# Patient Record
Sex: FEMALE | Race: NATIVE HAWAIIAN OR OTHER PACIFIC ISLANDER | NOT HISPANIC OR LATINO
[De-identification: names, ages, dates, MRNs, and addresses within clinical notes are randomized per-mention and may not be internally consistent; named-entity substitution may affect disease eponyms.]

---

## 2017-04-19 ENCOUNTER — APPOINTMENT (OUTPATIENT)
Dept: HEART AND VASCULAR | Facility: CLINIC | Age: 67
End: 2017-04-19

## 2021-08-20 ENCOUNTER — LABORATORY RESULT (OUTPATIENT)
Age: 71
End: 2021-08-20

## 2021-08-20 ENCOUNTER — NON-APPOINTMENT (OUTPATIENT)
Age: 71
End: 2021-08-20

## 2021-08-20 ENCOUNTER — APPOINTMENT (OUTPATIENT)
Dept: INTERNAL MEDICINE | Facility: CLINIC | Age: 71
End: 2021-08-20
Payer: MEDICARE

## 2021-08-20 VITALS
OXYGEN SATURATION: 98 % | WEIGHT: 136 LBS | HEIGHT: 61 IN | DIASTOLIC BLOOD PRESSURE: 86 MMHG | SYSTOLIC BLOOD PRESSURE: 142 MMHG | RESPIRATION RATE: 16 BRPM | HEART RATE: 82 BPM | TEMPERATURE: 97.4 F | BODY MASS INDEX: 25.68 KG/M2

## 2021-08-20 DIAGNOSIS — R11.2 NAUSEA WITH VOMITING, UNSPECIFIED: ICD-10-CM

## 2021-08-20 PROCEDURE — G0439: CPT

## 2021-08-20 PROCEDURE — 36415 COLL VENOUS BLD VENIPUNCTURE: CPT

## 2021-08-20 PROCEDURE — 93000 ELECTROCARDIOGRAM COMPLETE: CPT | Mod: 59

## 2021-08-20 PROCEDURE — 99214 OFFICE O/P EST MOD 30 MIN: CPT | Mod: 25

## 2021-08-20 PROCEDURE — 99497 ADVNCD CARE PLAN 30 MIN: CPT

## 2021-08-20 PROCEDURE — G0444 DEPRESSION SCREEN ANNUAL: CPT | Mod: 59

## 2021-08-20 PROCEDURE — G0442 ANNUAL ALCOHOL SCREEN 15 MIN: CPT | Mod: 59

## 2021-08-22 PROBLEM — R11.2 NAUSEA WITH VOMITING: Status: ACTIVE | Noted: 2021-08-22

## 2021-08-22 LAB
25(OH)D3 SERPL-MCNC: 80.2 NG/ML
ALBUMIN SERPL ELPH-MCNC: 4.7 G/DL
ALP BLD-CCNC: 79 U/L
ALT SERPL-CCNC: 19 U/L
ANION GAP SERPL CALC-SCNC: 14 MMOL/L
APPEARANCE: ABNORMAL
AST SERPL-CCNC: 20 U/L
BASOPHILS # BLD AUTO: 0.02 K/UL
BASOPHILS NFR BLD AUTO: 0.4 %
BILIRUB SERPL-MCNC: 0.7 MG/DL
BILIRUBIN URINE: NEGATIVE
BLOOD URINE: NEGATIVE
BUN SERPL-MCNC: 15 MG/DL
CALCIUM SERPL-MCNC: 10.3 MG/DL
CHLORIDE SERPL-SCNC: 104 MMOL/L
CHOLEST SERPL-MCNC: 215 MG/DL
CO2 SERPL-SCNC: 24 MMOL/L
COLOR: YELLOW
CREAT SERPL-MCNC: 0.72 MG/DL
EOSINOPHIL # BLD AUTO: 0.11 K/UL
EOSINOPHIL NFR BLD AUTO: 2 %
ESTIMATED AVERAGE GLUCOSE: 94 MG/DL
GGT SERPL-CCNC: 13 U/L
GLUCOSE QUALITATIVE U: NEGATIVE
GLUCOSE SERPL-MCNC: 97 MG/DL
HBA1C MFR BLD HPLC: 4.9 %
HCT VFR BLD CALC: 47 %
HDLC SERPL-MCNC: 62 MG/DL
HGB BLD-MCNC: 15.3 G/DL
IMM GRANULOCYTES NFR BLD AUTO: 0.4 %
KETONES URINE: NEGATIVE
LDLC SERPL CALC-MCNC: 127 MG/DL
LEUKOCYTE ESTERASE URINE: NEGATIVE
LYMPHOCYTES # BLD AUTO: 1.98 K/UL
LYMPHOCYTES NFR BLD AUTO: 35.3 %
MAN DIFF?: NORMAL
MCHC RBC-ENTMCNC: 30.8 PG
MCHC RBC-ENTMCNC: 32.6 GM/DL
MCV RBC AUTO: 94.8 FL
MONOCYTES # BLD AUTO: 0.51 K/UL
MONOCYTES NFR BLD AUTO: 9.1 %
NEUTROPHILS # BLD AUTO: 2.97 K/UL
NEUTROPHILS NFR BLD AUTO: 52.8 %
NITRITE URINE: NEGATIVE
NONHDLC SERPL-MCNC: 153 MG/DL
PH URINE: 7
PLATELET # BLD AUTO: 218 K/UL
POTASSIUM SERPL-SCNC: 3.8 MMOL/L
PROT SERPL-MCNC: 6.7 G/DL
PROTEIN URINE: NEGATIVE
RBC # BLD: 4.96 M/UL
RBC # FLD: 13.5 %
SODIUM SERPL-SCNC: 142 MMOL/L
SPECIFIC GRAVITY URINE: 1.02
TRIGL SERPL-MCNC: 128 MG/DL
TSH SERPL-ACNC: 2.04 UIU/ML
UROBILINOGEN URINE: NORMAL
WBC # FLD AUTO: 5.61 K/UL

## 2021-08-22 NOTE — DATA REVIEWED
[FreeTextEntry1] : Cholesterol 215  vitamin D is 80.. EKG normal sinus rhythm no acute findings.  Questionable read of anteroseptal infarct age-indeterminate.  No history of MIs low risk patient.

## 2021-08-22 NOTE — HISTORY OF PRESENT ILLNESS
[FreeTextEntry1] : annual wellness, f/u, GHM, hx of lipids, gluc intol, osteoporosis, blood work [de-identified] : 69 yr old female comes for an annual wellness, general health maintenance of lipids, glucose intolerance, osteoporosis, routine blood work.\par \par Pt is fully vacinated against covid-19\par Is fasting for blood work\par Patient became lightheaded and nauseous with dry heaves prior to blood test.. Vital signs stable patient feels she was anxious and fasting from last night made her feel unwell.\par ROS as documented below.  Patient denies fever chills cough shortness of breath. Patient is now retired from work.\par States that she tries to exercise\par Voices no further complaints.

## 2021-08-22 NOTE — COUNSELING
[Fall prevention counseling provided] : Fall prevention counseling provided [Adequate lighting] : Adequate lighting [No throw rugs] : No throw rugs [Use proper foot wear] : Use proper foot wear [Use recommended devices] : Use recommended devices [Behavioral health counseling provided] : Behavioral health counseling provided [Sleep ___ hours/day] : Sleep [unfilled] hours/day [Engage in a relaxing activity] : Engage in a relaxing activity [Plan in advance] : Plan in advance [de-identified] : Medical Annual wellness visit completed:\par HRA completed and reviewed with patient\par Medical, family, surgical history reviewed with patient and updated\par List of current providers r/w patient and updated\par Vitals, BMI reviewed and discussed along with healthy BMI goals. Dietary counseling x 15 minutes provided\par Depression PHQ 9 completed and reviewed \par Annual safety assessment reviewed\par discussed advanced directives\par smoking cessation counseling provided\par Established routine screening and immunization schedules\par \par VACCINATION & OTHER TX RECOMMENDATIONS\par \par ASA preventative therapy\par Calcium/Vitamin D supplementation\par  \par Dietary counseling, nutrition referral\par risks vs. benefits d/w patient. routine vaccination and vaccination schedules and recommendation d/w patient\par \par Vaccines recommended: \par * pneumovax (once after 65) & prevnar\par * annual Influenza vaccine\par * Hep B vaccines\par * zostavax\par * Tdap\par \par Colorectal screening recommended; screening colonoscopy q10yr, flex sig q5yr, annual fecal occult testing\par BMD recommended biennially for osteoporosis screening\par Glaucoma screening recommended, annual optho evals\par Cardiovascular screening and blood tests recommended and discussed w/ patient, cholesterol screening and dietary counseling\par AAA recommended x 1\par \par Met with MOISÉS MAYO, who was willing to discuss advance care planning. \par Our advance care planning conversation included a discussion about:\par 1. The value and importance of advance care planning.\par 2. Experiences with loved ones who have been seriously ill or have .\par 3. Exploration of personal, cultural, or spiritual beliefs that might influence medical decisions.\par 4. Exploration of goals of care in the event of a sudden injury or illness.\par 5. Identification of a health care agent.\par 6. Review and update, or completion of, an advance directive.\par Start time: 10 End time: 1015\par \par diet and exercise weight loss.  Low-salt low-fat ADA diet/ htn- Discussed diabetes physiology\par - Discussed importance of monitoring blood glucose levels\par - Encouraged a low fat/low cholesterol diet\par - Discussed symptoms of hyperglycemia and hypoglycemia\par - Discussed ADA glucose goals\par - Discussed  HGB A1c and the effects of blood glucose on the level\par - Discussed Healthy eating, avoidance of concentrated sweets, and to include vegetables by at least 2 meals a day\par - Discussed regular exercise\par - Discussed importance of follow up physician visits Limit intake of Sodium (Salt) to less than 2 grams a day to prevent fluid retention-swelling or worsening of symptoms. The importance of keeping the blood pressure at or below 130/80 to prevent stroke, heart attacks, kidney failure, blindness, and loss of limbs was  low chol diet. Avoid fried foods, red meat, butter, eggs, hard cheeses. Use canola or olive oil preferred. ::  was established in which goals would be set, monitoring would be done, and problem solving would also be addressed. The patient would be assisted using behavior change techniques, such as self-help and counseling through behavioral modification: Problem solving using hypnosis and positive medical reinforcement to achieve agreed-upon goals.\par \par Symptomatic patients : Test for influenza, if positive, treat for influenza and do not continue below. \par 1. Fever plus cough or shortness of breath : Test for RVP and COVID-19.\par 2.Indirect, circumstantial or unclear exposure to COVID-19, or other concerning cases not meeting above criteria: Please call [facility] to discuss testing. \par +++ All above cases must be reported to the [facility] registry. +++\par \par Asymptomatic patients: \par 1. Known first-degree direct-contact exposure to positive COVID-19 patient but asymptomatic: No testing PLUS 14 day self-quarantine. Pt to call if symptoms develop. Report to [facility] Registry.\par 2. No known exposure and asymptomatic, referred from outside healthcare organization: Please call [facility] to discuss testing. \par 3.All other asymptomatic patients with no known exposures: no testing, no exceptions.\par \par \par  [None] : None

## 2021-08-22 NOTE — REASON FOR VISIT
[Annual Wellness Visit] : an annual wellness visit [FreeTextEntry1] : Annual wellness exam blood work

## 2021-08-22 NOTE — ASSESSMENT
[FreeTextEntry1] : Qatari-speaking female was not mild distress due to anxiety and nausea prior to blood test.  Patient felt better after blood tests were given and patient had fluids.  Patient denies fever chills cough chest pain or shortness of breath she is fully vaccinated.

## 2021-08-22 NOTE — HEALTH RISK ASSESSMENT
[Good] : ~his/her~  mood as  good [Never (0 pts)] : Never (0 points) [No] : In the past 12 months have you used drugs other than those required for medical reasons? No [No falls in past year] : Patient reported no falls in the past year [0] : 2) Feeling down, depressed, or hopeless: Not at all (0) [PHQ-2 Negative - No further assessment needed] : PHQ-2 Negative - No further assessment needed [I have developed a follow-up plan documented below in the note.] : I have developed a follow-up plan documented below in the note. [Patient reported colonoscopy was normal] : Patient reported colonoscopy was normal [None] : None [Patient reported mammogram was normal] : Patient reported mammogram was normal [Patient reported PAP Smear was normal] : Patient reported PAP Smear was normal [Patient reported bone density results were normal] : Patient reported bone density results were normal [Alone] : lives alone [Retired] : retired [College] : College [Single] : single [Feels Safe at Home] : Feels safe at home [Fully functional (bathing, dressing, toileting, transferring, walking, feeding)] : Fully functional (bathing, dressing, toileting, transferring, walking, feeding) [Fully functional (using the telephone, shopping, preparing meals, housekeeping, doing laundry, using] : Fully functional and needs no help or supervision to perform IADLs (using the telephone, shopping, preparing meals, housekeeping, doing laundry, using transportation, managing medications and managing finances) [With Patient/Caregiver] : , with patient/caregiver [Designated Healthcare Proxy] : Designated healthcare proxy [Name: ___] : Health Care Proxy's Name: [unfilled]  [Relationship: ___] : Relationship: [unfilled] [I will adhere to the patient's wishes.] : I will adhere to the patient's wishes. [Time Spent: ___ minutes] : Time Spent: [unfilled] minutes [Yes] : Yes [] : No [Monthly or less (1 pt)] : Monthly or less (1 point) [Audit-CScore] : 1 [de-identified] :  Diet [de-identified] : Walking [[id number]] : 0 [Change in mental status noted] : No change in mental status noted [Language] : denies difficulty with language [Behavior] : denies difficulty with behavior [Learning/Retaining New Information] : denies difficulty learning/retaining new information [Spatial Ability and Orientation] : denies difficulty with spatial ability and orientation [Sexually Active] : not sexually active [Reports changes in hearing] : Reports no changes in hearing [Reports changes in vision] : Reports no changes in vision [MammogramDate] : 2021 [BoneDensityDate] : 2021 [PapSmearDate] : 2021 [ColonoscopyDate] : 2021 [FreeTextEntry2] : Teacher [AdvancecareDate] : 08/21 [FreeTextEntry4] : Patient declines DNR

## 2021-08-25 ENCOUNTER — NON-APPOINTMENT (OUTPATIENT)
Age: 71
End: 2021-08-25

## 2021-09-20 ENCOUNTER — APPOINTMENT (OUTPATIENT)
Dept: INTERNAL MEDICINE | Facility: CLINIC | Age: 71
End: 2021-09-20
Payer: MEDICARE

## 2021-09-20 VITALS
SYSTOLIC BLOOD PRESSURE: 128 MMHG | HEART RATE: 78 BPM | TEMPERATURE: 97.5 F | RESPIRATION RATE: 16 BRPM | BODY MASS INDEX: 25.49 KG/M2 | HEIGHT: 61 IN | WEIGHT: 135 LBS | DIASTOLIC BLOOD PRESSURE: 64 MMHG | OXYGEN SATURATION: 98 %

## 2021-09-20 PROCEDURE — G0008: CPT

## 2021-09-20 PROCEDURE — 90662 IIV NO PRSV INCREASED AG IM: CPT

## 2021-09-22 ENCOUNTER — TRANSCRIPTION ENCOUNTER (OUTPATIENT)
Age: 71
End: 2021-09-22

## 2022-02-19 DIAGNOSIS — Z87.891 PERSONAL HISTORY OF NICOTINE DEPENDENCE: ICD-10-CM

## 2022-02-19 DIAGNOSIS — Z87.39 PERSONAL HISTORY OF OTHER DISEASES OF THE MUSCULOSKELETAL SYSTEM AND CONNECTIVE TISSUE: ICD-10-CM

## 2022-02-19 DIAGNOSIS — Z80.0 FAMILY HISTORY OF MALIGNANT NEOPLASM OF DIGESTIVE ORGANS: ICD-10-CM

## 2022-02-24 ENCOUNTER — NON-APPOINTMENT (OUTPATIENT)
Age: 72
End: 2022-02-24

## 2022-02-28 ENCOUNTER — APPOINTMENT (OUTPATIENT)
Dept: INTERNAL MEDICINE | Facility: CLINIC | Age: 72
End: 2022-02-28
Payer: MEDICARE

## 2022-02-28 VITALS
OXYGEN SATURATION: 97 % | HEART RATE: 80 BPM | HEIGHT: 61 IN | BODY MASS INDEX: 26.81 KG/M2 | RESPIRATION RATE: 17 BRPM | TEMPERATURE: 96.2 F | WEIGHT: 142 LBS | SYSTOLIC BLOOD PRESSURE: 116 MMHG | DIASTOLIC BLOOD PRESSURE: 78 MMHG

## 2022-02-28 DIAGNOSIS — M77.9 ENTHESOPATHY, UNSPECIFIED: ICD-10-CM

## 2022-02-28 DIAGNOSIS — G56.00 CARPAL TUNNEL SYNDROME, UNSPECIFIED UPPER LIMB: ICD-10-CM

## 2022-02-28 PROCEDURE — 99213 OFFICE O/P EST LOW 20 MIN: CPT

## 2022-03-01 NOTE — HEALTH RISK ASSESSMENT
[Good] : ~his/her~  mood as  good [Never] : Never [No] : No [No falls in past year] : Patient reported no falls in the past year [0] : 2) Feeling down, depressed, or hopeless: Not at all (0) [Change in mental status noted] : No change in mental status noted [Language] : denies difficulty with language [Behavior] : denies difficulty with behavior [Learning/Retaining New Information] : denies difficulty learning/retaining new information [Handling Complex Tasks] : denies difficulty handling complex tasks [Spatial Ability and Orientation] : denies difficulty with spatial ability and orientation [None] : None [Alone] : lives alone [College] : College [] :  [Fully functional (bathing, dressing, toileting, transferring, walking, feeding)] : Fully functional (bathing, dressing, toileting, transferring, walking, feeding) [Fully functional (using the telephone, shopping, preparing meals, housekeeping, doing laundry, using] : Fully functional and needs no help or supervision to perform IADLs (using the telephone, shopping, preparing meals, housekeeping, doing laundry, using transportation, managing medications and managing finances) [I will adhere to the patient's wishes.] : I will adhere to the patient's wishes. [FreeTextEntry4] : Patient was given the months and advance care booklet for personal review and then she will return to office for decision-making regarding advance care

## 2022-03-01 NOTE — HISTORY OF PRESENT ILLNESS
[FreeTextEntry1] : F/U,GHM Hx of hyperlipidemia,glucose intolerance and osteoporosis.Patient is here today because she has been having mild pain from her forearm down to her left hand (trembling of her left thumb more). CC: Tremors of left hand.  Pain is 1st 3 fingers with numbness.  Patient also has concerns about healthcare proxy and end-of-life decisions. [de-identified] : 71 year old female presents herself today for a F/U,GHM.\par Hx of hyperlipidemia,glucose intolerance and osteoporosis.\par \par Patient is here today because she has been having mild pain from her forearm down to her left hand palm (trembling of her left thumb more) for about two weeks already. \par She is also here to discuss about her health care proxy. \par She is fully vaccinated for covid-19. \par Otherwise patient is stable \par \par Voices no further complaints.\par ROS as documented below. \par Denies fever,cough,chills,body aches and SOB.

## 2022-03-12 ENCOUNTER — APPOINTMENT (OUTPATIENT)
Dept: INTERNAL MEDICINE | Facility: CLINIC | Age: 72
End: 2022-03-12
Payer: MEDICARE

## 2022-03-12 VITALS
TEMPERATURE: 99.5 F | BODY MASS INDEX: 26.81 KG/M2 | HEART RATE: 84 BPM | RESPIRATION RATE: 18 BRPM | DIASTOLIC BLOOD PRESSURE: 82 MMHG | WEIGHT: 142 LBS | OXYGEN SATURATION: 98 % | SYSTOLIC BLOOD PRESSURE: 116 MMHG | HEIGHT: 61 IN

## 2022-03-12 PROCEDURE — 99213 OFFICE O/P EST LOW 20 MIN: CPT

## 2022-03-12 NOTE — ASSESSMENT
[FreeTextEntry1] : Dog bite to forearm left questionable dermatitis  concurrent start Augmentin 875–125 for 7 days.  Start Medrol Dosepak as directed.  Close follow-up in 3 days with Dr. Meredith.  I advised patient to report to ER if has any worsening of rash swelling or discharging or fever.  Patient verbalized understanding.  Patient declined Tdap had it 2 years ago\par Follow-up 3 days

## 2022-03-12 NOTE — PHYSICAL EXAM
[Normal] : normal rate, regular rhythm, normal S1 and S2 and no murmur heard [de-identified] : Left forearm area of erythema 5 x 5 cm with blistering rash

## 2022-03-12 NOTE — HISTORY OF PRESENT ILLNESS
[de-identified] : Patient presents complaining of dog bite yesterday to left forearm.  Patient was bit by her dog who is up-to-date with immunizations.  Patient shortly after started experiencing significant swelling  states that she was not bleeding however itching and blistering of the skin.  Denies any fever discharge.  Last tetanus vaccine was few years ago [FreeTextEntry1] : Dog bite

## 2022-03-14 ENCOUNTER — APPOINTMENT (OUTPATIENT)
Dept: INTERNAL MEDICINE | Facility: CLINIC | Age: 72
End: 2022-03-14
Payer: MEDICARE

## 2022-03-14 VITALS
WEIGHT: 142 LBS | RESPIRATION RATE: 17 BRPM | HEIGHT: 61 IN | BODY MASS INDEX: 26.81 KG/M2 | SYSTOLIC BLOOD PRESSURE: 122 MMHG | DIASTOLIC BLOOD PRESSURE: 74 MMHG | OXYGEN SATURATION: 98 % | TEMPERATURE: 97.6 F | HEART RATE: 82 BPM

## 2022-03-14 DIAGNOSIS — S51.859A OPEN BITE OF UNSPECIFIED FOREARM, INITIAL ENCOUNTER: ICD-10-CM
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
## 2022-03-14 DIAGNOSIS — T14.8XXA OTHER INJURY OF UNSPECIFIED BODY REGION, INITIAL ENCOUNTER: ICD-10-CM

## 2022-03-14 PROCEDURE — 99213 OFFICE O/P EST LOW 20 MIN: CPT

## 2022-03-15 PROBLEM — S51.859A DOG BITE OF FOREARM: Status: ACTIVE | Noted: 2022-03-12

## 2022-03-15 PROBLEM — T14.8XXA SKIN ABRASION: Status: ACTIVE | Noted: 2022-03-15

## 2022-03-15 NOTE — PHYSICAL EXAM
[No Acute Distress] : no acute distress [Well Nourished] : well nourished [Well Developed] : well developed [Well-Appearing] : well-appearing [Normal Sclera/Conjunctiva] : normal sclera/conjunctiva [PERRL] : pupils equal round and reactive to light [EOMI] : extraocular movements intact [Normal Outer Ear/Nose] : the outer ears and nose were normal in appearance [Normal Oropharynx] : the oropharynx was normal [No JVD] : no jugular venous distention [No Lymphadenopathy] : no lymphadenopathy [Supple] : supple [Thyroid Normal, No Nodules] : the thyroid was normal and there were no nodules present [No Respiratory Distress] : no respiratory distress  [No Accessory Muscle Use] : no accessory muscle use [Clear to Auscultation] : lungs were clear to auscultation bilaterally [Normal Rate] : normal rate  [Regular Rhythm] : with a regular rhythm [Normal S1, S2] : normal S1 and S2 [No Murmur] : no murmur heard [No Carotid Bruits] : no carotid bruits [No Abdominal Bruit] : a ~M bruit was not heard ~T in the abdomen [No Varicosities] : no varicosities [Pedal Pulses Present] : the pedal pulses are present [No Edema] : there was no peripheral edema [No Palpable Aorta] : no palpable aorta [No Extremity Clubbing/Cyanosis] : no extremity clubbing/cyanosis [Soft] : abdomen soft [Non Tender] : non-tender [Non-distended] : non-distended [No Masses] : no abdominal mass palpated [No HSM] : no HSM [Normal Bowel Sounds] : normal bowel sounds [Normal Posterior Cervical Nodes] : no posterior cervical lymphadenopathy [Normal Anterior Cervical Nodes] : no anterior cervical lymphadenopathy [No CVA Tenderness] : no CVA  tenderness [No Spinal Tenderness] : no spinal tenderness [No Joint Swelling] : no joint swelling [Grossly Normal Strength/Tone] : grossly normal strength/tone [No Rash] : no rash [Coordination Grossly Intact] : coordination grossly intact [No Focal Deficits] : no focal deficits [Normal Gait] : normal gait [Deep Tendon Reflexes (DTR)] : deep tendon reflexes were 2+ and symmetric [Normal Affect] : the affect was normal [Normal Insight/Judgement] : insight and judgment were intact [de-identified] : Abrasion on her left forearm near her elbow and a hematoma between her thumb and index finger on her left hand.

## 2022-03-15 NOTE — HISTORY OF PRESENT ILLNESS
[FreeTextEntry1] : F/U,GHM Hx of hyperlipidemia,glucose intolerance and osteoporosis. Patient is here for a routine follow up on an abrasion that she has on her left forearm.  The lesion developed blisters, she wears an elbow brace perhaps had an allergic reaction to the brace.  Patient also was bitten on her hand by her dog has a black and blue serg [de-identified] : 71 year old female presents herself today for a F/U,GHM.\par Hx of hyperlipidemia,glucose intolerance and osteoporosis.\par \par Patient is here for a routine follow up because she has a itchy patch on her left forearm and has been taking oral steroids (Medrol dose padmini). Patient thought at first it was a dog bite but now she thinks it may be something else.  (Allergic reaction to elbow brace)\par She is fully vaccinated for covid-19. \par Otherwise patient is stable.\par \par Voices no further complaints.\par ROS as documented below. \par Denies fever,cough,chills,body aches and SOB.

## 2022-03-15 NOTE — HEALTH RISK ASSESSMENT
[Good] : ~his/her~  mood as  good [0] : 2) Feeling down, depressed, or hopeless: Not at all (0) [None] : None [Change in mental status noted] : No change in mental status noted [Language] : denies difficulty with language [Handling Complex Tasks] : denies difficulty handling complex tasks [Reasoning] : denies difficulty with reasoning [Spatial Ability and Orientation] : denies difficulty with spatial ability and orientation

## 2022-03-23 ENCOUNTER — APPOINTMENT (OUTPATIENT)
Dept: INTERNAL MEDICINE | Facility: CLINIC | Age: 72
End: 2022-03-23
Payer: MEDICARE

## 2022-03-23 VITALS
BODY MASS INDEX: 26.62 KG/M2 | OXYGEN SATURATION: 98 % | WEIGHT: 141 LBS | HEIGHT: 61 IN | DIASTOLIC BLOOD PRESSURE: 72 MMHG | SYSTOLIC BLOOD PRESSURE: 120 MMHG | RESPIRATION RATE: 17 BRPM | HEART RATE: 83 BPM | TEMPERATURE: 98.6 F

## 2022-03-23 PROCEDURE — 99213 OFFICE O/P EST LOW 20 MIN: CPT

## 2022-03-24 ENCOUNTER — NON-APPOINTMENT (OUTPATIENT)
Age: 72
End: 2022-03-24

## 2022-03-24 NOTE — HISTORY OF PRESENT ILLNESS
[FreeTextEntry1] : F/U,GHM Hx of hyperlipidemia,glucose intolerance and osteoporosis\par Acute complaint: s/p fall [de-identified] : 71 year old female presents herself today for a F/U,GHM.\par Hx of hyperlipidemia,glucose intolerance and osteoporosis.\par \par s/p fall, pt was pushing a daja with some boxes, the daja was stuck, pt hit her chest (sternal region) on the boxes, and fell onto her left side. She denies head injury, dizziness, nausea, vomiting, SOB and radiating pain. She denies any left side pain. \par Fall was mechanical.\par \par She is fully vaccinated for covid-19. \par Otherwise patient is stable.\par \par Voices no further complaints.\par ROS as documented below. \par Denies fever,cough,chills,body aches and SOB.

## 2022-03-24 NOTE — COUNSELING
[Fall prevention counseling provided] : Fall prevention counseling provided [Adequate lighting] : Adequate lighting [No throw rugs] : No throw rugs [Use proper foot wear] : Use proper foot wear [de-identified] : 1. Make an appointment with your doctor\par Begin your fall-prevention plan by making an appointment with your doctor. Be prepared to answer questions such as:\par \par What medications are you taking? Make a list of your prescription and over-the-counter medications and supplements, or bring them with you to the appointment. Your doctor can review your medications for side effects and interactions that may increase your risk of falling. To help with fall prevention, your doctor may consider weaning you off medications that make you tired or affect your thinking, such as sedatives and some types of antidepressants.\par Have you fallen before? Write down the details, including when, where and how you fell. Be prepared to discuss instances when you almost fell but were caught by someone or managed to grab hold of something just in time. Details such as these may help your doctor identify specific fall-prevention strategies.\par Could your health conditions cause a fall? Certain eye and ear disorders may increase your risk of falls. Be prepared to discuss your health conditions and how comfortable you are when you walk - for example, do you feel any dizziness, joint pain, shortness of breath, or numbness in your feet and legs when you walk? Your doctor may evaluate your muscle strength, balance and walking style (gait) as well.\par 2. Keep moving\par Physical activity can go a long way toward fall prevention. With your doctor's OK, consider activities such as walking, water workouts or arcadio chi - a gentle exercise that involves slow and graceful dance-like movements. Such activities reduce the risk of falls by improving strength, balance, coordination and flexibility.\par \par If you avoid physical activity because you're afraid it will make a fall more likely, tell your doctor. He or she may recommend carefully monitored exercise programs or refer you to a physical therapist. The physical therapist can create a custom exercise program aimed at improving your balance, flexibility, muscle strength and gait.\par \par 3. Wear sensible shoes\par Consider changing your footwear as part of your fall-prevention plan. High heels, floppy slippers and shoes with slick soles can make you slip, stumble and fall. So can walking in your stocking feet. Instead, wear properly fitting, sturdy shoes with nonskid soles. Sensible shoes may also reduce joint pain.\par \par 4. Remove home hazards\par Take a look around your home. Your living room, kitchen, bedroom, bathroom, hallways and stairways may be filled with hazards. To make your home safer:\par \par Remove boxes, newspapers, electrical cords and phone cords from walkways.\par Move coffee tables, magazine racks and plant stands from high-traffic areas.\par Secure loose rugs with double-faced tape, tacks or a slip-resistant backing - or remove loose rugs from your home.\par Repair loose, wooden floorboards and carpeting right away.\par Store clothing, dishes, food and other necessities within easy reach.\par Immediately clean spilled liquids, grease or food.\par Use nonslip mats in your bathtub or shower. Use a bath seat, which allows you to sit while showering.\par 5. Light up your living space\par Keep your home brightly lit to avoid tripping on objects that are hard to see. Also:\par \par Place night lights in your bedroom, bathroom and hallways.\par Place a lamp within reach of your bed for middle-of-the-night needs.\par Make clear paths to light switches that aren't near room entrances. Consider trading traditional switches for glow-in-the-dark or illuminated switches.\par Turn on the lights before going up or down stairs.\par Store flashlights in easy-to-find places in case of power outages.\par 6. Use assistive devices\par Your doctor might recommend using a cane or walker to keep you steady. Other assistive devices can help, too. For example:\par \par Hand rails for both sides of stairways\par Nonslip treads for bare-wood steps\par A raised toilet seat or one with armrests\par Grab bars for the shower or tub\par A sturdy plastic seat for the shower or tub - plus a hand-held shower nozzle for bathing while sitting down\par If necessary, ask your doctor for a referral to an occupational therapist. He or she can help you brainstorm other fall-prevention strategies. Some solutions are easily installed and relatively inexpensive. Others may require professional help or a larger investment. If you're concerned about the cost, remember that an investment in fall prevention is an investment in your independence.\par \par \par

## 2022-03-24 NOTE — PHYSICAL EXAM
[No Acute Distress] : no acute distress [Well Nourished] : well nourished [Well Developed] : well developed [Well-Appearing] : well-appearing [Normal Sclera/Conjunctiva] : normal sclera/conjunctiva [PERRL] : pupils equal round and reactive to light [EOMI] : extraocular movements intact [Normal Outer Ear/Nose] : the outer ears and nose were normal in appearance [Normal Oropharynx] : the oropharynx was normal [No JVD] : no jugular venous distention [No Lymphadenopathy] : no lymphadenopathy [Supple] : supple [Thyroid Normal, No Nodules] : the thyroid was normal and there were no nodules present [No Respiratory Distress] : no respiratory distress  [No Accessory Muscle Use] : no accessory muscle use [Clear to Auscultation] : lungs were clear to auscultation bilaterally [Normal Rate] : normal rate  [Regular Rhythm] : with a regular rhythm [Normal S1, S2] : normal S1 and S2 [No Murmur] : no murmur heard [No Carotid Bruits] : no carotid bruits [No Abdominal Bruit] : a ~M bruit was not heard ~T in the abdomen [No Varicosities] : no varicosities [Pedal Pulses Present] : the pedal pulses are present [No Edema] : there was no peripheral edema [No Palpable Aorta] : no palpable aorta [No Extremity Clubbing/Cyanosis] : no extremity clubbing/cyanosis [Soft] : abdomen soft [Non Tender] : non-tender [Non-distended] : non-distended [No Masses] : no abdominal mass palpated [No HSM] : no HSM [Normal Bowel Sounds] : normal bowel sounds [Normal Posterior Cervical Nodes] : no posterior cervical lymphadenopathy [Normal Anterior Cervical Nodes] : no anterior cervical lymphadenopathy [No CVA Tenderness] : no CVA  tenderness [No Spinal Tenderness] : no spinal tenderness [No Joint Swelling] : no joint swelling [Grossly Normal Strength/Tone] : grossly normal strength/tone [No Rash] : no rash [Coordination Grossly Intact] : coordination grossly intact [No Focal Deficits] : no focal deficits [Normal Gait] : normal gait [Deep Tendon Reflexes (DTR)] : deep tendon reflexes were 2+ and symmetric [Normal Affect] : the affect was normal [Normal Insight/Judgement] : insight and judgment were intact [de-identified] : mild tenderness to anterior chest wall to palpation, no crepitus, no ecchymosis

## 2022-04-04 ENCOUNTER — NON-APPOINTMENT (OUTPATIENT)
Age: 72
End: 2022-04-04

## 2022-04-04 ENCOUNTER — APPOINTMENT (OUTPATIENT)
Dept: INTERNAL MEDICINE | Facility: CLINIC | Age: 72
End: 2022-04-04
Payer: MEDICARE

## 2022-04-04 VITALS
TEMPERATURE: 97.8 F | BODY MASS INDEX: 26.62 KG/M2 | OXYGEN SATURATION: 98 % | SYSTOLIC BLOOD PRESSURE: 124 MMHG | HEART RATE: 82 BPM | DIASTOLIC BLOOD PRESSURE: 74 MMHG | RESPIRATION RATE: 17 BRPM | WEIGHT: 141 LBS | HEIGHT: 61 IN

## 2022-04-04 PROCEDURE — 99213 OFFICE O/P EST LOW 20 MIN: CPT | Mod: 25

## 2022-04-04 NOTE — DATA REVIEWED
[FreeTextEntry1] : Chest x-ray reveals no fractures of sternum or ribs does reveal mild COPD spirometry reveals FEV1/F VC of 76% predicted.  FEV1 2.3 and 2.2,

## 2022-04-04 NOTE — COUNSELING
[Adequate lighting] : Adequate lighting [No throw rugs] : No throw rugs [Use proper foot wear] : Use proper foot wear [Sleep ___ hours/day] : Sleep [unfilled] hours/day [Engage in a relaxing activity] : Engage in a relaxing activity [Plan in advance] : Plan in advance [None] : None [de-identified] : Urged to stop smoking offered tx options w. nictine gum , patches, pharmacotherapy, accupunture, hypnosis, and b-mod , dscd risks of smoking.\par \par \par Symptomatic patients : Test for influenza, if positive, treat for influenza and do not continue below. \par 1. Fever plus cough or shortness of breath : Test for RVP and COVID-19.\par 2.Indirect, circumstantial or unclear exposure to COVID-19, or other concerning cases not meeting above criteria: Please call [facility] to discuss testing. \par +++ All above cases must be reported to the [facility] registry. +++\par \par Asymptomatic patients: \par 1. Known first-degree direct-contact exposure to positive COVID-19 patient but asymptomatic: No testing PLUS 14 day self-quarantine. Pt to call if symptoms develop. Report to [facility] Registry.\par 2. No known exposure and asymptomatic, referred from outside healthcare organization: Please call [facility] to discuss testing. \par 3.All other asymptomatic patients with no known exposures: no testing, no exceptions.\par \par take medications as advised. Rest, ice/heat massage therapy/myofascial  release no heavy lift or twisting avoid prolonged positioning. We reviewed proper lifting mechanics continue with a home stretching program. Return to office in 2 weeks\par \par - Discussed diabetes physiology\par - Discussed importance of monitoring blood glucose levels\par - Encouraged a low fat/low cholesterol diet\par - Discussed symptoms of hyperglycemia and hypoglycemia\par - Discussed ADA glucose goals\par - Discussed  HGB A1c and the effects of blood glucose on the level\par - Discussed Healthy eating, avoidance of concentrated sweets, and to include vegetables by at least 2 meals a day\par - Discussed regular exercise\par - Discussed importance of follow up physician visits\par \par \par \par low chol diet. Avoid fried foods, red meat, butter, eggs, hard cheeses. Use canola or olive oil preferred.\par \par  - Encouraged a low fat/low cholesterol diet\par  - Discussed Healthy eating, avoidance of concentrated sweets, and to include vegetables by at least 3 meals a day\par  - encouraged low glycemic fruits, grains and vegetables and a diet high in plant protein\par  - Discussed regular exercise\par  - Discussed importance of follow up physician visits\par Face-to-face counseling 10 minutes\par

## 2022-04-04 NOTE — HEALTH RISK ASSESSMENT
[Good] : ~his/her~ current health as good [Former] : Former [Yes] : Yes [One fall no injury in past year] : Patient reported one fall in the past year without injury [0] : 2) Feeling down, depressed, or hopeless: Not at all (0) [None] : None [Alone] : lives alone [Retired] : retired [High School] : high school [] :  [Fully functional (bathing, dressing, toileting, transferring, walking, feeding)] : Fully functional (bathing, dressing, toileting, transferring, walking, feeding) [Fully functional (using the telephone, shopping, preparing meals, housekeeping, doing laundry, using] : Fully functional and needs no help or supervision to perform IADLs (using the telephone, shopping, preparing meals, housekeeping, doing laundry, using transportation, managing medications and managing finances) [Reviewed no changes] : Reviewed, no changes [Change in mental status noted] : No change in mental status noted [Behavior] : denies difficulty with behavior [Learning/Retaining New Information] : denies difficulty learning/retaining new information [Handling Complex Tasks] : denies difficulty handling complex tasks [Reasoning] : denies difficulty with reasoning [Spatial Ability and Orientation] : denies difficulty with spatial ability and orientation [Sexually Active] : not sexually active

## 2022-04-04 NOTE — HISTORY OF PRESENT ILLNESS
[FreeTextEntry1] : F/U,[facility] Hx of hyperlipidemia,glucose intolerance and osteoporosis.  Patient is status post trauma to chest wall has pain of sternum and left fifth rib.  X-rays ruled out fracture however they confirm that she has mild COPD.\par  [de-identified] : 71 year old female presents herself today for a F/U,[facility].\par Hx of hyperlipidemia,glucose intolerance and osteoporosis.\par \par s/p fall, pt was pushing a daja with some boxes, the daja was stuck, pt hit her chest (sternal region) on the boxes, and fell onto her left side.\par Pain on the bone is going away, but she still has some pain and goes under her armpit and then back. \par X-ray doesn’t show anything broken.\par on the xray showed pulmonary issues.  Mild COPD\par booster COVID shot begingi of may \par showed mild COPD and smoked 40 year ago. \par SOB after her fall.\par trauma to the chest wall.\par traumatized 5 rib goes to thoraic vertabeta 5 \par aleve or motrin\par \par \par \par \par . She denies head injury, dizziness, nausea, vomiting, SOB and radiating pain. She denies any left side pain. \par Fall was mechanical.\par \par She is fully vaccinated for covid-19. \par Otherwise patient is stable.\par \par Voices no further complaints.\par ROS as documented below. \par Denies fever,cough,chills,body aches and SOB.

## 2022-04-04 NOTE — REVIEW OF SYSTEMS
[Negative] : Heme/Lymph [Chest Pain] : chest pain [Shortness Of Breath] : shortness of breath [Discharge] : no discharge [FreeTextEntry5] : Musculoskeletal [FreeTextEntry6] : Shortness of breath since trauma to chest wall due to splinting [FreeTextEntry9] : Tenderness left lateral sternum left fifth rib

## 2022-04-04 NOTE — PHYSICAL EXAM
[No Acute Distress] : no acute distress [Well Nourished] : well nourished [Well Developed] : well developed [Well-Appearing] : well-appearing [Normal Sclera/Conjunctiva] : normal sclera/conjunctiva [PERRL] : pupils equal round and reactive to light [EOMI] : extraocular movements intact [Normal Outer Ear/Nose] : the outer ears and nose were normal in appearance [Normal Oropharynx] : the oropharynx was normal [No JVD] : no jugular venous distention [No Lymphadenopathy] : no lymphadenopathy [Supple] : supple [Thyroid Normal, No Nodules] : the thyroid was normal and there were no nodules present [No Respiratory Distress] : no respiratory distress  [No Accessory Muscle Use] : no accessory muscle use [Clear to Auscultation] : lungs were clear to auscultation bilaterally [Normal Rate] : normal rate  [Regular Rhythm] : with a regular rhythm [Normal S1, S2] : normal S1 and S2 [No Murmur] : no murmur heard [No Carotid Bruits] : no carotid bruits [No Abdominal Bruit] : a ~M bruit was not heard ~T in the abdomen [No Varicosities] : no varicosities [Pedal Pulses Present] : the pedal pulses are present [No Edema] : there was no peripheral edema [No Palpable Aorta] : no palpable aorta [No Extremity Clubbing/Cyanosis] : no extremity clubbing/cyanosis [Soft] : abdomen soft [Non Tender] : non-tender [Non-distended] : non-distended [No Masses] : no abdominal mass palpated [No HSM] : no HSM [Normal Bowel Sounds] : normal bowel sounds [Normal Posterior Cervical Nodes] : no posterior cervical lymphadenopathy [Normal Anterior Cervical Nodes] : no anterior cervical lymphadenopathy [No CVA Tenderness] : no CVA  tenderness [No Spinal Tenderness] : no spinal tenderness [No Joint Swelling] : no joint swelling [Grossly Normal Strength/Tone] : grossly normal strength/tone [No Rash] : no rash [Coordination Grossly Intact] : coordination grossly intact [No Focal Deficits] : no focal deficits [Normal Gait] : normal gait [Deep Tendon Reflexes (DTR)] : deep tendon reflexes were 2+ and symmetric [Normal Affect] : the affect was normal [Normal Insight/Judgement] : insight and judgment were intact [Normal] : soft, non-tender, non-distended, no masses palpated, no HSM and normal bowel sounds [de-identified] : Splinting noted with deep breaths [de-identified] : Tenderness left fifth rib left costochondral angle.

## 2022-04-07 ENCOUNTER — APPOINTMENT (OUTPATIENT)
Dept: INTERNAL MEDICINE | Facility: CLINIC | Age: 72
End: 2022-04-07

## 2022-04-11 ENCOUNTER — APPOINTMENT (OUTPATIENT)
Dept: PHYSICAL MEDICINE AND REHAB | Facility: CLINIC | Age: 72
End: 2022-04-11
Payer: MEDICARE

## 2022-04-11 VITALS
WEIGHT: 141 LBS | TEMPERATURE: 97.1 F | OXYGEN SATURATION: 98 % | DIASTOLIC BLOOD PRESSURE: 72 MMHG | RESPIRATION RATE: 18 BRPM | SYSTOLIC BLOOD PRESSURE: 124 MMHG | HEART RATE: 79 BPM | BODY MASS INDEX: 26.62 KG/M2 | HEIGHT: 61 IN

## 2022-04-11 DIAGNOSIS — S20.219A CONTUSION OF UNSPECIFIED FRONT WALL OF THORAX, INITIAL ENCOUNTER: ICD-10-CM

## 2022-04-11 DIAGNOSIS — M24.9 JOINT DERANGEMENT, UNSPECIFIED: ICD-10-CM

## 2022-04-11 DIAGNOSIS — G57.02 LESION OF SCIATIC NERVE, LEFT LOWER LIMB: ICD-10-CM

## 2022-04-11 PROCEDURE — 99204 OFFICE O/P NEW MOD 45 MIN: CPT

## 2022-04-11 PROCEDURE — 99214 OFFICE O/P EST MOD 30 MIN: CPT

## 2022-04-11 NOTE — HISTORY OF PRESENT ILLNESS
[FreeTextEntry1] : 71 year old female presents with low back  and left shoulder pain\par \par left shoulder pain\par The left shoulder pain began 3 weeks ago.  She fell over forward striking her chest against some boxes.\par Pain:  5/10 Worse: 10/10 Quality: sharp  Frequency: constant\par The pain is over the lateral chest wall anterior axillary line about T5.  The pain is a sharp stabbing sensation.  The pain is worse with cough.\par Often she cannot lay on the left shoulder due to pain.\par \par \par Left low back pain\par She has had pain in the left low back since Saturday.  She denies a fall.\par Pain:  8/10 Worse: 10/10 Quality: sharp  Frequency: constant\par The pain starts over the left lateral low back and the posterior hips.  The pain radiates through the buttock into the side of the leg into the knee.  The pain is worse with sitting.  The pain is not aggravated with walking.  She denies bowel bladder difficulties.\par \par She does not like to take pain medication.  When she has severe pain she takes Tylenol 3 25 mg 1 p.o. every 6 hours.  Pain\par

## 2022-04-11 NOTE — PHYSICAL EXAM
[FreeTextEntry1] : Pleasant, in no distress. Language: English was not female no apparent distress.\par HEENT: Head: no trauma. Eyes: no discharge. Ears: No discharge. Nose No discharge. Throat: clear\par Neck: FAROM. Negative Spurlings\par Heart: RR, +S1, S2\par Lungs: CTA\par Anterior chest wall tender over the fifth intercostal space over the anterior axillary line.  No rash\par Abdomen: soft, NT\par Lumbar spine: Full active range of motion, tenderness over the left SI joint and piriformis.  Nontender over the left lateral hip bursa.\par \par LUE: Shoulder:AROM, nontender to palpation MS 5/5\par Elbow: FAROM, MS 5/5 reflexes 2/4\par Wrist: FAROM, MS 5/5 reflexes 2/4\par Warm, nontender, pulse 2+\par \par RUE:Shoulder:FAROM, MS 5/5\par Elbow: FAROM, MS 5/5 reflexes 2/4\par Wrist: FAROM, MS 5/5 reflexes 2/4\par Warm, nontender, pulse 2+\par \par LLE: Hip: FAROM, MS 5/5\par Knee: FAROM, MS 5/5 reflexes 2/4 positive crepitus of the knee\par Ankle: FAROM, MS 5/5 reflexes 2/4\par Warm , nontender, pulse 2+ negative homans\par \par RLE: Hip: FAROM, MS 5/5\par Knee: FAROM, MS 5/5 reflexes 2/4.  Positive crepitus of the knee\par Ankle: FAROM, MS 5/5 reflexes 2/4\par Warm , nontender, pulse 2+ negative homans\par \par Gait: Spontaneous, reciprocal, safe without an assistive device\par \par Sensation\par RUE: sensation is intact to light touch, pinprick  and proprioception\par LUE: sensation is intact to light touch, pinprick  and proprioception\par RLE: sensation is intact to light touch, pinprick  and proprioception. Neg SLR. Neg OLAF, Neg FADIR\par LLE: sensation is intact to light touch, pinprick  and proprioception. Neg SLR. Neg OLAF, Neg FADIR\par \par

## 2022-04-11 NOTE — DATA REVIEWED
[FreeTextEntry1] : X-ray of the chest March 28, 2022 reveals mild obstructive pulmonary disease.  No fractures.

## 2022-04-11 NOTE — REVIEW OF SYSTEMS
[Joint Pain] : joint pain [Joint Stiffness] : joint stiffness [Muscle Pain] : muscle pain [Chills] : no chills [Red Eyes] : eyes not red [Hearing Loss] : no loss of hearing [Palpitations] : no palpitations [Wheezing] : no wheezing [Nausea] : no nausea [Incontinence] : no incontinence [Skin Wound] : no skin wound [Dizziness] : no dizziness [Insomnia] : no insomnia [Easy Bruising] : no tendency for easy bruising

## 2022-05-16 ENCOUNTER — APPOINTMENT (OUTPATIENT)
Dept: PHYSICAL MEDICINE AND REHAB | Facility: CLINIC | Age: 72
End: 2022-05-16

## 2022-08-05 ENCOUNTER — APPOINTMENT (OUTPATIENT)
Dept: INTERNAL MEDICINE | Facility: CLINIC | Age: 72
End: 2022-08-05

## 2022-08-22 ENCOUNTER — APPOINTMENT (OUTPATIENT)
Dept: INTERNAL MEDICINE | Facility: CLINIC | Age: 72
End: 2022-08-22

## 2022-08-22 ENCOUNTER — LABORATORY RESULT (OUTPATIENT)
Age: 72
End: 2022-08-22

## 2022-08-22 ENCOUNTER — NON-APPOINTMENT (OUTPATIENT)
Age: 72
End: 2022-08-22

## 2022-08-22 VITALS
OXYGEN SATURATION: 98 % | SYSTOLIC BLOOD PRESSURE: 112 MMHG | HEIGHT: 61 IN | DIASTOLIC BLOOD PRESSURE: 76 MMHG | WEIGHT: 140 LBS | HEART RATE: 74 BPM | TEMPERATURE: 98 F | BODY MASS INDEX: 26.43 KG/M2 | RESPIRATION RATE: 18 BRPM

## 2022-08-22 DIAGNOSIS — E73.9 LACTOSE INTOLERANCE, UNSPECIFIED: ICD-10-CM

## 2022-08-22 DIAGNOSIS — Z12.39 ENCOUNTER FOR OTHER SCREENING FOR MALIGNANT NEOPLASM OF BREAST: ICD-10-CM

## 2022-08-22 PROCEDURE — 99497 ADVNCD CARE PLAN 30 MIN: CPT

## 2022-08-22 PROCEDURE — 93000 ELECTROCARDIOGRAM COMPLETE: CPT | Mod: 59

## 2022-08-22 PROCEDURE — G0439: CPT

## 2022-08-22 PROCEDURE — G0444 DEPRESSION SCREEN ANNUAL: CPT | Mod: 59

## 2022-08-22 PROCEDURE — 99215 OFFICE O/P EST HI 40 MIN: CPT | Mod: 25

## 2022-08-23 ENCOUNTER — NON-APPOINTMENT (OUTPATIENT)
Age: 72
End: 2022-08-23

## 2022-08-23 PROBLEM — Z12.39 SCREENING FOR BREAST CANCER: Status: ACTIVE | Noted: 2022-08-23

## 2022-08-23 PROBLEM — E73.9 LACTOSE INTOLERANCE: Status: ACTIVE | Noted: 2022-08-23

## 2022-08-23 LAB
25(OH)D3 SERPL-MCNC: 68.1 NG/ML
ALBUMIN SERPL ELPH-MCNC: 4.6 G/DL
ALP BLD-CCNC: 82 U/L
ALT SERPL-CCNC: 25 U/L
ANION GAP SERPL CALC-SCNC: 9 MMOL/L
AST SERPL-CCNC: 26 U/L
BASOPHILS # BLD AUTO: 0.02 K/UL
BASOPHILS NFR BLD AUTO: 0.4 %
BILIRUB SERPL-MCNC: 0.4 MG/DL
BUN SERPL-MCNC: 16 MG/DL
CALCIUM SERPL-MCNC: 9.5 MG/DL
CHLORIDE SERPL-SCNC: 107 MMOL/L
CHOLEST SERPL-MCNC: 202 MG/DL
CO2 SERPL-SCNC: 27 MMOL/L
CREAT SERPL-MCNC: 0.62 MG/DL
EGFR: 95 ML/MIN/1.73M2
EOSINOPHIL # BLD AUTO: 0.08 K/UL
EOSINOPHIL NFR BLD AUTO: 1.7 %
ESTIMATED AVERAGE GLUCOSE: 100 MG/DL
GGT SERPL-CCNC: 14 U/L
GLUCOSE SERPL-MCNC: 83 MG/DL
HBA1C MFR BLD HPLC: 5.1 %
HCT VFR BLD CALC: 45.6 %
HDLC SERPL-MCNC: 53 MG/DL
HGB BLD-MCNC: 15.3 G/DL
IMM GRANULOCYTES NFR BLD AUTO: 0.2 %
LDLC SERPL CALC-MCNC: 127 MG/DL
LYMPHOCYTES # BLD AUTO: 1.39 K/UL
LYMPHOCYTES NFR BLD AUTO: 30 %
MAN DIFF?: NORMAL
MCHC RBC-ENTMCNC: 30.8 PG
MCHC RBC-ENTMCNC: 33.6 GM/DL
MCV RBC AUTO: 91.9 FL
MONOCYTES # BLD AUTO: 0.37 K/UL
MONOCYTES NFR BLD AUTO: 8 %
NEUTROPHILS # BLD AUTO: 2.76 K/UL
NEUTROPHILS NFR BLD AUTO: 59.7 %
NONHDLC SERPL-MCNC: 149 MG/DL
PLATELET # BLD AUTO: 216 K/UL
POTASSIUM SERPL-SCNC: 3.8 MMOL/L
PROT SERPL-MCNC: 6.4 G/DL
RBC # BLD: 4.96 M/UL
RBC # FLD: 13.7 %
SODIUM SERPL-SCNC: 144 MMOL/L
TRIGL SERPL-MCNC: 107 MG/DL
TSH SERPL-ACNC: 2.85 UIU/ML
WBC # FLD AUTO: 4.63 K/UL

## 2022-08-23 NOTE — DATA REVIEWED
[FreeTextEntry1] : Cholesterol 202 mg/dL, elevated\par  mg/dL, elevated\par Non  mg/dL, elevated\par diet, low fat, low carb, exercise

## 2022-08-23 NOTE — HEALTH RISK ASSESSMENT
[Good] : ~his/her~  mood as  good [Former] : Former [10-14] : 10-14 [Yes] : Yes [Monthly or less (1 pt)] : Monthly or less (1 point) [1 or 2 (0 pts)] : 1 or 2 (0 points) [Never (0 pts)] : Never (0 points) [No] : In the past 12 months have you used drugs other than those required for medical reasons? No [No falls in past year] : Patient reported no falls in the past year [0] : 2) Feeling down, depressed, or hopeless: Not at all (0) [PHQ-2 Negative - No further assessment needed] : PHQ-2 Negative - No further assessment needed [I have developed a follow-up plan documented below in the note.] : I have developed a follow-up plan documented below in the note. [Patient reported PAP Smear was normal] : Patient reported PAP Smear was normal [Patient reported colonoscopy was normal] : Patient reported colonoscopy was normal [None] : None [Alone] : lives alone [Retired] : retired [Single] : single [Feels Safe at Home] : Feels safe at home [Fully functional (bathing, dressing, toileting, transferring, walking, feeding)] : Fully functional (bathing, dressing, toileting, transferring, walking, feeding) [Fully functional (using the telephone, shopping, preparing meals, housekeeping, doing laundry, using] : Fully functional and needs no help or supervision to perform IADLs (using the telephone, shopping, preparing meals, housekeeping, doing laundry, using transportation, managing medications and managing finances) [With Patient/Caregiver] : , with patient/caregiver [Designated Healthcare Proxy] : Designated healthcare proxy [Name: ___] : Health Care Proxy's Name: [unfilled]  [Relationship: ___] : Relationship: [unfilled] [Aggressive treatment] : aggressive treatment [Last Verification Date: ___] : Last Verification Date: [unfilled] [I will adhere to the patient's wishes.] : I will adhere to the patient's wishes. [Time Spent: ___ minutes] : Time Spent: [unfilled] minutes [Reports normal functional visual acuity (ie: able to read med bottle)] : Reports normal functional visual acuity [Smoke Detector] : smoke detector [Carbon Monoxide Detector] : carbon monoxide detector [Safety elements used in home] : safety elements used in home [Seat Belt] :  uses seat belt [Sunscreen] : uses sunscreen [YearQuit] : ~84 [Audit-CScore] : 0 [de-identified] : walking [de-identified] : standard diet [[id number]] : 0 [Change in mental status noted] : No change in mental status noted [Language] : denies difficulty with language [Behavior] : denies difficulty with behavior [Learning/Retaining New Information] : denies difficulty learning/retaining new information [Handling Complex Tasks] : denies difficulty handling complex tasks [Reasoning] : denies difficulty with reasoning [Spatial Ability and Orientation] : denies difficulty with spatial ability and orientation [Sexually Active] : not sexually active [High Risk Behavior] : no high risk behavior [Reports changes in hearing] : Reports no changes in hearing [Reports changes in vision] : Reports no changes in vision [Reports changes in dental health] : Reports no changes in dental health [Guns at Home] : no guns at home [Travel to Developing Areas] : does not  travel to developing areas [TB Exposure] : is not being exposed to tuberculosis [Caregiver Concerns] : does not have caregiver concerns [MammogramComments] : Needs UTD screen [PapSmearDate] : 2021 [BoneDensityComments] : Needs UTD screen [ColonoscopyDate] : 2021 [AdvancecareDate] : 08/2022

## 2022-08-23 NOTE — PHYSICAL EXAM
[No Acute Distress] : no acute distress [Well Nourished] : well nourished [Well Developed] : well developed [Well-Appearing] : well-appearing [Normal Sclera/Conjunctiva] : normal sclera/conjunctiva [PERRL] : pupils equal round and reactive to light [EOMI] : extraocular movements intact [Normal Outer Ear/Nose] : the outer ears and nose were normal in appearance [Normal Oropharynx] : the oropharynx was normal [No JVD] : no jugular venous distention [No Lymphadenopathy] : no lymphadenopathy [Supple] : supple [Thyroid Normal, No Nodules] : the thyroid was normal and there were no nodules present [No Respiratory Distress] : no respiratory distress  [No Accessory Muscle Use] : no accessory muscle use [Clear to Auscultation] : lungs were clear to auscultation bilaterally [Normal Rate] : normal rate  [Regular Rhythm] : with a regular rhythm [Normal S1, S2] : normal S1 and S2 [No Murmur] : no murmur heard [No Carotid Bruits] : no carotid bruits [No Abdominal Bruit] : a ~M bruit was not heard ~T in the abdomen [No Varicosities] : no varicosities [Pedal Pulses Present] : the pedal pulses are present [No Edema] : there was no peripheral edema [No Palpable Aorta] : no palpable aorta [No Extremity Clubbing/Cyanosis] : no extremity clubbing/cyanosis [Soft] : abdomen soft [Non Tender] : non-tender [Non-distended] : non-distended [No HSM] : no HSM [Normal Bowel Sounds] : normal bowel sounds [Normal Posterior Cervical Nodes] : no posterior cervical lymphadenopathy [Normal Anterior Cervical Nodes] : no anterior cervical lymphadenopathy [No CVA Tenderness] : no CVA  tenderness [No Spinal Tenderness] : no spinal tenderness [No Joint Swelling] : no joint swelling [Grossly Normal Strength/Tone] : grossly normal strength/tone [No Rash] : no rash [Coordination Grossly Intact] : coordination grossly intact [No Focal Deficits] : no focal deficits [Normal Gait] : normal gait [Deep Tendon Reflexes (DTR)] : deep tendon reflexes were 2+ and symmetric [Normal Affect] : the affect was normal [Normal Insight/Judgement] : insight and judgment were intact [Normal Appearance] : normal in appearance [No Masses] : no palpable masses [No Nipple Discharge] : no nipple discharge [No Axillary Lymphadenopathy] : no axillary lymphadenopathy [Declined Rectal Exam] : declined rectal exam [Normal] : affect was normal and insight and judgment were intact

## 2022-08-23 NOTE — COUNSELING
[Fall prevention counseling provided] : Fall prevention counseling provided [Adequate lighting] : Adequate lighting [No throw rugs] : No throw rugs [Use proper foot wear] : Use proper foot wear [Use recommended devices] : Use recommended devices [Behavioral health counseling provided] : Behavioral health counseling provided [Sleep ___ hours/day] : Sleep [unfilled] hours/day [Engage in a relaxing activity] : Engage in a relaxing activity [Plan in advance] : Plan in advance [None] : None [Good understanding] : Patient has a good understanding of lifestyle changes and steps needed to achieve self management goal [de-identified] : Symptomatic patients : Test for influenza, if positive, treat for influenza and do not continue below. \par 1. Fever plus cough or shortness of breath : Test for RVP and COVID-19.\par 2.Indirect, circumstantial or unclear exposure to COVID-19, or other concerning cases not meeting above criteria: Please call [facility] to discuss testing. \par +++ All above cases must be reported to the [facility] registry. +++\par \par Asymptomatic patients: \par 1. Known first-degree direct-contact exposure to positive COVID-19 patient but asymptomatic: No testing PLUS 14 day self-quarantine. Pt to call if symptoms develop. Report to [facility] Registry.\par 2. No known exposure and asymptomatic, referred from outside healthcare organization: Please call [facility] to discuss testing. \par 3.All other asymptomatic patients with no known exposures: no testing, no exceptions.\par \par low chol diet. Avoid fried foods, red meat, butter, eggs, hard cheeses. Use canola or olive oil preferred.\par \par  - Encouraged a low fat/low cholesterol diet\par  - Discussed Healthy eating, avoidance of concentrated sweets, and to include vegetables by at least 3 meals a day\par  - encouraged low glycemic fruits, grains and vegetables and a diet high in plant protein\par  - Discussed regular exercise\par  - Discussed importance of follow up physician visits\par \par Advised to take 1500mg per day of elemental calcium and 1,000 iu of vitamin D3, as well as a minimum of 3 hours per week of weight baring exercise.Repeat Bone Density in 2 years, as protocol indicates:\par \par \par \par \par \par \par \par \par \par \par Medical Annual wellness visit completed:\par HRA completed and reviewed with patient\par Medical, family, surgical history reviewed with patient and updated\par List of current providers r/w patient and updated\par Vitals, BMI reviewed and discussed along with healthy BMI goals. Dietary counseling x 15 minutes provided\par Depression PHQ 9 completed and reviewed \par Annual safety assessment reviewed\par discussed advanced directives\par smoking cessation counseling provided\par Established routine screening and immunization schedules\par Medical Annual wellness visit completed:\par HRA completed and reviewed with patient\par Medical, family, surgical history reviewed with patient and updated\par List of current providers r/w patient and updated\par Vitals, BMI reviewed and discussed along with healthy BMI goals. Dietary counseling x 15 minutes provided\par Depression PHQ 9 completed and reviewed \par Annual safety assessment reviewed\par discussed advanced directives\par smoking cessation counseling provided\par Established routine screening and immunization schedules\par \par VACCINATION & OTHER TX RECOMMENDATIONS\par \par ASA preventative therapy\par Calcium/Vitamin D supplementation\par  \par Dietary counseling, nutrition referral\par risks vs. benefits d/w patient. routine vaccination and vaccination schedules and recommendation d/w patient\par \par Vaccines recommended: \par * pneumovax (once after 65) & prevnar\par * annual Influenza vaccine\par * Hep B vaccines\par * zostavax\par * Tdap\par \par Colorectal screening recommended; screening colonoscopy q10yr, flex sig q5yr, annual fecal occult testing\par BMD recommended biennially for osteoporosis screening\par Glaucoma screening recommended, annual optho evals\par Cardiovascular screening and blood tests recommended and discussed w/ patient, cholesterol screening and dietary counseling\par AAA recommended x 1\par \par

## 2022-08-23 NOTE — HISTORY OF PRESENT ILLNESS
[FreeTextEntry1] : F/U,[facility] Hx of hyperlipidemia,glucose intolerance and osteoporosis, lactose intolerance.  She wishes to discuss her abdominal sonogram and her GI work-up\par  [de-identified] : 71 year old female presents herself today for a F/U,[facility].\par Hx of hyperlipidemia,glucose intolerance and osteoporosis.\par \par covid vaccinated\par mild COPD and smoked 40 year ago. \par \par She is fully vaccinated for covid-19. \par Otherwise patient is stable.\par \par Voices no further complaints.\par ROS as documented below. \par Denies fever,cough,chills,body aches and SOB.

## 2022-08-24 ENCOUNTER — NON-APPOINTMENT (OUTPATIENT)
Age: 72
End: 2022-08-24

## 2022-08-24 LAB
APPEARANCE: ABNORMAL
BILIRUBIN URINE: NEGATIVE
BLOOD URINE: NEGATIVE
COLOR: NORMAL
GLUCOSE QUALITATIVE U: NEGATIVE
KETONES URINE: NEGATIVE
LEUKOCYTE ESTERASE URINE: NEGATIVE
NITRITE URINE: NEGATIVE
PH URINE: 6.5
PROTEIN URINE: NEGATIVE
SPECIFIC GRAVITY URINE: 1.02
UROBILINOGEN URINE: NORMAL

## 2022-10-06 ENCOUNTER — NON-APPOINTMENT (OUTPATIENT)
Age: 72
End: 2022-10-06

## 2022-10-06 ENCOUNTER — APPOINTMENT (OUTPATIENT)
Dept: INTERNAL MEDICINE | Facility: CLINIC | Age: 72
End: 2022-10-06

## 2022-10-06 VITALS
OXYGEN SATURATION: 97 % | BODY MASS INDEX: 25.86 KG/M2 | RESPIRATION RATE: 18 BRPM | DIASTOLIC BLOOD PRESSURE: 72 MMHG | HEIGHT: 61 IN | SYSTOLIC BLOOD PRESSURE: 114 MMHG | HEART RATE: 71 BPM | WEIGHT: 137 LBS | TEMPERATURE: 97.6 F

## 2022-10-06 DIAGNOSIS — R09.81 NASAL CONGESTION: ICD-10-CM

## 2022-10-06 PROCEDURE — 99213 OFFICE O/P EST LOW 20 MIN: CPT

## 2022-10-07 LAB
RAPID RVP RESULT: NOT DETECTED
SARS-COV-2 RNA PNL RESP NAA+PROBE: NOT DETECTED

## 2022-10-08 PROBLEM — R09.81 NASAL CONGESTION: Status: ACTIVE | Noted: 2022-10-06

## 2022-10-08 NOTE — PHYSICAL EXAM
[No Acute Distress] : no acute distress [Well Nourished] : well nourished [Well Developed] : well developed [Well-Appearing] : well-appearing [Normal Sclera/Conjunctiva] : normal sclera/conjunctiva [PERRL] : pupils equal round and reactive to light [EOMI] : extraocular movements intact [Normal Outer Ear/Nose] : the outer ears and nose were normal in appearance [Normal Oropharynx] : the oropharynx was normal [No JVD] : no jugular venous distention [No Lymphadenopathy] : no lymphadenopathy [Thyroid Normal, No Nodules] : the thyroid was normal and there were no nodules present [Supple] : supple [No Respiratory Distress] : no respiratory distress  [No Accessory Muscle Use] : no accessory muscle use [Clear to Auscultation] : lungs were clear to auscultation bilaterally [Normal Rate] : normal rate  [Regular Rhythm] : with a regular rhythm [Normal S1, S2] : normal S1 and S2 [No Murmur] : no murmur heard [No Carotid Bruits] : no carotid bruits [No Abdominal Bruit] : a ~M bruit was not heard ~T in the abdomen [No Varicosities] : no varicosities [Pedal Pulses Present] : the pedal pulses are present [No Edema] : there was no peripheral edema [No Palpable Aorta] : no palpable aorta [No Extremity Clubbing/Cyanosis] : no extremity clubbing/cyanosis [Soft] : abdomen soft [Non Tender] : non-tender [Non-distended] : non-distended [No Masses] : no abdominal mass palpated [No HSM] : no HSM [Normal Bowel Sounds] : normal bowel sounds [Normal Posterior Cervical Nodes] : no posterior cervical lymphadenopathy [Normal Anterior Cervical Nodes] : no anterior cervical lymphadenopathy [No CVA Tenderness] : no CVA  tenderness [No Spinal Tenderness] : no spinal tenderness [No Joint Swelling] : no joint swelling [Grossly Normal Strength/Tone] : grossly normal strength/tone [No Rash] : no rash [Coordination Grossly Intact] : coordination grossly intact [No Focal Deficits] : no focal deficits [Normal Gait] : normal gait [Normal Affect] : the affect was normal [Deep Tendon Reflexes (DTR)] : deep tendon reflexes were 2+ and symmetric [Normal Insight/Judgement] : insight and judgment were intact

## 2022-10-10 NOTE — REVIEW OF SYSTEMS
[Fever] : no fever [Chills] : no chills [Recent Change In Weight] : ~T no recent weight change [Vision Problems] : no vision problems [Earache] : no earache [Nasal Discharge] : no nasal discharge [Sore Throat] : no sore throat [Chest Pain] : no chest pain [Palpitations] : no palpitations [Shortness Of Breath] : no shortness of breath [Wheezing] : no wheezing [Abdominal Pain] : no abdominal pain [Nausea] : no nausea [Diarrhea] : diarrhea [Vomiting] : no vomiting [Dysuria] : no dysuria [Hematuria] : no hematuria [Frequency] : no frequency [Joint Pain] : no joint pain [Joint Swelling] : no joint swelling [Skin Rash] : no skin rash [Headache] : no headache [Dizziness] : no dizziness [Anxiety] : no anxiety [Depression] : no depression [Swollen Glands] : no swollen glands [FreeTextEntry4] : sinus congestion

## 2022-10-10 NOTE — HISTORY OF PRESENT ILLNESS
[FreeTextEntry1] : sinus congestion  [de-identified] : 73 y/o female presents with concerns for sinus congestion over the past few days. She has been using oxymetazoline nasal spray with mild relief of her symptoms. She feels otherwise well and offers no other acute complaints or concerns. ROS as documented below. \par

## 2022-10-13 ENCOUNTER — APPOINTMENT (OUTPATIENT)
Dept: PHYSICAL MEDICINE AND REHAB | Facility: CLINIC | Age: 72
End: 2022-10-13

## 2022-10-13 VITALS
HEART RATE: 75 BPM | WEIGHT: 137 LBS | TEMPERATURE: 97.5 F | HEIGHT: 61 IN | BODY MASS INDEX: 25.86 KG/M2 | SYSTOLIC BLOOD PRESSURE: 100 MMHG | RESPIRATION RATE: 17 BRPM | OXYGEN SATURATION: 97 % | DIASTOLIC BLOOD PRESSURE: 70 MMHG

## 2022-10-13 DIAGNOSIS — M25.532 PAIN IN LEFT WRIST: ICD-10-CM

## 2022-10-13 PROCEDURE — 99214 OFFICE O/P EST MOD 30 MIN: CPT

## 2022-10-17 PROBLEM — M25.532 WRIST PAIN, ACUTE, LEFT: Status: ACTIVE | Noted: 2022-10-17

## 2022-10-17 NOTE — HISTORY OF PRESENT ILLNESS
[FreeTextEntry1] : 72 year old female presents Presents with left knee pain and  left wrist pain\par She fell 1 month ago landing on her left wrist and knee.\par \par Left wrist pain\par Pain:  3/10 Worse: 10/10 Quality: mild ache  Frequency: constant\par Pain starts over the lateral wrist.  The pain is worse with leaning through the wrist.  The pain is improving.  She never had any ecchymosis.\par \par knee pain\par Pain:  8/10 Worse: 10/10 Quality: sharp  Frequency: constant\par The pain starts on the outside of her knee.  The pain is worse with performing yoga and planting and twisting over the knee.  She denies any pain with ambulation or use of stairs.\par \par Right great toe pain\par Pain:  8/10 Worse: 10/10 Quality: sharp  Frequency: constant\par She has a grinding sensation in the right great toe specially with heel off during gait cycle.  She previously had an x-ray which revealed arthritis in the first MTP\par \par She does not like to take pain medication.  When she has severe pain she takes Tylenol 3 25 mg 1 p.o. every 6 hours.  Pain\par \par \par

## 2022-10-17 NOTE — REVIEW OF SYSTEMS
[Joint Pain] : joint pain [Joint Stiffness] : joint stiffness [Muscle Pain] : muscle pain [Chills] : no chills [Red Eyes] : eyes not red [Hearing Loss] : no loss of hearing [Palpitations] : no palpitations [Wheezing] : no wheezing [Nausea] : no nausea [Incontinence] : no incontinence [Dizziness] : no dizziness [Skin Wound] : no skin wound [Insomnia] : no insomnia [Easy Bruising] : no tendency for easy bruising

## 2022-10-17 NOTE — PHYSICAL EXAM
[FreeTextEntry1] : Pleasant, in no distress. Language: English was not female no apparent distress.\par HEENT: Head: no trauma. Eyes: no discharge. Ears: No discharge. Nose No discharge. Throat: clear\par Neck: FAROM. Negative Spurlings\par Heart: RR, +S1, S2\par Lungs: CTA\par Anterior chest wall tender over the fifth intercostal space over the anterior axillary line.  No rash\par Abdomen: soft, NT\par Lumbar spine: Full active range of motion, tenderness over the left SI joint and piriformis.  Nontender over the left lateral hip bursa.\par \par LUE: Shoulder:AROM, nontender to palpation MS 5/5\par Elbow: FAROM, MS 5/5 reflexes 2/4\par Wrist: FAROM, MS 4+/5 reflexes 2/4\par Warm, nontender, pulseMild tender over the ulnar styloid.  No gross deformities. 2+\par \par RUE:Shoulder:FAROM, MS 5/5\par Elbow: FAROM, MS 5/5 reflexes 2/4\par Wrist: FAROM, MS 5/5 reflexes 2/4\par Warm, nontender, pulse 2+\par \par LLE: Hip: FAROM, MS 5/5\par Knee: AROM  degrees., MS 4/5 Positive crepitus.  No medial lateral Tc's.reflexes 2/4 \par Ankle: FAROM, MS 5/5 reflexes 2/4\par Warm , nontender, pulse 2+ negative homans\par \par RLE: Hip: FAROM, MS 5/5\par Knee: FAROM, MS 5/5 reflexes 2/4.  Positive crepitus of the knee\par Ankle: FAROM, MS 5/5 reflexes 2/4\par Warm , nontender, pulse 2+ negative homans\par \par Gait: Spontaneous, reciprocal, safe without an assistive device\par \par Sensation\par RUE: sensation is intact to light touch, pinprick  and proprioception\par LUE: sensation is intact to light touch, pinprick  and proprioception\par RLE: sensation is intact to light touch, pinprick  and proprioception. Neg SLR. Neg OLAF, Neg FADIR\par LLE: sensation is intact to light touch, pinprick  and proprioception. Neg SLR. Neg OLAF, Neg FADIR\par \par

## 2022-11-30 ENCOUNTER — NON-APPOINTMENT (OUTPATIENT)
Age: 72
End: 2022-11-30

## 2022-11-30 ENCOUNTER — APPOINTMENT (OUTPATIENT)
Dept: INTERNAL MEDICINE | Facility: CLINIC | Age: 72
End: 2022-11-30

## 2022-11-30 VITALS
TEMPERATURE: 97.1 F | SYSTOLIC BLOOD PRESSURE: 106 MMHG | OXYGEN SATURATION: 99 % | HEIGHT: 61 IN | RESPIRATION RATE: 17 BRPM | WEIGHT: 137 LBS | DIASTOLIC BLOOD PRESSURE: 80 MMHG | HEART RATE: 77 BPM | BODY MASS INDEX: 25.86 KG/M2

## 2022-11-30 PROCEDURE — 93000 ELECTROCARDIOGRAM COMPLETE: CPT

## 2022-11-30 PROCEDURE — 36415 COLL VENOUS BLD VENIPUNCTURE: CPT

## 2022-11-30 PROCEDURE — 99214 OFFICE O/P EST MOD 30 MIN: CPT | Mod: 25

## 2022-12-01 ENCOUNTER — NON-APPOINTMENT (OUTPATIENT)
Age: 72
End: 2022-12-01

## 2022-12-01 LAB
25(OH)D3 SERPL-MCNC: 76.8 NG/ML
ALBUMIN SERPL ELPH-MCNC: 5.1 G/DL
ALP BLD-CCNC: 83 U/L
ALT SERPL-CCNC: 22 U/L
ANION GAP SERPL CALC-SCNC: 13 MMOL/L
AST SERPL-CCNC: 21 U/L
BASOPHILS # BLD AUTO: 0.02 K/UL
BASOPHILS NFR BLD AUTO: 0.4 %
BILIRUB SERPL-MCNC: 0.6 MG/DL
BUN SERPL-MCNC: 18 MG/DL
CALCIUM SERPL-MCNC: 10.4 MG/DL
CHLORIDE SERPL-SCNC: 106 MMOL/L
CHOLEST SERPL-MCNC: 237 MG/DL
CO2 SERPL-SCNC: 22 MMOL/L
CREAT SERPL-MCNC: 0.72 MG/DL
EGFR: 89 ML/MIN/1.73M2
EOSINOPHIL # BLD AUTO: 0.04 K/UL
EOSINOPHIL NFR BLD AUTO: 0.8 %
GGT SERPL-CCNC: 15 U/L
GLUCOSE SERPL-MCNC: 93 MG/DL
HCT VFR BLD CALC: 44.5 %
HDLC SERPL-MCNC: 56 MG/DL
HGB BLD-MCNC: 15.8 G/DL
IMM GRANULOCYTES NFR BLD AUTO: 0.4 %
LDLC SERPL CALC-MCNC: 152 MG/DL
LYMPHOCYTES # BLD AUTO: 1.65 K/UL
LYMPHOCYTES NFR BLD AUTO: 31.9 %
MAN DIFF?: NORMAL
MCHC RBC-ENTMCNC: 31.3 PG
MCHC RBC-ENTMCNC: 35.5 GM/DL
MCV RBC AUTO: 88.1 FL
MONOCYTES # BLD AUTO: 0.42 K/UL
MONOCYTES NFR BLD AUTO: 8.1 %
NEUTROPHILS # BLD AUTO: 3.03 K/UL
NEUTROPHILS NFR BLD AUTO: 58.4 %
NONHDLC SERPL-MCNC: 181 MG/DL
PLATELET # BLD AUTO: 237 K/UL
POTASSIUM SERPL-SCNC: 4.8 MMOL/L
PROT SERPL-MCNC: 6.9 G/DL
RBC # BLD: 5.05 M/UL
RBC # FLD: 12.9 %
SODIUM SERPL-SCNC: 141 MMOL/L
TRIGL SERPL-MCNC: 144 MG/DL
TSH SERPL-ACNC: 3.13 UIU/ML
WBC # FLD AUTO: 5.18 K/UL

## 2022-12-01 NOTE — HISTORY OF PRESENT ILLNESS
[FreeTextEntry1] : F/U,[facility] Hx of hyperlipidemia,glucose intolerance and osteoporosis, lactose intolerance. weakness and fatigue.\par CC: Patient is here today complaining of having difficulty breathing. 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 \par She is fully covid/Flu vaccinated for this year.\par Has mild COPD and smoked 40 year ago. \par Otherwise patient is stable.\par \par Voices no further complaints.\par ROS as documented below. \par Denies fever,cough,chills,body aches and SOB. \par \par I Fely Daniels, am scribing for and in the presence of Dr. Meredith, the following sections of:  HISTORY OF PRESENT ILLNESS, PAST MEDICAL/FAMILY/SOCIAL HISTORY, ROS, VITALS, PE, DISPOSITION.

## 2022-12-01 NOTE — COUNSELING
[Fall prevention counseling provided] : Fall prevention counseling provided [Adequate lighting] : Adequate lighting [No throw rugs] : No throw rugs [Use proper foot wear] : Use proper foot wear [Use recommended devices] : Use recommended devices [de-identified] : Medical Annual wellness visit completed:\par HRA completed and reviewed with patient\par Medical, family, surgical history reviewed with patient and updated\par List of current providers r/w patient and updated\par Vitals, BMI reviewed and discussed along with healthy BMI goals. Dietary counseling x 15 minutes provided\par Depression PHQ 9 completed and reviewed \par Annual safety assessment reviewed\par discussed advanced directives\par smoking cessation counseling provided\par Established routine screening and immunization schedules\par Medical Annual wellness visit completed:\par HRA completed and reviewed with patient\par Medical, family, surgical history reviewed with patient and updated\par List of current providers r/w patient and updated\par Vitals, BMI reviewed and discussed along with healthy BMI goals. Dietary counseling x 15 minutes provided\par Depression PHQ 9 completed and reviewed \par Annual safety assessment reviewed\par discussed advanced directives\par smoking cessation counseling provided\par Established routine screening and immunization schedules\par \par VACCINATION & OTHER TX RECOMMENDATIONS\par \par ASA preventative therapy\par Calcium/Vitamin D supplementation\par  \par Dietary counseling, nutrition referral\par risks vs. benefits d/w patient. routine vaccination and vaccination schedules and recommendation d/w patient\par \par Vaccines recommended: \par * pneumovax (once after 65) & prevnar\par * annual Influenza vaccine\par * Hep B vaccines\par * zostavax\par * Tdap\par \par Colorectal screening recommended; screening colonoscopy q10yr, flex sig q5yr, annual fecal occult testing\par BMD recommended biennially for osteoporosis screening\par Glaucoma screening recommended, annual optho evals\par Cardiovascular screening and blood tests recommended and discussed w/ patient, cholesterol screening and dietary counseling\par AAA recommended x 1\par \par Symptomatic patients : Test for influenza, if positive, treat for influenza and do not continue below. \par 1. Fever plus cough or shortness of breath : Test for RVP and COVID-19.\par 2.Indirect, circumstantial or unclear exposure to COVID-19, or other concerning cases not meeting above criteria: Please call [facility] to discuss testing. \par +++ All above cases must be reported to the [facility] registry. +++\par \par Asymptomatic patients: \par 1. Known first-degree direct-contact exposure to positive COVID-19 patient but asymptomatic: No testing PLUS 14 day self-quarantine. Pt to call if symptoms develop. Report to [facility] Registry.\par 2. No known exposure and asymptomatic, referred from outside healthcare organization: Please call [facility] to discuss testing. \par 3.All other asymptomatic patients with no known exposures: no testing, no exceptions.\par \par

## 2022-12-05 ENCOUNTER — APPOINTMENT (OUTPATIENT)
Dept: PHYSICAL MEDICINE AND REHAB | Facility: CLINIC | Age: 72
End: 2022-12-05

## 2022-12-05 VITALS
WEIGHT: 137 LBS | BODY MASS INDEX: 25.86 KG/M2 | HEART RATE: 85 BPM | DIASTOLIC BLOOD PRESSURE: 80 MMHG | SYSTOLIC BLOOD PRESSURE: 120 MMHG | RESPIRATION RATE: 17 BRPM | HEIGHT: 61 IN | TEMPERATURE: 98.8 F | OXYGEN SATURATION: 98 %

## 2022-12-05 DIAGNOSIS — M17.0 BILATERAL PRIMARY OSTEOARTHRITIS OF KNEE: ICD-10-CM

## 2022-12-05 DIAGNOSIS — M79.674 PAIN IN RIGHT TOE(S): ICD-10-CM

## 2022-12-05 PROCEDURE — 99213 OFFICE O/P EST LOW 20 MIN: CPT

## 2022-12-06 PROBLEM — M17.0 PRIMARY OSTEOARTHRITIS OF BOTH KNEES: Status: ACTIVE | Noted: 2022-10-13

## 2022-12-06 PROBLEM — M79.674 PAIN OF RIGHT GREAT TOE: Status: ACTIVE | Noted: 2022-10-13

## 2022-12-06 NOTE — PHYSICAL EXAM
[FreeTextEntry1] : Pleasant, in no distress. Language: English was not female no apparent distress.\par HEENT: Head: no trauma. Eyes: no discharge. Ears: No discharge. Nose No discharge. Throat: clear\par Neck: FAROM. Negative Spurlings\par Heart: RR, +S1, S2\par Lungs: CTA\par Anterior chest wall tender over the fifth intercostal space over the anterior axillary line.  No rash\par Abdomen: soft, NT\par Lumbar spine: Full active range of motion, tenderness over the left SI joint and piriformis.  Nontender over the left lateral hip bursa.\par \par LUE: Shoulder:AROM, nontender to palpation MS 5/5\par Elbow: FAROM, MS 5/5 reflexes 2/4\par Wrist: FAROM, MS 4+/5 reflexes 2/4\par Warm, nontender, pulseMild tender over the ulnar styloid.  No gross deformities. 2+\par \par RUE:Shoulder:FAROM, MS 5/5\par Elbow: FAROM, MS 5/5 reflexes 2/4\par Wrist: FAROM, MS 5/5 reflexes 2/4\par Warm, nontender, pulse 2+\par \par LLE: Hip: FAROM, MS 5/5\par Knee: AROM  degrees., MS 4/5 Positive crepitus.  No medial lateral Tc's.reflexes 2/4 \par Ankle: FAROM, MS 5/5 reflexes 2/4\par Warm , nontender, pulse 2+ negative homans\par \par RLE: Hip: FAROM, MS 5/5\par Knee: FAROM, MS 5/5 reflexes 2/4.  Positive crepitus of the knee\par Ankle: FAROM, MS 5/5 reflexes 2/4.  Mild swelling no erythema of the right first MTP\par \par Warm , nontender, pulse 2+ negative homans.\par \par Gait: Spontaneous, reciprocal, safe without an assistive device\par \par Sensation\par RUE: sensation is intact to light touch, pinprick  and proprioception\par LUE: sensation is intact to light touch, pinprick  and proprioception\par RLE: sensation is intact to light touch, pinprick  and proprioception. Neg SLR. Neg OLAF, Neg FADIR\par LLE: sensation is intact to light touch, pinprick  and proprioception. Neg SLR. Neg OLAF, Neg FADIR\par \par

## 2022-12-06 NOTE — HISTORY OF PRESENT ILLNESS
[FreeTextEntry1] : 72 year old female presents Presents with left knee pain and  left wrist pain\par She fell 1 month ago landing on her left wrist and knee.\par \par She has been to therapy 2 x week for 7 sessions.  Her knee pain is much improved.  She continues to have significant right great toe pain. \par \par knee pain\par Pain:  1/10 Worse: 10/10 Quality: sharp  Frequency: constant\par She is able to ambulate with minimal amount of pain which resolves with rest.\par \par Right great toe pain\par Pain:  8/10 Worse: 10/10 Quality: sharp  Frequency: constant\par The pain is minimal at rest.  However with range of motion, therapy and prolonged ambulation the great toe begins to ache.  She has a grinding sensation in the right great toe specially with heel off during gait cycle.  She previously had an x-ray which revealed arthritis in the first MTP\par \par She does not like to take pain medication.  When she has severe pain she takes Tylenol 3 25 mg 1 p.o. every 6 hours.  \par \par

## 2022-12-21 ENCOUNTER — APPOINTMENT (OUTPATIENT)
Dept: ORTHOPEDIC SURGERY | Facility: CLINIC | Age: 72
End: 2022-12-21

## 2022-12-21 VITALS — WEIGHT: 136 LBS | BODY MASS INDEX: 25.68 KG/M2 | HEIGHT: 61 IN

## 2022-12-21 DIAGNOSIS — M20.21 HALLUX RIGIDUS, RIGHT FOOT: ICD-10-CM

## 2022-12-21 PROCEDURE — 99203 OFFICE O/P NEW LOW 30 MIN: CPT

## 2022-12-21 NOTE — ASSESSMENT
[FreeTextEntry1] : I had a lengthy discussion with the patient regarding my findings and treatment options. The patient has hallux rigidus. Given failure of conservative treatment and chronic pain surgical treatment is indicated. Even though a fusion would reliably relieve all of the patient’s pain, it would limit shoe wear and take away any motion at the joint. For this reason the patient would like to go ahead with a Cheilectomy and possible Tam phalangeal osteotomy of the 1st MTPJ. This will continue to allow motion. I emphasized to the patient that perhaps in the future they may still need the joint to be fused if they continue to experience pain and stiffness and activity limitations and the patient understands this.\par \par I explained to the patient the surgical procedure in detail. My explanation of the risks and benefits included but were not limited to infection, wound problems, nerve damage, nonunion, DVT, and/or further disability requiring braces and walking aids. I reviewed the post operative treatment plan. The patient understands this and is realistic about their expectations for surgery.\par .  She feels that she would not like to have surgery at this time.  She would like to continue with using a stiff soled shoe that I did recommend that limit the repetitive bending and to change and modify her poses during yoga classes she can continue with a local anti-inflammatory cream over the hallux MTP joint once to twice per day and she states that she feels that she would like to have the surgery in 3 months depending on her pain and discomfort.  She would like to follow-up with us in 2 to 3 months for repeat clinical and x-ray examination.  I discussed with the patient that I am leaving this group at the end of the month and I have given her 3 excellent recommendations of foot and ankle orthopedic surgeons in this group that she can call and schedule a follow-up appointment with.\par

## 2022-12-21 NOTE — HISTORY OF PRESENT ILLNESS
[3] : 3 [0] : 0 [Dull/Aching] : dull/aching [Occasional] : occasional [Rest] : rest [de-identified] : Ms. MAYO is a 72 year female who presents today for evaluation of their right foot pain and swelling and great toe stiffness.  She states that over the past few years she has been noticing bone spurs that have formed at the base of her great toe as well as limited motion and stiffness and pain at the base of the great toe.  She does not have pain throughout the entire day.  She does have pain with certain activities such as yoga and with certain poses during a yoga class.  She does use an anti-inflammatory cream intermittently.  She does not notice any redness or warmth. [FreeTextEntry5] : New patient is here for right foot. NKI. Big toe has been an issue for years, has been treated by another doctor who diagnosed her with bone spurs. Patient has been doing pt until 2 weeks ago [FreeTextEntry9] : Voltaren gel [de-identified] : putting pressure on toe

## 2022-12-21 NOTE — IMAGING
[de-identified] : General: Well-nourished, in no apparent distress\par Psych: Clear speech, pleasant mood and affect\par Neurological: Alert and oriented to person, place, and time\par Gait: Slight Antalgic\par Respiratory: Normal respiratory effort\par Skin: No rashes, no lesions, no open skin, no ecchymosis, no erythema\par \par LEFT/RIGHT Lower Extremity:\par \par Examination: There is mild swelling over the hallux MTP joint, without erythema or induration. No ecchymosis noted. Skin intact throughout.\par \par Alignment :Hindfoot slight valgus. Neutral midfoot and forefoot.\par \par Strength: 5/5 strength on resisted dorsiflexion, plantar flexion, inversion and eversion.\par \par Hindfoot: No pain over the ankle joint or syndesmosis. No pain over the proximal, midshaft or distal tibia or over the leg compartments. Calf is soft and nontender No tenderness over the calcaneus or over the insertion of the Achilles tendon. No pain or instability with passive inversion/eversion through the subtalar joint. No pain and over the course of Achilles, peroneal, or posterior tibial tendons. Plantar fascia non-tender. No pain over the sinus tarsi.\par Midfoot: No pain over the transverse tarsal joints or the Lisfranc joints on palpation or stress exam. No pain on supination abduction stress testing of the midfoot. No pain over the navicular, cuboid, cuneiforms, or metatarsal shafts. No gross instability noted.\par Forefoot: There are palpable osteophytes over the dorsal aspect of the hallux MTP joint. Limited range of motion of the hallux MTP joint with pain on dorsiflexion and plantar flexion. Positive grind test of the hallux MTP joint. Full ROM through the lesser MTP joints. Lesser toes are well aligned. No gross instability. No webspace tenderness.\par \par Neurologic Exam : Sensation is grossly intact bilateral upper and lower extremities to light touch throughout. 2+ patellar tendon and Achilles tendon reflexes are noted. There is a downgoing Babinski test. No clonus is noted bilaterally.\par \par Vascular: Arterial Pulses right and Left: posterior tibialis normal and dorsalis pedis normal. Right and Left: no edema, no varicosities and capillary refill test normal.\par \par Radiographs: X-rays done today in the office were 3 views of the foot weight-bearing with no limitations to today's studies and no comparative studies available for review which revealed no acute fractures or dislocations. Neutral hindfoot alignment. There are significant arthritic changes in the hallux MTP joint with joint space narrowing, subchondral sclerosis in addition to dorsal, medial and lateral osteophytes. No instability or plantar gapping of the first TMT joint is noted\par

## 2023-02-27 ENCOUNTER — APPOINTMENT (OUTPATIENT)
Dept: INTERNAL MEDICINE | Facility: CLINIC | Age: 73
End: 2023-02-27
Payer: MEDICARE

## 2023-02-27 ENCOUNTER — NON-APPOINTMENT (OUTPATIENT)
Age: 73
End: 2023-02-27

## 2023-02-27 VITALS
RESPIRATION RATE: 17 BRPM | BODY MASS INDEX: 25.68 KG/M2 | TEMPERATURE: 98.1 F | OXYGEN SATURATION: 98 % | HEIGHT: 61 IN | HEART RATE: 75 BPM | SYSTOLIC BLOOD PRESSURE: 118 MMHG | DIASTOLIC BLOOD PRESSURE: 80 MMHG | WEIGHT: 136 LBS

## 2023-02-27 DIAGNOSIS — L85.3 XEROSIS CUTIS: ICD-10-CM

## 2023-02-27 DIAGNOSIS — J06.9 ACUTE UPPER RESPIRATORY INFECTION, UNSPECIFIED: ICD-10-CM

## 2023-02-27 PROCEDURE — 99213 OFFICE O/P EST LOW 20 MIN: CPT

## 2023-02-27 RX ORDER — HYDROCORTISONE 1 %
12 CREAM (GRAM) TOPICAL
Qty: 1 | Refills: 3 | Status: ACTIVE | COMMUNITY
Start: 2023-02-27 | End: 1900-01-01

## 2023-02-28 NOTE — HISTORY OF PRESENT ILLNESS
[FreeTextEntry1] : F/U,[facility] Hx of hyperlipidemia,glucose intolerance and osteoporosis, lactose intolerance. weakness and fatigue.\par CC: Patient is here for medication refill  . anxiety.\par  [de-identified] : 72 year old female presents herself today for a F/U,[facility].\par Hx of hyperlipidemia,glucose intolerance and osteoporosis.\par \par cc/ medication refill. states her panic attacks have gotten better but she feels very stressed. \par She is fully covid/Flu vaccinated for this year.\par Has mild COPD and smoked 40 year ago. \par Otherwise patient is stable.\par \par Voices no further complaints.\par ROS as documented below. \par Denies fever,cough,chills,body aches and SOB. \par \par I Fely Daniels, am scribing for and in the presence of Dr. Meredith, the following sections of:  HISTORY OF PRESENT ILLNESS, PAST MEDICAL/FAMILY/SOCIAL HISTORY, ROS, VITALS, PE, DISPOSITION.

## 2023-03-02 ENCOUNTER — APPOINTMENT (OUTPATIENT)
Dept: PHYSICAL MEDICINE AND REHAB | Facility: CLINIC | Age: 73
End: 2023-03-02
Payer: MEDICARE

## 2023-03-02 VITALS
TEMPERATURE: 97.8 F | SYSTOLIC BLOOD PRESSURE: 110 MMHG | HEART RATE: 72 BPM | WEIGHT: 136 LBS | OXYGEN SATURATION: 98 % | HEIGHT: 61 IN | BODY MASS INDEX: 25.68 KG/M2 | RESPIRATION RATE: 17 BRPM | DIASTOLIC BLOOD PRESSURE: 80 MMHG

## 2023-03-02 PROCEDURE — 99214 OFFICE O/P EST MOD 30 MIN: CPT

## 2023-03-06 NOTE — PHYSICAL EXAM
[FreeTextEntry1] : Pleasant, in no distress. Language: English was not female no apparent distress.\par HEENT: Head: no trauma. Eyes: no discharge. Ears: No discharge. Nose No discharge. Throat: clear\par Neck: AROM.  To the right 0 to 60 degrees to the left 0 to 50 degrees.  Positive Spurling's to the right Spurlings\par Heart: RR, +S1, S2\par Lungs: CTA\par Abdomen: soft, NT\par Lumbar spine: Full active range of motion, tenderness over the left SI joint and piriformis.  Nontender over the left lateral hip bursa.\par \par LUE: Shoulder:AROM, nontender to palpation MS 5/5\par Elbow: FAROM, MS 5/5 reflexes 2/4\par Wrist: FAROM, MS 4+/5 reflexes 2/4\par Warm, nontender, pulseMild tender over the ulnar styloid.  No gross deformities. 2+\par \par RUE:Shoulder:FAROM, MS 5-/5 she has tenderness on palpation of the medial border of the scapula.  Negative lift test.  No rash.\par Elbow: FAROM, MS 5/5 reflexes 2/4\par Wrist: FAROM, MS 5/5 reflexes 2/4\par Warm, nontender, pulse 2+\par \par LLE: Hip: FAROM, MS 5/5\par Knee: AROM  degrees., MS 4/5 Positive crepitus.  No medial lateral Tc's.reflexes 2/4 \par Ankle: FAROM, MS 5/5 reflexes 2/4\par Warm , nontender, pulse 2+ negative homans\par \par RLE: Hip: FAROM, MS 5/5\par Knee: FAROM, MS 5/5 reflexes 2/4.  Positive crepitus of the knee\par Ankle: FAROM, MS 5/5 reflexes 2/4.  Mild swelling no erythema of the right first MTP\par \par Warm , nontender, pulse 2+ negative homans.\par \par Gait: Spontaneous, reciprocal, safe without an assistive device\par \par Sensation\par RUE: sensation is intact to light touch, pinprick  and proprioception\par LUE: sensation is intact to light touch, pinprick  and proprioception\par RLE: sensation is intact to light touch, pinprick  and proprioception. Neg SLR. Neg OLAF, Neg FADIR\par LLE: sensation is intact to light touch, pinprick  and proprioception. Neg SLR. Neg OLAF, Neg FADIR\par \par

## 2023-03-06 NOTE — HISTORY OF PRESENT ILLNESS
[FreeTextEntry1] : The patient was last seen in the office on October 13, 2022\par \par 72 year old female presents with pain in the neck and right shoulder pain.\par \par Neck pain\par Pain:  8/10 Worse: 10/10 Quality: sharp  Frequency: constant\par The pain starts  at  the base of the right neck into the shoulder blade\par Pain is worse with extension of her neck and rotation to the right.\par She denies weakness of her hands.\par \par Right shoulder pain\par Pain:  7/10 Worse: 10/10 Quality: Soreness Frequency: constant\par She has a soreness pain is on the right medial border scapula\par The pain is worse with use of her arm away from the body or pushing through the arm.\par She uses Salonpas at least once a day with some temporary relief of her pain\par

## 2023-03-06 NOTE — REVIEW OF SYSTEMS
[Joint Pain] : joint pain [Joint Stiffness] : joint stiffness [Muscle Pain] : muscle pain [Chills] : no chills [Red Eyes] : eyes not red [Hearing Loss] : no loss of hearing [Palpitations] : no palpitations [Wheezing] : no wheezing [Incontinence] : no incontinence [Nausea] : no nausea [Skin Wound] : no skin wound [Dizziness] : no dizziness [Insomnia] : no insomnia [Easy Bruising] : no tendency for easy bruising

## 2023-03-23 ENCOUNTER — APPOINTMENT (OUTPATIENT)
Dept: INTERNAL MEDICINE | Facility: CLINIC | Age: 73
End: 2023-03-23
Payer: MEDICARE

## 2023-03-23 VITALS
BODY MASS INDEX: 25.68 KG/M2 | DIASTOLIC BLOOD PRESSURE: 80 MMHG | HEIGHT: 61 IN | SYSTOLIC BLOOD PRESSURE: 112 MMHG | RESPIRATION RATE: 17 BRPM | HEART RATE: 84 BPM | OXYGEN SATURATION: 99 % | TEMPERATURE: 97.7 F | WEIGHT: 136 LBS

## 2023-03-23 DIAGNOSIS — N76.0 ACUTE VAGINITIS: ICD-10-CM

## 2023-03-23 LAB
BILIRUB UR QL STRIP: NEGATIVE
GLUCOSE UR-MCNC: NEGATIVE
HCG UR QL: 0.2 EU/DL
HGB UR QL STRIP.AUTO: NORMAL
KETONES UR-MCNC: NEGATIVE
LEUKOCYTE ESTERASE UR QL STRIP: NEGATIVE
NITRITE UR QL STRIP: NEGATIVE
PH UR STRIP: 6
PROT UR STRIP-MCNC: NEGATIVE
SP GR UR STRIP: 1.02

## 2023-03-23 PROCEDURE — 99213 OFFICE O/P EST LOW 20 MIN: CPT | Mod: 25

## 2023-03-23 PROCEDURE — 81003 URINALYSIS AUTO W/O SCOPE: CPT | Mod: QW

## 2023-03-23 NOTE — ASSESSMENT
[FreeTextEntry1] : Vaginitis Diflucan ketoconazole urine dipstick discussed with the patient unremarkable we will check UA urine culture antibiotic intervention pending results.  Increase hydration\par Follow-up as needed 1 week

## 2023-03-23 NOTE — HISTORY OF PRESENT ILLNESS
[FreeTextEntry1] : Pelvic burning [de-identified] : Patient presents complaining of burning sensation mild intermittent in pelvic/genital region.  Denies burning with urination denies frequency blood in the urine nausea vomiting flank pain.  Some discomfort in the genital/vaginal region

## 2023-04-10 ENCOUNTER — APPOINTMENT (OUTPATIENT)
Dept: PHYSICAL MEDICINE AND REHAB | Facility: CLINIC | Age: 73
End: 2023-04-10
Payer: MEDICARE

## 2023-04-10 VITALS
SYSTOLIC BLOOD PRESSURE: 110 MMHG | DIASTOLIC BLOOD PRESSURE: 76 MMHG | WEIGHT: 136 LBS | HEART RATE: 75 BPM | BODY MASS INDEX: 25.68 KG/M2 | RESPIRATION RATE: 17 BRPM | TEMPERATURE: 98.7 F | OXYGEN SATURATION: 98 % | HEIGHT: 61 IN

## 2023-04-10 DIAGNOSIS — S93.402D SPRAIN OF UNSPECIFIED LIGAMENT OF LEFT ANKLE, SUBSEQUENT ENCOUNTER: ICD-10-CM

## 2023-04-10 PROCEDURE — 99214 OFFICE O/P EST MOD 30 MIN: CPT

## 2023-04-11 ENCOUNTER — APPOINTMENT (OUTPATIENT)
Dept: PHYSICAL MEDICINE AND REHAB | Facility: CLINIC | Age: 73
End: 2023-04-11

## 2023-04-13 NOTE — PHYSICAL EXAM
[FreeTextEntry1] : Pleasant, in no distress. Language: English was not female no apparent distress.\par HEENT: Head: no trauma. Eyes: no discharge. Ears: No discharge. Nose No discharge. Throat: clear\par Neck: AROM.  To the right 0 to 60 degrees to the left 0 to 60 degrees.  Positive Spurling's to the right \par Heart: RR, +S1, S2\par Lungs: CTA\par Abdomen: soft, NT\par Lumbar spine: Full active range of motion, tenderness over the left SI joint and piriformis.  Nontender over the left lateral hip bursa.\par \par LUE: Shoulder:AROM, nontender to palpation MS 5/5\par Elbow: FAROM, MS 5/5 reflexes 2/4\par Wrist: FAROM, MS 4+/5 reflexes 2/4\par Warm, nontender, pulseMild tender over the ulnar styloid.  No gross deformities. 2+\par \par RUE:Shoulder:FAROM, MS 5-/5 she has less tenderness on palpation of the medial border of the scapula.  Negative lift test.  No rash.\par Elbow: FAROM, MS 5/5 reflexes 2/4\par Wrist: FAROM, MS 5/5 reflexes 2/4\par Warm, nontender, pulse 2+\par \par LLE: Hip: FAROM, MS 5/5\par Knee: AROM  degrees., MS 4/5 Positive crepitus.  No medial lateral Tc's.reflexes 2/4 \par Ankle: AROM, 0-20 MS 3/5 Positive swelling.  Resolving ecchymosis in the toes.reflexes 2/4\par Warm , nontender, pulse 2+ negative homans\par \par RLE: Hip: FAROM, MS 5/5\par Knee: FAROM, MS 5/5 reflexes 2/4.  Positive crepitus of the knee\par Ankle: FAROM, MS 5/5 reflexes 2/4.  Mild swelling no erythema of the right first MTP\par \par Warm , nontender, pulse 2+ negative homans.\par \par Gait: Spontaneous, reciprocal, safe without an assistive device\par \par Sensation\par RUE: sensation is intact to light touch, pinprick  and proprioception\par LUE: sensation is intact to light touch, pinprick  and proprioception\par RLE: sensation is intact to light touch, pinprick  and proprioception. Neg SLR. Neg OLAF, Neg FADIR\par LLE: sensation is intact to light touch, pinprick  and proprioception. Neg SLR. Neg OLAF, Neg FADIR\par \par

## 2023-04-13 NOTE — HISTORY OF PRESENT ILLNESS
[FreeTextEntry1] : \par \par 72 year old female presents Left ankle pain and follow-up for neck and right shoulder pain\par \par Left ankle pain.\par Pain:  8/10 Worse: 10/10 Quality: sharp  Frequency: constant\par He was recently on a trip in [place] when she twisted her ankle going down stairs at the hotel.  She had severe pain in the left ankle.  She had black and blue through the foot.  She was unable to step on the ankle.  She tried to treat with elevation ice and Tylenol.  She presents today for persistent severe left ankle pain.\par \par She is able to put weight through the left ankle.  The pain is a sharp sensation over the lateral ankle.  Worse with weight-bear through the foot and walking.\par \par Neck pain\par Pain:  2/10 Worse: 10/10 Quality: sharp  Frequency: constant\par The pain starts  at  the base of the right neck into the shoulder blade\par Pain is worse with extension of her neck and rotation to the right.\par She denies weakness of her hands.She denies radiation of pain into her left hand\par \par Right shoulder pain\par Pain:  1/10 Worse: 10/10 Quality: Soreness Frequency: constant\par She only has a mild ache on the inside part of her shoulder blade.  Salonpas is helpful in decreasing the pain\par \par

## 2023-04-13 NOTE — REVIEW OF SYSTEMS
[Joint Pain] : joint pain [Joint Stiffness] : joint stiffness [Muscle Pain] : muscle pain [Chills] : no chills [Red Eyes] : eyes not red [Hearing Loss] : no loss of hearing [Palpitations] : no palpitations [Nausea] : no nausea [Wheezing] : no wheezing [Incontinence] : no incontinence [Skin Wound] : no skin wound [Dizziness] : no dizziness [Insomnia] : no insomnia [Easy Bruising] : no tendency for easy bruising

## 2023-04-25 ENCOUNTER — APPOINTMENT (OUTPATIENT)
Dept: PHYSICAL MEDICINE AND REHAB | Facility: CLINIC | Age: 73
End: 2023-04-25
Payer: MEDICARE

## 2023-04-25 VITALS
WEIGHT: 139.44 LBS | HEART RATE: 78 BPM | OXYGEN SATURATION: 98 % | TEMPERATURE: 97.1 F | RESPIRATION RATE: 17 BRPM | SYSTOLIC BLOOD PRESSURE: 116 MMHG | HEIGHT: 61 IN | DIASTOLIC BLOOD PRESSURE: 76 MMHG | BODY MASS INDEX: 26.33 KG/M2

## 2023-04-25 DIAGNOSIS — M89.8X1 OTHER SPECIFIED DISORDERS OF BONE, SHOULDER: ICD-10-CM

## 2023-04-25 DIAGNOSIS — M25.572 PAIN IN LEFT ANKLE AND JOINTS OF LEFT FOOT: ICD-10-CM

## 2023-04-25 PROCEDURE — 99214 OFFICE O/P EST MOD 30 MIN: CPT

## 2023-04-26 DIAGNOSIS — M79.672 PAIN IN LEFT FOOT: ICD-10-CM

## 2023-04-27 PROBLEM — M25.572 LEFT ANKLE PAIN, UNSPECIFIED CHRONICITY: Status: ACTIVE | Noted: 2023-04-25

## 2023-04-27 PROBLEM — M89.8X1 PAIN OF RIGHT SCAPULA: Status: ACTIVE | Noted: 2023-03-02

## 2023-04-27 NOTE — PHYSICAL EXAM
[FreeTextEntry1] : Pleasant, in no distress. Language: English was not female no apparent distress.\par HEENT: Head: no trauma. Eyes: no discharge. Ears: No discharge. Nose No discharge. Throat: clear\par Neck: AROM.  To the right 0 to 60 degrees to the left 0 to 60 degrees.  Positive Spurling's to the right \par Heart: RR, +S1, S2\par Lungs: CTA\par Abdomen: soft, NT\par Lumbar spine: Full active range of motion, tenderness over the left SI joint and piriformis.  Nontender over the left lateral hip bursa.\par \par LUE: Shoulder:AROM, nontender to palpation MS 5/5\par Elbow: FAROM, MS 5/5 reflexes 2/4\par Wrist: FAROM, MS 4+/5 reflexes 2/4\par Warm, nontender, pulseMild tender over the ulnar styloid.  No gross deformities. 2+\par \par RUE:Shoulder:FAROM, MS 5-/5 she has less tenderness on palpation of the medial border of the scapula.  Negative lift test.  No rash.\par Elbow: FAROM, MS 5/5 reflexes 2/4\par Wrist: FAROM, MS 5/5 reflexes 2/4\par Warm, nontender, pulse 2+\par \par LLE: Hip: FAROM, MS 5/5\par Knee: AROM  degrees., MS 4/5 Positive crepitus.  No medial lateral Tc's.reflexes 2/4 \par Ankle: AROM, 0-20 MS 3/5 Positive swelling.  Very tender this over the lateral ankle anterior to the malleolus with mild swelling..reflexes 2/4\par Warm , nontender, pulse 2+ negative homans\par \par RLE: Hip: FAROM, MS 5/5\par Knee: FAROM, MS 5/5 reflexes 2/4.  Positive crepitus of the knee\par Ankle: FAROM, MS 5/5 reflexes 2/4.  Mild swelling no erythema of the right first MTP\par \par Warm , nontender, pulse 2+ negative homans.\par \par Gait: Spontaneous, reciprocal, safe without an assistive device.  She has decreased weightbearing over the left ankle\par \par Sensation\par RUE: sensation is intact to light touch, pinprick  and proprioception\par LUE: sensation is intact to light touch, pinprick  and proprioception\par RLE: sensation is intact to light touch, pinprick  and proprioception. Neg SLR. Neg OLAF, Neg FADIR\par LLE: sensation is intact to light touch, pinprick  and proprioception. Neg SLR. Neg OLAF, Neg FADIR\par \par

## 2023-04-27 NOTE — HISTORY OF PRESENT ILLNESS
[FreeTextEntry1] : \par 72 year old female presents Left ankle pain and follow-up for neck and right shoulder pain\par \par Left ankle pain.\par Pain:  9+/10 Worse: 10/10 Quality: sharp  Frequency: constant\par He was recently on a trip in [place] when she twisted her ankle going down stairs at the hotel.  She had severe pain in the left ankle.  She had black and blue through the foot.  She was unable to step on the ankle.  She tried to treat with elevation ice and Tylenol.  She presents today for persistent severe left ankle pain.\par \par She has been to restorative physical therapy 2 times a week for 6 sessions.  She continues to have exquisite pain over the lateral ankle especially to touch.  She is unable to tolerate pressure over the lateral ankle including using the Aircast.  She is able to put weight through the left ankle.  The pain is a sharp sensation over the lateral ankle.  Worse with weight-bear through the foot and walking. She has a sharp pain over the lateral ankle\par \par Neck pain\par Pain:  2/10 Worse: 10/10 Quality: sharp  Frequency: constant\par The pain starts  at  the base of the right neck into the shoulder blade\par Pain is worse with extension of her neck and rotation to the right.\par She denies weakness of her hands.She denies radiation of pain into her left hand\par \par Right shoulder pain\par Pain:  1/10 Worse: 10/10 Quality: Soreness Frequency: constant\par She only has a mild ache on the inside part of her shoulder blade.  Salonpas is helpful in decreasing the pain\par \par

## 2023-05-01 ENCOUNTER — APPOINTMENT (OUTPATIENT)
Dept: ORTHOPEDIC SURGERY | Facility: CLINIC | Age: 73
End: 2023-05-01
Payer: MEDICARE

## 2023-05-01 VITALS — BODY MASS INDEX: 26.24 KG/M2 | WEIGHT: 139 LBS | HEIGHT: 61 IN

## 2023-05-01 DIAGNOSIS — S92.025A NONDISPLACED FRACTURE OF ANTERIOR PROCESS OF LEFT CALCANEUS, INITIAL ENCOUNTER FOR CLOSED FRACTURE: ICD-10-CM

## 2023-05-01 PROCEDURE — L4361: CPT | Mod: KX,LT

## 2023-05-01 PROCEDURE — 99214 OFFICE O/P EST MOD 30 MIN: CPT | Mod: 57

## 2023-05-01 PROCEDURE — 28400 CLTX CALCANEAL FX W/O MNPJ: CPT

## 2023-05-01 NOTE — ASSESSMENT
[FreeTextEntry1] : Recommend weight bearing as tolerated in cam walker.  Ice to the affected area as needed.  Nsaids prn.\par Elevation encouraged.\par \par Patient was instructed that they can not operate an automatic vehicle while wearing a CAM boot or cast on the right lower extremity. If operating a vehicle that requires use of a clutch, patient may not drive while wearing a CAM boot or cast on the left lower extremity.\par \par Ankle ROM exercises daily are encouraged. \par \par Repeat x-ray will be performed at the next office visit (LT calcaneus).\par

## 2023-05-01 NOTE — HISTORY OF PRESENT ILLNESS
[4] : 4 [0] : 0 [de-identified] : Pt is a 72 year old F who presents today for evaluation of their left foot/ankle. Pt. reports twisting injury while going down hotel stairs in [place] on 3/30/23. She presents today with xray and mri studies. No numbness/tingling. Ice and elevation to affected area. Tylenol for pain. Doing PT. WB in sneakers and OTC brace. No previous injury/problem with left foot/ankle.  [FreeTextEntry1] : L ankle  [FreeTextEntry6] : stiffness

## 2023-05-01 NOTE — PHYSICAL EXAM
[Left] : left foot and ankle [NL (40)] : plantar flexion 40 degrees [5___] : eversion 5[unfilled]/5 [2+] : posterior tibialis pulse: 2+ [Normal] : saphenous nerve sensation normal [] : mildly antalgic [de-identified] : WB with OTC brace.  [de-identified] : inversion 20 degrees [de-identified] : eversion 10 degrees [TWNoteComboBox7] : dorsiflexion 15 degrees

## 2023-05-01 NOTE — DATA REVIEWED
[MRI] : MRI [Left] : left [Foot] : foot [Report was reviewed and noted in the chart] : The report was reviewed and noted in the chart [I independently reviewed and interpreted images and report] : I independently reviewed and interpreted images and report [FreeTextEntry1] : ZP 4/25/23: Nondisplaced mildly comminuted fracture involving the anterior process of the\par calcaneus with associated bone marrow edema. Suspicion for associated avascular\par necrosis.\par \par Moderate posterior subtalar joint effusion.\par \par Scarring and partial tearing of the anterior talofibular ligament. Scarring of\par the calcaneal fibular ligament and deep portion of the deltoid ligament.\par \par Soft tissue edema around the ankle.

## 2023-05-08 ENCOUNTER — NON-APPOINTMENT (OUTPATIENT)
Age: 73
End: 2023-05-08

## 2023-05-24 ENCOUNTER — APPOINTMENT (OUTPATIENT)
Dept: ORTHOPEDIC SURGERY | Facility: CLINIC | Age: 73
End: 2023-05-24
Payer: MEDICARE

## 2023-05-24 VITALS — WEIGHT: 139 LBS | BODY MASS INDEX: 26.24 KG/M2 | HEIGHT: 61 IN

## 2023-05-24 PROCEDURE — 99024 POSTOP FOLLOW-UP VISIT: CPT

## 2023-05-24 PROCEDURE — 73650 X-RAY EXAM OF HEEL: CPT | Mod: LT

## 2023-05-24 NOTE — ASSESSMENT
[FreeTextEntry1] : Patient is doing much better.\par She no longer needs the cam boot.\par She can be wbat in a supportive sneaker and should go to PT.\par Ice/nsaids prn.

## 2023-05-24 NOTE — HISTORY OF PRESENT ILLNESS
[4] : 4 [0] : 0 [de-identified] : Patient presents for follow up for  herl eft foot/ankle. Pt. reports twisting injury while going down hotel stairs in [place] on 3/30/23.  Prior MRI showed an anterior process calcaneus fracture.  She has bee WB in a cam boot.  She reports less pain at the fracture site, but she has pain with active ankle ROM. [] : no [FreeTextEntry1] : L ankle  [FreeTextEntry6] : stiffness

## 2023-05-24 NOTE — PHYSICAL EXAM
[NL (40)] : plantar flexion 40 degrees [5___] : dorsiflexion 5[unfilled]/5 [2+] : posterior tibialis pulse: 2+ [Normal] : saphenous nerve sensation normal [NL (20)] : eversion 20 degrees [4___] : eversion 4[unfilled]/5 [] : no pain when stressing lateral tarsal metatarsal joint [Left] : left calcaneus [There are no fractures, subluxations or dislocations. No significant abnormalities are seen] : There are no fractures, subluxations or dislocations. No significant abnormalities are seen [FreeTextEntry9] : Mild pain with passive hindfoot motion. [de-identified] : WB with OTC brace.  [de-identified] : inversion 20 degrees [de-identified] : eversion 15 degrees [TWNoteComboBox7] : dorsiflexion 15 degrees

## 2023-05-25 ENCOUNTER — NON-APPOINTMENT (OUTPATIENT)
Age: 73
End: 2023-05-25

## 2023-06-18 ENCOUNTER — FORM ENCOUNTER (OUTPATIENT)
Age: 73
End: 2023-06-18

## 2023-06-19 ENCOUNTER — APPOINTMENT (OUTPATIENT)
Dept: MRI IMAGING | Facility: CLINIC | Age: 73
End: 2023-06-19
Payer: MEDICARE

## 2023-06-19 ENCOUNTER — APPOINTMENT (OUTPATIENT)
Dept: ORTHOPEDIC SURGERY | Facility: CLINIC | Age: 73
End: 2023-06-19
Payer: MEDICARE

## 2023-06-19 DIAGNOSIS — M25.472 EFFUSION, LEFT ANKLE: ICD-10-CM

## 2023-06-19 PROCEDURE — 73721 MRI JNT OF LWR EXTRE W/O DYE: CPT | Mod: LT,MH

## 2023-06-19 PROCEDURE — 99213 OFFICE O/P EST LOW 20 MIN: CPT | Mod: 24

## 2023-06-19 NOTE — HISTORY OF PRESENT ILLNESS
[1] : 2 [8] : 8 [Lying in bed] : lying in bed [de-identified] : Patient presents for follow up for  her left foot/ankle. Pt. reports twisting injury while going down hotel stairs in [place] on 3/30/23.  Prior MRI showed an anterior process calcaneus fracture.  She has bee WB in sneaker and has been going to PT.  She still reports having pain.  She reports pain is with movement and it does not hurt to touch. [] : no [FreeTextEntry1] : L ankle  [FreeTextEntry6] : stiffness

## 2023-06-19 NOTE — PHYSICAL EXAM
[NL (40)] : plantar flexion 40 degrees [4___] : inversion 4[unfilled]/5 [2+] : posterior tibialis pulse: 2+ [Normal] : saphenous nerve sensation normal [Left] : left calcaneus [There are no fractures, subluxations or dislocations. No significant abnormalities are seen] : There are no fractures, subluxations or dislocations. No significant abnormalities are seen [5___] : eversion 5[unfilled]/5 [] : no pain when stressing lateral tarsal metatarsal joint [FreeTextEntry9] : Mild pain with passive hindfoot motion. [de-identified] : inversion 20 degrees [de-identified] : eversion 15 degrees [TWNoteComboBox7] : dorsiflexion 15 degrees

## 2023-06-19 NOTE — ASSESSMENT
[FreeTextEntry1] : Patient continues to have pain despite PT and home exercises.\par MRI left ankle has been ordered to evaluate for tendon injury.

## 2023-06-22 ENCOUNTER — NON-APPOINTMENT (OUTPATIENT)
Age: 73
End: 2023-06-22

## 2023-06-30 ENCOUNTER — APPOINTMENT (OUTPATIENT)
Dept: ORTHOPEDIC SURGERY | Facility: CLINIC | Age: 73
End: 2023-06-30
Payer: MEDICARE

## 2023-06-30 DIAGNOSIS — M25.672 STIFFNESS OF LEFT ANKLE, NOT ELSEWHERE CLASSIFIED: ICD-10-CM

## 2023-06-30 PROCEDURE — 99214 OFFICE O/P EST MOD 30 MIN: CPT | Mod: 24

## 2023-06-30 NOTE — PHYSICAL EXAM
[Left] : left foot and ankle [NL (40)] : plantar flexion 40 degrees [2+] : posterior tibialis pulse: 2+ [Normal] : saphenous nerve sensation normal [5___] : inversion 5[unfilled]/5 [FreeTextEntry3] : Minimal diffuse ankle swelling.\par  [] : patient ambulates without assistive device [de-identified] : inversion 20 degrees [de-identified] : eversion 15 degrees [TWNoteComboBox7] : dorsiflexion 15 degrees

## 2023-06-30 NOTE — HISTORY OF PRESENT ILLNESS
[Lying in bed] : lying in bed [5] : 5 [4] : 4 [de-identified] : Patient presents for follow up for  her left foot/ankle. Pt. reports twisting injury while going down hotel stairs in [place] on 3/30/23.  Prior MRI showed an anterior process calcaneus fracture. She went for updated MRI study.  She has bee WB in sneaker and has been going to PT. She has been able to walk two miles without issue.  [] : no [FreeTextEntry1] : L ankle  [FreeTextEntry6] : stiffness

## 2023-06-30 NOTE — PHYSICAL EXAM
[Left] : left foot and ankle [NL (40)] : plantar flexion 40 degrees [2+] : posterior tibialis pulse: 2+ [Normal] : saphenous nerve sensation normal [5___] : inversion 5[unfilled]/5 [FreeTextEntry3] : Minimal diffuse ankle swelling.\par  [] : patient ambulates without assistive device [de-identified] : inversion 20 degrees [de-identified] : eversion 15 degrees [TWNoteComboBox7] : dorsiflexion 15 degrees

## 2023-06-30 NOTE — ASSESSMENT
[FreeTextEntry1] : MRI report and films reviewed with patient.\par \par Pt. is doing much better and will continue with supportive footwear and physical therapy to improve her ankle motion.\par

## 2023-06-30 NOTE — HISTORY OF PRESENT ILLNESS
[Lying in bed] : lying in bed [5] : 5 [4] : 4 [de-identified] : Patient presents for follow up for  her left foot/ankle. Pt. reports twisting injury while going down hotel stairs in [place] on 3/30/23.  Prior MRI showed an anterior process calcaneus fracture. She went for updated MRI study.  She has bee WB in sneaker and has been going to PT. She has been able to walk two miles without issue.  [] : no [FreeTextEntry1] : L ankle  [FreeTextEntry6] : stiffness

## 2023-06-30 NOTE — DATA REVIEWED
[MRI] : MRI [Left] : left [Ankle] : ankle [Report was reviewed and noted in the chart] : The report was reviewed and noted in the chart [I independently reviewed and interpreted images and report] : I independently reviewed and interpreted images and report [FreeTextEntry1] : 1. Findings consistent with a large nondisplaced fracture of the calcaneus particularly involving the anterior \par process with diffuse surrounding soft tissue swelling. Clinical correlation regarding date of injury is \par recommended.\par 2. Possible contusion or mild degenerative subchondral edema in the dorsal proximal cuboid at the \par calcaneocuboid articulation.\par 3. Subchondral edema associated with arthrosis in the dorsal medial talonavicular joint.\par 4. Multifocal tendinopathy and tenosynovitis, moderate soft tissue swelling surrounding the ankle, mild chronic \par appearing sprain of the anterior talofibular ligament and mild chronic appearing sprains of the calcaneofibular \par and deltoid ligaments with moderate tibiotalar effusion and synovitis posteriorly.\par 5. Achilles tendinitis and plantar fascial thickening without tear.

## 2023-08-11 ENCOUNTER — APPOINTMENT (OUTPATIENT)
Dept: ORTHOPEDIC SURGERY | Facility: CLINIC | Age: 73
End: 2023-08-11
Payer: MEDICARE

## 2023-08-11 DIAGNOSIS — S92.025D NONDISPLACED FRACTURE OF ANTERIOR PROCESS OF LEFT CALCANEUS, SUBSEQUENT ENCOUNTER FOR FRACTURE WITH ROUTINE HEALING: ICD-10-CM

## 2023-08-11 PROCEDURE — 99213 OFFICE O/P EST LOW 20 MIN: CPT

## 2023-08-11 NOTE — ASSESSMENT
[FreeTextEntry1] : Patient has healed her fracture. and has recovered well. She will finish up her course of PT and then continue with home exercises. She will return if she has any problems.

## 2023-08-11 NOTE — PHYSICAL EXAM
[Left] : left foot and ankle [NL (40)] : plantar flexion 40 degrees [5___] : eversion 5[unfilled]/5 [2+] : posterior tibialis pulse: 2+ [Normal] : saphenous nerve sensation normal [NL 30)] : inversion 30 degrees [NL (20)] : eversion 20 degrees [] : non-antalgic

## 2023-08-11 NOTE — HISTORY OF PRESENT ILLNESS
[4] : 4 [1] : 2 [Lying in bed] : lying in bed [de-identified] : Patient presents for follow up for her left foot/ankle. Pt. reports twisting injury while going down hotel stairs in [place] on 3/30/23.  Prior MRI showed an anterior process calcaneus fracture.  She has been WB in sneaker and has been going to PT. She has been doing better.  Her pain is less. She still reports having some swelling, but this too is improving. [] : no [FreeTextEntry1] : L ankle  [FreeTextEntry6] : stiffness

## 2023-08-14 ENCOUNTER — NON-APPOINTMENT (OUTPATIENT)
Age: 73
End: 2023-08-14

## 2023-08-14 ENCOUNTER — APPOINTMENT (OUTPATIENT)
Dept: INTERNAL MEDICINE | Facility: CLINIC | Age: 73
End: 2023-08-14
Payer: MEDICARE

## 2023-08-14 VITALS
TEMPERATURE: 98.2 F | SYSTOLIC BLOOD PRESSURE: 118 MMHG | DIASTOLIC BLOOD PRESSURE: 78 MMHG | HEART RATE: 78 BPM | OXYGEN SATURATION: 98 % | HEIGHT: 61 IN | BODY MASS INDEX: 26.24 KG/M2 | RESPIRATION RATE: 17 BRPM | WEIGHT: 139 LBS

## 2023-08-14 PROCEDURE — G0439: CPT

## 2023-08-14 PROCEDURE — 93000 ELECTROCARDIOGRAM COMPLETE: CPT

## 2023-08-14 PROCEDURE — 99497 ADVNCD CARE PLAN 30 MIN: CPT

## 2023-08-14 PROCEDURE — 99214 OFFICE O/P EST MOD 30 MIN: CPT | Mod: 25

## 2023-08-14 NOTE — HEALTH RISK ASSESSMENT
[Good] : ~his/her~  mood as  good [Yes] : Yes [Monthly or less (1 pt)] : Monthly or less (1 point) [1 or 2 (0 pts)] : 1 or 2 (0 points) [Never (0 pts)] : Never (0 points) [No] : In the past 12 months have you used drugs other than those required for medical reasons? No [0] : 2) Feeling down, depressed, or hopeless: Not at all (0) [PHQ-2 Negative - No further assessment needed] : PHQ-2 Negative - No further assessment needed [I have developed a follow-up plan documented below in the note.] : I have developed a follow-up plan documented below in the note. [Patient reported mammogram was normal] : Patient reported mammogram was normal [Patient reported PAP Smear was normal] : Patient reported PAP Smear was normal [Patient reported bone density results were normal] : Patient reported bone density results were normal [None] : None [Alone] : lives alone [Retired] : retired [High School] : high school [] :  [Feels Safe at Home] : Feels safe at home [Fully functional (bathing, dressing, toileting, transferring, walking, feeding)] : Fully functional (bathing, dressing, toileting, transferring, walking, feeding) [Fully functional (using the telephone, shopping, preparing meals, housekeeping, doing laundry, using] : Fully functional and needs no help or supervision to perform IADLs (using the telephone, shopping, preparing meals, housekeeping, doing laundry, using transportation, managing medications and managing finances) [Reports changes in vision] : Reports changes in vision [Reports normal functional visual acuity (ie: able to read med bottle)] : Reports normal functional visual acuity [Smoke Detector] : smoke detector [Carbon Monoxide Detector] : carbon monoxide detector [With Patient/Caregiver] : , with patient/caregiver [Designated Healthcare Proxy] : Designated healthcare proxy [Name: ___] : Health Care Proxy's Name: [unfilled]  [Relationship: ___] : Relationship: [unfilled] [Aggressive treatment] : aggressive treatment [Last Verification Date: ___] : Last Verification Date: [unfilled] [I will adhere to the patient's wishes.] : I will adhere to the patient's wishes. [Time Spent: ___ minutes] : Time Spent: [unfilled] minutes [Former] : Former [No falls in past year] : Patient reported no falls in the past year [Audit-CScore] : 1 [de-identified] : Light [de-identified] : Standard [[id number]] : 0 [Change in mental status noted] : No change in mental status noted [Language] : denies difficulty with language [Sexually Active] : not sexually active [Reports changes in hearing] : Reports no changes in hearing [MammogramDate] : 01/2022 [PapSmearDate] : 01/2022 [BoneDensityDate] : 01/2022 [BoneDensityComments] : Osteopenia  [ColonoscopyDate] : 01/2021 [AdvancecareDate] : 08/23

## 2023-08-14 NOTE — HISTORY OF PRESENT ILLNESS
[FreeTextEntry1] : F/u and a GHM. Hx of hyperlipidemia,glucose intolerance and osteoporosis. CC: Patient is here today for an Annual Wellness and blood work. Patient also needs a refill on her Xanax for her occasional anxiety. States that she has allergies and takes Flonase and Zyrtec which helps her a lot.   [de-identified] : 72 year old female presents herself today for a F/U,GHM. Hx of hyperlipidemia,glucose intolerance and osteoporosis.  Patient is here today for an Annual Wellness and blood work.Patient also needs a refill on her Xanax for her occasional anxiety. States that she has allergies and takes Flonase and Zyrtec which helps her a lot.     She is fully covid/Flu vaccinated for this year. Has mild COPD and smoked 40 year ago.  Otherwise patient is stable.  Voices no further complaints. ROS as documented below.  Denies fever,cough,chills,body aches and SOB.   I Fely Daniels, am scribing for and in the presence of Dr. Meredith, the following sections of:  HISTORY OF PRESENT ILLNESS, PAST MEDICAL/FAMILY/SOCIAL HISTORY, ROS, VITALS, PE, DISPOSITION.

## 2023-08-14 NOTE — COUNSELING
[Fall prevention counseling provided] : Fall prevention counseling provided [Adequate lighting] : Adequate lighting [No throw rugs] : No throw rugs [Use proper foot wear] : Use proper foot wear [Use recommended devices] : Use recommended devices [Behavioral health counseling provided] : Behavioral health counseling provided [Sleep ___ hours/day] : Sleep [unfilled] hours/day [Engage in a relaxing activity] : Engage in a relaxing activity [Plan in advance] : Plan in advance [None] : None [Good understanding] : Patient has a good understanding of lifestyle changes and steps needed to achieve self management goal [de-identified] : Medical Annual wellness visit completed: HRA completed and reviewed with patient Medical, family, surgical history reviewed with patient and updated List of current providers r/w patient and updated Vitals, BMI reviewed and discussed along with healthy BMI goals. Dietary counseling x 15 minutes provided Depression PHQ 9 completed and reviewed  Annual safety assessment reviewed discussed advanced directives smoking cessation counseling provided Established routine screening and immunization schedules  Medical Annual wellness visit completed: HRA completed and reviewed with patient Medical, family, surgical history reviewed with patient and updated List of current providers r/w patient and updated Vitals, BMI reviewed and discussed along with healthy BMI goals. Dietary counseling x 15 minutes provided Depression PHQ 9 completed and reviewed  Annual safety assessment reviewed discussed advanced directives smoking cessation counseling provided Established routine screening and immunization schedules  VACCINATION & OTHER TX RECOMMENDATIONS  ASA preventative therapy Calcium/Vitamin D supplementation   Dietary counseling, nutrition referral risks vs. benefits d/w patient. routine vaccination and vaccination schedules and recommendation d/w patient  Vaccines recommended:  * pneumovax (once after 65) & prevnar * annual Influenza vaccine * Hep B vaccines * zostavax * Tdap  Colorectal screening recommended; screening colonoscopy q10yr, flex sig q5yr, annual fecal occult testing BMD recommended biennially for osteoporosis screening Glaucoma screening recommended, annual optho evals Cardiovascular screening and blood tests recommended and discussed w/ patient, cholesterol screening and dietary counseling AAA recommended x 1  DIABETIC recommendations: med nutrition counseling, nutrition referral  annual podiatry evaluations and follow up annual optho eval/retinal exams routine blood tets, including FBS, a1c% and cholesterol panels  Symptomatic patients : Test for influenza, if positive, treat for influenza and do not continue below.  1. Fever plus cough or shortness of breath : Test for RVP and COVID-19. 2.Indirect, circumstantial or unclear exposure to COVID-19, or other concerning cases not meeting above criteria: Please call [facility] to discuss testing.  +++ All above cases must be reported to the [facility] registry. +++  Asymptomatic patients:  1. Known first-degree direct-contact exposure to positive COVID-19 patient but asymptomatic: No testing PLUS 14 day self-quarantine. Pt to call if symptoms develop. Report to [facility] Registry. 2. No known exposure and asymptomatic, referred from outside healthcare organization: Please call [facility] to discuss testing.  3.All other asymptomatic patients with no known exposures: no testing, no exceptions.

## 2023-08-15 ENCOUNTER — APPOINTMENT (OUTPATIENT)
Dept: INTERNAL MEDICINE | Facility: CLINIC | Age: 73
End: 2023-08-15
Payer: MEDICARE

## 2023-08-15 ENCOUNTER — LABORATORY RESULT (OUTPATIENT)
Age: 73
End: 2023-08-15

## 2023-08-15 PROCEDURE — 36415 COLL VENOUS BLD VENIPUNCTURE: CPT

## 2023-08-16 LAB
25(OH)D3 SERPL-MCNC: 54.5 NG/ML
ALBUMIN SERPL ELPH-MCNC: 4.5 G/DL
ALP BLD-CCNC: 81 U/L
ALT SERPL-CCNC: 21 U/L
ANION GAP SERPL CALC-SCNC: 10 MMOL/L
APPEARANCE: CLEAR
AST SERPL-CCNC: 20 U/L
BILIRUB SERPL-MCNC: 0.5 MG/DL
BILIRUBIN URINE: NEGATIVE
BLOOD URINE: NEGATIVE
BUN SERPL-MCNC: 11 MG/DL
CALCIUM SERPL-MCNC: 9.6 MG/DL
CHLORIDE SERPL-SCNC: 106 MMOL/L
CHOLEST SERPL-MCNC: 218 MG/DL
CO2 SERPL-SCNC: 27 MMOL/L
COLOR: YELLOW
CREAT SERPL-MCNC: 0.61 MG/DL
EGFR: 95 ML/MIN/1.73M2
ESTIMATED AVERAGE GLUCOSE: 97 MG/DL
GGT SERPL-CCNC: 17 U/L
GLUCOSE QUALITATIVE U: NEGATIVE MG/DL
GLUCOSE SERPL-MCNC: 80 MG/DL
HBA1C MFR BLD HPLC: 5 %
HDLC SERPL-MCNC: 56 MG/DL
KETONES URINE: NEGATIVE MG/DL
LDLC SERPL CALC-MCNC: 132 MG/DL
LEUKOCYTE ESTERASE URINE: ABNORMAL
NITRITE URINE: POSITIVE
NONHDLC SERPL-MCNC: 162 MG/DL
PH URINE: 7
POTASSIUM SERPL-SCNC: 4.2 MMOL/L
PROT SERPL-MCNC: 6.3 G/DL
PROTEIN URINE: NEGATIVE MG/DL
SODIUM SERPL-SCNC: 143 MMOL/L
SPECIFIC GRAVITY URINE: 1.01
TRIGL SERPL-MCNC: 174 MG/DL
TSH SERPL-ACNC: 2.51 UIU/ML
UROBILINOGEN URINE: 0.2 MG/DL

## 2023-08-18 ENCOUNTER — LABORATORY RESULT (OUTPATIENT)
Age: 73
End: 2023-08-18

## 2023-08-18 ENCOUNTER — APPOINTMENT (OUTPATIENT)
Dept: INTERNAL MEDICINE | Facility: CLINIC | Age: 73
End: 2023-08-18
Payer: MEDICARE

## 2023-08-18 VITALS
RESPIRATION RATE: 18 BRPM | DIASTOLIC BLOOD PRESSURE: 72 MMHG | SYSTOLIC BLOOD PRESSURE: 116 MMHG | WEIGHT: 140 LBS | HEIGHT: 61 IN | HEART RATE: 78 BPM | OXYGEN SATURATION: 98 % | TEMPERATURE: 98.1 F | BODY MASS INDEX: 26.43 KG/M2

## 2023-08-18 DIAGNOSIS — S61.209A UNSPECIFIED OPEN WOUND OF UNSPECIFIED FINGER W/OUT DAMAGE TO NAIL, INITIAL ENCOUNTER: ICD-10-CM

## 2023-08-18 PROCEDURE — 99213 OFFICE O/P EST LOW 20 MIN: CPT

## 2023-08-18 NOTE — PHYSICAL EXAM
[No Acute Distress] : no acute distress [Well Nourished] : well nourished [Well Developed] : well developed [Well-Appearing] : well-appearing [Normal Sclera/Conjunctiva] : normal sclera/conjunctiva [PERRL] : pupils equal round and reactive to light [EOMI] : extraocular movements intact [Normal Outer Ear/Nose] : the outer ears and nose were normal in appearance [Normal Oropharynx] : the oropharynx was normal [No JVD] : no jugular venous distention [No Lymphadenopathy] : no lymphadenopathy [Supple] : supple [Thyroid Normal, No Nodules] : the thyroid was normal and there were no nodules present [No Respiratory Distress] : no respiratory distress  [No Accessory Muscle Use] : no accessory muscle use [Clear to Auscultation] : lungs were clear to auscultation bilaterally [Normal Rate] : normal rate  [Regular Rhythm] : with a regular rhythm [Normal S1, S2] : normal S1 and S2 [No Murmur] : no murmur heard [No Carotid Bruits] : no carotid bruits [No Abdominal Bruit] : a ~M bruit was not heard ~T in the abdomen [No Varicosities] : no varicosities [No Edema] : there was no peripheral edema [Pedal Pulses Present] : the pedal pulses are present [No Palpable Aorta] : no palpable aorta [No Extremity Clubbing/Cyanosis] : no extremity clubbing/cyanosis [Soft] : abdomen soft [Non Tender] : non-tender [Non-distended] : non-distended [No Masses] : no abdominal mass palpated [No HSM] : no HSM [Normal Bowel Sounds] : normal bowel sounds [Normal Posterior Cervical Nodes] : no posterior cervical lymphadenopathy [Normal Anterior Cervical Nodes] : no anterior cervical lymphadenopathy [No CVA Tenderness] : no CVA  tenderness [No Spinal Tenderness] : no spinal tenderness [No Joint Swelling] : no joint swelling [Grossly Normal Strength/Tone] : grossly normal strength/tone [No Rash] : no rash [Coordination Grossly Intact] : coordination grossly intact [No Focal Deficits] : no focal deficits [Normal Gait] : normal gait [Deep Tendon Reflexes (DTR)] : deep tendon reflexes were 2+ and symmetric [Normal Affect] : the affect was normal [Normal Insight/Judgement] : insight and judgment were intact No

## 2023-08-21 LAB
APPEARANCE: ABNORMAL
BILIRUBIN URINE: NEGATIVE
BLOOD URINE: NEGATIVE
COLOR: YELLOW
GLUCOSE QUALITATIVE U: NEGATIVE MG/DL
KETONES URINE: NEGATIVE MG/DL
LEUKOCYTE ESTERASE URINE: ABNORMAL
NITRITE URINE: POSITIVE
PH URINE: 7
PROTEIN URINE: NEGATIVE MG/DL
SPECIFIC GRAVITY URINE: 1.01
UROBILINOGEN URINE: 0.2 MG/DL

## 2023-08-21 NOTE — HISTORY OF PRESENT ILLNESS
[FreeTextEntry1] : Finger complaint [de-identified] : 72 year old female presents herself today for a F/U,GHM. L index finger healing- had skin avulsion with closure. Had positive UA recently but is currently asymptomatic.   Hx of hyperlipidemia,glucose intolerance and osteoporosis.  She is fully covid/Flu vaccinated for this year. Has mild COPD and smoked 40 year ago.  Voices no further complaints. ROS as documented below.  Denies fever,cough,chills,body aches and SOB.

## 2023-08-21 NOTE — COUNSELING
[Fall prevention counseling provided] : Fall prevention counseling provided [Adequate lighting] : Adequate lighting [No throw rugs] : No throw rugs [Use proper foot wear] : Use proper foot wear [Use recommended devices] : Use recommended devices [Behavioral health counseling provided] : Behavioral health counseling provided [Sleep ___ hours/day] : Sleep [unfilled] hours/day [Engage in a relaxing activity] : Engage in a relaxing activity [Plan in advance] : Plan in advance [None] : None [Good understanding] : Patient has a good understanding of lifestyle changes and steps needed to achieve self management goal [de-identified] : Total face-to-face time with patient - 20 minutes; >50% involved counselling, review of labs/tests, and/or coordination of medical care:

## 2023-08-21 NOTE — END OF VISIT
[FreeTextEntry4] : I Greg Shane, am scribing for and in the presence of Dr. Meredith, the following sections of:  HISTORY OF PRESENT ILLNESS, PAST MEDICAL/FAMILY/SOCIAL HISTORY, ROS, VITALS, PE, DISPOSITION

## 2023-08-25 ENCOUNTER — APPOINTMENT (OUTPATIENT)
Dept: INTERNAL MEDICINE | Facility: CLINIC | Age: 73
End: 2023-08-25
Payer: MEDICARE

## 2023-08-25 ENCOUNTER — LABORATORY RESULT (OUTPATIENT)
Age: 73
End: 2023-08-25

## 2023-08-25 DIAGNOSIS — N39.0 URINARY TRACT INFECTION, SITE NOT SPECIFIED: ICD-10-CM

## 2023-08-25 DIAGNOSIS — R30.0 DYSURIA: ICD-10-CM

## 2023-08-25 PROCEDURE — 99212 OFFICE O/P EST SF 10 MIN: CPT

## 2023-08-26 PROBLEM — N39.0 URINARY TRACT INFECTION: Status: ACTIVE | Noted: 2023-08-26 | Resolved: 2023-09-25

## 2023-08-26 LAB
APPEARANCE: CLEAR
BILIRUBIN URINE: NEGATIVE
BLOOD URINE: NEGATIVE
COLOR: YELLOW
GLUCOSE QUALITATIVE U: NEGATIVE MG/DL
KETONES URINE: NEGATIVE MG/DL
LEUKOCYTE ESTERASE URINE: ABNORMAL
NITRITE URINE: POSITIVE
PH URINE: 6.5
PROTEIN URINE: NEGATIVE MG/DL
SPECIFIC GRAVITY URINE: 1.02
UROBILINOGEN URINE: 0.2 MG/DL

## 2023-08-26 NOTE — COUNSELING
[Fall prevention counseling provided] : Fall prevention counseling provided [Adequate lighting] : Adequate lighting [No throw rugs] : No throw rugs [Use proper foot wear] : Use proper foot wear [Use recommended devices] : Use recommended devices [Behavioral health counseling provided] : Behavioral health counseling provided [Sleep ___ hours/day] : Sleep [unfilled] hours/day [Engage in a relaxing activity] : Engage in a relaxing activity [Plan in advance] : Plan in advance [Good understanding] : Patient has a good understanding of lifestyle changes and steps needed to achieve self management goal [de-identified] :  Told to increase fluids and avoid coffee alcohol and spicy foods   [None] : None

## 2023-08-26 NOTE — HISTORY OF PRESENT ILLNESS
[FreeTextEntry1] : Follow up, dysuria= patient's urine sample was discarded by lab because of insufficient quantity.  Patient continues to have burning when she urinates [de-identified] : 72 year old female presents herself today for a F/U,GHM. cc: persistent dysuria, has a weeks worth of levaquin has not taken thus far  Hx of hyperlipidemia,glucose intolerance and osteoporosis.  She is fully covid/Flu vaccinated for this year. Has mild COPD and smoked 40 year ago.  Voices no further complaints. ROS as documented below.  Denies fever,cough,chills,body aches and SOB.

## 2023-08-26 NOTE — PHYSICAL EXAM
[No Acute Distress] : no acute distress [Well Nourished] : well nourished [Well Developed] : well developed [Well-Appearing] : well-appearing [Normal Sclera/Conjunctiva] : normal sclera/conjunctiva [PERRL] : pupils equal round and reactive to light [EOMI] : extraocular movements intact [Normal Outer Ear/Nose] : the outer ears and nose were normal in appearance [Normal Oropharynx] : the oropharynx was normal [No JVD] : no jugular venous distention [No Lymphadenopathy] : no lymphadenopathy [Supple] : supple [Thyroid Normal, No Nodules] : the thyroid was normal and there were no nodules present [No Respiratory Distress] : no respiratory distress  [No Accessory Muscle Use] : no accessory muscle use [Clear to Auscultation] : lungs were clear to auscultation bilaterally [Normal Rate] : normal rate  [Regular Rhythm] : with a regular rhythm [Normal S1, S2] : normal S1 and S2 [No Murmur] : no murmur heard [No Carotid Bruits] : no carotid bruits [No Abdominal Bruit] : a ~M bruit was not heard ~T in the abdomen [No Varicosities] : no varicosities [Pedal Pulses Present] : the pedal pulses are present [No Edema] : there was no peripheral edema [No Palpable Aorta] : no palpable aorta [No Extremity Clubbing/Cyanosis] : no extremity clubbing/cyanosis [Soft] : abdomen soft [Non Tender] : non-tender [Non-distended] : non-distended [No Masses] : no abdominal mass palpated [No HSM] : no HSM [Normal Bowel Sounds] : normal bowel sounds [Normal Posterior Cervical Nodes] : no posterior cervical lymphadenopathy [Normal Anterior Cervical Nodes] : no anterior cervical lymphadenopathy [No CVA Tenderness] : no CVA  tenderness [No Spinal Tenderness] : no spinal tenderness [No Joint Swelling] : no joint swelling [Grossly Normal Strength/Tone] : grossly normal strength/tone [No Rash] : no rash [Coordination Grossly Intact] : coordination grossly intact [No Focal Deficits] : no focal deficits [Normal Gait] : normal gait [Deep Tendon Reflexes (DTR)] : deep tendon reflexes were 2+ and symmetric [Normal Affect] : the affect was normal [Normal Insight/Judgement] : insight and judgment were intact [Normal] : no acute distress, well nourished, well developed and well-appearing [Alert and Oriented x3] : oriented to person, place, and time [de-identified] : Anxious

## 2023-08-29 LAB — BACTERIA UR CULT: ABNORMAL

## 2023-08-30 DIAGNOSIS — M25.562 PAIN IN LEFT KNEE: ICD-10-CM

## 2023-09-08 ENCOUNTER — LABORATORY RESULT (OUTPATIENT)
Age: 73
End: 2023-09-08

## 2023-09-10 LAB
APPEARANCE: CLEAR
BILIRUBIN URINE: NEGATIVE
BLOOD URINE: NEGATIVE
COLOR: YELLOW
GLUCOSE QUALITATIVE U: NEGATIVE MG/DL
KETONES URINE: NEGATIVE MG/DL
LEUKOCYTE ESTERASE URINE: ABNORMAL
NITRITE URINE: NEGATIVE
PH URINE: 6
PROTEIN URINE: NEGATIVE MG/DL
SPECIFIC GRAVITY URINE: 1.02
UROBILINOGEN URINE: 0.2 MG/DL

## 2023-10-30 ENCOUNTER — TRANSCRIPTION ENCOUNTER (OUTPATIENT)
Age: 73
End: 2023-10-30

## 2023-12-14 ENCOUNTER — INPATIENT (INPATIENT)
Facility: HOSPITAL | Age: 73
LOS: 5 days | Discharge: NURSING, REHAB OR LONG-TERM CARE | DRG: 545 | End: 2023-12-20
Attending: PSYCHIATRY & NEUROLOGY | Admitting: PSYCHIATRY & NEUROLOGY
Payer: MEDICARE

## 2023-12-14 VITALS
OXYGEN SATURATION: 95 % | RESPIRATION RATE: 18 BRPM | SYSTOLIC BLOOD PRESSURE: 124 MMHG | HEART RATE: 80 BPM | DIASTOLIC BLOOD PRESSURE: 78 MMHG

## 2023-12-14 DIAGNOSIS — I61.9 NONTRAUMATIC INTRACEREBRAL HEMORRHAGE, UNSPECIFIED: ICD-10-CM

## 2023-12-14 LAB
ALBUMIN SERPL ELPH-MCNC: 4.3 G/DL — SIGNIFICANT CHANGE UP (ref 3.3–5)
ALBUMIN SERPL ELPH-MCNC: 4.3 G/DL — SIGNIFICANT CHANGE UP (ref 3.3–5)
ALP SERPL-CCNC: 70 U/L — SIGNIFICANT CHANGE UP (ref 40–120)
ALP SERPL-CCNC: 70 U/L — SIGNIFICANT CHANGE UP (ref 40–120)
ALT FLD-CCNC: 21 U/L — SIGNIFICANT CHANGE UP (ref 10–45)
ALT FLD-CCNC: 21 U/L — SIGNIFICANT CHANGE UP (ref 10–45)
ANION GAP SERPL CALC-SCNC: 13 MMOL/L — SIGNIFICANT CHANGE UP (ref 5–17)
ANION GAP SERPL CALC-SCNC: 13 MMOL/L — SIGNIFICANT CHANGE UP (ref 5–17)
ANION GAP SERPL CALC-SCNC: 14 MMOL/L — SIGNIFICANT CHANGE UP (ref 5–17)
ANION GAP SERPL CALC-SCNC: 14 MMOL/L — SIGNIFICANT CHANGE UP (ref 5–17)
APTT BLD: 29.3 SEC — SIGNIFICANT CHANGE UP (ref 24.5–35.6)
APTT BLD: 29.3 SEC — SIGNIFICANT CHANGE UP (ref 24.5–35.6)
AST SERPL-CCNC: 58 U/L — HIGH (ref 10–40)
AST SERPL-CCNC: 58 U/L — HIGH (ref 10–40)
BASOPHILS # BLD AUTO: 0.02 K/UL — SIGNIFICANT CHANGE UP (ref 0–0.2)
BASOPHILS # BLD AUTO: 0.02 K/UL — SIGNIFICANT CHANGE UP (ref 0–0.2)
BASOPHILS NFR BLD AUTO: 0.3 % — SIGNIFICANT CHANGE UP (ref 0–2)
BASOPHILS NFR BLD AUTO: 0.3 % — SIGNIFICANT CHANGE UP (ref 0–2)
BILIRUB SERPL-MCNC: 0.6 MG/DL — SIGNIFICANT CHANGE UP (ref 0.2–1.2)
BILIRUB SERPL-MCNC: 0.6 MG/DL — SIGNIFICANT CHANGE UP (ref 0.2–1.2)
BLD GP AB SCN SERPL QL: NEGATIVE — SIGNIFICANT CHANGE UP
BLD GP AB SCN SERPL QL: NEGATIVE — SIGNIFICANT CHANGE UP
BUN SERPL-MCNC: 13 MG/DL — SIGNIFICANT CHANGE UP (ref 7–23)
BUN SERPL-MCNC: 13 MG/DL — SIGNIFICANT CHANGE UP (ref 7–23)
BUN SERPL-MCNC: 15 MG/DL — SIGNIFICANT CHANGE UP (ref 7–23)
BUN SERPL-MCNC: 15 MG/DL — SIGNIFICANT CHANGE UP (ref 7–23)
CALCIUM SERPL-MCNC: 9.3 MG/DL — SIGNIFICANT CHANGE UP (ref 8.4–10.5)
CALCIUM SERPL-MCNC: 9.3 MG/DL — SIGNIFICANT CHANGE UP (ref 8.4–10.5)
CALCIUM SERPL-MCNC: 9.6 MG/DL — SIGNIFICANT CHANGE UP (ref 8.4–10.5)
CALCIUM SERPL-MCNC: 9.6 MG/DL — SIGNIFICANT CHANGE UP (ref 8.4–10.5)
CHLORIDE SERPL-SCNC: 104 MMOL/L — SIGNIFICANT CHANGE UP (ref 96–108)
CHLORIDE SERPL-SCNC: 104 MMOL/L — SIGNIFICANT CHANGE UP (ref 96–108)
CHLORIDE SERPL-SCNC: 105 MMOL/L — SIGNIFICANT CHANGE UP (ref 96–108)
CHLORIDE SERPL-SCNC: 105 MMOL/L — SIGNIFICANT CHANGE UP (ref 96–108)
CO2 SERPL-SCNC: 19 MMOL/L — LOW (ref 22–31)
CO2 SERPL-SCNC: 19 MMOL/L — LOW (ref 22–31)
CO2 SERPL-SCNC: 20 MMOL/L — LOW (ref 22–31)
CO2 SERPL-SCNC: 20 MMOL/L — LOW (ref 22–31)
CREAT SERPL-MCNC: 0.51 MG/DL — SIGNIFICANT CHANGE UP (ref 0.5–1.3)
CREAT SERPL-MCNC: 0.51 MG/DL — SIGNIFICANT CHANGE UP (ref 0.5–1.3)
CREAT SERPL-MCNC: 0.53 MG/DL — SIGNIFICANT CHANGE UP (ref 0.5–1.3)
CREAT SERPL-MCNC: 0.53 MG/DL — SIGNIFICANT CHANGE UP (ref 0.5–1.3)
EGFR: 98 ML/MIN/1.73M2 — SIGNIFICANT CHANGE UP
EOSINOPHIL # BLD AUTO: 0.01 K/UL — SIGNIFICANT CHANGE UP (ref 0–0.5)
EOSINOPHIL # BLD AUTO: 0.01 K/UL — SIGNIFICANT CHANGE UP (ref 0–0.5)
EOSINOPHIL NFR BLD AUTO: 0.1 % — SIGNIFICANT CHANGE UP (ref 0–6)
EOSINOPHIL NFR BLD AUTO: 0.1 % — SIGNIFICANT CHANGE UP (ref 0–6)
GLUCOSE SERPL-MCNC: 103 MG/DL — HIGH (ref 70–99)
GLUCOSE SERPL-MCNC: 103 MG/DL — HIGH (ref 70–99)
GLUCOSE SERPL-MCNC: 106 MG/DL — HIGH (ref 70–99)
GLUCOSE SERPL-MCNC: 106 MG/DL — HIGH (ref 70–99)
HCT VFR BLD CALC: 41.7 % — SIGNIFICANT CHANGE UP (ref 34.5–45)
HCT VFR BLD CALC: 41.7 % — SIGNIFICANT CHANGE UP (ref 34.5–45)
HCT VFR BLD CALC: 41.8 % — SIGNIFICANT CHANGE UP (ref 34.5–45)
HCT VFR BLD CALC: 41.8 % — SIGNIFICANT CHANGE UP (ref 34.5–45)
HGB BLD-MCNC: 15 G/DL — SIGNIFICANT CHANGE UP (ref 11.5–15.5)
HGB BLD-MCNC: 15 G/DL — SIGNIFICANT CHANGE UP (ref 11.5–15.5)
HGB BLD-MCNC: 15.3 G/DL — SIGNIFICANT CHANGE UP (ref 11.5–15.5)
HGB BLD-MCNC: 15.3 G/DL — SIGNIFICANT CHANGE UP (ref 11.5–15.5)
IMM GRANULOCYTES NFR BLD AUTO: 0.8 % — SIGNIFICANT CHANGE UP (ref 0–0.9)
IMM GRANULOCYTES NFR BLD AUTO: 0.8 % — SIGNIFICANT CHANGE UP (ref 0–0.9)
INR BLD: 1.07 RATIO — SIGNIFICANT CHANGE UP (ref 0.85–1.18)
INR BLD: 1.07 RATIO — SIGNIFICANT CHANGE UP (ref 0.85–1.18)
LYMPHOCYTES # BLD AUTO: 0.77 K/UL — LOW (ref 1–3.3)
LYMPHOCYTES # BLD AUTO: 0.77 K/UL — LOW (ref 1–3.3)
LYMPHOCYTES # BLD AUTO: 10 % — LOW (ref 13–44)
LYMPHOCYTES # BLD AUTO: 10 % — LOW (ref 13–44)
MAGNESIUM SERPL-MCNC: 1.9 MG/DL — SIGNIFICANT CHANGE UP (ref 1.6–2.6)
MAGNESIUM SERPL-MCNC: 1.9 MG/DL — SIGNIFICANT CHANGE UP (ref 1.6–2.6)
MCHC RBC-ENTMCNC: 30.7 PG — SIGNIFICANT CHANGE UP (ref 27–34)
MCHC RBC-ENTMCNC: 30.7 PG — SIGNIFICANT CHANGE UP (ref 27–34)
MCHC RBC-ENTMCNC: 31.5 PG — SIGNIFICANT CHANGE UP (ref 27–34)
MCHC RBC-ENTMCNC: 31.5 PG — SIGNIFICANT CHANGE UP (ref 27–34)
MCHC RBC-ENTMCNC: 35.9 GM/DL — SIGNIFICANT CHANGE UP (ref 32–36)
MCHC RBC-ENTMCNC: 35.9 GM/DL — SIGNIFICANT CHANGE UP (ref 32–36)
MCHC RBC-ENTMCNC: 36.7 GM/DL — HIGH (ref 32–36)
MCHC RBC-ENTMCNC: 36.7 GM/DL — HIGH (ref 32–36)
MCV RBC AUTO: 85.5 FL — SIGNIFICANT CHANGE UP (ref 80–100)
MCV RBC AUTO: 85.5 FL — SIGNIFICANT CHANGE UP (ref 80–100)
MCV RBC AUTO: 85.8 FL — SIGNIFICANT CHANGE UP (ref 80–100)
MCV RBC AUTO: 85.8 FL — SIGNIFICANT CHANGE UP (ref 80–100)
MONOCYTES # BLD AUTO: 0.32 K/UL — SIGNIFICANT CHANGE UP (ref 0–0.9)
MONOCYTES # BLD AUTO: 0.32 K/UL — SIGNIFICANT CHANGE UP (ref 0–0.9)
MONOCYTES NFR BLD AUTO: 4.1 % — SIGNIFICANT CHANGE UP (ref 2–14)
MONOCYTES NFR BLD AUTO: 4.1 % — SIGNIFICANT CHANGE UP (ref 2–14)
NEUTROPHILS # BLD AUTO: 6.55 K/UL — SIGNIFICANT CHANGE UP (ref 1.8–7.4)
NEUTROPHILS # BLD AUTO: 6.55 K/UL — SIGNIFICANT CHANGE UP (ref 1.8–7.4)
NEUTROPHILS NFR BLD AUTO: 84.7 % — HIGH (ref 43–77)
NEUTROPHILS NFR BLD AUTO: 84.7 % — HIGH (ref 43–77)
NRBC # BLD: 0 /100 WBCS — SIGNIFICANT CHANGE UP (ref 0–0)
PHOSPHATE SERPL-MCNC: 3 MG/DL — SIGNIFICANT CHANGE UP (ref 2.5–4.5)
PHOSPHATE SERPL-MCNC: 3 MG/DL — SIGNIFICANT CHANGE UP (ref 2.5–4.5)
PLATELET # BLD AUTO: 199 K/UL — SIGNIFICANT CHANGE UP (ref 150–400)
PLATELET # BLD AUTO: 199 K/UL — SIGNIFICANT CHANGE UP (ref 150–400)
PLATELET # BLD AUTO: 214 K/UL — SIGNIFICANT CHANGE UP (ref 150–400)
PLATELET # BLD AUTO: 214 K/UL — SIGNIFICANT CHANGE UP (ref 150–400)
PLATELET RESPONSE ASPIRIN RESULT: 468 ARU — SIGNIFICANT CHANGE UP
PLATELET RESPONSE ASPIRIN RESULT: 468 ARU — SIGNIFICANT CHANGE UP
POTASSIUM SERPL-MCNC: 3.7 MMOL/L — SIGNIFICANT CHANGE UP (ref 3.5–5.3)
POTASSIUM SERPL-MCNC: 3.7 MMOL/L — SIGNIFICANT CHANGE UP (ref 3.5–5.3)
POTASSIUM SERPL-MCNC: 5.5 MMOL/L — HIGH (ref 3.5–5.3)
POTASSIUM SERPL-MCNC: 5.5 MMOL/L — HIGH (ref 3.5–5.3)
POTASSIUM SERPL-SCNC: 3.7 MMOL/L — SIGNIFICANT CHANGE UP (ref 3.5–5.3)
POTASSIUM SERPL-SCNC: 3.7 MMOL/L — SIGNIFICANT CHANGE UP (ref 3.5–5.3)
POTASSIUM SERPL-SCNC: 5.5 MMOL/L — HIGH (ref 3.5–5.3)
POTASSIUM SERPL-SCNC: 5.5 MMOL/L — HIGH (ref 3.5–5.3)
PROT SERPL-MCNC: 7 G/DL — SIGNIFICANT CHANGE UP (ref 6–8.3)
PROT SERPL-MCNC: 7 G/DL — SIGNIFICANT CHANGE UP (ref 6–8.3)
PROTHROM AB SERPL-ACNC: 11.2 SEC — SIGNIFICANT CHANGE UP (ref 9.5–13)
PROTHROM AB SERPL-ACNC: 11.2 SEC — SIGNIFICANT CHANGE UP (ref 9.5–13)
RBC # BLD: 4.86 M/UL — SIGNIFICANT CHANGE UP (ref 3.8–5.2)
RBC # BLD: 4.86 M/UL — SIGNIFICANT CHANGE UP (ref 3.8–5.2)
RBC # BLD: 4.89 M/UL — SIGNIFICANT CHANGE UP (ref 3.8–5.2)
RBC # BLD: 4.89 M/UL — SIGNIFICANT CHANGE UP (ref 3.8–5.2)
RBC # FLD: 12.2 % — SIGNIFICANT CHANGE UP (ref 10.3–14.5)
RBC # FLD: 12.2 % — SIGNIFICANT CHANGE UP (ref 10.3–14.5)
RBC # FLD: 12.3 % — SIGNIFICANT CHANGE UP (ref 10.3–14.5)
RBC # FLD: 12.3 % — SIGNIFICANT CHANGE UP (ref 10.3–14.5)
RH IG SCN BLD-IMP: POSITIVE — SIGNIFICANT CHANGE UP
RH IG SCN BLD-IMP: POSITIVE — SIGNIFICANT CHANGE UP
SODIUM SERPL-SCNC: 136 MMOL/L — SIGNIFICANT CHANGE UP (ref 135–145)
SODIUM SERPL-SCNC: 136 MMOL/L — SIGNIFICANT CHANGE UP (ref 135–145)
SODIUM SERPL-SCNC: 139 MMOL/L — SIGNIFICANT CHANGE UP (ref 135–145)
SODIUM SERPL-SCNC: 139 MMOL/L — SIGNIFICANT CHANGE UP (ref 135–145)
TROPONIN T, HIGH SENSITIVITY RESULT: 7 NG/L — SIGNIFICANT CHANGE UP (ref 0–51)
TROPONIN T, HIGH SENSITIVITY RESULT: 7 NG/L — SIGNIFICANT CHANGE UP (ref 0–51)
WBC # BLD: 7.73 K/UL — SIGNIFICANT CHANGE UP (ref 3.8–10.5)
WBC # BLD: 7.73 K/UL — SIGNIFICANT CHANGE UP (ref 3.8–10.5)
WBC # BLD: 8.59 K/UL — SIGNIFICANT CHANGE UP (ref 3.8–10.5)
WBC # BLD: 8.59 K/UL — SIGNIFICANT CHANGE UP (ref 3.8–10.5)
WBC # FLD AUTO: 7.73 K/UL — SIGNIFICANT CHANGE UP (ref 3.8–10.5)
WBC # FLD AUTO: 7.73 K/UL — SIGNIFICANT CHANGE UP (ref 3.8–10.5)
WBC # FLD AUTO: 8.59 K/UL — SIGNIFICANT CHANGE UP (ref 3.8–10.5)
WBC # FLD AUTO: 8.59 K/UL — SIGNIFICANT CHANGE UP (ref 3.8–10.5)

## 2023-12-14 PROCEDURE — 70450 CT HEAD/BRAIN W/O DYE: CPT | Mod: 26,MA,XU

## 2023-12-14 PROCEDURE — 70498 CT ANGIOGRAPHY NECK: CPT | Mod: 26,MA

## 2023-12-14 PROCEDURE — 70496 CT ANGIOGRAPHY HEAD: CPT | Mod: 26,MA

## 2023-12-14 RX ORDER — DESMOPRESSIN ACETATE 0.1 MG/1
20 TABLET ORAL ONCE
Refills: 0 | Status: COMPLETED | OUTPATIENT
Start: 2023-12-14 | End: 2023-12-15

## 2023-12-14 RX ORDER — SODIUM CHLORIDE 5 G/100ML
1000 INJECTION, SOLUTION INTRAVENOUS
Refills: 0 | Status: DISCONTINUED | OUTPATIENT
Start: 2023-12-14 | End: 2023-12-15

## 2023-12-14 RX ORDER — ACETAMINOPHEN 500 MG
650 TABLET ORAL EVERY 6 HOURS
Refills: 0 | Status: DISCONTINUED | OUTPATIENT
Start: 2023-12-14 | End: 2023-12-20

## 2023-12-14 RX ORDER — POLYETHYLENE GLYCOL 3350 17 G/17G
17 POWDER, FOR SOLUTION ORAL
Refills: 0 | Status: DISCONTINUED | OUTPATIENT
Start: 2023-12-14 | End: 2023-12-20

## 2023-12-14 RX ORDER — CHLORHEXIDINE GLUCONATE 213 G/1000ML
1 SOLUTION TOPICAL DAILY
Refills: 0 | Status: DISCONTINUED | OUTPATIENT
Start: 2023-12-14 | End: 2023-12-17

## 2023-12-14 RX ORDER — ACETAMINOPHEN 500 MG
1000 TABLET ORAL ONCE
Refills: 0 | Status: COMPLETED | OUTPATIENT
Start: 2023-12-14 | End: 2023-12-14

## 2023-12-14 RX ORDER — PANTOPRAZOLE SODIUM 20 MG/1
40 TABLET, DELAYED RELEASE ORAL ONCE
Refills: 0 | Status: COMPLETED | OUTPATIENT
Start: 2023-12-14 | End: 2023-12-14

## 2023-12-14 RX ORDER — SENNA PLUS 8.6 MG/1
1 TABLET ORAL AT BEDTIME
Refills: 0 | Status: DISCONTINUED | OUTPATIENT
Start: 2023-12-14 | End: 2023-12-17

## 2023-12-14 RX ORDER — DESMOPRESSIN ACETATE 0.1 MG/1
20 TABLET ORAL ONCE
Refills: 0 | Status: DISCONTINUED | OUTPATIENT
Start: 2023-12-14 | End: 2023-12-14

## 2023-12-14 RX ORDER — SODIUM CHLORIDE 5 G/100ML
1000 INJECTION, SOLUTION INTRAVENOUS
Refills: 0 | Status: DISCONTINUED | OUTPATIENT
Start: 2023-12-14 | End: 2023-12-14

## 2023-12-14 RX ORDER — OXYCODONE HYDROCHLORIDE 5 MG/1
5 TABLET ORAL EVERY 4 HOURS
Refills: 0 | Status: DISCONTINUED | OUTPATIENT
Start: 2023-12-14 | End: 2023-12-15

## 2023-12-14 RX ORDER — OXYCODONE HYDROCHLORIDE 5 MG/1
10 TABLET ORAL EVERY 4 HOURS
Refills: 0 | Status: DISCONTINUED | OUTPATIENT
Start: 2023-12-14 | End: 2023-12-15

## 2023-12-14 RX ORDER — POTASSIUM CHLORIDE 20 MEQ
40 PACKET (EA) ORAL ONCE
Refills: 0 | Status: COMPLETED | OUTPATIENT
Start: 2023-12-14 | End: 2023-12-15

## 2023-12-14 RX ORDER — FAMOTIDINE 10 MG/ML
20 INJECTION INTRAVENOUS EVERY 12 HOURS
Refills: 0 | Status: DISCONTINUED | OUTPATIENT
Start: 2023-12-14 | End: 2023-12-19

## 2023-12-14 RX ORDER — MAGNESIUM SULFATE 500 MG/ML
1 VIAL (ML) INJECTION ONCE
Refills: 0 | Status: COMPLETED | OUTPATIENT
Start: 2023-12-14 | End: 2023-12-15

## 2023-12-14 RX ADMIN — SODIUM CHLORIDE 50 MILLILITER(S): 5 INJECTION, SOLUTION INTRAVENOUS at 22:12

## 2023-12-14 RX ADMIN — Medication 400 MILLIGRAM(S): at 21:40

## 2023-12-14 RX ADMIN — Medication 1000 MILLIGRAM(S): at 21:55

## 2023-12-14 RX ADMIN — PANTOPRAZOLE SODIUM 40 MILLIGRAM(S): 20 TABLET, DELAYED RELEASE ORAL at 21:47

## 2023-12-14 NOTE — ED ADULT NURSE NOTE - OBJECTIVE STATEMENT
74 y/o female presents to ED transferred from [facility] for AMS/headache, found to have R sided ICH. Pt states headache began around 6am this morning. Her friend went to visit her earlier and found her to be not acting like herself and c/o headache. Received 25mcg Fentanyl prior to transfer for 10/10 headache which improved to 6/10 pain. While en route to [facility], EMS reported pt began c/o of L sided weakness, L facial droop, loss of sensation to L face/arm/leg at 15:55. Called stroke called upon arrival. Pt arrived A&O x 3 though distractible. Breathing even and unlabored. Mild L facial droop noed. PERRL 3mmB. Speech is clear. +drift to L arm and leg though limbs do not hit bed. Passed dysphagia. NSR on CM. Safety and comfort provided. 72 y/o female presents to ED transferred from [facility] for AMS/headache, found to have R sided ICH. Pt states headache began around 6am this morning. Her friend went to visit her earlier and found her to be not acting like herself and c/o headache. Received 25mcg Fentanyl prior to transfer for 10/10 headache which improved to 6/10 pain. While en route to [facility], EMS reported pt began c/o of L sided weakness, L facial droop, loss of sensation to L face/arm/leg at 15:55. Called stroke called upon arrival. Pt arrived A&O x 3 though distractible. Breathing even and unlabored. Mild L facial droop noed. PERRL 3mmB. Speech is clear. +drift to L arm and leg though limbs do not hit bed. Passed dysphagia. NSR on CM. Safety and comfort provided. 74 y/o female presents to ED transferred from [facility] for AMS/headache, found to have R sided ICH. Pt states headache began around 6am this morning. Her friend went to visit her earlier and found her to be not acting like herself and c/o headache. Received 25mcg Fentanyl prior to transfer for 10/10 headache which improved to 6/10 pain. States pain radiates to R posterior neck. While en route to [facility], EMS reported pt began c/o of L sided weakness, L facial droop, loss of sensation to L face/arm/leg at 15:55. Called stroke called upon arrival. Pt arrived A&O x 3 though distractible. Breathing even and unlabored. Mild L facial droop noed. PERRL 3mmB. Speech is clear. +drift to L arm and leg though limbs do not hit bed. Passed dysphagia. NSR on CM. Safety and comfort provided. 74 y/o female presents to ED transferred from [facility] for AMS/headache, found to have R sided ICH. Pt states headache began around 6am this morning. Her friend went to visit her earlier and found her to be not acting like herself and c/o headache. Received 25mcg Fentanyl prior to transfer for 10/10 headache which improved to 6/10 pain. States pain radiates to R posterior neck. While en route to [facility], EMS reported pt began c/o of L sided weakness, L facial droop, loss of sensation to L face/arm/leg at 15:55. Called stroke called upon arrival. Pt arrived A&O x 3 though distractible. Breathing even and unlabored. Mild L facial droop noed. PERRL 3mmB. Speech is clear. +drift to L arm and leg though limbs do not hit bed. Passed dysphagia. NSR on CM. Safety and comfort provided. 72 y/o female presents to ED transferred from [facility] for AMS/headache, found to have R sided ICH. Pt states headache began around 6am this morning. Her friend went to visit her earlier and found her to be not acting like herself and c/o headache. Received 25mcg Fentanyl prior to transfer for 10/10 headache which improved to 6/10 pain. States pain radiates to R posterior neck. While en route to [facility], EMS reported pt began c/o of L sided weakness, L facial droop, loss of sensation to L face/arm/leg at 15:55. Called stroke called upon arrival. Pt arrived A&O x 3 though distractible. Breathing even and unlabored. Mild L facial droop noted. PERRL 3mmB. +R gaze preference. Speech is clear. +drift to L arm and leg though limbs do not hit bed. Passed dysphagia. NSR on CM. Safety and comfort provided. 74 y/o female presents to ED transferred from [facility] for AMS/headache, found to have R sided ICH. Pt states headache began around 6am this morning. Her friend went to visit her earlier and found her to be not acting like herself and c/o headache. Received 25mcg Fentanyl prior to transfer for 10/10 headache which improved to 6/10 pain. States pain radiates to R posterior neck. While en route to [facility], EMS reported pt began c/o of L sided weakness, L facial droop, loss of sensation to L face/arm/leg at 15:55. Called stroke called upon arrival. Pt arrived A&O x 3 though distractible. Breathing even and unlabored. Mild L facial droop noted. PERRL 3mmB. +R gaze preference. Speech is clear. +drift to L arm and leg though limbs do not hit bed. Passed dysphagia. NSR on CM. Safety and comfort provided.

## 2023-12-14 NOTE — CONSULT NOTE ADULT - ATTENDING COMMENTS
I reviewed available diagnostic studies, and reviewed images personally. I agree with resident's history, exam, orders placed, and plan of care. Thirty minutes of critical care time was spent in caring for this patient who has concern of sudden and clinically significant deterioration requiring a high level of physician preparedness, management of brain supporting interventions, and frequent physician assessments. Medical issues needing to be addressed include: Nontraumatic intracerebral hemorrhage w/ cerebral edema, brain compression, AMS, L hemiparesis and L neglect. no underlying vascular abn, on asa, s/p ddavp. SBP <140 goal. MRI wwo to assess underlying abn.  plt/coags wnl. Bp wnl, caa is a possibility here. Exam w/ L hemiparesis, neglect. appears to have possible L hemianopsia also.

## 2023-12-14 NOTE — H&P ADULT - NSHPPHYSICALEXAM_GEN_ALL_CORE
PE:  Neuro: Lethargic, EOV, PERRL, R gaze deviation can come to midline, Ox3, FC, R side 5/5. L side AG  CV: +s1s2, rrr, no peripheral edema or clubbing  Resp: CTA BL in all fields, no wheezes or crackles  Abd; soft ntnd x4  : no CVA tenderness  Integ: no rashes, lesions, or bruising

## 2023-12-14 NOTE — H&P ADULT - HISTORY OF PRESENT ILLNESS
patient is Patient MOISÉS MAYO is a 73y (1950) RIGHT handed woman who presented to [facility] ED on 12/14/2023, as a transfer from [facility], as a CODE STROKE, with c/o R IPH.  Denies PMH.  CTH and CTA repeated - large R IPH (temporal/parietal). Presented to [place] today with c/o HA and AMS. Friend accompanying patient said that when visiting patient today - patient was not acting like herself and c/o HA.    NIHSS 9    Patient MOISÉS MAYO is a 73y (1950) RIGHT handed woman who presented to [facility] ED on 12/14/2023, as a transfer from [facility], as a CODE STROKE, with c/o R IPH.  Denies PMH.  CTH and CTA repeated - large R IPH (temporal/parietal). Presented to [place] today with c/o HA and AMS. Friend accompanying patient said that when visiting patient today - patient was not acting like herself and c/o HA.    NIHSS 9

## 2023-12-14 NOTE — ED ADULT NURSE REASSESSMENT NOTE - NS ED NURSE REASSESS COMMENT FT1
Report received from break coverage MARLEN Rivas. Neuro team at bedside, pt placed on Primafit, NIHSS 9. Safety and comfort measures in place bed in lowest position, siderails upright and call bell within reach.

## 2023-12-14 NOTE — H&P ADULT - NSHPLABSRESULTS_GEN_ALL_CORE
15.0   8.59  )-----------( 199      ( 14 Dec 2023 22:14 )             41.8   12-14    139  |  105  |  13  ----------------------------<  106<H>  3.7   |  20<L>  |  0.51    Ca    9.3      14 Dec 2023 22:14  Phos  3.0     12-14  Mg     1.9     12-14    TPro  7.0  /  Alb  4.3  /  TBili  0.6  /  DBili  x   /  AST  58<H>  /  ALT  21  /  AlkPhos  70  12-14    PT/INR - ( 14 Dec 2023 16:33 )   PT: 11.2 sec;   INR: 1.07 ratio         PTT - ( 14 Dec 2023 16:33 )  PTT:29.3 sec    < from: CT Brain Stroke Protocol (12.14.23 @ 16:44) >    IMPRESSION: Large right temporal parietal parenchymal hemorrhage with   mass effect on the right lateral ventricle and midline shift to the left.   No definite abnormal vascularity or high-grade stenosis.    < end of copied text >

## 2023-12-14 NOTE — PATIENT PROFILE ADULT - FALL HARM RISK - HARM RISK INTERVENTIONS
Assistance OOB with selected safe patient handling equipment/Communicate Risk of Fall with Harm to all staff/Discuss with provider need for PT consult/Monitor gait and stability/Provide patient with walking aids - walker, cane, crutches/Reinforce activity limits and safety measures with patient and family/Tailored Fall Risk Interventions/Visual Cue: Yellow wristband and red socks/Bed in lowest position, wheels locked, appropriate side rails in place/Call bell, personal items and telephone in reach/Instruct patient to call for assistance before getting out of bed or chair/Non-slip footwear when patient is out of bed/[place] to call system/Physically safe environment - no spills, clutter or unnecessary equipment/Purposeful Proactive Rounding/Room/bathroom lighting operational, light cord in reach Assistance OOB with selected safe patient handling equipment/Communicate Risk of Fall with Harm to all staff/Discuss with provider need for PT consult/Monitor gait and stability/Provide patient with walking aids - walker, cane, crutches/Reinforce activity limits and safety measures with patient and family/Tailored Fall Risk Interventions/Visual Cue: Yellow wristband and red socks/Bed in lowest position, wheels locked, appropriate side rails in place/Call bell, personal items and telephone in reach/Instruct patient to call for assistance before getting out of bed or chair/Non-slip footwear when patient is out of bed/[place] to call system/Physically safe environment - no spills, clutter or unnecessary equipment/Purposeful Proactive Rounding/Room/bathroom lighting operational, light cord in reach

## 2023-12-14 NOTE — PATIENT PROFILE ADULT - CAREGIVER ADDRESS
[id number] [facility] [id number] [id number]

## 2023-12-14 NOTE — ED ADULT TRIAGE NOTE - NS ED NURSE AMBULANCES
[facility]

## 2023-12-14 NOTE — ED PROVIDER NOTE - OBJECTIVE STATEMENT
73-year-old female patient that is being transferred from [facility] ED for atraumatic intraparenchymal hemorrhage associated with left-sided weakness and loss of sensation.  Of note, patient was also AMS and lethargic.  Per EMS patient had headache and is currently status post fentanyl administration for her headache.  Code stroke was called on arrival. 73-year-old female patient that is being transferred from [facility] ED for atraumatic intraparenchymal hemorrhage associated with left-sided weakness and loss of sensation.  Of note, patient was also AMS and lethargic.  Per EMS patient had headache and is currently status post fentanyl administration for her headache.  Code stroke was called on arrival.

## 2023-12-14 NOTE — H&P ADULT - ASSESSMENT
Patient MOISÉS MAYO is a 73y (1950) RIGHT handed woman who presented to [facility] ED on 12/14/2023, as a transfer from [facility], as a CODE STROKE, with c/o R IPH.  Denies PMH.  CTH and CTA repeated - large R IPH (temporal/parietal). Presented to [employer] today with c/o HA and AMS. Friend accompanying patient said that when visiting patient today - patient was not acting like herself and c/o HA. Admited to NSCU for interval imaging and neuro checks.    Neuro  -CTH with large R IPH (temporal/parietal)  -Interval CT with possible worsening heme, f/u official read  -q1 hour neuro checks  -pain management with tylenol/oxy  -ARU/PRU both therapeutic possible 2/2 OTC supplements  -DDAVP 20mcg  -CTH AM  -f/u stroke core measures    Resp  -on RA  -encourage IS    CV  --140  -f/u TTE    GI  -NPO  -LBM PTA      -2% @ 50cc/hr Na goal 140-150  -BMP q6 hours for at least 24 hours  -I&O strict  -monitor Cr  -replete lytes PRN    ENDO  -fu a1c  -maintain eugylcemia 120-180    HEME  -monitor CBC  -f/u va dupplex BL LE  -defer chemical vte ppx at this time    ID  -afebrile    Patient to be maintained in ICU given risk for decompensation 2/2 acute IPH Patient MOISÉS MAYO is a 73y (1950) RIGHT handed woman who presented to [facility] ED on 12/14/2023, as a transfer from [facility], as a CODE STROKE, with c/o R IPH.  Denies PMH.  CTH and CTA repeated - large R IPH (temporal/parietal). Presented to [place] today with c/o HA and AMS. Friend accompanying patient said that when visiting patient today - patient was not acting like herself and c/o HA. Admited to NSCU for interval imaging and neuro checks.    Neuro  -CTH with large R IPH (temporal/parietal)  -Interval CT with possible worsening heme, f/u official read  -q1 hour neuro checks  -pain management with tylenol/oxy  -ARU/PRU both therapeutic possible 2/2 OTC supplements  -DDAVP 20mcg  -CTH AM  -f/u stroke core measures    Resp  -on RA  -encourage IS    CV  --140  -f/u TTE    GI  -NPO  -LBM PTA      -2% @ 50cc/hr Na goal 140-150  -BMP q6 hours for at least 24 hours  -I&O strict  -monitor Cr  -replete lytes PRN    ENDO  -fu a1c  -maintain eugylcemia 120-180    HEME  -monitor CBC  -f/u va dupplex BL LE  -defer chemical vte ppx at this time    ID  -afebrile    Patient to be maintained in ICU given risk for decompensation 2/2 acute IPH

## 2023-12-14 NOTE — ED ADULT NURSE NOTE - NSFALLRISKINTERV_ED_ALL_ED
Assistance OOB with selected safe patient handling equipment if applicable/Assistance with ambulation/Communicate fall risk and risk factors to all staff, patient, and family/Provide visual cue: yellow wristband, yellow gown, etc/Reinforce activity limits and safety measures with patient and family/Call bell, personal items and telephone in reach/Instruct patient to call for assistance before getting out of bed/chair/stretcher/Non-slip footwear applied when patient is off stretcher/[place] to call system/Physically safe environment - no spills, clutter or unnecessary equipment/Purposeful Proactive Rounding/Room/bathroom lighting operational, light cord in reach Assistance OOB with selected safe patient handling equipment if applicable/Assistance with ambulation/Communicate fall risk and risk factors to all staff, patient, and family/Provide visual cue: yellow wristband, yellow gown, etc/Reinforce activity limits and safety measures with patient and family/Call bell, personal items and telephone in reach/Instruct patient to call for assistance before getting out of bed/chair/stretcher/Non-slip footwear applied when patient is off stretcher/[place] to call system/Physically safe environment - no spills, clutter or unnecessary equipment/Purposeful Proactive Rounding/Room/bathroom lighting operational, light cord in reach

## 2023-12-14 NOTE — PATIENT PROFILE ADULT - FUNCTIONAL ASSESSMENT - BASIC MOBILITY 6.
2-calculated by average/Not able to assess (calculate score using [facility] averaging method)  2-calculated by average/Not able to assess (calculate score using [facility] averaging method)

## 2023-12-14 NOTE — PATIENT PROFILE ADULT - VISION (WITH CORRECTIVE LENSES IF THE PATIENT USUALLY WEARS THEM):
H/O of macular degeneration/Partially impaired: cannot see medication labels or newsprint, but can see obstacles in path, and the surrounding layout; can count fingers at arm's length

## 2023-12-14 NOTE — CONSULT NOTE ADULT - ASSESSMENT
73F no AC/AP hx macular degen, says she is typically hypotensive, takes only vitamins denied AP/AC. Presented to OSH w/ gradual onset HA since today AM, confusion. Tx s/p CTH w/ R temporo-parietal IPH w/ MLS to the left. CTA w/ no obvious vascular abnormality. Exam: AOx3, confused, left neglect, but on repeated interrogation strength is 5/5 in all extremities, SILT.  -adm NSCU under neurointensivist  -SBP <140  -no AC/AP  -ARU/PRU  -repeat CTH 4hr

## 2023-12-14 NOTE — ED ADULT NURSE REASSESSMENT NOTE - NS ED NURSE REASSESS COMMENT FT1
Report received from MARLEN Mix and MARLEN Thomas. PT is resting comfortably in bed, breathing unlabored on room air, and speaking in complete sentences. PT on primafit at this time. Neuro exam documented in code stroke flowsheet. Updated PT on plan of care. Awaiting NSCU bed. Safety and comfort maintained. Call bell within reach.

## 2023-12-14 NOTE — ED ADULT NURSE NOTE - NS ED NURSE TRANSPORT WITH
Packed on zoll with continuous cardiac monitoring and pulse oximetry transported with NSCU MD and EDT

## 2023-12-14 NOTE — CONSULT NOTE ADULT - ASSESSMENT
ED vitals notable for: ***. Labs notable for: ***. CT imaging disclosed: ***. Exam notable for ***.     NIHSS on admission: 9  ICH score: 1  LKN: Unknown  pre-MRS: 0 (no collateral history at this time)    Impression:    Recommendations INCOMPLETE:  [] Admit to NSCU  [] Telemetry monitoring  [] Dysphagia screen performed in ED  [] SBP goal 100-140 or per NSCU   [] Hold AP/AC  [] A1c, lipid panel  [] MRI brain w/o contrast  [] TTE with bubble study  [] Neurochecks, vitals q*h  [] Fall and aspiration precautions  [] PT/OT/SLP/CM  [] Diet: ***  [] DVT prophylaxis: enoxaparin 40mg q24h (unless medically contraindicated) AND SCDs  [] Stroke education provided  [] Smoking cessation education if patient is a current smoker  [] Please document MRS on discharge    NO tenecteplase d/t hemorrhage.  NO thrombectomy d/t no LVO.    Patient/plan d/w Stroke fellow, Dr. Hansen, under the supervision of attending vascular neurologist Dr. Genao. To be seen by attending in the AM with attestation to follow. Plan is NOT formalized until attending attestation is complete. 73-year-old RHW, unknown PMH, who presented to [facility] ED on 12/14/2023, as a transfer from [facility], as a CODE STROKE, with c/o R IPH. NIHSS 9 on Stroke Team assessment. CTH and CTA repeated - large R IPH (temporal/parietal). Presented to [place] today with c/o HA and AMS. NIHSS 0 at [place]. CTH performed at around 14:24 returned with large R IPH. Transferred to [facility] for further management in the NSCU. Did not take any AC/AP today (nor takes regularly).    ED vitals notable for: afebrile, HR 80s-90, SBP to 124, RR 18-20, SpO2 % on RA, NSR on telemetry. Labs notable for: MCHC 36.7, platelets 214, INR 1.07, bicarb 19, glucose 104. CT imaging disclosed: large R temporal-parietal IPH exerting mass effect on the R lateral ventricle, midline shift to the L; no evidence of underlying vascular malformation. Exam notable for L sided neglect, LHH, L NLF flattening, slight drift LUE/LLE, R gaze preference, significantly increased tone on the L side.     NIHSS on admission: 9  ICH score: 1  LKN: Unknown  pre-MRS: 0 (no collateral history at this time)    Impression: LHP (mild), left sided neglect, LHH w/R gaze preference d/t large R temporal-parietal IPH    Recommendations:  [] Admit to NSCU  [] Telemetry monitoring  [] Dysphagia screen performed in ED  [] SBP goal 100-140 or per NSCU   [] Hold AP/AC  [] MRI brain w/wo contrast  [] Neurochecks, vitals q1h  [] Fall and aspiration precautions  [] PT/OT/SLP/CM  [] Diet: NPO for now - target is DASH/TLC  [] DVT prophylaxis: SCDs, please document reason if chemical DVT prophylaxis is not started within 24 hours of admission  [] Stroke education provided  [] Smoking cessation education not needed - nonsmoker  [] Please document MRS on discharge    NO tenecteplase d/t hemorrhage.  NO thrombectomy d/t no LVO.    Patient/plan d/w Stroke fellow, Dr. Hansen, under the supervision of attending vascular neurologist Dr. Genao. To be seen by attending in the AM with attestation to follow. Plan is NOT formalized until attending attestation is complete. 73-year-old RHW, unknown PMH, who presented to [facility] ED on 12/14/2023, as a transfer from [facility], as a CODE STROKE, with c/o R IPH. NIHSS 9 on Stroke Team assessment. CTH and CTA repeated - large R IPH (temporal/parietal). Presented to [place] today with c/o HA and AMS. NIHSS 0 at [place]. CTH performed at around 14:24 returned with large R IPH. Transferred to [facility] for further management in the NSCU. Did not take any AC/AP today (nor takes regularly).    ED vitals notable for: afebrile, HR 80s-90, SBP to 124, RR 18-20, SpO2 % on RA, NSR on telemetry. Labs notable for: MCHC 36.7, platelets 214, INR 1.07, bicarb 19, glucose 104. CT imaging disclosed: large R temporal-parietal IPH exerting mass effect on the R lateral ventricle, midline shift to the L; no evidence of underlying vascular malformation. Exam notable for L sided neglect, LHH, L NLF flattening, slight drift LUE/LLE, R gaze preference, significantly increased tone on the L side.     NIHSS on admission: 9  ICH score: 1  LKN: Unknown  pre-MRS: 0 (no collateral history at this time)    Impression: LHP (mild), left sided neglect, LHH w/R gaze preference d/t large R temporal-parietal IPH    Recommendations:  [] Admit to NSCU  [] Telemetry monitoring  [] Dysphagia screen performed in ED  [] SBP goal 100-140 or per NSCU   [] Hold AP/AC  [] MRI brain w/wo contrast  [] Neurochecks, vitals q1h  [] Fall and aspiration precautions  [] PT/OT/SLP/CM  [] Diet: NPO for now - target is DASH/TLC  [] DVT prophylaxis: SCDs, please document reason if chemical DVT prophylaxis is not started within 24 hours of admission  [] Stroke education provided  [] Smoking cessation education not needed - nonsmoker  [] Please document MRS on discharge    NO tenecteplase d/t hemorrhage.  NO thrombectomy d/t no LVO.    Patient/plan d/w Stroke fellow, Dr. Hansen, under the supervision of attending vascular neurologist Dr. Genao. To be seen by attending in the AM with attestation to follow. Plan is NOT formalized until attending attestation is complete.

## 2023-12-14 NOTE — ED PROVIDER NOTE - PHYSICAL EXAMINATION
GENERAL: Awake, alert, NAD  HEENT: NC/AT, moist mucous membranes, left-sided neglect.  LUNGS: CTAB, no wheezes or crackles   CARDIAC: RRR, no m/r/g  ABDOMEN: Soft, non tender, non distended, no rebound, no guarding  BACK: No midline spinal tenderness, no CVA tenderness  EXT: No edema, no calf tenderness, no deformities.  NEURO: A&Ox3. Moving all extremities.  Left-sided numbness noted on exam.  SKIN: Warm and dry. No rash.  PSYCH: Normal affect.

## 2023-12-14 NOTE — ED ADULT NURSE NOTE - CHIEF COMPLAINT QUOTE
no
2018
tx for headbleed, en route by EMS at 1555 patient developed sudden left sided weakness
scheduled for excision pf benign subcutaneous lipoma of right lateral neck

## 2023-12-14 NOTE — ED PROVIDER NOTE - CLINICAL SUMMARY MEDICAL DECISION MAKING FREE TEXT BOX
73-year-old female patient who was transferred from outside hospital for atraumatic intraparenchymal bleed with shift.  Code stroke called on arrival.  Neurology evaluating patient bedside.  Will call neurosurgery for evaluation most likely admission.  Patient's current blood pressure 110s to 120s.  Will continue to monitor patient's vital signs.  Patient found with left-sided neglect and numbness.  Will draw labs and evaluate the CT.

## 2023-12-14 NOTE — ED PROVIDER NOTE - ATTENDING CONTRIBUTION TO CARE
code stroke called on arrival      attending Radha: 73yF transferred from OSH for intraparenchymal bleed. Pt presented to OSH with AMS and headache. Developed new L sided weakness en route prompting code stroke activation. Exam as above. Will review OSH records, obtain labs, ICH order set, CT scans, neuro and neurosurgery evaluation, admit

## 2023-12-15 LAB
A1C WITH ESTIMATED AVERAGE GLUCOSE RESULT: 5 % — SIGNIFICANT CHANGE UP (ref 4–5.6)
A1C WITH ESTIMATED AVERAGE GLUCOSE RESULT: 5 % — SIGNIFICANT CHANGE UP (ref 4–5.6)
ANION GAP SERPL CALC-SCNC: 10 MMOL/L — SIGNIFICANT CHANGE UP (ref 5–17)
ANION GAP SERPL CALC-SCNC: 10 MMOL/L — SIGNIFICANT CHANGE UP (ref 5–17)
ANION GAP SERPL CALC-SCNC: 11 MMOL/L — SIGNIFICANT CHANGE UP (ref 5–17)
ANION GAP SERPL CALC-SCNC: 11 MMOL/L — SIGNIFICANT CHANGE UP (ref 5–17)
ANION GAP SERPL CALC-SCNC: 9 MMOL/L — SIGNIFICANT CHANGE UP (ref 5–17)
ANION GAP SERPL CALC-SCNC: 9 MMOL/L — SIGNIFICANT CHANGE UP (ref 5–17)
BUN SERPL-MCNC: 14 MG/DL — SIGNIFICANT CHANGE UP (ref 7–23)
CALCIUM SERPL-MCNC: 8.7 MG/DL — SIGNIFICANT CHANGE UP (ref 8.4–10.5)
CALCIUM SERPL-MCNC: 8.7 MG/DL — SIGNIFICANT CHANGE UP (ref 8.4–10.5)
CALCIUM SERPL-MCNC: 8.8 MG/DL — SIGNIFICANT CHANGE UP (ref 8.4–10.5)
CALCIUM SERPL-MCNC: 8.8 MG/DL — SIGNIFICANT CHANGE UP (ref 8.4–10.5)
CALCIUM SERPL-MCNC: 9 MG/DL — SIGNIFICANT CHANGE UP (ref 8.4–10.5)
CALCIUM SERPL-MCNC: 9 MG/DL — SIGNIFICANT CHANGE UP (ref 8.4–10.5)
CHLORIDE SERPL-SCNC: 108 MMOL/L — SIGNIFICANT CHANGE UP (ref 96–108)
CHLORIDE SERPL-SCNC: 108 MMOL/L — SIGNIFICANT CHANGE UP (ref 96–108)
CHLORIDE SERPL-SCNC: 110 MMOL/L — HIGH (ref 96–108)
CHOLEST SERPL-MCNC: 197 MG/DL — SIGNIFICANT CHANGE UP
CHOLEST SERPL-MCNC: 197 MG/DL — SIGNIFICANT CHANGE UP
CO2 SERPL-SCNC: 22 MMOL/L — SIGNIFICANT CHANGE UP (ref 22–31)
CO2 SERPL-SCNC: 22 MMOL/L — SIGNIFICANT CHANGE UP (ref 22–31)
CO2 SERPL-SCNC: 23 MMOL/L — SIGNIFICANT CHANGE UP (ref 22–31)
CREAT SERPL-MCNC: 0.51 MG/DL — SIGNIFICANT CHANGE UP (ref 0.5–1.3)
CREAT SERPL-MCNC: 0.51 MG/DL — SIGNIFICANT CHANGE UP (ref 0.5–1.3)
CREAT SERPL-MCNC: 0.53 MG/DL — SIGNIFICANT CHANGE UP (ref 0.5–1.3)
CREAT SERPL-MCNC: 0.53 MG/DL — SIGNIFICANT CHANGE UP (ref 0.5–1.3)
CREAT SERPL-MCNC: 0.54 MG/DL — SIGNIFICANT CHANGE UP (ref 0.5–1.3)
CREAT SERPL-MCNC: 0.54 MG/DL — SIGNIFICANT CHANGE UP (ref 0.5–1.3)
EGFR: 97 ML/MIN/1.73M2 — SIGNIFICANT CHANGE UP
EGFR: 97 ML/MIN/1.73M2 — SIGNIFICANT CHANGE UP
EGFR: 98 ML/MIN/1.73M2 — SIGNIFICANT CHANGE UP
ESTIMATED AVERAGE GLUCOSE: 97 MG/DL — SIGNIFICANT CHANGE UP (ref 68–114)
ESTIMATED AVERAGE GLUCOSE: 97 MG/DL — SIGNIFICANT CHANGE UP (ref 68–114)
GLUCOSE SERPL-MCNC: 106 MG/DL — HIGH (ref 70–99)
GLUCOSE SERPL-MCNC: 106 MG/DL — HIGH (ref 70–99)
GLUCOSE SERPL-MCNC: 111 MG/DL — HIGH (ref 70–99)
GLUCOSE SERPL-MCNC: 111 MG/DL — HIGH (ref 70–99)
GLUCOSE SERPL-MCNC: 98 MG/DL — SIGNIFICANT CHANGE UP (ref 70–99)
GLUCOSE SERPL-MCNC: 98 MG/DL — SIGNIFICANT CHANGE UP (ref 70–99)
HDLC SERPL-MCNC: 58 MG/DL — SIGNIFICANT CHANGE UP
HDLC SERPL-MCNC: 58 MG/DL — SIGNIFICANT CHANGE UP
LIPID PNL WITH DIRECT LDL SERPL: 127 MG/DL — HIGH
LIPID PNL WITH DIRECT LDL SERPL: 127 MG/DL — HIGH
MAGNESIUM SERPL-MCNC: 2.2 MG/DL — SIGNIFICANT CHANGE UP (ref 1.6–2.6)
MAGNESIUM SERPL-MCNC: 2.2 MG/DL — SIGNIFICANT CHANGE UP (ref 1.6–2.6)
MAGNESIUM SERPL-MCNC: 2.3 MG/DL — SIGNIFICANT CHANGE UP (ref 1.6–2.6)
NON HDL CHOLESTEROL: 139 MG/DL — HIGH
NON HDL CHOLESTEROL: 139 MG/DL — HIGH
PA ADP PRP-ACNC: 177 PRU — LOW (ref 194–417)
PA ADP PRP-ACNC: 177 PRU — LOW (ref 194–417)
PA ADP PRP-ACNC: 191 PRU — LOW (ref 194–417)
PA ADP PRP-ACNC: 191 PRU — LOW (ref 194–417)
PHOSPHATE SERPL-MCNC: 2.6 MG/DL — SIGNIFICANT CHANGE UP (ref 2.5–4.5)
PHOSPHATE SERPL-MCNC: 2.6 MG/DL — SIGNIFICANT CHANGE UP (ref 2.5–4.5)
PHOSPHATE SERPL-MCNC: 2.7 MG/DL — SIGNIFICANT CHANGE UP (ref 2.5–4.5)
PHOSPHATE SERPL-MCNC: 2.7 MG/DL — SIGNIFICANT CHANGE UP (ref 2.5–4.5)
PHOSPHATE SERPL-MCNC: 3.1 MG/DL — SIGNIFICANT CHANGE UP (ref 2.5–4.5)
PHOSPHATE SERPL-MCNC: 3.1 MG/DL — SIGNIFICANT CHANGE UP (ref 2.5–4.5)
PLATELET RESPONSE ASPIRIN RESULT: 615 ARU — SIGNIFICANT CHANGE UP
PLATELET RESPONSE ASPIRIN RESULT: 615 ARU — SIGNIFICANT CHANGE UP
POTASSIUM SERPL-MCNC: 3.7 MMOL/L — SIGNIFICANT CHANGE UP (ref 3.5–5.3)
POTASSIUM SERPL-MCNC: 3.7 MMOL/L — SIGNIFICANT CHANGE UP (ref 3.5–5.3)
POTASSIUM SERPL-MCNC: 3.9 MMOL/L — SIGNIFICANT CHANGE UP (ref 3.5–5.3)
POTASSIUM SERPL-MCNC: 3.9 MMOL/L — SIGNIFICANT CHANGE UP (ref 3.5–5.3)
POTASSIUM SERPL-MCNC: 4.1 MMOL/L — SIGNIFICANT CHANGE UP (ref 3.5–5.3)
POTASSIUM SERPL-MCNC: 4.1 MMOL/L — SIGNIFICANT CHANGE UP (ref 3.5–5.3)
POTASSIUM SERPL-SCNC: 3.7 MMOL/L — SIGNIFICANT CHANGE UP (ref 3.5–5.3)
POTASSIUM SERPL-SCNC: 3.7 MMOL/L — SIGNIFICANT CHANGE UP (ref 3.5–5.3)
POTASSIUM SERPL-SCNC: 3.9 MMOL/L — SIGNIFICANT CHANGE UP (ref 3.5–5.3)
POTASSIUM SERPL-SCNC: 3.9 MMOL/L — SIGNIFICANT CHANGE UP (ref 3.5–5.3)
POTASSIUM SERPL-SCNC: 4.1 MMOL/L — SIGNIFICANT CHANGE UP (ref 3.5–5.3)
POTASSIUM SERPL-SCNC: 4.1 MMOL/L — SIGNIFICANT CHANGE UP (ref 3.5–5.3)
SODIUM SERPL-SCNC: 140 MMOL/L — SIGNIFICANT CHANGE UP (ref 135–145)
SODIUM SERPL-SCNC: 140 MMOL/L — SIGNIFICANT CHANGE UP (ref 135–145)
SODIUM SERPL-SCNC: 142 MMOL/L — SIGNIFICANT CHANGE UP (ref 135–145)
SODIUM SERPL-SCNC: 142 MMOL/L — SIGNIFICANT CHANGE UP (ref 135–145)
SODIUM SERPL-SCNC: 144 MMOL/L — SIGNIFICANT CHANGE UP (ref 135–145)
SODIUM SERPL-SCNC: 144 MMOL/L — SIGNIFICANT CHANGE UP (ref 135–145)
T3 SERPL-MCNC: 94 NG/DL — SIGNIFICANT CHANGE UP (ref 80–200)
T3 SERPL-MCNC: 94 NG/DL — SIGNIFICANT CHANGE UP (ref 80–200)
T4 AB SER-ACNC: 8 UG/DL — SIGNIFICANT CHANGE UP (ref 4.6–12)
T4 AB SER-ACNC: 8 UG/DL — SIGNIFICANT CHANGE UP (ref 4.6–12)
T4 FREE SERPL-MCNC: 1.3 NG/DL — SIGNIFICANT CHANGE UP (ref 0.9–1.8)
T4 FREE SERPL-MCNC: 1.3 NG/DL — SIGNIFICANT CHANGE UP (ref 0.9–1.8)
TRIGL SERPL-MCNC: 64 MG/DL — SIGNIFICANT CHANGE UP
TRIGL SERPL-MCNC: 64 MG/DL — SIGNIFICANT CHANGE UP
TSH SERPL-MCNC: 1.04 UIU/ML — SIGNIFICANT CHANGE UP (ref 0.27–4.2)
TSH SERPL-MCNC: 1.04 UIU/ML — SIGNIFICANT CHANGE UP (ref 0.27–4.2)

## 2023-12-15 PROCEDURE — 70450 CT HEAD/BRAIN W/O DYE: CPT | Mod: 26

## 2023-12-15 PROCEDURE — 99291 CRITICAL CARE FIRST HOUR: CPT

## 2023-12-15 PROCEDURE — 93970 EXTREMITY STUDY: CPT | Mod: 26

## 2023-12-15 PROCEDURE — 70553 MRI BRAIN STEM W/O & W/DYE: CPT | Mod: 26

## 2023-12-15 PROCEDURE — 70450 CT HEAD/BRAIN W/O DYE: CPT | Mod: 26,77

## 2023-12-15 PROCEDURE — 93306 TTE W/DOPPLER COMPLETE: CPT | Mod: 26

## 2023-12-15 PROCEDURE — 95720 EEG PHY/QHP EA INCR W/VEEG: CPT

## 2023-12-15 RX ORDER — METOCLOPRAMIDE HCL 10 MG
5 TABLET ORAL ONCE
Refills: 0 | Status: COMPLETED | OUTPATIENT
Start: 2023-12-15 | End: 2023-12-15

## 2023-12-15 RX ORDER — TRAMADOL HYDROCHLORIDE 50 MG/1
50 TABLET ORAL EVERY 4 HOURS
Refills: 0 | Status: DISCONTINUED | OUTPATIENT
Start: 2023-12-15 | End: 2023-12-19

## 2023-12-15 RX ORDER — POTASSIUM CHLORIDE 20 MEQ
20 PACKET (EA) ORAL
Refills: 0 | Status: COMPLETED | OUTPATIENT
Start: 2023-12-15 | End: 2023-12-15

## 2023-12-15 RX ORDER — ONDANSETRON 8 MG/1
4 TABLET, FILM COATED ORAL ONCE
Refills: 0 | Status: COMPLETED | OUTPATIENT
Start: 2023-12-15 | End: 2023-12-15

## 2023-12-15 RX ORDER — SODIUM CHLORIDE 5 G/100ML
1000 INJECTION, SOLUTION INTRAVENOUS
Refills: 0 | Status: DISCONTINUED | OUTPATIENT
Start: 2023-12-15 | End: 2023-12-17

## 2023-12-15 RX ORDER — ONDANSETRON 8 MG/1
4 TABLET, FILM COATED ORAL ONCE
Refills: 0 | Status: COMPLETED | OUTPATIENT
Start: 2023-12-15 | End: 2023-12-16

## 2023-12-15 RX ORDER — POTASSIUM PHOSPHATE, MONOBASIC POTASSIUM PHOSPHATE, DIBASIC 236; 224 MG/ML; MG/ML
15 INJECTION, SOLUTION INTRAVENOUS ONCE
Refills: 0 | Status: COMPLETED | OUTPATIENT
Start: 2023-12-15 | End: 2023-12-15

## 2023-12-15 RX ORDER — DICLOFENAC SODIUM 0.1 %
1 DROPS OPHTHALMIC (EYE)
Refills: 0 | Status: DISCONTINUED | OUTPATIENT
Start: 2023-12-15 | End: 2023-12-20

## 2023-12-15 RX ORDER — SODIUM CHLORIDE 5 G/100ML
1000 INJECTION, SOLUTION INTRAVENOUS
Refills: 0 | Status: DISCONTINUED | OUTPATIENT
Start: 2023-12-15 | End: 2023-12-15

## 2023-12-15 RX ORDER — ACETAMINOPHEN 500 MG
1000 TABLET ORAL ONCE
Refills: 0 | Status: COMPLETED | OUTPATIENT
Start: 2023-12-15 | End: 2023-12-15

## 2023-12-15 RX ORDER — TRAMADOL HYDROCHLORIDE 50 MG/1
25 TABLET ORAL EVERY 4 HOURS
Refills: 0 | Status: DISCONTINUED | OUTPATIENT
Start: 2023-12-15 | End: 2023-12-19

## 2023-12-15 RX ORDER — LEVETIRACETAM 250 MG/1
500 TABLET, FILM COATED ORAL
Refills: 0 | Status: DISCONTINUED | OUTPATIENT
Start: 2023-12-15 | End: 2023-12-16

## 2023-12-15 RX ADMIN — LEVETIRACETAM 500 MILLIGRAM(S): 250 TABLET, FILM COATED ORAL at 17:24

## 2023-12-15 RX ADMIN — LEVETIRACETAM 500 MILLIGRAM(S): 250 TABLET, FILM COATED ORAL at 12:10

## 2023-12-15 RX ADMIN — SODIUM CHLORIDE 50 MILLILITER(S): 5 INJECTION, SOLUTION INTRAVENOUS at 07:19

## 2023-12-15 RX ADMIN — Medication 1 DROP(S): at 17:34

## 2023-12-15 RX ADMIN — OXYCODONE HYDROCHLORIDE 5 MILLIGRAM(S): 5 TABLET ORAL at 00:30

## 2023-12-15 RX ADMIN — Medication 102 GRAM(S): at 02:08

## 2023-12-15 RX ADMIN — SODIUM CHLORIDE 50 MILLILITER(S): 5 INJECTION, SOLUTION INTRAVENOUS at 19:19

## 2023-12-15 RX ADMIN — Medication 5 MILLIGRAM(S): at 11:06

## 2023-12-15 RX ADMIN — CHLORHEXIDINE GLUCONATE 1 APPLICATION(S): 213 SOLUTION TOPICAL at 12:44

## 2023-12-15 RX ADMIN — Medication 20 MILLIEQUIVALENT(S): at 17:24

## 2023-12-15 RX ADMIN — SENNA PLUS 1 TABLET(S): 8.6 TABLET ORAL at 21:24

## 2023-12-15 RX ADMIN — POTASSIUM PHOSPHATE, MONOBASIC POTASSIUM PHOSPHATE, DIBASIC 62.5 MILLIMOLE(S): 236; 224 INJECTION, SOLUTION INTRAVENOUS at 17:14

## 2023-12-15 RX ADMIN — OXYCODONE HYDROCHLORIDE 5 MILLIGRAM(S): 5 TABLET ORAL at 01:00

## 2023-12-15 RX ADMIN — Medication 20 MILLIEQUIVALENT(S): at 21:23

## 2023-12-15 RX ADMIN — Medication 40 MILLIEQUIVALENT(S): at 00:36

## 2023-12-15 RX ADMIN — Medication 400 MILLIGRAM(S): at 12:09

## 2023-12-15 RX ADMIN — POLYETHYLENE GLYCOL 3350 17 GRAM(S): 17 POWDER, FOR SOLUTION ORAL at 05:11

## 2023-12-15 RX ADMIN — Medication 1000 MILLIGRAM(S): at 12:24

## 2023-12-15 RX ADMIN — ONDANSETRON 4 MILLIGRAM(S): 8 TABLET, FILM COATED ORAL at 08:54

## 2023-12-15 RX ADMIN — DESMOPRESSIN ACETATE 220 MICROGRAM(S): 0.1 TABLET ORAL at 00:33

## 2023-12-15 RX ADMIN — SODIUM CHLORIDE 50 MILLILITER(S): 5 INJECTION, SOLUTION INTRAVENOUS at 05:41

## 2023-12-15 NOTE — OCCUPATIONAL THERAPY INITIAL EVALUATION ADULT - DIAGNOSIS, OT EVAL
decreased independence with ADLs and functional transfers Pt p/w impaired balance, strength, endurance, AROM, coordination, motor control, vision, cognition impacting independence with ADLs and functional mobility.

## 2023-12-15 NOTE — PHYSICAL THERAPY INITIAL EVALUATION ADULT - PERTINENT HX OF CURRENT PROBLEM, REHAB EVAL
Patient MOISÉS MAYO is a 73y (1950) RIGHT handed woman who presented to [facility] ED on 12/14/2023, as a transfer from [facility], as a CODE STROKE, with c/o R IPH.  Denies PMH.  CTH and CTA repeated - large R IPH (temporal/parietal). Presented to [place] today with c/o HA and AMS. Friend accompanying patient said that when visiting patient today - patient was not acting like herself and c/o HA. 12/15 CT head: 1.Grossly stable intraparenchymal hemorrhage centered in the right parietal/temporal lobes. 2.Midline shift to the left of approximately 0.5 cm. Patient MOISÉS MAYO is a 73y (1950) RIGHT handed woman who presented to [facility] ED on 12/14/2023, as a transfer from [facility], as a CODE STROKE, with c/o R IPH.  Denies PMH.  CTH and CTA repeated - large R IPH (temporal/parietal). Presented to [place] today with c/o HA and AMS. Friend accompanying patient said that when visiting patient today - patient was not acting like herself and c/o HA. 12/15 CT head: 1.Grossly stable intraparenchymal hemorrhage centered in the right parietal/temporal lobes. 2.Midline shift to the left of approximately 0.5 cm.

## 2023-12-15 NOTE — PROGRESS NOTE ADULT - SUBJECTIVE AND OBJECTIVE BOX
HOSPITAL COURSE: Patient MOISÉS MAYO is a 73y (1950) RIGHT handed woman who presented to [facility] on 12/14/2023, as a transfer from [facility], as a CODE STROKE, with c/o R IPH.  Denies PMH.  CTH and CTA repeated - large R IPH (temporal/parietal). Presented to [place] today with c/o HA and AMS. Friend accompanying patient said that when visiting patient today - patient was not acting like herself and c/o HA.    NIHSS 9       Admission Scores  GCS:   HH:   MF:   NIHSS:   RASS:    CAM-ICU:   ICH:    24 hour Events:       12/15: rCTH: unchanged pending official read. No neurological exam as of now. Pending MR  12/15 O/N: Patient lethargic but opens eyes and follow commands. No events.     Allergies    Allergy Status Unknown    Intolerances    oxycodone (Nausea)      REVIEW OF SYSTEMS: [ ] Unable to Assess due to neurologic exam   [ ] All ROS addressed below are non-contributory, except:  Neuro: [ ] Headache [ ] Back pain [ ] Numbness [ ] Weakness [ ] Ataxia [ ] Dizziness [ ] Aphasia [ ] Dysarthria [ ] Visual disturbance  Resp: [ ] Shortness of breath/dyspnea, [ ] Orthopnea [ ] Cough  CV: [ ] Chest pain [ ] Palpitation [ ] Lightheadedness [ ] Syncope  Renal: [ ] Thirst [ ] Edema  GI: [ ] Nausea [ ] Emesis [ ] Abdominal pain [ ] Constipation [ ] Diarrhea  Hem: [ ] Hematemesis [ ] bright red blood per rectum  ID: [ ] Fever [ ] Chills [ ] Dysuria  ENT: [ ] Rhinorrhea      DEVICES:   [ ] Restraints [ ] ET tube [ ] central line [ ] arterial line [ ] kovacs [ ] NGT/OGT [ ] EVD [ ] LD [ ] TREVIN/HMV [ ] Trach [ ] PEG [ ] Chest Tube     VITALS:   Vital Signs Last 24 Hrs  T(C): 37.1 (15 Dec 2023 19:00), Max: 37.1 (15 Dec 2023 19:00)  T(F): 98.7 (15 Dec 2023 19:00), Max: 98.7 (15 Dec 2023 19:00)  HR: 79 (15 Dec 2023 19:00) (65 - 99)  BP: 109/63 (15 Dec 2023 19:00) (101/63 - 138/71)  BP(mean): 78 (15 Dec 2023 19:00) (74 - 99)  RR: 23 (15 Dec 2023 19:00) (15 - 23)  SpO2: 96% (15 Dec 2023 19:00) (96% - 100%)    Parameters below as of 15 Dec 2023 19:00  Patient On (Oxygen Delivery Method): room air      CAPILLARY BLOOD GLUCOSE        I&O's Summary    14 Dec 2023 07:01  -  15 Dec 2023 07:00  --------------------------------------------------------  IN: 680 mL / OUT: 500 mL / NET: 180 mL    15 Dec 2023 07:01  -  15 Dec 2023 20:06  --------------------------------------------------------  IN: 712.5 mL / OUT: 500 mL / NET: 212.5 mL        Respiratory:        LABS:                        15.0   8.59  )-----------( 199      ( 14 Dec 2023 22:14 )             41.8     12-15    144  |  110<H>  |  14  ----------------------------<  98  3.7   |  23  |  0.53             MEDICATION LEVELS:     IVF FLUIDS/MEDICATIONS:   MEDICATIONS  (STANDING):  chlorhexidine 4% Liquid 1 Application(s) Topical daily  diclofenac 0.1% Ophthalmic Solution 1 Drop(s) Left EYE <User Schedule>  levETIRAcetam 500 milliGRAM(s) Oral two times a day  ondansetron Injectable 4 milliGRAM(s) IV Push once  polyethylene glycol 3350 17 Gram(s) Oral two times a day  potassium chloride    Tablet ER 20 milliEquivalent(s) Oral every 2 hours  senna 1 Tablet(s) Oral at bedtime  sodium chloride 3% + sodium acetate 50:50 1000 milliLiter(s) (50 mL/Hr) IV Continuous <Continuous>    MEDICATIONS  (PRN):  acetaminophen     Tablet .. 650 milliGRAM(s) Oral every 6 hours PRN Temp greater or equal to 38C (100.4F), Mild Pain (1 - 3)  famotidine    Tablet 20 milliGRAM(s) Oral every 12 hours PRN dyspepsia  traMADol 25 milliGRAM(s) Oral every 4 hours PRN Moderate Pain (4 - 6)  traMADol 50 milliGRAM(s) Oral every 4 hours PRN Severe Pain (7 - 10)        IMAGING:      EXAMINATION:  PHYSICAL EXAM:    Constitutional: No Acute Distress     Neurological: Arousable, oriented x3, following commands after a lot of prompting, Pupils bilaterally reactive and equal. Right gaze preference. Right side 5/5. LUE antigravity but neglecting. LLE withdraws to pain and is antigravity, however neglecting.    Pulmonary: Clear to Auscultation, No rales, No rhonchi, No wheezes     Cardiovascular: S1, S2, Regular rate and rhythm     Gastrointestinal: Soft, Non-tender, Non-distended     Extremities: No calf tenderness     Incision:    HOSPITAL COURSE: Patient MOISÉS MAYO is a 73y (1950) RIGHT handed woman who presented to [facility] ED on 12/14/2023, as a transfer from [facility], as a CODE STROKE, with c/o R IPH.  Denies PMH.  CTH and CTA repeated - large R IPH (temporal/parietal). Presented to [place] today with c/o HA and AMS. Friend accompanying patient said that when visiting patient today - patient was not acting like herself and c/o HA.    NIHSS 9       Admission Scores  GCS:   HH:   MF:   NIHSS:   RASS:    CAM-ICU:   ICH:    24 hour Events:       12/15: rCTH: unchanged pending official read. No neurological exam as of now. Pending MR  12/15 O/N: Patient lethargic but opens eyes and follow commands. No events.     Allergies    Allergy Status Unknown    Intolerances    oxycodone (Nausea)      REVIEW OF SYSTEMS: [ ] Unable to Assess due to neurologic exam   [ ] All ROS addressed below are non-contributory, except:  Neuro: [ ] Headache [ ] Back pain [ ] Numbness [ ] Weakness [ ] Ataxia [ ] Dizziness [ ] Aphasia [ ] Dysarthria [ ] Visual disturbance  Resp: [ ] Shortness of breath/dyspnea, [ ] Orthopnea [ ] Cough  CV: [ ] Chest pain [ ] Palpitation [ ] Lightheadedness [ ] Syncope  Renal: [ ] Thirst [ ] Edema  GI: [ ] Nausea [ ] Emesis [ ] Abdominal pain [ ] Constipation [ ] Diarrhea  Hem: [ ] Hematemesis [ ] bright red blood per rectum  ID: [ ] Fever [ ] Chills [ ] Dysuria  ENT: [ ] Rhinorrhea      DEVICES:   [ ] Restraints [ ] ET tube [ ] central line [ ] arterial line [ ] kovacs [ ] NGT/OGT [ ] EVD [ ] LD [ ] TREVIN/HMV [ ] Trach [ ] PEG [ ] Chest Tube     VITALS:   Vital Signs Last 24 Hrs  T(C): 37.1 (15 Dec 2023 19:00), Max: 37.1 (15 Dec 2023 19:00)  T(F): 98.7 (15 Dec 2023 19:00), Max: 98.7 (15 Dec 2023 19:00)  HR: 79 (15 Dec 2023 19:00) (65 - 99)  BP: 109/63 (15 Dec 2023 19:00) (101/63 - 138/71)  BP(mean): 78 (15 Dec 2023 19:00) (74 - 99)  RR: 23 (15 Dec 2023 19:00) (15 - 23)  SpO2: 96% (15 Dec 2023 19:00) (96% - 100%)    Parameters below as of 15 Dec 2023 19:00  Patient On (Oxygen Delivery Method): room air      CAPILLARY BLOOD GLUCOSE        I&O's Summary    14 Dec 2023 07:01  -  15 Dec 2023 07:00  --------------------------------------------------------  IN: 680 mL / OUT: 500 mL / NET: 180 mL    15 Dec 2023 07:01  -  15 Dec 2023 20:06  --------------------------------------------------------  IN: 712.5 mL / OUT: 500 mL / NET: 212.5 mL        Respiratory:        LABS:                        15.0   8.59  )-----------( 199      ( 14 Dec 2023 22:14 )             41.8     12-15    144  |  110<H>  |  14  ----------------------------<  98  3.7   |  23  |  0.53             MEDICATION LEVELS:     IVF FLUIDS/MEDICATIONS:   MEDICATIONS  (STANDING):  chlorhexidine 4% Liquid 1 Application(s) Topical daily  diclofenac 0.1% Ophthalmic Solution 1 Drop(s) Left EYE <User Schedule>  levETIRAcetam 500 milliGRAM(s) Oral two times a day  ondansetron Injectable 4 milliGRAM(s) IV Push once  polyethylene glycol 3350 17 Gram(s) Oral two times a day  potassium chloride    Tablet ER 20 milliEquivalent(s) Oral every 2 hours  senna 1 Tablet(s) Oral at bedtime  sodium chloride 3% + sodium acetate 50:50 1000 milliLiter(s) (50 mL/Hr) IV Continuous <Continuous>    MEDICATIONS  (PRN):  acetaminophen     Tablet .. 650 milliGRAM(s) Oral every 6 hours PRN Temp greater or equal to 38C (100.4F), Mild Pain (1 - 3)  famotidine    Tablet 20 milliGRAM(s) Oral every 12 hours PRN dyspepsia  traMADol 25 milliGRAM(s) Oral every 4 hours PRN Moderate Pain (4 - 6)  traMADol 50 milliGRAM(s) Oral every 4 hours PRN Severe Pain (7 - 10)        IMAGING:      EXAMINATION:  PHYSICAL EXAM:    Constitutional: No Acute Distress     Neurological: Arousable, oriented x3, following commands after a lot of prompting, Pupils bilaterally reactive and equal. Right gaze preference. Right side 5/5. LUE antigravity but neglecting. LLE withdraws to pain and is antigravity, however neglecting.    Pulmonary: Clear to Auscultation, No rales, No rhonchi, No wheezes     Cardiovascular: S1, S2, Regular rate and rhythm     Gastrointestinal: Soft, Non-tender, Non-distended     Extremities: No calf tenderness     Incision:    HOSPITAL COURSE: Patient MOISÉS MAYO is a 73y (1950) RIGHT handed woman who presented to [facility] ED on 12/14/2023, as a transfer from [facility], as a CODE STROKE, with c/o R IPH.  Denies PMH.  CTH and CTA repeated - large R IPH (temporal/parietal). Presented to [place] today with c/o HA and AMS. Friend accompanying patient said that when visiting patient today - patient was not acting like herself and c/o HA.    NIHSS 9       24 hour Events:       12/15: rCTH: unchanged pending official read. No neurological exam as of now. Pending MR  12/15 O/N: Patient lethargic but opens eyes and follow commands. No events.     Allergies    Allergy Status Unknown    Intolerances    oxycodone (Nausea)      REVIEW OF SYSTEMS: [ ] Unable to Assess due to neurologic exam   [X] All ROS addressed below are non-contributory, except:  Neuro: [ ] Headache [ ] Back pain [ ] Numbness [ ] Weakness [ ] Ataxia [ ] Dizziness [ ] Aphasia [ ] Dysarthria [ ] Visual disturbance  Resp: [ ] Shortness of breath/dyspnea, [ ] Orthopnea [ ] Cough  CV: [ ] Chest pain [ ] Palpitation [ ] Lightheadedness [ ] Syncope  Renal: [ ] Thirst [ ] Edema  GI: [ ] Nausea [ ] Emesis [ ] Abdominal pain [ ] Constipation [ ] Diarrhea  Hem: [ ] Hematemesis [ ] bright red blood per rectum  ID: [ ] Fever [ ] Chills [ ] Dysuria  ENT: [ ] Rhinorrhea      DEVICES:   [ ] Restraints [ ] ET tube [ ] central line [ ] arterial line [ ] kovacs [ ] NGT/OGT [ ] EVD [ ] LD [ ] TREVIN/HMV [ ] Trach [ ] PEG [ ] Chest Tube     VITALS:   Vital Signs Last 24 Hrs  T(C): 37.1 (15 Dec 2023 19:00), Max: 37.1 (15 Dec 2023 19:00)  T(F): 98.7 (15 Dec 2023 19:00), Max: 98.7 (15 Dec 2023 19:00)  HR: 79 (15 Dec 2023 19:00) (65 - 99)  BP: 109/63 (15 Dec 2023 19:00) (101/63 - 138/71)  BP(mean): 78 (15 Dec 2023 19:00) (74 - 99)  RR: 23 (15 Dec 2023 19:00) (15 - 23)  SpO2: 96% (15 Dec 2023 19:00) (96% - 100%)    Parameters below as of 15 Dec 2023 19:00  Patient On (Oxygen Delivery Method): room air      CAPILLARY BLOOD GLUCOSE        I&O's Summary    14 Dec 2023 07:01  -  15 Dec 2023 07:00  --------------------------------------------------------  IN: 680 mL / OUT: 500 mL / NET: 180 mL    15 Dec 2023 07:01  -  15 Dec 2023 20:06  --------------------------------------------------------  IN: 712.5 mL / OUT: 500 mL / NET: 212.5 mL        Respiratory:        LABS:                        15.0   8.59  )-----------( 199      ( 14 Dec 2023 22:14 )             41.8     12-15    144  |  110<H>  |  14  ----------------------------<  98  3.7   |  23  |  0.53             MEDICATION LEVELS:     IVF FLUIDS/MEDICATIONS:   MEDICATIONS  (STANDING):  chlorhexidine 4% Liquid 1 Application(s) Topical daily  diclofenac 0.1% Ophthalmic Solution 1 Drop(s) Left EYE <User Schedule>  levETIRAcetam 500 milliGRAM(s) Oral two times a day  ondansetron Injectable 4 milliGRAM(s) IV Push once  polyethylene glycol 3350 17 Gram(s) Oral two times a day  potassium chloride    Tablet ER 20 milliEquivalent(s) Oral every 2 hours  senna 1 Tablet(s) Oral at bedtime  sodium chloride 3% + sodium acetate 50:50 1000 milliLiter(s) (50 mL/Hr) IV Continuous <Continuous>    MEDICATIONS  (PRN):  acetaminophen     Tablet .. 650 milliGRAM(s) Oral every 6 hours PRN Temp greater or equal to 38C (100.4F), Mild Pain (1 - 3)  famotidine    Tablet 20 milliGRAM(s) Oral every 12 hours PRN dyspepsia  traMADol 25 milliGRAM(s) Oral every 4 hours PRN Moderate Pain (4 - 6)  traMADol 50 milliGRAM(s) Oral every 4 hours PRN Severe Pain (7 - 10)        IMAGING:      EXAMINATION:  PHYSICAL EXAM:    Constitutional: No Acute Distress     Neurological: Arousable, oriented x3, following commands after a lot of prompting, Pupils bilaterally reactive and equal. Right gaze preference. Right side 5/5. LUE antigravity but neglecting. LLE withdraws to pain and is antigravity, however neglecting.    Pulmonary: Clear to Auscultation, No rales, No rhonchi, No wheezes     Cardiovascular: S1, S2, Regular rate and rhythm     Gastrointestinal: Soft, Non-tender, Non-distended     Extremities: No calf tenderness     Incision:    HOSPITAL COURSE: Patient MOISÉS MAYO is a 73y (1950) RIGHT handed woman who presented to [facility] ED on 12/14/2023, as a transfer from [facility], as a CODE STROKE, with c/o R IPH.  Denies PMH.  CTH and CTA repeated - large R IPH (temporal/parietal). Presented to [place] today with c/o HA and AMS. Friend accompanying patient said that when visiting patient today - patient was not acting like herself and c/o HA.    NIHSS 9       24 hour Events:       12/15: rCTH: unchanged pending official read. No neurological exam as of now. Pending MR  12/15 O/N: Patient lethargic but opens eyes and follow commands. No events.     Allergies    Allergy Status Unknown    Intolerances    oxycodone (Nausea)      REVIEW OF SYSTEMS: [ ] Unable to Assess due to neurologic exam   [X] All ROS addressed below are non-contributory, except:  Neuro: [ ] Headache [ ] Back pain [ ] Numbness [ ] Weakness [ ] Ataxia [ ] Dizziness [ ] Aphasia [ ] Dysarthria [ ] Visual disturbance  Resp: [ ] Shortness of breath/dyspnea, [ ] Orthopnea [ ] Cough  CV: [ ] Chest pain [ ] Palpitation [ ] Lightheadedness [ ] Syncope  Renal: [ ] Thirst [ ] Edema  GI: [ ] Nausea [ ] Emesis [ ] Abdominal pain [ ] Constipation [ ] Diarrhea  Hem: [ ] Hematemesis [ ] bright red blood per rectum  ID: [ ] Fever [ ] Chills [ ] Dysuria  ENT: [ ] Rhinorrhea      DEVICES:   [ ] Restraints [ ] ET tube [ ] central line [ ] arterial line [ ] kovacs [ ] NGT/OGT [ ] EVD [ ] LD [ ] TREVIN/HMV [ ] Trach [ ] PEG [ ] Chest Tube     VITALS:   Vital Signs Last 24 Hrs  T(C): 37.1 (15 Dec 2023 19:00), Max: 37.1 (15 Dec 2023 19:00)  T(F): 98.7 (15 Dec 2023 19:00), Max: 98.7 (15 Dec 2023 19:00)  HR: 79 (15 Dec 2023 19:00) (65 - 99)  BP: 109/63 (15 Dec 2023 19:00) (101/63 - 138/71)  BP(mean): 78 (15 Dec 2023 19:00) (74 - 99)  RR: 23 (15 Dec 2023 19:00) (15 - 23)  SpO2: 96% (15 Dec 2023 19:00) (96% - 100%)    Parameters below as of 15 Dec 2023 19:00  Patient On (Oxygen Delivery Method): room air      CAPILLARY BLOOD GLUCOSE        I&O's Summary    14 Dec 2023 07:01  -  15 Dec 2023 07:00  --------------------------------------------------------  IN: 680 mL / OUT: 500 mL / NET: 180 mL    15 Dec 2023 07:01  -  15 Dec 2023 20:06  --------------------------------------------------------  IN: 712.5 mL / OUT: 500 mL / NET: 212.5 mL        Respiratory:        LABS:                        15.0   8.59  )-----------( 199      ( 14 Dec 2023 22:14 )             41.8     12-15    144  |  110<H>  |  14  ----------------------------<  98  3.7   |  23  |  0.53             MEDICATION LEVELS:     IVF FLUIDS/MEDICATIONS:   MEDICATIONS  (STANDING):  chlorhexidine 4% Liquid 1 Application(s) Topical daily  diclofenac 0.1% Ophthalmic Solution 1 Drop(s) Left EYE <User Schedule>  levETIRAcetam 500 milliGRAM(s) Oral two times a day  ondansetron Injectable 4 milliGRAM(s) IV Push once  polyethylene glycol 3350 17 Gram(s) Oral two times a day  potassium chloride    Tablet ER 20 milliEquivalent(s) Oral every 2 hours  senna 1 Tablet(s) Oral at bedtime  sodium chloride 3% + sodium acetate 50:50 1000 milliLiter(s) (50 mL/Hr) IV Continuous <Continuous>    MEDICATIONS  (PRN):  acetaminophen     Tablet .. 650 milliGRAM(s) Oral every 6 hours PRN Temp greater or equal to 38C (100.4F), Mild Pain (1 - 3)  famotidine    Tablet 20 milliGRAM(s) Oral every 12 hours PRN dyspepsia  traMADol 25 milliGRAM(s) Oral every 4 hours PRN Moderate Pain (4 - 6)  traMADol 50 milliGRAM(s) Oral every 4 hours PRN Severe Pain (7 - 10)        IMAGING:      EXAMINATION:  PHYSICAL EXAM:    Constitutional: No Acute Distress     Neurological: Arousable, oriented x3, following commands after a lot of prompting, Pupils bilaterally reactive and equal. Right gaze preference. Right side 5/5. LUE antigravity but neglecting. LLE withdraws to pain and is antigravity, however neglecting.    Pulmonary: Clear to Auscultation, No rales, No rhonchi, No wheezes     Cardiovascular: S1, S2, Regular rate and rhythm     Gastrointestinal: Soft, Non-tender, Non-distended     Extremities: No calf tenderness     Incision:

## 2023-12-15 NOTE — SPEECH LANGUAGE PATHOLOGY EVALUATION - SLP DIAGNOSIS
Chart reviewed, attempted to see pt for speech language evaluation however pt off the floor at MRI. Service will f/u as appropriate. Chart reviewed, attempted to see pt for speech language evaluation x2 however pt lethargic and unable to achieve or maintain arousal. Service will f/u as appropriate.

## 2023-12-15 NOTE — PROGRESS NOTE ADULT - ASSESSMENT
ASSESSMENT/PLAN: Patient with right IPH ICH score 1.     Neuro  -CTH with large R IPH (temporal/parietal)  - rCTH as of 12/15: unchanged  - MRI done- no evidence of mass, stable right IPH   -q1 hour neuro checks  -pain management with tylenol/oxy  - EEG on - negative for seizures  - Patient started on Keppra for LLE shaking  - OOB as tolerated     Resp  -on RA  -encourage IS    CV  --150  -f/u TTE    GI  -NPO  -LBM PTA      -3% @ 50cc/hr Na goal 140-145 for 12/15  -BMP q6 hours for at least 24 hours  -I&O strict  -monitor Cr  -replete lytes PRN    ENDO  -fu a1c  -maintain eugylcemia 120-180    HEME  -monitor CBC  -f/u va dupplex BL LE  -defer chemical vte ppx at this time    ID  -afebrile         ASSESSMENT/PLAN: Patient with right IPH ICH score 1.     Neuro  - Neurochecks Q2 H  -CTH with large R IPH (temporal/parietal)  - rCTH as of 12/15: unchanged  - MRI done- no evidence of mass, stable right IPH   - pain management with tylenol/oxy  - EEG on - negative for seizures  - Patient started on Keppra for LLE shaking  - CTH tomorrow for stability- start DVT ppx if stable  - OOB as tolerated     Resp  -on RA  -encourage IS    CV  - -150  - TTE- EF 64%, no other otherwise     GI  - Passed dysphagia  - Will start diet from tomorrow   - LBM PTA      - 3% @ 50cc/hr Na goal 140-145 for 12/15  - BMP q8  - I&O strict  - monitor Cr  - Replete lytes PRN    ENDO  - 5.0 a1c  -maintain eugylcemia 120-180    HEME  -monitor CBC  - negative dupplex BL LE  - chemical vte ppx- restart after stability scan tomorrow    ID  - afebrile

## 2023-12-15 NOTE — OCCUPATIONAL THERAPY INITIAL EVALUATION ADULT - ADL RETRAINING, OT EVAL
pt will perform UB dressing with I in 4 weeks / pt will perform grooming task with I in 4 weeks Pt will complete grooming tasks with min A and AD as needed within 4 weeks.

## 2023-12-15 NOTE — OCCUPATIONAL THERAPY INITIAL EVALUATION ADULT - PERTINENT HX OF CURRENT PROBLEM, REHAB EVAL
Patient MOISÉS MAYO is a 73y (1950) RIGHT handed woman who presented to [facility] ED on 12/14/2023, as a transfer from [facility], as a CODE STROKE, with c/o R IPH.  Denies PMH.  CTH and CTA repeated - large R IPH (temporal/parietal). Presented to [place] today with c/o HA and AMS. Friend accompanying patient said that when visiting patient today - patient was not acting like herself and c/o HA.  12/15 CT head: 1.Grossly stable intraparenchymal hemorrhage centered in the right parietal/temporal lobes. 2.Midline shift to the left of approximately 0.5 cm. Patient MOISÉS MAYO is a 73y (1950) RIGHT handed woman who presented to [facility] ED on 12/14/2023, as a transfer from [facility], as a CODE STROKE, with c/o R IPH.  Denies PMH.  CTH and CTA repeated - large R IPH (temporal/parietal). Presented to [place] today with c/o HA and AMS. Friend accompanying patient said that when visiting patient today - patient was not acting like herself and c/o HA.  12/15 CT head: 1.Grossly stable intraparenchymal hemorrhage centered in the right parietal/temporal lobes. 2.Midline shift to the left of approximately 0.5 cm.

## 2023-12-15 NOTE — CHART NOTE - NSCHARTNOTEFT_GEN_A_CORE
CAPRINI SCORE [CLOT] Score on Admission for     AGE RELATED RISK FACTORS                                                       MOBILITY RELATED FACTORS  [ ] Age 41-60 years                                            (1 Point)                  [ ] Bed rest                                                        (1 Point)  [X ] Age: 61-74 years                                           (2 Points)                 [ ] Plaster cast                                                   (2 Points)  [ ] Age= 75 years                                              (3 Points)                 [ ] Bed bound for more than 72 hours                 (2 Points)    DISEASE RELATED RISK FACTORS                                               GENDER SPECIFIC FACTORS  [ ] Edema in the lower extremities                       (1 Point)                  [ ] Pregnancy                                                     (1 Point)  [ ] Varicose veins                                               (1 Point)                  [ ] Post-partum < 6 weeks                                   (1 Point)             [ ] BMI > 25 Kg/m2                                            (1 Point)                  [ ] Hormonal therapy  or oral contraception          (1 Point)                 [ ] Sepsis (in the previous month)                        (1 Point)                  [ ] History of pregnancy complications                 (1 point)  [ ] Pneumonia or serious lung disease                                               [ ] Unexplained or recurrent                     (1 Point)           (in the previous month)                               (1 Point)  [ ] Abnormal pulmonary function test                     (1 Point)                 SURGERY RELATED RISK FACTORS (include planned surgeries)  [ ] Acute myocardial infarction                              (1 Point)                 [ ]  Section                                             (1 Point)  [ ] Congestive heart failure (in the previous month)  (1 Point)         [ ] Minor surgery                                                  (1 Point)   [ ] Inflammatory bowel disease                             (1 Point)                 [ ] Arthroscopic surgery                                        (2 Points)  [ ] Central venous access                                      (2 Points)                [ ] General surgery lasting more than 45 minutes   (2 Points)       [ ] Stroke (in the previous month)                          (5 Points)               [ ] Elective arthroplasty                                         (5 Points)            [ ] current or past malignancy                              (2 Points)                                                                                                       HEMATOLOGY RELATED FACTORS                                                 TRAUMA RELATED RISK FACTORS  [ ] Prior episodes of VTE                                     (3 Points)                [ ] Fracture of the hip, pelvis, or leg                       (5 Points)  [ ] Positive family history for VTE                         (3 Points)                 [ ] Acute spinal cord injury (in the previous month)  (5 Points)  [ ] Prothrombin 47764 A                                     (3 Points)                 [ ] Paralysis  (less than 1 month)                             (5 Points)  [ ] Factor V Leiden                                             (3 Points)                  [ ] Multiple Trauma within 1 month                        (5 Points)  [ ] Lupus anticoagulants                                     (3 Points)                                                           [ ] Anticardiolipin antibodies                               (3 Points)                                                       [ ] High homocysteine in the blood                      (3 Points)                                             [ ] Other congenital or acquired thrombophilia      (3 Points)                                                [ ] Heparin induced thrombocytopenia                  (3 Points)                                          Total Score [   2   ]    Risk:  Very low 0   Low 1 to 2   Moderate 3 to 4   High =5       VTE Prophylasix Recommednations:  [X ] mechanical pneumatic compression devices                                      [ ] contraindicated: _____________________  [ ] chemo prophylasix                                                                                   [X ] contraindicated Right IPH  **** HIGH LIKELIHOOD DVT PRESENT ON ADMISSION  [ X] (please order LE dopplers within 24 hours of admission) CAPRINI SCORE [CLOT] Score on Admission for     AGE RELATED RISK FACTORS                                                       MOBILITY RELATED FACTORS  [ ] Age 41-60 years                                            (1 Point)                  [ ] Bed rest                                                        (1 Point)  [X ] Age: 61-74 years                                           (2 Points)                 [ ] Plaster cast                                                   (2 Points)  [ ] Age= 75 years                                              (3 Points)                 [ ] Bed bound for more than 72 hours                 (2 Points)    DISEASE RELATED RISK FACTORS                                               GENDER SPECIFIC FACTORS  [ ] Edema in the lower extremities                       (1 Point)                  [ ] Pregnancy                                                     (1 Point)  [ ] Varicose veins                                               (1 Point)                  [ ] Post-partum < 6 weeks                                   (1 Point)             [ ] BMI > 25 Kg/m2                                            (1 Point)                  [ ] Hormonal therapy  or oral contraception          (1 Point)                 [ ] Sepsis (in the previous month)                        (1 Point)                  [ ] History of pregnancy complications                 (1 point)  [ ] Pneumonia or serious lung disease                                               [ ] Unexplained or recurrent                     (1 Point)           (in the previous month)                               (1 Point)  [ ] Abnormal pulmonary function test                     (1 Point)                 SURGERY RELATED RISK FACTORS (include planned surgeries)  [ ] Acute myocardial infarction                              (1 Point)                 [ ]  Section                                             (1 Point)  [ ] Congestive heart failure (in the previous month)  (1 Point)         [ ] Minor surgery                                                  (1 Point)   [ ] Inflammatory bowel disease                             (1 Point)                 [ ] Arthroscopic surgery                                        (2 Points)  [ ] Central venous access                                      (2 Points)                [ ] General surgery lasting more than 45 minutes   (2 Points)       [ ] Stroke (in the previous month)                          (5 Points)               [ ] Elective arthroplasty                                         (5 Points)            [ ] current or past malignancy                              (2 Points)                                                                                                       HEMATOLOGY RELATED FACTORS                                                 TRAUMA RELATED RISK FACTORS  [ ] Prior episodes of VTE                                     (3 Points)                [ ] Fracture of the hip, pelvis, or leg                       (5 Points)  [ ] Positive family history for VTE                         (3 Points)                 [ ] Acute spinal cord injury (in the previous month)  (5 Points)  [ ] Prothrombin 72741 A                                     (3 Points)                 [ ] Paralysis  (less than 1 month)                             (5 Points)  [ ] Factor V Leiden                                             (3 Points)                  [ ] Multiple Trauma within 1 month                        (5 Points)  [ ] Lupus anticoagulants                                     (3 Points)                                                           [ ] Anticardiolipin antibodies                               (3 Points)                                                       [ ] High homocysteine in the blood                      (3 Points)                                             [ ] Other congenital or acquired thrombophilia      (3 Points)                                                [ ] Heparin induced thrombocytopenia                  (3 Points)                                          Total Score [   2   ]    Risk:  Very low 0   Low 1 to 2   Moderate 3 to 4   High =5       VTE Prophylasix Recommednations:  [X ] mechanical pneumatic compression devices                                      [ ] contraindicated: _____________________  [ ] chemo prophylasix                                                                                   [X ] contraindicated Right IPH  **** HIGH LIKELIHOOD DVT PRESENT ON ADMISSION  [ X] (please order LE dopplers within 24 hours of admission)

## 2023-12-15 NOTE — PROGRESS NOTE ADULT - SUBJECTIVE AND OBJECTIVE BOX
Patient seen and examined at bedside.    --Anticoagulation--    T(C): 36.5 (12-14-23 @ 23:00), Max: 37 (12-14-23 @ 16:45)  HR: 87 (12-15-23 @ 02:00) (68 - 92)  BP: 107/65 (12-15-23 @ 02:00) (107/65 - 138/71)  RR: 17 (12-15-23 @ 02:00) (15 - 21)  SpO2: 97% (12-15-23 @ 02:00) (95% - 100%)  Wt(kg): --    Exam: lethargic Ox3, PERRL, R gaze, L facial, Lt neglect, LUE drift, CHEN 5/

## 2023-12-15 NOTE — OCCUPATIONAL THERAPY INITIAL EVALUATION ADULT - PLANNED THERAPY INTERVENTIONS, OT EVAL
ADL retraining/balance training/cognitive, visual perceptual/transfer training ADL retraining/balance training/bed mobility training/cognitive, visual perceptual/motor coordination training/neuromuscular re-education/strengthening/transfer training

## 2023-12-15 NOTE — SPEECH LANGUAGE PATHOLOGY EVALUATION - SLP PERTINENT HISTORY OF CURRENT PROBLEM
Pt admitted to NSCU for R IPH and interval imaging and neuro checks. 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

## 2023-12-15 NOTE — PROGRESS NOTE ADULT - SUBJECTIVE AND OBJECTIVE BOX
HOSPITAL COURSE: Patient MOISÉS MAYO is a 73y (1950) RIGHT handed woman who presented to [facility] ED on 12/14/2023, as a transfer from [facility], as a CODE STROKE, with c/o R IPH.  Denies PMH.  CTH and CTA repeated - large R IPH (temporal/parietal). Presented to [place] today with c/o HA and AMS. Friend accompanying patient said that when visiting patient today - patient was not acting like herself and c/o HA.    NIHSS 9       Admission Scores  GCS:   HH:   MF:   NIHSS:   RASS:    CAM-ICU:   ICH:    24 hour Events:       Allergies    Allergy Status Unknown    Intolerances        REVIEW OF SYSTEMS: [ ] Unable to Assess due to neurologic exam   [ ] All ROS addressed below are non-contributory, except:  Neuro: [ ] Headache [ ] Back pain [ ] Numbness [ ] Weakness [ ] Ataxia [ ] Dizziness [ ] Aphasia [ ] Dysarthria [ ] Visual disturbance  Resp: [ ] Shortness of breath/dyspnea, [ ] Orthopnea [ ] Cough  CV: [ ] Chest pain [ ] Palpitation [ ] Lightheadedness [ ] Syncope  Renal: [ ] Thirst [ ] Edema  GI: [ ] Nausea [ ] Emesis [ ] Abdominal pain [ ] Constipation [ ] Diarrhea  Hem: [ ] Hematemesis [ ] bright red blood per rectum  ID: [ ] Fever [ ] Chills [ ] Dysuria  ENT: [ ] Rhinorrhea      DEVICES:   [ ] Restraints [ ] ET tube [ ] central line [ ] arterial line [ ] kovacs [ ] NGT/OGT [ ] EVD [ ] LD [ ] TREVIN/HMV [ ] Trach [ ] PEG [ ] Chest Tube     VITALS:   Vital Signs Last 24 Hrs  T(C): 36.7 (15 Dec 2023 03:00), Max: 37 (14 Dec 2023 16:45)  T(F): 98 (15 Dec 2023 03:00), Max: 98.6 (14 Dec 2023 16:45)  HR: 89 (15 Dec 2023 06:00) (68 - 99)  BP: 106/63 (15 Dec 2023 06:00) (101/63 - 138/71)  BP(mean): 77 (15 Dec 2023 06:00) (76 - 99)  RR: 18 (15 Dec 2023 06:00) (15 - 21)  SpO2: 99% (15 Dec 2023 06:00) (95% - 100%)    Parameters below as of 14 Dec 2023 21:00  Patient On (Oxygen Delivery Method): room air      CAPILLARY BLOOD GLUCOSE  104 (14 Dec 2023 17:16)      POCT Blood Glucose.: 104 mg/dL (14 Dec 2023 16:30)    I&O's Summary    14 Dec 2023 07:01  -  15 Dec 2023 06:23  --------------------------------------------------------  IN: 680 mL / OUT: 500 mL / NET: 180 mL        Respiratory:        LABS:                        15.0   8.59  )-----------( 199      ( 14 Dec 2023 22:14 )             41.8     12-15    140  |  108  |  14  ----------------------------<  111<H>  4.1   |  22  |  0.54             MEDICATION LEVELS:     IVF FLUIDS/MEDICATIONS:   MEDICATIONS  (STANDING):  chlorhexidine 4% Liquid 1 Application(s) Topical daily  polyethylene glycol 3350 17 Gram(s) Oral two times a day  senna 1 Tablet(s) Oral at bedtime  sodium chloride 3% + sodium acetate 50:50 1000 milliLiter(s) (50 mL/Hr) IV Continuous <Continuous>    MEDICATIONS  (PRN):  acetaminophen     Tablet .. 650 milliGRAM(s) Oral every 6 hours PRN Temp greater or equal to 38C (100.4F), Mild Pain (1 - 3)  famotidine    Tablet 20 milliGRAM(s) Oral every 12 hours PRN dyspepsia  traMADol 25 milliGRAM(s) Oral every 4 hours PRN Moderate Pain (4 - 6)  traMADol 50 milliGRAM(s) Oral every 4 hours PRN Severe Pain (7 - 10)        IMAGING:      EXAMINATION:  Neuro: Lethargic, EOV, PERRL, R gaze deviation can come to midline, Ox3, FC, R side 5/5. L side AG  CV: +s1s2, rrr, no peripheral edema or clubbing  Resp: CTA BL in all fields, no wheezes or crackles  Abd; soft ntnd x4  : no CVA tenderness  Integ: no rashes, lesions, or bruising   HOSPITAL COURSE: Patient MOISÉS MAYO is a 73y (1950) RIGHT handed woman who presented to [facility] ED on 12/14/2023, as a transfer from [facility], as a CODE STROKE, with c/o R IPH.  Denies PMH.  CTH and CTA repeated - large R IPH (temporal/parietal). Presented to [place] today with c/o HA and AMS. Friend accompanying patient said that when visiting patient today - patient was not acting like herself and c/o HA.    NIHSS 9       Admission Scores  GCS:   HH:   MF:   NIHSS:   RASS:    CAM-ICU:   ICH:    24 hour Events:       Allergies    Allergy Status Unknown    Intolerances        REVIEW OF SYSTEMS: [ ] Unable to Assess due to neurologic exam   [ ] All ROS addressed below are non-contributory, except:  Neuro: [ ] Headache [ ] Back pain [ ] Numbness [ ] Weakness [ ] Ataxia [ ] Dizziness [ ] Aphasia [ ] Dysarthria [ ] Visual disturbance  Resp: [ ] Shortness of breath/dyspnea, [ ] Orthopnea [ ] Cough  CV: [ ] Chest pain [ ] Palpitation [ ] Lightheadedness [ ] Syncope  Renal: [ ] Thirst [ ] Edema  GI: [ ] Nausea [ ] Emesis [ ] Abdominal pain [ ] Constipation [ ] Diarrhea  Hem: [ ] Hematemesis [ ] bright red blood per rectum  ID: [ ] Fever [ ] Chills [ ] Dysuria  ENT: [ ] Rhinorrhea      DEVICES:   [ ] Restraints [ ] ET tube [ ] central line [ ] arterial line [ ] kovacs [ ] NGT/OGT [ ] EVD [ ] LD [ ] TREVIN/HMV [ ] Trach [ ] PEG [ ] Chest Tube     VITALS:   Vital Signs Last 24 Hrs  T(C): 36.7 (15 Dec 2023 03:00), Max: 37 (14 Dec 2023 16:45)  T(F): 98 (15 Dec 2023 03:00), Max: 98.6 (14 Dec 2023 16:45)  HR: 89 (15 Dec 2023 06:00) (68 - 99)  BP: 106/63 (15 Dec 2023 06:00) (101/63 - 138/71)  BP(mean): 77 (15 Dec 2023 06:00) (76 - 99)  RR: 18 (15 Dec 2023 06:00) (15 - 21)  SpO2: 99% (15 Dec 2023 06:00) (95% - 100%)    Parameters below as of 14 Dec 2023 21:00  Patient On (Oxygen Delivery Method): room air      CAPILLARY BLOOD GLUCOSE  104 (14 Dec 2023 17:16)      POCT Blood Glucose.: 104 mg/dL (14 Dec 2023 16:30)    I&O's Summary    14 Dec 2023 07:01  -  15 Dec 2023 06:23  --------------------------------------------------------  IN: 680 mL / OUT: 500 mL / NET: 180 mL        Respiratory:        LABS:                        15.0   8.59  )-----------( 199      ( 14 Dec 2023 22:14 )             41.8     12-15    140  |  108  |  14  ----------------------------<  111<H>  4.1   |  22  |  0.54             MEDICATION LEVELS:     IVF FLUIDS/MEDICATIONS:   MEDICATIONS  (STANDING):  chlorhexidine 4% Liquid 1 Application(s) Topical daily  polyethylene glycol 3350 17 Gram(s) Oral two times a day  senna 1 Tablet(s) Oral at bedtime  sodium chloride 3% + sodium acetate 50:50 1000 milliLiter(s) (50 mL/Hr) IV Continuous <Continuous>    MEDICATIONS  (PRN):  acetaminophen     Tablet .. 650 milliGRAM(s) Oral every 6 hours PRN Temp greater or equal to 38C (100.4F), Mild Pain (1 - 3)  famotidine    Tablet 20 milliGRAM(s) Oral every 12 hours PRN dyspepsia  traMADol 25 milliGRAM(s) Oral every 4 hours PRN Moderate Pain (4 - 6)  traMADol 50 milliGRAM(s) Oral every 4 hours PRN Severe Pain (7 - 10)        IMAGING:      EXAMINATION:  Neuro: Lethargic, EOV, PERRL, R gaze deviation can come to midline, Ox3, FC, R side 5/5. L side AG  CV: +s1s2, rrr, no peripheral edema or clubbing  Resp: CTA BL in all fields, no wheezes or crackles  Abd; soft ntnd x4  : no CVA tenderness  Integ: no rashes, lesions, or bruising   HOSPITAL COURSE: Patient MOISÉS MAYO is a 73y (1950) RIGHT handed woman who presented to [facility] ED on 12/14/2023, as a transfer from [facility], as a CODE STROKE, with c/o R IPH.  Denies PMH.  CTH and CTA repeated - large R IPH (temporal/parietal). Presented to [place] today with c/o HA and AMS. Friend accompanying patient said that when visiting patient today - patient was not acting like herself and c/o HA.    NIHSS 9       Admission Scores  GCS:   HH:   MF:   NIHSS:   RASS:    CAM-ICU:   ICH:    24 hour Events:       12/15: rCTH: unchanged pending official read. No neurological exam as of now. Pending MRI    Allergies    Allergy Status Unknown    Intolerances        REVIEW OF SYSTEMS: [ ] Unable to Assess due to neurologic exam   [ ] All ROS addressed below are non-contributory, except:  Neuro: [ ] Headache [ ] Back pain [ ] Numbness [ ] Weakness [ ] Ataxia [ ] Dizziness [ ] Aphasia [ ] Dysarthria [ ] Visual disturbance  Resp: [ ] Shortness of breath/dyspnea, [ ] Orthopnea [ ] Cough  CV: [ ] Chest pain [ ] Palpitation [ ] Lightheadedness [ ] Syncope  Renal: [ ] Thirst [ ] Edema  GI: [ ] Nausea [ ] Emesis [ ] Abdominal pain [ ] Constipation [ ] Diarrhea  Hem: [ ] Hematemesis [ ] bright red blood per rectum  ID: [ ] Fever [ ] Chills [ ] Dysuria  ENT: [ ] Rhinorrhea      DEVICES:   [ ] Restraints [ ] ET tube [ ] central line [ ] arterial line [ ] kovacs [ ] NGT/OGT [ ] EVD [ ] LD [ ] TREVIN/HMV [ ] Trach [ ] PEG [ ] Chest Tube     VITALS:   Vital Signs Last 24 Hrs  T(C): 36.7 (15 Dec 2023 03:00), Max: 37 (14 Dec 2023 16:45)  T(F): 98 (15 Dec 2023 03:00), Max: 98.6 (14 Dec 2023 16:45)  HR: 89 (15 Dec 2023 06:00) (68 - 99)  BP: 106/63 (15 Dec 2023 06:00) (101/63 - 138/71)  BP(mean): 77 (15 Dec 2023 06:00) (76 - 99)  RR: 18 (15 Dec 2023 06:00) (15 - 21)  SpO2: 99% (15 Dec 2023 06:00) (95% - 100%)    Parameters below as of 14 Dec 2023 21:00  Patient On (Oxygen Delivery Method): room air      CAPILLARY BLOOD GLUCOSE  104 (14 Dec 2023 17:16)      POCT Blood Glucose.: 104 mg/dL (14 Dec 2023 16:30)    I&O's Summary    14 Dec 2023 07:01  -  15 Dec 2023 06:23  --------------------------------------------------------  IN: 680 mL / OUT: 500 mL / NET: 180 mL        Respiratory:        LABS:                        15.0   8.59  )-----------( 199      ( 14 Dec 2023 22:14 )             41.8     12-15    140  |  108  |  14  ----------------------------<  111<H>  4.1   |  22  |  0.54             MEDICATION LEVELS:     IVF FLUIDS/MEDICATIONS:   MEDICATIONS  (STANDING):  chlorhexidine 4% Liquid 1 Application(s) Topical daily  polyethylene glycol 3350 17 Gram(s) Oral two times a day  senna 1 Tablet(s) Oral at bedtime  sodium chloride 3% + sodium acetate 50:50 1000 milliLiter(s) (50 mL/Hr) IV Continuous <Continuous>    MEDICATIONS  (PRN):  acetaminophen     Tablet .. 650 milliGRAM(s) Oral every 6 hours PRN Temp greater or equal to 38C (100.4F), Mild Pain (1 - 3)  famotidine    Tablet 20 milliGRAM(s) Oral every 12 hours PRN dyspepsia  traMADol 25 milliGRAM(s) Oral every 4 hours PRN Moderate Pain (4 - 6)  traMADol 50 milliGRAM(s) Oral every 4 hours PRN Severe Pain (7 - 10)        IMAGING:      EXAMINATION:  Neuro: Lethargic, EOV, PERRL, R gaze deviation can come to midline, left side hemineglect, Ox3, FC, R side 5/5. L side AG  CV: +s1s2, rrr, no peripheral edema or clubbing  Resp: CTA BL in all fields, no wheezes or crackles  Abd; soft ntnd x4  : no CVA tenderness  Integ: no rashes, lesions, or bruising   HOSPITAL COURSE: Patient MOISÉS MAYO is a 73y (1950) RIGHT handed woman who presented to [facility] ED on 12/14/2023, as a transfer from [facility], as a CODE STROKE, with c/o R IPH.  Denies PMH.  CTH and CTA repeated - large R IPH (temporal/parietal). Presented to [place] today with c/o HA and AMS. Friend accompanying patient said that when visiting patient today - patient was not acting like herself and c/o HA.    NIHSS 9       Admission Scores  GCS:   HH:   MF:   NIHSS:   RASS:    CAM-ICU:   ICH:    24 hour Events:       12/15: rCTH: unchanged pending official read. No neurological exam as of now. Pending MRI    Allergies    Allergy Status Unknown    Intolerances        REVIEW OF SYSTEMS: [ ] Unable to Assess due to neurologic exam   [ ] All ROS addressed below are non-contributory, except:  Neuro: [ ] Headache [ ] Back pain [ ] Numbness [ ] Weakness [ ] Ataxia [ ] Dizziness [ ] Aphasia [ ] Dysarthria [ ] Visual disturbance  Resp: [ ] Shortness of breath/dyspnea, [ ] Orthopnea [ ] Cough  CV: [ ] Chest pain [ ] Palpitation [ ] Lightheadedness [ ] Syncope  Renal: [ ] Thirst [ ] Edema  GI: [ ] Nausea [ ] Emesis [ ] Abdominal pain [ ] Constipation [ ] Diarrhea  Hem: [ ] Hematemesis [ ] bright red blood per rectum  ID: [ ] Fever [ ] Chills [ ] Dysuria  ENT: [ ] Rhinorrhea      DEVICES:   [ ] Restraints [ ] ET tube [ ] central line [ ] arterial line [ ] kovacs [ ] NGT/OGT [ ] EVD [ ] LD [ ] TREVIN/HMV [ ] Trach [ ] PEG [ ] Chest Tube     VITALS:   Vital Signs Last 24 Hrs  T(C): 36.7 (15 Dec 2023 03:00), Max: 37 (14 Dec 2023 16:45)  T(F): 98 (15 Dec 2023 03:00), Max: 98.6 (14 Dec 2023 16:45)  HR: 89 (15 Dec 2023 06:00) (68 - 99)  BP: 106/63 (15 Dec 2023 06:00) (101/63 - 138/71)  BP(mean): 77 (15 Dec 2023 06:00) (76 - 99)  RR: 18 (15 Dec 2023 06:00) (15 - 21)  SpO2: 99% (15 Dec 2023 06:00) (95% - 100%)    Parameters below as of 14 Dec 2023 21:00  Patient On (Oxygen Delivery Method): room air      CAPILLARY BLOOD GLUCOSE  104 (14 Dec 2023 17:16)      POCT Blood Glucose.: 104 mg/dL (14 Dec 2023 16:30)    I&O's Summary    14 Dec 2023 07:01  -  15 Dec 2023 06:23  --------------------------------------------------------  IN: 680 mL / OUT: 500 mL / NET: 180 mL        Respiratory:        LABS:                        15.0   8.59  )-----------( 199      ( 14 Dec 2023 22:14 )             41.8     12-15    140  |  108  |  14  ----------------------------<  111<H>  4.1   |  22  |  0.54             MEDICATION LEVELS:     IVF FLUIDS/MEDICATIONS:   MEDICATIONS  (STANDING):  chlorhexidine 4% Liquid 1 Application(s) Topical daily  polyethylene glycol 3350 17 Gram(s) Oral two times a day  senna 1 Tablet(s) Oral at bedtime  sodium chloride 3% + sodium acetate 50:50 1000 milliLiter(s) (50 mL/Hr) IV Continuous <Continuous>    MEDICATIONS  (PRN):  acetaminophen     Tablet .. 650 milliGRAM(s) Oral every 6 hours PRN Temp greater or equal to 38C (100.4F), Mild Pain (1 - 3)  famotidine    Tablet 20 milliGRAM(s) Oral every 12 hours PRN dyspepsia  traMADol 25 milliGRAM(s) Oral every 4 hours PRN Moderate Pain (4 - 6)  traMADol 50 milliGRAM(s) Oral every 4 hours PRN Severe Pain (7 - 10)        IMAGING:      EXAMINATION:  Neuro: Lethargic, EOV, PERRL, R gaze deviation can come to midline, left side hemineglect, Ox3, FC, R side 5/5. L side AG  CV: +s1s2, rrr, no peripheral edema or clubbing  Resp: CTA BL in all fields, no wheezes or crackles  Abd; soft ntnd x4  : no CVA tenderness  Integ: no rashes, lesions, or bruising

## 2023-12-15 NOTE — SPEECH LANGUAGE PATHOLOGY EVALUATION - H & P REVIEW
Patient MOISÉS MAYO is a 73y (1950) RIGHT handed woman who presented to [facility] ED on 12/14/2023, as a transfer from [facility], as a CODE STROKE, with c/o R IPH.  Denies PMH.  CTH and CTA repeated - large R IPH (temporal/parietal). Presented to [place] today with c/o HA and AMS. Friend accompanying patient said that when visiting patient today - patient was not acting like herself and c/o HA. NIHSS 9/yes Patient MOISÉS MAYO is a 73y (1950) RIGHT handed woman who presented to [facility] ED on 12/14/2023, as a transfer from [facility], as a CODE STROKE, with c/o R IPH.  Denies PMH.  CTH and CTA repeated - large R IPH (temporal/parietal). Presented to [place] today with c/o HA and AMS. Friend accompanying patient said that when visiting patient today - patient was not acting like herself and c/o HA. NIHSS 9/yes

## 2023-12-15 NOTE — OCCUPATIONAL THERAPY INITIAL EVALUATION ADULT - BALANCE TRAINING, PT EVAL
pt will improve sitting/standing balance by 1/2 grade in 4 weeks Pt will improve static standing balance by 1/2 grade to improve functional performance with ADLs within 4 weeks.

## 2023-12-15 NOTE — PHYSICAL THERAPY INITIAL EVALUATION ADULT - ADDITIONAL COMMENTS
Pt lives in an elevator access apt, independent w/ all ADLs and functional mobility at baseline as per friend at bedside.

## 2023-12-15 NOTE — OCCUPATIONAL THERAPY INITIAL EVALUATION ADULT - GENERAL OBSERVATIONS, REHAB EVAL
pt received semisupine in bed+ICU monitoring, IV, EEG pt received semisupine in bed +ICU monitoring, IV. Pt received semisupine in bed +ICU monitoring, IV.

## 2023-12-15 NOTE — PROGRESS NOTE ADULT - ASSESSMENT
ASSESSMENT/PLAN:    Neuro  -CTH with large R IPH (temporal/parietal)  -Interval CT with possible worsening heme, f/u official read  -q1 hour neuro checks  -pain management with tylenol/oxy  -ARU/PRU both therapeutic possible 2/2 OTC supplements  -DDAVP 20mcg  -CTH AM  -f/u stroke core measures    Resp  -on RA  -encourage IS    CV  --140  -f/u TTE    GI  -NPO  -LBM PTA      -2% @ 50cc/hr Na goal 140-150  -BMP q6 hours for at least 24 hours  -I&O strict  -monitor Cr  -replete lytes PRN    ENDO  -fu a1c  -maintain eugylcemia 120-180    HEME  -monitor CBC  -f/u va dupplex BL LE  -defer chemical vte ppx at this time    ID  -afebrile    Patient to be maintained in ICU given risk for decompensation 2/2 acute IPH       ASSESSMENT/PLAN:    Neuro  -CTH with large R IPH (temporal/parietal)  -Interval CT with possible worsening heme  - rCTH as of 12/15: unchanged, pending official read  -q1 hour neuro checks  -pain management with tylenol/oxy  -f/u stroke core measures  - stroke team to be consulted    Resp  -on RA  -encourage IS    CV  --150  -f/u TTE    GI  -NPO  -LBM PTA      -3% @ 50cc/hr Na goal 140-145 for 12/15  -BMP q6 hours for at least 24 hours  -I&O strict  -monitor Cr  -replete lytes PRN    ENDO  -fu a1c  -maintain eugylcemia 120-180    HEME  -monitor CBC  -f/u va dupplex BL LE  -defer chemical vte ppx at this time    ID  -afebrile

## 2023-12-15 NOTE — OCCUPATIONAL THERAPY INITIAL EVALUATION ADULT - ADDITIONAL COMMENTS
as per family, pt lives alone in apt, elevator entrance. Prior to admission Ind with ADLs/ambulating, no AD/DME.

## 2023-12-15 NOTE — CHART NOTE - NSCHARTNOTEFT_GEN_A_CORE
EEG preliminary read (not final) on the initial recording hour(s) = x 2     No seizures recorded.  Right hemispheric focal slowing.  Moderate slowing noted, nonspecific.    Final report to follow tomorrow morning after completion of study.    [facility] EEG Reading Room Ph#: [phone]  Epilepsy Answering Service after 5PM and before 8:30AM: Ph#: [phone] EEG preliminary read (not final) on the initial recording hour(s) = x 2     No seizures recorded.  Right hemispheric focal slowing.  Moderate slowing noted, nonspecific.    Final report to follow tomorrow morning after completion of study.    [facility] EEG Reading Room Ph#: [phone]  Epilepsy Answering Service after 5PM and before 8:30AM: Ph#: [phone]

## 2023-12-15 NOTE — SPEECH LANGUAGE PATHOLOGY EVALUATION - COMMENTS
12/15: CTH: 1.  Grossly stable intraparenchymal hemorrhage centered in the right parietal/temporal lobes.  2.  Midline shift to the left of approximately 0.5 cm.    Pt unknown to this service 12/15: CTH: 1.  Grossly stable intraparenchymal hemorrhage centered in the right parietal/temporal lobes.  2.  Midline shift to the left of approximately 0.5 cm.  Passed dysphagia screen 12/14 @1645  Pt unknown to this service

## 2023-12-15 NOTE — CHART NOTE - NSCHARTNOTEFT_GEN_A_CORE
***Vascular Neurology Follow-up Chart Note***    Patient seen today 12/15/23. Please also refer to attending attestation on initial consult note dated 12/14/23    Exam grossly stable:  MS: A&A, oriented to self. Naming repetition and ability to follow commands intact. Patient not answering on DSS testing for neglect.  CN: PERRL, EOMI, reduced but present BTT in H  Motor: Left arm drift. Left leg and Right extremities fall after some time seemingly with sudden cessation of effort.  and Elbow flexion 5 on right, 4 on left.  Sensory: Patient not commenting on LT. Responds to noxious x 4 extremities  Coordination: Limited by cooperation and LUE weakness. No marked dysmetria in spontaneous movements.    Impression: Left Hemiparesis (mild), left sided neglect, Left homonomous hemianopsia with right gaze preference due to large Right temporal-parietal Intraparenchymal hemorrhage status post desmopressin     Plan:  [x] Vessel imaging  [x] TTE             [x] Smoking cessation education not needed - nonsmoker   [] Atorvastatin 80 [x] not on high intensity statin due to bleed  [] ANTITHROMBOTIC THERAPY: Holding in setting of bleed  [] MRI Head with and without contrast ordered  [] A1C, LDL - ordered, specimen in lab  [] Physical therapy/OT/Speech Language ordered  [] Telemetry monitoring  [] Dysphagia screen / S&S if failed  [] Diet: NPO for now - target is DASH/TLC  [] Fall and aspiration precautions  [] PT/OT/SLP/CM  [] SBP goal per NSCU   [] Neurochecks, per NSCU  [] DVT prophylaxis: SCDs, chemical dvt pxx per NSCU pending interval scans and clinical course     SBP goal: per nscu  Other:     CORE MEASURES:         Admission NIHSS: 9     ICH score: 1     [place] [address] Score:  N/A     Tenecteplase: [] YES [x] NO     Statin Therapy: [] YES [x] NO     Smoking [] YES [x] NO     Afib [] YES [x] NO     Stroke Education [] YES [] NO    Dysphagia screen : [] Passed [] Failed- S&S pending [x] Pending  (Ensure medications are ordered via appropriate route)    DVT ppx: Venodynes [] Heparin s.c [] LMWH [x] Not on chemical DVT ppx due to: Bleed

## 2023-12-15 NOTE — OCCUPATIONAL THERAPY INITIAL EVALUATION ADULT - TRANSFER TRAINING, PT EVAL
pt will perform functional transfers with CGA in 4 weeks using least restrictive assistive device Pt will complete functional bed-chair transfer with mod A and appropriate AD within 4 weeks.

## 2023-12-16 LAB
ANION GAP SERPL CALC-SCNC: 13 MMOL/L — SIGNIFICANT CHANGE UP (ref 5–17)
ANION GAP SERPL CALC-SCNC: 13 MMOL/L — SIGNIFICANT CHANGE UP (ref 5–17)
ANION GAP SERPL CALC-SCNC: 7 MMOL/L — SIGNIFICANT CHANGE UP (ref 5–17)
ANION GAP SERPL CALC-SCNC: 7 MMOL/L — SIGNIFICANT CHANGE UP (ref 5–17)
ANION GAP SERPL CALC-SCNC: 9 MMOL/L — SIGNIFICANT CHANGE UP (ref 5–17)
ANION GAP SERPL CALC-SCNC: 9 MMOL/L — SIGNIFICANT CHANGE UP (ref 5–17)
APTT BLD: 27.5 SEC — SIGNIFICANT CHANGE UP (ref 24.5–35.6)
APTT BLD: 27.5 SEC — SIGNIFICANT CHANGE UP (ref 24.5–35.6)
BASOPHILS # BLD AUTO: 0.01 K/UL — SIGNIFICANT CHANGE UP (ref 0–0.2)
BASOPHILS # BLD AUTO: 0.01 K/UL — SIGNIFICANT CHANGE UP (ref 0–0.2)
BASOPHILS NFR BLD AUTO: 0.1 % — SIGNIFICANT CHANGE UP (ref 0–2)
BASOPHILS NFR BLD AUTO: 0.1 % — SIGNIFICANT CHANGE UP (ref 0–2)
BUN SERPL-MCNC: 13 MG/DL — SIGNIFICANT CHANGE UP (ref 7–23)
BUN SERPL-MCNC: 14 MG/DL — SIGNIFICANT CHANGE UP (ref 7–23)
BUN SERPL-MCNC: 14 MG/DL — SIGNIFICANT CHANGE UP (ref 7–23)
CALCIUM SERPL-MCNC: 8.1 MG/DL — LOW (ref 8.4–10.5)
CALCIUM SERPL-MCNC: 8.1 MG/DL — LOW (ref 8.4–10.5)
CALCIUM SERPL-MCNC: 8.2 MG/DL — LOW (ref 8.4–10.5)
CALCIUM SERPL-MCNC: 8.2 MG/DL — LOW (ref 8.4–10.5)
CALCIUM SERPL-MCNC: 8.4 MG/DL — SIGNIFICANT CHANGE UP (ref 8.4–10.5)
CALCIUM SERPL-MCNC: 8.4 MG/DL — SIGNIFICANT CHANGE UP (ref 8.4–10.5)
CHLORIDE SERPL-SCNC: 112 MMOL/L — HIGH (ref 96–108)
CHLORIDE SERPL-SCNC: 112 MMOL/L — HIGH (ref 96–108)
CHLORIDE SERPL-SCNC: 113 MMOL/L — HIGH (ref 96–108)
CHLORIDE SERPL-SCNC: 113 MMOL/L — HIGH (ref 96–108)
CHLORIDE SERPL-SCNC: 114 MMOL/L — HIGH (ref 96–108)
CHLORIDE SERPL-SCNC: 114 MMOL/L — HIGH (ref 96–108)
CO2 SERPL-SCNC: 20 MMOL/L — LOW (ref 22–31)
CO2 SERPL-SCNC: 20 MMOL/L — LOW (ref 22–31)
CO2 SERPL-SCNC: 21 MMOL/L — LOW (ref 22–31)
CO2 SERPL-SCNC: 21 MMOL/L — LOW (ref 22–31)
CO2 SERPL-SCNC: 26 MMOL/L — SIGNIFICANT CHANGE UP (ref 22–31)
CO2 SERPL-SCNC: 26 MMOL/L — SIGNIFICANT CHANGE UP (ref 22–31)
CREAT SERPL-MCNC: 0.46 MG/DL — LOW (ref 0.5–1.3)
CREAT SERPL-MCNC: 0.46 MG/DL — LOW (ref 0.5–1.3)
CREAT SERPL-MCNC: 0.47 MG/DL — LOW (ref 0.5–1.3)
CREAT SERPL-MCNC: 0.47 MG/DL — LOW (ref 0.5–1.3)
CREAT SERPL-MCNC: 0.51 MG/DL — SIGNIFICANT CHANGE UP (ref 0.5–1.3)
CREAT SERPL-MCNC: 0.51 MG/DL — SIGNIFICANT CHANGE UP (ref 0.5–1.3)
EGFR: 100 ML/MIN/1.73M2 — SIGNIFICANT CHANGE UP
EGFR: 100 ML/MIN/1.73M2 — SIGNIFICANT CHANGE UP
EGFR: 101 ML/MIN/1.73M2 — SIGNIFICANT CHANGE UP
EGFR: 101 ML/MIN/1.73M2 — SIGNIFICANT CHANGE UP
EGFR: 98 ML/MIN/1.73M2 — SIGNIFICANT CHANGE UP
EGFR: 98 ML/MIN/1.73M2 — SIGNIFICANT CHANGE UP
EOSINOPHIL # BLD AUTO: 0.01 K/UL — SIGNIFICANT CHANGE UP (ref 0–0.5)
EOSINOPHIL # BLD AUTO: 0.01 K/UL — SIGNIFICANT CHANGE UP (ref 0–0.5)
EOSINOPHIL NFR BLD AUTO: 0.1 % — SIGNIFICANT CHANGE UP (ref 0–6)
EOSINOPHIL NFR BLD AUTO: 0.1 % — SIGNIFICANT CHANGE UP (ref 0–6)
GLUCOSE SERPL-MCNC: 121 MG/DL — HIGH (ref 70–99)
GLUCOSE SERPL-MCNC: 121 MG/DL — HIGH (ref 70–99)
GLUCOSE SERPL-MCNC: 188 MG/DL — HIGH (ref 70–99)
GLUCOSE SERPL-MCNC: 188 MG/DL — HIGH (ref 70–99)
GLUCOSE SERPL-MCNC: 99 MG/DL — SIGNIFICANT CHANGE UP (ref 70–99)
GLUCOSE SERPL-MCNC: 99 MG/DL — SIGNIFICANT CHANGE UP (ref 70–99)
HCT VFR BLD CALC: 35.4 % — SIGNIFICANT CHANGE UP (ref 34.5–45)
HCT VFR BLD CALC: 35.4 % — SIGNIFICANT CHANGE UP (ref 34.5–45)
HCT VFR BLD CALC: 37.2 % — SIGNIFICANT CHANGE UP (ref 34.5–45)
HCT VFR BLD CALC: 37.2 % — SIGNIFICANT CHANGE UP (ref 34.5–45)
HCV AB S/CO SERPL IA: 0.07 S/CO — SIGNIFICANT CHANGE UP (ref 0–0.99)
HCV AB S/CO SERPL IA: 0.07 S/CO — SIGNIFICANT CHANGE UP (ref 0–0.99)
HCV AB SERPL-IMP: SIGNIFICANT CHANGE UP
HCV AB SERPL-IMP: SIGNIFICANT CHANGE UP
HGB BLD-MCNC: 12.1 G/DL — SIGNIFICANT CHANGE UP (ref 11.5–15.5)
HGB BLD-MCNC: 12.1 G/DL — SIGNIFICANT CHANGE UP (ref 11.5–15.5)
HGB BLD-MCNC: 12.9 G/DL — SIGNIFICANT CHANGE UP (ref 11.5–15.5)
HGB BLD-MCNC: 12.9 G/DL — SIGNIFICANT CHANGE UP (ref 11.5–15.5)
IMM GRANULOCYTES NFR BLD AUTO: 0.9 % — SIGNIFICANT CHANGE UP (ref 0–0.9)
IMM GRANULOCYTES NFR BLD AUTO: 0.9 % — SIGNIFICANT CHANGE UP (ref 0–0.9)
INR BLD: 1.2 RATIO — HIGH (ref 0.85–1.18)
INR BLD: 1.2 RATIO — HIGH (ref 0.85–1.18)
LYMPHOCYTES # BLD AUTO: 0.9 K/UL — LOW (ref 1–3.3)
LYMPHOCYTES # BLD AUTO: 0.9 K/UL — LOW (ref 1–3.3)
LYMPHOCYTES # BLD AUTO: 11.6 % — LOW (ref 13–44)
LYMPHOCYTES # BLD AUTO: 11.6 % — LOW (ref 13–44)
MAGNESIUM SERPL-MCNC: 2.3 MG/DL — SIGNIFICANT CHANGE UP (ref 1.6–2.6)
MAGNESIUM SERPL-MCNC: 2.3 MG/DL — SIGNIFICANT CHANGE UP (ref 1.6–2.6)
MCHC RBC-ENTMCNC: 30.6 PG — SIGNIFICANT CHANGE UP (ref 27–34)
MCHC RBC-ENTMCNC: 30.6 PG — SIGNIFICANT CHANGE UP (ref 27–34)
MCHC RBC-ENTMCNC: 31.2 PG — SIGNIFICANT CHANGE UP (ref 27–34)
MCHC RBC-ENTMCNC: 31.2 PG — SIGNIFICANT CHANGE UP (ref 27–34)
MCHC RBC-ENTMCNC: 34.2 GM/DL — SIGNIFICANT CHANGE UP (ref 32–36)
MCHC RBC-ENTMCNC: 34.2 GM/DL — SIGNIFICANT CHANGE UP (ref 32–36)
MCHC RBC-ENTMCNC: 34.7 GM/DL — SIGNIFICANT CHANGE UP (ref 32–36)
MCHC RBC-ENTMCNC: 34.7 GM/DL — SIGNIFICANT CHANGE UP (ref 32–36)
MCV RBC AUTO: 89.6 FL — SIGNIFICANT CHANGE UP (ref 80–100)
MCV RBC AUTO: 89.6 FL — SIGNIFICANT CHANGE UP (ref 80–100)
MCV RBC AUTO: 90.1 FL — SIGNIFICANT CHANGE UP (ref 80–100)
MCV RBC AUTO: 90.1 FL — SIGNIFICANT CHANGE UP (ref 80–100)
MONOCYTES # BLD AUTO: 0.57 K/UL — SIGNIFICANT CHANGE UP (ref 0–0.9)
MONOCYTES # BLD AUTO: 0.57 K/UL — SIGNIFICANT CHANGE UP (ref 0–0.9)
MONOCYTES NFR BLD AUTO: 7.3 % — SIGNIFICANT CHANGE UP (ref 2–14)
MONOCYTES NFR BLD AUTO: 7.3 % — SIGNIFICANT CHANGE UP (ref 2–14)
NEUTROPHILS # BLD AUTO: 6.2 K/UL — SIGNIFICANT CHANGE UP (ref 1.8–7.4)
NEUTROPHILS # BLD AUTO: 6.2 K/UL — SIGNIFICANT CHANGE UP (ref 1.8–7.4)
NEUTROPHILS NFR BLD AUTO: 80 % — HIGH (ref 43–77)
NEUTROPHILS NFR BLD AUTO: 80 % — HIGH (ref 43–77)
NRBC # BLD: 0 /100 WBCS — SIGNIFICANT CHANGE UP (ref 0–0)
PA ADP PRP-ACNC: 219 PRU — SIGNIFICANT CHANGE UP (ref 194–417)
PA ADP PRP-ACNC: 219 PRU — SIGNIFICANT CHANGE UP (ref 194–417)
PA ADP PRP: 631 % — HIGH
PA ADP PRP: 631 % — HIGH
PHOSPHATE SERPL-MCNC: 2.2 MG/DL — LOW (ref 2.5–4.5)
PHOSPHATE SERPL-MCNC: 2.2 MG/DL — LOW (ref 2.5–4.5)
PLATELET # BLD AUTO: 142 K/UL — LOW (ref 150–400)
PLATELET # BLD AUTO: 142 K/UL — LOW (ref 150–400)
PLATELET # BLD AUTO: 176 K/UL — SIGNIFICANT CHANGE UP (ref 150–400)
PLATELET # BLD AUTO: 176 K/UL — SIGNIFICANT CHANGE UP (ref 150–400)
PLATELET RESPONSE ASPIRIN RESULT: 631 ARU — SIGNIFICANT CHANGE UP
PLATELET RESPONSE ASPIRIN RESULT: 631 ARU — SIGNIFICANT CHANGE UP
POTASSIUM SERPL-MCNC: 3.9 MMOL/L — SIGNIFICANT CHANGE UP (ref 3.5–5.3)
POTASSIUM SERPL-MCNC: 3.9 MMOL/L — SIGNIFICANT CHANGE UP (ref 3.5–5.3)
POTASSIUM SERPL-MCNC: 4 MMOL/L — SIGNIFICANT CHANGE UP (ref 3.5–5.3)
POTASSIUM SERPL-MCNC: 4 MMOL/L — SIGNIFICANT CHANGE UP (ref 3.5–5.3)
POTASSIUM SERPL-MCNC: 4.1 MMOL/L — SIGNIFICANT CHANGE UP (ref 3.5–5.3)
POTASSIUM SERPL-MCNC: 4.1 MMOL/L — SIGNIFICANT CHANGE UP (ref 3.5–5.3)
POTASSIUM SERPL-SCNC: 3.9 MMOL/L — SIGNIFICANT CHANGE UP (ref 3.5–5.3)
POTASSIUM SERPL-SCNC: 3.9 MMOL/L — SIGNIFICANT CHANGE UP (ref 3.5–5.3)
POTASSIUM SERPL-SCNC: 4 MMOL/L — SIGNIFICANT CHANGE UP (ref 3.5–5.3)
POTASSIUM SERPL-SCNC: 4 MMOL/L — SIGNIFICANT CHANGE UP (ref 3.5–5.3)
POTASSIUM SERPL-SCNC: 4.1 MMOL/L — SIGNIFICANT CHANGE UP (ref 3.5–5.3)
POTASSIUM SERPL-SCNC: 4.1 MMOL/L — SIGNIFICANT CHANGE UP (ref 3.5–5.3)
PROTHROM AB SERPL-ACNC: 12.5 SEC — SIGNIFICANT CHANGE UP (ref 9.5–13)
PROTHROM AB SERPL-ACNC: 12.5 SEC — SIGNIFICANT CHANGE UP (ref 9.5–13)
RBC # BLD: 3.95 M/UL — SIGNIFICANT CHANGE UP (ref 3.8–5.2)
RBC # BLD: 3.95 M/UL — SIGNIFICANT CHANGE UP (ref 3.8–5.2)
RBC # BLD: 4.13 M/UL — SIGNIFICANT CHANGE UP (ref 3.8–5.2)
RBC # BLD: 4.13 M/UL — SIGNIFICANT CHANGE UP (ref 3.8–5.2)
RBC # FLD: 12.6 % — SIGNIFICANT CHANGE UP (ref 10.3–14.5)
RBC # FLD: 12.6 % — SIGNIFICANT CHANGE UP (ref 10.3–14.5)
RBC # FLD: 12.8 % — SIGNIFICANT CHANGE UP (ref 10.3–14.5)
RBC # FLD: 12.8 % — SIGNIFICANT CHANGE UP (ref 10.3–14.5)
SODIUM SERPL-SCNC: 143 MMOL/L — SIGNIFICANT CHANGE UP (ref 135–145)
SODIUM SERPL-SCNC: 143 MMOL/L — SIGNIFICANT CHANGE UP (ref 135–145)
SODIUM SERPL-SCNC: 145 MMOL/L — SIGNIFICANT CHANGE UP (ref 135–145)
SODIUM SERPL-SCNC: 145 MMOL/L — SIGNIFICANT CHANGE UP (ref 135–145)
SODIUM SERPL-SCNC: 147 MMOL/L — HIGH (ref 135–145)
SODIUM SERPL-SCNC: 147 MMOL/L — HIGH (ref 135–145)
WBC # BLD: 7.16 K/UL — SIGNIFICANT CHANGE UP (ref 3.8–10.5)
WBC # BLD: 7.16 K/UL — SIGNIFICANT CHANGE UP (ref 3.8–10.5)
WBC # BLD: 7.76 K/UL — SIGNIFICANT CHANGE UP (ref 3.8–10.5)
WBC # BLD: 7.76 K/UL — SIGNIFICANT CHANGE UP (ref 3.8–10.5)
WBC # FLD AUTO: 7.16 K/UL — SIGNIFICANT CHANGE UP (ref 3.8–10.5)
WBC # FLD AUTO: 7.16 K/UL — SIGNIFICANT CHANGE UP (ref 3.8–10.5)
WBC # FLD AUTO: 7.76 K/UL — SIGNIFICANT CHANGE UP (ref 3.8–10.5)
WBC # FLD AUTO: 7.76 K/UL — SIGNIFICANT CHANGE UP (ref 3.8–10.5)

## 2023-12-16 PROCEDURE — 99233 SBSQ HOSP IP/OBS HIGH 50: CPT

## 2023-12-16 PROCEDURE — 70450 CT HEAD/BRAIN W/O DYE: CPT | Mod: 26,76

## 2023-12-16 RX ORDER — ONDANSETRON 8 MG/1
4 TABLET, FILM COATED ORAL ONCE
Refills: 0 | Status: COMPLETED | OUTPATIENT
Start: 2023-12-16 | End: 2023-12-16

## 2023-12-16 RX ORDER — SODIUM CHLORIDE 5 G/100ML
1000 INJECTION, SOLUTION INTRAVENOUS
Refills: 0 | Status: DISCONTINUED | OUTPATIENT
Start: 2023-12-16 | End: 2023-12-17

## 2023-12-16 RX ORDER — POTASSIUM PHOSPHATE, MONOBASIC POTASSIUM PHOSPHATE, DIBASIC 236; 224 MG/ML; MG/ML
15 INJECTION, SOLUTION INTRAVENOUS ONCE
Refills: 0 | Status: COMPLETED | OUTPATIENT
Start: 2023-12-16 | End: 2023-12-16

## 2023-12-16 RX ORDER — ACETAMINOPHEN 500 MG
1000 TABLET ORAL ONCE
Refills: 0 | Status: COMPLETED | OUTPATIENT
Start: 2023-12-16 | End: 2023-12-16

## 2023-12-16 RX ORDER — POTASSIUM CHLORIDE 20 MEQ
10 PACKET (EA) ORAL
Refills: 0 | Status: COMPLETED | OUTPATIENT
Start: 2023-12-16 | End: 2023-12-16

## 2023-12-16 RX ORDER — SODIUM CHLORIDE 9 MG/ML
3 INJECTION INTRAMUSCULAR; INTRAVENOUS; SUBCUTANEOUS EVERY 6 HOURS
Refills: 0 | Status: DISCONTINUED | OUTPATIENT
Start: 2023-12-16 | End: 2023-12-16

## 2023-12-16 RX ADMIN — SODIUM CHLORIDE 50 MILLILITER(S): 5 INJECTION, SOLUTION INTRAVENOUS at 19:21

## 2023-12-16 RX ADMIN — POLYETHYLENE GLYCOL 3350 17 GRAM(S): 17 POWDER, FOR SOLUTION ORAL at 05:03

## 2023-12-16 RX ADMIN — Medication 1000 MILLIGRAM(S): at 15:15

## 2023-12-16 RX ADMIN — SODIUM CHLORIDE 30 MILLILITER(S): 5 INJECTION, SOLUTION INTRAVENOUS at 19:21

## 2023-12-16 RX ADMIN — Medication 100 MILLIEQUIVALENT(S): at 03:18

## 2023-12-16 RX ADMIN — Medication 400 MILLIGRAM(S): at 15:03

## 2023-12-16 RX ADMIN — Medication 1000 MILLIGRAM(S): at 21:45

## 2023-12-16 RX ADMIN — ONDANSETRON 4 MILLIGRAM(S): 8 TABLET, FILM COATED ORAL at 12:32

## 2023-12-16 RX ADMIN — SODIUM CHLORIDE 30 MILLILITER(S): 5 INJECTION, SOLUTION INTRAVENOUS at 05:22

## 2023-12-16 RX ADMIN — LEVETIRACETAM 500 MILLIGRAM(S): 250 TABLET, FILM COATED ORAL at 05:04

## 2023-12-16 RX ADMIN — ONDANSETRON 4 MILLIGRAM(S): 8 TABLET, FILM COATED ORAL at 02:44

## 2023-12-16 RX ADMIN — SODIUM CHLORIDE 3 GRAM(S): 9 INJECTION INTRAMUSCULAR; INTRAVENOUS; SUBCUTANEOUS at 17:06

## 2023-12-16 RX ADMIN — POTASSIUM PHOSPHATE, MONOBASIC POTASSIUM PHOSPHATE, DIBASIC 62.5 MILLIMOLE(S): 236; 224 INJECTION, SOLUTION INTRAVENOUS at 05:03

## 2023-12-16 RX ADMIN — Medication 100 MILLIEQUIVALENT(S): at 02:06

## 2023-12-16 RX ADMIN — TRAMADOL HYDROCHLORIDE 25 MILLIGRAM(S): 50 TABLET ORAL at 02:32

## 2023-12-16 RX ADMIN — TRAMADOL HYDROCHLORIDE 25 MILLIGRAM(S): 50 TABLET ORAL at 03:32

## 2023-12-16 RX ADMIN — CHLORHEXIDINE GLUCONATE 1 APPLICATION(S): 213 SOLUTION TOPICAL at 21:15

## 2023-12-16 RX ADMIN — Medication 100 MILLIEQUIVALENT(S): at 04:22

## 2023-12-16 RX ADMIN — Medication 1 DROP(S): at 15:04

## 2023-12-16 RX ADMIN — POLYETHYLENE GLYCOL 3350 17 GRAM(S): 17 POWDER, FOR SOLUTION ORAL at 17:06

## 2023-12-16 RX ADMIN — Medication 400 MILLIGRAM(S): at 21:15

## 2023-12-16 NOTE — PROVIDER CONTACT NOTE (CHANGE IN STATUS NOTIFICATION) - RECOMMENDATIONS
August 12, 2020      Debbie Alvarez  [address] [place] [address] [address] [place] [address]        Dear ,    We are writing to inform you of your test results.    Your test results fall within the expected range. No concerns.    Resulted Orders   Glucose whole blood   Result Value Ref Range    Glucose Whole Blood 99 70 - 99 mg/dL   Lipid panel reflex to direct LDL Fasting   Result Value Ref Range    Cholesterol 172 <200 mg/dL    Triglycerides 75 <150 mg/dL    HDL Cholesterol 59 >49 mg/dL    LDL Cholesterol Calculated 98 <100 mg/dL      Comment:      Desirable:       <100 mg/dl    Non HDL Cholesterol 113 <130 mg/dL       If you have any questions or concerns, please call the clinic at the number listed above.       Sincerely,        Dr. Ruvalcaba                
MD Diamond Ridley and BLAKE Sood assessing pt at bedside. CT of the head to be ordered.

## 2023-12-16 NOTE — PROGRESS NOTE ADULT - ASSESSMENT
Patient MOISÉS MAYO is a 73y (1950) RIGHT handed woman, w/o PMH, who presented to [facility] ED on 12/14/2023, as a transfer from [facility], found to have large R IPH (temporal/parieta).    ICH score1  CTA - negative for definitive abnormal vasculature   NIHSS 9  (L-neglect, LHH, L NLF, LLUE/LLE drift, R gaze)  ED  RA.     large R IPH (temporal/parieta).    12/15: rCTH: unchanged   12/15 O/N: Patient lethargic but opens eyes and follow commands. No events.   12/16: rCTH showing interval mild increased IPH    # Large R IPH (temporal/parieta).    -ICU for IPH progression monitor,   - MRI done- no evidence of mass, not much microbleed on SWI, all the hypointensity dots have continuous trajectory, c/w vasculature   - pain management with tylenol/oxy for HA  - EEG on - pending read  - Patient started on Keppra for LLE shaking  - SBP goal 100-150  - a1c 5.0   - TTE- EF 64%, no other otherwise   - now on PO diet [] pending PT OT when IPPH is more stable.   - On 3% @ 50cc/hr Na goal 140-145 for 12/16    - [] will re-evaluate again for transfer care, follow exam for acute CTH need [] rCTH on 12/17 am      Colby Caldera MD PHD  Vascular neurology fellow PGY5 Patient MOISÉS MAYO is a 73y (1950) RIGHT handed woman, w/o PMH, who presented to [facility] ED on 12/14/2023, as a transfer from [facility], found to have large R IPH (temporal/parieta).    ICH score1  CTA - negative for definitive abnormal vasculature   NIHSS 9  (L-neglect, LHH, L NLF, LLUE/LLE drift, R gaze)  ED  RA.     large R IPH (temporal/parieta).    12/15: rCTH: unchanged   12/15 O/N: Patient lethargic but opens eyes and follow commands. No events.   12/16: rCTH showing interval mild increased IPH    # Large R IPH (temporal/parieta).    -ICU for IPH progression monitor,   - MRI done- no evidence of mass, not much microbleed on SWI, all the hypointensity dots have continuous trajectory, c/w vasculature   - pain management with tylenol/oxy for HA  - EEG on - pending read  - Patient started on Keppra for LLE shaking  - SBP goal 100-150  - a1c 5.0   - TTE- EF 64%, no other otherwise   - now on PO diet [] pending PT OT when IPPH is more stable.   - On 3% @ 50cc/hr Na goal 140-145 for 12/16    - [] will re-evaluate again for transfer care, follow exam for acute CTH need [] rCTH on 12/17 am      Colby Caldera MD PHD  Vascular neurology fellow PGY5

## 2023-12-16 NOTE — PROGRESS NOTE ADULT - SUBJECTIVE AND OBJECTIVE BOX
HOSPITAL COURSE: Patient MOISÉS MAYO is a 73y (1950) RIGHT handed woman who presented to [facility] ED on 12/14/2023, as a transfer from [facility], as a CODE STROKE, with c/o R IPH.  Denies PMH.  CTH and CTA repeated - large R IPH (temporal/parietal). Presented to [place] today with c/o HA and AMS. Friend accompanying patient said that when visiting patient today - patient was not acting like herself and c/o HA.    NIHSS 9       24 hour Events:       12/15: rCTH: unchanged pending official read. No neurological exam as of now. Pending MR  12/15 O/N: Patient lethargic but opens eyes and follow commands. No events.     Allergies    Allergy Status Unknown    Intolerances    oxycodone (Nausea)      REVIEW OF SYSTEMS: [ ] Unable to Assess due to neurologic exam   [X] All ROS addressed below are non-contributory, except:  Neuro: [ ] Headache [ ] Back pain [ ] Numbness [ ] Weakness [ ] Ataxia [ ] Dizziness [ ] Aphasia [ ] Dysarthria [ ] Visual disturbance  Resp: [ ] Shortness of breath/dyspnea, [ ] Orthopnea [ ] Cough  CV: [ ] Chest pain [ ] Palpitation [ ] Lightheadedness [ ] Syncope  Renal: [ ] Thirst [ ] Edema  GI: [ ] Nausea [ ] Emesis [ ] Abdominal pain [ ] Constipation [ ] Diarrhea  Hem: [ ] Hematemesis [ ] bright red blood per rectum  ID: [ ] Fever [ ] Chills [ ] Dysuria  ENT: [ ] Rhinorrhea      DEVICES:   [ ] Restraints [ ] ET tube [ ] central line [ ] arterial line [ ] kovacs [ ] NGT/OGT [ ] EVD [ ] LD [ ] TREVIN/HMV [ ] Trach [ ] PEG [ ] Chest Tube     VITALS:   Vital Signs Last 24 Hrs  T(C): 37.1 (15 Dec 2023 19:00), Max: 37.1 (15 Dec 2023 19:00)  T(F): 98.7 (15 Dec 2023 19:00), Max: 98.7 (15 Dec 2023 19:00)  HR: 79 (15 Dec 2023 19:00) (65 - 99)  BP: 109/63 (15 Dec 2023 19:00) (101/63 - 138/71)  BP(mean): 78 (15 Dec 2023 19:00) (74 - 99)  RR: 23 (15 Dec 2023 19:00) (15 - 23)  SpO2: 96% (15 Dec 2023 19:00) (96% - 100%)    Parameters below as of 15 Dec 2023 19:00  Patient On (Oxygen Delivery Method): room air      CAPILLARY BLOOD GLUCOSE        I&O's Summary    14 Dec 2023 07:01  -  15 Dec 2023 07:00  --------------------------------------------------------  IN: 680 mL / OUT: 500 mL / NET: 180 mL    15 Dec 2023 07:01  -  15 Dec 2023 20:06  --------------------------------------------------------  IN: 712.5 mL / OUT: 500 mL / NET: 212.5 mL        Respiratory:        LABS:                        15.0   8.59  )-----------( 199      ( 14 Dec 2023 22:14 )             41.8     12-15    144  |  110<H>  |  14  ----------------------------<  98  3.7   |  23  |  0.53             MEDICATION LEVELS:     IVF FLUIDS/MEDICATIONS:   MEDICATIONS  (STANDING):  chlorhexidine 4% Liquid 1 Application(s) Topical daily  diclofenac 0.1% Ophthalmic Solution 1 Drop(s) Left EYE <User Schedule>  levETIRAcetam 500 milliGRAM(s) Oral two times a day  ondansetron Injectable 4 milliGRAM(s) IV Push once  polyethylene glycol 3350 17 Gram(s) Oral two times a day  potassium chloride    Tablet ER 20 milliEquivalent(s) Oral every 2 hours  senna 1 Tablet(s) Oral at bedtime  sodium chloride 3% + sodium acetate 50:50 1000 milliLiter(s) (50 mL/Hr) IV Continuous <Continuous>    MEDICATIONS  (PRN):  acetaminophen     Tablet .. 650 milliGRAM(s) Oral every 6 hours PRN Temp greater or equal to 38C (100.4F), Mild Pain (1 - 3)  famotidine    Tablet 20 milliGRAM(s) Oral every 12 hours PRN dyspepsia  traMADol 25 milliGRAM(s) Oral every 4 hours PRN Moderate Pain (4 - 6)  traMADol 50 milliGRAM(s) Oral every 4 hours PRN Severe Pain (7 - 10)        IMAGING:      EXAMINATION:  PHYSICAL EXAM:    Constitutional: No Acute Distress     Neurological: Arousable, oriented x3, following commands after a lot of prompting, Pupils bilaterally reactive and equal. Right gaze preference. Right side 5/5. LUE antigravity but neglecting. LLE withdraws to pain and is antigravity, however neglecting.    Pulmonary: Clear to Auscultation, No rales, No rhonchi, No wheezes     Cardiovascular: S1, S2, Regular rate and rhythm     Gastrointestinal: Soft, Non-tender, Non-distended     Extremities: No calf tenderness     Incision:    HOSPITAL COURSE: Patient MOISÉS MAYO is a 73y (1950) RIGHT handed woman who presented to [facility] ED on 12/14/2023, as a transfer from [facility], as a CODE STROKE, with c/o R IPH.  Denies PMH.  CTH and CTA repeated - large R IPH (temporal/parietal). Presented to [place] today with c/o HA and AMS. Friend accompanying patient said that when visiting patient today - patient was not acting like herself and c/o HA.    NIHSS 9       24 hour Events:       12/15: rCTH: unchanged pending official read. No neurological exam as of now. Pending MR  12/15 O/N: Patient lethargic but opens eyes and follow commands. No events.     Allergies    Allergy Status Unknown    Intolerances    oxycodone (Nausea)      REVIEW OF SYSTEMS: [ ] Unable to Assess due to neurologic exam   [X] All ROS addressed below are non-contributory, except:  Neuro: [ ] Headache [ ] Back pain [ ] Numbness [ ] Weakness [ ] Ataxia [ ] Dizziness [ ] Aphasia [ ] Dysarthria [ ] Visual disturbance  Resp: [ ] Shortness of breath/dyspnea, [ ] Orthopnea [ ] Cough  CV: [ ] Chest pain [ ] Palpitation [ ] Lightheadedness [ ] Syncope  Renal: [ ] Thirst [ ] Edema  GI: [ ] Nausea [ ] Emesis [ ] Abdominal pain [ ] Constipation [ ] Diarrhea  Hem: [ ] Hematemesis [ ] bright red blood per rectum  ID: [ ] Fever [ ] Chills [ ] Dysuria  ENT: [ ] Rhinorrhea      DEVICES:   [ ] Restraints [ ] ET tube [ ] central line [ ] arterial line [ ] kovacs [ ] NGT/OGT [ ] EVD [ ] LD [ ] TREVIN/HMV [ ] Trach [ ] PEG [ ] Chest Tube     VITALS:   Vital Signs Last 24 Hrs  T(C): 37.1 (15 Dec 2023 19:00), Max: 37.1 (15 Dec 2023 19:00)  T(F): 98.7 (15 Dec 2023 19:00), Max: 98.7 (15 Dec 2023 19:00)  HR: 79 (15 Dec 2023 19:00) (65 - 99)  BP: 109/63 (15 Dec 2023 19:00) (101/63 - 138/71)  BP(mean): 78 (15 Dec 2023 19:00) (74 - 99)  RR: 23 (15 Dec 2023 19:00) (15 - 23)  SpO2: 96% (15 Dec 2023 19:00) (96% - 100%)    Parameters below as of 15 Dec 2023 19:00  Patient On (Oxygen Delivery Method): room air      CAPILLARY BLOOD GLUCOSE        I&O's Summary    14 Dec 2023 07:01  -  15 Dec 2023 07:00  --------------------------------------------------------  IN: 680 mL / OUT: 500 mL / NET: 180 mL    15 Dec 2023 07:01  -  15 Dec 2023 20:06  --------------------------------------------------------  IN: 712.5 mL / OUT: 500 mL / NET: 212.5 mL        Respiratory:        LABS:                        15.0   8.59  )-----------( 199      ( 14 Dec 2023 22:14 )             41.8     12-15    144  |  110<H>  |  14  ----------------------------<  98  3.7   |  23  |  0.53             MEDICATION LEVELS:     IVF FLUIDS/MEDICATIONS:   MEDICATIONS  (STANDING):  chlorhexidine 4% Liquid 1 Application(s) Topical daily  diclofenac 0.1% Ophthalmic Solution 1 Drop(s) Left EYE <User Schedule>  levETIRAcetam 500 milliGRAM(s) Oral two times a day  ondansetron Injectable 4 milliGRAM(s) IV Push once  polyethylene glycol 3350 17 Gram(s) Oral two times a day  potassium chloride    Tablet ER 20 milliEquivalent(s) Oral every 2 hours  senna 1 Tablet(s) Oral at bedtime  sodium chloride 3% + sodium acetate 50:50 1000 milliLiter(s) (50 mL/Hr) IV Continuous <Continuous>    MEDICATIONS  (PRN):  acetaminophen     Tablet .. 650 milliGRAM(s) Oral every 6 hours PRN Temp greater or equal to 38C (100.4F), Mild Pain (1 - 3)  famotidine    Tablet 20 milliGRAM(s) Oral every 12 hours PRN dyspepsia  traMADol 25 milliGRAM(s) Oral every 4 hours PRN Moderate Pain (4 - 6)  traMADol 50 milliGRAM(s) Oral every 4 hours PRN Severe Pain (7 - 10)        IMAGING:      EXAMINATION:  PHYSICAL EXAM:    Constitutional: No Acute Distress     Neurological: Arousable, oriented x3, following commands after a lot of prompting, Pupils bilaterally reactive and equal. Right gaze preference. Right side 5/5. LUE antigravity but neglecting. LLE withdraws to pain and is antigravity, however neglecting.    Pulmonary: Clear to Auscultation, No rales, No rhonchi, No wheezes     Cardiovascular: S1, S2, Regular rate and rhythm     Gastrointestinal: Soft, Non-tender, Non-distended     Extremities: No calf tenderness     Incision:    HOSPITAL COURSE: Patient MOISÉS MAYO is a 73y (1950) RIGHT handed woman who presented to [facility] ED on 12/14/2023, as a transfer from [facility], as a CODE STROKE, with c/o R IPH.  Denies PMH.  CTH and CTA repeated - large R IPH (temporal/parietal). Presented to [place] today with c/o HA and AMS. Friend accompanying patient said that when visiting patient today - patient was not acting like herself and c/o HA.    NIHSS 9       24 hour Events:       12/15: rCTH: unchanged pending official read. No neurological exam as of now. Pending MR  12/15 O/N: Patient lethargic but opens eyes and follow commands. No events.   12/16: Transfer to stroke unit     Allergies    Allergy Status Unknown    Intolerances    oxycodone (Nausea)      REVIEW OF SYSTEMS: [ ] Unable to Assess due to neurologic exam   [X] All ROS addressed below are non-contributory, except:  Neuro: [ ] Headache [ ] Back pain [ ] Numbness [ ] Weakness [ ] Ataxia [ ] Dizziness [ ] Aphasia [ ] Dysarthria [ ] Visual disturbance  Resp: [ ] Shortness of breath/dyspnea, [ ] Orthopnea [ ] Cough  CV: [ ] Chest pain [ ] Palpitation [ ] Lightheadedness [ ] Syncope  Renal: [ ] Thirst [ ] Edema  GI: [ ] Nausea [ ] Emesis [ ] Abdominal pain [ ] Constipation [ ] Diarrhea  Hem: [ ] Hematemesis [ ] bright red blood per rectum  ID: [ ] Fever [ ] Chills [ ] Dysuria  ENT: [ ] Rhinorrhea      DEVICES:   [ ] Restraints [ ] ET tube [ ] central line [ ] arterial line [ ] kovacs [ ] NGT/OGT [ ] EVD [ ] LD [ ] TREVIN/HMV [ ] Trach [ ] PEG [ ] Chest Tube     VITALS:   Vital Signs Last 24 Hrs  T(C): 37.1 (15 Dec 2023 19:00), Max: 37.1 (15 Dec 2023 19:00)  T(F): 98.7 (15 Dec 2023 19:00), Max: 98.7 (15 Dec 2023 19:00)  HR: 79 (15 Dec 2023 19:00) (65 - 99)  BP: 109/63 (15 Dec 2023 19:00) (101/63 - 138/71)  BP(mean): 78 (15 Dec 2023 19:00) (74 - 99)  RR: 23 (15 Dec 2023 19:00) (15 - 23)  SpO2: 96% (15 Dec 2023 19:00) (96% - 100%)    Parameters below as of 15 Dec 2023 19:00  Patient On (Oxygen Delivery Method): room air      CAPILLARY BLOOD GLUCOSE        I&O's Summary    14 Dec 2023 07:01  -  15 Dec 2023 07:00  --------------------------------------------------------  IN: 680 mL / OUT: 500 mL / NET: 180 mL    15 Dec 2023 07:01  -  15 Dec 2023 20:06  --------------------------------------------------------  IN: 712.5 mL / OUT: 500 mL / NET: 212.5 mL        Respiratory:        LABS:                        15.0   8.59  )-----------( 199      ( 14 Dec 2023 22:14 )             41.8     12-15    144  |  110<H>  |  14  ----------------------------<  98  3.7   |  23  |  0.53             MEDICATION LEVELS:     IVF FLUIDS/MEDICATIONS:   MEDICATIONS  (STANDING):  chlorhexidine 4% Liquid 1 Application(s) Topical daily  diclofenac 0.1% Ophthalmic Solution 1 Drop(s) Left EYE <User Schedule>  levETIRAcetam 500 milliGRAM(s) Oral two times a day  ondansetron Injectable 4 milliGRAM(s) IV Push once  polyethylene glycol 3350 17 Gram(s) Oral two times a day  potassium chloride    Tablet ER 20 milliEquivalent(s) Oral every 2 hours  senna 1 Tablet(s) Oral at bedtime  sodium chloride 3% + sodium acetate 50:50 1000 milliLiter(s) (50 mL/Hr) IV Continuous <Continuous>    MEDICATIONS  (PRN):  acetaminophen     Tablet .. 650 milliGRAM(s) Oral every 6 hours PRN Temp greater or equal to 38C (100.4F), Mild Pain (1 - 3)  famotidine    Tablet 20 milliGRAM(s) Oral every 12 hours PRN dyspepsia  traMADol 25 milliGRAM(s) Oral every 4 hours PRN Moderate Pain (4 - 6)  traMADol 50 milliGRAM(s) Oral every 4 hours PRN Severe Pain (7 - 10)        IMAGING:      EXAMINATION:  PHYSICAL EXAM:    Constitutional: No Acute Distress     Neurological: Arousable, oriented x3, following commands after a lot of prompting, Pupils bilaterally reactive and equal. Right gaze preference. Right side 5/5. LUE antigravity but neglecting. LLE withdraws to pain and is antigravity, however neglecting.    Pulmonary: Clear to Auscultation, No rales, No rhonchi, No wheezes     Cardiovascular: S1, S2, Regular rate and rhythm     Gastrointestinal: Soft, Non-tender, Non-distended     Extremities: No calf tenderness     Incision:    HOSPITAL COURSE: Patient MOISÉS MAYO is a 73y (1950) RIGHT handed woman who presented to [facility] ED on 12/14/2023, as a transfer from [facility], as a CODE STROKE, with c/o R IPH.  Denies PMH.  CTH and CTA repeated - large R IPH (temporal/parietal). Presented to [place] today with c/o HA and AMS. Friend accompanying patient said that when visiting patient today - patient was not acting like herself and c/o HA.    NIHSS 9       24 hour Events:       12/15: rCTH: unchanged pending official read. No neurological exam as of now. Pending MR  12/15 O/N: Patient lethargic but opens eyes and follow commands. No events.   12/16: Transfer to stroke unit     Allergies    Allergy Status Unknown    Intolerances    oxycodone (Nausea)      REVIEW OF SYSTEMS: [ ] Unable to Assess due to neurologic exam   [X] All ROS addressed below are non-contributory, except:  Neuro: [ ] Headache [ ] Back pain [ ] Numbness [ ] Weakness [ ] Ataxia [ ] Dizziness [ ] Aphasia [ ] Dysarthria [ ] Visual disturbance  Resp: [ ] Shortness of breath/dyspnea, [ ] Orthopnea [ ] Cough  CV: [ ] Chest pain [ ] Palpitation [ ] Lightheadedness [ ] Syncope  Renal: [ ] Thirst [ ] Edema  GI: [ ] Nausea [ ] Emesis [ ] Abdominal pain [ ] Constipation [ ] Diarrhea  Hem: [ ] Hematemesis [ ] bright red blood per rectum  ID: [ ] Fever [ ] Chills [ ] Dysuria  ENT: [ ] Rhinorrhea      DEVICES:   [ ] Restraints [ ] ET tube [ ] central line [ ] arterial line [ ] kovacs [ ] NGT/OGT [ ] EVD [ ] LD [ ] TREVIN/HMV [ ] Trach [ ] PEG [ ] Chest Tube     VITALS:   Vital Signs Last 24 Hrs  T(C): 37.1 (15 Dec 2023 19:00), Max: 37.1 (15 Dec 2023 19:00)  T(F): 98.7 (15 Dec 2023 19:00), Max: 98.7 (15 Dec 2023 19:00)  HR: 79 (15 Dec 2023 19:00) (65 - 99)  BP: 109/63 (15 Dec 2023 19:00) (101/63 - 138/71)  BP(mean): 78 (15 Dec 2023 19:00) (74 - 99)  RR: 23 (15 Dec 2023 19:00) (15 - 23)  SpO2: 96% (15 Dec 2023 19:00) (96% - 100%)    Parameters below as of 15 Dec 2023 19:00  Patient On (Oxygen Delivery Method): room air      CAPILLARY BLOOD GLUCOSE        I&O's Summary    14 Dec 2023 07:01  -  15 Dec 2023 07:00  --------------------------------------------------------  IN: 680 mL / OUT: 500 mL / NET: 180 mL    15 Dec 2023 07:01  -  15 Dec 2023 20:06  --------------------------------------------------------  IN: 712.5 mL / OUT: 500 mL / NET: 212.5 mL        Respiratory:        LABS:                        15.0   8.59  )-----------( 199      ( 14 Dec 2023 22:14 )             41.8     12-15    144  |  110<H>  |  14  ----------------------------<  98  3.7   |  23  |  0.53             MEDICATION LEVELS:     IVF FLUIDS/MEDICATIONS:   MEDICATIONS  (STANDING):  chlorhexidine 4% Liquid 1 Application(s) Topical daily  diclofenac 0.1% Ophthalmic Solution 1 Drop(s) Left EYE <User Schedule>  levETIRAcetam 500 milliGRAM(s) Oral two times a day  ondansetron Injectable 4 milliGRAM(s) IV Push once  polyethylene glycol 3350 17 Gram(s) Oral two times a day  potassium chloride    Tablet ER 20 milliEquivalent(s) Oral every 2 hours  senna 1 Tablet(s) Oral at bedtime  sodium chloride 3% + sodium acetate 50:50 1000 milliLiter(s) (50 mL/Hr) IV Continuous <Continuous>    MEDICATIONS  (PRN):  acetaminophen     Tablet .. 650 milliGRAM(s) Oral every 6 hours PRN Temp greater or equal to 38C (100.4F), Mild Pain (1 - 3)  famotidine    Tablet 20 milliGRAM(s) Oral every 12 hours PRN dyspepsia  traMADol 25 milliGRAM(s) Oral every 4 hours PRN Moderate Pain (4 - 6)  traMADol 50 milliGRAM(s) Oral every 4 hours PRN Severe Pain (7 - 10)        IMAGING:      EXAMINATION:  PHYSICAL EXAM:    Constitutional: No Acute Distress     Neurological: Arousable, oriented x3, following commands after a lot of prompting, Pupils bilaterally reactive and equal. Right gaze preference. Right side 5/5. LUE antigravity but neglecting. LLE withdraws to pain and is antigravity, however neglecting.    Pulmonary: Clear to Auscultation, No rales, No rhonchi, No wheezes     Cardiovascular: S1, S2, Regular rate and rhythm     Gastrointestinal: Soft, Non-tender, Non-distended     Extremities: No calf tenderness     Incision:

## 2023-12-16 NOTE — PROGRESS NOTE ADULT - SUBJECTIVE AND OBJECTIVE BOX
Subjective    Pt was in room sitting up conversational, upset about being at the hospital, complaining about HA and nausea.   REVIEW OF SYSTEMS:  All systems were reviewed except HPI     Med  MEDICATIONS  (STANDING):  chlorhexidine 4% Liquid 1 Application(s) Topical daily  diclofenac 0.1% Ophthalmic Solution 1 Drop(s) Left EYE <User Schedule>  levETIRAcetam 500 milliGRAM(s) Oral two times a day  polyethylene glycol 3350 17 Gram(s) Oral two times a day  senna 1 Tablet(s) Oral at bedtime  sodium chloride 3% + sodium acetate 50:50 1000 milliLiter(s) (50 mL/Hr) IV Continuous <Continuous>  sodium chloride 3% + sodium acetate 50:50 1000 milliLiter(s) (30 mL/Hr) IV Continuous <Continuous>    MEDICATIONS  (PRN):  acetaminophen     Tablet .. 650 milliGRAM(s) Oral every 6 hours PRN Temp greater or equal to 38C (100.4F), Mild Pain (1 - 3)  artificial  tears Solution 1 Drop(s) Both EYES every 4 hours PRN Dry Eyes  famotidine    Tablet 20 milliGRAM(s) Oral every 12 hours PRN dyspepsia  traMADol 25 milliGRAM(s) Oral every 4 hours PRN Moderate Pain (4 - 6)  traMADol 50 milliGRAM(s) Oral every 4 hours PRN Severe Pain (7 - 10)    Objective  ICU Vital Signs Last 24 Hrs  T(C): 36.8 (16 Dec 2023 11:00), Max: 37.1 (15 Dec 2023 19:00)  T(F): 98.2 (16 Dec 2023 11:00), Max: 98.7 (15 Dec 2023 19:00)  HR: 65 (16 Dec 2023 11:00) (55 - 81)  BP: 112/56 (16 Dec 2023 11:00) (101/45 - 138/61)  BP(mean): 73 (16 Dec 2023 11:00) (63 - 86)  RR: 22 (16 Dec 2023 11:00) (17 - 23)  SpO2: 98% (16 Dec 2023 11:00) (95% - 100%)    O2 Parameters below as of 16 Dec 2023 07:00  Patient On (Oxygen Delivery Method): room air    On exam,  -AOx4 conversational, moving all four with LUE2/5 LLE3/5 RUE 5/5 and RLE 5/5, reduced L-side sensory, mild left facial droop, R gaze preference, some L-neglect

## 2023-12-16 NOTE — EEG REPORT - NS EEG TEXT BOX
[facility]   COMPREHENSIVE EPILEPSY CENTER   REPORT OF CONTINUOUS VIDEO EEG     [facility]: [address] [facility] , [address], [place] [address], Ph#: [phone]  [facility]: [address], [place] [address], Ph#: [phone]  Office: [address] [place] [address], [facility] [address], [place] [address] Ph#: [phone]    Patient Name: MOISÉS MAYO  Age and : 73y (50)  MRN #: [id number]  Location: [place] [address]  Referring Physician: Ailyn Walden    Study Date: 12-15-23 - 23  Duration: 19 hr 4 min  _____________________________________________________________  STUDY INFORMATION    EEG Recording Technique:  The patient underwent continuous Video-EEG monitoring, using Telemetry System hardware on the XLTek Digital System. EEG and video data were stored on a computer hard drive with important events saved in digital archive files. The material was reviewed by a physician (electroencephalographer / epileptologist) on a daily basis. Saeed and seizure detection algorithms were utilized and reviewed. An EEG Technician attended to the patient, and was available throughout daytime work hours.  The epilepsy center neurologist was available in person or on call 24-hours per day.    EEG Placement and Labeling of Electrodes:  The EEG was performed utilizing 20 channel referential EEG connections (coronal over temporal over parasagittal montage) using all standard 10-20 electrode placements with EKG, with additional electrodes placed in the inferior temporal region using the modified 10-10 montage electrode placements for elective admissions, or if deemed necessary. Recording was at a sampling rate of 256 samples per second per channel. Time synchronized digital video recording was done simultaneously with EEG recording. A low light infrared camera was used for low light recording.     _____________________________________________________________  HISTORY    Patient is a 73y old  Female who presents with a chief complaint of R IPH (16 Dec 2023 12:58)      PERTINENT MEDICATION:  MEDICATIONS  (STANDING):  chlorhexidine 4% Liquid 1 Application(s) Topical daily  diclofenac 0.1% Ophthalmic Solution 1 Drop(s) Left EYE <User Schedule>  polyethylene glycol 3350 17 Gram(s) Oral two times a day  senna 1 Tablet(s) Oral at bedtime  sodium chloride 3% + sodium acetate 50:50 1000 milliLiter(s) (50 mL/Hr) IV Continuous <Continuous>  sodium chloride 3% + sodium acetate 50:50 1000 milliLiter(s) (30 mL/Hr) IV Continuous <Continuous>    _____________________________________________________________  INTERPRETATION    Findings: The background was continuous, spontaneously variable and reactive. During wakefulness, the posterior dominant rhythm consisted of well-modulated 9 Hz activity, with amplitude to 30 uV, that attenuated to eye opening.  Low amplitude frontal beta was noted in wakefulness.    Background Slowing:  -Intermittent diffuse theta and polymorphic delta slowing.    Focal Slowing:   -Continuous polymorphic delta and theta slowing in the right hemisphere  -LRDA, right frontocentral    Sleep Background:  Drowsiness was characterized by fragmentation, attenuation, and slowing of the background activity.      Other Non-Epileptiform Findings:  -Diffuse excess beta activity.    Interictal Epileptiform Activity:   -Rare sharp wave in the right frontocentral region, maximum F4/C4    Events:  Clinical events: None recorded.  Seizures: None recorded.    Artifacts:  Intermittent myogenic and movement artifacts were noted.    ECG:  The heart rate on single channel ECG was predominantly between 60-80 BPM.    _____________________________________________________________  EEG SUMMARY/CLASSIFICATION    Abnormal EEG in the awake, drowsy and asleep states.  -Rare sharp wave in the right frontocentral region, maximum F4/C4  -Continuous polymorphic delta and theta slowing in the right hemisphere  -LRDA, right frontocentral  -Intermittent diffuse theta and polymorphic delta slowing.  _____________________________________________________________  EEG IMPRESSION/CLINICAL CORRELATE    Abnormal EEG study.  1. Increased risk for focal seizures with onset in the right frontocentral region.  2. Structural abnormality in the right hemisphere.  3. Mild nonspecific diffuse or multifocal cerebral dysfunction.   4. No seizures were recorded.    Massimo Salinas MD  Neurology Attending Physician       [facility]   COMPREHENSIVE EPILEPSY CENTER   REPORT OF CONTINUOUS VIDEO EEG     [facility]: [address] [facility] , [address], [place] [address], Ph#: [phone]  [facility]: [address], [place] [address], Ph#: [phone]  Office: [address] [place] [address], [facility] [address], [place] [address] Ph#: [phone]    Patient Name: MOISÉS MAYO  Age and : 73y (50)  MRN #: [id number]  Location: [place] [address]  Referring Physician: Ailyn Walden    Study Date: 12-15-23 - 23  Duration: 19 hr 4 min  _____________________________________________________________  STUDY INFORMATION    EEG Recording Technique:  The patient underwent continuous Video-EEG monitoring, using Telemetry System hardware on the XLTek Digital System. EEG and video data were stored on a computer hard drive with important events saved in digital archive files. The material was reviewed by a physician (electroencephalographer / epileptologist) on a daily basis. Saeed and seizure detection algorithms were utilized and reviewed. An EEG Technician attended to the patient, and was available throughout daytime work hours.  The epilepsy center neurologist was available in person or on call 24-hours per day.    EEG Placement and Labeling of Electrodes:  The EEG was performed utilizing 20 channel referential EEG connections (coronal over temporal over parasagittal montage) using all standard 10-20 electrode placements with EKG, with additional electrodes placed in the inferior temporal region using the modified 10-10 montage electrode placements for elective admissions, or if deemed necessary. Recording was at a sampling rate of 256 samples per second per channel. Time synchronized digital video recording was done simultaneously with EEG recording. A low light infrared camera was used for low light recording.     _____________________________________________________________  HISTORY    Patient is a 73y old  Female who presents with a chief complaint of R IPH (16 Dec 2023 12:58)      PERTINENT MEDICATION:  MEDICATIONS  (STANDING):  chlorhexidine 4% Liquid 1 Application(s) Topical daily  diclofenac 0.1% Ophthalmic Solution 1 Drop(s) Left EYE <User Schedule>  polyethylene glycol 3350 17 Gram(s) Oral two times a day  senna 1 Tablet(s) Oral at bedtime  sodium chloride 3% + sodium acetate 50:50 1000 milliLiter(s) (50 mL/Hr) IV Continuous <Continuous>  sodium chloride 3% + sodium acetate 50:50 1000 milliLiter(s) (30 mL/Hr) IV Continuous <Continuous>    _____________________________________________________________  INTERPRETATION    Findings: The background was continuous, spontaneously variable and reactive. During wakefulness, the posterior dominant rhythm consisted of well-modulated 9 Hz activity, with amplitude to 30 uV, that attenuated to eye opening.  Low amplitude frontal beta was noted in wakefulness.    Background Slowing:  -Intermittent diffuse theta and polymorphic delta slowing.    Focal Slowing:   -Continuous polymorphic delta and theta slowing in the right hemisphere  -LRDA, right frontocentral    Sleep Background:  Drowsiness was characterized by fragmentation, attenuation, and slowing of the background activity.      Other Non-Epileptiform Findings:  -Diffuse excess beta activity.    Interictal Epileptiform Activity:   -Rare sharp wave in the right frontocentral region, maximum F4/C4    Events:  Clinical events: None recorded.  Seizures: None recorded.    Artifacts:  Intermittent myogenic and movement artifacts were noted.    ECG:  The heart rate on single channel ECG was predominantly between 60-80 BPM.    _____________________________________________________________  EEG SUMMARY/CLASSIFICATION    Abnormal EEG in the awake, drowsy and asleep states.  -Rare sharp wave in the right frontocentral region, maximum F4/C4  -Continuous polymorphic delta and theta slowing in the right hemisphere  -LRDA, right frontocentral  -Intermittent diffuse theta and polymorphic delta slowing.  _____________________________________________________________  EEG IMPRESSION/CLINICAL CORRELATE    Abnormal EEG study.  1. Increased risk for focal seizures with onset in the right frontocentral region.  2. Structural abnormality in the right hemisphere.  3. Mild nonspecific diffuse or multifocal cerebral dysfunction.   4. No seizures were recorded.    Massimo Salinas MD  Neurology Attending Physician       [facility]   COMPREHENSIVE EPILEPSY CENTER   REPORT OF CONTINUOUS VIDEO EEG     [facility]: [address] , [address], [place] [address], Ph#: [phone]  [facility]: [address], [place] [address], Ph#: [phone]  Office: [address] [place] [address], [facility] [address], [place] [address] Ph#: [phone]    Patient Name: MOISÉS MAYO  Age and : 73y (50)  MRN #: [id number]  Location: [place] [address]  Referring Physician: Ailyn Walden    Study Date: 12-15-23 - 23  Duration: 23 hr 49 min  _____________________________________________________________  STUDY INFORMATION    EEG Recording Technique:  The patient underwent continuous Video-EEG monitoring, using Telemetry System hardware on the XLTek Digital System. EEG and video data were stored on a computer hard drive with important events saved in digital archive files. The material was reviewed by a physician (electroencephalographer / epileptologist) on a daily basis. Saeed and seizure detection algorithms were utilized and reviewed. An EEG Technician attended to the patient, and was available throughout daytime work hours.  The epilepsy center neurologist was available in person or on call 24-hours per day.    EEG Placement and Labeling of Electrodes:  The EEG was performed utilizing 20 channel referential EEG connections (coronal over temporal over parasagittal montage) using all standard 10-20 electrode placements with EKG, with additional electrodes placed in the inferior temporal region using the modified 10-10 montage electrode placements for elective admissions, or if deemed necessary. Recording was at a sampling rate of 256 samples per second per channel. Time synchronized digital video recording was done simultaneously with EEG recording. A low light infrared camera was used for low light recording.     _____________________________________________________________  HISTORY    Patient is a 73y old  Female who presents with a chief complaint of R IPH (16 Dec 2023 12:58)      PERTINENT MEDICATION:  MEDICATIONS  (STANDING):  chlorhexidine 4% Liquid 1 Application(s) Topical daily  diclofenac 0.1% Ophthalmic Solution 1 Drop(s) Left EYE <User Schedule>  polyethylene glycol 3350 17 Gram(s) Oral two times a day  senna 1 Tablet(s) Oral at bedtime  sodium chloride 3% + sodium acetate 50:50 1000 milliLiter(s) (50 mL/Hr) IV Continuous <Continuous>  sodium chloride 3% + sodium acetate 50:50 1000 milliLiter(s) (30 mL/Hr) IV Continuous <Continuous>    _____________________________________________________________  INTERPRETATION    Findings: The background was continuous, spontaneously variable and reactive. During wakefulness, the posterior dominant rhythm consisted of well-modulated 9 Hz activity, with amplitude to 30 uV, that attenuated to eye opening.  Low amplitude frontal beta was noted in wakefulness.    Background Slowing:  -Intermittent diffuse theta and polymorphic delta slowing.    Focal Slowing:   -Continuous polymorphic delta and theta slowing in the right hemisphere  -LRDA, right frontocentral    Sleep Background:  Drowsiness was characterized by fragmentation, attenuation, and slowing of the background activity.      Other Non-Epileptiform Findings:  -Diffuse excess beta activity.    Interictal Epileptiform Activity:   -Rare sharp wave in the right frontocentral region, maximum F4/C4    Events:  Clinical events: None recorded.  Seizures: None recorded.    Artifacts:  Intermittent myogenic and movement artifacts were noted.    ECG:  The heart rate on single channel ECG was predominantly between 60-80 BPM.    _____________________________________________________________  EEG SUMMARY/CLASSIFICATION    Abnormal EEG in the awake, drowsy and asleep states.  -Rare sharp wave in the right frontocentral region, maximum F4/C4  -Continuous polymorphic delta and theta slowing in the right hemisphere  -LRDA, right frontocentral  -Intermittent diffuse theta and polymorphic delta slowing.  _____________________________________________________________  EEG IMPRESSION/CLINICAL CORRELATE    Abnormal EEG study.  1. Increased risk for focal seizures with onset in the right frontocentral region.  2. Structural abnormality in the right hemisphere.  3. Mild nonspecific diffuse or multifocal cerebral dysfunction.   4. No seizures were recorded.    Regan Mccarty MD  Epilepsy Fellow    Massimo Salinas MD  Neurology Attending Physician       [facility]   COMPREHENSIVE EPILEPSY CENTER   REPORT OF CONTINUOUS VIDEO EEG     [facility]: [address] [facility] , [address], [place] [address], Ph#: [phone]  [facility]: [address], [place] [address], Ph#: [phone]  Office: [address] [place] [address], [facility] [address], [place] [address] Ph#: [phone]    Patient Name: MOISÉS MAYO  Age and : 73y (50)  MRN #: [id number]  Location: [place] [address]  Referring Physician: Ailyn Walden    Study Date: 12-15-23 - 23  Duration: 23 hr 49 min  _____________________________________________________________  STUDY INFORMATION    EEG Recording Technique:  The patient underwent continuous Video-EEG monitoring, using Telemetry System hardware on the XLTek Digital System. EEG and video data were stored on a computer hard drive with important events saved in digital archive files. The material was reviewed by a physician (electroencephalographer / epileptologist) on a daily basis. Saeed and seizure detection algorithms were utilized and reviewed. An EEG Technician attended to the patient, and was available throughout daytime work hours.  The epilepsy center neurologist was available in person or on call 24-hours per day.    EEG Placement and Labeling of Electrodes:  The EEG was performed utilizing 20 channel referential EEG connections (coronal over temporal over parasagittal montage) using all standard 10-20 electrode placements with EKG, with additional electrodes placed in the inferior temporal region using the modified 10-10 montage electrode placements for elective admissions, or if deemed necessary. Recording was at a sampling rate of 256 samples per second per channel. Time synchronized digital video recording was done simultaneously with EEG recording. A low light infrared camera was used for low light recording.     _____________________________________________________________  HISTORY    Patient is a 73y old  Female who presents with a chief complaint of R IPH (16 Dec 2023 12:58)      PERTINENT MEDICATION:  MEDICATIONS  (STANDING):  chlorhexidine 4% Liquid 1 Application(s) Topical daily  diclofenac 0.1% Ophthalmic Solution 1 Drop(s) Left EYE <User Schedule>  polyethylene glycol 3350 17 Gram(s) Oral two times a day  senna 1 Tablet(s) Oral at bedtime  sodium chloride 3% + sodium acetate 50:50 1000 milliLiter(s) (50 mL/Hr) IV Continuous <Continuous>  sodium chloride 3% + sodium acetate 50:50 1000 milliLiter(s) (30 mL/Hr) IV Continuous <Continuous>    _____________________________________________________________  INTERPRETATION    Findings: The background was continuous, spontaneously variable and reactive. During wakefulness, the posterior dominant rhythm consisted of well-modulated 9 Hz activity, with amplitude to 30 uV, that attenuated to eye opening.  Low amplitude frontal beta was noted in wakefulness.    Background Slowing:  -Intermittent diffuse theta and polymorphic delta slowing.    Focal Slowing:   -Continuous polymorphic delta and theta slowing in the right hemisphere  -LRDA, right frontocentral    Sleep Background:  Drowsiness was characterized by fragmentation, attenuation, and slowing of the background activity.      Other Non-Epileptiform Findings:  -Diffuse excess beta activity.    Interictal Epileptiform Activity:   -Rare sharp wave in the right frontocentral region, maximum F4/C4    Events:  Clinical events: None recorded.  Seizures: None recorded.    Artifacts:  Intermittent myogenic and movement artifacts were noted.    ECG:  The heart rate on single channel ECG was predominantly between 60-80 BPM.    _____________________________________________________________  EEG SUMMARY/CLASSIFICATION    Abnormal EEG in the awake, drowsy and asleep states.  -Rare sharp wave in the right frontocentral region, maximum F4/C4  -Continuous polymorphic delta and theta slowing in the right hemisphere  -LRDA, right frontocentral  -Intermittent diffuse theta and polymorphic delta slowing.  _____________________________________________________________  EEG IMPRESSION/CLINICAL CORRELATE    Abnormal EEG study.  1. Increased risk for focal seizures with onset in the right frontocentral region.  2. Structural abnormality in the right hemisphere.  3. Mild nonspecific diffuse or multifocal cerebral dysfunction.   4. No seizures were recorded.    Regan Mccarty MD  Epilepsy Fellow    Massimo Salinas MD  Neurology Attending Physician       [facility]   COMPREHENSIVE EPILEPSY CENTER   REPORT OF CONTINUOUS VIDEO EEG     [facility]: [address] [facility] , [address], [place] [address], Ph#: [phone]  [facility]: [address], [place] [address], Ph#: [phone]  Office: [address] [place] [address], [facility] [address], [place] [address] Ph#: [phone]    Patient Name: MOISÉS MAYO  Age and : 73y (50)  MRN #: [id number]  Location: [place] [address]  Referring Physician: Ailyn Walden    Study Date: 12-15-23 - 23  Duration: 23 hr 49 min  _____________________________________________________________  STUDY INFORMATION    EEG Recording Technique:  The patient underwent continuous Video-EEG monitoring, using Telemetry System hardware on the XLTek Digital System. EEG and video data were stored on a computer hard drive with important events saved in digital archive files. The material was reviewed by a physician (electroencephalographer / epileptologist) on a daily basis. Saeed and seizure detection algorithms were utilized and reviewed. An EEG Technician attended to the patient, and was available throughout daytime work hours.  The epilepsy center neurologist was available in person or on call 24-hours per day.    EEG Placement and Labeling of Electrodes:  The EEG was performed utilizing 20 channel referential EEG connections (coronal over temporal over parasagittal montage) using all standard 10-20 electrode placements with EKG, with additional electrodes placed in the inferior temporal region using the modified 10-10 montage electrode placements for elective admissions, or if deemed necessary. Recording was at a sampling rate of 256 samples per second per channel. Time synchronized digital video recording was done simultaneously with EEG recording. A low light infrared camera was used for low light recording.     _____________________________________________________________  HISTORY    Patient is a 73y old  Female who presents with a chief complaint of R IPH (16 Dec 2023 12:58)      PERTINENT MEDICATION:  MEDICATIONS  (STANDING):  chlorhexidine 4% Liquid 1 Application(s) Topical daily  diclofenac 0.1% Ophthalmic Solution 1 Drop(s) Left EYE <User Schedule>  polyethylene glycol 3350 17 Gram(s) Oral two times a day  senna 1 Tablet(s) Oral at bedtime  sodium chloride 3% + sodium acetate 50:50 1000 milliLiter(s) (50 mL/Hr) IV Continuous <Continuous>  sodium chloride 3% + sodium acetate 50:50 1000 milliLiter(s) (30 mL/Hr) IV Continuous <Continuous>    _____________________________________________________________  INTERPRETATION    Findings: The background was continuous, spontaneously variable and reactive. During wakefulness, the posterior dominant rhythm consisted of well-modulated 9 Hz activity, with amplitude to 30 uV, that attenuated to eye opening.  Low amplitude frontal beta was noted in wakefulness.    Background Slowing:  -Intermittent diffuse theta and polymorphic delta slowing.    Focal Slowing:   -Continuous polymorphic delta and theta slowing in the right hemisphere  -LRDA, right frontocentral    Sleep Background:  Drowsiness was characterized by fragmentation, attenuation, and slowing of the background activity.      Other Non-Epileptiform Findings:  -Diffuse excess beta activity.    Interictal Epileptiform Activity:   -Rare sharp wave in the right frontocentral region, maximum F4/C4    Events:  Clinical events: None recorded.  Seizures: None recorded.    Artifacts:  Intermittent myogenic and movement artifacts were noted.    ECG:  The heart rate on single channel ECG was predominantly between 60-80 BPM.    _____________________________________________________________  EEG SUMMARY/CLASSIFICATION    Abnormal EEG in the awake, drowsy and asleep states.  -Rare sharp wave in the right frontocentral region, maximum F4/C4  -Continuous polymorphic delta and theta slowing in the right hemisphere  -LRDA, right frontocentral  -Intermittent diffuse theta and polymorphic delta slowing.  _____________________________________________________________  EEG IMPRESSION/CLINICAL CORRELATE    Abnormal EEG study.  1. Increased risk for focal seizures with onset in the right frontocentral region.  2. Structural abnormality in the right hemisphere.  3. Mild nonspecific diffuse or multifocal cerebral dysfunction.   4. No seizures were recorded.    Regan Mccarty MD  Epilepsy Fellow    Massimo Salinas MD  Neurology Attending Physician.       [facility]   COMPREHENSIVE EPILEPSY CENTER   REPORT OF CONTINUOUS VIDEO EEG     [facility]: [address] [facility] , [address], [place] [address], Ph#: [phone]  [facility]: [address], [place] [address], Ph#: [phone]  Office: [address] [place] [address], [facility] [address], [place] [address] Ph#: [phone]    Patient Name: MOISÉS MAYO  Age and : 73y (50)  MRN #: [id number]  Location: [place] [address]  Referring Physician: Ailyn Walden    Study Date: 12-15-23 - 23  Duration: 23 hr 49 min  _____________________________________________________________  STUDY INFORMATION    EEG Recording Technique:  The patient underwent continuous Video-EEG monitoring, using Telemetry System hardware on the XLTek Digital System. EEG and video data were stored on a computer hard drive with important events saved in digital archive files. The material was reviewed by a physician (electroencephalographer / epileptologist) on a daily basis. Saeed and seizure detection algorithms were utilized and reviewed. An EEG Technician attended to the patient, and was available throughout daytime work hours.  The epilepsy center neurologist was available in person or on call 24-hours per day.    EEG Placement and Labeling of Electrodes:  The EEG was performed utilizing 20 channel referential EEG connections (coronal over temporal over parasagittal montage) using all standard 10-20 electrode placements with EKG, with additional electrodes placed in the inferior temporal region using the modified 10-10 montage electrode placements for elective admissions, or if deemed necessary. Recording was at a sampling rate of 256 samples per second per channel. Time synchronized digital video recording was done simultaneously with EEG recording. A low light infrared camera was used for low light recording.     _____________________________________________________________  HISTORY    Patient is a 73y old  Female who presents with a chief complaint of R IPH (16 Dec 2023 12:58)      PERTINENT MEDICATION:  MEDICATIONS  (STANDING):  chlorhexidine 4% Liquid 1 Application(s) Topical daily  diclofenac 0.1% Ophthalmic Solution 1 Drop(s) Left EYE <User Schedule>  polyethylene glycol 3350 17 Gram(s) Oral two times a day  senna 1 Tablet(s) Oral at bedtime  sodium chloride 3% + sodium acetate 50:50 1000 milliLiter(s) (50 mL/Hr) IV Continuous <Continuous>  sodium chloride 3% + sodium acetate 50:50 1000 milliLiter(s) (30 mL/Hr) IV Continuous <Continuous>    _____________________________________________________________  INTERPRETATION    Findings: The background was continuous, spontaneously variable and reactive. During wakefulness, the posterior dominant rhythm consisted of well-modulated 9 Hz activity, with amplitude to 30 uV, that attenuated to eye opening.  Low amplitude frontal beta was noted in wakefulness.    Background Slowing:  -Intermittent diffuse theta and polymorphic delta slowing.    Focal Slowing:   -Continuous polymorphic delta and theta slowing in the right hemisphere  -LRDA, right frontocentral    Sleep Background:  Drowsiness was characterized by fragmentation, attenuation, and slowing of the background activity.      Other Non-Epileptiform Findings:  -Diffuse excess beta activity.    Interictal Epileptiform Activity:   -Rare sharp wave in the right frontocentral region, maximum F4/C4    Events:  Clinical events: None recorded.  Seizures: None recorded.    Artifacts:  Intermittent myogenic and movement artifacts were noted.    ECG:  The heart rate on single channel ECG was predominantly between 60-80 BPM.    _____________________________________________________________  EEG SUMMARY/CLASSIFICATION    Abnormal EEG in the awake, drowsy and asleep states.  -Rare sharp wave in the right frontocentral region, maximum F4/C4  -Continuous polymorphic delta and theta slowing in the right hemisphere  -LRDA, right frontocentral  -Intermittent diffuse theta and polymorphic delta slowing.  _____________________________________________________________  EEG IMPRESSION/CLINICAL CORRELATE    Abnormal EEG study.  1. Increased risk for focal seizures with onset in the right frontocentral region.  2. Structural abnormality in the right hemisphere.  3. Mild nonspecific diffuse or multifocal cerebral dysfunction.   4. No seizures were recorded.    Regan Mccarty MD  Epilepsy Fellow    Massimo Salinas MD  Neurology Attending Physician.

## 2023-12-16 NOTE — PROGRESS NOTE ADULT - ASSESSMENT
ASSESSMENT/PLAN: Patient with right IPH ICH score 1.     Neuro  - Neurochecks Q2 H  -CTH with large R IPH (temporal/parietal)  - r CTH: stablehematoma  - MRI done- no evidence of mass, stable right IPH   - pain management with tylenol/oxy  - EEG- Increase risk of seizures in the right frontocentral region, no seizures were recorded  - Patient started on Keppra for LLE shaking  - 12/16- CTH for stability- start DVT ppx if stable  - OOB as tolerated     Resp  -on RA  -encourage IS    CV  - -150  - TTE- EF 64%, no other otherwise     GI  - Passed dysphagia  - Will start diet from tomorrow   - LBM PTA      - 3% @ 80cc/hr Na goal 140-145 for 12/15  - BMP q8  - I&O strict  - monitor Cr  - Replete lytes PRN    ENDO  - 5.0 a1c  -maintain eugylcemia 120-180    HEME  -monitor CBC  - negative dupplex BL LE  - chemical vte ppx- restart after stability scan tomorrow    ID  - afebrile         ASSESSMENT/PLAN: Patient with right IPH ICH score 1.     Neuro  - Neurochecks Q2 H  -CTH with large R IPH (temporal/parietal)  - 12/16- r CTH: stable hematoma, will obtain another CTH in the AM  - MRI done- no evidence of mass, stable right IPH   - pain management with tylenol/oxy  - EEG- Increase risk of seizures in the right frontocentral region, no seizures were recorded- off now  - 12/16- CTH for stability- start DVT ppx if stable  - OOB as tolerated     Resp:  -on RA  -encourage IS    CV  - -150  - TTE- EF 64%, no other otherwise   - Bradycardic- will obtain EKG    GI  - On soft bite sized diet with calorie count   - LBM PTA  - Continue Pepcid   - Zofran for nausea   - Thrombocytopenia- unknown etiology could be consumptive continue to monitor     Renal   - 3% @ 80cc/hr Na goal 140-145 for 12/15 + salt tabs  - BMP q8  - I&O strict  - monitor Cr  - Replete lytes PRN    ENDO  - 5.0 a1c  - maintain eugylcemia 120-180    HEME  - monitor CBC  - negative dupplex BL LE  - chemical vte ppx- restart after stability scan tomorrow    ID  - afebrile  - Monitor fever curve    Disposition: Stroke unit after stability scan

## 2023-12-16 NOTE — PROVIDER CONTACT NOTE (CHANGE IN STATUS NOTIFICATION) - ASSESSMENT
right upper extremity displays drift and the left upper extremity displays no effort against gravity. the right lower extremity displays drift and the left lower extremity displays some effort against gravity. pupils are 3 round and brisk bilaterally. pt remains alert and oriented x4.

## 2023-12-16 NOTE — PROGRESS NOTE ADULT - NUTRITIONAL ASSESSMENT
Patient MOISÉS MAYO is a 73y (1950) RIGHT handed woman, w/o PMH, who presented to [facility] ED on 12/14/2023, as a transfer from [facility], found to have large R IPH (temporal/parieta).    ICH score1  CTA - negative for definitive abnormal vasculature   NIHSS 9  (L-neglect, LHH, L NLF, LLUE/LLE drift, R gaze)  ED  RA.     large R IPH (temporal/parieta).    12/15: rCTH: unchanged   12/15 O/N: Patient lethargic but opens eyes and follow commands. No events.   12/16: rCTH showing interval mild increased IPH    # Large R IPH (temporal/parieta).    -ICU for IPH progression monitor,   - MRI done- no evidence of mass, not much microbleed on SWI, all the hypointensity dots have continuous trajectory, c/w vasculature   - pain management with tylenol/oxy for HA  - EEG on - pending read  - Patient started on Keppra for LLE shaking  - SBP goal 100-150  - a1c 5.0   - TTE- EF 64%, no other otherwise   - now on PO diet [] pending PT OT when IPPH is more stable.   - On 3% @ 50cc/hr Na goal 140-145 for 12/16    - [] will re-evaluate again for transfer care, follow exam for acute CTH need [] rCTH on 12/17 am      Colby Caldera MD PHD  Vascular neurology fellow PGY5 Patient MOISÉS MAYO is a 73y (1950) RIGHT handed woman, w/o PMH, who presented to [facility] ED on 12/14/2023, as a transfer from [facility], found to have large R IPH (temporal/parieta).    ICH score1  CTA - negative for definitive abnormal vasculature   NIHSS 9  (L-neglect, LHH, L NLF, LLUE/LLE drift, R gaze)  ED  RA.     large R IPH (temporal/parieta).    12/15: rCTH: unchanged   12/15 O/N: Patient lethargic but opens eyes and follow commands. No events.   12/16: rCTH showing interval mild increased IPH    # Large R IPH (temporal/parieta).    -ICU for IPH progression monitor,   - MRI done- no evidence of mass, not much microbleed on SWI, all the hypointensity dots have continuous trajectory, c/w vasculature   - pain management with tylenol/oxy for HA  - EEG on - pending read  - Patient started on Keppra for LLE shaking  - SBP goal 100-150  - a1c 5.0   - TTE- EF 64%, no other otherwise   - now on PO diet [] pending PT OT when IPPH is more stable.   - On 3% @ 50cc/hr Na goal 140-145 for 12/16    - [] will re-evaluate again for transfer care, follow exam for acute CTH need [] rCTH on 12/17 am      Colby Caldera MD PHD  Vascular neurology fellow PGY5

## 2023-12-16 NOTE — PROGRESS NOTE ADULT - SUBJECTIVE AND OBJECTIVE BOX
Patient seen and examined at bedside.    --Anticoagulation--    T(C): 36.5 (12-14-23 @ 23:00), Max: 37 (12-14-23 @ 16:45)  HR: 87 (12-15-23 @ 02:00) (68 - 92)  BP: 107/65 (12-15-23 @ 02:00) (107/65 - 138/71)  RR: 17 (12-15-23 @ 02:00) (15 - 21)  SpO2: 97% (12-15-23 @ 02:00) (95% - 100%)  Wt(kg): --    Exam: AOx3, PERRL, R gaze, no facial, mild Lt neglect, no drift, CHEN 5/5

## 2023-12-16 NOTE — PROVIDER CONTACT NOTE (CHANGE IN STATUS NOTIFICATION) - DATE AND TIME:
How Severe Is Your Skin Lesion?: mild
Has Your Skin Lesion Been Treated?: not been treated
Is This A New Presentation, Or A Follow-Up?: Skin Lesion
16-Dec-2023 05:00
[see HPI] : see HPI
[Negative] : Gastrointestinal

## 2023-12-16 NOTE — PROGRESS NOTE ADULT - ASSESSMENT
ASSESSMENT/PLAN: Patient with right IPH ICH score 1.     Neuro  - Neurochecks Q2 H  -CTH with large R IPH (temporal/parietal)  - rCTH as of 12/15: unchanged  - MRI done- no evidence of mass, stable right IPH   - pain management with tylenol/oxy  - EEG on - negative for seizures  - Patient started on Keppra for LLE shaking  - CTH tomorrow for stability- start DVT ppx if stable  - OOB as tolerated     Resp  -on RA  -encourage IS    CV  - -150  - TTE- EF 64%, no other otherwise     GI  - Passed dysphagia  - Will start diet from tomorrow   - LBM PTA      - 3% @ 50cc/hr Na goal 140-145 for 12/15  - BMP q8  - I&O strict  - monitor Cr  - Replete lytes PRN    ENDO  - 5.0 a1c  -maintain eugylcemia 120-180    HEME  -monitor CBC  - negative dupplex BL LE  - chemical vte ppx- restart after stability scan tomorrow    ID  - afebrile         ASSESSMENT/PLAN: Patient with right IPH ICH score 1.     Neuro  - Neurochecks Q2 H  -CTH with large R IPH (temporal/parietal)  - r CTH: pending official read 12/16  - rCTH as of 12/15: unchanged  - MRI done- no evidence of mass, stable right IPH   - pain management with tylenol/oxy  - EEG on - pending read  - Patient started on Keppra for LLE shaking  - CTH tomorrow for stability- start DVT ppx if stable  - OOB as tolerated     Resp  -on RA  -encourage IS    CV  - -150  - TTE- EF 64%, no other otherwise     GI  - Passed dysphagia  - Will start diet from tomorrow   - LBM PTA      - 3% @ 50cc/hr Na goal 140-145 for 12/15  - BMP q8  - I&O strict  - monitor Cr  - Replete lytes PRN    ENDO  - 5.0 a1c  -maintain eugylcemia 120-180    HEME  -monitor CBC  - negative dupplex BL LE  - chemical vte ppx- restart after stability scan tomorrow    ID  - afebrile

## 2023-12-16 NOTE — PROGRESS NOTE ADULT - SUBJECTIVE AND OBJECTIVE BOX
HOSPITAL COURSE: Patient MOISÉS MAYO is a 73y (1950) RIGHT handed woman who presented to [facility] ED on 12/14/2023, as a transfer from [facility], as a CODE STROKE, with c/o R IPH.  Denies PMH.  CTH and CTA repeated - large R IPH (temporal/parietal). Presented to [place] today with c/o HA and AMS. Friend accompanying patient said that when visiting patient today - patient was not acting like herself and c/o HA.    NIHSS 9       24 hour Events:       12/15: rCTH: unchanged pending official read. No neurological exam as of now. Pending MR  12/15 O/N: Patient lethargic but opens eyes and follow commands. No events.   12/16: Transfer to stroke unit   12/16 o/n: Patient with increased in hematoma on the AM scan. No changes in exam. Pending another scan tomorrow AM.     Allergies    Allergy Status Unknown    Intolerances    oxycodone (Nausea)      REVIEW OF SYSTEMS: [ ] Unable to Assess due to neurologic exam   [ ] All ROS addressed below are non-contributory, except:  Neuro: [ ] Headache [ ] Back pain [ ] Numbness [ ] Weakness [ ] Ataxia [ ] Dizziness [ ] Aphasia [ ] Dysarthria [ ] Visual disturbance  Resp: [ ] Shortness of breath/dyspnea, [ ] Orthopnea [ ] Cough  CV: [ ] Chest pain [ ] Palpitation [ ] Lightheadedness [ ] Syncope  Renal: [ ] Thirst [ ] Edema  GI: [ ] Nausea [ ] Emesis [ ] Abdominal pain [ ] Constipation [ ] Diarrhea  Hem: [ ] Hematemesis [ ] bright red blood per rectum  ID: [ ] Fever [ ] Chills [ ] Dysuria  ENT: [ ] Rhinorrhea      DEVICES:   [ ] Restraints [ ] ET tube [ ] central line [ ] arterial line [ ] kovacs [ ] NGT/OGT [ ] EVD [ ] LD [ ] TREVIN/HMV [ ] Trach [ ] PEG [ ] Chest Tube     VITALS:   Vital Signs Last 24 Hrs  T(C): 36.9 (16 Dec 2023 15:00), Max: 37.1 (15 Dec 2023 19:00)  T(F): 98.5 (16 Dec 2023 15:00), Max: 98.7 (15 Dec 2023 19:00)  HR: 50 (16 Dec 2023 17:00) (50 - 79)  BP: 113/59 (16 Dec 2023 17:00) (101/45 - 122/60)  BP(mean): 76 (16 Dec 2023 17:00) (63 - 86)  RR: 19 (16 Dec 2023 17:00) (18 - 23)  SpO2: 97% (16 Dec 2023 17:00) (95% - 100%)    Parameters below as of 16 Dec 2023 07:00  Patient On (Oxygen Delivery Method): room air      CAPILLARY BLOOD GLUCOSE        I&O's Summary    15 Dec 2023 07:01  -  16 Dec 2023 07:00  --------------------------------------------------------  IN: 1952.5 mL / OUT: 1600 mL / NET: 352.5 mL    16 Dec 2023 07:01  -  16 Dec 2023 18:51  --------------------------------------------------------  IN: 980 mL / OUT: 650 mL / NET: 330 mL        Respiratory:        LABS:                        12.1   7.16  )-----------( 142      ( 16 Dec 2023 13:07 )             35.4     12-16    143  |  113<H>  |  14  ----------------------------<  188<H>  4.1   |  21<L>  |  0.46<L>             MEDICATION LEVELS:     IVF FLUIDS/MEDICATIONS:   MEDICATIONS  (STANDING):  chlorhexidine 4% Liquid 1 Application(s) Topical daily  diclofenac 0.1% Ophthalmic Solution 1 Drop(s) Left EYE <User Schedule>  polyethylene glycol 3350 17 Gram(s) Oral two times a day  senna 1 Tablet(s) Oral at bedtime  sodium chloride 3 Gram(s) Oral every 6 hours  sodium chloride 3% + sodium acetate 50:50 1000 milliLiter(s) (30 mL/Hr) IV Continuous <Continuous>  sodium chloride 3% + sodium acetate 50:50 1000 milliLiter(s) (50 mL/Hr) IV Continuous <Continuous>    MEDICATIONS  (PRN):  acetaminophen     Tablet .. 650 milliGRAM(s) Oral every 6 hours PRN Temp greater or equal to 38C (100.4F), Mild Pain (1 - 3)  artificial  tears Solution 1 Drop(s) Both EYES every 4 hours PRN Dry Eyes  famotidine    Tablet 20 milliGRAM(s) Oral every 12 hours PRN dyspepsia  traMADol 50 milliGRAM(s) Oral every 4 hours PRN Severe Pain (7 - 10)  traMADol 25 milliGRAM(s) Oral every 4 hours PRN Moderate Pain (4 - 6)        IMAGING:    XAMINATION:  PHYSICAL EXAM:    Constitutional: No Acute Distress     Neurological: Arousable, oriented x3, following commands after a lot of prompting, Pupils bilaterally reactive and equal. Right gaze preference. Right side 5/5. LUE antigravity but neglecting. LLE withdraws to pain and is antigravity, however neglecting.    Pulmonary: Clear to Auscultation, No rales, No rhonchi, No wheezes     Cardiovascular: S1, S2, Regular rate and rhythm     Gastrointestinal: Soft, Non-tender, Non-distended     Extremities: No calf tenderness     Incision:      HOSPITAL COURSE: Patient MOISÉS MAYO is a 73y (1950) RIGHT handed woman who presented to [facility] ED on 12/14/2023, as a transfer from [facility], as a CODE STROKE, with c/o R IPH.  Denies PMH.  CTH and CTA repeated - large R IPH (temporal/parietal). Presented to [place] today with c/o HA and AMS. Friend accompanying patient said that when visiting patient today - patient was not acting like herself and c/o HA.    NIHSS 9       24 hour Events:       12/15: rCTH: unchanged pending official read. No neurological exam as of now. Pending MR  12/15 O/N: Patient lethargic but opens eyes and follow commands. No events.   12/16: Transfer to stroke unit   12/16 o/n: Patient with increased in hematoma on the AM scan. No changes in exam. Pending another scan tomorrow AM.     Allergies    Allergy Status Unknown    Intolerances    oxycodone (Nausea)      REVIEW OF SYSTEMS: [ ] Unable to Assess due to neurologic exam   [ ] All ROS addressed below are non-contributory, except:  Neuro: [ ] Headache [ ] Back pain [ ] Numbness [ ] Weakness [ ] Ataxia [ ] Dizziness [ ] Aphasia [ ] Dysarthria [ ] Visual disturbance  Resp: [ ] Shortness of breath/dyspnea, [ ] Orthopnea [ ] Cough  CV: [ ] Chest pain [ ] Palpitation [ ] Lightheadedness [ ] Syncope  Renal: [ ] Thirst [ ] Edema  GI: [ ] Nausea [ ] Emesis [ ] Abdominal pain [ ] Constipation [ ] Diarrhea  Hem: [ ] Hematemesis [ ] bright red blood per rectum  ID: [ ] Fever [ ] Chills [ ] Dysuria  ENT: [ ] Rhinorrhea      DEVICES:   [ ] Restraints [ ] ET tube [ ] central line [ ] arterial line [ ] kovacs [ ] NGT/OGT [ ] EVD [ ] LD [ ] TREVIN/HMV [ ] Trach [ ] PEG [ ] Chest Tube     VITALS:   Vital Signs Last 24 Hrs  T(C): 36.9 (16 Dec 2023 15:00), Max: 37.1 (15 Dec 2023 19:00)  T(F): 98.5 (16 Dec 2023 15:00), Max: 98.7 (15 Dec 2023 19:00)  HR: 50 (16 Dec 2023 17:00) (50 - 79)  BP: 113/59 (16 Dec 2023 17:00) (101/45 - 122/60)  BP(mean): 76 (16 Dec 2023 17:00) (63 - 86)  RR: 19 (16 Dec 2023 17:00) (18 - 23)  SpO2: 97% (16 Dec 2023 17:00) (95% - 100%)    Parameters below as of 16 Dec 2023 07:00  Patient On (Oxygen Delivery Method): room air      CAPILLARY BLOOD GLUCOSE        I&O's Summary    15 Dec 2023 07:01  -  16 Dec 2023 07:00  --------------------------------------------------------  IN: 1952.5 mL / OUT: 1600 mL / NET: 352.5 mL    16 Dec 2023 07:01  -  16 Dec 2023 18:51  --------------------------------------------------------  IN: 980 mL / OUT: 650 mL / NET: 330 mL        Respiratory:        LABS:                        12.1   7.16  )-----------( 142      ( 16 Dec 2023 13:07 )             35.4     12-16    143  |  113<H>  |  14  ----------------------------<  188<H>  4.1   |  21<L>  |  0.46<L>             MEDICATION LEVELS:     IVF FLUIDS/MEDICATIONS:   MEDICATIONS  (STANDING):  chlorhexidine 4% Liquid 1 Application(s) Topical daily  diclofenac 0.1% Ophthalmic Solution 1 Drop(s) Left EYE <User Schedule>  polyethylene glycol 3350 17 Gram(s) Oral two times a day  senna 1 Tablet(s) Oral at bedtime  sodium chloride 3 Gram(s) Oral every 6 hours  sodium chloride 3% + sodium acetate 50:50 1000 milliLiter(s) (30 mL/Hr) IV Continuous <Continuous>  sodium chloride 3% + sodium acetate 50:50 1000 milliLiter(s) (50 mL/Hr) IV Continuous <Continuous>    MEDICATIONS  (PRN):  acetaminophen     Tablet .. 650 milliGRAM(s) Oral every 6 hours PRN Temp greater or equal to 38C (100.4F), Mild Pain (1 - 3)  artificial  tears Solution 1 Drop(s) Both EYES every 4 hours PRN Dry Eyes  famotidine    Tablet 20 milliGRAM(s) Oral every 12 hours PRN dyspepsia  traMADol 50 milliGRAM(s) Oral every 4 hours PRN Severe Pain (7 - 10)  traMADol 25 milliGRAM(s) Oral every 4 hours PRN Moderate Pain (4 - 6)        IMAGING:    XAMINATION:  PHYSICAL EXAM:    Constitutional: No Acute Distress     Neurological: Arousable, oriented x3, following commands after a lot of prompting, Pupils bilaterally reactive and equal. Right gaze preference. Right side 5/5. LUE antigravity but neglecting. LLE withdraws to pain and is antigravity, however neglecting.    Pulmonary: Clear to Auscultation, No rales, No rhonchi, No wheezes     Cardiovascular: S1, S2, Regular rate and rhythm     Gastrointestinal: Soft, Non-tender, Non-distended     Extremities: No calf tenderness     Incision:      HOSPITAL COURSE: Patient MOISÉS MAYO is a 73y (1950) RIGHT handed woman who presented to [facility] ED on 12/14/2023, as a transfer from [facility], as a CODE STROKE, with c/o R IPH.  Denies PMH.  CTH and CTA repeated - large R IPH (temporal/parietal). Presented to [place] today with c/o HA and AMS. Friend accompanying patient said that when visiting patient today - patient was not acting like herself and c/o HA.    NIHSS 9       24 hour Events:       12/15: rCTH: unchanged pending official read. No neurological exam as of now. Pending MR  12/15 O/N: Patient lethargic but opens eyes and follow commands. No events.   12/16: Transfer to stroke unit   12/16 o/n: No changes in exam. Pending another scan tomorrow AM.     Allergies    Allergy Status Unknown    Intolerances    oxycodone (Nausea)      REVIEW OF SYSTEMS: [ ] Unable to Assess due to neurologic exam   [ ] All ROS addressed below are non-contributory, except:  Neuro: [ ] Headache [ ] Back pain [ ] Numbness [ ] Weakness [ ] Ataxia [ ] Dizziness [ ] Aphasia [ ] Dysarthria [ ] Visual disturbance  Resp: [ ] Shortness of breath/dyspnea, [ ] Orthopnea [ ] Cough  CV: [ ] Chest pain [ ] Palpitation [ ] Lightheadedness [ ] Syncope  Renal: [ ] Thirst [ ] Edema  GI: [ ] Nausea [ ] Emesis [ ] Abdominal pain [ ] Constipation [ ] Diarrhea  Hem: [ ] Hematemesis [ ] bright red blood per rectum  ID: [ ] Fever [ ] Chills [ ] Dysuria  ENT: [ ] Rhinorrhea      DEVICES:   [ ] Restraints [ ] ET tube [ ] central line [ ] arterial line [ ] kovacs [ ] NGT/OGT [ ] EVD [ ] LD [ ] TREVIN/HMV [ ] Trach [ ] PEG [ ] Chest Tube     VITALS:   Vital Signs Last 24 Hrs  T(C): 36.9 (16 Dec 2023 15:00), Max: 37.1 (15 Dec 2023 19:00)  T(F): 98.5 (16 Dec 2023 15:00), Max: 98.7 (15 Dec 2023 19:00)  HR: 50 (16 Dec 2023 17:00) (50 - 79)  BP: 113/59 (16 Dec 2023 17:00) (101/45 - 122/60)  BP(mean): 76 (16 Dec 2023 17:00) (63 - 86)  RR: 19 (16 Dec 2023 17:00) (18 - 23)  SpO2: 97% (16 Dec 2023 17:00) (95% - 100%)    Parameters below as of 16 Dec 2023 07:00  Patient On (Oxygen Delivery Method): room air      CAPILLARY BLOOD GLUCOSE        I&O's Summary    15 Dec 2023 07:01  -  16 Dec 2023 07:00  --------------------------------------------------------  IN: 1952.5 mL / OUT: 1600 mL / NET: 352.5 mL    16 Dec 2023 07:01  -  16 Dec 2023 18:51  --------------------------------------------------------  IN: 980 mL / OUT: 650 mL / NET: 330 mL        Respiratory:        LABS:                        12.1   7.16  )-----------( 142      ( 16 Dec 2023 13:07 )             35.4     12-16    143  |  113<H>  |  14  ----------------------------<  188<H>  4.1   |  21<L>  |  0.46<L>             MEDICATION LEVELS:     IVF FLUIDS/MEDICATIONS:   MEDICATIONS  (STANDING):  chlorhexidine 4% Liquid 1 Application(s) Topical daily  diclofenac 0.1% Ophthalmic Solution 1 Drop(s) Left EYE <User Schedule>  polyethylene glycol 3350 17 Gram(s) Oral two times a day  senna 1 Tablet(s) Oral at bedtime  sodium chloride 3 Gram(s) Oral every 6 hours  sodium chloride 3% + sodium acetate 50:50 1000 milliLiter(s) (30 mL/Hr) IV Continuous <Continuous>  sodium chloride 3% + sodium acetate 50:50 1000 milliLiter(s) (50 mL/Hr) IV Continuous <Continuous>    MEDICATIONS  (PRN):  acetaminophen     Tablet .. 650 milliGRAM(s) Oral every 6 hours PRN Temp greater or equal to 38C (100.4F), Mild Pain (1 - 3)  artificial  tears Solution 1 Drop(s) Both EYES every 4 hours PRN Dry Eyes  famotidine    Tablet 20 milliGRAM(s) Oral every 12 hours PRN dyspepsia  traMADol 50 milliGRAM(s) Oral every 4 hours PRN Severe Pain (7 - 10)  traMADol 25 milliGRAM(s) Oral every 4 hours PRN Moderate Pain (4 - 6)        IMAGING:    XAMINATION:  PHYSICAL EXAM:    Constitutional: No Acute Distress     Neurological: Arousable, oriented x3, following commands after a lot of prompting, Pupils bilaterally reactive and equal. Right gaze preference. Right side 5/5. LUE withdrawing. LLE is antigravity, however neglecting the left side.    Pulmonary: Clear to Auscultation, No rales, No rhonchi, No wheezes     Cardiovascular: S1, S2, Regular rate and rhythm     Gastrointestinal: Soft, Non-tender, Non-distended     Extremities: No calf tenderness     Incision:      HOSPITAL COURSE: Patient MOISÉS MAYO is a 73y (1950) RIGHT handed woman who presented to [facility] ED on 12/14/2023, as a transfer from [facility], as a CODE STROKE, with c/o R IPH.  Denies PMH.  CTH and CTA repeated - large R IPH (temporal/parietal). Presented to [place] today with c/o HA and AMS. Friend accompanying patient said that when visiting patient today - patient was not acting like herself and c/o HA.    NIHSS 9       24 hour Events:       12/15: rCTH: unchanged pending official read. No neurological exam as of now. Pending MR  12/15 O/N: Patient lethargic but opens eyes and follow commands. No events.   12/16: Transfer to stroke unit   12/16 o/n: No changes in exam. Pending another scan tomorrow AM.     Allergies    Allergy Status Unknown    Intolerances    oxycodone (Nausea)      REVIEW OF SYSTEMS: [ ] Unable to Assess due to neurologic exam   [ ] All ROS addressed below are non-contributory, except:  Neuro: [ ] Headache [ ] Back pain [ ] Numbness [ ] Weakness [ ] Ataxia [ ] Dizziness [ ] Aphasia [ ] Dysarthria [ ] Visual disturbance  Resp: [ ] Shortness of breath/dyspnea, [ ] Orthopnea [ ] Cough  CV: [ ] Chest pain [ ] Palpitation [ ] Lightheadedness [ ] Syncope  Renal: [ ] Thirst [ ] Edema  GI: [ ] Nausea [ ] Emesis [ ] Abdominal pain [ ] Constipation [ ] Diarrhea  Hem: [ ] Hematemesis [ ] bright red blood per rectum  ID: [ ] Fever [ ] Chills [ ] Dysuria  ENT: [ ] Rhinorrhea      DEVICES:   [ ] Restraints [ ] ET tube [ ] central line [ ] arterial line [ ] kovacs [ ] NGT/OGT [ ] EVD [ ] LD [ ] TREVIN/HMV [ ] Trach [ ] PEG [ ] Chest Tube     VITALS:   Vital Signs Last 24 Hrs  T(C): 36.9 (16 Dec 2023 15:00), Max: 37.1 (15 Dec 2023 19:00)  T(F): 98.5 (16 Dec 2023 15:00), Max: 98.7 (15 Dec 2023 19:00)  HR: 50 (16 Dec 2023 17:00) (50 - 79)  BP: 113/59 (16 Dec 2023 17:00) (101/45 - 122/60)  BP(mean): 76 (16 Dec 2023 17:00) (63 - 86)  RR: 19 (16 Dec 2023 17:00) (18 - 23)  SpO2: 97% (16 Dec 2023 17:00) (95% - 100%)    Parameters below as of 16 Dec 2023 07:00  Patient On (Oxygen Delivery Method): room air      CAPILLARY BLOOD GLUCOSE        I&O's Summary    15 Dec 2023 07:01  -  16 Dec 2023 07:00  --------------------------------------------------------  IN: 1952.5 mL / OUT: 1600 mL / NET: 352.5 mL    16 Dec 2023 07:01  -  16 Dec 2023 18:51  --------------------------------------------------------  IN: 980 mL / OUT: 650 mL / NET: 330 mL        Respiratory:        LABS:                        12.1   7.16  )-----------( 142      ( 16 Dec 2023 13:07 )             35.4     12-16    143  |  113<H>  |  14  ----------------------------<  188<H>  4.1   |  21<L>  |  0.46<L>             MEDICATION LEVELS:     IVF FLUIDS/MEDICATIONS:   MEDICATIONS  (STANDING):  chlorhexidine 4% Liquid 1 Application(s) Topical daily  diclofenac 0.1% Ophthalmic Solution 1 Drop(s) Left EYE <User Schedule>  polyethylene glycol 3350 17 Gram(s) Oral two times a day  senna 1 Tablet(s) Oral at bedtime  sodium chloride 3 Gram(s) Oral every 6 hours  sodium chloride 3% + sodium acetate 50:50 1000 milliLiter(s) (30 mL/Hr) IV Continuous <Continuous>  sodium chloride 3% + sodium acetate 50:50 1000 milliLiter(s) (50 mL/Hr) IV Continuous <Continuous>    MEDICATIONS  (PRN):  acetaminophen     Tablet .. 650 milliGRAM(s) Oral every 6 hours PRN Temp greater or equal to 38C (100.4F), Mild Pain (1 - 3)  artificial  tears Solution 1 Drop(s) Both EYES every 4 hours PRN Dry Eyes  famotidine    Tablet 20 milliGRAM(s) Oral every 12 hours PRN dyspepsia  traMADol 50 milliGRAM(s) Oral every 4 hours PRN Severe Pain (7 - 10)  traMADol 25 milliGRAM(s) Oral every 4 hours PRN Moderate Pain (4 - 6)        IMAGING:    XAMINATION:  PHYSICAL EXAM:    Constitutional: No Acute Distress     Neurological: Arousable, oriented x3, following commands after a lot of prompting, Pupils bilaterally reactive and equal. Right gaze preference. Right side 5/5. LUE withdrawing. LLE is antigravity, however neglecting the left side.    Pulmonary: Clear to Auscultation, No rales, No rhonchi, No wheezes     Cardiovascular: S1, S2, Regular rate and rhythm     Gastrointestinal: Soft, Non-tender, Non-distended     Extremities: No calf tenderness     Incision:

## 2023-12-17 DIAGNOSIS — I61.9 NONTRAUMATIC INTRACEREBRAL HEMORRHAGE, UNSPECIFIED: ICD-10-CM

## 2023-12-17 DIAGNOSIS — R00.1 BRADYCARDIA, UNSPECIFIED: ICD-10-CM

## 2023-12-17 LAB
ANION GAP SERPL CALC-SCNC: 10 MMOL/L — SIGNIFICANT CHANGE UP (ref 5–17)
ANION GAP SERPL CALC-SCNC: 10 MMOL/L — SIGNIFICANT CHANGE UP (ref 5–17)
ANION GAP SERPL CALC-SCNC: 9 MMOL/L — SIGNIFICANT CHANGE UP (ref 5–17)
ANION GAP SERPL CALC-SCNC: 9 MMOL/L — SIGNIFICANT CHANGE UP (ref 5–17)
BUN SERPL-MCNC: 11 MG/DL — SIGNIFICANT CHANGE UP (ref 7–23)
BUN SERPL-MCNC: 11 MG/DL — SIGNIFICANT CHANGE UP (ref 7–23)
BUN SERPL-MCNC: 13 MG/DL — SIGNIFICANT CHANGE UP (ref 7–23)
BUN SERPL-MCNC: 13 MG/DL — SIGNIFICANT CHANGE UP (ref 7–23)
CALCIUM SERPL-MCNC: 8.4 MG/DL — SIGNIFICANT CHANGE UP (ref 8.4–10.5)
CALCIUM SERPL-MCNC: 8.4 MG/DL — SIGNIFICANT CHANGE UP (ref 8.4–10.5)
CALCIUM SERPL-MCNC: 8.7 MG/DL — SIGNIFICANT CHANGE UP (ref 8.4–10.5)
CALCIUM SERPL-MCNC: 8.7 MG/DL — SIGNIFICANT CHANGE UP (ref 8.4–10.5)
CHLORIDE SERPL-SCNC: 114 MMOL/L — HIGH (ref 96–108)
CHLORIDE SERPL-SCNC: 114 MMOL/L — HIGH (ref 96–108)
CHLORIDE SERPL-SCNC: 118 MMOL/L — HIGH (ref 96–108)
CHLORIDE SERPL-SCNC: 118 MMOL/L — HIGH (ref 96–108)
CO2 SERPL-SCNC: 25 MMOL/L — SIGNIFICANT CHANGE UP (ref 22–31)
CREAT SERPL-MCNC: 0.5 MG/DL — SIGNIFICANT CHANGE UP (ref 0.5–1.3)
CREAT SERPL-MCNC: 0.5 MG/DL — SIGNIFICANT CHANGE UP (ref 0.5–1.3)
CREAT SERPL-MCNC: 0.51 MG/DL — SIGNIFICANT CHANGE UP (ref 0.5–1.3)
CREAT SERPL-MCNC: 0.51 MG/DL — SIGNIFICANT CHANGE UP (ref 0.5–1.3)
EGFR: 98 ML/MIN/1.73M2 — SIGNIFICANT CHANGE UP
EGFR: 98 ML/MIN/1.73M2 — SIGNIFICANT CHANGE UP
EGFR: 99 ML/MIN/1.73M2 — SIGNIFICANT CHANGE UP
EGFR: 99 ML/MIN/1.73M2 — SIGNIFICANT CHANGE UP
GLUCOSE SERPL-MCNC: 118 MG/DL — HIGH (ref 70–99)
GLUCOSE SERPL-MCNC: 118 MG/DL — HIGH (ref 70–99)
GLUCOSE SERPL-MCNC: 125 MG/DL — HIGH (ref 70–99)
GLUCOSE SERPL-MCNC: 125 MG/DL — HIGH (ref 70–99)
POTASSIUM SERPL-MCNC: 3.3 MMOL/L — LOW (ref 3.5–5.3)
POTASSIUM SERPL-MCNC: 3.3 MMOL/L — LOW (ref 3.5–5.3)
POTASSIUM SERPL-MCNC: 3.9 MMOL/L — SIGNIFICANT CHANGE UP (ref 3.5–5.3)
POTASSIUM SERPL-MCNC: 3.9 MMOL/L — SIGNIFICANT CHANGE UP (ref 3.5–5.3)
POTASSIUM SERPL-SCNC: 3.3 MMOL/L — LOW (ref 3.5–5.3)
POTASSIUM SERPL-SCNC: 3.3 MMOL/L — LOW (ref 3.5–5.3)
POTASSIUM SERPL-SCNC: 3.9 MMOL/L — SIGNIFICANT CHANGE UP (ref 3.5–5.3)
POTASSIUM SERPL-SCNC: 3.9 MMOL/L — SIGNIFICANT CHANGE UP (ref 3.5–5.3)
SODIUM SERPL-SCNC: 148 MMOL/L — HIGH (ref 135–145)
SODIUM SERPL-SCNC: 148 MMOL/L — HIGH (ref 135–145)
SODIUM SERPL-SCNC: 153 MMOL/L — HIGH (ref 135–145)
SODIUM SERPL-SCNC: 153 MMOL/L — HIGH (ref 135–145)

## 2023-12-17 PROCEDURE — 70450 CT HEAD/BRAIN W/O DYE: CPT | Mod: 26

## 2023-12-17 PROCEDURE — 99233 SBSQ HOSP IP/OBS HIGH 50: CPT

## 2023-12-17 PROCEDURE — 93010 ELECTROCARDIOGRAM REPORT: CPT

## 2023-12-17 PROCEDURE — 74018 RADEX ABDOMEN 1 VIEW: CPT | Mod: 26

## 2023-12-17 RX ORDER — SODIUM CHLORIDE 5 G/100ML
1000 INJECTION, SOLUTION INTRAVENOUS
Refills: 0 | Status: DISCONTINUED | OUTPATIENT
Start: 2023-12-17 | End: 2023-12-18

## 2023-12-17 RX ORDER — BRIVARACETAM 25 MG/1
50 TABLET, FILM COATED ORAL
Refills: 0 | Status: DISCONTINUED | OUTPATIENT
Start: 2023-12-17 | End: 2023-12-19

## 2023-12-17 RX ORDER — SENNA PLUS 8.6 MG/1
2 TABLET ORAL AT BEDTIME
Refills: 0 | Status: DISCONTINUED | OUTPATIENT
Start: 2023-12-17 | End: 2023-12-20

## 2023-12-17 RX ORDER — LOSARTAN POTASSIUM 100 MG/1
25 TABLET, FILM COATED ORAL DAILY
Refills: 0 | Status: DISCONTINUED | OUTPATIENT
Start: 2023-12-17 | End: 2023-12-20

## 2023-12-17 RX ORDER — HYDRALAZINE HCL 50 MG
5 TABLET ORAL EVERY 6 HOURS
Refills: 0 | Status: DISCONTINUED | OUTPATIENT
Start: 2023-12-17 | End: 2023-12-19

## 2023-12-17 RX ORDER — ONDANSETRON 8 MG/1
4 TABLET, FILM COATED ORAL ONCE
Refills: 0 | Status: COMPLETED | OUTPATIENT
Start: 2023-12-17 | End: 2023-12-17

## 2023-12-17 RX ORDER — ENOXAPARIN SODIUM 100 MG/ML
40 INJECTION SUBCUTANEOUS
Refills: 0 | Status: DISCONTINUED | OUTPATIENT
Start: 2023-12-17 | End: 2023-12-20

## 2023-12-17 RX ORDER — LEVETIRACETAM 250 MG/1
500 TABLET, FILM COATED ORAL EVERY 12 HOURS
Refills: 0 | Status: DISCONTINUED | OUTPATIENT
Start: 2023-12-17 | End: 2023-12-17

## 2023-12-17 RX ORDER — METOCLOPRAMIDE HCL 10 MG
5 TABLET ORAL EVERY 6 HOURS
Refills: 0 | Status: DISCONTINUED | OUTPATIENT
Start: 2023-12-17 | End: 2023-12-19

## 2023-12-17 RX ORDER — SIMETHICONE 80 MG/1
80 TABLET, CHEWABLE ORAL ONCE
Refills: 0 | Status: COMPLETED | OUTPATIENT
Start: 2023-12-17 | End: 2023-12-17

## 2023-12-17 RX ORDER — METOCLOPRAMIDE HCL 10 MG
10 TABLET ORAL ONCE
Refills: 0 | Status: DISCONTINUED | OUTPATIENT
Start: 2023-12-17 | End: 2023-12-17

## 2023-12-17 RX ORDER — HYDRALAZINE HCL 50 MG
5 TABLET ORAL ONCE
Refills: 0 | Status: COMPLETED | OUTPATIENT
Start: 2023-12-17 | End: 2023-12-17

## 2023-12-17 RX ORDER — ACETAMINOPHEN 500 MG
1000 TABLET ORAL ONCE
Refills: 0 | Status: COMPLETED | OUTPATIENT
Start: 2023-12-17 | End: 2023-12-17

## 2023-12-17 RX ADMIN — SODIUM CHLORIDE 75 MILLILITER(S): 5 INJECTION, SOLUTION INTRAVENOUS at 11:09

## 2023-12-17 RX ADMIN — BRIVARACETAM 220 MILLIGRAM(S): 25 TABLET, FILM COATED ORAL at 18:30

## 2023-12-17 RX ADMIN — POLYETHYLENE GLYCOL 3350 17 GRAM(S): 17 POWDER, FOR SOLUTION ORAL at 05:14

## 2023-12-17 RX ADMIN — Medication 400 MILLIGRAM(S): at 18:54

## 2023-12-17 RX ADMIN — ENOXAPARIN SODIUM 40 MILLIGRAM(S): 100 INJECTION SUBCUTANEOUS at 18:30

## 2023-12-17 RX ADMIN — Medication 1000 MILLIGRAM(S): at 19:24

## 2023-12-17 RX ADMIN — Medication 1 DROP(S): at 16:42

## 2023-12-17 RX ADMIN — Medication 5 MILLIGRAM(S): at 14:40

## 2023-12-17 RX ADMIN — ONDANSETRON 4 MILLIGRAM(S): 8 TABLET, FILM COATED ORAL at 05:00

## 2023-12-17 RX ADMIN — Medication 5 MILLIGRAM(S): at 16:00

## 2023-12-17 RX ADMIN — SIMETHICONE 80 MILLIGRAM(S): 80 TABLET, CHEWABLE ORAL at 05:44

## 2023-12-17 RX ADMIN — Medication 10 MILLIGRAM(S): at 11:09

## 2023-12-17 NOTE — DIETITIAN INITIAL EVALUATION ADULT - ADD RECOMMEND
1. Continue PO diet with no therapeutic restrictions. Defer consistency to medical team, SLP.   2. Encourage and monitor PO intake. Encourage use of daily menus. Honor dietary preferences as expressed as able.  3. RD to add Mighty Shakes TID with meal trays, Greek yogurt with breakfast and lunch. Prune juice with breakfast, lunch and dinner.   4. Recommend multivitamin (if no medication contraindications) to aid in prevention of micronutrient deficiencies.   5. Monitor wt trends/labs/skin integrity/hydration/status/bowel regularity.   6. Calorie count in place - RD to analyze and interpret results upon completion of 3 days.

## 2023-12-17 NOTE — PROGRESS NOTE ADULT - THIS PATIENT HAS THE FOLLOWING CONDITION(S)/DIAGNOSES ON THIS ADMISSION:
Encephalopathy/Cerebral Edema/Brain Compression / Herniation
None
Encephalopathy/Cerebral Edema/Brain Compression / Herniation
IPH
None
None
IPH

## 2023-12-17 NOTE — PROGRESS NOTE ADULT - ASSESSMENT
Patient MOISÉS MAYO is a 73y (1950) RIGHT handed woman, w/o PMH, who presented to [facility] ED on 12/14/2023, as a transfer from [facility], found to have large R IPH (temporal/parieta).    ICH score1  CTA - negative for definitive abnormal vasculature   NIHSS 9  (L-neglect, LHH, L NLF, LLUE/LLE drift, R gaze)  ED  RA.     large R IPH (temporal/parieta).    12/15: rCTH: unchanged   12/15 O/N: Patient lethargic but opens eyes and follow commands. No events.   12/16: rCTH showing interval mild increased IPH  12/17 stable exam lalito to 30-40, EEG read rare sharp wave, Increased risk for focal seizures with onset in the right frontocentral region. EEG dc    # seizure tendency:  -h/o LLE shaking, on EEG for 19hr, showing rare sharp in R-FC, increase epileptic tendency  [] Please consider initiating AED and monitor clinically   #Constipation  - no BM since admission w/ abd pain  -[] Please increase bowel regimen to reduce Abd pain and nausea sxm    #Afib  #bradycardia to 30s  - is holding AC due to IPH..... [ ] please consult/ contact cardiology for a recommendation.     # Large R IPH (temporal/parieta).    -ICU for IPH progression monitor, Holding AC (for Afib on tele)  - MRI done- no evidence of mass, not much microbleed on SWI, all the hypointensity dots have continuous trajectory, c/w vasculature   - pain management with tylenol/oxy for HA  - Patient started on Keppra for LLE shaking, EEG on - negative and DC,   - SBP goal 100-150  - a1c 5.0   - TTE- EF 64%, no other otherwise   - now on PO diet [] pending PT OT when IPH is more stable.   - On 3% @ 50cc/hr Na goal 140-145 for 12/17  Now it is day 4   - [] 12/17 reviewed [facility], stable, will accept transfer care to stroke unit, stroke bed      Colby Caldera MD PHD  Vascular neurology fellow    Patient MOISÉS MAYO is a 73y (1950) RIGHT handed woman, w/o PMH, who presented to [facility] ED on 12/14/2023, as a transfer from [facility], found to have large R IPH (temporal/parieta).    ICH score1  CTA - negative for definitive abnormal vasculature   NIHSS 9  (L-neglect, LHH, L NLF, LLUE/LLE drift, R gaze)  ED  RA.     large R IPH (temporal/parieta).    12/15: rCTH: unchanged   12/15 O/N: Patient lethargic but opens eyes and follow commands. No events.   12/16: rCTH showing interval mild increased IPH  12/17 stable exam lalito to 30-40, EEG read rare sharp wave, Increased risk for focal seizures with onset in the right frontocentral region. EEG dc    # seizure tendency:  -h/o LLE shaking, on EEG for 19hr, showing rare sharp in R-FC, increase epileptic tendency  [] Please consider initiating AED and monitor clinically   #Constipation  - no BM since admission w/ abd pain  -[] Please increase bowel regimen to reduce Abd pain and nausea sxm    #Afib  #bradycardia to 30s  - is holding AC due to IPH..... [ ] please consult/ contact cardiology for a recommendation.     # Large R IPH (temporal/parieta).    -ICU for IPH progression monitor, Holding AC (for Afib on tele)  - MRI done- no evidence of mass, not much microbleed on SWI, all the hypointensity dots have continuous trajectory, c/w vasculature   - pain management with tylenol/oxy for HA  - Patient started on Keppra for LLE shaking, EEG on - negative and DC,   - SBP goal 100-150  - a1c 5.0   - TTE- EF 64%, no other otherwise   - now on PO diet [] pending PT OT when IPH is more stable.   - On 3% @ 50cc/hr Na goal 140-145 for 12/17  Now it is day 4   - [] 12/17 reviewed [facility], stable, will accept transfer care to stroke unit, stroke bed      Colby Caldera MD PHD  Vascular neurology fellow

## 2023-12-17 NOTE — CONSULT NOTE ADULT - SUBJECTIVE AND OBJECTIVE BOX
CHIEF COMPLAINT:    HISTORY OF PRESENT ILLNESS:   73y (1950) RIGHT handed woman who presented to [facility] ED on 12/14/2023, as a transfer from [facility], as a CODE STROKE, with c/o R IPH.  Denies PMH.  CTH and CTA repeated - large R IPH (temporal/parietal). Presented to [place] today with c/o HA and AMS. Friend accompanying patient said that when visiting patient today - patient was not acting like herself and c/o HA.    NIHSS 9       24 hour Events:       12/15: rCTH: unchanged pending official read. No neurological exam as of now. Pending MR  12/15 O/N: Patient lethargic but opens eyes and follow commands. No events.   12/16: Transfer to stroke unit   12/16 o/n: No changes in exam. Pending another scan tomorrow AM.   12/17- No events overnight.   Bradycardic to 30s. Stable BP   No chest pain or shortness of breath   No previous cardia issues as per patient      PAST MEDICAL & SURGICAL HISTORY:  No pertinent past medical history    MEDICATIONS:  enoxaparin Injectable 40 milliGRAM(s) SubCutaneous <User Schedule>        acetaminophen     Tablet .. 650 milliGRAM(s) Oral every 6 hours PRN  brivaracetam  IVPB 50 milliGRAM(s) IV Intermittent two times a day  traMADol 50 milliGRAM(s) Oral every 4 hours PRN  traMADol 25 milliGRAM(s) Oral every 4 hours PRN    bisacodyl 5 milliGRAM(s) Oral daily  bisacodyl Suppository 10 milliGRAM(s) Rectal daily PRN  famotidine    Tablet 20 milliGRAM(s) Oral every 12 hours PRN  polyethylene glycol 3350 17 Gram(s) Oral two times a day  senna 2 Tablet(s) Oral at bedtime      artificial  tears Solution 1 Drop(s) Both EYES every 4 hours PRN  diclofenac 0.1% Ophthalmic Solution 1 Drop(s) Left EYE <User Schedule>  sodium chloride 2% + sodium acetate 50:50 1000 milliLiter(s) IV Continuous <Continuous>      FAMILY HISTORY:      SOCIAL HISTORY:    [ ] Non-smoker  [ ] Smoker  [ ] Alcohol    Allergies    Allergy Status Unknown    Intolerances    oxycodone (Nausea)  	    REVIEW OF SYSTEMS:  CONSTITUTIONAL: No fever, weight loss, + fatigue  EYES: No eye pain, visual disturbances, or discharge  ENMT:  No difficulty hearing, tinnitus, vertigo; No sinus or throat pain  NECK: No pain or stiffness  RESPIRATORY: No cough, wheezing, chills or hemoptysis; No Shortness of Breath  CARDIOVASCULAR: No chest pain, palpitations, passing out, dizziness, or leg swelling  GASTROINTESTINAL: No abdominal or epigastric pain. No nausea, vomiting, or hematemesis; No diarrhea or constipation. No melena or hematochezia.  GENITOURINARY: No dysuria, frequency, hematuria, or incontinence  NEUROLOGICAL: No headaches, memory loss, loss of strength, numbness, or tremors  SKIN: No itching, burning, rashes, or lesions   LYMPH Nodes: No enlarged glands  ENDOCRINE: No heat or cold intolerance; No hair loss  MUSCULOSKELETAL: No joint pain or swelling; No muscle, back, or extremity pain  PSYCHIATRIC: No depression, anxiety, mood swings, or difficulty sleeping  HEME/LYMPH: No easy bruising, or bleeding gums  ALLERY AND IMMUNOLOGIC: No hives or eczema	    [ ] All others negative	  [ ] Unable to obtain    PHYSICAL EXAM:  T(C): 36.8 (12-17-23 @ 07:00), Max: 37.2 (12-16-23 @ 23:00)  HR: 44 (12-17-23 @ 09:00) (37 - 65)  BP: 135/61 (12-17-23 @ 09:00) (105/53 - 135/61)  RR: 23 (12-17-23 @ 09:00) (18 - 23)  SpO2: 96% (12-17-23 @ 09:00) (95% - 99%)  Wt(kg): --  I&O's Summary    16 Dec 2023 07:01  -  17 Dec 2023 07:00  --------------------------------------------------------  IN: 2120 mL / OUT: 850 mL / NET: 1270 mL    17 Dec 2023 07:01  -  17 Dec 2023 10:31  --------------------------------------------------------  IN: 160 mL / OUT: 0 mL / NET: 160 mL        Appearance: Normal	  HEENT:   Normal oral mucosa, PERRL, EOMI	  Lymphatic: No lymphadenopathy  Cardiovascular: Normal S1 S2, No JVD, No murmurs, No edema  Respiratory: Lungs clear to auscultation	  Psychiatry: A & O x 3, Mood & affect appropriate  Gastrointestinal:  Soft, Non-tender, + BS	  Skin: No rashes, No ecchymoses, No cyanosis	  Neurological: Arousable, oriented x3, following commands after a lot of prompting, Pupils bilaterally reactive and equal. Right gaze preference. Right side 5/5. LUE withdrawing. LLE is antigravity, however neglecting the left side.      Extremities: Normal range of motion, No clubbing, cyanosis or edema  Vascular: Peripheral pulses palpable 2+ bilaterally    TELEMETRY: Sinus bradycardia 37 PACs	    ECG:  	SR PACs non specific stt changes   RADIOLOGY:  < from: CT Head No Cont (12.16.23 @ 14:47) >  ACC: [id number] EXAM:  CT BRAIN   ORDERED BY: JEOVANY RUFF     PROCEDURE DATE:  12/16/2023          INTERPRETATION:  CLINICAL INDICATION: Follow-up right temporal parietal   intraparenchymal hematoma..    TECHNIQUE: CT of the head was performed without the administration of   intravenous contrast.    COMPARISON: Noncontrast CT head 12/16/2023 at 10:11 AM.    FINDINGS:  Similar-appearing large right parietal temporal intraparenchymal hematoma   measuring up to 6.3 x 3.0 x 4.0 cm with similar surrounding vasogenic   edema.. There is similar mass effect effacing the right lateral ventricle   and resulting in a right to left midline shift of 7 mm, unchanged from   comparison.    Similar small scattered areas of adjacent subarachnoid hemorrhage.   Similar small hemorrhage within the left occipital horn.    White matter hypoattenuating foci are noted, compatible with   age-indeterminate small vessel disease.    No hydrocephalus. No extra-axial fluid collections.    Similar mild mucosal thickening in the right sphenoid sinus. The other   visualized paranasal sinuses are clear. The mastoid air cells are clear.   The visualized soft tissues and osseous structures appear normal.    IMPRESSION:  Stable large right parietotemporal intraparenchymal hematoma with   intraventricular extension and surrounding small subarachnoid hemorrhage.   There are multiple blood fluid levels within the hematoma    --- End of Report ---        < end of copied text >  < from: CT Head No Cont (12.15.23 @ 08:22) >    ACC: [id number] EXAM:  CT BRAIN   ORDERED BY: JEOVANY RUFF     PROCEDURE DATE:  12/15/2023          INTERPRETATION:  Noncontrast CT of the brain.    CLINICAL INDICATION:  Follow-up right parietotemporal parenchymal   hemorrhage    TECHNIQUE : Axial CT scanning of the brain was obtained from the skull   base to the vertex without the administration of intravenous contrast.   Sagittal and coronal reformats were provided.    COMPARISON: CT brain 12/15/2023    FINDINGS:    Similar large right parietotemporal parenchymal hemorrhage.    Similar mass effect upon the right ventricular atrium.    Similar mild leftward midline shift. Basal cisterns are visualized.    Ventricles are similar in size, no large hydrocephalus.    IMPRESSION:    No significant interval change from CT brain 12/15/2023 obtained at 12:16   AM.    --- End of Report ---    < end of copied text >  < from: TTE Limited W or WO Ultrasound Enhancing Agent (12.15.23 @ 06:25) >    TRANSTHORACIC ECHOCARDIOGRAM REPORT  ________________________________________________________________________________                                      _______       Pt. Name:       MOISÉS MAYO Study Date:    12/15/2023  MRN:            [id number]    YOB: 1950  Accession #:    [id number]     Age:           73 years  Account#:       [id number]  Gender:        F  Heart Rate:     76 bpm        Height:        64.00 in (162.56 cm)  Rhythm:                       Weight:        150.00 lb (68.04 kg)  Blood Pressure: 106/63 mmHg   BSA/BMI:       1.73 m² / 25.75 kg/m²  ________________________________________________________________________________________  Referring Physician:    [id number] Ailyn Walden  Interpreting Physician: Haylee Suárez MD  Primary Sonographer:    Ria Wall [facility]  Fellow (Performing):    Holger Hancock MD    CPT:               ECHO TTE WO CON COMP W DOPP - 31751.m  Indication(s):     Chest pain, unspecified - R07.9  Procedure:         Transthoracic echocardiogram with 2-D, M-mode and complete                     spectral and color flow Doppler.  Ordering Location: [facility]  Admission Status:  Inpatient  Study Information: Image quality for this study is adequate.    _______________________________________________________________________________________     CONCLUSIONS:      1. Left ventricular cavity is small. Left ventricular wall thickness is normal. Left ventricular systolic function is normal with an ejection fraction of 64 % by Molina's method of disks. There are no regional wall motion abnormalities seen.   2. Normal left ventricular diastolic function, with normal filling pressure.   3. Normal right ventricular cavity size, wall thickness, and systolic function.   4. Normal atria.   5. No significant valvular disease.   6. No pericardial effusion seen.   7. No prior echocardiogram is available for comparison.    ________________________________________________________________________________________  FINDINGS:     Left Ventricle:  The left ventricular cavity is small. Left ventricular wall thickness is normal. Left ventricular systolic function is normal with a calculated ejection fraction of 64 % by the Molina's biplane method of disks. There are no regional wall motion abnormalities seen. There is normal left ventricular diastolic function, with normal filling pressure.     Right Ventricle:  The right ventricular cavity is normal in size, normal wall thickness and normal systolic function. Tricuspid annular plane systolic excursion (TAPSE) is 2.2 cm (normal >=1.7 cm).     Left Atrium:  The left atrium is normal with an indexed volume of 22.94 ml/m².     Right Atrium:  The right atrium is normal in size with an indexed volume of 17.91 ml/m².     Interatrial Septum:  The interatrial septum appears intact.     Aortic Valve:  The aortic valve is tricuspid with normal leaflet excursion. There is no aortic valve stenosis. There is no evidence of aortic regurgitation.     Mitral Valve:  Structurally normal mitral valve with normal leaflet excursion. There is no mitral valve stenosis. There is trace mitral regurgitation.     Tricuspid Valve:  Structurally normal tricuspid valve with normal leaflet excursion. There is trace tricuspid regurgitation.     Pulmonic Valve:  Structurally normal pulmonic valve with normal leaflet excursion. There is trace pulmonic regurgitation.     Aorta:  The aortic annulus and aortic root appear normal in size.     Pericardium:  No pericardial effusion seen.  ____________________________________________________________________    < end of copied text >    OTHER: 	  	  LABS:	 	    CARDIAC MARKERS:                                  12.1   7.16  )-----------( 142      ( 16 Dec 2023 13:07 )             35.4     12-17    153<H>  |  118<H>  |  13  ----------------------------<  125<H>  3.9   |  25  |  0.50    Ca    8.4      17 Dec 2023 05:46  Phos  2.2     12-16  Mg     2.3     12-16      proBNP:   Lipid Profile:   HgA1c:   TSH:            CHIEF COMPLAINT:    HISTORY OF PRESENT ILLNESS:   73y (1950) RIGHT handed woman who presented to [facility] ED on 12/14/2023, as a transfer from [facility], as a CODE STROKE, with c/o R IPH.  Denies PMH.  CTH and CTA repeated - large R IPH (temporal/parietal). Presented to [place] today with c/o HA and AMS. Friend accompanying patient said that when visiting patient today - patient was not acting like herself and c/o HA.    NIHSS 9       24 hour Events:       12/15: rCTH: unchanged pending official read. No neurological exam as of now. Pending MR  12/15 O/N: Patient lethargic but opens eyes and follow commands. No events.   12/16: Transfer to stroke unit   12/16 o/n: No changes in exam. Pending another scan tomorrow AM.   12/17- No events overnight.   Bradycardic to 30s. Stable BP   No chest pain or shortness of breath   No previous cardia issues as per patient      PAST MEDICAL & SURGICAL HISTORY:  No pertinent past medical history    MEDICATIONS:  enoxaparin Injectable 40 milliGRAM(s) SubCutaneous <User Schedule>        acetaminophen     Tablet .. 650 milliGRAM(s) Oral every 6 hours PRN  brivaracetam  IVPB 50 milliGRAM(s) IV Intermittent two times a day  traMADol 50 milliGRAM(s) Oral every 4 hours PRN  traMADol 25 milliGRAM(s) Oral every 4 hours PRN    bisacodyl 5 milliGRAM(s) Oral daily  bisacodyl Suppository 10 milliGRAM(s) Rectal daily PRN  famotidine    Tablet 20 milliGRAM(s) Oral every 12 hours PRN  polyethylene glycol 3350 17 Gram(s) Oral two times a day  senna 2 Tablet(s) Oral at bedtime      artificial  tears Solution 1 Drop(s) Both EYES every 4 hours PRN  diclofenac 0.1% Ophthalmic Solution 1 Drop(s) Left EYE <User Schedule>  sodium chloride 2% + sodium acetate 50:50 1000 milliLiter(s) IV Continuous <Continuous>      FAMILY HISTORY:      SOCIAL HISTORY:    [ ] Non-smoker  [ ] Smoker  [ ] Alcohol    Allergies    Allergy Status Unknown    Intolerances    oxycodone (Nausea)  	    REVIEW OF SYSTEMS:  CONSTITUTIONAL: No fever, weight loss, + fatigue  EYES: No eye pain, visual disturbances, or discharge  ENMT:  No difficulty hearing, tinnitus, vertigo; No sinus or throat pain  NECK: No pain or stiffness  RESPIRATORY: No cough, wheezing, chills or hemoptysis; No Shortness of Breath  CARDIOVASCULAR: No chest pain, palpitations, passing out, dizziness, or leg swelling  GASTROINTESTINAL: No abdominal or epigastric pain. No nausea, vomiting, or hematemesis; No diarrhea or constipation. No melena or hematochezia.  GENITOURINARY: No dysuria, frequency, hematuria, or incontinence  NEUROLOGICAL: No headaches, memory loss, loss of strength, numbness, or tremors  SKIN: No itching, burning, rashes, or lesions   LYMPH Nodes: No enlarged glands  ENDOCRINE: No heat or cold intolerance; No hair loss  MUSCULOSKELETAL: No joint pain or swelling; No muscle, back, or extremity pain  PSYCHIATRIC: No depression, anxiety, mood swings, or difficulty sleeping  HEME/LYMPH: No easy bruising, or bleeding gums  ALLERY AND IMMUNOLOGIC: No hives or eczema	    [ ] All others negative	  [ ] Unable to obtain    PHYSICAL EXAM:  T(C): 36.8 (12-17-23 @ 07:00), Max: 37.2 (12-16-23 @ 23:00)  HR: 44 (12-17-23 @ 09:00) (37 - 65)  BP: 135/61 (12-17-23 @ 09:00) (105/53 - 135/61)  RR: 23 (12-17-23 @ 09:00) (18 - 23)  SpO2: 96% (12-17-23 @ 09:00) (95% - 99%)  Wt(kg): --  I&O's Summary    16 Dec 2023 07:01  -  17 Dec 2023 07:00  --------------------------------------------------------  IN: 2120 mL / OUT: 850 mL / NET: 1270 mL    17 Dec 2023 07:01  -  17 Dec 2023 10:31  --------------------------------------------------------  IN: 160 mL / OUT: 0 mL / NET: 160 mL        Appearance: Normal	  HEENT:   Normal oral mucosa, PERRL, EOMI	  Lymphatic: No lymphadenopathy  Cardiovascular: Normal S1 S2, No JVD, No murmurs, No edema  Respiratory: Lungs clear to auscultation	  Psychiatry: A & O x 3, Mood & affect appropriate  Gastrointestinal:  Soft, Non-tender, + BS	  Skin: No rashes, No ecchymoses, No cyanosis	  Neurological: Arousable, oriented x3, following commands after a lot of prompting, Pupils bilaterally reactive and equal. Right gaze preference. Right side 5/5. LUE withdrawing. LLE is antigravity, however neglecting the left side.      Extremities: Normal range of motion, No clubbing, cyanosis or edema  Vascular: Peripheral pulses palpable 2+ bilaterally    TELEMETRY: Sinus bradycardia 37 PACs	    ECG:  	SR PACs non specific stt changes   RADIOLOGY:  < from: CT Head No Cont (12.16.23 @ 14:47) >  ACC: [id number] EXAM:  CT BRAIN   ORDERED BY: JEOVANY RUFF     PROCEDURE DATE:  12/16/2023          INTERPRETATION:  CLINICAL INDICATION: Follow-up right temporal parietal   intraparenchymal hematoma..    TECHNIQUE: CT of the head was performed without the administration of   intravenous contrast.    COMPARISON: Noncontrast CT head 12/16/2023 at 10:11 AM.    FINDINGS:  Similar-appearing large right parietal temporal intraparenchymal hematoma   measuring up to 6.3 x 3.0 x 4.0 cm with similar surrounding vasogenic   edema.. There is similar mass effect effacing the right lateral ventricle   and resulting in a right to left midline shift of 7 mm, unchanged from   comparison.    Similar small scattered areas of adjacent subarachnoid hemorrhage.   Similar small hemorrhage within the left occipital horn.    White matter hypoattenuating foci are noted, compatible with   age-indeterminate small vessel disease.    No hydrocephalus. No extra-axial fluid collections.    Similar mild mucosal thickening in the right sphenoid sinus. The other   visualized paranasal sinuses are clear. The mastoid air cells are clear.   The visualized soft tissues and osseous structures appear normal.    IMPRESSION:  Stable large right parietotemporal intraparenchymal hematoma with   intraventricular extension and surrounding small subarachnoid hemorrhage.   There are multiple blood fluid levels within the hematoma    --- End of Report ---        < end of copied text >  < from: CT Head No Cont (12.15.23 @ 08:22) >    ACC: [id number] EXAM:  CT BRAIN   ORDERED BY: JEOVANY RUFF     PROCEDURE DATE:  12/15/2023          INTERPRETATION:  Noncontrast CT of the brain.    CLINICAL INDICATION:  Follow-up right parietotemporal parenchymal   hemorrhage    TECHNIQUE : Axial CT scanning of the brain was obtained from the skull   base to the vertex without the administration of intravenous contrast.   Sagittal and coronal reformats were provided.    COMPARISON: CT brain 12/15/2023    FINDINGS:    Similar large right parietotemporal parenchymal hemorrhage.    Similar mass effect upon the right ventricular atrium.    Similar mild leftward midline shift. Basal cisterns are visualized.    Ventricles are similar in size, no large hydrocephalus.    IMPRESSION:    No significant interval change from CT brain 12/15/2023 obtained at 12:16   AM.    --- End of Report ---    < end of copied text >  < from: TTE Limited W or WO Ultrasound Enhancing Agent (12.15.23 @ 06:25) >    TRANSTHORACIC ECHOCARDIOGRAM REPORT  ________________________________________________________________________________                                      _______       Pt. Name:       MOISÉS MAYO Study Date:    12/15/2023  MRN:            [id number]    YOB: 1950  Accession #:    [id number]     Age:           73 years  Account#:       [id number]  Gender:        F  Heart Rate:     76 bpm        Height:        64.00 in (162.56 cm)  Rhythm:                       Weight:        150.00 lb (68.04 kg)  Blood Pressure: 106/63 mmHg   BSA/BMI:       1.73 m² / 25.75 kg/m²  ________________________________________________________________________________________  Referring Physician:    [id number] Ailyn Walden  Interpreting Physician: Haylee Suárez MD  Primary Sonographer:    Ria Wall [facility]  Fellow (Performing):    Holger Hancock MD    CPT:               ECHO TTE WO CON COMP W DOPP - 85097.m  Indication(s):     Chest pain, unspecified - R07.9  Procedure:         Transthoracic echocardiogram with 2-D, M-mode and complete                     spectral and color flow Doppler.  Ordering Location: [facility]  Admission Status:  Inpatient  Study Information: Image quality for this study is adequate.    _______________________________________________________________________________________     CONCLUSIONS:      1. Left ventricular cavity is small. Left ventricular wall thickness is normal. Left ventricular systolic function is normal with an ejection fraction of 64 % by Molina's method of disks. There are no regional wall motion abnormalities seen.   2. Normal left ventricular diastolic function, with normal filling pressure.   3. Normal right ventricular cavity size, wall thickness, and systolic function.   4. Normal atria.   5. No significant valvular disease.   6. No pericardial effusion seen.   7. No prior echocardiogram is available for comparison.    ________________________________________________________________________________________  FINDINGS:     Left Ventricle:  The left ventricular cavity is small. Left ventricular wall thickness is normal. Left ventricular systolic function is normal with a calculated ejection fraction of 64 % by the Molina's biplane method of disks. There are no regional wall motion abnormalities seen. There is normal left ventricular diastolic function, with normal filling pressure.     Right Ventricle:  The right ventricular cavity is normal in size, normal wall thickness and normal systolic function. Tricuspid annular plane systolic excursion (TAPSE) is 2.2 cm (normal >=1.7 cm).     Left Atrium:  The left atrium is normal with an indexed volume of 22.94 ml/m².     Right Atrium:  The right atrium is normal in size with an indexed volume of 17.91 ml/m².     Interatrial Septum:  The interatrial septum appears intact.     Aortic Valve:  The aortic valve is tricuspid with normal leaflet excursion. There is no aortic valve stenosis. There is no evidence of aortic regurgitation.     Mitral Valve:  Structurally normal mitral valve with normal leaflet excursion. There is no mitral valve stenosis. There is trace mitral regurgitation.     Tricuspid Valve:  Structurally normal tricuspid valve with normal leaflet excursion. There is trace tricuspid regurgitation.     Pulmonic Valve:  Structurally normal pulmonic valve with normal leaflet excursion. There is trace pulmonic regurgitation.     Aorta:  The aortic annulus and aortic root appear normal in size.     Pericardium:  No pericardial effusion seen.  ____________________________________________________________________    < end of copied text >    OTHER: 	  	  LABS:	 	    CARDIAC MARKERS:                                  12.1   7.16  )-----------( 142      ( 16 Dec 2023 13:07 )             35.4     12-17    153<H>  |  118<H>  |  13  ----------------------------<  125<H>  3.9   |  25  |  0.50    Ca    8.4      17 Dec 2023 05:46  Phos  2.2     12-16  Mg     2.3     12-16      proBNP:   Lipid Profile:   HgA1c:   TSH:

## 2023-12-17 NOTE — CONSULT NOTE ADULT - PROBLEM SELECTOR RECOMMENDATION 9
Likely neurogenic   Stable Blood pressure   Atropine at bedside for sustained HR < 35 and/or drop in BP with bradycardia

## 2023-12-17 NOTE — DIETITIAN INITIAL EVALUATION ADULT - OTHER INFO
Dosing wt (12/14): 149 lbs  Wt trend (per Nan BROWN): (12/14): 144 lbs; (5/24): 139 lbs; (3/23): 136 lbs; (12/21/22): 136 lbs  Wt GAIN trend noted; confirmed with pt that she has been experiencing gradual wt gain throughout the past year. States she has decreased her physical activity which she believes has contributed to gain. Will continue to monitor/trend.

## 2023-12-17 NOTE — DIETITIAN INITIAL EVALUATION ADULT - ENERGY INTAKE
Pt reports "I don't feel like eating today". Endorsed abdominal discomfort in setting of constipation. Will provide prune juice. Menu provided. Calorie count in place. RD to add Mighty Shakes TID with meals.

## 2023-12-17 NOTE — DIETITIAN INITIAL EVALUATION ADULT - REASON FOR ADMISSION
Pt is a 74 yo F presented [facility] a [facility] as a CODE STROKE with c/o R IPH. CTH and CTA repeated showed a large R IPH.

## 2023-12-17 NOTE — PROGRESS NOTE ADULT - ATTENDING COMMENTS
seen in ICU with stroke team and fellow   CTH stable   bradycardic   plan for stroke unit  cardio f/u  Soren Andrade MD  Vascular Neurology  Office: [phone] seen in ICU with stroke team and fellow   CTH stable   bradycardic   plan for stroke unit  cardio f/u  Soren Andrade MD  Vascular Neurology  Office: [phone]

## 2023-12-17 NOTE — PROGRESS NOTE ADULT - SUBJECTIVE AND OBJECTIVE BOX
HOSPITAL COURSE: Patient MOISÉS MAYO is a 73y (1950) RIGHT handed woman who presented to [facility] ED on 12/14/2023, as a transfer from [facility], as a CODE STROKE, with c/o R IPH.  Denies PMH.  CTH and CTA repeated - large R IPH (temporal/parietal). Presented to [place] today with c/o HA and AMS. Friend accompanying patient said that when visiting patient today - patient was not acting like herself and c/o HA.    NIHSS 9       24 hour Events:       12/15: rCTH: unchanged pending official read. No neurological exam as of now. Pending MR  12/15 O/N: Patient lethargic but opens eyes and follow commands. No events.   12/16: Transfer to stroke unit   12/16 o/n: No changes in exam. Pending another scan tomorrow AM.   12/17- No events overnight.     Allergies    Allergy Status Unknown    Intolerances    oxycodone (Nausea)      REVIEW OF SYSTEMS: [ ] Unable to Assess due to neurologic exam   [x ] All ROS addressed below are non-contributory, except:  Neuro: [ ] Headache [ ] Back pain [ ] Numbness [ ] Weakness [ ] Ataxia [ ] Dizziness [ ] Aphasia [ ] Dysarthria [ ] Visual disturbance  Resp: [ ] Shortness of breath/dyspnea, [ ] Orthopnea [ ] Cough  CV: [ ] Chest pain [ ] Palpitation [ ] Lightheadedness [ ] Syncope  Renal: [ ] Thirst [ ] Edema  GI: [ ] Nausea [ ] Emesis [ ] Abdominal pain [ ] Constipation [ ] Diarrhea  Hem: [ ] Hematemesis [ ] bright red blood per rectum  ID: [ ] Fever [ ] Chills [ ] Dysuria  ENT: [ ] Rhinorrhea      DEVICES:   [ ] Restraints [ ] ET tube [ ] central line [ ] arterial line [ ] kovacs [ ] NGT/OGT [ ] EVD [ ] LD [ ] TREVIN/HMV [ ] Trach [ ] PEG [ ] Chest Tube     VITALS:   Vital Signs Last 24 Hrs  T(C): 37.2 (16 Dec 2023 23:00), Max: 37.2 (16 Dec 2023 23:00)  T(F): 99 (16 Dec 2023 23:00), Max: 99 (16 Dec 2023 23:00)  HR: 46 (16 Dec 2023 23:00) (46 - 76)  BP: 120/76 (16 Dec 2023 23:00) (101/45 - 122/60)  BP(mean): 90 (16 Dec 2023 23:00) (63 - 90)  RR: 19 (16 Dec 2023 23:00) (19 - 22)  SpO2: 95% (16 Dec 2023 23:00) (95% - 100%)    Parameters below as of 16 Dec 2023 23:00  Patient On (Oxygen Delivery Method): room air      CAPILLARY BLOOD GLUCOSE        I&O's Summary    15 Dec 2023 07:01  -  16 Dec 2023 07:00  --------------------------------------------------------  IN: 1952.5 mL / OUT: 1600 mL / NET: 352.5 mL    16 Dec 2023 07:01  -  17 Dec 2023 00:05  --------------------------------------------------------  IN: 1480 mL / OUT: 650 mL / NET: 830 mL        Respiratory:        LABS:                        12.1   7.16  )-----------( 142      ( 16 Dec 2023 13:07 )             35.4     12-16    147<H>  |  114<H>  |  13  ----------------------------<  121<H>  4.0   |  26  |  0.51             MEDICATION LEVELS:     IVF FLUIDS/MEDICATIONS:   MEDICATIONS  (STANDING):  chlorhexidine 4% Liquid 1 Application(s) Topical daily  diclofenac 0.1% Ophthalmic Solution 1 Drop(s) Left EYE <User Schedule>  polyethylene glycol 3350 17 Gram(s) Oral two times a day  senna 1 Tablet(s) Oral at bedtime  sodium chloride 3% + sodium acetate 50:50 1000 milliLiter(s) (30 mL/Hr) IV Continuous <Continuous>  sodium chloride 3% + sodium acetate 50:50 1000 milliLiter(s) (50 mL/Hr) IV Continuous <Continuous>    MEDICATIONS  (PRN):  acetaminophen     Tablet .. 650 milliGRAM(s) Oral every 6 hours PRN Temp greater or equal to 38C (100.4F), Mild Pain (1 - 3)  artificial  tears Solution 1 Drop(s) Both EYES every 4 hours PRN Dry Eyes  famotidine    Tablet 20 milliGRAM(s) Oral every 12 hours PRN dyspepsia  traMADol 50 milliGRAM(s) Oral every 4 hours PRN Severe Pain (7 - 10)  traMADol 25 milliGRAM(s) Oral every 4 hours PRN Moderate Pain (4 - 6)        IMAGING:      PHYSICAL EXAM:    Constitutional: No Acute Distress     Neurological: Arousable, oriented x3, following commands after a lot of prompting, Pupils bilaterally reactive and equal. Right gaze preference. Right side 5/5. LUE withdrawing. LLE is antigravity, however neglecting the left side.    Pulmonary: Clear to Auscultation, No rales, No rhonchi, No wheezes     Cardiovascular: S1, S2, Regular rate and rhythm     Gastrointestinal: Soft, Non-tender, Non-distended     Extremities: No calf tenderness      HOSPITAL COURSE: Patient MOISÉS MAYO is a 73y (1950) RIGHT handed woman who presented to [facility] ED on 12/14/2023, as a transfer from [facility], as a CODE STROKE, with c/o R IPH.  Denies PMH.  CTH and CTA repeated - large R IPH (temporal/parietal). Presented to [place] today with c/o HA and AMS. Friend accompanying patient said that when visiting patient today - patient was not acting like herself and c/o HA.    NIHSS 9       24 hour Events:       12/15: rCTH: unchanged pending official read. No neurological exam as of now. Pending MR  12/15 O/N: Patient lethargic but opens eyes and follow commands. No events.   12/16: Transfer to stroke unit   12/16 o/n: No changes in exam. Pending another scan tomorrow AM.   12/17- No events overnight.     Allergies    Allergy Status Unknown    Intolerances    oxycodone (Nausea)      REVIEW OF SYSTEMS: [ ] Unable to Assess due to neurologic exam   [x ] All ROS addressed below are non-contributory, except:  Neuro: [ ] Headache [ ] Back pain [ ] Numbness [ ] Weakness [ ] Ataxia [ ] Dizziness [ ] Aphasia [ ] Dysarthria [ ] Visual disturbance  Resp: [ ] Shortness of breath/dyspnea, [ ] Orthopnea [ ] Cough  CV: [ ] Chest pain [ ] Palpitation [ ] Lightheadedness [ ] Syncope  Renal: [ ] Thirst [ ] Edema  GI: [ ] Nausea [ ] Emesis [ ] Abdominal pain [ ] Constipation [ ] Diarrhea  Hem: [ ] Hematemesis [ ] bright red blood per rectum  ID: [ ] Fever [ ] Chills [ ] Dysuria  ENT: [ ] Rhinorrhea      DEVICES:   [ ] Restraints [ ] ET tube [ ] central line [ ] arterial line [ ] kovacs [ ] NGT/OGT [ ] EVD [ ] LD [ ] TREVIN/HMV [ ] Trach [ ] PEG [ ] Chest Tube     VITALS:   Vital Signs Last 24 Hrs  T(C): 37.2 (16 Dec 2023 23:00), Max: 37.2 (16 Dec 2023 23:00)  T(F): 99 (16 Dec 2023 23:00), Max: 99 (16 Dec 2023 23:00)  HR: 46 (16 Dec 2023 23:00) (46 - 76)  BP: 120/76 (16 Dec 2023 23:00) (101/45 - 122/60)  BP(mean): 90 (16 Dec 2023 23:00) (63 - 90)  RR: 19 (16 Dec 2023 23:00) (19 - 22)  SpO2: 95% (16 Dec 2023 23:00) (95% - 100%)    Parameters below as of 16 Dec 2023 23:00  Patient On (Oxygen Delivery Method): room air      CAPILLARY BLOOD GLUCOSE        I&O's Summary    15 Dec 2023 07:01  -  16 Dec 2023 07:00  --------------------------------------------------------  IN: 1952.5 mL / OUT: 1600 mL / NET: 352.5 mL    16 Dec 2023 07:01  -  17 Dec 2023 00:05  --------------------------------------------------------  IN: 1480 mL / OUT: 650 mL / NET: 830 mL        Respiratory:        LABS:                        12.1   7.16  )-----------( 142      ( 16 Dec 2023 13:07 )             35.4     12-16    147<H>  |  114<H>  |  13  ----------------------------<  121<H>  4.0   |  26  |  0.51             MEDICATION LEVELS:     IVF FLUIDS/MEDICATIONS:   MEDICATIONS  (STANDING):  chlorhexidine 4% Liquid 1 Application(s) Topical daily  diclofenac 0.1% Ophthalmic Solution 1 Drop(s) Left EYE <User Schedule>  polyethylene glycol 3350 17 Gram(s) Oral two times a day  senna 1 Tablet(s) Oral at bedtime  sodium chloride 3% + sodium acetate 50:50 1000 milliLiter(s) (30 mL/Hr) IV Continuous <Continuous>  sodium chloride 3% + sodium acetate 50:50 1000 milliLiter(s) (50 mL/Hr) IV Continuous <Continuous>    MEDICATIONS  (PRN):  acetaminophen     Tablet .. 650 milliGRAM(s) Oral every 6 hours PRN Temp greater or equal to 38C (100.4F), Mild Pain (1 - 3)  artificial  tears Solution 1 Drop(s) Both EYES every 4 hours PRN Dry Eyes  famotidine    Tablet 20 milliGRAM(s) Oral every 12 hours PRN dyspepsia  traMADol 50 milliGRAM(s) Oral every 4 hours PRN Severe Pain (7 - 10)  traMADol 25 milliGRAM(s) Oral every 4 hours PRN Moderate Pain (4 - 6)        IMAGING:      PHYSICAL EXAM:    Constitutional: No Acute Distress     Neurological: Arousable, oriented x3, following commands after a lot of prompting, Pupils bilaterally reactive and equal. Right gaze preference. Right side 5/5. LUE withdrawing. LLE is antigravity, however neglecting the left side.    Pulmonary: Clear to Auscultation, No rales, No rhonchi, No wheezes     Cardiovascular: S1, S2, Regular rate and rhythm     Gastrointestinal: Soft, Non-tender, Non-distended     Extremities: No calf tenderness

## 2023-12-17 NOTE — DIETITIAN INITIAL EVALUATION ADULT - PERTINENT LABORATORY DATA
12-17    153<H>  |  118<H>  |  13  ----------------------------<  125<H>  3.9   |  25  |  0.50    Ca    8.4      17 Dec 2023 05:46  Phos  2.2     12-16  Mg     2.3     12-16    A1C with Estimated Average Glucose Result: 5.0 % (12-15-23 @ 04:03)

## 2023-12-17 NOTE — PROGRESS NOTE ADULT - ASSESSMENT
ASSESSMENT/PLAN: Patient with right IPH ICH score 1.     Neuro  - Neurochecks Q2 H  - CTH with large R IPH (temporal/parietal)  - 12/16- r CTH: stable hematoma, will obtain another CTH in the AM  - MRI done- no evidence of mass, stable right IPH   - pain management with tylenol/oxy  - EEG- Increase risk of seizures in the right frontocentral region, no seizures were recorded- off now  - 12/16- CTH for stability- start DVT ppx if stable  - OOB as tolerated     Resp:  -on RA  -encourage IS    CV  - -150  - TTE- EF 64%, no other otherwise   - Bradycardic- will obtain EKG    GI  - On soft bite sized diet with calorie count   - LBM PTA  - Continue Pepcid   - Zofran for nausea   - Thrombocytopenia- unknown etiology could be consumptive continue to monitor     Renal   - 3% @ 80cc/hr Na goal 140-145 for 12/15 + salt tabs  - BMP q8  - I&O strict  - monitor Cr  - Replete lytes PRN    ENDO  - 5.0 a1c  - maintain eugylcemia 120-180    HEME  - monitor CBC  - negative dupplex BL LE  - chemical vte ppx- restart after stability scan tomorrow    ID  - afebrile  - Monitor fever curve    Disposition: Stroke unit after stability scan          ASSESSMENT/PLAN: Patient with right IPH ICH score 1.     Neuro  - Neurochecks Q2 H  - CTH with large R IPH (temporal/parietal)  - 12/16- r CTH: stable hematoma, CTH today stable, will start DVT ppx  - MRI done- no evidence of mass, stable right IPH   - pain management with tylenol/oxy  - EEG- Increase risk of seizures in the right frontocentral region, no seizures were recorded- off now  - Briviact 50 mg BID for seizure ppx   - OOB as tolerated     Resp:  -on RA  -encourage IS    CV  - -150  - TTE- EF 64%, no other otherwise   - Cardiology- no afib, PVCs on EKG, continue to monitor    GI  - On soft bite sized diet with calorie count   - LBM - PTA  - Continue Pepcid   - Zofran for nausea   - Thrombocytopenia- unknown etiology could be consumptive continue to monitor - avoid medications that can make her thrombocytopenic- e.g. Keppra     Renal   - 2% at 75cc  - BMP q12  - Na goal 140-150  - I&O strict  - monitor Cr  - Replete lytes PRN    ENDO  - 5.0 a1c  - maintain eugylcemia 120-180    HEME  - monitor CBC  - negative dupplex BL LE  - chemical vte ppx- SQL    ID  - afebrile  - Monitor fever curve    Disposition: Stroke unit

## 2023-12-17 NOTE — PROGRESS NOTE ADULT - SUBJECTIVE AND OBJECTIVE BOX
Subjective    Pt was in room finishing [facility], conversational, upset about ABd pain, no BM when baseline daily, complaining about HA and nausea.   REVIEW OF SYSTEMS:  All systems were reviewed except HPI     MEDICATIONS  (STANDING):  bisacodyl 5 milliGRAM(s) Oral daily  chlorhexidine 4% Liquid 1 Application(s) Topical daily  diclofenac 0.1% Ophthalmic Solution 1 Drop(s) Left EYE <User Schedule>  polyethylene glycol 3350 17 Gram(s) Oral two times a day  senna 1 Tablet(s) Oral at bedtime  sodium chloride 3% + sodium acetate 50:50 1000 milliLiter(s) (30 mL/Hr) IV Continuous <Continuous>    MEDICATIONS  (PRN):  acetaminophen     Tablet .. 650 milliGRAM(s) Oral every 6 hours PRN Temp greater or equal to 38C (100.4F), Mild Pain (1 - 3)  artificial  tears Solution 1 Drop(s) Both EYES every 4 hours PRN Dry Eyes  famotidine    Tablet 20 milliGRAM(s) Oral every 12 hours PRN dyspepsia  traMADol 25 milliGRAM(s) Oral every 4 hours PRN Moderate Pain (4 - 6)  traMADol 50 milliGRAM(s) Oral every 4 hours PRN Severe Pain (7 - 10)    Objective  ICU Vital Signs Last 24 Hrs  T(C): 36.8 (17 Dec 2023 07:00), Max: 37.2 (16 Dec 2023 23:00)  T(F): 98.3 (17 Dec 2023 07:00), Max: 99 (16 Dec 2023 23:00)  HR: 44 (17 Dec 2023 09:00) (37 - 65)  BP: 135/61 (17 Dec 2023 09:00) (105/53 - 135/61)  BP(mean): 83 (17 Dec 2023 09:00) (63 - 90)  RR: 23 (17 Dec 2023 09:00) (18 - 23)  SpO2: 96% (17 Dec 2023 09:00) (95% - 99%)    O2 Parameters below as of 17 Dec 2023 07:00 Patient On (Oxygen Delivery Method): room air    On exam,  -AOx4 conversational, LUE0/5 LLE4/5 RUE 5/5 and RLE 5/5, reduced L-side sensory, mild left facial droop, R gaze preference, some L-neglect     Subjective    Pt was in room finishing [facility], conversational, upset about ABd pain, no BM when baseline daily, complaining about HA and nausea.   REVIEW OF SYSTEMS:  All systems were reviewed except HPI     MEDICATIONS  (STANDING):  bisacodyl 5 milliGRAM(s) Oral daily  chlorhexidine 4% Liquid 1 Application(s) Topical daily  diclofenac 0.1% Ophthalmic Solution 1 Drop(s) Left EYE <User Schedule>  polyethylene glycol 3350 17 Gram(s) Oral two times a day  senna 1 Tablet(s) Oral at bedtime  sodium chloride 3% + sodium acetate 50:50 1000 milliLiter(s) (30 mL/Hr) IV Continuous <Continuous>    MEDICATIONS  (PRN):  acetaminophen     Tablet .. 650 milliGRAM(s) Oral every 6 hours PRN Temp greater or equal to 38C (100.4F), Mild Pain (1 - 3)  artificial  tears Solution 1 Drop(s) Both EYES every 4 hours PRN Dry Eyes  famotidine    Tablet 20 milliGRAM(s) Oral every 12 hours PRN dyspepsia  traMADol 25 milliGRAM(s) Oral every 4 hours PRN Moderate Pain (4 - 6)  traMADol 50 milliGRAM(s) Oral every 4 hours PRN Severe Pain (7 - 10)    Objective  ICU Vital Signs Last 24 Hrs  T(C): 36.8 (17 Dec 2023 07:00), Max: 37.2 (16 Dec 2023 23:00)  T(F): 98.3 (17 Dec 2023 07:00), Max: 99 (16 Dec 2023 23:00)  HR: 44 (17 Dec 2023 09:00) (37 - 65)  BP: 135/61 (17 Dec 2023 09:00) (105/53 - 135/61)  BP(mean): 83 (17 Dec 2023 09:00) (63 - 90)  RR: 23 (17 Dec 2023 09:00) (18 - 23)  SpO2: 96% (17 Dec 2023 09:00) (95% - 99%)    O2 Parameters below as of 17 Dec 2023 07:00 Patient On (Oxygen Delivery Method): room air    On exam,  -AOx4 conversational, LUE0/5 LLE4/5 RUE 5/5 and RLE 5/5, reduced L-side sensory, mild left facial droop, R gaze preference, some L-neglect     Subjective    Pt was in room finishing [facility], conversational, upset about ABd pain, no BM when baseline daily, complaining about HA and nausea.   REVIEW OF SYSTEMS:  All systems were reviewed except HPI     MEDICATIONS  (STANDING):  bisacodyl 5 milliGRAM(s) Oral daily  chlorhexidine 4% Liquid 1 Application(s) Topical daily  diclofenac 0.1% Ophthalmic Solution 1 Drop(s) Left EYE <User Schedule>  polyethylene glycol 3350 17 Gram(s) Oral two times a day  senna 1 Tablet(s) Oral at bedtime  sodium chloride 3% + sodium acetate 50:50 1000 milliLiter(s) (30 mL/Hr) IV Continuous <Continuous>    MEDICATIONS  (PRN):  acetaminophen     Tablet .. 650 milliGRAM(s) Oral every 6 hours PRN Temp greater or equal to 38C (100.4F), Mild Pain (1 - 3)  artificial  tears Solution 1 Drop(s) Both EYES every 4 hours PRN Dry Eyes  famotidine    Tablet 20 milliGRAM(s) Oral every 12 hours PRN dyspepsia  traMADol 25 milliGRAM(s) Oral every 4 hours PRN Moderate Pain (4 - 6)  traMADol 50 milliGRAM(s) Oral every 4 hours PRN Severe Pain (7 - 10)    Objective  ICU Vital Signs Last 24 Hrs  T(C): 36.8 (17 Dec 2023 07:00), Max: 37.2 (16 Dec 2023 23:00)  T(F): 98.3 (17 Dec 2023 07:00), Max: 99 (16 Dec 2023 23:00)  HR: 44 (17 Dec 2023 09:00) (37 - 65)  BP: 135/61 (17 Dec 2023 09:00) (105/53 - 135/61)  BP(mean): 83 (17 Dec 2023 09:00) (63 - 90)  RR: 23 (17 Dec 2023 09:00) (18 - 23)  SpO2: 96% (17 Dec 2023 09:00) (95% - 99%)    O2 Parameters below as of 17 Dec 2023 07:00 Patient On (Oxygen Delivery Method): room air    On exam,  -AOx4 conversational, LUE0/5 LLE4/5 RUE 5/5 and RLE 5/5, reduced L-side sensory, mild left facial droop, R gaze preference, some L-neglect      LABS:                          12.1   7.16  )-----------( 142      ( 16 Dec 2023 13:07 )             35.4     12-17    153<H>  |  118<H>  |  13  ----------------------------<  125<H>  3.9   |  25  |  0.50    Ca    8.4      17 Dec 2023 05:46  Phos  2.2     12-16  Mg     2.3     12-16        PT/INR - ( 16 Dec 2023 13:07 )   PT: 12.5 sec;   INR: 1.20 ratio         PTT - ( 16 Dec 2023 13:07 )  PTT:27.5 sec  Urinalysis Basic - ( 17 Dec 2023 05:46 )    Color: x / Appearance: x / SG: x / pH: x  Gluc: 125 mg/dL / Ketone: x  / Bili: x / Urobili: x   Blood: x / Protein: x / Nitrite: x   Leuk Esterase: x / RBC: x / WBC x   Sq Epi: x / Non Sq Epi: x / Bacteria: x         Subjective    Pt was in room finishing [facility], conversational, upset about ABd pain, no BM when baseline daily, complaining about HA and nausea.   REVIEW OF SYSTEMS:  All systems were reviewed except HPI     MEDICATIONS  (STANDING):  bisacodyl 5 milliGRAM(s) Oral daily  chlorhexidine 4% Liquid 1 Application(s) Topical daily  diclofenac 0.1% Ophthalmic Solution 1 Drop(s) Left EYE <User Schedule>  polyethylene glycol 3350 17 Gram(s) Oral two times a day  senna 1 Tablet(s) Oral at bedtime  sodium chloride 3% + sodium acetate 50:50 1000 milliLiter(s) (30 mL/Hr) IV Continuous <Continuous>    MEDICATIONS  (PRN):  acetaminophen     Tablet .. 650 milliGRAM(s) Oral every 6 hours PRN Temp greater or equal to 38C (100.4F), Mild Pain (1 - 3)  artificial  tears Solution 1 Drop(s) Both EYES every 4 hours PRN Dry Eyes  famotidine    Tablet 20 milliGRAM(s) Oral every 12 hours PRN dyspepsia  traMADol 25 milliGRAM(s) Oral every 4 hours PRN Moderate Pain (4 - 6)  traMADol 50 milliGRAM(s) Oral every 4 hours PRN Severe Pain (7 - 10)    Objective  ICU Vital Signs Last 24 Hrs  T(C): 36.8 (17 Dec 2023 07:00), Max: 37.2 (16 Dec 2023 23:00)  T(F): 98.3 (17 Dec 2023 07:00), Max: 99 (16 Dec 2023 23:00)  HR: 44 (17 Dec 2023 09:00) (37 - 65)  BP: 135/61 (17 Dec 2023 09:00) (105/53 - 135/61)  BP(mean): 83 (17 Dec 2023 09:00) (63 - 90)  RR: 23 (17 Dec 2023 09:00) (18 - 23)  SpO2: 96% (17 Dec 2023 09:00) (95% - 99%)    O2 Parameters below as of 17 Dec 2023 07:00 Patient On (Oxygen Delivery Method): room air    On exam,  -AOx4 conversational, LUE0/5 LLE4/5 RUE 5/5 and RLE 5/5, reduced L-side sensory, mild left facial droop, R gaze preference, some L-neglect      LABS:                          12.1   7.16  )-----------( 142      ( 16 Dec 2023 13:07 )             35.4     12-17    153<H>  |  118<H>  |  13  ----------------------------<  125<H>  3.9   |  25  |  0.50    Ca    8.4      17 Dec 2023 05:46  Phos  2.2     12-16  Mg     2.3     12-16        PT/INR - ( 16 Dec 2023 13:07 )   PT: 12.5 sec;   INR: 1.20 ratio         PTT - ( 16 Dec 2023 13:07 )  PTT:27.5 sec  Urinalysis Basic - ( 17 Dec 2023 05:46 )    Color: x / Appearance: x / SG: x / pH: x  Gluc: 125 mg/dL / Ketone: x  / Bili: x / Urobili: x   Blood: x / Protein: x / Nitrite: x   Leuk Esterase: x / RBC: x / WBC x   Sq Epi: x / Non Sq Epi: x / Bacteria: x

## 2023-12-17 NOTE — DIETITIAN INITIAL EVALUATION ADULT - ORAL INTAKE PTA/DIET HISTORY
-Pt reports good appetite PTA. Typically consumes three meals/day and enjoys a variety of foods (including, but not limited to salads, eggs, Greek yogurt, fruit, sweet potatoes, artichokes, soup, occasionally salmon). Denies intolerance to chewing/swallowing and requesting regular consistency meals.  -No reported food allergies. Reports taking "vitamin for eyes" and calcium supplement.   -Typically denies N/V/C/diarrhea, however +constipation noted in-house with abdominal pain. Team aware. Requesting prunes/prune juice. Educated pt on importance of fiber consumption to promote bowel regularity. 
24

## 2023-12-17 NOTE — DIETITIAN INITIAL EVALUATION ADULT - NSFNSGIIOFT_GEN_A_CORE
-Continues on NaCl 2% with sodium acetate. Sodium goal: 140-150.  -No documented BM's recorded thus far. On bowel regimen (senna, Miralax, Dulcolax). Prune juice to be added.

## 2023-12-17 NOTE — DIETITIAN INITIAL EVALUATION ADULT - PERTINENT MEDS FT
MEDICATIONS  (STANDING):  bisacodyl 5 milliGRAM(s) Oral daily  brivaracetam  IVPB 50 milliGRAM(s) IV Intermittent two times a day  diclofenac 0.1% Ophthalmic Solution 1 Drop(s) Left EYE <User Schedule>  enoxaparin Injectable 40 milliGRAM(s) SubCutaneous <User Schedule>  polyethylene glycol 3350 17 Gram(s) Oral two times a day  senna 2 Tablet(s) Oral at bedtime  sodium chloride 2% + sodium acetate 50:50 1000 milliLiter(s) (75 mL/Hr) IV Continuous <Continuous>    MEDICATIONS  (PRN):  acetaminophen     Tablet .. 650 milliGRAM(s) Oral every 6 hours PRN Temp greater or equal to 38C (100.4F), Mild Pain (1 - 3)  artificial  tears Solution 1 Drop(s) Both EYES every 4 hours PRN Dry Eyes  bisacodyl Suppository 10 milliGRAM(s) Rectal daily PRN Constipation  famotidine    Tablet 20 milliGRAM(s) Oral every 12 hours PRN dyspepsia  traMADol 25 milliGRAM(s) Oral every 4 hours PRN Moderate Pain (4 - 6)  traMADol 50 milliGRAM(s) Oral every 4 hours PRN Severe Pain (7 - 10)

## 2023-12-18 LAB
ANION GAP SERPL CALC-SCNC: 10 MMOL/L — SIGNIFICANT CHANGE UP (ref 5–17)
ANION GAP SERPL CALC-SCNC: 8 MMOL/L — SIGNIFICANT CHANGE UP (ref 5–17)
ANION GAP SERPL CALC-SCNC: 8 MMOL/L — SIGNIFICANT CHANGE UP (ref 5–17)
BUN SERPL-MCNC: 15 MG/DL — SIGNIFICANT CHANGE UP (ref 7–23)
BUN SERPL-MCNC: 15 MG/DL — SIGNIFICANT CHANGE UP (ref 7–23)
BUN SERPL-MCNC: 17 MG/DL — SIGNIFICANT CHANGE UP (ref 7–23)
BUN SERPL-MCNC: 17 MG/DL — SIGNIFICANT CHANGE UP (ref 7–23)
BUN SERPL-MCNC: 18 MG/DL — SIGNIFICANT CHANGE UP (ref 7–23)
CALCIUM SERPL-MCNC: 8.4 MG/DL — SIGNIFICANT CHANGE UP (ref 8.4–10.5)
CALCIUM SERPL-MCNC: 8.4 MG/DL — SIGNIFICANT CHANGE UP (ref 8.4–10.5)
CALCIUM SERPL-MCNC: 8.7 MG/DL — SIGNIFICANT CHANGE UP (ref 8.4–10.5)
CALCIUM SERPL-MCNC: 8.8 MG/DL — SIGNIFICANT CHANGE UP (ref 8.4–10.5)
CALCIUM SERPL-MCNC: 8.8 MG/DL — SIGNIFICANT CHANGE UP (ref 8.4–10.5)
CHLORIDE SERPL-SCNC: 114 MMOL/L — HIGH (ref 96–108)
CHLORIDE SERPL-SCNC: 114 MMOL/L — HIGH (ref 96–108)
CHLORIDE SERPL-SCNC: 115 MMOL/L — HIGH (ref 96–108)
CHLORIDE SERPL-SCNC: 116 MMOL/L — HIGH (ref 96–108)
CHLORIDE SERPL-SCNC: 116 MMOL/L — HIGH (ref 96–108)
CO2 SERPL-SCNC: 25 MMOL/L — SIGNIFICANT CHANGE UP (ref 22–31)
CO2 SERPL-SCNC: 25 MMOL/L — SIGNIFICANT CHANGE UP (ref 22–31)
CO2 SERPL-SCNC: 26 MMOL/L — SIGNIFICANT CHANGE UP (ref 22–31)
CO2 SERPL-SCNC: 28 MMOL/L — SIGNIFICANT CHANGE UP (ref 22–31)
CO2 SERPL-SCNC: 28 MMOL/L — SIGNIFICANT CHANGE UP (ref 22–31)
CREAT SERPL-MCNC: 0.5 MG/DL — SIGNIFICANT CHANGE UP (ref 0.5–1.3)
CREAT SERPL-MCNC: 0.5 MG/DL — SIGNIFICANT CHANGE UP (ref 0.5–1.3)
CREAT SERPL-MCNC: 0.52 MG/DL — SIGNIFICANT CHANGE UP (ref 0.5–1.3)
CREAT SERPL-MCNC: 0.52 MG/DL — SIGNIFICANT CHANGE UP (ref 0.5–1.3)
CREAT SERPL-MCNC: 0.53 MG/DL — SIGNIFICANT CHANGE UP (ref 0.5–1.3)
EGFR: 98 ML/MIN/1.73M2 — SIGNIFICANT CHANGE UP
EGFR: 99 ML/MIN/1.73M2 — SIGNIFICANT CHANGE UP
EGFR: 99 ML/MIN/1.73M2 — SIGNIFICANT CHANGE UP
GLUCOSE SERPL-MCNC: 104 MG/DL — HIGH (ref 70–99)
GLUCOSE SERPL-MCNC: 104 MG/DL — HIGH (ref 70–99)
GLUCOSE SERPL-MCNC: 116 MG/DL — HIGH (ref 70–99)
GLUCOSE SERPL-MCNC: 116 MG/DL — HIGH (ref 70–99)
GLUCOSE SERPL-MCNC: 117 MG/DL — HIGH (ref 70–99)
GLUCOSE SERPL-MCNC: 117 MG/DL — HIGH (ref 70–99)
GLUCOSE SERPL-MCNC: 119 MG/DL — HIGH (ref 70–99)
GLUCOSE SERPL-MCNC: 119 MG/DL — HIGH (ref 70–99)
HCT VFR BLD CALC: 33.7 % — LOW (ref 34.5–45)
HCT VFR BLD CALC: 33.7 % — LOW (ref 34.5–45)
HGB BLD-MCNC: 11.4 G/DL — LOW (ref 11.5–15.5)
HGB BLD-MCNC: 11.4 G/DL — LOW (ref 11.5–15.5)
MAGNESIUM SERPL-MCNC: 2.1 MG/DL — SIGNIFICANT CHANGE UP (ref 1.6–2.6)
MAGNESIUM SERPL-MCNC: 2.1 MG/DL — SIGNIFICANT CHANGE UP (ref 1.6–2.6)
MCHC RBC-ENTMCNC: 31.1 PG — SIGNIFICANT CHANGE UP (ref 27–34)
MCHC RBC-ENTMCNC: 31.1 PG — SIGNIFICANT CHANGE UP (ref 27–34)
MCHC RBC-ENTMCNC: 33.8 GM/DL — SIGNIFICANT CHANGE UP (ref 32–36)
MCHC RBC-ENTMCNC: 33.8 GM/DL — SIGNIFICANT CHANGE UP (ref 32–36)
MCV RBC AUTO: 92.1 FL — SIGNIFICANT CHANGE UP (ref 80–100)
MCV RBC AUTO: 92.1 FL — SIGNIFICANT CHANGE UP (ref 80–100)
NRBC # BLD: 0 /100 WBCS — SIGNIFICANT CHANGE UP (ref 0–0)
NRBC # BLD: 0 /100 WBCS — SIGNIFICANT CHANGE UP (ref 0–0)
PHOSPHATE SERPL-MCNC: 2.3 MG/DL — LOW (ref 2.5–4.5)
PHOSPHATE SERPL-MCNC: 2.3 MG/DL — LOW (ref 2.5–4.5)
PLATELET # BLD AUTO: 134 K/UL — LOW (ref 150–400)
PLATELET # BLD AUTO: 134 K/UL — LOW (ref 150–400)
POTASSIUM SERPL-MCNC: 3.2 MMOL/L — LOW (ref 3.5–5.3)
POTASSIUM SERPL-MCNC: 3.2 MMOL/L — LOW (ref 3.5–5.3)
POTASSIUM SERPL-MCNC: 3.5 MMOL/L — SIGNIFICANT CHANGE UP (ref 3.5–5.3)
POTASSIUM SERPL-MCNC: 3.5 MMOL/L — SIGNIFICANT CHANGE UP (ref 3.5–5.3)
POTASSIUM SERPL-MCNC: 3.7 MMOL/L — SIGNIFICANT CHANGE UP (ref 3.5–5.3)
POTASSIUM SERPL-SCNC: 3.2 MMOL/L — LOW (ref 3.5–5.3)
POTASSIUM SERPL-SCNC: 3.2 MMOL/L — LOW (ref 3.5–5.3)
POTASSIUM SERPL-SCNC: 3.5 MMOL/L — SIGNIFICANT CHANGE UP (ref 3.5–5.3)
POTASSIUM SERPL-SCNC: 3.5 MMOL/L — SIGNIFICANT CHANGE UP (ref 3.5–5.3)
POTASSIUM SERPL-SCNC: 3.7 MMOL/L — SIGNIFICANT CHANGE UP (ref 3.5–5.3)
RBC # BLD: 3.66 M/UL — LOW (ref 3.8–5.2)
RBC # BLD: 3.66 M/UL — LOW (ref 3.8–5.2)
RBC # FLD: 12.7 % — SIGNIFICANT CHANGE UP (ref 10.3–14.5)
RBC # FLD: 12.7 % — SIGNIFICANT CHANGE UP (ref 10.3–14.5)
SODIUM SERPL-SCNC: 150 MMOL/L — HIGH (ref 135–145)
SODIUM SERPL-SCNC: 151 MMOL/L — HIGH (ref 135–145)
SODIUM SERPL-SCNC: 151 MMOL/L — HIGH (ref 135–145)
SODIUM SERPL-SCNC: 152 MMOL/L — HIGH (ref 135–145)
SODIUM SERPL-SCNC: 152 MMOL/L — HIGH (ref 135–145)
WBC # BLD: 6.11 K/UL — SIGNIFICANT CHANGE UP (ref 3.8–10.5)
WBC # BLD: 6.11 K/UL — SIGNIFICANT CHANGE UP (ref 3.8–10.5)
WBC # FLD AUTO: 6.11 K/UL — SIGNIFICANT CHANGE UP (ref 3.8–10.5)
WBC # FLD AUTO: 6.11 K/UL — SIGNIFICANT CHANGE UP (ref 3.8–10.5)

## 2023-12-18 PROCEDURE — 99233 SBSQ HOSP IP/OBS HIGH 50: CPT

## 2023-12-18 RX ORDER — POTASSIUM CHLORIDE 20 MEQ
10 PACKET (EA) ORAL
Refills: 0 | Status: COMPLETED | OUTPATIENT
Start: 2023-12-18 | End: 2023-12-18

## 2023-12-18 RX ORDER — ACETAMINOPHEN 500 MG
1000 TABLET ORAL ONCE
Refills: 0 | Status: COMPLETED | OUTPATIENT
Start: 2023-12-18 | End: 2023-12-18

## 2023-12-18 RX ORDER — SODIUM CHLORIDE 5 G/100ML
1000 INJECTION, SOLUTION INTRAVENOUS
Refills: 0 | Status: DISCONTINUED | OUTPATIENT
Start: 2023-12-18 | End: 2023-12-19

## 2023-12-18 RX ORDER — SODIUM CHLORIDE 5 G/100ML
1000 INJECTION, SOLUTION INTRAVENOUS
Refills: 0 | Status: DISCONTINUED | OUTPATIENT
Start: 2023-12-18 | End: 2023-12-18

## 2023-12-18 RX ORDER — POTASSIUM CHLORIDE 20 MEQ
40 PACKET (EA) ORAL ONCE
Refills: 0 | Status: DISCONTINUED | OUTPATIENT
Start: 2023-12-18 | End: 2023-12-18

## 2023-12-18 RX ADMIN — BRIVARACETAM 220 MILLIGRAM(S): 25 TABLET, FILM COATED ORAL at 05:19

## 2023-12-18 RX ADMIN — ENOXAPARIN SODIUM 40 MILLIGRAM(S): 100 INJECTION SUBCUTANEOUS at 18:27

## 2023-12-18 RX ADMIN — Medication 400 MILLIGRAM(S): at 08:00

## 2023-12-18 RX ADMIN — Medication 100 MILLIEQUIVALENT(S): at 13:00

## 2023-12-18 RX ADMIN — LOSARTAN POTASSIUM 25 MILLIGRAM(S): 100 TABLET, FILM COATED ORAL at 05:38

## 2023-12-18 RX ADMIN — Medication 100 MILLIEQUIVALENT(S): at 10:28

## 2023-12-18 RX ADMIN — Medication 5 MILLIGRAM(S): at 04:18

## 2023-12-18 RX ADMIN — Medication 100 MILLIEQUIVALENT(S): at 11:00

## 2023-12-18 RX ADMIN — SODIUM CHLORIDE 75 MILLILITER(S): 5 INJECTION, SOLUTION INTRAVENOUS at 00:49

## 2023-12-18 RX ADMIN — Medication 1 DROP(S): at 18:29

## 2023-12-18 RX ADMIN — Medication 100 MILLIEQUIVALENT(S): at 09:26

## 2023-12-18 RX ADMIN — Medication 1000 MILLIGRAM(S): at 08:30

## 2023-12-18 RX ADMIN — BRIVARACETAM 220 MILLIGRAM(S): 25 TABLET, FILM COATED ORAL at 18:27

## 2023-12-18 NOTE — PROVIDER CONTACT NOTE (OTHER) - ACTION/TREATMENT ORDERED:
MELIZA Will assess pt at bedside with RN. When pt awake HR sustaining 40-48. No new interventions ordered at this time. Pt maintained on tele, RN to notify provider if HR sustaining below 32 awake.

## 2023-12-18 NOTE — PROGRESS NOTE ADULT - ASSESSMENT
74 yo female admitted to the NSCU w/ a right IPH ICH score 1.  Found to be bradycardic  72 yo female admitted to the NSCU w/ a right IPH ICH score 1.  Found to be bradycardic

## 2023-12-18 NOTE — SPEECH LANGUAGE PATHOLOGY EVALUATION - H & P REVIEW
Patient MOISÉS MAYO is a 73y (1950) RIGHT handed woman who presented to [facility] ED on 12/14/2023, as a transfer from [facility], as a CODE STROKE, with c/o R IPH.  Denies PMH.  CTH and CTA repeated - large R IPH (temporal/parietal). Presented to [facility] today with c/o HA and AMS. Friend accompanying patient said that when visiting patient today - patient was not acting like herself and c/o HA. NIHSS 9/yes Patient MOISÉS MAYO is a 73y (1950) RIGHT handed woman who presented to [facility] ED on 12/14/2023, as a transfer from [facility], as a CODE STROKE, with c/o R IPH.  Denies PMH.  CTH and CTA repeated - large R IPH (temporal/parietal). Presented to [facility] today with c/o HA and AMS. Friend accompanying patient said that when visiting patient today - patient was not acting like herself and c/o HA. NIHSS 9/yes

## 2023-12-18 NOTE — SPEECH LANGUAGE PATHOLOGY EVALUATION - COMMENTS
12/15: CTH: 1.  Grossly stable intraparenchymal hemorrhage centered in the right parietal/temporal lobes.  2.  Midline shift to the left of approximately 0.5 cm.  Passed dysphagia screen 12/14 @1645  12/17/23 Pt with episodes of bradycardia.  Pt unknown to this service SLP d/w Dr. Canada and Nsg as Pt passed swallow screen and protocol is for Pt to receive puree diet; this service remains available if warranted.  Speech language therapy post d/c; SLP to f/u on inpt basis 1-2 times per week. n/a given Pt c/o headache and reduced sustained attention Pt required mod to max cues to arouse although sustained alertness was reduced. Pt c/o headache and nausea with decreased sustained levels of alertness/attention. Soft and bite sized tray at b/s; Pt passed dysphagia screen although mentation did not support PO intake at this time; SLP d/w Elisa and MD, re: dysphagia screen protocol. 12/15: CTH: 1.  Grossly stable intraparenchymal hemorrhage centered in the right parietal/temporal lobes.  2.  Midline shift to the left of approximately 0.5 cm.   Passed dysphagia screen 12/14/23 @1645  12/17/23 Pt with episodes of bradycardia. CT Head: IMPRESSION: Stable large right parietal temporal lobe hemorrhage with dissection of hemorrhage into the right lateral ventricle. Stable mass effect and midline shift.   Pt unknown to this service Pt verbalized simple thoughts and reported that she had a headache and nausea with PT and SLP at b/s. Pt answered simple self centered questions with encouragement.

## 2023-12-18 NOTE — PROGRESS NOTE ADULT - SUBJECTIVE AND OBJECTIVE BOX
THE PATIENT WAS SEEN AND EXAMINED BY ME WITH THE HOUSESTAFF AND STROKE TEAM DURING MORNING ROUNDS.     HPI:  Patient MOISÉS MAYO is a 73y (1950) RIGHT handed woman who presented to [facility] ED on 12/14/2023, as a transfer from [facility], as a CODE STROKE, with c/o R IPH.  Denies PMH.  CTH and CTA repeated - large R IPH (temporal/parietal). Presented to [place] today with c/o HA and AMS. Friend accompanying patient said that when visiting patient today - patient was not acting like herself and c/o HA    SUBJECTIVE: No events overnight.  No new neurologic complaints.      acetaminophen     Tablet .. 650 milliGRAM(s) Oral every 6 hours PRN  acetaminophen   IVPB .. 1000 milliGRAM(s) IV Intermittent once  artificial  tears Solution 1 Drop(s) Both EYES every 4 hours PRN  bisacodyl 5 milliGRAM(s) Oral daily  bisacodyl Suppository 10 milliGRAM(s) Rectal daily PRN  brivaracetam  IVPB 50 milliGRAM(s) IV Intermittent two times a day  diclofenac 0.1% Ophthalmic Solution 1 Drop(s) Left EYE <User Schedule>  enoxaparin Injectable 40 milliGRAM(s) SubCutaneous <User Schedule>  famotidine    Tablet 20 milliGRAM(s) Oral every 12 hours PRN  hydrALAZINE Injectable 5 milliGRAM(s) IV Push once  hydrALAZINE Injectable 5 milliGRAM(s) IV Push every 6 hours PRN  losartan 25 milliGRAM(s) Oral daily  metoclopramide Injectable 5 milliGRAM(s) IV Push every 6 hours PRN  polyethylene glycol 3350 17 Gram(s) Oral two times a day  senna 2 Tablet(s) Oral at bedtime  sodium chloride 2% + sodium acetate 50:50 1000 milliLiter(s) IV Continuous <Continuous>  traMADol 25 milliGRAM(s) Oral every 4 hours PRN  traMADol 50 milliGRAM(s) Oral every 4 hours PRN      PHYSICAL EXAM:   Vital Signs Last 24 Hrs  T(C): 37.3 (18 Dec 2023 04:05), Max: 37.3 (17 Dec 2023 20:00)  T(F): 99.2 (18 Dec 2023 04:05), Max: 99.2 (17 Dec 2023 20:00)  HR: 42 (18 Dec 2023 06:00) (38 - 69)  BP: 149/68 (18 Dec 2023 06:00) (114/61 - 170/71)  BP(mean): 98 (18 Dec 2023 06:00) (82 - 102)  RR: 21 (18 Dec 2023 06:00) (18 - 24)  SpO2: 94% (18 Dec 2023 06:00) (93% - 99%)    General: No acute distress  Abdomen: Soft, nontender, nondistended   Extremities: No edema    NEUROLOGICAL EXAM:  Mental status: Awake, alert, oriented x3, no aphasia, no neglect, normal memory   Cranial Nerves: Mild L facial droop, no nystagmus, no dysarthria,  tongue midline  Motor exam: Normal tone, no drift, 5/5 RUE, 5/5 RLE, 0/5 LUE, 4/5 LLE, normal fine finger movements.  Sensation: Reduced L sided sensory loss  Coordination/ Gait: No dysmetria, PARISH intact and symmetric bilaterally    LABS:                        11.4   6.11  )-----------( 134      ( 18 Dec 2023 06:34 )             33.7    12-18    152<H>  |  116<H>  |  18  ----------------------------<  116<H>  3.2<L>   |  26  |  0.53    Ca    8.8      18 Dec 2023 06:34  Phos  2.3     12-18  Mg     2.1     12-18    PT/INR - ( 16 Dec 2023 13:07 )   PT: 12.5 sec;   INR: 1.20 ratio      PTT - ( 16 Dec 2023 13:07 )  PTT:27.5 sec    IMAGING: Reviewed by me.     MRI Brain w/wo contrast 12/15/23:  Stable size of right parietotemporal intraparenchymal hemorrhage. Similar midline shift of 5 mm. Follow-up imaging is recommended to show no underlying lesion.    CT Head 12/17/23:  Stable large right parietal temporal lobe hemorrhage with dissection of hemorrhage into the right lateral ventricle. Stable mass effect and midline shift.    CT Head, CTA Head/Neck 12/14/23:  Large right temporal parietal parenchymal hemorrhage with mass effect on the right lateral ventricle and midline shift to the left. No definite abnormal vascularity or high-grade stenosis. THE PATIENT WAS SEEN AND EXAMINED BY ME WITH THE HOUSESTAFF AND STROKE TEAM DURING MORNING ROUNDS.     HPI:  Patient MOISÉS MAYO is a 73y (1950) RIGHT handed woman who presented to [facility] ED on 12/14/2023, as a transfer from [facility], as a CODE STROKE, with c/o R IPH.  Denies PMH.  CTH and CTA repeated - large R IPH (temporal/parietal). Presented to [place] today with c/o HA and AMS. Friend accompanying patient said that when visiting patient today - patient was not acting like herself and c/o HA    SUBJECTIVE: No events overnight.  No new neurologic complaints.      acetaminophen     Tablet .. 650 milliGRAM(s) Oral every 6 hours PRN  acetaminophen   IVPB .. 1000 milliGRAM(s) IV Intermittent once  artificial  tears Solution 1 Drop(s) Both EYES every 4 hours PRN  bisacodyl 5 milliGRAM(s) Oral daily  bisacodyl Suppository 10 milliGRAM(s) Rectal daily PRN  brivaracetam  IVPB 50 milliGRAM(s) IV Intermittent two times a day  diclofenac 0.1% Ophthalmic Solution 1 Drop(s) Left EYE <User Schedule>  enoxaparin Injectable 40 milliGRAM(s) SubCutaneous <User Schedule>  famotidine    Tablet 20 milliGRAM(s) Oral every 12 hours PRN  hydrALAZINE Injectable 5 milliGRAM(s) IV Push once  hydrALAZINE Injectable 5 milliGRAM(s) IV Push every 6 hours PRN  losartan 25 milliGRAM(s) Oral daily  metoclopramide Injectable 5 milliGRAM(s) IV Push every 6 hours PRN  polyethylene glycol 3350 17 Gram(s) Oral two times a day  senna 2 Tablet(s) Oral at bedtime  sodium chloride 2% + sodium acetate 50:50 1000 milliLiter(s) IV Continuous <Continuous>  traMADol 25 milliGRAM(s) Oral every 4 hours PRN  traMADol 50 milliGRAM(s) Oral every 4 hours PRN      PHYSICAL EXAM:   Vital Signs Last 24 Hrs  T(C): 37.3 (18 Dec 2023 04:05), Max: 37.3 (17 Dec 2023 20:00)  T(F): 99.2 (18 Dec 2023 04:05), Max: 99.2 (17 Dec 2023 20:00)  HR: 42 (18 Dec 2023 06:00) (38 - 69)  BP: 149/68 (18 Dec 2023 06:00) (114/61 - 170/71)  BP(mean): 98 (18 Dec 2023 06:00) (82 - 102)  RR: 21 (18 Dec 2023 06:00) (18 - 24)  SpO2: 94% (18 Dec 2023 06:00) (93% - 99%)    General: No acute distress  Abdomen: Soft, nontender, nondistended   Extremities: No edema    NEUROLOGICAL EXAM:  Mental status: Awake, alert, oriented x3, no aphasia, no neglect, normal memory   Cranial Nerves: Mild L facial droop, no nystagmus, no dysarthria,  tongue midline  Motor exam: Normal tone, no drift, 5/5 RUE, 5/5 RLE, 0/5 LUE, 4/5 LLE, normal fine finger movements.  Sensation: Reduced L sided sensory loss  Coordination/ Gait: No dysmetria, PARISH intact and symmetric bilaterally    LABS:                        11.4   6.11  )-----------( 134      ( 18 Dec 2023 06:34 )             33.7    12-18    152<H>  |  116<H>  |  18  ----------------------------<  116<H>  3.2<L>   |  26  |  0.53    Ca    8.8      18 Dec 2023 06:34  Phos  2.3     12-18  Mg     2.1     12-18    PT/INR - ( 16 Dec 2023 13:07 )   PT: 12.5 sec;   INR: 1.20 ratio      PTT - ( 16 Dec 2023 13:07 )  PTT:27.5 sec    IMAGING: Reviewed by me.     MRI Brain w/wo contrast 12/15/23:  Stable size of right parietotemporal intraparenchymal hemorrhage. Similar midline shift of 5 mm. Follow-up imaging is recommended to show no underlying lesion.    CT Head 12/17/23:  Stable large right parietal temporal lobe hemorrhage with dissection of hemorrhage into the right lateral ventricle. Stable mass effect and midline shift.    CT Head, CTA Head/Neck 12/14/23:  Large right temporal parietal parenchymal hemorrhage with mass effect on the right lateral ventricle and midline shift to the left. No definite abnormal vascularity or high-grade stenosis. THE PATIENT WAS SEEN AND EXAMINED BY ME WITH THE HOUSESTAFF AND STROKE TEAM DURING MORNING ROUNDS.     HPI:  72yo woman who presented to [facility] ED on 12/14/23, as a transfer from [facility], as a CODE STROKE, with c/o R IPH.  Denies PMH.  CTH and CTA repeated - large R IPH (temporal/parietal). Presented to [place] today with c/o HA and AMS. Friend accompanying patient said that when visiting patient today - patient was not acting like herself and c/o HA.    SUBJECTIVE: No events overnight. No new neurologic complaints.      acetaminophen     Tablet .. 650 milliGRAM(s) Oral every 6 hours PRN  acetaminophen   IVPB .. 1000 milliGRAM(s) IV Intermittent once  artificial  tears Solution 1 Drop(s) Both EYES every 4 hours PRN  bisacodyl 5 milliGRAM(s) Oral daily  bisacodyl Suppository 10 milliGRAM(s) Rectal daily PRN  brivaracetam  IVPB 50 milliGRAM(s) IV Intermittent two times a day  diclofenac 0.1% Ophthalmic Solution 1 Drop(s) Left EYE <User Schedule>  enoxaparin Injectable 40 milliGRAM(s) SubCutaneous <User Schedule>  famotidine    Tablet 20 milliGRAM(s) Oral every 12 hours PRN  hydrALAZINE Injectable 5 milliGRAM(s) IV Push once  hydrALAZINE Injectable 5 milliGRAM(s) IV Push every 6 hours PRN  losartan 25 milliGRAM(s) Oral daily  metoclopramide Injectable 5 milliGRAM(s) IV Push every 6 hours PRN  polyethylene glycol 3350 17 Gram(s) Oral two times a day  senna 2 Tablet(s) Oral at bedtime  sodium chloride 2% + sodium acetate 50:50 1000 milliLiter(s) IV Continuous <Continuous>  traMADol 25 milliGRAM(s) Oral every 4 hours PRN  traMADol 50 milliGRAM(s) Oral every 4 hours PRN    PHYSICAL EXAM:   Vital Signs Last 24 Hrs  T(C): 37.3 (18 Dec 2023 04:05), Max: 37.3 (17 Dec 2023 20:00)  T(F): 99.2 (18 Dec 2023 04:05), Max: 99.2 (17 Dec 2023 20:00)  HR: 42 (18 Dec 2023 06:00) (38 - 69)  BP: 149/68 (18 Dec 2023 06:00) (114/61 - 170/71)  BP(mean): 98 (18 Dec 2023 06:00) (82 - 102)  RR: 21 (18 Dec 2023 06:00) (18 - 24)  SpO2: 94% (18 Dec 2023 06:00) (93% - 99%)    General: No acute distress  Abdomen: Soft, nontender, nondistended   Extremities: No edema    NEUROLOGICAL EXAM:  Mental status: Awake, alert, oriented x3, no aphasia, no neglect, normal memory   Cranial Nerves: Mild L facial droop, no nystagmus, no dysarthria,  tongue midline  Motor exam: Normal tone, no drift, 5/5 RUE, 5/5 RLE, 0/5 LUE, 4/5 LLE, normal fine finger movements.  Sensation: Reduced L sided sensory loss  Coordination/ Gait: No dysmetria, PARISH intact and symmetric bilaterally    LABS:                        11.4   6.11  )-----------( 134      ( 18 Dec 2023 06:34 )             33.7    12-18    152<H>  |  116<H>  |  18  ----------------------------<  116<H>  3.2<L>   |  26  |  0.53    Ca    8.8      18 Dec 2023 06:34  Phos  2.3     12-18  Mg     2.1     12-18    PT/INR - ( 16 Dec 2023 13:07 )   PT: 12.5 sec;   INR: 1.20 ratio      PTT - ( 16 Dec 2023 13:07 )  PTT:27.5 sec    IMAGING: Reviewed by me.     MRI Brain w/wo contrast 12/15/23:  Stable size of right parietotemporal intraparenchymal hemorrhage. Similar midline shift of 5 mm. Follow-up imaging is recommended to show no underlying lesion.    CT Head 12/17/23:  Stable large right parietal temporal lobe hemorrhage with dissection of hemorrhage into the right lateral ventricle. Stable mass effect and midline shift.    CT Head, CTA Head/Neck 12/14/23:  Large right temporal parietal parenchymal hemorrhage with mass effect on the right lateral ventricle and midline shift to the left. No definite abnormal vascularity or high-grade stenosis. THE PATIENT WAS SEEN AND EXAMINED BY ME WITH THE HOUSESTAFF AND STROKE TEAM DURING MORNING ROUNDS.     HPI:  74yo woman who presented to [facility] ED on 12/14/23, as a transfer from [facility], as a CODE STROKE, with c/o R IPH.  Denies PMH.  CTH and CTA repeated - large R IPH (temporal/parietal). Presented to [place] today with c/o HA and AMS. Friend accompanying patient said that when visiting patient today - patient was not acting like herself and c/o HA.    SUBJECTIVE: No events overnight. No new neurologic complaints.      acetaminophen     Tablet .. 650 milliGRAM(s) Oral every 6 hours PRN  acetaminophen   IVPB .. 1000 milliGRAM(s) IV Intermittent once  artificial  tears Solution 1 Drop(s) Both EYES every 4 hours PRN  bisacodyl 5 milliGRAM(s) Oral daily  bisacodyl Suppository 10 milliGRAM(s) Rectal daily PRN  brivaracetam  IVPB 50 milliGRAM(s) IV Intermittent two times a day  diclofenac 0.1% Ophthalmic Solution 1 Drop(s) Left EYE <User Schedule>  enoxaparin Injectable 40 milliGRAM(s) SubCutaneous <User Schedule>  famotidine    Tablet 20 milliGRAM(s) Oral every 12 hours PRN  hydrALAZINE Injectable 5 milliGRAM(s) IV Push once  hydrALAZINE Injectable 5 milliGRAM(s) IV Push every 6 hours PRN  losartan 25 milliGRAM(s) Oral daily  metoclopramide Injectable 5 milliGRAM(s) IV Push every 6 hours PRN  polyethylene glycol 3350 17 Gram(s) Oral two times a day  senna 2 Tablet(s) Oral at bedtime  sodium chloride 2% + sodium acetate 50:50 1000 milliLiter(s) IV Continuous <Continuous>  traMADol 25 milliGRAM(s) Oral every 4 hours PRN  traMADol 50 milliGRAM(s) Oral every 4 hours PRN    PHYSICAL EXAM:   Vital Signs Last 24 Hrs  T(C): 37.3 (18 Dec 2023 04:05), Max: 37.3 (17 Dec 2023 20:00)  T(F): 99.2 (18 Dec 2023 04:05), Max: 99.2 (17 Dec 2023 20:00)  HR: 42 (18 Dec 2023 06:00) (38 - 69)  BP: 149/68 (18 Dec 2023 06:00) (114/61 - 170/71)  BP(mean): 98 (18 Dec 2023 06:00) (82 - 102)  RR: 21 (18 Dec 2023 06:00) (18 - 24)  SpO2: 94% (18 Dec 2023 06:00) (93% - 99%)    General: No acute distress  Abdomen: Soft, nontender, nondistended   Extremities: No edema    NEUROLOGICAL EXAM:  Mental status: Awake, alert, oriented x3, no aphasia, no neglect, normal memory   Cranial Nerves: Mild L facial droop, no nystagmus, no dysarthria,  tongue midline  Motor exam: Normal tone, no drift, 5/5 RUE, 5/5 RLE, 0/5 LUE, 4/5 LLE, normal fine finger movements.  Sensation: Reduced L sided sensory loss  Coordination/ Gait: No dysmetria, PARISH intact and symmetric bilaterally    LABS:                        11.4   6.11  )-----------( 134      ( 18 Dec 2023 06:34 )             33.7    12-18    152<H>  |  116<H>  |  18  ----------------------------<  116<H>  3.2<L>   |  26  |  0.53    Ca    8.8      18 Dec 2023 06:34  Phos  2.3     12-18  Mg     2.1     12-18    PT/INR - ( 16 Dec 2023 13:07 )   PT: 12.5 sec;   INR: 1.20 ratio      PTT - ( 16 Dec 2023 13:07 )  PTT:27.5 sec    IMAGING: Reviewed by me.     MRI Brain w/wo contrast 12/15/23:  Stable size of right parietotemporal intraparenchymal hemorrhage. Similar midline shift of 5 mm. Follow-up imaging is recommended to show no underlying lesion.    CT Head 12/17/23:  Stable large right parietal temporal lobe hemorrhage with dissection of hemorrhage into the right lateral ventricle. Stable mass effect and midline shift.    CT Head, CTA Head/Neck 12/14/23:  Large right temporal parietal parenchymal hemorrhage with mass effect on the right lateral ventricle and midline shift to the left. No definite abnormal vascularity or high-grade stenosis. THE PATIENT WAS SEEN AND EXAMINED BY ME WITH THE HOUSESTAFF AND STROKE TEAM DURING MORNING ROUNDS.     HPI:  74yo woman who presented to [facility] ED on 12/14/23, as a transfer from [facility], as a CODE STROKE, with c/o R IPH.  Denies PMH.  CTH and CTA repeated - large R IPH (temporal/parietal). Presented to [place] today with c/o HA and AMS. Friend accompanying patient said that when visiting patient today - patient was not acting like herself and c/o HA.    SUBJECTIVE: No events overnight. No new neurologic complaints.      acetaminophen     Tablet .. 650 milliGRAM(s) Oral every 6 hours PRN  acetaminophen   IVPB .. 1000 milliGRAM(s) IV Intermittent once  artificial  tears Solution 1 Drop(s) Both EYES every 4 hours PRN  bisacodyl 5 milliGRAM(s) Oral daily  bisacodyl Suppository 10 milliGRAM(s) Rectal daily PRN  brivaracetam  IVPB 50 milliGRAM(s) IV Intermittent two times a day  diclofenac 0.1% Ophthalmic Solution 1 Drop(s) Left EYE <User Schedule>  enoxaparin Injectable 40 milliGRAM(s) SubCutaneous <User Schedule>  famotidine    Tablet 20 milliGRAM(s) Oral every 12 hours PRN  hydrALAZINE Injectable 5 milliGRAM(s) IV Push once  hydrALAZINE Injectable 5 milliGRAM(s) IV Push every 6 hours PRN  losartan 25 milliGRAM(s) Oral daily  metoclopramide Injectable 5 milliGRAM(s) IV Push every 6 hours PRN  polyethylene glycol 3350 17 Gram(s) Oral two times a day  senna 2 Tablet(s) Oral at bedtime  sodium chloride 2% + sodium acetate 50:50 1000 milliLiter(s) IV Continuous <Continuous>  traMADol 25 milliGRAM(s) Oral every 4 hours PRN  traMADol 50 milliGRAM(s) Oral every 4 hours PRN    PHYSICAL EXAM:   Vital Signs Last 24 Hrs  T(C): 37.3 (18 Dec 2023 04:05), Max: 37.3 (17 Dec 2023 20:00)  T(F): 99.2 (18 Dec 2023 04:05), Max: 99.2 (17 Dec 2023 20:00)  HR: 42 (18 Dec 2023 06:00) (38 - 69)  BP: 149/68 (18 Dec 2023 06:00) (114/61 - 170/71)  BP(mean): 98 (18 Dec 2023 06:00) (82 - 102)  RR: 21 (18 Dec 2023 06:00) (18 - 24)  SpO2: 94% (18 Dec 2023 06:00) (93% - 99%)    General: No acute distress  Abdomen: Soft, nontender, nondistended   Extremities: No edema    NEUROLOGICAL EXAM:  Mental status: Awake, alert, oriented x3, no aphasia, no neglect.  Cranial Nerves: Mild L facial droop, no nystagmus, no dysarthria, tongue midline  Motor exam: Normal tone, no drift, 5/5 RUE, 5/5 RLE, 0/5 LUE, 3/5 LLE.  Sensation: L sided sensory loss  Coordination/ Gait: No dysmetria, PARISH intact and symmetric bilaterally    LABS:                        11.4   6.11  )-----------( 134      ( 18 Dec 2023 06:34 )             33.7    12-18    152<H>  |  116<H>  |  18  ----------------------------<  116<H>  3.2<L>   |  26  |  0.53    Ca    8.8      18 Dec 2023 06:34  Phos  2.3     12-18  Mg     2.1     12-18    PT/INR - ( 16 Dec 2023 13:07 )   PT: 12.5 sec;   INR: 1.20 ratio      PTT - ( 16 Dec 2023 13:07 )  PTT:27.5 sec    IMAGING: Reviewed by me.     MRI Brain w/wo contrast 12/15/23:  Stable size of right parietotemporal intraparenchymal hemorrhage. Similar midline shift of 5 mm. Follow-up imaging is recommended to show no underlying lesion.    CT Head 12/17/23:  Stable large right parietal temporal lobe hemorrhage with dissection of hemorrhage into the right lateral ventricle. Stable mass effect and midline shift.    CT Head, CTA Head/Neck 12/14/23:  Large right temporal parietal parenchymal hemorrhage with mass effect on the right lateral ventricle and midline shift to the left. No definite abnormal vascularity or high-grade stenosis. THE PATIENT WAS SEEN AND EXAMINED BY ME WITH THE HOUSESTAFF AND STROKE TEAM DURING MORNING ROUNDS.     HPI:  74yo woman who presented to [facility] ED on 12/14/23, as a transfer from [facility], as a CODE STROKE, with c/o R IPH.  Denies PMH.  CTH and CTA repeated - large R IPH (temporal/parietal). Presented to [place] today with c/o HA and AMS. Friend accompanying patient said that when visiting patient today - patient was not acting like herself and c/o HA.    SUBJECTIVE: No events overnight. No new neurologic complaints.      acetaminophen     Tablet .. 650 milliGRAM(s) Oral every 6 hours PRN  acetaminophen   IVPB .. 1000 milliGRAM(s) IV Intermittent once  artificial  tears Solution 1 Drop(s) Both EYES every 4 hours PRN  bisacodyl 5 milliGRAM(s) Oral daily  bisacodyl Suppository 10 milliGRAM(s) Rectal daily PRN  brivaracetam  IVPB 50 milliGRAM(s) IV Intermittent two times a day  diclofenac 0.1% Ophthalmic Solution 1 Drop(s) Left EYE <User Schedule>  enoxaparin Injectable 40 milliGRAM(s) SubCutaneous <User Schedule>  famotidine    Tablet 20 milliGRAM(s) Oral every 12 hours PRN  hydrALAZINE Injectable 5 milliGRAM(s) IV Push once  hydrALAZINE Injectable 5 milliGRAM(s) IV Push every 6 hours PRN  losartan 25 milliGRAM(s) Oral daily  metoclopramide Injectable 5 milliGRAM(s) IV Push every 6 hours PRN  polyethylene glycol 3350 17 Gram(s) Oral two times a day  senna 2 Tablet(s) Oral at bedtime  sodium chloride 2% + sodium acetate 50:50 1000 milliLiter(s) IV Continuous <Continuous>  traMADol 25 milliGRAM(s) Oral every 4 hours PRN  traMADol 50 milliGRAM(s) Oral every 4 hours PRN    PHYSICAL EXAM:   Vital Signs Last 24 Hrs  T(C): 37.3 (18 Dec 2023 04:05), Max: 37.3 (17 Dec 2023 20:00)  T(F): 99.2 (18 Dec 2023 04:05), Max: 99.2 (17 Dec 2023 20:00)  HR: 42 (18 Dec 2023 06:00) (38 - 69)  BP: 149/68 (18 Dec 2023 06:00) (114/61 - 170/71)  BP(mean): 98 (18 Dec 2023 06:00) (82 - 102)  RR: 21 (18 Dec 2023 06:00) (18 - 24)  SpO2: 94% (18 Dec 2023 06:00) (93% - 99%)    General: No acute distress  Abdomen: Soft, nontender, nondistended   Extremities: No edema    NEUROLOGICAL EXAM:  Mental status: Awake, alert, oriented x3, no aphasia, no neglect.  Cranial Nerves: Mild L facial droop, no nystagmus, no dysarthria, tongue midline  Motor exam: Normal tone, no drift, 5/5 RUE, 5/5 RLE, 0/5 LUE, 3/5 LLE.  Sensation: L sided sensory loss  Coordination/ Gait: No dysmetria, PARISH intact and symmetric bilaterally    LABS:                        11.4   6.11  )-----------( 134      ( 18 Dec 2023 06:34 )             33.7    12-18    152<H>  |  116<H>  |  18  ----------------------------<  116<H>  3.2<L>   |  26  |  0.53    Ca    8.8      18 Dec 2023 06:34  Phos  2.3     12-18  Mg     2.1     12-18    PT/INR - ( 16 Dec 2023 13:07 )   PT: 12.5 sec;   INR: 1.20 ratio      PTT - ( 16 Dec 2023 13:07 )  PTT:27.5 sec    IMAGING: Reviewed by me.     MRI Brain w/wo contrast 12/15/23:  Stable size of right parietotemporal intraparenchymal hemorrhage. Similar midline shift of 5 mm. Follow-up imaging is recommended to show no underlying lesion.    CT Head 12/17/23:  Stable large right parietal temporal lobe hemorrhage with dissection of hemorrhage into the right lateral ventricle. Stable mass effect and midline shift.    CT Head, CTA Head/Neck 12/14/23:  Large right temporal parietal parenchymal hemorrhage with mass effect on the right lateral ventricle and midline shift to the left. No definite abnormal vascularity or high-grade stenosis.

## 2023-12-18 NOTE — SPEECH LANGUAGE PATHOLOGY EVALUATION - SLP DIAGNOSIS
Pt is a 74 y/o female who presents with suspected cognitive linguistic deficits which appear to be superimposed upon by impaired mentation. Pt c/o headache and nausea at this time. Pt did not sustain awake/alert state following PT this am although Pt was able to follow some simple commands and verbalized basic self centered needs/thoughts. Pt is a 72 y/o female who presents with suspected cognitive linguistic deficits which appear to be superimposed upon by impaired mentation. Pt c/o headache and nausea at this time. Pt did not sustain awake/alert state following PT this am although Pt was able to follow some simple commands and verbalized basic self centered needs/thoughts. 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 Pt c/o headache and nausea at this time. Pt did not sustain awake/alert state following PT this am although Pt was able to follow some simple commands and verbalized basic self centered needs/thoughts. Staff continues to primarily anticipate needs and wants.  Speech intelligibility is reduced as decreased articulatory precision and volume noted.

## 2023-12-18 NOTE — PROGRESS NOTE ADULT - SUBJECTIVE AND OBJECTIVE BOX
Subjective: Patient seen and examined. No new events except as noted.   moved out of NSCU to Stroke unit   HR stable     REVIEW OF SYSTEMS:    CONSTITUTIONAL: +weakness, fevers or chills  EYES/ENT: No visual changes;  No vertigo or throat pain   NECK: No pain or stiffness  RESPIRATORY: No cough, wheezing, hemoptysis; No shortness of breath  CARDIOVASCULAR: No chest pain or palpitations  GASTROINTESTINAL: No abdominal or epigastric pain. No nausea, vomiting, or hematemesis; No diarrhea or constipation. No melena or hematochezia.  GENITOURINARY: No dysuria, frequency or hematuria  NEUROLOGICAL: No numbness or weakness  SKIN: No itching, burning, rashes, or lesions   All other review of systems is negative unless indicated above.    MEDICATIONS:  MEDICATIONS  (STANDING):  bisacodyl 5 milliGRAM(s) Oral daily  brivaracetam  IVPB 50 milliGRAM(s) IV Intermittent two times a day  diclofenac 0.1% Ophthalmic Solution 1 Drop(s) Left EYE <User Schedule>  enoxaparin Injectable 40 milliGRAM(s) SubCutaneous <User Schedule>  losartan 25 milliGRAM(s) Oral daily  polyethylene glycol 3350 17 Gram(s) Oral two times a day  senna 2 Tablet(s) Oral at bedtime  sodium chloride 2% + sodium acetate 50:50 1000 milliLiter(s) (40 mL/Hr) IV Continuous <Continuous>      PHYSICAL EXAM:  T(C): 37.1 (12-18-23 @ 18:00), Max: 37.3 (12-17-23 @ 20:00)  HR: 41 (12-18-23 @ 18:00) (37 - 55)  BP: 152/64 (12-18-23 @ 18:00) (114/61 - 158/65)  RR: 23 (12-18-23 @ 18:00) (18 - 25)  SpO2: 94% (12-18-23 @ 18:00) (92% - 98%)  Wt(kg): --  I&O's Summary    17 Dec 2023 07:01  -  18 Dec 2023 07:00  --------------------------------------------------------  IN: 235 mL / OUT: 300 mL / NET: -65 mL          Appearance: Normal	  HEENT:   Normal oral mucosa, PERRL, EOMI	  Lymphatic: No lymphadenopathy  Cardiovascular: Normal S1 S2, No JVD, No murmurs, No edema  Respiratory: Lungs clear to auscultation	  Psychiatry: A & O x 3, Mood & affect appropriate  Gastrointestinal:  Soft, Non-tender, + BS	  Skin: No rashes, No ecchymoses, No cyanosis	  Neurological: Arousable, oriented x3, following commands after a lot of prompting, Pupils bilaterally reactive and equal. Right gaze preference. Right side 5/5. LUE withdrawing. LLE is antigravity, however neglecting the left side.      Extremities: Normal range of motion, No clubbing, cyanosis or edema  Vascular: Peripheral pulses palpable 2+ bilaterally      LABS:    CARDIAC MARKERS:                                11.4   6.11  )-----------( 134      ( 18 Dec 2023 06:34 )             33.7     12-18    151<H>  |  115<H>  |  18  ----------------------------<  104<H>  3.7   |  28  |  0.52    Ca    8.4      18 Dec 2023 13:00  Phos  2.3     12-18  Mg     2.1     12-18      proBNP:   Lipid Profile:   HgA1c:   TSH:             TELEMETRY: 	SR     ECG:  	  RADIOLOGY:   DIAGNOSTIC TESTING:  [ ] Echocardiogram:  [ ]  Catheterization:  [ ] Stress Test:    OTHER:

## 2023-12-18 NOTE — PROVIDER CONTACT NOTE (OTHER) - ASSESSMENT
Pt lalito sustaining 32-38 sinus lalito, asymptomatic, /64. Known bradycardia, cardiology following. Pt remains neurologically unchanged from previous assessment. No c/o pain or discomfort at this time.

## 2023-12-18 NOTE — PROGRESS NOTE ADULT - ASSESSMENT
73y (1950) RIGHT handed woman, w/o PMH, who presented to [facility] ED on 12/14/2023, as a transfer from [facility], found to have large R IPH (temporal/parietal)    NEURO: Continue close monitoring for neurologic deterioration, permissive HTN, titrate statin to LDL goal less than 70, MRI Brain w/o, MRA Head w/o and Neck w/contrast. Physical therapy/OT/Speech eval/treatment.     ANTITHROMBOTIC THERAPY:     PULMONARY: CXR clear, protecting airway, saturating well     CARDIOVASCULAR: check TTE, cardiac monitoring                              SBP goal:     GASTROINTESTINAL:  dysphagia screen       Diet:     RENAL: BUN/Cr stable, good urine output      Na Goal: Greater than 135     Sweeney:    HEMATOLOGY: H/H stable, Platelets normal      DVT ppx: Heparin s.c [] LMWH []     ID: afebrile, no leukocytosis     OTHER:     DISPOSITION: Rehab or home depending on PT eval once stable and workup is complete      CORE MEASURES:        Admission NIHSS:      TPA: [] YES [] NO      LDL/HDL:     Depression Screen:      Statin Therapy:     Dysphagia Screen: [] PASS [] FAIL     Smoking [] YES [] NO      Afib [] YES [] NO     Stroke Education [] YES [] NO    Obtain screening lower extremity venous ultrasound in patients who meet 1 or more of the following criteria as patient is high risk for DVT/PE on admission:   [] History of DVT/PE  []Hypercoagulable states (Factor V Leiden, Cancer, OCP, etc. 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 Physical therapy/OT/Speech eval/treatment.     ANTITHROMBOTIC THERAPY:     PULMONARY: CXR clear, protecting airway, saturating well     CARDIOVASCULAR: check TTE, cardiac monitoring                              SBP goal:     GASTROINTESTINAL:  dysphagia screen       Diet:     RENAL: BUN/Cr stable, good urine output      Na Goal: Greater than 135     Sweeney:    HEMATOLOGY: H/H stable, Platelets normal      DVT ppx: Heparin s.c [] LMWH []     ID: afebrile, no leukocytosis     OTHER:     DISPOSITION: Rehab or home depending on PT eval once stable and workup is complete      CORE MEASURES:        Admission NIHSS:      TPA: [] YES [] NO      LDL/HDL:     Depression Screen:      Statin Therapy:     Dysphagia Screen: [] PASS [] FAIL     Smoking [] YES [] NO      Afib [] YES [] NO     Stroke Education [] YES [] NO    Obtain screening lower extremity venous ultrasound in patients who meet 1 or more of the following criteria as patient is high risk for DVT/PE on admission:   [] History of DVT/PE  []Hypercoagulable states (Factor V Leiden, Cancer, OCP, etc. 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     NEURO: Neurologically unchanged. Continue close monitoring for neurologic deterioration. -140. LDL - 127 - not on atorvastatin as mechanism nonarteriosclerotic, will follow up with PMD. A1c 5.0. MRI Brain w/wo contrast as above. Briviact 50mg BID for seizure prophylaxis, EEG with increased risk of seizures in the right frontocentral region. Physical therapy/OT/Speech eval - Acute.    ANTITHROMBOTIC THERAPY: None due to hemorrhage    PULMONARY: Protecting airway, saturating well     CARDIOVASCULAR: TTE EF 64%. Continue cardiac monitoring. Continue Losartan 25mg daily. Cardiology following for bradycardia,                SBP goal: 100-140    GASTROINTESTINAL: Dysphagia screen passed. Pt with vomiting on 12/17, abdominal x-ray with stool burden. Started on bowel regimen.      Diet: Soft and bite sized    RENAL: BUN/Cr stable, good urine output. 2% reduced to 40cc per hour, will continue to wean as tolerated.      Na Goal: Greater than 135     Sweeney: No    HEMATOLOGY: H/H stable, Platelets 134, will monitor.       DVT ppx: Lovenox    ID: Afebrile, no leukocytosis     OTHER: Plan discussed with patient at bedside    DISPOSITION: Rehab once stable and workup is complete    CORE MEASURES:        Admission NIHSS:      TPA: [] YES [] NO      LDL/HDL:     Depression Screen:      Statin Therapy:     Dysphagia Screen: [] PASS [] FAIL     Smoking [] YES [] NO      Afib [] YES [] NO     Stroke Education [] YES [] NO    Obtain screening lower extremity venous ultrasound in patients who meet 1 or more of the following criteria as patient is high risk for DVT/PE on admission:   [] History of DVT/PE  []Hypercoagulable states (Factor V Leiden, Cancer, OCP, etc. 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     NEURO: Neurologically unchanged. Continue close monitoring for neurologic deterioration. -140. LDL - 127 - not on atorvastatin as mechanism nonarteriosclerotic, will follow up with PMD. A1c 5.0. MRI Brain w/wo contrast as above. Briviact 50mg BID for seizure prophylaxis, EEG with increased risk of seizures in the right frontocentral region. Physical therapy/OT/Speech eval - Acute.    ANTITHROMBOTIC THERAPY: None due to hemorrhage    PULMONARY: Protecting airway, saturating well     CARDIOVASCULAR: TTE EF 64%. Continue cardiac monitoring. Continue Losartan 25mg daily. Cardiology following for bradycardia,                SBP goal: 100-140    GASTROINTESTINAL: Dysphagia screen passed. Pt with vomiting on 12/17, abdominal x-ray with stool burden. Started on bowel regimen.      Diet: Soft and bite sized    RENAL: BUN/Cr stable, good urine output. 2% reduced to 40cc per hour, will continue to wean as tolerated.      Na Goal: Greater than 135     Sweeney: No    HEMATOLOGY: H/H stable, Platelets 134, will monitor.       DVT ppx: Lovenox    ID: Afebrile, no leukocytosis     OTHER: Plan discussed with patient at bedside    DISPOSITION: Rehab once stable and workup is complete    CORE MEASURES:        Admission NIHSS:      TPA: [] YES [] NO      LDL/HDL:     Depression Screen:      Statin Therapy:     Dysphagia Screen: [] PASS [] FAIL     Smoking [] YES [] NO      Afib [] YES [] NO     Stroke Education [] YES [] NO    Obtain screening lower extremity venous ultrasound in patients who meet 1 or more of the following criteria as patient is high risk for DVT/PE on admission:   [] History of DVT/PE  []Hypercoagulable states (Factor V Leiden, Cancer, OCP, etc. 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     NEURO: Neurologically unchanged. Continue close monitoring for neurologic deterioration. -140. LDL - 127 - not on atorvastatin as mechanism nonarteriosclerotic, will follow up with PMD. A1c 5.0. MRI Brain w/wo contrast as above. Briviact 50mg BID for seizure prophylaxis, EEG with increased risk of seizures in the right frontocentral region. Physical therapy/OT/Speech eval - Acute.    ANTITHROMBOTIC THERAPY: None due to hemorrhage    PULMONARY: Protecting airway, saturating well     CARDIOVASCULAR: TTE EF 64%. Continue cardiac monitoring. Continue Losartan 25mg daily. Cardiology following for bradycardia, Atropine at bedside for sustained HR < 35 and/or drop in BP with bradycardia.              SBP goal: 100-140    GASTROINTESTINAL: Dysphagia screen passed. Pt with vomiting on 12/17, abdominal x-ray with stool burden. Started on bowel regimen.      Diet: Soft and bite sized    RENAL: BUN/Cr stable, good urine output. 2% reduced to 40cc per hour, will continue to wean as tolerated.      Na Goal: Greater than 135     Sweeney: No    HEMATOLOGY: H/H stable, Platelets 134, will monitor.       DVT ppx: Lovenox    ID: Afebrile, no leukocytosis     OTHER: Plan discussed with patient at bedside    DISPOSITION: Rehab once stable and workup is complete    CORE MEASURES:        Admission NIHSS: 9     TPA: NO      LDL/HDL: 127/58     Depression Screen: p     Statin Therapy: no, hemorrhage     Dysphagia Screen: PASS     Smoking NO      Afib NO     Stroke Education YES    Obtain screening lower extremity venous ultrasound in patients who meet 1 or more of the following criteria as patient is high risk for DVT/PE on admission:   [] History of DVT/PE  [] Hypercoagulable states (Factor V Leiden, Cancer, OCP, etc. 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     NEURO: Neurologically unchanged. Continue close monitoring for neurologic deterioration. -140. LDL - 127 - not on atorvastatin as mechanism nonarteriosclerotic, will follow up with PMD. A1c 5.0. MRI Brain w/wo contrast as above. Briviact 50mg BID for seizure prophylaxis, EEG with increased risk of seizures in the right frontocentral region. Physical therapy/OT/Speech eval - Acute.    ANTITHROMBOTIC THERAPY: None due to hemorrhage    PULMONARY: Protecting airway, saturating well     CARDIOVASCULAR: TTE EF 64%. Continue cardiac monitoring. Continue Losartan 25mg daily. Cardiology following for bradycardia, Atropine at bedside for sustained HR < 35 and/or drop in BP with bradycardia.              SBP goal: 100-140    GASTROINTESTINAL: Dysphagia screen passed. Pt with vomiting on 12/17, abdominal x-ray with stool burden. Started on bowel regimen.      Diet: Soft and bite sized    RENAL: BUN/Cr stable, good urine output. 2% reduced to 40cc per hour, will continue to wean as tolerated.      Na Goal: Greater than 135     Sweeney: No    HEMATOLOGY: H/H stable, Platelets 134, will monitor.       DVT ppx: Lovenox    ID: Afebrile, no leukocytosis     OTHER: Plan discussed with patient at bedside    DISPOSITION: Rehab once stable and workup is complete    CORE MEASURES:        Admission NIHSS: 9     TPA: NO      LDL/HDL: 127/58     Depression Screen: p     Statin Therapy: no, hemorrhage     Dysphagia Screen: PASS     Smoking NO      Afib NO     Stroke Education YES    Obtain screening lower extremity venous ultrasound in patients who meet 1 or more of the following criteria as patient is high risk for DVT/PE on admission:   [] History of DVT/PE  [] Hypercoagulable states (Factor V Leiden, Cancer, OCP, etc. )  [] Prolonged immobility (hemiplegia/hemiparesis/post operative or any other extended immobilization)  [] Transferred from outside facility (Rehab or Long term care)  [] Age </= to 50

## 2023-12-19 LAB
ANION GAP SERPL CALC-SCNC: 10 MMOL/L — SIGNIFICANT CHANGE UP (ref 5–17)
ANION GAP SERPL CALC-SCNC: 10 MMOL/L — SIGNIFICANT CHANGE UP (ref 5–17)
ANION GAP SERPL CALC-SCNC: 12 MMOL/L — SIGNIFICANT CHANGE UP (ref 5–17)
ANION GAP SERPL CALC-SCNC: 13 MMOL/L — SIGNIFICANT CHANGE UP (ref 5–17)
ANION GAP SERPL CALC-SCNC: 13 MMOL/L — SIGNIFICANT CHANGE UP (ref 5–17)
APPEARANCE UR: ABNORMAL
APPEARANCE UR: ABNORMAL
BACTERIA # UR AUTO: ABNORMAL /HPF
BACTERIA # UR AUTO: ABNORMAL /HPF
BILIRUB UR-MCNC: NEGATIVE — SIGNIFICANT CHANGE UP
BILIRUB UR-MCNC: NEGATIVE — SIGNIFICANT CHANGE UP
BUN SERPL-MCNC: 10 MG/DL — SIGNIFICANT CHANGE UP (ref 7–23)
BUN SERPL-MCNC: 10 MG/DL — SIGNIFICANT CHANGE UP (ref 7–23)
BUN SERPL-MCNC: 12 MG/DL — SIGNIFICANT CHANGE UP (ref 7–23)
BUN SERPL-MCNC: 12 MG/DL — SIGNIFICANT CHANGE UP (ref 7–23)
BUN SERPL-MCNC: 14 MG/DL — SIGNIFICANT CHANGE UP (ref 7–23)
BUN SERPL-MCNC: 14 MG/DL — SIGNIFICANT CHANGE UP (ref 7–23)
BUN SERPL-MCNC: 15 MG/DL — SIGNIFICANT CHANGE UP (ref 7–23)
BUN SERPL-MCNC: 15 MG/DL — SIGNIFICANT CHANGE UP (ref 7–23)
CALCIUM SERPL-MCNC: 8.4 MG/DL — SIGNIFICANT CHANGE UP (ref 8.4–10.5)
CALCIUM SERPL-MCNC: 8.4 MG/DL — SIGNIFICANT CHANGE UP (ref 8.4–10.5)
CALCIUM SERPL-MCNC: 8.7 MG/DL — SIGNIFICANT CHANGE UP (ref 8.4–10.5)
CALCIUM SERPL-MCNC: 8.8 MG/DL — SIGNIFICANT CHANGE UP (ref 8.4–10.5)
CALCIUM SERPL-MCNC: 8.8 MG/DL — SIGNIFICANT CHANGE UP (ref 8.4–10.5)
CAST: 1 /LPF — SIGNIFICANT CHANGE UP (ref 0–4)
CAST: 1 /LPF — SIGNIFICANT CHANGE UP (ref 0–4)
CHLORIDE SERPL-SCNC: 108 MMOL/L — SIGNIFICANT CHANGE UP (ref 96–108)
CHLORIDE SERPL-SCNC: 108 MMOL/L — SIGNIFICANT CHANGE UP (ref 96–108)
CHLORIDE SERPL-SCNC: 109 MMOL/L — HIGH (ref 96–108)
CHLORIDE SERPL-SCNC: 109 MMOL/L — HIGH (ref 96–108)
CHLORIDE SERPL-SCNC: 112 MMOL/L — HIGH (ref 96–108)
CHLORIDE SERPL-SCNC: 112 MMOL/L — HIGH (ref 96–108)
CHLORIDE SERPL-SCNC: 113 MMOL/L — HIGH (ref 96–108)
CHLORIDE SERPL-SCNC: 113 MMOL/L — HIGH (ref 96–108)
CO2 SERPL-SCNC: 22 MMOL/L — SIGNIFICANT CHANGE UP (ref 22–31)
CO2 SERPL-SCNC: 22 MMOL/L — SIGNIFICANT CHANGE UP (ref 22–31)
CO2 SERPL-SCNC: 24 MMOL/L — SIGNIFICANT CHANGE UP (ref 22–31)
CO2 SERPL-SCNC: 26 MMOL/L — SIGNIFICANT CHANGE UP (ref 22–31)
CO2 SERPL-SCNC: 26 MMOL/L — SIGNIFICANT CHANGE UP (ref 22–31)
COLOR SPEC: YELLOW — SIGNIFICANT CHANGE UP
COLOR SPEC: YELLOW — SIGNIFICANT CHANGE UP
CREAT SERPL-MCNC: 0.39 MG/DL — LOW (ref 0.5–1.3)
CREAT SERPL-MCNC: 0.39 MG/DL — LOW (ref 0.5–1.3)
CREAT SERPL-MCNC: 0.42 MG/DL — LOW (ref 0.5–1.3)
CREAT SERPL-MCNC: 0.44 MG/DL — LOW (ref 0.5–1.3)
CREAT SERPL-MCNC: 0.44 MG/DL — LOW (ref 0.5–1.3)
DIFF PNL FLD: ABNORMAL
DIFF PNL FLD: ABNORMAL
EGFR: 102 ML/MIN/1.73M2 — SIGNIFICANT CHANGE UP
EGFR: 102 ML/MIN/1.73M2 — SIGNIFICANT CHANGE UP
EGFR: 103 ML/MIN/1.73M2 — SIGNIFICANT CHANGE UP
EGFR: 105 ML/MIN/1.73M2 — SIGNIFICANT CHANGE UP
EGFR: 105 ML/MIN/1.73M2 — SIGNIFICANT CHANGE UP
GLUCOSE SERPL-MCNC: 102 MG/DL — HIGH (ref 70–99)
GLUCOSE SERPL-MCNC: 102 MG/DL — HIGH (ref 70–99)
GLUCOSE SERPL-MCNC: 104 MG/DL — HIGH (ref 70–99)
GLUCOSE SERPL-MCNC: 104 MG/DL — HIGH (ref 70–99)
GLUCOSE SERPL-MCNC: 95 MG/DL — SIGNIFICANT CHANGE UP (ref 70–99)
GLUCOSE SERPL-MCNC: 95 MG/DL — SIGNIFICANT CHANGE UP (ref 70–99)
GLUCOSE SERPL-MCNC: 98 MG/DL — SIGNIFICANT CHANGE UP (ref 70–99)
GLUCOSE SERPL-MCNC: 98 MG/DL — SIGNIFICANT CHANGE UP (ref 70–99)
GLUCOSE UR QL: NEGATIVE MG/DL — SIGNIFICANT CHANGE UP
GLUCOSE UR QL: NEGATIVE MG/DL — SIGNIFICANT CHANGE UP
HCT VFR BLD CALC: 39 % — SIGNIFICANT CHANGE UP (ref 34.5–45)
HCT VFR BLD CALC: 39 % — SIGNIFICANT CHANGE UP (ref 34.5–45)
HGB BLD-MCNC: 13.4 G/DL — SIGNIFICANT CHANGE UP (ref 11.5–15.5)
HGB BLD-MCNC: 13.4 G/DL — SIGNIFICANT CHANGE UP (ref 11.5–15.5)
KETONES UR-MCNC: NEGATIVE MG/DL — SIGNIFICANT CHANGE UP
KETONES UR-MCNC: NEGATIVE MG/DL — SIGNIFICANT CHANGE UP
LEUKOCYTE ESTERASE UR-ACNC: ABNORMAL
LEUKOCYTE ESTERASE UR-ACNC: ABNORMAL
MCHC RBC-ENTMCNC: 30.9 PG — SIGNIFICANT CHANGE UP (ref 27–34)
MCHC RBC-ENTMCNC: 30.9 PG — SIGNIFICANT CHANGE UP (ref 27–34)
MCHC RBC-ENTMCNC: 34.4 GM/DL — SIGNIFICANT CHANGE UP (ref 32–36)
MCHC RBC-ENTMCNC: 34.4 GM/DL — SIGNIFICANT CHANGE UP (ref 32–36)
MCV RBC AUTO: 90.1 FL — SIGNIFICANT CHANGE UP (ref 80–100)
MCV RBC AUTO: 90.1 FL — SIGNIFICANT CHANGE UP (ref 80–100)
NITRITE UR-MCNC: NEGATIVE — SIGNIFICANT CHANGE UP
NITRITE UR-MCNC: NEGATIVE — SIGNIFICANT CHANGE UP
NRBC # BLD: 0 /100 WBCS — SIGNIFICANT CHANGE UP (ref 0–0)
NRBC # BLD: 0 /100 WBCS — SIGNIFICANT CHANGE UP (ref 0–0)
PH UR: 8.5 (ref 5–8)
PH UR: 8.5 (ref 5–8)
PLATELET # BLD AUTO: 148 K/UL — LOW (ref 150–400)
PLATELET # BLD AUTO: 148 K/UL — LOW (ref 150–400)
POTASSIUM SERPL-MCNC: 3.1 MMOL/L — LOW (ref 3.5–5.3)
POTASSIUM SERPL-MCNC: 3.1 MMOL/L — LOW (ref 3.5–5.3)
POTASSIUM SERPL-MCNC: 3.4 MMOL/L — LOW (ref 3.5–5.3)
POTASSIUM SERPL-MCNC: 3.4 MMOL/L — LOW (ref 3.5–5.3)
POTASSIUM SERPL-MCNC: 3.6 MMOL/L — SIGNIFICANT CHANGE UP (ref 3.5–5.3)
POTASSIUM SERPL-MCNC: 3.6 MMOL/L — SIGNIFICANT CHANGE UP (ref 3.5–5.3)
POTASSIUM SERPL-MCNC: 4.4 MMOL/L — SIGNIFICANT CHANGE UP (ref 3.5–5.3)
POTASSIUM SERPL-MCNC: 4.4 MMOL/L — SIGNIFICANT CHANGE UP (ref 3.5–5.3)
POTASSIUM SERPL-SCNC: 3.1 MMOL/L — LOW (ref 3.5–5.3)
POTASSIUM SERPL-SCNC: 3.1 MMOL/L — LOW (ref 3.5–5.3)
POTASSIUM SERPL-SCNC: 3.4 MMOL/L — LOW (ref 3.5–5.3)
POTASSIUM SERPL-SCNC: 3.4 MMOL/L — LOW (ref 3.5–5.3)
POTASSIUM SERPL-SCNC: 3.6 MMOL/L — SIGNIFICANT CHANGE UP (ref 3.5–5.3)
POTASSIUM SERPL-SCNC: 3.6 MMOL/L — SIGNIFICANT CHANGE UP (ref 3.5–5.3)
POTASSIUM SERPL-SCNC: 4.4 MMOL/L — SIGNIFICANT CHANGE UP (ref 3.5–5.3)
POTASSIUM SERPL-SCNC: 4.4 MMOL/L — SIGNIFICANT CHANGE UP (ref 3.5–5.3)
PROT UR-MCNC: NEGATIVE MG/DL — SIGNIFICANT CHANGE UP
PROT UR-MCNC: NEGATIVE MG/DL — SIGNIFICANT CHANGE UP
RBC # BLD: 4.33 M/UL — SIGNIFICANT CHANGE UP (ref 3.8–5.2)
RBC # BLD: 4.33 M/UL — SIGNIFICANT CHANGE UP (ref 3.8–5.2)
RBC # FLD: 12.5 % — SIGNIFICANT CHANGE UP (ref 10.3–14.5)
RBC # FLD: 12.5 % — SIGNIFICANT CHANGE UP (ref 10.3–14.5)
RBC CASTS # UR COMP ASSIST: 12 /HPF — HIGH (ref 0–4)
RBC CASTS # UR COMP ASSIST: 12 /HPF — HIGH (ref 0–4)
SODIUM SERPL-SCNC: 142 MMOL/L — SIGNIFICANT CHANGE UP (ref 135–145)
SODIUM SERPL-SCNC: 142 MMOL/L — SIGNIFICANT CHANGE UP (ref 135–145)
SODIUM SERPL-SCNC: 147 MMOL/L — HIGH (ref 135–145)
SODIUM SERPL-SCNC: 147 MMOL/L — HIGH (ref 135–145)
SODIUM SERPL-SCNC: 148 MMOL/L — HIGH (ref 135–145)
SP GR SPEC: 1.02 — SIGNIFICANT CHANGE UP (ref 1–1.03)
SP GR SPEC: 1.02 — SIGNIFICANT CHANGE UP (ref 1–1.03)
SQUAMOUS # UR AUTO: 1 /HPF — SIGNIFICANT CHANGE UP (ref 0–5)
SQUAMOUS # UR AUTO: 1 /HPF — SIGNIFICANT CHANGE UP (ref 0–5)
UROBILINOGEN FLD QL: 1 MG/DL — SIGNIFICANT CHANGE UP (ref 0.2–1)
UROBILINOGEN FLD QL: 1 MG/DL — SIGNIFICANT CHANGE UP (ref 0.2–1)
WBC # BLD: 7 K/UL — SIGNIFICANT CHANGE UP (ref 3.8–10.5)
WBC # BLD: 7 K/UL — SIGNIFICANT CHANGE UP (ref 3.8–10.5)
WBC # FLD AUTO: 7 K/UL — SIGNIFICANT CHANGE UP (ref 3.8–10.5)
WBC # FLD AUTO: 7 K/UL — SIGNIFICANT CHANGE UP (ref 3.8–10.5)
WBC UR QL: 4 /HPF — SIGNIFICANT CHANGE UP (ref 0–5)
WBC UR QL: 4 /HPF — SIGNIFICANT CHANGE UP (ref 0–5)

## 2023-12-19 PROCEDURE — 99222 1ST HOSP IP/OBS MODERATE 55: CPT

## 2023-12-19 PROCEDURE — 70450 CT HEAD/BRAIN W/O DYE: CPT | Mod: 26

## 2023-12-19 PROCEDURE — 99233 SBSQ HOSP IP/OBS HIGH 50: CPT

## 2023-12-19 RX ORDER — POTASSIUM CHLORIDE 20 MEQ
10 PACKET (EA) ORAL
Refills: 0 | Status: COMPLETED | OUTPATIENT
Start: 2023-12-19 | End: 2023-12-20

## 2023-12-19 RX ORDER — METOCLOPRAMIDE HCL 10 MG
5 TABLET ORAL EVERY 6 HOURS
Refills: 0 | Status: DISCONTINUED | OUTPATIENT
Start: 2023-12-19 | End: 2023-12-20

## 2023-12-19 RX ORDER — METOCLOPRAMIDE HCL 10 MG
5 TABLET ORAL ONCE
Refills: 0 | Status: COMPLETED | OUTPATIENT
Start: 2023-12-19 | End: 2023-12-19

## 2023-12-19 RX ORDER — TRAMADOL HYDROCHLORIDE 50 MG/1
50 TABLET ORAL EVERY 4 HOURS
Refills: 0 | Status: DISCONTINUED | OUTPATIENT
Start: 2023-12-19 | End: 2023-12-20

## 2023-12-19 RX ORDER — ONDANSETRON 8 MG/1
4 TABLET, FILM COATED ORAL ONCE
Refills: 0 | Status: DISCONTINUED | OUTPATIENT
Start: 2023-12-19 | End: 2023-12-19

## 2023-12-19 RX ORDER — CEFTRIAXONE 500 MG/1
1000 INJECTION, POWDER, FOR SOLUTION INTRAMUSCULAR; INTRAVENOUS EVERY 24 HOURS
Refills: 0 | Status: DISCONTINUED | OUTPATIENT
Start: 2023-12-19 | End: 2023-12-20

## 2023-12-19 RX ORDER — ACETAMINOPHEN 500 MG
1000 TABLET ORAL ONCE
Refills: 0 | Status: COMPLETED | OUTPATIENT
Start: 2023-12-19 | End: 2023-12-19

## 2023-12-19 RX ORDER — KETOROLAC TROMETHAMINE 0.5 %
1 DROPS OPHTHALMIC (EYE) DAILY
Refills: 0 | Status: DISCONTINUED | OUTPATIENT
Start: 2023-12-19 | End: 2023-12-20

## 2023-12-19 RX ORDER — POTASSIUM CHLORIDE 20 MEQ
40 PACKET (EA) ORAL EVERY 4 HOURS
Refills: 0 | Status: DISCONTINUED | OUTPATIENT
Start: 2023-12-19 | End: 2023-12-19

## 2023-12-19 RX ORDER — BRIVARACETAM 25 MG/1
50 TABLET, FILM COATED ORAL
Refills: 0 | Status: DISCONTINUED | OUTPATIENT
Start: 2023-12-19 | End: 2023-12-20

## 2023-12-19 RX ORDER — ATROPINE SULFATE 0.1 MG/ML
1 SYRINGE (ML) INJECTION ONCE
Refills: 0 | Status: DISCONTINUED | OUTPATIENT
Start: 2023-12-19 | End: 2023-12-19

## 2023-12-19 RX ORDER — TRAMADOL HYDROCHLORIDE 50 MG/1
25 TABLET ORAL EVERY 4 HOURS
Refills: 0 | Status: DISCONTINUED | OUTPATIENT
Start: 2023-12-19 | End: 2023-12-20

## 2023-12-19 RX ORDER — METOCLOPRAMIDE HCL 10 MG
5 TABLET ORAL EVERY 6 HOURS
Refills: 0 | Status: DISCONTINUED | OUTPATIENT
Start: 2023-12-19 | End: 2023-12-19

## 2023-12-19 RX ORDER — SODIUM CHLORIDE 5 G/100ML
1000 INJECTION, SOLUTION INTRAVENOUS
Refills: 0 | Status: DISCONTINUED | OUTPATIENT
Start: 2023-12-19 | End: 2023-12-20

## 2023-12-19 RX ORDER — FAMOTIDINE 10 MG/ML
20 INJECTION INTRAVENOUS
Refills: 0 | Status: DISCONTINUED | OUTPATIENT
Start: 2023-12-19 | End: 2023-12-20

## 2023-12-19 RX ORDER — CALCIUM CARBONATE 500(1250)
1 TABLET ORAL THREE TIMES A DAY
Refills: 0 | Status: DISCONTINUED | OUTPATIENT
Start: 2023-12-19 | End: 2023-12-20

## 2023-12-19 RX ORDER — ONDANSETRON 8 MG/1
4 TABLET, FILM COATED ORAL ONCE
Refills: 0 | Status: COMPLETED | OUTPATIENT
Start: 2023-12-19 | End: 2023-12-19

## 2023-12-19 RX ADMIN — SODIUM CHLORIDE 20 MILLILITER(S): 5 INJECTION, SOLUTION INTRAVENOUS at 12:52

## 2023-12-19 RX ADMIN — Medication 1000 MILLIGRAM(S): at 00:59

## 2023-12-19 RX ADMIN — ONDANSETRON 4 MILLIGRAM(S): 8 TABLET, FILM COATED ORAL at 16:38

## 2023-12-19 RX ADMIN — Medication 650 MILLIGRAM(S): at 20:44

## 2023-12-19 RX ADMIN — CEFTRIAXONE 100 MILLIGRAM(S): 500 INJECTION, POWDER, FOR SOLUTION INTRAMUSCULAR; INTRAVENOUS at 18:21

## 2023-12-19 RX ADMIN — Medication 100 MILLIEQUIVALENT(S): at 17:16

## 2023-12-19 RX ADMIN — Medication 5 MILLIGRAM(S): at 00:29

## 2023-12-19 RX ADMIN — FAMOTIDINE 20 MILLIGRAM(S): 10 INJECTION INTRAVENOUS at 19:06

## 2023-12-19 RX ADMIN — Medication 1000 MILLIGRAM(S): at 15:12

## 2023-12-19 RX ADMIN — Medication 650 MILLIGRAM(S): at 06:41

## 2023-12-19 RX ADMIN — Medication 400 MILLIGRAM(S): at 14:42

## 2023-12-19 RX ADMIN — Medication 400 MILLIGRAM(S): at 00:29

## 2023-12-19 RX ADMIN — BRIVARACETAM 220 MILLIGRAM(S): 25 TABLET, FILM COATED ORAL at 05:27

## 2023-12-19 RX ADMIN — Medication 5 MILLIGRAM(S): at 23:44

## 2023-12-19 RX ADMIN — Medication 650 MILLIGRAM(S): at 20:14

## 2023-12-19 RX ADMIN — Medication 100 MILLIEQUIVALENT(S): at 19:28

## 2023-12-19 RX ADMIN — BRIVARACETAM 50 MILLIGRAM(S): 25 TABLET, FILM COATED ORAL at 19:06

## 2023-12-19 RX ADMIN — Medication 650 MILLIGRAM(S): at 07:11

## 2023-12-19 RX ADMIN — Medication 100 MILLIEQUIVALENT(S): at 21:53

## 2023-12-19 RX ADMIN — Medication 5 MILLIGRAM(S): at 14:29

## 2023-12-19 RX ADMIN — Medication 1 TABLET(S): at 19:27

## 2023-12-19 RX ADMIN — LOSARTAN POTASSIUM 25 MILLIGRAM(S): 100 TABLET, FILM COATED ORAL at 05:27

## 2023-12-19 RX ADMIN — Medication 1 DROP(S): at 21:52

## 2023-12-19 RX ADMIN — ENOXAPARIN SODIUM 40 MILLIGRAM(S): 100 INJECTION SUBCUTANEOUS at 19:06

## 2023-12-19 RX ADMIN — Medication 5 MILLIGRAM(S): at 06:41

## 2023-12-19 NOTE — CONSULT NOTE ADULT - SUBJECTIVE AND OBJECTIVE BOX
Patient is a 73y old  Female who presents with a chief complaint of R IPH (19 Dec 2023 07:54)    Admission HPI:  Patient MOISÉS MAYO is a 73y (1950) RIGHT handed woman who presented to [facility] ED on 12/14/2023, as a transfer from [facility], as a CODE STROKE, with c/o R IPH.  Denies PMH.  CTH and CTA repeated - large R IPH (temporal/parietal). Presented to [place] today with c/o HA and AMS. Friend accompanying patient said that when visiting patient today - patient was not acting like herself and c/o HA.  NIHSS 9    (14 Dec 2023 23:44)    Interval History:  Patient admitted for w/u and management.  Most recent imaging:  CT Head No Cont (12.17.23 @ 10:12) >  Stable large right parietal temporal lobe hemorrhage with   dissection of hemorrhage into the right lateral ventricle. Stable mass   effect and midline shift.     MR Head w/wo IV Cont (12.15.23 @ 10:49) >  Stable size of right parietotemporal intraparenchymal hemorrhage. Similar   midline shift of 5 mm. Follow-up imaging is recommended to show no   underlying lesion.    Patent w persistent functional deficits.    REVIEW OF SYSTEMS: + weakness,  + difficulty walking, + mild HANo chest pain, shortness of breath, nausea, vomiting or diarhea; other ROS neg     PAST MEDICAL & SURGICAL HISTORY  No pertinent past medical history    FUNCTIONAL HISTORY:   Lives alone in an apartment. PTA Independent    CURRENT FUNCTIONAL STATUS:  Max A transfers    FAMILY HISTORY   N/C    MEDICATIONS   acetaminophen     Tablet .. 650 milliGRAM(s) Oral every 6 hours PRN  artificial  tears Solution 1 Drop(s) Both EYES every 4 hours PRN  bisacodyl 5 milliGRAM(s) Oral daily  bisacodyl Suppository 10 milliGRAM(s) Rectal daily PRN  brivaracetam  IVPB 50 milliGRAM(s) IV Intermittent two times a day  diclofenac 0.1% Ophthalmic Solution 1 Drop(s) Left EYE <User Schedule>  enoxaparin Injectable 40 milliGRAM(s) SubCutaneous <User Schedule>  famotidine    Tablet 20 milliGRAM(s) Oral every 12 hours PRN  hydrALAZINE Injectable 5 milliGRAM(s) IV Push every 6 hours PRN  losartan 25 milliGRAM(s) Oral daily  metoclopramide Injectable 5 milliGRAM(s) IV Push every 6 hours PRN  polyethylene glycol 3350 17 Gram(s) Oral two times a day  senna 2 Tablet(s) Oral at bedtime  sodium chloride 2% + sodium acetate 50:50 1000 milliLiter(s) IV Continuous <Continuous>  traMADol 25 milliGRAM(s) Oral every 4 hours PRN  traMADol 50 milliGRAM(s) Oral every 4 hours PRN    ALLERGIES  Allergy Status Unknown  oxycodone (Nausea)    VITALS  T(C): 36.9 (12-19-23 @ 08:00), Max: 37.1 (12-18-23 @ 18:00)  HR: 45 (12-19-23 @ 10:00) (37 - 56)  BP: 144/75 (12-19-23 @ 10:00) (123/66 - 169/75)  RR: 22 (12-19-23 @ 10:00) (19 - 26)  SpO2: 98% (12-19-23 @ 10:00) (92% - 98%)  Wt(kg): --    PHYSICAL EXAM  Constitutional - NAD, Comfortable  HEENT - NCAT, EOMI  Neck - Supple  Chest - No distress, no use of accessory muscles for respiration  Cardiovascular - Butch. Well perfused  Abdomen - BS+, Soft, NTND  Extremities - No C/C/E, No calf tenderness   Neurologic Exam -                 Lethargic but arousable  Speech hypophonic  Some delayed processing  Motor LUE 4/5, LLE/RUE/RLE - 4+/5  No clonus   Psychiatric - Mood stable, Affect WNL    RECENT LABS/IMAGING  CBC Full  -  ( 19 Dec 2023 05:53 )  WBC Count : 7.00 K/uL  RBC Count : 4.33 M/uL  Hemoglobin : 13.4 g/dL  Hematocrit : 39.0 %  Platelet Count - Automated : 148 K/uL  Mean Cell Volume : 90.1 fl  Mean Cell Hemoglobin : 30.9 pg  Mean Cell Hemoglobin Concentration : 34.4 gm/dL  Auto Neutrophil # : x  Auto Lymphocyte # : x  Auto Monocyte # : x  Auto Eosinophil # : x  Auto Basophil # : x  Auto Neutrophil % : x  Auto Lymphocyte % : x  Auto Monocyte % : x  Auto Eosinophil % : x  Auto Basophil % : x    12-19    148<H>  |  112<H>  |  14  ----------------------------<  98  3.6   |  24  |  0.44<L>    Ca    8.8      19 Dec 2023 05:52  Phos  2.3     12-18  Mg     2.1     12-18      Urinalysis Basic - ( 19 Dec 2023 05:52 )    Color: x / Appearance: x / SG: x / pH: x  Gluc: 98 mg/dL / Ketone: x  / Bili: x / Urobili: x   Blood: x / Protein: x / Nitrite: x   Leuk Esterase: x / RBC: x / WBC x   Sq Epi: x / Non Sq Epi: x / Bacteria: x    Impression:  72 yo with functional deficits secondary to diagnosis of IPH    Plan:  PT- ROM, Bed Mob, Transfers, Amb w AD and bracing as needed  OT- ADLs, bracing  SLP- Dysphagia eval and treat  Prec- Falls, Cardiac  Monitor bradycardia  DVT Prophylaxis- Lovenox  Skin- Turn q2 h  Dispo- Acute Rehab- patient requires active and ongoing therapeutic interventions of multiple disciplines and can tolerate 3 hours of therapies x 4wks. Can actively participate and benefit from  an intensive rehabilitation program. Requires supervision by a rehabilitation physician and a coordinated interdisciplinary approach to providing rehabilitation.    Patient is a 73y old  Female who presents with a chief complaint of R IPH (19 Dec 2023 07:54)    Admission HPI:  Patient MOISÉS MAYO is a 73y (1950) RIGHT handed woman who presented to [facility] ED on 12/14/2023, as a transfer from [facility], as a CODE STROKE, with c/o R IPH.  Denies PMH.  CTH and CTA repeated - large R IPH (temporal/parietal). Presented to [place] today with c/o HA and AMS. Friend accompanying patient said that when visiting patient today - patient was not acting like herself and c/o HA.  NIHSS 9    (14 Dec 2023 23:44)    Interval History:  Patient admitted for w/u and management.  Most recent imaging:  CT Head No Cont (12.17.23 @ 10:12) >  Stable large right parietal temporal lobe hemorrhage with   dissection of hemorrhage into the right lateral ventricle. Stable mass   effect and midline shift.     MR Head w/wo IV Cont (12.15.23 @ 10:49) >  Stable size of right parietotemporal intraparenchymal hemorrhage. Similar   midline shift of 5 mm. Follow-up imaging is recommended to show no   underlying lesion.    Patent w persistent functional deficits.    REVIEW OF SYSTEMS: + weakness,  + difficulty walking, + mild HANo chest pain, shortness of breath, nausea, vomiting or diarhea; other ROS neg     PAST MEDICAL & SURGICAL HISTORY  No pertinent past medical history    FUNCTIONAL HISTORY:   Lives alone in an apartment. PTA Independent    CURRENT FUNCTIONAL STATUS:  Max A transfers    FAMILY HISTORY   N/C    MEDICATIONS   acetaminophen     Tablet .. 650 milliGRAM(s) Oral every 6 hours PRN  artificial  tears Solution 1 Drop(s) Both EYES every 4 hours PRN  bisacodyl 5 milliGRAM(s) Oral daily  bisacodyl Suppository 10 milliGRAM(s) Rectal daily PRN  brivaracetam  IVPB 50 milliGRAM(s) IV Intermittent two times a day  diclofenac 0.1% Ophthalmic Solution 1 Drop(s) Left EYE <User Schedule>  enoxaparin Injectable 40 milliGRAM(s) SubCutaneous <User Schedule>  famotidine    Tablet 20 milliGRAM(s) Oral every 12 hours PRN  hydrALAZINE Injectable 5 milliGRAM(s) IV Push every 6 hours PRN  losartan 25 milliGRAM(s) Oral daily  metoclopramide Injectable 5 milliGRAM(s) IV Push every 6 hours PRN  polyethylene glycol 3350 17 Gram(s) Oral two times a day  senna 2 Tablet(s) Oral at bedtime  sodium chloride 2% + sodium acetate 50:50 1000 milliLiter(s) IV Continuous <Continuous>  traMADol 25 milliGRAM(s) Oral every 4 hours PRN  traMADol 50 milliGRAM(s) Oral every 4 hours PRN    ALLERGIES  Allergy Status Unknown  oxycodone (Nausea)    VITALS  T(C): 36.9 (12-19-23 @ 08:00), Max: 37.1 (12-18-23 @ 18:00)  HR: 45 (12-19-23 @ 10:00) (37 - 56)  BP: 144/75 (12-19-23 @ 10:00) (123/66 - 169/75)  RR: 22 (12-19-23 @ 10:00) (19 - 26)  SpO2: 98% (12-19-23 @ 10:00) (92% - 98%)  Wt(kg): --    PHYSICAL EXAM  Constitutional - NAD, Comfortable  HEENT - NCAT, EOMI  Neck - Supple  Chest - No distress, no use of accessory muscles for respiration  Cardiovascular - Butch. Well perfused  Abdomen - BS+, Soft, NTND  Extremities - No C/C/E, No calf tenderness   Neurologic Exam -                 Lethargic but arousable  Speech hypophonic  Some delayed processing  Motor LUE 4/5, LLE/RUE/RLE - 4+/5  No clonus   Psychiatric - Mood stable, Affect WNL    RECENT LABS/IMAGING  CBC Full  -  ( 19 Dec 2023 05:53 )  WBC Count : 7.00 K/uL  RBC Count : 4.33 M/uL  Hemoglobin : 13.4 g/dL  Hematocrit : 39.0 %  Platelet Count - Automated : 148 K/uL  Mean Cell Volume : 90.1 fl  Mean Cell Hemoglobin : 30.9 pg  Mean Cell Hemoglobin Concentration : 34.4 gm/dL  Auto Neutrophil # : x  Auto Lymphocyte # : x  Auto Monocyte # : x  Auto Eosinophil # : x  Auto Basophil # : x  Auto Neutrophil % : x  Auto Lymphocyte % : x  Auto Monocyte % : x  Auto Eosinophil % : x  Auto Basophil % : x    12-19    148<H>  |  112<H>  |  14  ----------------------------<  98  3.6   |  24  |  0.44<L>    Ca    8.8      19 Dec 2023 05:52  Phos  2.3     12-18  Mg     2.1     12-18      Urinalysis Basic - ( 19 Dec 2023 05:52 )    Color: x / Appearance: x / SG: x / pH: x  Gluc: 98 mg/dL / Ketone: x  / Bili: x / Urobili: x   Blood: x / Protein: x / Nitrite: x   Leuk Esterase: x / RBC: x / WBC x   Sq Epi: x / Non Sq Epi: x / Bacteria: x    Impression:  74 yo with functional deficits secondary to diagnosis of IPH    Plan:  PT- ROM, Bed Mob, Transfers, Amb w AD and bracing as needed  OT- ADLs, bracing  SLP- Dysphagia eval and treat  Prec- Falls, Cardiac  Monitor bradycardia  DVT Prophylaxis- Lovenox  Skin- Turn q2 h  Dispo- Acute Rehab- patient requires active and ongoing therapeutic interventions of multiple disciplines and can tolerate 3 hours of therapies x 4wks. Can actively participate and benefit from  an intensive rehabilitation program. Requires supervision by a rehabilitation physician and a coordinated interdisciplinary approach to providing rehabilitation.

## 2023-12-19 NOTE — PROVIDER CONTACT NOTE (OTHER) - ASSESSMENT
pt is AOx4 with eyes closed, pt follow commands, pt complain of headache and nausea. b/p 140/756 hr 56, SpO2 98%room air, resp 21.

## 2023-12-19 NOTE — PROGRESS NOTE ADULT - SUBJECTIVE AND OBJECTIVE BOX
THE PATIENT WAS SEEN AND EXAMINED BY ME WITH THE HOUSESTAFF AND STROKE TEAM DURING MORNING ROUNDS.     HPI:  74yo woman who presented to [facility] ED on 12/14/23, as a transfer from [facility], as a CODE STROKE, with c/o R IPH.  Denies PMH.  CTH and CTA repeated - large R IPH (temporal/parietal). Presented to [place] today with c/o HA and AMS. Friend accompanying patient said that when visiting patient today - patient was not acting like herself and c/o HA.    SUBJECTIVE: Overnight pt required straight cath x 1, UA ordered. No new neurologic complaints.      acetaminophen     Tablet .. 650 milliGRAM(s) Oral every 6 hours PRN  artificial  tears Solution 1 Drop(s) Both EYES every 4 hours PRN  bisacodyl 5 milliGRAM(s) Oral daily  bisacodyl Suppository 10 milliGRAM(s) Rectal daily PRN  brivaracetam  IVPB 50 milliGRAM(s) IV Intermittent two times a day  diclofenac 0.1% Ophthalmic Solution 1 Drop(s) Left EYE <User Schedule>  enoxaparin Injectable 40 milliGRAM(s) SubCutaneous <User Schedule>  famotidine    Tablet 20 milliGRAM(s) Oral every 12 hours PRN  hydrALAZINE Injectable 5 milliGRAM(s) IV Push every 6 hours PRN  losartan 25 milliGRAM(s) Oral daily  metoclopramide Injectable 5 milliGRAM(s) IV Push every 6 hours PRN  polyethylene glycol 3350 17 Gram(s) Oral two times a day  senna 2 Tablet(s) Oral at bedtime  sodium chloride 2% + sodium acetate 50:50 1000 milliLiter(s) IV Continuous <Continuous>  traMADol 50 milliGRAM(s) Oral every 4 hours PRN  traMADol 25 milliGRAM(s) Oral every 4 hours PRN    PHYSICAL EXAM:   Vital Signs Last 24 Hrs  T(C): 36.8 (19 Dec 2023 02:00), Max: 37.1 (18 Dec 2023 18:00)  T(F): 98.3 (19 Dec 2023 02:00), Max: 98.8 (18 Dec 2023 18:00)  HR: 52 (19 Dec 2023 06:28) (37 - 56)  BP: 169/75 (19 Dec 2023 06:28) (115/58 - 169/75)  BP(mean): 108 (19 Dec 2023 06:28) (82 - 112)  RR: 24 (19 Dec 2023 06:28) (19 - 26)  SpO2: 93% (19 Dec 2023 06:28) (92% - 98%)    Parameters below as of 19 Dec 2023 06:00  Patient On (Oxygen Delivery Method): room air    General: No acute distress  Abdomen: Soft, nontender, nondistended   Extremities: No edema    NEUROLOGICAL EXAM:  Mental status: Awake, alert, oriented x3, no aphasia, no neglect.  Cranial Nerves: Mild L facial droop, no nystagmus, no dysarthria, tongue midline  Motor exam: Normal tone, no drift, 5/5 RUE, 5/5 RLE, 0/5 LUE, 3/5 LLE.  Sensation: L sided sensory loss  Coordination/ Gait: No dysmetria, PARISH intact and symmetric bilaterally    LABS:                        13.4   7.00  )-----------( 148      ( 19 Dec 2023 05:53 )             39.0    12-19    148<H>  |  112<H>  |  14  ----------------------------<  98  3.6   |  24  |  0.44<L>    Ca    8.8      19 Dec 2023 05:52  Phos  2.3     12-18  Mg     2.1     12-18    IMAGING: Reviewed by me.     MRI Brain w/wo contrast 12/15/23:  Stable size of right parietotemporal intraparenchymal hemorrhage. Similar midline shift of 5 mm. Follow-up imaging is recommended to show no underlying lesion.    CT Head 12/17/23:  Stable large right parietal temporal lobe hemorrhage with dissection of hemorrhage into the right lateral ventricle. Stable mass effect and midline shift.    CT Head, CTA Head/Neck 12/14/23:  Large right temporal parietal parenchymal hemorrhage with mass effect on the right lateral ventricle and midline shift to the left. No definite abnormal vascularity or high-grade stenosis. THE PATIENT WAS SEEN AND EXAMINED BY ME WITH THE HOUSESTAFF AND STROKE TEAM DURING MORNING ROUNDS.     HPI:  74yo woman who presented to [facility] ED on 12/14/23, as a transfer from [facility], as a CODE STROKE, with c/o R IPH.  Denies PMH.  CTH and CTA repeated - large R IPH (temporal/parietal). Presented to [place] today with c/o HA and AMS. Friend accompanying patient said that when visiting patient today - patient was not acting like herself and c/o HA.    SUBJECTIVE: Overnight pt required straight cath x 1, UA ordered. No new neurologic complaints.      acetaminophen     Tablet .. 650 milliGRAM(s) Oral every 6 hours PRN  artificial  tears Solution 1 Drop(s) Both EYES every 4 hours PRN  bisacodyl 5 milliGRAM(s) Oral daily  bisacodyl Suppository 10 milliGRAM(s) Rectal daily PRN  brivaracetam  IVPB 50 milliGRAM(s) IV Intermittent two times a day  diclofenac 0.1% Ophthalmic Solution 1 Drop(s) Left EYE <User Schedule>  enoxaparin Injectable 40 milliGRAM(s) SubCutaneous <User Schedule>  famotidine    Tablet 20 milliGRAM(s) Oral every 12 hours PRN  hydrALAZINE Injectable 5 milliGRAM(s) IV Push every 6 hours PRN  losartan 25 milliGRAM(s) Oral daily  metoclopramide Injectable 5 milliGRAM(s) IV Push every 6 hours PRN  polyethylene glycol 3350 17 Gram(s) Oral two times a day  senna 2 Tablet(s) Oral at bedtime  sodium chloride 2% + sodium acetate 50:50 1000 milliLiter(s) IV Continuous <Continuous>  traMADol 50 milliGRAM(s) Oral every 4 hours PRN  traMADol 25 milliGRAM(s) Oral every 4 hours PRN    PHYSICAL EXAM:   Vital Signs Last 24 Hrs  T(C): 36.8 (19 Dec 2023 02:00), Max: 37.1 (18 Dec 2023 18:00)  T(F): 98.3 (19 Dec 2023 02:00), Max: 98.8 (18 Dec 2023 18:00)  HR: 52 (19 Dec 2023 06:28) (37 - 56)  BP: 169/75 (19 Dec 2023 06:28) (115/58 - 169/75)  BP(mean): 108 (19 Dec 2023 06:28) (82 - 112)  RR: 24 (19 Dec 2023 06:28) (19 - 26)  SpO2: 93% (19 Dec 2023 06:28) (92% - 98%)    Parameters below as of 19 Dec 2023 06:00  Patient On (Oxygen Delivery Method): room air    General: No acute distress  Abdomen: Soft, nontender, nondistended   Extremities: No edema    NEUROLOGICAL EXAM:  Mental status: Awake, alert, oriented x3, no aphasia, no neglect.  Cranial Nerves: Mild L facial droop, no nystagmus, no dysarthria, tongue midline  Motor exam: Normal tone, no drift, 5/5 RUE, 5/5 RLE, 0/5 LUE, 3/5 LLE.  Sensation: L sided sensory loss  Coordination/ Gait: No dysmetria, PARISH intact and symmetric bilaterally    LABS:                        13.4   7.00  )-----------( 148      ( 19 Dec 2023 05:53 )             39.0    12-19    148<H>  |  112<H>  |  14  ----------------------------<  98  3.6   |  24  |  0.44<L>    Ca    8.8      19 Dec 2023 05:52  Phos  2.3     12-18  Mg     2.1     12-18    IMAGING: Reviewed by me.     MRI Brain w/wo contrast 12/15/23:  Stable size of right parietotemporal intraparenchymal hemorrhage. Similar midline shift of 5 mm. Follow-up imaging is recommended to show no underlying lesion.    CT Head 12/17/23:  Stable large right parietal temporal lobe hemorrhage with dissection of hemorrhage into the right lateral ventricle. Stable mass effect and midline shift.    CT Head, CTA Head/Neck 12/14/23:  Large right temporal parietal parenchymal hemorrhage with mass effect on the right lateral ventricle and midline shift to the left. No definite abnormal vascularity or high-grade stenosis. THE PATIENT WAS SEEN AND EXAMINED BY ME WITH THE HOUSESTAFF AND STROKE TEAM DURING MORNING ROUNDS.     HPI:  74yo woman who presented to [facility] ED on 12/14/23, as a transfer from [facility], as a CODE STROKE, with c/o R IPH.  Denies PMH.  CTH and CTA repeated - large R IPH (temporal/parietal). Presented to [place] today with c/o HA and AMS. Friend accompanying patient said that when visiting patient today - patient was not acting like herself and c/o HA.    SUBJECTIVE: Overnight pt required straight cath x 1, UA ordered. No new neurologic complaints.      acetaminophen     Tablet .. 650 milliGRAM(s) Oral every 6 hours PRN  artificial  tears Solution 1 Drop(s) Both EYES every 4 hours PRN  bisacodyl 5 milliGRAM(s) Oral daily  bisacodyl Suppository 10 milliGRAM(s) Rectal daily PRN  brivaracetam  IVPB 50 milliGRAM(s) IV Intermittent two times a day  diclofenac 0.1% Ophthalmic Solution 1 Drop(s) Left EYE <User Schedule>  enoxaparin Injectable 40 milliGRAM(s) SubCutaneous <User Schedule>  famotidine    Tablet 20 milliGRAM(s) Oral every 12 hours PRN  hydrALAZINE Injectable 5 milliGRAM(s) IV Push every 6 hours PRN  losartan 25 milliGRAM(s) Oral daily  metoclopramide Injectable 5 milliGRAM(s) IV Push every 6 hours PRN  polyethylene glycol 3350 17 Gram(s) Oral two times a day  senna 2 Tablet(s) Oral at bedtime  sodium chloride 2% + sodium acetate 50:50 1000 milliLiter(s) IV Continuous <Continuous>  traMADol 50 milliGRAM(s) Oral every 4 hours PRN  traMADol 25 milliGRAM(s) Oral every 4 hours PRN    PHYSICAL EXAM:   Vital Signs Last 24 Hrs  T(C): 36.8 (19 Dec 2023 02:00), Max: 37.1 (18 Dec 2023 18:00)  T(F): 98.3 (19 Dec 2023 02:00), Max: 98.8 (18 Dec 2023 18:00)  HR: 52 (19 Dec 2023 06:28) (37 - 56)  BP: 169/75 (19 Dec 2023 06:28) (115/58 - 169/75)  BP(mean): 108 (19 Dec 2023 06:28) (82 - 112)  RR: 24 (19 Dec 2023 06:28) (19 - 26)  SpO2: 93% (19 Dec 2023 06:28) (92% - 98%)    Parameters below as of 19 Dec 2023 06:00  Patient On (Oxygen Delivery Method): room air    General: No acute distress  Abdomen: Soft, nontender, nondistended   Extremities: No edema    NEUROLOGICAL EXAM:  Mental status: Sleeping but arousable to voice, oriented x 3, no aphasia, no neglect.  Cranial Nerves: Mild L facial droop, no nystagmus, no dysarthria, tongue midline  Motor exam: Normal tone, no drift, 5/5 RUE, 5/5 RLE, 0/5 LUE, 3/5 LLE.  Sensation: L sided sensory loss  Coordination/ Gait: No dysmetria, PARISH intact and symmetric bilaterally    LABS:                        13.4   7.00  )-----------( 148      ( 19 Dec 2023 05:53 )             39.0    12-19    148<H>  |  112<H>  |  14  ----------------------------<  98  3.6   |  24  |  0.44<L>    Ca    8.8      19 Dec 2023 05:52  Phos  2.3     12-18  Mg     2.1     12-18    IMAGING: Reviewed by me.     MRI Brain w/wo contrast 12/15/23:  Stable size of right parietotemporal intraparenchymal hemorrhage. Similar midline shift of 5 mm. Follow-up imaging is recommended to show no underlying lesion.    CT Head 12/17/23:  Stable large right parietal temporal lobe hemorrhage with dissection of hemorrhage into the right lateral ventricle. Stable mass effect and midline shift.    CT Head, CTA Head/Neck 12/14/23:  Large right temporal parietal parenchymal hemorrhage with mass effect on the right lateral ventricle and midline shift to the left. No definite abnormal vascularity or high-grade stenosis. THE PATIENT WAS SEEN AND EXAMINED BY ME WITH THE HOUSESTAFF AND STROKE TEAM DURING MORNING ROUNDS.     HPI:  72yo woman who presented to [facility] ED on 12/14/23, as a transfer from [facility], as a CODE STROKE, with c/o R IPH.  Denies PMH.  CTH and CTA repeated - large R IPH (temporal/parietal). Presented to [place] today with c/o HA and AMS. Friend accompanying patient said that when visiting patient today - patient was not acting like herself and c/o HA.    SUBJECTIVE: Overnight pt required straight cath x 1, UA ordered. No new neurologic complaints.      acetaminophen     Tablet .. 650 milliGRAM(s) Oral every 6 hours PRN  artificial  tears Solution 1 Drop(s) Both EYES every 4 hours PRN  bisacodyl 5 milliGRAM(s) Oral daily  bisacodyl Suppository 10 milliGRAM(s) Rectal daily PRN  brivaracetam  IVPB 50 milliGRAM(s) IV Intermittent two times a day  diclofenac 0.1% Ophthalmic Solution 1 Drop(s) Left EYE <User Schedule>  enoxaparin Injectable 40 milliGRAM(s) SubCutaneous <User Schedule>  famotidine    Tablet 20 milliGRAM(s) Oral every 12 hours PRN  hydrALAZINE Injectable 5 milliGRAM(s) IV Push every 6 hours PRN  losartan 25 milliGRAM(s) Oral daily  metoclopramide Injectable 5 milliGRAM(s) IV Push every 6 hours PRN  polyethylene glycol 3350 17 Gram(s) Oral two times a day  senna 2 Tablet(s) Oral at bedtime  sodium chloride 2% + sodium acetate 50:50 1000 milliLiter(s) IV Continuous <Continuous>  traMADol 50 milliGRAM(s) Oral every 4 hours PRN  traMADol 25 milliGRAM(s) Oral every 4 hours PRN    PHYSICAL EXAM:   Vital Signs Last 24 Hrs  T(C): 36.8 (19 Dec 2023 02:00), Max: 37.1 (18 Dec 2023 18:00)  T(F): 98.3 (19 Dec 2023 02:00), Max: 98.8 (18 Dec 2023 18:00)  HR: 52 (19 Dec 2023 06:28) (37 - 56)  BP: 169/75 (19 Dec 2023 06:28) (115/58 - 169/75)  BP(mean): 108 (19 Dec 2023 06:28) (82 - 112)  RR: 24 (19 Dec 2023 06:28) (19 - 26)  SpO2: 93% (19 Dec 2023 06:28) (92% - 98%)    Parameters below as of 19 Dec 2023 06:00  Patient On (Oxygen Delivery Method): room air    General: No acute distress  Abdomen: Soft, nontender, nondistended   Extremities: No edema    NEUROLOGICAL EXAM:  Mental status: Sleeping but arousable to voice, oriented x 3, no aphasia, no neglect.  Cranial Nerves: Mild L facial droop, no nystagmus, no dysarthria, tongue midline  Motor exam: Normal tone, no drift, 5/5 RUE, 5/5 RLE, 0/5 LUE, 3/5 LLE.  Sensation: L sided sensory loss  Coordination/ Gait: No dysmetria, PARISH intact and symmetric bilaterally    LABS:                        13.4   7.00  )-----------( 148      ( 19 Dec 2023 05:53 )             39.0    12-19    148<H>  |  112<H>  |  14  ----------------------------<  98  3.6   |  24  |  0.44<L>    Ca    8.8      19 Dec 2023 05:52  Phos  2.3     12-18  Mg     2.1     12-18    IMAGING: Reviewed by me.     MRI Brain w/wo contrast 12/15/23:  Stable size of right parietotemporal intraparenchymal hemorrhage. Similar midline shift of 5 mm. Follow-up imaging is recommended to show no underlying lesion.    CT Head 12/17/23:  Stable large right parietal temporal lobe hemorrhage with dissection of hemorrhage into the right lateral ventricle. Stable mass effect and midline shift.    CT Head, CTA Head/Neck 12/14/23:  Large right temporal parietal parenchymal hemorrhage with mass effect on the right lateral ventricle and midline shift to the left. No definite abnormal vascularity or high-grade stenosis.

## 2023-12-19 NOTE — PROGRESS NOTE ADULT - SUBJECTIVE AND OBJECTIVE BOX
Subjective: Patient seen and examined. No new events except as noted.   remains in Stroke unit   bradycardic 40s   bp stable       REVIEW OF SYSTEMS:    CONSTITUTIONAL: No weakness, fevers or chills  EYES/ENT: No visual changes;  No vertigo or throat pain   NECK: No pain or stiffness  RESPIRATORY: No cough, wheezing, hemoptysis; No shortness of breath  CARDIOVASCULAR: No chest pain or palpitations  GASTROINTESTINAL: No abdominal or epigastric pain. No nausea, vomiting, or hematemesis; No diarrhea or constipation. No melena or hematochezia.  GENITOURINARY: No dysuria, frequency or hematuria  NEUROLOGICAL: No numbness or weakness  SKIN: No itching, burning, rashes, or lesions   All other review of systems is negative unless indicated above.    MEDICATIONS:  MEDICATIONS  (STANDING):  bisacodyl 5 milliGRAM(s) Oral daily  brivaracetam 50 milliGRAM(s) Oral two times a day  cefTRIAXone   IVPB 1000 milliGRAM(s) IV Intermittent every 24 hours  diclofenac 0.1% Ophthalmic Solution 1 Drop(s) Left EYE <User Schedule>  enoxaparin Injectable 40 milliGRAM(s) SubCutaneous <User Schedule>  famotidine    Tablet 20 milliGRAM(s) Oral two times a day  ketorolac 0.5% Ophthalmic Solution 1 Drop(s) Left EYE daily  losartan 25 milliGRAM(s) Oral daily  polyethylene glycol 3350 17 Gram(s) Oral two times a day  potassium chloride  10 mEq/100 mL IVPB 10 milliEquivalent(s) IV Intermittent every 1 hour  senna 2 Tablet(s) Oral at bedtime  sodium chloride 2% + sodium acetate 50:50 1000 milliLiter(s) (20 mL/Hr) IV Continuous <Continuous>      PHYSICAL EXAM:  T(C): 36.9 (12-19-23 @ 08:00), Max: 37.1 (12-18-23 @ 18:00)  HR: 56 (12-19-23 @ 17:00) (38 - 56)  BP: 150/76 (12-19-23 @ 17:00) (123/66 - 169/75)  RR: 22 (12-19-23 @ 17:00) (17 - 26)  SpO2: 97% (12-19-23 @ 17:00) (93% - 98%)  Wt(kg): --  I&O's Summary    18 Dec 2023 07:01  -  19 Dec 2023 07:00  --------------------------------------------------------  IN: 480 mL / OUT: 1150 mL / NET: -670 mL    19 Dec 2023 07:01  -  19 Dec 2023 17:58  --------------------------------------------------------  IN: 520 mL / OUT: 400 mL / NET: 120 mL          Appearance: Normal	  HEENT:   Normal oral mucosa, PERRL, EOMI	  Lymphatic: No lymphadenopathy , no edema  Cardiovascular: Normal S1 S2, No JVD, No murmurs , Peripheral pulses palpable 2+ bilaterally  Respiratory: Lungs clear to auscultation, normal effort 	  Gastrointestinal:  Soft, Non-tender, + BS	  Skin: No rashes, No ecchymoses, No cyanosis, warm to touch  Musculoskeletal: Normal range of motion, normal strength  Psychiatry:  Mood & affect appropriate  Ext: No edema      LABS:    CARDIAC MARKERS:                                13.4   7.00  )-----------( 148      ( 19 Dec 2023 05:53 )             39.0     12-19    147<H>  |  109<H>  |  12  ----------------------------<  104<H>  3.1<L>   |  26  |  0.42<L>    Ca    8.7      19 Dec 2023 12:58  Phos  2.3     12-18  Mg     2.1     12-18      proBNP:   Lipid Profile:   HgA1c:   TSH:     Negative          TELEMETRY: 	    ECG:  	  RADIOLOGY:   DIAGNOSTIC TESTING:  [ ] Echocardiogram:  [ ]  Catheterization:  [ ] Stress Test:    OTHER:

## 2023-12-19 NOTE — PROVIDER CONTACT NOTE (OTHER) - ASSESSMENT
pt heart rate is outside ordered parameter , pt heart rate has been between 35-50's, MD change pt heart rate parameters pt heart rate is outside ordered parameter , pt heart rate has been between 35-50's, MD change pt heart rate parameters. pt is asymptomatic, pt denies chest pain or discomfort. No distress noted throughout shift.

## 2023-12-19 NOTE — PROVIDER CONTACT NOTE (OTHER) - ACTION/TREATMENT ORDERED:
As Per MD Newman, heart rate parameters will be changed. Cardiology MD Gloria is following the pt. Pacing Pads applied to pt chest as recommended by [facility] Director Lacie.

## 2023-12-19 NOTE — PROVIDER CONTACT NOTE (OTHER) - ACTION/TREATMENT ORDERED:
MD ordered potassium supplement, replan IV, eye drops MD ordered potassium supplement, Reglan IV push, eye drops

## 2023-12-19 NOTE — CHART NOTE - NSCHARTNOTEFT_GEN_A_CORE
Called [facility] at [phone] to update on patient's case and discharge planning. Went to voicemail twice. Left a message and call back number of 4 flores. Called [facility] at [phone] to update on patient's case and discharge planning. Went to voicemail twice. Left a message and call back number of 4 flores.

## 2023-12-19 NOTE — PROGRESS NOTE ADULT - ASSESSMENT
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     NEURO: Neurologically unchanged. Continue close monitoring for neurologic deterioration. -140. LDL - 127 - not on atorvastatin as mechanism nonarteriosclerotic, will follow up with PMD. A1c 5.0. MRI Brain w/wo contrast as above. Briviact 50mg BID for seizure prophylaxis, EEG with increased risk of seizures in the right frontocentral region. Physical therapy/OT/Speech eval - Acute.    ANTITHROMBOTIC THERAPY: None due to hemorrhage    PULMONARY: Protecting airway, saturating well     CARDIOVASCULAR: TTE EF 64%. Continue cardiac monitoring. Continue Losartan 25mg daily. Cardiology following for bradycardia, Atropine at bedside for sustained HR < 35 and/or drop in BP with bradycardia.              SBP goal: 100-140    GASTROINTESTINAL: Dysphagia screen passed. Pt with vomiting on 12/17, abdominal x-ray with stool burden. Started on bowel regimen.      Diet: Soft and bite sized    RENAL: BUN/Cr stable, good urine output. 2% reduced to 40cc per hour, will continue to wean as tolerated.      Na Goal: Greater than 135     Sweeney: No    HEMATOLOGY: H/H stable, Platelets 134, will monitor.       DVT ppx: Lovenox    ID: Afebrile, no leukocytosis     OTHER: Plan discussed with patient at bedside    DISPOSITION: Rehab once stable and workup is complete    CORE MEASURES:        Admission NIHSS: 9     TPA: NO      LDL/HDL: 127/58     Depression Screen: p     Statin Therapy: no, hemorrhage     Dysphagia Screen: PASS     Smoking NO      Afib NO     Stroke Education YES    Obtain screening lower extremity venous ultrasound in patients who meet 1 or more of the following criteria as patient is high risk for DVT/PE on admission:   [] History of DVT/PE  [] Hypercoagulable states (Factor V Leiden, Cancer, OCP, etc. 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     NEURO: Neurologically unchanged. Continue close monitoring for neurologic deterioration. -140. LDL - 127 - not on atorvastatin as mechanism nonarteriosclerotic, will follow up with PMD. A1c 5.0. MRI Brain w/wo contrast as above. Briviact 50mg BID for seizure prophylaxis, EEG with increased risk of seizures in the right frontocentral region. Physical therapy/OT/Speech eval - Acute.    ANTITHROMBOTIC THERAPY: None due to hemorrhage    PULMONARY: Protecting airway, saturating well     CARDIOVASCULAR: TTE EF 64%. Continue cardiac monitoring. Continue Losartan 25mg daily. Cardiology following for bradycardia, Atropine at bedside for sustained HR < 35 and/or drop in BP with bradycardia.              SBP goal: 100-140    GASTROINTESTINAL: Dysphagia screen passed. Pt with vomiting on 12/17, abdominal x-ray with stool burden. Started on bowel regimen.      Diet: Soft and bite sized    RENAL: BUN/Cr stable, good urine output. 2% reduced to 40cc per hour, will continue to wean as tolerated.      Na Goal: Greater than 135     Sweeney: No    HEMATOLOGY: H/H stable, Platelets 134, will monitor.       DVT ppx: Lovenox    ID: Afebrile, no leukocytosis     OTHER: Plan discussed with patient at bedside    DISPOSITION: Rehab once stable and workup is complete    CORE MEASURES:        Admission NIHSS: 9     TPA: NO      LDL/HDL: 127/58     Depression Screen: p     Statin Therapy: no, hemorrhage     Dysphagia Screen: PASS     Smoking NO      Afib NO     Stroke Education YES    Obtain screening lower extremity venous ultrasound in patients who meet 1 or more of the following criteria as patient is high risk for DVT/PE on admission:   [] History of DVT/PE  [] Hypercoagulable states (Factor V Leiden, Cancer, OCP, etc. )  [] Prolonged immobility (hemiplegia/hemiparesis/post operative or any other extended immobilization)  [] Transferred from outside facility (Rehab or Long term care)  [] Age </= to 50 73y (1950) RIGHT handed woman, w/o PMH, who presented to 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     NEURO: Neurologically unchanged. Continue close monitoring for neurologic deterioration. -140. LDL - 127 - not on atorvastatin as mechanism nonarteriosclerotic, will follow up with PMD. A1c 5.0. MRI Brain w/wo contrast as above. Briviact 50mg BID for seizure prophylaxis, EEG with increased risk of seizures in the right frontocentral region. Physical therapy/OT/Speech eval - Acute.    ANTITHROMBOTIC THERAPY: None due to hemorrhage    PULMONARY: Protecting airway, saturating well     CARDIOVASCULAR: TTE EF 64%. Continue cardiac monitoring. Continue Losartan 25mg daily. Cardiology following for bradycardia, Atropine at bedside for sustained HR < 35 and/or drop in BP with bradycardia.              SBP goal: 100-140    GASTROINTESTINAL: Dysphagia screen passed. Pt with vomiting on 12/17, abdominal x-ray with stool burden. Started on bowel regimen.      Diet: Soft and bite sized    RENAL: BUN/Cr stable, good urine output. 2% reduced to 20cc per hour, will plan to d/c tomorrow.      Na Goal: Greater than 135     Sweeney: No    HEMATOLOGY: H/H stable, Platelets 148, will monitor.       DVT ppx: Lovenox    ID: Afebrile, no leukocytosis     OTHER: Plan discussed with patient at bedside    DISPOSITION: Rehab once stable and workup is complete    CORE MEASURES:        Admission NIHSS: 9     TPA: NO      LDL/HDL: 127/58     Depression Screen: p     Statin Therapy: no, hemorrhage     Dysphagia Screen: PASS     Smoking NO      Afib NO     Stroke Education YES    Obtain screening lower extremity venous ultrasound in patients who meet 1 or more of the following criteria as patient is high risk for DVT/PE on admission:   [] History of DVT/PE  [] Hypercoagulable states (Factor V Leiden, Cancer, OCP, etc. 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     NEURO: Neurologically unchanged. Continue close monitoring for neurologic deterioration. -140. LDL - 127 - not on atorvastatin as mechanism nonarteriosclerotic, will follow up with PMD. A1c 5.0. MRI Brain w/wo contrast as above. Briviact 50mg BID for seizure prophylaxis, EEG with increased risk of seizures in the right frontocentral region. Physical therapy/OT/Speech eval - Acute.    ANTITHROMBOTIC THERAPY: None due to hemorrhage    PULMONARY: Protecting airway, saturating well     CARDIOVASCULAR: TTE EF 64%. Continue cardiac monitoring. Continue Losartan 25mg daily. Cardiology following for bradycardia, Atropine at bedside for sustained HR < 35 and/or drop in BP with bradycardia.              SBP goal: 100-140    GASTROINTESTINAL: Dysphagia screen passed. Pt with vomiting on 12/17, abdominal x-ray with stool burden. Started on bowel regimen.      Diet: Soft and bite sized    RENAL: BUN/Cr stable, good urine output. 2% reduced to 20cc per hour, will plan to d/c tomorrow.      Na Goal: Greater than 135     Sweeney: No    HEMATOLOGY: H/H stable, Platelets 148, will monitor.       DVT ppx: Lovenox    ID: Afebrile, no leukocytosis     OTHER: Plan discussed with patient at bedside    DISPOSITION: Rehab once stable and workup is complete    CORE MEASURES:        Admission NIHSS: 9     TPA: NO      LDL/HDL: 127/58     Depression Screen: p     Statin Therapy: no, hemorrhage     Dysphagia Screen: PASS     Smoking NO      Afib NO     Stroke Education YES    Obtain screening lower extremity venous ultrasound in patients who meet 1 or more of the following criteria as patient is high risk for DVT/PE on admission:   [] History of DVT/PE  [] Hypercoagulable states (Factor V Leiden, Cancer, OCP, etc. )  [] Prolonged immobility (hemiplegia/hemiparesis/post operative or any other extended immobilization)  [] Transferred from outside facility (Rehab or Long term care)  [] Age </= to 50

## 2023-12-19 NOTE — PROVIDER CONTACT NOTE (OTHER) - ACTION/TREATMENT ORDERED:
PA assess and evaluate pt at bedside, Zofran IV push, CT head stat ordered. pt attached to Defib with pacing pads applied, atropine at bedside on route to CT scan with PA, charge RN and assigned RN.

## 2023-12-19 NOTE — PROVIDER CONTACT NOTE (OTHER) - ASSESSMENT
pt had 1 emesis as per family after meal, pt complain of  nauseous, potassium 3.1, sodium 147, change eye drops to pt own meds at bedside pt had 1 emesis as per family after meal, pt complain of  nauseous, potassium 3.1, sodium 147, change eye drops to pt own meds at bedside. pt is neurologically stable.

## 2023-12-20 ENCOUNTER — INPATIENT (INPATIENT)
Facility: HOSPITAL | Age: 73
LOS: 21 days | Discharge: NURSING, REHAB OR LONG-TERM CARE | DRG: 57 | End: 2024-01-11
Attending: STUDENT IN AN ORGANIZED HEALTH CARE EDUCATION/TRAINING PROGRAM | Admitting: STUDENT IN AN ORGANIZED HEALTH CARE EDUCATION/TRAINING PROGRAM
Payer: MEDICARE

## 2023-12-20 ENCOUNTER — TRANSCRIPTION ENCOUNTER (OUTPATIENT)
Age: 73
End: 2023-12-20

## 2023-12-20 VITALS
RESPIRATION RATE: 18 BRPM | DIASTOLIC BLOOD PRESSURE: 74 MMHG | OXYGEN SATURATION: 96 % | SYSTOLIC BLOOD PRESSURE: 132 MMHG | TEMPERATURE: 98 F | HEART RATE: 54 BPM

## 2023-12-20 VITALS
RESPIRATION RATE: 18 BRPM | DIASTOLIC BLOOD PRESSURE: 71 MMHG | TEMPERATURE: 98 F | SYSTOLIC BLOOD PRESSURE: 113 MMHG | OXYGEN SATURATION: 97 % | HEART RATE: 57 BPM

## 2023-12-20 DIAGNOSIS — I61.8 OTHER NONTRAUMATIC INTRACEREBRAL HEMORRHAGE: ICD-10-CM

## 2023-12-20 LAB
ANION GAP SERPL CALC-SCNC: 10 MMOL/L — SIGNIFICANT CHANGE UP (ref 5–17)
ANION GAP SERPL CALC-SCNC: 10 MMOL/L — SIGNIFICANT CHANGE UP (ref 5–17)
ANION GAP SERPL CALC-SCNC: 12 MMOL/L — SIGNIFICANT CHANGE UP (ref 5–17)
ANION GAP SERPL CALC-SCNC: 12 MMOL/L — SIGNIFICANT CHANGE UP (ref 5–17)
ANION GAP SERPL CALC-SCNC: 13 MMOL/L — SIGNIFICANT CHANGE UP (ref 5–17)
ANION GAP SERPL CALC-SCNC: 13 MMOL/L — SIGNIFICANT CHANGE UP (ref 5–17)
BUN SERPL-MCNC: 10 MG/DL — SIGNIFICANT CHANGE UP (ref 7–23)
BUN SERPL-MCNC: 10 MG/DL — SIGNIFICANT CHANGE UP (ref 7–23)
BUN SERPL-MCNC: 11 MG/DL — SIGNIFICANT CHANGE UP (ref 7–23)
BUN SERPL-MCNC: 11 MG/DL — SIGNIFICANT CHANGE UP (ref 7–23)
BUN SERPL-MCNC: 9 MG/DL — SIGNIFICANT CHANGE UP (ref 7–23)
BUN SERPL-MCNC: 9 MG/DL — SIGNIFICANT CHANGE UP (ref 7–23)
CALCIUM SERPL-MCNC: 8.6 MG/DL — SIGNIFICANT CHANGE UP (ref 8.4–10.5)
CALCIUM SERPL-MCNC: 8.6 MG/DL — SIGNIFICANT CHANGE UP (ref 8.4–10.5)
CALCIUM SERPL-MCNC: 8.7 MG/DL — SIGNIFICANT CHANGE UP (ref 8.4–10.5)
CALCIUM SERPL-MCNC: 8.7 MG/DL — SIGNIFICANT CHANGE UP (ref 8.4–10.5)
CALCIUM SERPL-MCNC: 9.1 MG/DL — SIGNIFICANT CHANGE UP (ref 8.4–10.5)
CALCIUM SERPL-MCNC: 9.1 MG/DL — SIGNIFICANT CHANGE UP (ref 8.4–10.5)
CHLORIDE SERPL-SCNC: 104 MMOL/L — SIGNIFICANT CHANGE UP (ref 96–108)
CHLORIDE SERPL-SCNC: 104 MMOL/L — SIGNIFICANT CHANGE UP (ref 96–108)
CHLORIDE SERPL-SCNC: 105 MMOL/L — SIGNIFICANT CHANGE UP (ref 96–108)
CHLORIDE SERPL-SCNC: 105 MMOL/L — SIGNIFICANT CHANGE UP (ref 96–108)
CHLORIDE SERPL-SCNC: 107 MMOL/L — SIGNIFICANT CHANGE UP (ref 96–108)
CHLORIDE SERPL-SCNC: 107 MMOL/L — SIGNIFICANT CHANGE UP (ref 96–108)
CO2 SERPL-SCNC: 24 MMOL/L — SIGNIFICANT CHANGE UP (ref 22–31)
CO2 SERPL-SCNC: 24 MMOL/L — SIGNIFICANT CHANGE UP (ref 22–31)
CO2 SERPL-SCNC: 25 MMOL/L — SIGNIFICANT CHANGE UP (ref 22–31)
CO2 SERPL-SCNC: 25 MMOL/L — SIGNIFICANT CHANGE UP (ref 22–31)
CO2 SERPL-SCNC: 26 MMOL/L — SIGNIFICANT CHANGE UP (ref 22–31)
CO2 SERPL-SCNC: 26 MMOL/L — SIGNIFICANT CHANGE UP (ref 22–31)
CREAT SERPL-MCNC: 0.42 MG/DL — LOW (ref 0.5–1.3)
CREAT SERPL-MCNC: 0.42 MG/DL — LOW (ref 0.5–1.3)
CREAT SERPL-MCNC: 0.45 MG/DL — LOW (ref 0.5–1.3)
CREAT SERPL-MCNC: 0.45 MG/DL — LOW (ref 0.5–1.3)
CREAT SERPL-MCNC: 0.48 MG/DL — LOW (ref 0.5–1.3)
CREAT SERPL-MCNC: 0.48 MG/DL — LOW (ref 0.5–1.3)
EGFR: 100 ML/MIN/1.73M2 — SIGNIFICANT CHANGE UP
EGFR: 100 ML/MIN/1.73M2 — SIGNIFICANT CHANGE UP
EGFR: 102 ML/MIN/1.73M2 — SIGNIFICANT CHANGE UP
EGFR: 102 ML/MIN/1.73M2 — SIGNIFICANT CHANGE UP
EGFR: 103 ML/MIN/1.73M2 — SIGNIFICANT CHANGE UP
EGFR: 103 ML/MIN/1.73M2 — SIGNIFICANT CHANGE UP
GLUCOSE SERPL-MCNC: 122 MG/DL — HIGH (ref 70–99)
GLUCOSE SERPL-MCNC: 122 MG/DL — HIGH (ref 70–99)
GLUCOSE SERPL-MCNC: 87 MG/DL — SIGNIFICANT CHANGE UP (ref 70–99)
GLUCOSE SERPL-MCNC: 87 MG/DL — SIGNIFICANT CHANGE UP (ref 70–99)
GLUCOSE SERPL-MCNC: 92 MG/DL — SIGNIFICANT CHANGE UP (ref 70–99)
GLUCOSE SERPL-MCNC: 92 MG/DL — SIGNIFICANT CHANGE UP (ref 70–99)
HCT VFR BLD CALC: 38.7 % — SIGNIFICANT CHANGE UP (ref 34.5–45)
HCT VFR BLD CALC: 38.7 % — SIGNIFICANT CHANGE UP (ref 34.5–45)
HGB BLD-MCNC: 14 G/DL — SIGNIFICANT CHANGE UP (ref 11.5–15.5)
HGB BLD-MCNC: 14 G/DL — SIGNIFICANT CHANGE UP (ref 11.5–15.5)
MAGNESIUM SERPL-MCNC: 1.8 MG/DL — SIGNIFICANT CHANGE UP (ref 1.6–2.6)
MAGNESIUM SERPL-MCNC: 1.8 MG/DL — SIGNIFICANT CHANGE UP (ref 1.6–2.6)
MCHC RBC-ENTMCNC: 31 PG — SIGNIFICANT CHANGE UP (ref 27–34)
MCHC RBC-ENTMCNC: 31 PG — SIGNIFICANT CHANGE UP (ref 27–34)
MCHC RBC-ENTMCNC: 36.2 GM/DL — HIGH (ref 32–36)
MCHC RBC-ENTMCNC: 36.2 GM/DL — HIGH (ref 32–36)
MCV RBC AUTO: 85.8 FL — SIGNIFICANT CHANGE UP (ref 80–100)
MCV RBC AUTO: 85.8 FL — SIGNIFICANT CHANGE UP (ref 80–100)
NRBC # BLD: 0 /100 WBCS — SIGNIFICANT CHANGE UP (ref 0–0)
NRBC # BLD: 0 /100 WBCS — SIGNIFICANT CHANGE UP (ref 0–0)
PHOSPHATE SERPL-MCNC: 3.3 MG/DL — SIGNIFICANT CHANGE UP (ref 2.5–4.5)
PHOSPHATE SERPL-MCNC: 3.3 MG/DL — SIGNIFICANT CHANGE UP (ref 2.5–4.5)
PLATELET # BLD AUTO: 166 K/UL — SIGNIFICANT CHANGE UP (ref 150–400)
PLATELET # BLD AUTO: 166 K/UL — SIGNIFICANT CHANGE UP (ref 150–400)
POTASSIUM SERPL-MCNC: 3.1 MMOL/L — LOW (ref 3.5–5.3)
POTASSIUM SERPL-SCNC: 3.1 MMOL/L — LOW (ref 3.5–5.3)
RBC # BLD: 4.51 M/UL — SIGNIFICANT CHANGE UP (ref 3.8–5.2)
RBC # BLD: 4.51 M/UL — SIGNIFICANT CHANGE UP (ref 3.8–5.2)
RBC # FLD: 12.3 % — SIGNIFICANT CHANGE UP (ref 10.3–14.5)
RBC # FLD: 12.3 % — SIGNIFICANT CHANGE UP (ref 10.3–14.5)
SARS-COV-2 RNA SPEC QL NAA+PROBE: SIGNIFICANT CHANGE UP
SARS-COV-2 RNA SPEC QL NAA+PROBE: SIGNIFICANT CHANGE UP
SODIUM SERPL-SCNC: 140 MMOL/L — SIGNIFICANT CHANGE UP (ref 135–145)
SODIUM SERPL-SCNC: 140 MMOL/L — SIGNIFICANT CHANGE UP (ref 135–145)
SODIUM SERPL-SCNC: 142 MMOL/L — SIGNIFICANT CHANGE UP (ref 135–145)
SODIUM SERPL-SCNC: 142 MMOL/L — SIGNIFICANT CHANGE UP (ref 135–145)
SODIUM SERPL-SCNC: 144 MMOL/L — SIGNIFICANT CHANGE UP (ref 135–145)
SODIUM SERPL-SCNC: 144 MMOL/L — SIGNIFICANT CHANGE UP (ref 135–145)
WBC # BLD: 6.9 K/UL — SIGNIFICANT CHANGE UP (ref 3.8–10.5)
WBC # BLD: 6.9 K/UL — SIGNIFICANT CHANGE UP (ref 3.8–10.5)
WBC # FLD AUTO: 6.9 K/UL — SIGNIFICANT CHANGE UP (ref 3.8–10.5)
WBC # FLD AUTO: 6.9 K/UL — SIGNIFICANT CHANGE UP (ref 3.8–10.5)

## 2023-12-20 PROCEDURE — 83735 ASSAY OF MAGNESIUM: CPT

## 2023-12-20 PROCEDURE — 97535 SELF CARE MNGMENT TRAINING: CPT

## 2023-12-20 PROCEDURE — 97166 OT EVAL MOD COMPLEX 45 MIN: CPT

## 2023-12-20 PROCEDURE — 82962 GLUCOSE BLOOD TEST: CPT

## 2023-12-20 PROCEDURE — 84100 ASSAY OF PHOSPHORUS: CPT

## 2023-12-20 PROCEDURE — 70498 CT ANGIOGRAPHY NECK: CPT | Mod: MA

## 2023-12-20 PROCEDURE — 95700 EEG CONT REC W/VID EEG TECH: CPT

## 2023-12-20 PROCEDURE — 99239 HOSP IP/OBS DSCHRG MGMT >30: CPT

## 2023-12-20 PROCEDURE — 86850 RBC ANTIBODY SCREEN: CPT

## 2023-12-20 PROCEDURE — 84295 ASSAY OF SERUM SODIUM: CPT

## 2023-12-20 PROCEDURE — 84132 ASSAY OF SERUM POTASSIUM: CPT

## 2023-12-20 PROCEDURE — 84484 ASSAY OF TROPONIN QUANT: CPT

## 2023-12-20 PROCEDURE — 86900 BLOOD TYPING SEROLOGIC ABO: CPT

## 2023-12-20 PROCEDURE — 85610 PROTHROMBIN TIME: CPT

## 2023-12-20 PROCEDURE — 70553 MRI BRAIN STEM W/O & W/DYE: CPT

## 2023-12-20 PROCEDURE — 70496 CT ANGIOGRAPHY HEAD: CPT | Mod: MA

## 2023-12-20 PROCEDURE — 97161 PT EVAL LOW COMPLEX 20 MIN: CPT

## 2023-12-20 PROCEDURE — 80061 LIPID PANEL: CPT

## 2023-12-20 PROCEDURE — 85018 HEMOGLOBIN: CPT

## 2023-12-20 PROCEDURE — 99291 CRITICAL CARE FIRST HOUR: CPT | Mod: 25

## 2023-12-20 PROCEDURE — 85576 BLOOD PLATELET AGGREGATION: CPT

## 2023-12-20 PROCEDURE — A9585: CPT

## 2023-12-20 PROCEDURE — 85027 COMPLETE CBC AUTOMATED: CPT

## 2023-12-20 PROCEDURE — 86901 BLOOD TYPING SEROLOGIC RH(D): CPT

## 2023-12-20 PROCEDURE — 83036 HEMOGLOBIN GLYCOSYLATED A1C: CPT

## 2023-12-20 PROCEDURE — 95714 VEEG EA 12-26 HR UNMNTR: CPT

## 2023-12-20 PROCEDURE — 93306 TTE W/DOPPLER COMPLETE: CPT

## 2023-12-20 PROCEDURE — 80048 BASIC METABOLIC PNL TOTAL CA: CPT

## 2023-12-20 PROCEDURE — 84443 ASSAY THYROID STIM HORMONE: CPT

## 2023-12-20 PROCEDURE — 92523 SPEECH SOUND LANG COMPREHEN: CPT

## 2023-12-20 PROCEDURE — 93970 EXTREMITY STUDY: CPT | Mod: 26

## 2023-12-20 PROCEDURE — 93970 EXTREMITY STUDY: CPT

## 2023-12-20 PROCEDURE — 74018 RADEX ABDOMEN 1 VIEW: CPT

## 2023-12-20 PROCEDURE — 36415 COLL VENOUS BLD VENIPUNCTURE: CPT

## 2023-12-20 PROCEDURE — 84480 ASSAY TRIIODOTHYRONINE (T3): CPT

## 2023-12-20 PROCEDURE — 85014 HEMATOCRIT: CPT

## 2023-12-20 PROCEDURE — 85025 COMPLETE CBC W/AUTO DIFF WBC: CPT

## 2023-12-20 PROCEDURE — 87077 CULTURE AEROBIC IDENTIFY: CPT

## 2023-12-20 PROCEDURE — 93005 ELECTROCARDIOGRAM TRACING: CPT

## 2023-12-20 PROCEDURE — 82435 ASSAY OF BLOOD CHLORIDE: CPT

## 2023-12-20 PROCEDURE — 97116 GAIT TRAINING THERAPY: CPT

## 2023-12-20 PROCEDURE — 84436 ASSAY OF TOTAL THYROXINE: CPT

## 2023-12-20 PROCEDURE — 85730 THROMBOPLASTIN TIME PARTIAL: CPT

## 2023-12-20 PROCEDURE — 96374 THER/PROPH/DIAG INJ IV PUSH: CPT

## 2023-12-20 PROCEDURE — 80053 COMPREHEN METABOLIC PANEL: CPT

## 2023-12-20 PROCEDURE — 70450 CT HEAD/BRAIN W/O DYE: CPT | Mod: MA

## 2023-12-20 PROCEDURE — 82803 BLOOD GASES ANY COMBINATION: CPT

## 2023-12-20 PROCEDURE — 81001 URINALYSIS AUTO W/SCOPE: CPT

## 2023-12-20 PROCEDURE — 82947 ASSAY GLUCOSE BLOOD QUANT: CPT

## 2023-12-20 PROCEDURE — 83605 ASSAY OF LACTIC ACID: CPT

## 2023-12-20 PROCEDURE — 87086 URINE CULTURE/COLONY COUNT: CPT

## 2023-12-20 PROCEDURE — 97530 THERAPEUTIC ACTIVITIES: CPT

## 2023-12-20 PROCEDURE — 87186 SC STD MICRODIL/AGAR DIL: CPT

## 2023-12-20 PROCEDURE — 86803 HEPATITIS C AB TEST: CPT

## 2023-12-20 PROCEDURE — 84439 ASSAY OF FREE THYROXINE: CPT

## 2023-12-20 PROCEDURE — 82330 ASSAY OF CALCIUM: CPT

## 2023-12-20 PROCEDURE — 97110 THERAPEUTIC EXERCISES: CPT

## 2023-12-20 RX ORDER — BRIVARACETAM 25 MG/1
1 TABLET, FILM COATED ORAL
Qty: 0 | Refills: 0 | DISCHARGE
Start: 2023-12-20

## 2023-12-20 RX ORDER — POTASSIUM CHLORIDE 20 MEQ
40 PACKET (EA) ORAL ONCE
Refills: 0 | Status: COMPLETED | OUTPATIENT
Start: 2023-12-20 | End: 2023-12-20

## 2023-12-20 RX ORDER — METOCLOPRAMIDE HCL 10 MG
1 TABLET ORAL
Qty: 0 | Refills: 0 | DISCHARGE
Start: 2023-12-20

## 2023-12-20 RX ORDER — KETOROLAC TROMETHAMINE 0.5 %
1 DROPS OPHTHALMIC (EYE)
Qty: 0 | Refills: 0 | DISCHARGE
Start: 2023-12-20

## 2023-12-20 RX ORDER — ENOXAPARIN SODIUM 100 MG/ML
40 INJECTION SUBCUTANEOUS
Qty: 0 | Refills: 0 | DISCHARGE
Start: 2023-12-20

## 2023-12-20 RX ORDER — SODIUM CHLORIDE 0.65 %
1 AEROSOL, SPRAY (ML) NASAL
Refills: 0 | Status: DISCONTINUED | OUTPATIENT
Start: 2023-12-20 | End: 2023-12-25

## 2023-12-20 RX ORDER — SENNA PLUS 8.6 MG/1
2 TABLET ORAL
Qty: 0 | Refills: 0 | DISCHARGE
Start: 2023-12-20

## 2023-12-20 RX ORDER — BRIVARACETAM 25 MG/1
50 TABLET, FILM COATED ORAL
Refills: 0 | Status: DISCONTINUED | OUTPATIENT
Start: 2023-12-20 | End: 2023-12-26

## 2023-12-20 RX ORDER — CEFPODOXIME PROXETIL 100 MG
100 TABLET ORAL EVERY 12 HOURS
Refills: 0 | Status: COMPLETED | OUTPATIENT
Start: 2023-12-21 | End: 2023-12-23

## 2023-12-20 RX ORDER — TRAMADOL HYDROCHLORIDE 50 MG/1
25 TABLET ORAL EVERY 4 HOURS
Refills: 0 | Status: DISCONTINUED | OUTPATIENT
Start: 2023-12-20 | End: 2023-12-26

## 2023-12-20 RX ORDER — KETOROLAC TROMETHAMINE 0.5 %
1 DROPS OPHTHALMIC (EYE) DAILY
Refills: 0 | Status: DISCONTINUED | OUTPATIENT
Start: 2023-12-21 | End: 2024-01-11

## 2023-12-20 RX ORDER — POLYETHYLENE GLYCOL 3350 17 G/17G
17 POWDER, FOR SOLUTION ORAL
Qty: 0 | Refills: 0 | DISCHARGE
Start: 2023-12-20

## 2023-12-20 RX ORDER — CALCIUM CARBONATE 500(1250)
1 TABLET ORAL
Qty: 0 | Refills: 0 | DISCHARGE
Start: 2023-12-20

## 2023-12-20 RX ORDER — METOCLOPRAMIDE HCL 10 MG
5 TABLET ORAL EVERY 6 HOURS
Refills: 0 | Status: DISCONTINUED | OUTPATIENT
Start: 2023-12-20 | End: 2023-12-21

## 2023-12-20 RX ORDER — POTASSIUM CHLORIDE 20 MEQ
40 PACKET (EA) ORAL EVERY 4 HOURS
Refills: 0 | Status: COMPLETED | OUTPATIENT
Start: 2023-12-20 | End: 2023-12-20

## 2023-12-20 RX ORDER — ENOXAPARIN SODIUM 100 MG/ML
40 INJECTION SUBCUTANEOUS
Refills: 0 | Status: DISCONTINUED | OUTPATIENT
Start: 2023-12-21 | End: 2024-01-11

## 2023-12-20 RX ORDER — POLYETHYLENE GLYCOL 3350 17 G/17G
17 POWDER, FOR SOLUTION ORAL
Refills: 0 | Status: DISCONTINUED | OUTPATIENT
Start: 2023-12-21 | End: 2024-01-04

## 2023-12-20 RX ORDER — TRAMADOL HYDROCHLORIDE 50 MG/1
50 TABLET ORAL EVERY 4 HOURS
Refills: 0 | Status: DISCONTINUED | OUTPATIENT
Start: 2023-12-20 | End: 2023-12-26

## 2023-12-20 RX ORDER — DICLOFENAC SODIUM 0.1 %
1 DROPS OPHTHALMIC (EYE)
Qty: 0 | Refills: 0 | DISCHARGE
Start: 2023-12-20

## 2023-12-20 RX ORDER — POTASSIUM CHLORIDE 20 MEQ
20 PACKET (EA) ORAL
Refills: 0 | Status: COMPLETED | OUTPATIENT
Start: 2023-12-20 | End: 2023-12-20

## 2023-12-20 RX ORDER — FAMOTIDINE 10 MG/ML
20 INJECTION INTRAVENOUS
Refills: 0 | Status: DISCONTINUED | OUTPATIENT
Start: 2023-12-21 | End: 2024-01-11

## 2023-12-20 RX ORDER — SENNA PLUS 8.6 MG/1
2 TABLET ORAL AT BEDTIME
Refills: 0 | Status: DISCONTINUED | OUTPATIENT
Start: 2023-12-20 | End: 2024-01-11

## 2023-12-20 RX ORDER — LOSARTAN POTASSIUM 100 MG/1
25 TABLET, FILM COATED ORAL DAILY
Refills: 0 | Status: DISCONTINUED | OUTPATIENT
Start: 2023-12-20 | End: 2023-12-27

## 2023-12-20 RX ORDER — ACETAMINOPHEN 500 MG
650 TABLET ORAL EVERY 6 HOURS
Refills: 0 | Status: DISCONTINUED | OUTPATIENT
Start: 2023-12-20 | End: 2024-01-11

## 2023-12-20 RX ORDER — LOSARTAN POTASSIUM 100 MG/1
1 TABLET, FILM COATED ORAL
Qty: 0 | Refills: 0 | DISCHARGE
Start: 2023-12-20

## 2023-12-20 RX ORDER — CALCIUM CARBONATE 500(1250)
1 TABLET ORAL THREE TIMES A DAY
Refills: 0 | Status: DISCONTINUED | OUTPATIENT
Start: 2023-12-21 | End: 2024-01-11

## 2023-12-20 RX ORDER — FAMOTIDINE 10 MG/ML
1 INJECTION INTRAVENOUS
Qty: 0 | Refills: 0 | DISCHARGE
Start: 2023-12-20

## 2023-12-20 RX ADMIN — Medication 1 TABLET(S): at 12:02

## 2023-12-20 RX ADMIN — Medication 20 MILLIEQUIVALENT(S): at 18:00

## 2023-12-20 RX ADMIN — Medication 650 MILLIGRAM(S): at 22:34

## 2023-12-20 RX ADMIN — LOSARTAN POTASSIUM 25 MILLIGRAM(S): 100 TABLET, FILM COATED ORAL at 22:35

## 2023-12-20 RX ADMIN — Medication 650 MILLIGRAM(S): at 05:36

## 2023-12-20 RX ADMIN — ENOXAPARIN SODIUM 40 MILLIGRAM(S): 100 INJECTION SUBCUTANEOUS at 16:50

## 2023-12-20 RX ADMIN — Medication 1 DROP(S): at 12:38

## 2023-12-20 RX ADMIN — Medication 650 MILLIGRAM(S): at 06:00

## 2023-12-20 RX ADMIN — BRIVARACETAM 50 MILLIGRAM(S): 25 TABLET, FILM COATED ORAL at 05:36

## 2023-12-20 RX ADMIN — Medication 650 MILLIGRAM(S): at 23:34

## 2023-12-20 RX ADMIN — Medication 20 MILLIEQUIVALENT(S): at 16:37

## 2023-12-20 RX ADMIN — FAMOTIDINE 20 MILLIGRAM(S): 10 INJECTION INTRAVENOUS at 05:36

## 2023-12-20 RX ADMIN — Medication 40 MILLIEQUIVALENT(S): at 12:02

## 2023-12-20 RX ADMIN — CEFTRIAXONE 100 MILLIGRAM(S): 500 INJECTION, POWDER, FOR SOLUTION INTRAMUSCULAR; INTRAVENOUS at 16:49

## 2023-12-20 RX ADMIN — POLYETHYLENE GLYCOL 3350 17 GRAM(S): 17 POWDER, FOR SOLUTION ORAL at 05:36

## 2023-12-20 RX ADMIN — Medication 5 MILLIGRAM(S): at 16:50

## 2023-12-20 RX ADMIN — Medication 5 MILLIGRAM(S): at 12:02

## 2023-12-20 RX ADMIN — Medication 40 MILLIEQUIVALENT(S): at 08:36

## 2023-12-20 RX ADMIN — Medication 5 MILLIGRAM(S): at 05:35

## 2023-12-20 RX ADMIN — BRIVARACETAM 50 MILLIGRAM(S): 25 TABLET, FILM COATED ORAL at 16:50

## 2023-12-20 RX ADMIN — Medication 100 MILLIEQUIVALENT(S): at 00:48

## 2023-12-20 RX ADMIN — LOSARTAN POTASSIUM 25 MILLIGRAM(S): 100 TABLET, FILM COATED ORAL at 05:36

## 2023-12-20 RX ADMIN — Medication 30 MILLILITER(S): at 14:08

## 2023-12-20 RX ADMIN — FAMOTIDINE 20 MILLIGRAM(S): 10 INJECTION INTRAVENOUS at 16:50

## 2023-12-20 RX ADMIN — SENNA PLUS 2 TABLET(S): 8.6 TABLET ORAL at 22:34

## 2023-12-20 NOTE — PROGRESS NOTE ADULT - SUBJECTIVE AND OBJECTIVE BOX
Subjective: Patient seen and examined. No new events except as noted.     REVIEW OF SYSTEMS:    CONSTITUTIONAL: +weakness, fevers or chills  EYES/ENT: No visual changes;  No vertigo or throat pain   NECK: No pain or stiffness  RESPIRATORY: No cough, wheezing, hemoptysis; No shortness of breath  CARDIOVASCULAR: No chest pain or palpitations  GASTROINTESTINAL: No abdominal or epigastric pain. No nausea, vomiting, or hematemesis; No diarrhea or constipation. No melena or hematochezia.  GENITOURINARY: No dysuria, frequency or hematuria  NEUROLOGICAL: No numbness or weakness  SKIN: No itching, burning, rashes, or lesions   All other review of systems is negative unless indicated above.    MEDICATIONS:  MEDICATIONS  (STANDING):  bisacodyl 5 milliGRAM(s) Oral daily  brivaracetam 50 milliGRAM(s) Oral two times a day  cefTRIAXone   IVPB 1000 milliGRAM(s) IV Intermittent every 24 hours  enoxaparin Injectable 40 milliGRAM(s) SubCutaneous <User Schedule>  famotidine    Tablet 20 milliGRAM(s) Oral two times a day  ketorolac 0.5% Ophthalmic Solution 1 Drop(s) Left EYE daily  losartan 25 milliGRAM(s) Oral daily  metoclopramide 5 milliGRAM(s) Oral every 6 hours  polyethylene glycol 3350 17 Gram(s) Oral two times a day  potassium chloride    Tablet ER 20 milliEquivalent(s) Oral every 2 hours  senna 2 Tablet(s) Oral at bedtime      PHYSICAL EXAM:  T(C): 36.7 (12-20-23 @ 08:00), Max: 36.8 (12-20-23 @ 00:00)  HR: 50 (12-20-23 @ 14:00) (46 - 56)  BP: 151/67 (12-20-23 @ 14:00) (116/59 - 165/73)  RR: 21 (12-20-23 @ 14:00) (13 - 23)  SpO2: 96% (12-20-23 @ 14:00) (94% - 98%)  Wt(kg): --  I&O's Summary    19 Dec 2023 07:01  -  20 Dec 2023 07:00  --------------------------------------------------------  IN: 840 mL / OUT: 4050 mL / NET: -3210 mL    20 Dec 2023 07:01  -  20 Dec 2023 15:27  --------------------------------------------------------  IN: 240 mL / OUT: 400 mL / NET: -160 mL          Appearance: Normal	  HEENT:   Normal oral mucosa, PERRL, EOMI	  Lymphatic: No lymphadenopathy , no edema  Cardiovascular: Normal S1 S2, No JVD, No murmurs , Peripheral pulses palpable 2+ bilaterally  Respiratory: Lungs clear to auscultation, normal effort 	  Gastrointestinal:  Soft, Non-tender, + BS	  Skin: No rashes, No ecchymoses, No cyanosis, warm to touch  Musculoskeletal: Normal range of motion, normal strength  Psychiatry:  Mood & affect appropriate  Ext: No edema  NEUROLOGICAL EXAM:  Mental status: Sleeping but arousable to voice, oriented x 3, no aphasia, no neglect.  Cranial Nerves: Mild L facial droop, no nystagmus, no dysarthria, tongue midline  Motor exam: Normal tone, no drift, 5/5 RUE, 5/5 RLE, 0/5 LUE, 3/5 LLE.  Sensation: L sided sensory loss  Coordination/ Gait: No dysmetria, PARISH intact and symmetric bilaterally        LABS:    CARDIAC MARKERS:                                14.0   6.90  )-----------( 166      ( 20 Dec 2023 06:25 )             38.7     12-20    140  |  104  |  11  ----------------------------<  122<H>  3.1<L>   |  26  |  0.48<L>    Ca    9.1      20 Dec 2023 13:01  Phos  3.3     12-20  Mg     1.8     12-20          TELEMETRY: 	  SR SB   ECG:  	  RADIOLOGY:   DIAGNOSTIC TESTING:  [ ] Echocardiogram:  [ ]  Catheterization:  [ ] Stress Test:    OTHER:

## 2023-12-20 NOTE — DISCHARGE NOTE PROVIDER - HOSPITAL COURSE
Patient MOISÉS MAYO is a 73y (1950) RIGHT handed woman who presented to [facility] ED on 12/14/2023, as a transfer from [facility], as a CODE STROKE, with c/o R IPH.  Denies PMH.  CTH and CTA repeated - large R IPH (temporal/parietal). Presented to [place] today with c/o HA and AMS. Friend accompanying patient said that when visiting patient today - patient was not acting like herself and c/o HA. NIHSS 9.    Imaging:  MRI Brain w/wo contrast 12/15/23:  Stable size of right parietotemporal intraparenchymal hemorrhage. Similar midline shift of 5 mm. Follow-up imaging is recommended to show no underlying lesion.  CT Head 12/17/23:  Stable large right parietal temporal lobe hemorrhage with dissection of hemorrhage into the right lateral ventricle. Stable mass effect and midline shift.  CT Head, CTA Head/Neck 12/14/23:  Large right temporal parietal parenchymal hemorrhage with mass effect on the right lateral ventricle and midline shift to the left. No definite abnormal vascularity or high-grade stenosis.  CT Head No Cont (12.19.23   Acute right parietal hematoma with measurements given above   as seen on the prior. No significant interval change in mass effect on   the right lateral ventricle and midline shift to the left. 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   Briviact 50mg BID for seizure prophylaxis, EEG with increased risk of seizures in the right frontocentral region.  TTE- EF 64%. Continue cardiac monitoring. Continue Losartan 25mg daily. Cardiology following for bradycardia, cleared for discharge. No intervention at this time.   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   UA: positive for UTI, started on IV abx and then transitioned to oral, end date 12/22.    Evaluated by PT/OT and was recommended AR. Patient stable for discharge.    Patient MOISÉS MAYO is a 73y (1950) RIGHT handed woman who presented to [facility] ED on 12/14/2023, as a transfer from [facility], as a CODE STROKE, with c/o R IPH.  Denies PMH.  CTH and CTA repeated - large R IPH (temporal/parietal). Presented to [place] today with c/o HA and AMS. Friend accompanying patient said that when visiting patient today - patient was not acting like herself and c/o HA. NIHSS 9.    Imaging:  MRI Brain w/wo contrast 12/15/23:  Stable size of right parietotemporal intraparenchymal hemorrhage. Similar midline shift of 5 mm. Follow-up imaging is recommended to show no underlying lesion.  CT Head 12/17/23:  Stable large right parietal temporal lobe hemorrhage with dissection of hemorrhage into the right lateral ventricle. Stable mass effect and midline shift.  CT Head, CTA Head/Neck 12/14/23:  Large right temporal parietal parenchymal hemorrhage with mass effect on the right lateral ventricle and midline shift to the left. No definite abnormal vascularity or high-grade stenosis.  CT Head No Cont (12.19.23   Acute right parietal hematoma with measurements given above   as seen on the prior. No significant interval change in mass effect on   the right lateral ventricle and midline shift to the left. 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   Briviact 50mg BID for seizure prophylaxis, EEG with increased risk of seizures in the right frontocentral region.  TTE- EF 64%. Continue cardiac monitoring. Continue Losartan 25mg daily. Cardiology following for bradycardia, cleared for discharge. No intervention at this time.   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   UA: positive for UTI, started on IV abx and then transitioned to oral, end date 12/22.    Evaluated by PT/OT and was recommended AR. Patient stable for discharge.

## 2023-12-20 NOTE — PROGRESS NOTE ADULT - REASON FOR ADMISSION
MIGUEL CHARLESH

## 2023-12-20 NOTE — H&P ADULT - ASSESSMENT
ASSESSMENT/PLAN  This is a 72 YO RIGHT handed woman with no PMH who presented to [facility] ED on 12/14/2023, as a transfer from [facility], as a CODE STROKE where she was found to have R IPH. Hospital with repeat imaging showing stability, UTI s/p IV ABX,  new b/l DVTs, increased seizure risks.Patient now with gait Instability, ADL impairments and Functional impairments.    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  - Start Comprehensive Rehab Program: PT/OT/ST, 3hours daily and 5 days weekly  - PT: Focused on improving strength, endurance, coordination, balance, functional mobility, and transfers  - OT: Focused on improving strength, fine motor skills, coordination, posture and ADLs.    - ST: to diagnose and treat deficits in swallowing, cognition and communication.   -  Briviact 50mg BID for seizure prophylaxis.  - EEG with increased risk of seizures in the right frontocentral region  - TTE- EF 64%      #HTN  - Losartan 25mg daily  - seen by cardiology for bradycardia thought to be likely neurogenic  - monitor    #UTI  - Vantin 100mg BID   -  end date 12/22.      #Pain management  - Tylenol PRN, tramadol  PRN    #DVT ppx  - Lovenox, SCD, TEDs    #GI ppx  - Pepcid 20mg   - TUMS   - Maalox    #Bowel Regimen  - Senna, miralax PRN    #Bladder management  - BS on admission, and q 8 hours (SC if > 400)  - Monitor UO    #FEN   - Diet: Soft and Bite sized    #Skin:  - Skin on admission: ***  - Pressure injury/Skin: Turn Q2hrs while in bed, OOB to Chair, PT/OT     #Dysphagia    - SLP: evaluation and treatment    #Sleep:   - Maintain quiet hours and low stim environment.  - Melatonin PRN to maximize participation in therapy during the day.   - Monitor sleep logs    #Precaution  - Fall, Aspiration, cardiac, seizure    #GOC  CODE STATUS: FULL CODE    Outpatient Follow-up (Specialty/Name of physician):    Emy Prakash  NP in [address], [address]  [place] [address]  Phone: [phone]  Fax: [phone]  Follow Up Time: 2 weeks    Sergio Jurado  [address], [address]  [place] [address]  Phone: [phone]  Fax: [phone]  Follow Up Time: 1 month      MEDICAL PROGNOSIS: GOOD            REHAB POTENTIAL: GOOD             ESTIMATED DISPOSITION: HOME WITH HOME CARE            ELOS: 10-14 Days   EXPECTED THERAPY:     P.T. 1hr/day       O.T. 1hr/day      S.L.P. 1hr/day     P&O Unnecessary     EXP FREQUENCY: 5 days per 7 day period     PRESCREEN COMPARISON:   I have reviewed the prescreen information and I have found no relevant changes between the preadmission screening and my post admission evaluation     RATIONALE FOR INPATIENT ADMISSION - Patient demonstrates the following: (check all that apply)  [X] Medically appropriate for rehabilitation admission  [X] Has attainable rehab goals with an appropriate initial discharge plan  [X] Has rehabilitation potential (expected to make a significant improvement within a reasonable period of time)   [X] Requires close medical management by a rehab physician, rehab nursing care, Hospitalist and comprehensive interdisciplinary team (including PT, OT, & or SLP, Prosthetics and Orthotics)   ASSESSMENT/PLAN  This is a 72 YO RIGHT handed woman with no PMH who presented to [facility] ED on 12/14/2023, as a transfer from [facility], as a CODE STROKE where she was found to have R IPH. Hospital with repeat imaging showing stability, UTI s/p IV ABX,  new b/l DVTs, increased seizure risks.Patient now with gait Instability, ADL impairments and Functional impairments.    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  - Start Comprehensive Rehab Program: PT/OT/ST, 3hours daily and 5 days weekly  - PT: Focused on improving strength, endurance, coordination, balance, functional mobility, and transfers  - OT: Focused on improving strength, fine motor skills, coordination, posture and ADLs.    - ST: to diagnose and treat deficits in swallowing, cognition and communication.   -  Briviact 50mg BID for seizure prophylaxis.  - EEG with increased risk of seizures in the right frontocentral region  - TTE- EF 64%      #HTN  - Losartan 25mg daily  - seen by cardiology for bradycardia thought to be likely neurogenic  - monitor    #UTI  - Vantin 100mg BID   -  end date 12/22.      #Pain management  - Tylenol PRN, tramadol  PRN    #DVT ppx  - Lovenox, SCD, TEDs    #GI ppx  - Pepcid 20mg   - TUMS   - Maalox    #Bowel Regimen  - Senna, miralax PRN    #Bladder management  - BS on admission, and q 8 hours (SC if > 400)  - Monitor UO    #FEN   - Diet: Soft and Bite sized    #Skin:  - Skin on admission: ***  - Pressure injury/Skin: Turn Q2hrs while in bed, OOB to Chair, PT/OT     #Dysphagia    - SLP: evaluation and treatment    #Sleep:   - Maintain quiet hours and low stim environment.  - Melatonin PRN to maximize participation in therapy during the day.   - Monitor sleep logs    #Precaution  - Fall, Aspiration, cardiac, seizure    #GOC  CODE STATUS: FULL CODE    Outpatient Follow-up (Specialty/Name of physician):    Emy Prakash  NP in [address], [address]  [place] [address]  Phone: [phone]  Fax: [phone]  Follow Up Time: 2 weeks    Sergio Jurado  [address], [address]  [place] [address]  Phone: [phone]  Fax: [phone]  Follow Up Time: 1 month      MEDICAL PROGNOSIS: GOOD            REHAB POTENTIAL: GOOD             ESTIMATED DISPOSITION: HOME WITH HOME CARE            ELOS: 10-14 Days   EXPECTED THERAPY:     P.T. 1hr/day       O.T. 1hr/day      S.L.P. 1hr/day     P&O Unnecessary     EXP FREQUENCY: 5 days per 7 day period     PRESCREEN COMPARISON:   I have reviewed the prescreen information and I have found no relevant changes between the preadmission screening and my post admission evaluation     RATIONALE FOR INPATIENT ADMISSION - Patient demonstrates the following: (check all that apply)  [X] Medically appropriate for rehabilitation admission  [X] Has attainable rehab goals with an appropriate initial discharge plan  [X] Has rehabilitation potential (expected to make a significant improvement within a reasonable period of time)   [X] Requires close medical management by a rehab physician, rehab nursing care, Hospitalist and comprehensive interdisciplinary team (including PT, OT, & or SLP, Prosthetics and Orthotics)   ASSESSMENT/PLAN  This is a 74 YO RIGHT handed woman with no PMH who presented to [facility] ED on 12/14/2023, as a transfer from [facility], as a CODE STROKE where she was found to have R IPH. Hospital with repeat imaging showing stability, UTI s/p IV ABX,  new b/l DVTs, increased seizure risks.Patient now with gait Instability, ADL impairments and Functional impairments.    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  - Start Comprehensive Rehab Program: PT/OT/ST, 3hours daily and 5 days weekly  - PT: Focused on improving strength, endurance, coordination, balance, functional mobility, and transfers  - OT: Focused on improving strength, fine motor skills, coordination, posture and ADLs.    - ST: to diagnose and treat deficits in swallowing, cognition and communication.   -  Briviact 50mg BID for seizure prophylaxis.  - EEG with increased risk of seizures in the right frontocentral region  - TTE- EF 64%      #HTN  - Losartan 25mg daily  - seen by cardiology for bradycardia thought to be likely neurogenic  - monitor    #UTI  - Vantin 100mg BID   -  end date 12/22.      #Pain management  - Tylenol PRN, tramadol  PRN    #DVT   - b/l below the knee DVTs: right  soleal vein, left soleal, peroneal and gastrocnemius veins.  - Lovenox  -  monitor with weekly US for propagation    #GI ppx  - Pepcid 20mg   - TUMS   - Maalox    #Bowel Regimen  - Senna, miralax PRN    #Bladder management  - BS on admission, and q 8 hours (SC if > 400)  - Monitor UO    #FEN   - Diet: Soft and Bite sized    #Skin:  - Skin on admission: ***  - Pressure injury/Skin: Turn Q2hrs while in bed, OOB to Chair, PT/OT     #Dysphagia    - SLP: evaluation and treatment    #Sleep:   - Maintain quiet hours and low stim environment.  - Melatonin PRN to maximize participation in therapy during the day.   - Monitor sleep logs    #Precaution  - Fall, Aspiration, cardiac, seizure    #GOC  CODE STATUS: FULL CODE    Outpatient Follow-up (Specialty/Name of physician):    Emy Prakash  NP in [address], [address]  [place] [address]  Phone: [phone]  Fax: [phone]  Follow Up Time: 2 weeks    Sergio Jurado  [address] [place] [address], [address]  [place] [address]  Phone: [phone]  Fax: [phone]  Follow Up Time: 1 month      MEDICAL PROGNOSIS: GOOD            REHAB POTENTIAL: GOOD             ESTIMATED DISPOSITION: HOME WITH HOME CARE            ELOS: 10-14 Days   EXPECTED THERAPY:     P.T. 1hr/day       O.T. 1hr/day      S.L.P. 1hr/day     P&O Unnecessary     EXP FREQUENCY: 5 days per 7 day period     PRESCREEN COMPARISON:   I have reviewed the prescreen information and I have found no relevant changes between the preadmission screening and my post admission evaluation     RATIONALE FOR INPATIENT ADMISSION - Patient demonstrates the following: (check all that apply)  [X] Medically appropriate for rehabilitation admission  [X] Has attainable rehab goals with an appropriate initial discharge plan  [X] Has rehabilitation potential (expected to make a significant improvement within a reasonable period of time)   [X] Requires close medical management by a rehab physician, rehab nursing care, Hospitalist and comprehensive interdisciplinary team (including PT, OT, & or SLP, Prosthetics and Orthotics)   ASSESSMENT/PLAN  This is a 74 YO RIGHT handed woman with no PMH who presented to [facility] ED on 12/14/2023, as a transfer from [facility], as a CODE STROKE where she was found to have R IPH. Hospital with repeat imaging showing stability, UTI s/p IV ABX,  new b/l DVTs, increased seizure risks.Patient now with gait Instability, ADL impairments and Functional impairments.    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  - Start Comprehensive Rehab Program: PT/OT/ST, 3hours daily and 5 days weekly  - PT: Focused on improving strength, endurance, coordination, balance, functional mobility, and transfers  - OT: Focused on improving strength, fine motor skills, coordination, posture and ADLs.    - ST: to diagnose and treat deficits in swallowing, cognition and communication.   -  Briviact 50mg BID for seizure prophylaxis.  - EEG with increased risk of seizures in the right frontocentral region  - TTE- EF 64%      #HTN  - Losartan 25mg daily  - seen by cardiology for bradycardia thought to be likely neurogenic  - monitor    #UTI  - Vantin 100mg BID   -  end date 12/22.      #Pain management  - Tylenol PRN, tramadol  PRN    #DVT   - b/l below the knee DVTs: right  soleal vein, left soleal, peroneal and gastrocnemius veins.  - Lovenox  -  monitor with weekly US for propagation    #GI ppx  - Pepcid 20mg   - TUMS   - Maalox    #Bowel Regimen  - Senna, miralax PRN    #Bladder management  - BS on admission, and q 8 hours (SC if > 400)  - Monitor UO    #FEN   - Diet: Soft and Bite sized    #Skin:  - Skin on admission: ***  - Pressure injury/Skin: Turn Q2hrs while in bed, OOB to Chair, PT/OT     #Dysphagia    - SLP: evaluation and treatment    #Sleep:   - Maintain quiet hours and low stim environment.  - Melatonin PRN to maximize participation in therapy during the day.   - Monitor sleep logs    #Precaution  - Fall, Aspiration, cardiac, seizure    #GOC  CODE STATUS: FULL CODE    Outpatient Follow-up (Specialty/Name of physician):    Emy Prakash  NP in [address], [address]  [place] [address]  Phone: [phone]  Fax: [phone]  Follow Up Time: 2 weeks    Sergio Jurado  [address] [place] [address], [address]  [place] [address]  Phone: [phone]  Fax: [phone]  Follow Up Time: 1 month      MEDICAL PROGNOSIS: GOOD            REHAB POTENTIAL: GOOD             ESTIMATED DISPOSITION: HOME WITH HOME CARE            ELOS: 10-14 Days   EXPECTED THERAPY:     P.T. 1hr/day       O.T. 1hr/day      S.L.P. 1hr/day     P&O Unnecessary     EXP FREQUENCY: 5 days per 7 day period     PRESCREEN COMPARISON:   I have reviewed the prescreen information and I have found no relevant changes between the preadmission screening and my post admission evaluation     RATIONALE FOR INPATIENT ADMISSION - Patient demonstrates the following: (check all that apply)  [X] Medically appropriate for rehabilitation admission  [X] Has attainable rehab goals with an appropriate initial discharge plan  [X] Has rehabilitation potential (expected to make a significant improvement within a reasonable period of time)   [X] Requires close medical management by a rehab physician, rehab nursing care, Hospitalist and comprehensive interdisciplinary team (including PT, OT, & or SLP, Prosthetics and Orthotics)   ASSESSMENT/PLAN  This is a 74 YO RIGHT handed woman with no PMH who presented to [facility] ED on 12/14/2023, as a transfer from [facility], as a CODE STROKE where she was found to have R IPH. Hospital with repeat imaging showing stability, UTI s/p IV ABX,  new b/l DVTs, increased seizure risks.Patient now with gait Instability, ADL impairments and Functional impairments.    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  - Start Comprehensive Rehab Program: PT/OT/ST, 3hours daily and 5 days weekly  - PT: Focused on improving strength, endurance, coordination, balance, functional mobility, and transfers  - OT: Focused on improving strength, fine motor skills, coordination, posture and ADLs.    - ST: to diagnose and treat deficits in swallowing, cognition and communication.   -  Briviact 50mg BID for seizure prophylaxis.  - EEG with increased risk of seizures in the right frontocentral region  - TTE- EF 64%      #HTN  - Losartan 25mg daily  - seen by cardiology for bradycardia thought to be likely neurogenic  - monitor    #UTI  - Vantin 100mg BID   -  end date 12/22.      #Pain management  - Tylenol PRN    #DVT   - b/l below the knee DVTs: right  soleal vein, left soleal, peroneal and gastrocnemius veins.  - Lovenox  -  monitor with weekly US for propagation    #GI ppx  - Pepcid 20mg   - TUMS   - Maalox    #Bowel Regimen  - Senna, miralax PRN    #Bladder management  - BS on admission, and q 8 hours (SC if > 400)  - Monitor UO    #FEN   - Diet: Soft and Bite sized    #Skin:  - Skin on admission: intact  - Pressure injury/Skin: Turn Q2hrs while in bed, OOB to Chair, PT/OT     #Dysphagia    - SLP: evaluation and treatment    #Sleep:   - Maintain quiet hours and low stim environment.  - Melatonin PRN to maximize participation in therapy during the day.   - Monitor sleep logs    #Precaution  - Fall, Aspiration, cardiac, seizure    #GOC  CODE STATUS: FULL CODE    Outpatient Follow-up (Specialty/Name of physician):    Emy Prakash  NP in [address], [address]  [place] [address]  Phone: [phone]  Fax: [phone]  Follow Up Time: 2 weeks    Sergio Jurado  Cardiology  [address] [place] [address], [address]  [place] [address]  Phone: [phone]  Fax: [phone]  Follow Up Time: 1 month      MEDICAL PROGNOSIS: GOOD            REHAB POTENTIAL: GOOD             ESTIMATED DISPOSITION: HOME WITH HOME CARE            ELOS: 10-14 Days   EXPECTED THERAPY:     P.T. 1hr/day       O.T. 1hr/day      S.L.P. 1hr/day     P&O Unnecessary     EXP FREQUENCY: 5 days per 7 day period     PRESCREEN COMPARISON:   I have reviewed the prescreen information and I have found no relevant changes between the preadmission screening and my post admission evaluation     RATIONALE FOR INPATIENT ADMISSION - Patient demonstrates the following: (check all that apply)  [X] Medically appropriate for rehabilitation admission  [X] Has attainable rehab goals with an appropriate initial discharge plan  [X] Has rehabilitation potential (expected to make a significant improvement within a reasonable period of time)   [X] Requires close medical management by a rehab physician, rehab nursing care, Hospitalist and comprehensive interdisciplinary team (including PT, OT, & or SLP, Prosthetics and Orthotics)   ASSESSMENT/PLAN  This is a 74 YO RIGHT handed woman with no PMH who presented to [facility] ED on 12/14/2023, as a transfer from [facility], as a CODE STROKE where she was found to have R IPH. Hospital with repeat imaging showing stability, UTI s/p IV ABX,  new b/l DVTs, increased seizure risks.Patient now with gait Instability, ADL impairments and Functional impairments.    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  - Start Comprehensive Rehab Program: PT/OT/ST, 3hours daily and 5 days weekly  - PT: Focused on improving strength, endurance, coordination, balance, functional mobility, and transfers  - OT: Focused on improving strength, fine motor skills, coordination, posture and ADLs.    - ST: to diagnose and treat deficits in swallowing, cognition and communication.   -  Briviact 50mg BID for seizure prophylaxis.  - EEG with increased risk of seizures in the right frontocentral region  - TTE- EF 64%      #HTN  - Losartan 25mg daily  - seen by cardiology for bradycardia thought to be likely neurogenic  - monitor    #UTI  - Vantin 100mg BID   -  end date 12/22.      #Pain management  - Tylenol PRN    #DVT   - b/l below the knee DVTs: right  soleal vein, left soleal, peroneal and gastrocnemius veins.  - Lovenox  -  monitor with weekly US for propagation    #GI ppx  - Pepcid 20mg   - TUMS   - Maalox    #Bowel Regimen  - Senna, miralax PRN    #Bladder management  - BS on admission, and q 8 hours (SC if > 400)  - Monitor UO    #FEN   - Diet: Soft and Bite sized    #Skin:  - Skin on admission: intact  - Pressure injury/Skin: Turn Q2hrs while in bed, OOB to Chair, PT/OT     #Dysphagia    - SLP: evaluation and treatment    #Sleep:   - Maintain quiet hours and low stim environment.  - Melatonin PRN to maximize participation in therapy during the day.   - Monitor sleep logs    #Precaution  - Fall, Aspiration, cardiac, seizure    #GOC  CODE STATUS: FULL CODE    Outpatient Follow-up (Specialty/Name of physician):    Emy Prakash  NP in [address], [address]  [place] [address]  Phone: [phone]  Fax: [phone]  Follow Up Time: 2 weeks    Sergio Jurado  Cardiology  [address] [place] [address], [address]  [place] [address]  Phone: [phone]  Fax: [phone]  Follow Up Time: 1 month      MEDICAL PROGNOSIS: GOOD            REHAB POTENTIAL: GOOD             ESTIMATED DISPOSITION: HOME WITH HOME CARE            ELOS: 10-14 Days   EXPECTED THERAPY:     P.T. 1hr/day       O.T. 1hr/day      S.L.P. 1hr/day     P&O Unnecessary     EXP FREQUENCY: 5 days per 7 day period     PRESCREEN COMPARISON:   I have reviewed the prescreen information and I have found no relevant changes between the preadmission screening and my post admission evaluation     RATIONALE FOR INPATIENT ADMISSION - Patient demonstrates the following: (check all that apply)  [X] Medically appropriate for rehabilitation admission  [X] Has attainable rehab goals with an appropriate initial discharge plan  [X] Has rehabilitation potential (expected to make a significant improvement within a reasonable period of time)   [X] Requires close medical management by a rehab physician, rehab nursing care, Hospitalist and comprehensive interdisciplinary team (including PT, OT, & or SLP, Prosthetics and Orthotics)   ASSESSMENT/PLAN  This is a 72 YO RIGHT handed woman with no PMH who presented to [facility] ED on 12/14/2023, as a transfer from [facility], as a CODE STROKE where she was found to have R IPH. Hospital with repeat imaging showing stability, UTI s/p IV ABX,  new b/l DVTs, increased seizure risks.Patient now with gait Instability, ADL impairments and Functional impairments.    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  - Start Comprehensive Rehab Program: PT/OT/ST, 3hours daily and 5 days weekly  - PT: Focused on improving strength, endurance, coordination, balance, functional mobility, and transfers  - OT: Focused on improving strength, fine motor skills, coordination, posture and ADLs.    - ST: to diagnose and treat deficits in swallowing, cognition and communication.   -  Briviact 50mg BID for seizure prophylaxis.  - EEG with increased risk of seizures in the right frontocentral region  - TTE- EF 64%      #HTN  - Losartan 25mg daily  - seen by cardiology for bradycardia thought to be likely neurogenic  - monitor    #UTI  - Vantin 100mg BID   -  end date 12/22.      #Pain management  - Tylenol PRN  -tramadol PRN    #DVT   - b/l below the knee DVTs: right  soleal vein, left soleal, peroneal and gastrocnemius veins.  - Lovenox  -  monitor with weekly US for propagation    #GI ppx  - Pepcid 20mg   - TUMS   - Maalox    #Bowel Regimen  - Senna, miralax PRN    #Bladder management  - BS on admission, and q 8 hours (SC if > 400)  - Monitor UO    #FEN   - Diet: Soft and Bite sized    #Skin:  - Skin on admission: intact  - Pressure injury/Skin: Turn Q2hrs while in bed, OOB to Chair, PT/OT     #Dysphagia    - SLP: evaluation and treatment    #Sleep:   - Maintain quiet hours and low stim environment.  - Melatonin PRN to maximize participation in therapy during the day.   - Monitor sleep logs    #Precaution  - Fall, Aspiration, cardiac, seizure    #GOC  CODE STATUS: FULL CODE    Outpatient Follow-up (Specialty/Name of physician):    Emy Prakash  NP in [address], [address]  [place] [address]  Phone: [phone]  Fax: [phone]  Follow Up Time: 2 weeks    Sergio Jurado  [address], [address]  [place] [address]  Phone: [phone]  Fax: [phone]  Follow Up Time: 1 month      MEDICAL PROGNOSIS: GOOD            REHAB POTENTIAL: GOOD             ESTIMATED DISPOSITION: HOME WITH HOME CARE            ELOS: 10-14 Days   EXPECTED THERAPY:     P.T. 1hr/day       O.T. 1hr/day      S.L.P. 1hr/day     P&O Unnecessary     EXP FREQUENCY: 5 days per 7 day period     PRESCREEN COMPARISON:   I have reviewed the prescreen information and I have found no relevant changes between the preadmission screening and my post admission evaluation     RATIONALE FOR INPATIENT ADMISSION - Patient demonstrates the following: (check all that apply)  [X] Medically appropriate for rehabilitation admission  [X] Has attainable rehab goals with an appropriate initial discharge plan  [X] Has rehabilitation potential (expected to make a significant improvement within a reasonable period of time)   [X] Requires close medical management by a rehab physician, rehab nursing care, Hospitalist and comprehensive interdisciplinary team (including PT, OT, & or SLP, Prosthetics and Orthotics)   ASSESSMENT/PLAN  This is a 72 YO RIGHT handed woman with no PMH who presented to [facility] ED on 12/14/2023, as a transfer from [facility], as a CODE STROKE where she was found to have R IPH. Hospital with repeat imaging showing stability, UTI s/p IV ABX,  new b/l DVTs, increased seizure risks.Patient now with gait Instability, ADL impairments and Functional impairments.    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  - Start Comprehensive Rehab Program: PT/OT/ST, 3hours daily and 5 days weekly  - PT: Focused on improving strength, endurance, coordination, balance, functional mobility, and transfers  - OT: Focused on improving strength, fine motor skills, coordination, posture and ADLs.    - ST: to diagnose and treat deficits in swallowing, cognition and communication.   -  Briviact 50mg BID for seizure prophylaxis.  - EEG with increased risk of seizures in the right frontocentral region  - TTE- EF 64%      #HTN  - Losartan 25mg daily  - seen by cardiology for bradycardia thought to be likely neurogenic  - monitor    #UTI  - Vantin 100mg BID   -  end date 12/22.      #Pain management  - Tylenol PRN  -tramadol PRN    #DVT   - b/l below the knee DVTs: right  soleal vein, left soleal, peroneal and gastrocnemius veins.  - Lovenox  -  monitor with weekly US for propagation    #GI ppx  - Pepcid 20mg   - TUMS   - Maalox    #Bowel Regimen  - Senna, miralax PRN    #Bladder management  - BS on admission, and q 8 hours (SC if > 400)  - Monitor UO    #FEN   - Diet: Soft and Bite sized    #Skin:  - Skin on admission: intact  - Pressure injury/Skin: Turn Q2hrs while in bed, OOB to Chair, PT/OT     #Dysphagia    - SLP: evaluation and treatment    #Sleep:   - Maintain quiet hours and low stim environment.  - Melatonin PRN to maximize participation in therapy during the day.   - Monitor sleep logs    #Precaution  - Fall, Aspiration, cardiac, seizure    #GOC  CODE STATUS: FULL CODE    Outpatient Follow-up (Specialty/Name of physician):    Emy Prakash  NP in [address], [address]  [place] [address]  Phone: [phone]  Fax: [phone]  Follow Up Time: 2 weeks    Sergio Jurado  [address], [address]  [place] [address]  Phone: [phone]  Fax: [phone]  Follow Up Time: 1 month      MEDICAL PROGNOSIS: GOOD            REHAB POTENTIAL: GOOD             ESTIMATED DISPOSITION: HOME WITH HOME CARE            ELOS: 10-14 Days   EXPECTED THERAPY:     P.T. 1hr/day       O.T. 1hr/day      S.L.P. 1hr/day     P&O Unnecessary     EXP FREQUENCY: 5 days per 7 day period     PRESCREEN COMPARISON:   I have reviewed the prescreen information and I have found no relevant changes between the preadmission screening and my post admission evaluation     RATIONALE FOR INPATIENT ADMISSION - Patient demonstrates the following: (check all that apply)  [X] Medically appropriate for rehabilitation admission  [X] Has attainable rehab goals with an appropriate initial discharge plan  [X] Has rehabilitation potential (expected to make a significant improvement within a reasonable period of time)   [X] Requires close medical management by a rehab physician, rehab nursing care, Hospitalist and comprehensive interdisciplinary team (including PT, OT, & or SLP, Prosthetics and Orthotics)   ASSESSMENT/PLAN  This is a 72 YO RIGHT handed woman with no PMH who presented to [facility] ED on 12/14/2023, as a transfer from [facility], as a CODE STROKE where she was found to have R IPH. Hospital with repeat imaging showing stability, UTI s/p IV ABX,  new b/l DVTs, EEG with increased seizure risks. Patient now with Left sided weakness, Fazal-neglect, cognitive deficits, left Homonymous hemianopsia,  gait Instability, ADL impairments and Functional impairments.    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  - Start Comprehensive Rehab Program: PT/OT/ST, 3hours daily and 5 days weekly  - PT: Focused on improving strength, endurance, coordination, balance, functional mobility, and transfers  - OT: Focused on improving strength, fine motor skills, coordination, posture and ADLs.    - ST: to diagnose and treat deficits in swallowing, cognition and communication.   -  Briviact 50mg BID for seizure prophylaxis.  - EEG with increased risk of seizures in the right frontocentral region  - TTE- EF 64%      #HTN  - Losartan 25mg daily  - seen by cardiology for bradycardia thought to be likely neurogenic  - monitor    #UTI  - Vantin 100mg BID   -  end date 12/22.      #Pain management  - Tylenol PRN  -tramadol PRN    #DVT   - b/l below the knee DVTs: right  soleal vein, left soleal, peroneal and gastrocnemius veins.  - Lovenox 40 mg daily -- not cleared for full dose AC due to large ICH  -  monitor with weekly US for propagation  --f/u with Neurology toward the end of next week regarding timeline for starting full dose AC-- Consider f/u CT head next week.     #GI ppx  - Pepcid 20mg   - TUMS   - Maalox    #Bowel Regimen  - Senna, miralax PRN    #Bladder management  - Incontinent  --check PVRs, SC >400cc    #Dysphagia--   - Diet: Soft and Bite sized with thin liquids  - SLP: evaluation and treatment    #Skin:  - Skin on admission: intact  - Pressure injury/Skin:  OOB to Chair, PT/OT       #Sleep:   - Maintain quiet hours and low stim environment.  - Melatonin PRN to maximize participation in therapy during the day.   - Monitor sleep logs    #Precaution  - Fall, Aspiration, cardiac, seizure    #GOC  CODE STATUS: FULL CODE    Outpatient Follow-up (Specialty/Name of physician):    Emy Prakash  NP in [facility]  [address], [address]  [place] [address]  Phone: [phone]  Fax: [phone]  Follow Up Time: 2 weeks    Sergio Jurado  Cardiology  [address], [address]  [place] [address]  Phone: [phone]  Fax: [phone]  Follow Up Time: 1 month      MEDICAL PROGNOSIS: GOOD            REHAB POTENTIAL: GOOD             ESTIMATED DISPOSITION: HOME WITH HOME CARE            ELOS: 14 Days   EXPECTED THERAPY:     P.T. 1hr/day       O.T. 1hr/day      S.L.P. 1hr/day     P&O Unnecessary     EXP FREQUENCY: 5 days per 7 day period     PRESCREEN COMPARISON:   I have reviewed the prescreen information and I have found no relevant changes between the preadmission screening and my post admission evaluation     RATIONALE FOR INPATIENT ADMISSION - Patient demonstrates the following: (check all that apply)  [X] Medically appropriate for rehabilitation admission  [X] Has attainable rehab goals with an appropriate initial discharge plan  [X] Has rehabilitation potential (expected to make a significant improvement within a reasonable period of time)   [X] Requires close medical management by a rehab physician, rehab nursing care, Hospitalist and comprehensive interdisciplinary team (including PT, OT, & or SLP, Prosthetics and Orthotics)   ASSESSMENT/PLAN  This is a 74 YO RIGHT handed woman with no PMH who presented to [facility] ED on 12/14/2023, as a transfer from [facility], as a CODE STROKE where she was found to have R IPH. Hospital with repeat imaging showing stability, UTI s/p IV ABX,  new b/l DVTs, EEG with increased seizure risks. Patient now with Left sided weakness, Fazal-neglect, cognitive deficits, left Homonymous hemianopsia,  gait Instability, ADL impairments and Functional impairments.    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  - Start Comprehensive Rehab Program: PT/OT/ST, 3hours daily and 5 days weekly  - PT: Focused on improving strength, endurance, coordination, balance, functional mobility, and transfers  - OT: Focused on improving strength, fine motor skills, coordination, posture and ADLs.    - ST: to diagnose and treat deficits in swallowing, cognition and communication.   -  Briviact 50mg BID for seizure prophylaxis.  - EEG with increased risk of seizures in the right frontocentral region  - TTE- EF 64%      #HTN  - Losartan 25mg daily  - seen by cardiology for bradycardia thought to be likely neurogenic  - monitor    #UTI  - Vantin 100mg BID   -  end date 12/22.      #Pain management  - Tylenol PRN  -tramadol PRN    #DVT   - b/l below the knee DVTs: right  soleal vein, left soleal, peroneal and gastrocnemius veins.  - Lovenox 40 mg daily -- not cleared for full dose AC due to large ICH  -  monitor with weekly US for propagation  --f/u with Neurology toward the end of next week regarding timeline for starting full dose AC-- Consider f/u CT head next week.     #GI ppx  - Pepcid 20mg   - TUMS   - Maalox    #Bowel Regimen  - Senna, miralax PRN    #Bladder management  - Incontinent  --check PVRs, SC >400cc    #Dysphagia--   - Diet: Soft and Bite sized with thin liquids  - SLP: evaluation and treatment    #Skin:  - Skin on admission: intact  - Pressure injury/Skin:  OOB to Chair, PT/OT       #Sleep:   - Maintain quiet hours and low stim environment.  - Melatonin PRN to maximize participation in therapy during the day.   - Monitor sleep logs    #Precaution  - Fall, Aspiration, cardiac, seizure    #GOC  CODE STATUS: FULL CODE    Outpatient Follow-up (Specialty/Name of physician):    Emy Prakash  NP in [facility]  [address], [address]  [place] [address]  Phone: [phone]  Fax: [phone]  Follow Up Time: 2 weeks    Sergio Jurado  Cardiology  [address], [address]  [place] [address]  Phone: [phone]  Fax: [phone]  Follow Up Time: 1 month      MEDICAL PROGNOSIS: GOOD            REHAB POTENTIAL: GOOD             ESTIMATED DISPOSITION: HOME WITH HOME CARE            ELOS: 14 Days   EXPECTED THERAPY:     P.T. 1hr/day       O.T. 1hr/day      S.L.P. 1hr/day     P&O Unnecessary     EXP FREQUENCY: 5 days per 7 day period     PRESCREEN COMPARISON:   I have reviewed the prescreen information and I have found no relevant changes between the preadmission screening and my post admission evaluation     RATIONALE FOR INPATIENT ADMISSION - Patient demonstrates the following: (check all that apply)  [X] Medically appropriate for rehabilitation admission  [X] Has attainable rehab goals with an appropriate initial discharge plan  [X] Has rehabilitation potential (expected to make a significant improvement within a reasonable period of time)   [X] Requires close medical management by a rehab physician, rehab nursing care, Hospitalist and comprehensive interdisciplinary team (including PT, OT, & or SLP, Prosthetics and Orthotics)

## 2023-12-20 NOTE — DISCHARGE NOTE NURSING/CASE MANAGEMENT/SOCIAL WORK - NSDPFAC_GEN_ALL_CORE
[facility] Acute [facility] p:[phone] [facility] p:[phone]

## 2023-12-20 NOTE — DISCHARGE NOTE NURSING/CASE MANAGEMENT/SOCIAL WORK - NSDCPETBCESMAN_GEN_ALL_CORE
Pharmacokinetic Initial Assessment: IV Vancomycin    Assessment/Plan:    Patient transferred from outside facility on vancomycin 1000 mg IVPB every 24 hours.    Desired empiric serum trough concentration is 15 to 20 mcg/mL.  Draw vancomycin trough level prior to 3rd dose on 01/07/2020 at 0030.  Pharmacy will continue to follow and monitor vancomycin.      Please contact pharmacy at extension [phone] with any questions regarding this assessment.     Thank you for the consult,   Ginilizette LATONIA Neville       Patient brief summary:  Patito Hudson is a 65 y.o. female initiated on antimicrobial therapy with IV Vancomycin for treatment of suspected bone/joint infection.    Drug Allergies:   Review of patient's allergies indicates:   Allergen Reactions    Codeine Hives and Nausea Only    Keflex [cephalexin]     Linagliptin Swelling    Sulfa (sulfonamide antibiotics)     Neosporin [benzalkonium chloride] Rash       Actual Body Weight:   66.4 kg    Renal Function:   Estimated Creatinine Clearance: 52.5 mL/min (based on SCr of 1 mg/dL).     CBC (last 72 hours):  Recent Labs   Lab Result Units 01/03/20 0425 01/05/20  1014 01/05/20  1218   WBC K/uL 10.47 10.26 10.54   Hemoglobin g/dL 9.6* 9.3* 10.4*   Hematocrit % 31.2* 30.1* 34.4*   Platelets K/uL 291 266 303   Gran% %  --  85.0* 82.1*   Lymph% %  --  11.0* 13.0*   Mono% %  --  2.2* 3.5*   Eosinophil% %  --  0.6 0.4   Basophil% %  --  0.8 0.6   Differential Method   --  Automated Automated       Metabolic Panel (last 72 hours):  Recent Labs   Lab Result Units 01/03/20 0425 01/05/20  1014 01/05/20  1218 01/05/20  1236   Sodium mmol/L 134* 136 136  --    Potassium mmol/L 3.2* 3.7 4.2  --    Chloride mmol/L 88* 93* 95  --    CO2 mmol/L 35* 31* 27  --    Glucose mg/dL 172* 180* 119*  --    Glucose, UA   --   --   --  Negative   BUN, Bld mg/dL 14 20 21  --    Creatinine mg/dL 0.9 1.0 1.0  --    Albumin g/dL 2.4* 2.3* 2.5*  --    Total Bilirubin mg/dL 0.8 0.8 0.9  --     Alkaline Phosphatase U/L 108 103 110  --    AST U/L 16 12 15  --    ALT U/L <5* <5* <5*  --    Magnesium mg/dL 1.7  --   --   --    Phosphorus mg/dL 4.0  --   --   --        Drug levels (last 3 results):  Recent Labs   Lab Result Units 01/04/20  0100   Vancomycin-Trough ug/mL 11.1       Microbiologic Results:  Microbiology Results (last 7 days)     Procedure Component Value Units Date/Time    Blood culture (site 1) [[id number]]     Order Status:  No result Specimen:  Blood     Blood culture (site 2) [[id number]]     Order Status:  No result Specimen:  Blood     Urine culture [[id number]] Collected:  01/05/20 1236    Order Status:  No result Specimen:  Urine Updated:  01/05/20 1300         If you are a smoker, it is important for your health to stop smoking. Please be aware that second hand smoke is also harmful.

## 2023-12-20 NOTE — CHART NOTE - NSCHARTNOTEFT_GEN_A_CORE
Nutrition Follow Up Note  Patient seen for: calorie count follow-up. Chart reviewed, events noted.     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     Source: [x] Patient       [x] Medical Record        [x] RN        [] Family at bedside       [] Other:    -If unable to interview patient: [] Trach/Vent/BiPAP  [] Disoriented/confused/inappropriate to interview    Diet Order:   Diet, Soft and Bite Sized (12-15-23 @ 22:00) [Active]    - PO intake :   [] >75%  Adequate    [x] 50-75%  Fair       [] <50%  Poor    - Nutrition-related concerns:      - RN assisting pt with meal at time of visit. States pt with fair PO intake. Pt states she doesn't like sweets. Declines oral nutrition supplements. Endorses wanting lasagna.       - Pt on soft & bite sized diet. No swallowing evaluation by SLP noted per chart. Per RD initial note 12/17, pt denies intolerance to chewing/swallowing and was requesting regular consistency meals.      - Ordered for reglan.    GI: constipation noted per chart; vomiting noted 12/19 per chart; Per chart, noted with stool burden on abd xray. Last BM 12/19 per chart.  Ordered for prune juice/stewed prunes by previous RD. Bowel Regimen? [x] Yes (dulcolax suppository, dulcolax, senna, miralax)  [] No      Weights:   Dosing wt: 149.7 lbs/67.9 kg (12-14)  No updated weights noted per chart at this time. RD to continue to monitor weight trends as able/available.    MEDICATIONS  (STANDING):  bisacodyl 5 milliGRAM(s) Oral daily  brivaracetam 50 milliGRAM(s) Oral two times a day  cefTRIAXone   IVPB 1000 milliGRAM(s) IV Intermittent every 24 hours  diclofenac 0.1% Ophthalmic Solution 1 Drop(s) Left EYE <User Schedule>  enoxaparin Injectable 40 milliGRAM(s) SubCutaneous <User Schedule>  famotidine    Tablet 20 milliGRAM(s) Oral two times a day  ketorolac 0.5% Ophthalmic Solution 1 Drop(s) Left EYE daily  losartan 25 milliGRAM(s) Oral daily  metoclopramide 5 milliGRAM(s) Oral every 6 hours  polyethylene glycol 3350 17 Gram(s) Oral two times a day  senna 2 Tablet(s) Oral at bedtime    MEDICATIONS  (PRN):  acetaminophen     Tablet .. 650 milliGRAM(s) Oral every 6 hours PRN Temp greater or equal to 38C (100.4F), Mild Pain (1 - 3)  artificial  tears Solution 1 Drop(s) Both EYES every 4 hours PRN Dry Eyes  bisacodyl Suppository 10 milliGRAM(s) Rectal daily PRN Constipation  calcium carbonate    500 mG (Tums) Chewable 1 Tablet(s) Chew three times a day PRN Heartburn  traMADol 25 milliGRAM(s) Oral every 4 hours PRN Moderate Pain (4 - 6)  traMADol 50 milliGRAM(s) Oral every 4 hours PRN Severe Pain (7 - 10)      Pertinent Labs:  12-20 @ 06:27: Na 142, BUN 9, Cr 0.45<L>, BG 87, K+ 3.1<L>, Phos 3.3, Mg 1.8  12-20 @ 00:57: Na 144, BUN 10, Cr 0.42<L>, BG 92, K+ 3.1<L>  12-19 @ 19:33: Na 142, BUN 10, Cr 0.39<L>, BG 95, K+ 3.4<L>  12-19 @ 12:58: Na 147<H>, BUN 12, Cr 0.42<L>, <H>, K+ 3.1<L>    A1C with Estimated Average Glucose Result: 5.0 % (12-15-23 @ 04:03)      Skin per nursing documentation: No pressure injuries noted.  Edema per nursing documentation: +2 L arm, hand, & leg    Estimated Needs:   [x] no change since previous assessment  25-30 kcal/kg = 0669-8451 kcal/day  1.1-1.3 g pro/kg = 75-88 g pro/day  fluids per team  based on dosing wt 67.9 kg (12-14)    Previous Nutrition Diagnosis: Increased nutrient needs  Nutrition Diagnosis is: [x] ongoing  [] resolved [] not applicable   -Being addressed with PO diet, micronutrient supplementation, and food preferences.     Nutrition Care Plan:  [x] In Progress  [] Achieved  [] Not applicable    New Nutrition Diagnosis: [x] Not applicable    Nutrition Interventions:     Education Provided   [x] Yes:  Briefly encouraged intake of meals as tolerated. Pt occupied with RN at time of visit. Will reinforce upon f/u as feasible.    Recommendations:      1) Continue diet free of therapeutic restrictions; consider bedside swallow evaluation by SLP for texture recommendations.  2) Recommend a multivitamin, pending no medical contraindications, to aid in the prevention of micronutrient deficiencies.  3) Monitor PO intake, GI tolerance, skin integrity, labs, weight, and bowel movement regularity.   4) Honor food preferences as feasible. Assist with meals PRN and encourage PO intake.    Monitoring and Evaluation:   Continue to monitor nutritional intake, tolerance to diet prescription, weights, labs, skin integrity    RD remains available upon request and will follow up per protocol  Svetlana Neely RDN, CDN (Available via [place] TEAMS) Nutrition Follow Up Note  Patient seen for: calorie count follow-up. Chart reviewed, events noted.     Per chart, pt is a 73 year old woman who presented to 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     Source: [x] Patient       [x] Medical Record        [x] RN        [] Family at bedside       [] Other:    -If unable to interview patient: [] Trach/Vent/BiPAP  [] Disoriented/confused/inappropriate to interview    Diet Order:   Diet, Soft and Bite Sized (12-15-23 @ 22:00) [Active]    - PO intake :   [] >75%  Adequate    [x] 50-75%  Fair       [] <50%  Poor    - Nutrition-related concerns:      - RN assisting pt with meal at time of visit. States pt with fair PO intake. Pt states she doesn't like sweets. Declines oral nutrition supplements. Endorses wanting lasagna.       - Pt on soft & bite sized diet. No swallowing evaluation by SLP noted per chart. Per RD initial note 12/17, pt denies intolerance to chewing/swallowing and was requesting regular consistency meals.      - Ordered for reglan.    GI: constipation noted per chart; vomiting noted 12/19 per chart; Per chart, noted with stool burden on abd xray. Last BM 12/19 per chart.  Ordered for prune juice/stewed prunes by previous RD. Bowel Regimen? [x] Yes (dulcolax suppository, dulcolax, senna, miralax)  [] No      Weights:   Dosing wt: 149.7 lbs/67.9 kg (12-14)  No updated weights noted per chart at this time. RD to continue to monitor weight trends as able/available.    MEDICATIONS  (STANDING):  bisacodyl 5 milliGRAM(s) Oral daily  brivaracetam 50 milliGRAM(s) Oral two times a day  cefTRIAXone   IVPB 1000 milliGRAM(s) IV Intermittent every 24 hours  diclofenac 0.1% Ophthalmic Solution 1 Drop(s) Left EYE <User Schedule>  enoxaparin Injectable 40 milliGRAM(s) SubCutaneous <User Schedule>  famotidine    Tablet 20 milliGRAM(s) Oral two times a day  ketorolac 0.5% Ophthalmic Solution 1 Drop(s) Left EYE daily  losartan 25 milliGRAM(s) Oral daily  metoclopramide 5 milliGRAM(s) Oral every 6 hours  polyethylene glycol 3350 17 Gram(s) Oral two times a day  senna 2 Tablet(s) Oral at bedtime    MEDICATIONS  (PRN):  acetaminophen     Tablet .. 650 milliGRAM(s) Oral every 6 hours PRN Temp greater or equal to 38C (100.4F), Mild Pain (1 - 3)  artificial  tears Solution 1 Drop(s) Both EYES every 4 hours PRN Dry Eyes  bisacodyl Suppository 10 milliGRAM(s) Rectal daily PRN Constipation  calcium carbonate    500 mG (Tums) Chewable 1 Tablet(s) Chew three times a day PRN Heartburn  traMADol 25 milliGRAM(s) Oral every 4 hours PRN Moderate Pain (4 - 6)  traMADol 50 milliGRAM(s) Oral every 4 hours PRN Severe Pain (7 - 10)      Pertinent Labs:  12-20 @ 06:27: Na 142, BUN 9, Cr 0.45<L>, BG 87, K+ 3.1<L>, Phos 3.3, Mg 1.8  12-20 @ 00:57: Na 144, BUN 10, Cr 0.42<L>, BG 92, K+ 3.1<L>  12-19 @ 19:33: Na 142, BUN 10, Cr 0.39<L>, BG 95, K+ 3.4<L>  12-19 @ 12:58: Na 147<H>, BUN 12, Cr 0.42<L>, <H>, K+ 3.1<L>    A1C with Estimated Average Glucose Result: 5.0 % (12-15-23 @ 04:03)      Skin per nursing documentation: No pressure injuries noted.  Edema per nursing documentation: +2 L arm, hand, & leg    Estimated Needs:   [x] no change since previous assessment  25-30 kcal/kg = 5800-3990 kcal/day  1.1-1.3 g pro/kg = 75-88 g pro/day  fluids per team  based on dosing wt 67.9 kg (12-14)    Previous Nutrition Diagnosis: Increased nutrient needs  Nutrition Diagnosis is: [x] ongoing  [] resolved [] not applicable   -Being addressed with PO diet, micronutrient supplementation, and food preferences.     Nutrition Care Plan:  [x] In Progress  [] Achieved  [] Not applicable    New Nutrition Diagnosis: [x] Not applicable    Nutrition Interventions:     Education Provided   [x] Yes:  Briefly encouraged intake of meals as tolerated. Pt occupied with RN at time of visit. Will reinforce upon f/u as feasible.    Recommendations:      1) Continue diet free of therapeutic restrictions; consider bedside swallow evaluation by SLP for texture recommendations.  2) Recommend a multivitamin, pending no medical contraindications, to aid in the prevention of micronutrient deficiencies.  3) Monitor PO intake, GI tolerance, skin integrity, labs, weight, and bowel movement regularity.   4) Honor food preferences as feasible. Assist with meals PRN and encourage PO intake.    Monitoring and Evaluation:   Continue to monitor nutritional intake, tolerance to diet prescription, weights, labs, skin integrity    RD remains available upon request and will follow up per protocol  Svetlana Neely RDN, CDN (Available via MS TEAMS) Nutrition Follow Up Note  Patient seen for: calorie count follow-up. Chart reviewed, events noted.     Per chart, pt is a 73 year old woman who presented to [facility] ED on 12/14/23, as a transfer from 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     Source: [x] Patient       [x] Medical Record        [x] RN        [] Family at bedside       [] Other:    -If unable to interview patient: [] Trach/Vent/BiPAP  [] Disoriented/confused/inappropriate to interview    Diet Order:   Diet, Soft and Bite Sized (12-15-23 @ 22:00) [Active]    - PO intake :   [] >75%  Adequate    [x] 50-75%  Fair       [] <50%  Poor    - Nutrition-related concerns:      - RN assisting pt with meal at time of visit. States pt with fair PO intake. Pt states she doesn't like sweets. Declines oral nutrition supplements. Endorses wanting lasagna. Of note, calorie count initiated on 12/17. Unable to locate calorie count sheet in pt's room or pt's chart, and RN today was not aware of a calorie count.      - Pt on soft & bite sized diet. No swallowing evaluation by SLP noted per chart. Per RD initial note 12/17, pt denies intolerance to chewing/swallowing and was requesting regular consistency meals.      - Ordered for Reglan.    GI: constipation noted per chart; vomiting noted 12/19 per chart; Per chart, noted with stool burden on abd xray. Last BM 12/19 per chart.  Ordered for prune juice/stewed prunes by previous RD. Bowel Regimen? [x] Yes (dulcolax suppository, dulcolax, senna, miralax)  [] No      Weights:   Dosing wt: 149.7 lbs/67.9 kg (12-14)  No updated weights noted per chart at this time. RD to continue to monitor weight trends as able/available.    MEDICATIONS  (STANDING):  bisacodyl 5 milliGRAM(s) Oral daily  brivaracetam 50 milliGRAM(s) Oral two times a day  cefTRIAXone   IVPB 1000 milliGRAM(s) IV Intermittent every 24 hours  diclofenac 0.1% Ophthalmic Solution 1 Drop(s) Left EYE <User Schedule>  enoxaparin Injectable 40 milliGRAM(s) SubCutaneous <User Schedule>  famotidine    Tablet 20 milliGRAM(s) Oral two times a day  ketorolac 0.5% Ophthalmic Solution 1 Drop(s) Left EYE daily  losartan 25 milliGRAM(s) Oral daily  metoclopramide 5 milliGRAM(s) Oral every 6 hours  polyethylene glycol 3350 17 Gram(s) Oral two times a day  senna 2 Tablet(s) Oral at bedtime    MEDICATIONS  (PRN):  acetaminophen     Tablet .. 650 milliGRAM(s) Oral every 6 hours PRN Temp greater or equal to 38C (100.4F), Mild Pain (1 - 3)  artificial  tears Solution 1 Drop(s) Both EYES every 4 hours PRN Dry Eyes  bisacodyl Suppository 10 milliGRAM(s) Rectal daily PRN Constipation  calcium carbonate    500 mG (Tums) Chewable 1 Tablet(s) Chew three times a day PRN Heartburn  traMADol 25 milliGRAM(s) Oral every 4 hours PRN Moderate Pain (4 - 6)  traMADol 50 milliGRAM(s) Oral every 4 hours PRN Severe Pain (7 - 10)      Pertinent Labs:  12-20 @ 06:27: Na 142, BUN 9, Cr 0.45<L>, BG 87, K+ 3.1<L>, Phos 3.3, Mg 1.8  12-20 @ 00:57: Na 144, BUN 10, Cr 0.42<L>, BG 92, K+ 3.1<L>  12-19 @ 19:33: Na 142, BUN 10, Cr 0.39<L>, BG 95, K+ 3.4<L>  12-19 @ 12:58: Na 147<H>, BUN 12, Cr 0.42<L>, <H>, K+ 3.1<L>    A1C with Estimated Average Glucose Result: 5.0 % (12-15-23 @ 04:03)      Skin per nursing documentation: No pressure injuries noted.  Edema per nursing documentation: +2 L arm, hand, & leg    Estimated Needs:   [x] no change since previous assessment  25-30 kcal/kg = 9397-6574 kcal/day  1.1-1.3 g pro/kg = 75-88 g pro/day  fluids per team  based on dosing wt 67.9 kg (12-14)    Previous Nutrition Diagnosis: Increased nutrient needs  Nutrition Diagnosis is: [x] ongoing  [] resolved [] not applicable   -Being addressed with PO diet, micronutrient supplementation, and food preferences.     Nutrition Care Plan:  [x] In Progress  [] Achieved  [] Not applicable    New Nutrition Diagnosis: [x] Not applicable    Nutrition Interventions:     Education Provided   [x] Yes:  Briefly encouraged intake of meals as tolerated. Pt occupied with RN at time of visit. Will reinforce upon f/u as feasible.    Recommendations:      1) Continue diet free of therapeutic restrictions; consider bedside swallow evaluation by SLP for texture recommendations.  2) Recommend a multivitamin, pending no medical contraindications, to aid in the prevention of micronutrient deficiencies.  3) Monitor PO intake, GI tolerance, skin integrity, labs, weight, and bowel movement regularity.   4) Honor food preferences as feasible. Assist with meals PRN and encourage PO intake.    Monitoring and Evaluation:   Continue to monitor nutritional intake, tolerance to diet prescription, weights, labs, skin integrity    RD remains available upon request and will follow up per protocol  Svetlana Neely RDN, CDN (Available via MS TEAMS) Nutrition Follow Up Note  Patient seen for: calorie count follow-up. Chart reviewed, events noted.     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     Source: [x] Patient       [x] Medical Record        [x] RN        [] Family at bedside       [] Other:    -If unable to interview patient: [] Trach/Vent/BiPAP  [] Disoriented/confused/inappropriate to interview    Diet Order:   Diet, Soft and Bite Sized (12-15-23 @ 22:00) [Active]    - PO intake :   [] >75%  Adequate    [x] 50-75%  Fair       [] <50%  Poor    - Nutrition-related concerns:      - RN assisting pt with meal at time of visit. States pt with fair PO intake. Pt states she doesn't like sweets. Declines oral nutrition supplements. Endorses wanting lasagna. Of note, calorie count initiated on 12/17. Unable to locate calorie count sheet in pt's room or pt's chart, and RN today was not aware of a calorie count.      - Pt on soft & bite sized diet. No swallowing evaluation by SLP noted per chart. Per RD initial note 12/17, pt denies intolerance to chewing/swallowing and was requesting regular consistency meals.      - Ordered for Reglan.    GI: constipation noted per chart; vomiting noted 12/19 per chart; Per chart, noted with stool burden on abd xray. Last BM 12/19 per chart.  Ordered for prune juice/stewed prunes by previous RD. Bowel Regimen? [x] Yes (dulcolax suppository, dulcolax, senna, miralax)  [] No      Weights:   Dosing wt: 149.7 lbs/67.9 kg (12-14)  No updated weights noted per chart at this time. RD to continue to monitor weight trends as able/available.    MEDICATIONS  (STANDING):  bisacodyl 5 milliGRAM(s) Oral daily  brivaracetam 50 milliGRAM(s) Oral two times a day  cefTRIAXone   IVPB 1000 milliGRAM(s) IV Intermittent every 24 hours  diclofenac 0.1% Ophthalmic Solution 1 Drop(s) Left EYE <User Schedule>  enoxaparin Injectable 40 milliGRAM(s) SubCutaneous <User Schedule>  famotidine    Tablet 20 milliGRAM(s) Oral two times a day  ketorolac 0.5% Ophthalmic Solution 1 Drop(s) Left EYE daily  losartan 25 milliGRAM(s) Oral daily  metoclopramide 5 milliGRAM(s) Oral every 6 hours  polyethylene glycol 3350 17 Gram(s) Oral two times a day  senna 2 Tablet(s) Oral at bedtime    MEDICATIONS  (PRN):  acetaminophen     Tablet .. 650 milliGRAM(s) Oral every 6 hours PRN Temp greater or equal to 38C (100.4F), Mild Pain (1 - 3)  artificial  tears Solution 1 Drop(s) Both EYES every 4 hours PRN Dry Eyes  bisacodyl Suppository 10 milliGRAM(s) Rectal daily PRN Constipation  calcium carbonate    500 mG (Tums) Chewable 1 Tablet(s) Chew three times a day PRN Heartburn  traMADol 25 milliGRAM(s) Oral every 4 hours PRN Moderate Pain (4 - 6)  traMADol 50 milliGRAM(s) Oral every 4 hours PRN Severe Pain (7 - 10)      Pertinent Labs:  12-20 @ 06:27: Na 142, BUN 9, Cr 0.45<L>, BG 87, K+ 3.1<L>, Phos 3.3, Mg 1.8  12-20 @ 00:57: Na 144, BUN 10, Cr 0.42<L>, BG 92, K+ 3.1<L>  12-19 @ 19:33: Na 142, BUN 10, Cr 0.39<L>, BG 95, K+ 3.4<L>  12-19 @ 12:58: Na 147<H>, BUN 12, Cr 0.42<L>, <H>, K+ 3.1<L>    A1C with Estimated Average Glucose Result: 5.0 % (12-15-23 @ 04:03)      Skin per nursing documentation: No pressure injuries noted.  Edema per nursing documentation: +2 L arm, hand, & leg    Estimated Needs:   [x] no change since previous assessment  25-30 kcal/kg = 2496-3008 kcal/day  1.1-1.3 g pro/kg = 75-88 g pro/day  fluids per team  based on dosing wt 67.9 kg (12-14)    Previous Nutrition Diagnosis: Increased nutrient needs  Nutrition Diagnosis is: [x] ongoing  [] resolved [] not applicable   -Being addressed with PO diet, micronutrient supplementation, and food preferences.     Nutrition Care Plan:  [x] In Progress  [] Achieved  [] Not applicable    New Nutrition Diagnosis: [x] Not applicable    Nutrition Interventions:     Education Provided   [x] Yes:  Briefly encouraged intake of meals as tolerated. Pt occupied with RN at time of visit. Will reinforce upon f/u as feasible.    Recommendations:      1) Continue diet free of therapeutic restrictions; consider bedside swallow evaluation by SLP for texture recommendations.  2) Recommend a multivitamin, pending no medical contraindications, to aid in the prevention of micronutrient deficiencies.  3) Monitor PO intake, GI tolerance, skin integrity, labs, weight, and bowel movement regularity.   4) Honor food preferences as feasible. Assist with meals PRN and encourage PO intake.    Monitoring and Evaluation:   Continue to monitor nutritional intake, tolerance to diet prescription, weights, labs, skin integrity    RD remains available upon request and will follow up per protocol  Svetlana Neely RDN, CDN (Available via MS TEAMS)

## 2023-12-20 NOTE — PROGRESS NOTE ADULT - PROBLEM SELECTOR PLAN 1
Likely neurogenic   Stable Blood pressure   no cardiac objection to DC to rehab
Likely neurogenic   Stable Blood pressure   no cardiac objection to DC to rehab
Likely neurogenic   Stable Blood pressure   Atropine at bedside for sustained HR < 35 and/or drop in BP with bradycardia.

## 2023-12-20 NOTE — H&P ADULT - NSHPSOCIALHISTORY_GEN_ALL_CORE
Smoking -  EtOH -   Drugs -     Patient lives n an elevator access apt,  PTA: Independent in ADLs and ambulation     CURRENT FUNCTIONAL STATUS  Date: 12/20  Bed Mobility: Min a, 1 person  Transfers: Mod a, 1 person  Gait:  Max A, 1 person. Bed to chair Patient lives n an elevator access apt,  PTA: Independent in ADLs and ambulation     CURRENT FUNCTIONAL STATUS  Date: 12/20  Bed Mobility: Min a, 1 person  Transfers: Mod a, 1 person  Gait:  Max A, 1 person. Bed to chair Patient lives Alone in an apartment with elevator access, No TIM  PTA: Independent in ADLs and ambulation   Retired   Masters education in Art education  Pt. reports she was active and walked 2 miles a day-  Reports she has friends that live near her that she says can help-- reports her HCP is Isabella Medina    CURRENT FUNCTIONAL STATUS  Date: 12/20  Bed Mobility: Min a, 1 person  Transfers: Mod a, 1 person  Gait:  Max A, 1 person. Bed to chair Patient lives Alone in an apartment with elevator access, No TIM  PTA: Independent in ADLs and ambulation   Retired   Masters education in Art education  Pt. reports she was active and walked 2 miles a day-  Reports she has friends that live near her that she says can help-- reports her HCP is Isabella Medina    Smoking-- quit when she was in her 40s  ETOH-- wine with dinner  No drugs    CURRENT FUNCTIONAL STATUS  Date: 12/20  Bed Mobility: Min a, 1 person  Transfers: Mod a, 1 person  Gait:  Max A, 1 person. Bed to chair

## 2023-12-20 NOTE — PROGRESS NOTE ADULT - ASSESSMENT
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     NEURO: Neurologically unchanged. Continue close monitoring for neurologic deterioration. -140. LDL - 127 - not on atorvastatin as mechanism nonarteriosclerotic, will follow up with PMD. Hemoglobin A1C- 5.0. MRI Brain w/wo contrast as above. Briviact 50mg BID for seizure prophylaxis, EEG with increased risk of seizures in the right frontocentral region. Physical therapy/OT/Speech eval - Acute rehab.     ANTITHROMBOTIC THERAPY: None due to hemorrhage    PULMONARY: Protecting airway, saturating well on room air     CARDIOVASCULAR: TTE- EF 64%. Continue cardiac monitoring. Continue Losartan 25mg daily. Cardiology following for bradycardia, cleared for discharge. No intervention at this time.               SBP goal: 100- 140 mmHg     GASTROINTESTINAL: Dysphagia screen passed. Pt with vomiting on 12/17- 12/20, abdominal x-ray with stool burden. Started on bowel regimen. Will c/w with PRN reglan for nausea.      Diet: Soft and bite sized    RENAL: BUN/Cr stable, good urine output. 2% discontinued on 12/20.       Na Goal: Greater than 135     Sweeney: No    HEMATOLOGY: H/H stable, Platelets 166, will monitor.  LE duplex showing b/l below the knee DVTs.      DVT ppx: Lovenox subq     ID: Afebrile, no leukocytosis     OTHER: Plan discussed with patient at bedside    DISPOSITION: Rehab as workup is complete    CORE MEASURES:        Admission NIHSS: 9     TPA: NO      LDL/HDL: 127/58     Depression Screen: p     Statin Therapy: no, hemorrhage     Dysphagia Screen: PASS     Smoking NO      Afib NO     Stroke Education YES    Obtain screening lower extremity venous ultrasound in patients who meet 1 or more of the following criteria as patient is high risk for DVT/PE on admission:   [] History of DVT/PE  [] Hypercoagulable states (Factor V Leiden, Cancer, OCP, etc. 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     NEURO: Neurologically unchanged. Continue close monitoring for neurologic deterioration. -140. LDL - 127 - not on atorvastatin as mechanism nonarteriosclerotic, will follow up with PMD. Hemoglobin A1C- 5.0. MRI Brain w/wo contrast as above. Briviact 50mg BID for seizure prophylaxis, EEG with increased risk of seizures in the right frontocentral region. Physical therapy/OT/Speech eval - Acute rehab.     ANTITHROMBOTIC THERAPY: None due to hemorrhage    PULMONARY: Protecting airway, saturating well on room air     CARDIOVASCULAR: TTE- EF 64%. Continue cardiac monitoring. Continue Losartan 25mg daily. Cardiology following for bradycardia, cleared for discharge. No intervention at this time.               SBP goal: 100- 140 mmHg     GASTROINTESTINAL: Dysphagia screen passed. Pt with vomiting on 12/17- 12/20, abdominal x-ray with stool burden. Started on bowel regimen. Will c/w with PRN reglan for nausea.      Diet: Soft and bite sized    RENAL: BUN/Cr stable, good urine output. 2% discontinued on 12/20.       Na Goal: Greater than 135     Sweeney: No    HEMATOLOGY: H/H stable, Platelets 166, will monitor.  LE duplex showing b/l below the knee DVTs.      DVT ppx: Lovenox subq     ID: Afebrile, no leukocytosis     OTHER: Plan discussed with patient at bedside    DISPOSITION: Rehab as workup is complete    CORE MEASURES:        Admission NIHSS: 9     TPA: NO      LDL/HDL: 127/58     Depression Screen: p     Statin Therapy: no, hemorrhage     Dysphagia Screen: PASS     Smoking NO      Afib NO     Stroke Education YES    Obtain screening lower extremity venous ultrasound in patients who meet 1 or more of the following criteria as patient is high risk for DVT/PE on admission:   [] History of DVT/PE  [] Hypercoagulable states (Factor V Leiden, Cancer, OCP, etc. )  [] Prolonged immobility (hemiplegia/hemiparesis/post operative or any other extended immobilization)  [] Transferred from outside facility (Rehab or Long term care)  [] Age </= to 50

## 2023-12-20 NOTE — DISCHARGE NOTE NURSING/CASE MANAGEMENT/SOCIAL WORK - PATIENT PORTAL LINK FT
You can access the FollowMyHealth Patient Portal offered by [facility] by registering at the following website: [email or web address]. By joining [facility]’s FollowMyHealth portal, you will also be able to view your health information using other applications (apps) compatible with our system. You can access the FollowMyHealth Patient Portal offered by [facility] by registering at the following website: [email or web address]. By joining [facility]’s FollowMyHealth portal, you will also be able to view your health information using other applications (apps) compatible with our system.

## 2023-12-20 NOTE — DISCHARGE NOTE PROVIDER - PROVIDER TOKENS
PROVIDER:[TOKEN:[[id number]],FOLLOWUP:[2 weeks]],PROVIDER:[TOKEN:[[id number]],FOLLOWUP:[1 month]] PROVIDER:[TOKEN:[[id number]],FOLLOWUP:[2 weeks]],PROVIDER:[TOKEN:[[id number]],FOLLOWUP:[1 month]]

## 2023-12-20 NOTE — PATIENT PROFILE ADULT - FALL HARM RISK - HARM RISK INTERVENTIONS
Assistance OOB with selected safe patient handling equipment/Communicate Risk of Fall with Harm to all staff/Discuss with provider need for PT consult/Monitor gait and stability/Provide patient with walking aids - walker, cane, crutches/Reinforce activity limits and safety measures with patient and family/Tailored Fall Risk Interventions/Visual Cue: Yellow wristband and red socks/Bed in lowest position, wheels locked, appropriate side rails in place/Call bell, personal items and telephone in reach/Instruct patient to call for assistance before getting out of bed or chair/Non-slip footwear when patient is out of bed/[place] to call system/Physically safe environment - no spills, clutter or unnecessary equipment/Purposeful Proactive Rounding/Room/bathroom lighting operational, light cord in reach Assistance OOB with selected safe patient handling equipment/Communicate Risk of Fall with Harm to all staff/Discuss with provider need for PT consult/Monitor gait and stability/Provide patient with walking aids - walker, cane, crutches/Reinforce activity limits and safety measures with patient and family/Tailored Fall Risk Interventions/Visual Cue: Yellow wristband and red socks/Bed in lowest position, wheels locked, appropriate side rails in place/Call bell, personal items and telephone in reach/Instruct patient to call for assistance before getting out of bed or chair/Non-slip footwear when patient is out of bed/[place] to call system/Physically safe environment - no spills, clutter or unnecessary equipment/Purposeful Proactive Rounding/Room/bathroom lighting operational, light cord in reach

## 2023-12-20 NOTE — PROVIDER CONTACT NOTE (OTHER) - REASON
pt complaining of headache and nausea
Pt lalito sustaining 32-38 sinus lalito, asymptomatic
pt had 1 emesis as per family after meal, pt complain of  nauseous, potassium 3.1, sodium 147, change eye drops to pt own meds at bedside
potassium 3.1
pt heart rate is outside ordered parameter , pt heart rate has been between 35-50's, requesting MD to change pt heart rate parameters.

## 2023-12-20 NOTE — PROGRESS NOTE ADULT - PROBLEM SELECTOR PLAN 2
unknown etiology   ? angiopathy   Seizure ppx   aspiration precautions   Orders per NSCU.
unknown etiology   ? angiopathy   Seizure ppx   aspiration precautions   Orders per stroke unit
unknown etiology   ? angiopathy   Seizure ppx   aspiration precautions   Orders per stroke unit

## 2023-12-20 NOTE — H&P ADULT - NSHPREVIEWOFSYSTEMS_GEN_ALL_CORE
REVIEW OF SYSTEMS  limited to patient willingness to participate in exam/history  +Headache; left ankle pain, congestion REVIEW OF SYSTEMS  limited to patient willingness to participate in exam/history  +Headache; left ankle pain, congestion  cognitive deficits,   Left sided weakness

## 2023-12-20 NOTE — H&P ADULT - HISTORY OF PRESENT ILLNESS
This is a 74 YO RIGHT handed woman with no PMH who presented to [facility] ED on 12/14/2023, as a transfer from [facility], as a CODE STROKE, with c/o R IPH.   CTH and CTA repeated - large R IPH (temporal/parietal). Presented to [place] today with c/o HA and AMS. Friend accompanying patient said that when visiting patient today - patient was not acting like herself and c/o HA. NIHSS 9.    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 Similar midline shift of 5 mm.  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 Briviact 50mg BID for seizure prophylaxis, EEG with increased risk of seizures in the right frontocentral region.TTE- EF 64%. Continue Losartan 25mg daily. 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 UA: positive for UTI, started on IV abx and then transitioned to oral, end date 12/22.      Patient was evaluated by PM&R and therapy for functional deficits, gait/ADL impairments and acute rehabilitation was recommended. Patient was medically optimized for discharge to [facility] IRU on 12/20/23. This is a 74 YO RIGHT handed woman with no PMH who presented to [facility] ED on 12/14/2023, as a transfer from [facility], as a CODE STROKE, with c/o R IPH.   CTH and CTA repeated - large R IPH (temporal/parietal). Presented to [place] today with c/o HA and AMS. Friend accompanying patient said that when visiting patient today - patient was not acting like herself and c/o HA. NIHSS 9.    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 Similar midline shift of 5 mm.  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 Briviact 50mg BID for seizure prophylaxis, EEG with increased risk of seizures in the right frontocentral region.TTE- EF 64%. Continue Losartan 25mg daily. 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 UA: positive for UTI, started on IV abx and then transitioned to oral, end date 12/22.      Patient was evaluated by PM&R and therapy for functional deficits, gait/ADL impairments and acute rehabilitation was recommended. Patient was medically optimized for discharge to [facility] IRU on 12/20/23.

## 2023-12-20 NOTE — H&P ADULT - NS ATTEND AMEND GEN_ALL_CORE FT
Pt. seen 12/21 AM.  Agree with documentation above as per NP and resident on call with amendments made as appropriate. Patient medically stable. Appropriate for acute rehabilitation.      Pt. slept during the night as per pt. and nursing.  Reports intermittent right sided headache pain.  No nausea/vomiting.  Needs assistance to eat breakfast.  Distractable, tangential which limited exam.     Vital Signs Last 24 Hrs  T(C): 36.7 (21 Dec 2023 09:02), Max: 36.9 (20 Dec 2023 21:00)  T(F): 98 (21 Dec 2023 09:02), Max: 98.4 (20 Dec 2023 21:00)  HR: 57 (21 Dec 2023 09:02) (47 - 57)  BP: 126/76 (21 Dec 2023 09:02) (113/71 - 151/67)  BP(mean): 96 (20 Dec 2023 14:00) (96 - 110)  RR: 16 (21 Dec 2023 09:02) (16 - 21)  SpO2: 98% (21 Dec 2023 09:02) (96% - 98%)    Parameters below as of 21 Dec 2023 09:02  Patient On (Oxygen Delivery Method): room air    Physical Exam as amended above                          13.8   8.06  )-----------( 162      ( 21 Dec 2023 05:57 )             38.1   12-21    143  |  106  |  15  ----------------------------<  100<H>  3.2<L>   |  25  |  0.52    Ca    9.1      21 Dec 2023 05:57  Phos  3.3     12-20  Mg     1.8     12-20    TPro  5.9<L>  /  Alb  2.9<L>  /  TBili  0.8  /  DBili  x   /  AST  52<H>  /  ALT  55<H>  /  AlkPhos  62  12-21    72 YO RIGHT handed woman with no PMH who presented to [facility] ED on 12/14/2023, as a transfer from [facility], as a CODE STROKE where she was found to have R [facility]. Hospital with repeat imaging showing stability, UTI s/p IV ABX,  new b/l DVTs, EEG with increased seizure risks. Patient now with Left sided weakness, Fazal-neglect, cognitive deficits, left Homonymous hemianopsia,  gait Instability, ADL impairments and Functional impairments.    Bladder management  - Incontinent  --check PVRs, SC >400cc    - b/l below the knee DVTs: right  soleal vein, left soleal, peroneal and gastrocnemius veins.  - Lovenox 40 mg daily -- not cleared for full dose AC due to large ICH  -  monitor with weekly US for propagation  --f/u with Neurology toward the end of next week regarding timeline for starting full dose AC-- Consider f/u CT head next week.     Hypokalemia-- d/w hospitalist to replace K+,  f/u BMP tomorrow AM    Left ankle PRAFO in bed for positioning    Continue current medications-  Labs and VS reviewed  d/w hospitalist Pt. seen 12/21 AM.  Agree with documentation above as per NP and resident on call with amendments made as appropriate. Patient medically stable. Appropriate for acute rehabilitation.      Pt. slept during the night as per pt. and nursing.  Reports intermittent right sided headache pain.  No nausea/vomiting.  Needs assistance to eat breakfast.  Distractable, tangential which limited exam.     Vital Signs Last 24 Hrs  T(C): 36.7 (21 Dec 2023 09:02), Max: 36.9 (20 Dec 2023 21:00)  T(F): 98 (21 Dec 2023 09:02), Max: 98.4 (20 Dec 2023 21:00)  HR: 57 (21 Dec 2023 09:02) (47 - 57)  BP: 126/76 (21 Dec 2023 09:02) (113/71 - 151/67)  BP(mean): 96 (20 Dec 2023 14:00) (96 - 110)  RR: 16 (21 Dec 2023 09:02) (16 - 21)  SpO2: 98% (21 Dec 2023 09:02) (96% - 98%)    Parameters below as of 21 Dec 2023 09:02  Patient On (Oxygen Delivery Method): room air    Physical Exam as amended above                          13.8   8.06  )-----------( 162      ( 21 Dec 2023 05:57 )             38.1   12-21    143  |  106  |  15  ----------------------------<  100<H>  3.2<L>   |  25  |  0.52    Ca    9.1      21 Dec 2023 05:57  Phos  3.3     12-20  Mg     1.8     12-20    TPro  5.9<L>  /  Alb  2.9<L>  /  TBili  0.8  /  DBili  x   /  AST  52<H>  /  ALT  55<H>  /  AlkPhos  62  12-21    72 YO RIGHT handed woman with no PMH who presented to [facility] ED on 12/14/2023, as a transfer from [facility], as a CODE STROKE where she was found to have R [facility]. Hospital with repeat imaging showing stability, UTI s/p IV ABX,  new b/l DVTs, EEG with increased seizure risks. Patient now with Left sided weakness, Fazal-neglect, cognitive deficits, left Homonymous hemianopsia,  gait Instability, ADL impairments and Functional impairments.    Bladder management  - Incontinent  --check PVRs, SC >400cc    - b/l below the knee DVTs: right  soleal vein, left soleal, peroneal and gastrocnemius veins.  - Lovenox 40 mg daily -- not cleared for full dose AC due to large ICH  -  monitor with weekly US for propagation  --f/u with Neurology toward the end of next week regarding timeline for starting full dose AC-- Consider f/u CT head next week.     Hypokalemia-- d/w hospitalist to replace K+,  f/u BMP tomorrow AM    Left ankle PRAFO in bed for positioning    Continue current medications-  Labs and VS reviewed  d/w hospitalist Pt. seen 12/21 AM.  Agree with documentation above as per NP and resident on call with amendments made as appropriate. Patient medically stable. Appropriate for acute rehabilitation.      Pt. slept during the night as per pt. and nursing.  Reports intermittent right sided headache pain.  No nausea/vomiting.  Needs assistance to eat breakfast.  Distractable, tangential which limited exam.     Vital Signs Last 24 Hrs  T(C): 36.7 (21 Dec 2023 09:02), Max: 36.9 (20 Dec 2023 21:00)  T(F): 98 (21 Dec 2023 09:02), Max: 98.4 (20 Dec 2023 21:00)  HR: 57 (21 Dec 2023 09:02) (47 - 57)  BP: 126/76 (21 Dec 2023 09:02) (113/71 - 151/67)  BP(mean): 96 (20 Dec 2023 14:00) (96 - 110)  RR: 16 (21 Dec 2023 09:02) (16 - 21)  SpO2: 98% (21 Dec 2023 09:02) (96% - 98%)    Parameters below as of 21 Dec 2023 09:02  Patient On (Oxygen Delivery Method): room air    Physical Exam as amended above                          13.8   8.06  )-----------( 162      ( 21 Dec 2023 05:57 )             38.1   12-21    143  |  106  |  15  ----------------------------<  100<H>  3.2<L>   |  25  |  0.52    Ca    9.1      21 Dec 2023 05:57  Phos  3.3     12-20  Mg     1.8     12-20    TPro  5.9<L>  /  Alb  2.9<L>  /  TBili  0.8  /  DBili  x   /  AST  52<H>  /  ALT  55<H>  /  AlkPhos  62  12-21    72 YO RIGHT handed woman with no PMH who presented to [facility] ED on 12/14/2023, as a transfer from [facility], as a CODE STROKE where she was found to have R [facility]. Hospital with repeat imaging showing stability, UTI s/p IV ABX,  new b/l DVTs, EEG with increased seizure risks. Patient now with Left sided weakness, Fazal-neglect, cognitive deficits, left Homonymous hemianopsia,  gait Instability, ADL impairments and Functional impairments.    Bladder management  - Incontinent  --check PVRs, SC >400cc    - b/l below the knee DVTs: right  soleal vein, left soleal, peroneal and gastrocnemius veins.  - Lovenox 40 mg daily -- not cleared for full dose AC due to large ICH  -  monitor with weekly US for propagation  --f/u with Neurology toward the end of next week regarding timeline for starting full dose AC-- Consider f/u CT head next week.     --d/c Reglan for Nausea/vomiting due to cognitive and sedative effects of D2 blocker.   Switched to PRN zofran.  Pt's Qtc normal at 440.     Hypokalemia-- d/w hospitalist to replace K+,  f/u BMP tomorrow AM    Left ankle PRAFO in bed for positioning    Continue current medications-  Labs and VS reviewed  d/w hospitalist Pt. seen 12/21 AM.  Agree with documentation above as per NP and resident on call with amendments made as appropriate. Patient medically stable. Appropriate for acute rehabilitation.      Pt. slept during the night as per pt. and nursing.  Reports intermittent right sided headache pain.  No nausea/vomiting.  Needs assistance to eat breakfast.  Distractable, tangential which limited exam.     Vital Signs Last 24 Hrs  T(C): 36.7 (21 Dec 2023 09:02), Max: 36.9 (20 Dec 2023 21:00)  T(F): 98 (21 Dec 2023 09:02), Max: 98.4 (20 Dec 2023 21:00)  HR: 57 (21 Dec 2023 09:02) (47 - 57)  BP: 126/76 (21 Dec 2023 09:02) (113/71 - 151/67)  BP(mean): 96 (20 Dec 2023 14:00) (96 - 110)  RR: 16 (21 Dec 2023 09:02) (16 - 21)  SpO2: 98% (21 Dec 2023 09:02) (96% - 98%)    Parameters below as of 21 Dec 2023 09:02  Patient On (Oxygen Delivery Method): room air    Physical Exam as amended above                          13.8   8.06  )-----------( 162      ( 21 Dec 2023 05:57 )             38.1   12-21    143  |  106  |  15  ----------------------------<  100<H>  3.2<L>   |  25  |  0.52    Ca    9.1      21 Dec 2023 05:57  Phos  3.3     12-20  Mg     1.8     12-20    TPro  5.9<L>  /  Alb  2.9<L>  /  TBili  0.8  /  DBili  x   /  AST  52<H>  /  ALT  55<H>  /  AlkPhos  62  12-21    74 YO RIGHT handed woman with no PMH who presented to [facility] ED on 12/14/2023, as a transfer from [facility], as a CODE STROKE where she was found to have R [facility]. Hospital with repeat imaging showing stability, UTI s/p IV ABX,  new b/l DVTs, EEG with increased seizure risks. Patient now with Left sided weakness, Fazal-neglect, cognitive deficits, left Homonymous hemianopsia,  gait Instability, ADL impairments and Functional impairments.    Bladder management  - Incontinent  --check PVRs, SC >400cc    - b/l below the knee DVTs: right  soleal vein, left soleal, peroneal and gastrocnemius veins.  - Lovenox 40 mg daily -- not cleared for full dose AC due to large ICH  -  monitor with weekly US for propagation  --f/u with Neurology toward the end of next week regarding timeline for starting full dose AC-- Consider f/u CT head next week.     --d/c Reglan for Nausea/vomiting due to cognitive and sedative effects of D2 blocker.   Switched to PRN zofran.  Pt's Qtc normal at 440.     Hypokalemia-- d/w hospitalist to replace K+,  f/u BMP tomorrow AM    Left ankle PRAFO in bed for positioning    Continue current medications-  Labs and VS reviewed  d/w hospitalist

## 2023-12-20 NOTE — PROGRESS NOTE ADULT - NS ATTEND AMEND GEN_ALL_CORE FT
Thirty five minutes of discharge time was spent on patient exam and discussion including test results, pathophysiology, natural history, stroke risk factors, medication changes and secondary prophylaxis and importance of medication compliance. We also discussed follow up plan. This was discussed with patient/family, who expresses understanding.
I saw and examined the patient, reviewed diagnostic studies, and reviewed images personally. I agree with PA’s history, exam, orders placed, and plan of care. Medical issues needing to be addressed include:  Nontraumatic intracerebral hemorrhage w/ cerebral edema, brain compression, AMS, L hemiparesis and L neglect. no underlying vascular abn, on asa, s/p ddavp. SBP <140 goal. MRI wo notable underlying lesion. plt/coags wnl. Bp wnl. No notable microhemorrahges, but CAA is still possible. Exam w/ L hemiparesis, neglect, possible L hemianopsia. Wean HTS, 25cc/hr now. Na 148.
I saw and examined the patient, reviewed diagnostic studies, and reviewed images personally. I agree with PA’s history, exam, orders placed, and plan of care. Medical issues needing to be addressed include:  Nontraumatic intracerebral hemorrhage w/ cerebral edema, brain compression, AMS, L hemiparesis and L neglect. no underlying vascular abn, on asa, s/p ddavp. SBP <140 goal. MRI wo notable underlying lesion. plt/coags wnl. Bp wnl. No notable microhemorrahges, but CAA is still possible. Exam w/ L hemiparesis, neglect, possible L hemianopsia.

## 2023-12-20 NOTE — DISCHARGE NOTE NURSING/CASE MANAGEMENT/SOCIAL WORK - NSDCPEFALRISK_GEN_ALL_CORE
For information on Fall & Injury Prevention, visit: [email or web address] OR  [email or web address] OR  https://www.cdc.gov/steadi/patient.html For information on Fall & Injury Prevention, visit: [email or web address] OR  [email or web address] OR  https://www.cdc.gov/steadi/patient.html

## 2023-12-20 NOTE — PROVIDER CONTACT NOTE (OTHER) - RECOMMENDATIONS
MD to change pt heart rate parameters in orders
Potassium supplement
PA assess and evaluate pt at bedside, Zofran IV push, CT head stat
potassium supplement, Reglan IV push, eye drops

## 2023-12-20 NOTE — DISCHARGE NOTE PROVIDER - NSDCMRMEDTOKEN_GEN_ALL_CORE_FT
aluminum hydroxide-magnesium hydroxide 200 mg-200 mg/5 mL oral suspension: 30 milliliter(s) orally every 6 hours As needed Dyspepsia  brivaracetam 50 mg oral tablet: 1 tab(s) orally 2 times a day  calcium carbonate 500 mg (200 mg elemental calcium) oral tablet, chewable: 1 tab(s) orally 3 times a day As needed Heartburn  cefpodoxime 100 mg oral tablet: 1 tab(s) orally 2 times a day end date 12/22  diclofenac 0.1% ophthalmic solution: 1 drop(s) to each affected eye once a day left eye  enoxaparin: 40 milligram(s) subcutaneous once a day (at bedtime)  famotidine 20 mg oral tablet: 1 tab(s) orally 2 times a day  ketorolac 0.5% ophthalmic solution: 1 drop(s) to each affected eye once a day  losartan 25 mg oral tablet: 1 tab(s) orally once a day  metoclopramide 5 mg oral tablet: 1 tab(s) orally every 6 hours  ocular lubricant ophthalmic solution: 1 drop(s) to each affected eye every 4 hours As needed Dry Eyes  polyethylene glycol 3350 oral powder for reconstitution: 17 gram(s) orally 2 times a day  senna leaf extract oral tablet: 2 tab(s) orally once a day (at bedtime)   aluminum hydroxide-magnesium hydroxide 200 mg-200 mg/5 mL oral suspension: 30 milliliter(s) orally every 6 hours As needed Dyspepsia  brivaracetam 50 mg oral tablet: 1 tab(s) orally 2 times a day  calcium carbonate 500 mg (200 mg elemental calcium) oral tablet, chewable: 1 tab(s) orally 3 times a day As needed Heartburn  cefpodoxime 100 mg oral tablet: 1 tab(s) orally 2 times a day end date 12/22  enoxaparin: 40 milligram(s) subcutaneous once a day (at bedtime)  famotidine 20 mg oral tablet: 1 tab(s) orally 2 times a day  ketorolac 0.5% ophthalmic solution: 1 drop(s) to each affected eye once a day  losartan 25 mg oral tablet: 1 tab(s) orally once a day  metoclopramide 5 mg oral tablet: 1 tab(s) orally every 6 hours  ocular lubricant ophthalmic solution: 1 drop(s) to each affected eye every 4 hours As needed Dry Eyes  polyethylene glycol 3350 oral powder for reconstitution: 17 gram(s) orally 2 times a day  senna leaf extract oral tablet: 2 tab(s) orally once a day (at bedtime)

## 2023-12-20 NOTE — PROGRESS NOTE ADULT - SUBJECTIVE AND OBJECTIVE BOX
THE PATIENT WAS SEEN AND EXAMINED BY ME WITH THE HOUSESTAFF AND STROKE TEAM DURING MORNING ROUNDS.   HPI:  Patient MOISÉS MAYO is a 73y (1950) RIGHT handed woman who presented to [facility] ED on 12/14/2023, as a transfer from [facility], as a CODE STROKE, with c/o R IPH.  Denies PMH.  CTH and CTA repeated - large R IPH (temporal/parietal). Presented to [place] today with c/o HA and AMS. Friend accompanying patient said that when visiting patient today - patient was not acting like herself and c/o HA.    NIHSS 9     SUBJECTIVE: No events overnight.  Yesterday complaining of nausea/ vomiting, improved with reglan and Tums. Doing better today. Repeat CTH stable.  ROS reported negative unless otherwise noted.    acetaminophen     Tablet .. 650 milliGRAM(s) Oral every 6 hours PRN  artificial  tears Solution 1 Drop(s) Both EYES every 4 hours PRN  bisacodyl 5 milliGRAM(s) Oral daily  bisacodyl Suppository 10 milliGRAM(s) Rectal daily PRN  brivaracetam 50 milliGRAM(s) Oral two times a day  calcium carbonate    500 mG (Tums) Chewable 1 Tablet(s) Chew three times a day PRN  cefTRIAXone   IVPB 1000 milliGRAM(s) IV Intermittent every 24 hours  diclofenac 0.1% Ophthalmic Solution 1 Drop(s) Left EYE <User Schedule>  enoxaparin Injectable 40 milliGRAM(s) SubCutaneous <User Schedule>  famotidine    Tablet 20 milliGRAM(s) Oral two times a day  ketorolac 0.5% Ophthalmic Solution 1 Drop(s) Left EYE daily  losartan 25 milliGRAM(s) Oral daily  metoclopramide 5 milliGRAM(s) Oral every 6 hours  polyethylene glycol 3350 17 Gram(s) Oral two times a day  senna 2 Tablet(s) Oral at bedtime  traMADol 25 milliGRAM(s) Oral every 4 hours PRN  traMADol 50 milliGRAM(s) Oral every 4 hours PRN      PHYSICAL EXAM:   Vital Signs Last 24 Hrs  T(C): 36.7 (20 Dec 2023 08:00), Max: 36.8 (20 Dec 2023 00:00)  T(F): 98.1 (20 Dec 2023 08:00), Max: 98.3 (20 Dec 2023 00:00)  HR: 55 (20 Dec 2023 10:30) (46 - 56)  BP: 146/75 (20 Dec 2023 10:30) (116/59 - 165/73)  BP(mean): 104 (20 Dec 2023 10:30) (82 - 106)  RR: 13 (20 Dec 2023 10:30) (13 - 23)  SpO2: 95% (20 Dec 2023 10:30) (94% - 98%)    Parameters below as of 20 Dec 2023 08:00  Patient On (Oxygen Delivery Method): room air        General: No acute distress  Abdomen: Soft, nontender, nondistended   Extremities: No edema    NEUROLOGICAL EXAM:  Mental status: Sleeping but arousable to voice, oriented x 3, no aphasia, no neglect.  Cranial Nerves: Mild L facial droop, no nystagmus, no dysarthria, tongue midline  Motor exam: Normal tone, no drift, 5/5 RUE, 5/5 RLE, 0/5 LUE, 3/5 LLE.  Sensation: L sided sensory loss  Coordination/ Gait: No dysmetria, PARISH intact and symmetric bilaterally    LABS:                        14.0   6.90  )-----------( 166      ( 20 Dec 2023 06:25 )             38.7    12-20    142  |  105  |  9   ----------------------------<  87  3.1<L>   |  24  |  0.45<L>    Ca    8.7      20 Dec 2023 06:27  Phos  3.3     12-20  Mg     1.8     12-20          IMAGING: Reviewed by me.       MRI Brain w/wo contrast 12/15/23:  Stable size of right parietotemporal intraparenchymal hemorrhage. Similar midline shift of 5 mm. Follow-up imaging is recommended to show no underlying lesion.    CT Head 12/17/23:  Stable large right parietal temporal lobe hemorrhage with dissection of hemorrhage into the right lateral ventricle. Stable mass effect and midline shift.    CT Head, CTA Head/Neck 12/14/23:  Large right temporal parietal parenchymal hemorrhage with mass effect on the right lateral ventricle and midline shift to the left. No definite abnormal vascularity or high-grade stenosis.    CT Head No Cont (12.19.23   Acute right parietal hematoma with measurements given above   as seen on the prior. No significant interval change in mass effect on   the right lateral ventricle and midline shift to the left. No change in   the size of the hematoma     THE PATIENT WAS SEEN AND EXAMINED BY ME WITH THE HOUSESTAFF AND STROKE TEAM DURING MORNING ROUNDS.   HPI:  Patient MOISÉS MAYO is a 73y (1950) RIGHT handed woman who presented to [facility] ED on 12/14/2023, as a transfer from [facility], as a CODE STROKE, with c/o R IPH.  Denies PMH.  CTH and CTA repeated - large R IPH (temporal/parietal). Presented to [place] today with c/o HA and AMS. Friend accompanying patient said that when visiting patient today - patient was not acting like herself and c/o HA.    NIHSS 9     SUBJECTIVE: No events overnight.  Yesterday complaining of nausea/ vomiting, improved with reglan and Tums. Doing better today. Repeat CTH stable.  ROS reported negative unless otherwise noted.    acetaminophen     Tablet .. 650 milliGRAM(s) Oral every 6 hours PRN  artificial  tears Solution 1 Drop(s) Both EYES every 4 hours PRN  bisacodyl 5 milliGRAM(s) Oral daily  bisacodyl Suppository 10 milliGRAM(s) Rectal daily PRN  brivaracetam 50 milliGRAM(s) Oral two times a day  calcium carbonate    500 mG (Tums) Chewable 1 Tablet(s) Chew three times a day PRN  cefTRIAXone   IVPB 1000 milliGRAM(s) IV Intermittent every 24 hours  diclofenac 0.1% Ophthalmic Solution 1 Drop(s) Left EYE <User Schedule>  enoxaparin Injectable 40 milliGRAM(s) SubCutaneous <User Schedule>  famotidine    Tablet 20 milliGRAM(s) Oral two times a day  ketorolac 0.5% Ophthalmic Solution 1 Drop(s) Left EYE daily  losartan 25 milliGRAM(s) Oral daily  metoclopramide 5 milliGRAM(s) Oral every 6 hours  polyethylene glycol 3350 17 Gram(s) Oral two times a day  senna 2 Tablet(s) Oral at bedtime  traMADol 25 milliGRAM(s) Oral every 4 hours PRN  traMADol 50 milliGRAM(s) Oral every 4 hours PRN      PHYSICAL EXAM:   Vital Signs Last 24 Hrs  T(C): 36.7 (20 Dec 2023 08:00), Max: 36.8 (20 Dec 2023 00:00)  T(F): 98.1 (20 Dec 2023 08:00), Max: 98.3 (20 Dec 2023 00:00)  HR: 55 (20 Dec 2023 10:30) (46 - 56)  BP: 146/75 (20 Dec 2023 10:30) (116/59 - 165/73)  BP(mean): 104 (20 Dec 2023 10:30) (82 - 106)  RR: 13 (20 Dec 2023 10:30) (13 - 23)  SpO2: 95% (20 Dec 2023 10:30) (94% - 98%)    Parameters below as of 20 Dec 2023 08:00  Patient On (Oxygen Delivery Method): room air        General: No acute distress  Abdomen: Soft, nontender, nondistended   Extremities: No edema    NEUROLOGICAL EXAM:  Mental status: Sleeping but arousable to voice, oriented x 3, no aphasia, no neglect.  Cranial Nerves: Mild L facial droop, no nystagmus, no dysarthria, tongue midline  Motor exam: Normal tone, no drift, 5/5 RUE, 5/5 RLE, 0/5 LUE, 3/5 LLE.  Sensation: L sided sensory loss  Coordination/ Gait: No dysmetria, PARISH intact and symmetric bilaterally    LABS:                        14.0   6.90  )-----------( 166      ( 20 Dec 2023 06:25 )             38.7    12-20    142  |  105  |  9   ----------------------------<  87  3.1<L>   |  24  |  0.45<L>    Ca    8.7      20 Dec 2023 06:27  Phos  3.3     12-20  Mg     1.8     12-20          IMAGING: Reviewed by me.       MRI Brain w/wo contrast 12/15/23:  Stable size of right parietotemporal intraparenchymal hemorrhage. Similar midline shift of 5 mm. Follow-up imaging is recommended to show no underlying lesion.    CT Head 12/17/23:  Stable large right parietal temporal lobe hemorrhage with dissection of hemorrhage into the right lateral ventricle. Stable mass effect and midline shift.    CT Head, CTA Head/Neck 12/14/23:  Large right temporal parietal parenchymal hemorrhage with mass effect on the right lateral ventricle and midline shift to the left. No definite abnormal vascularity or high-grade stenosis.    CT Head No Cont (12.19.23   Acute right parietal hematoma with measurements given above   as seen on the prior. No significant interval change in mass effect on   the right lateral ventricle and midline shift to the left. No change in   the size of the hematoma

## 2023-12-20 NOTE — PROVIDER CONTACT NOTE (OTHER) - SITUATION
Pt lalito sustaining 32-38 sinus lalito, asymptomatic
pt had 1 emesis as per family after meal, pt complain of  nauseous, potassium 3.1, sodium 147, change eye drops to pt own meds at bedside
pt complaining of headache and nausea
potassium 3.1
pt heart rate is outside ordered parameter , pt heart rate has been between 35-50's, requesting MD to change pt heart rate parameters

## 2023-12-20 NOTE — DISCHARGE NOTE PROVIDER - NSDCCPCAREPLAN_GEN_ALL_CORE_FT
PRINCIPAL DISCHARGE DIAGNOSIS  Diagnosis: Intraparenchymal hemorrhage of brain  Assessment and Plan of Treatment: Please follow up with neurologist after being discharged from rehab.  The office will call you to schedule an appointment, if you do not hear from them please call [phone]. Continue taking medications as prescribed. If you were prescribed a statin for your cholesterol please make sure you have your liver enzymes checked with your primary care physician. Monitor your blood pressure. Reduce fat, cholesterol and salt in your diet. Increase intake of fruits and vegetables. Limit alcohol to minimum and do not smoke. You may be at risk for falling, make changes to your home to help you walk easier. Keep up to date on vaccinations.  If you experience any symptoms of facial drooping, slurred speech, arm or leg weakness, severe headache, vision changes or any worsening symptoms, notify provider immediately and return to ER.       PRINCIPAL DISCHARGE DIAGNOSIS  Diagnosis: Intraparenchymal hemorrhage of brain  Assessment and Plan of Treatment: Please follow up with neurologist after being discharged from rehab.  The office will call you to schedule an appointment, if you do not hear from them please call [phone]. Continue taking medications as prescribed. If you were prescribed a statin for your cholesterol please make sure you have your liver enzymes checked with your primary care physician. Monitor your blood pressure. Reduce fat, cholesterol and salt in your diet. Increase intake of fruits and vegetables. Limit alcohol to minimum and do not smoke. You may be at risk for falling, make changes to your home to help you walk easier. Keep up to date on vaccinations.  If you experience any symptoms of facial drooping, slurred speech, arm or leg weakness, severe headache, vision changes or any worsening symptoms, notify provider immediately and return to ER.

## 2023-12-20 NOTE — PROGRESS NOTE ADULT - CRITICAL CARE SERVICES PROVIDED
numerical 0-10
Patient is critically ill, requiring critical care services.

## 2023-12-20 NOTE — CHART NOTE - NSCHARTNOTESELECT_GEN_ALL_CORE
Neurovascular/Event Note
Nutrition Services
VTE Risk Assessment/Event Note
Neurology/Event Note
Preliminary EEG report

## 2023-12-20 NOTE — DISCHARGE NOTE PROVIDER - CARE PROVIDER_API CALL
Emy Prakash  NP in Family Health  [address]  [place] [address]  Phone: [phone]  Fax: [phone]  Follow Up Time: 2 weeks    Sergio Jurado  [address], [address]  [place] [address]  Phone: [phone]  Fax: [phone]  Follow Up Time: 1 month   Emy Prakash  NP in Family Health  [address], [address]  [place] [address]  Phone: [phone]  Fax: [phone]  Follow Up Time: 2 weeks    Sergio Jurado  [address], [address]  [place] [address]  Phone: [phone]  Fax: [phone]  Follow Up Time: 1 month

## 2023-12-20 NOTE — PROVIDER CONTACT NOTE (OTHER) - BACKGROUND
pt admitted with IPH
R IPH no surgical interventions
pt admitted with IPH

## 2023-12-20 NOTE — PATIENT PROFILE ADULT - NSPROGENBLOODRESTRICT_GEN_A_NUR
none Add 52 Modifier (Optional): no Total Number Of Lesions Treated: 6 Post-Care Instructions: I reviewed with the patient in detail post-care instructions. Patient is to wear sunprotection, and avoid picking at any of the treated lesions. Pt may apply Vaseline to crusted or scabbing areas. Medical Necessity Information: It is in your best interest to select a reason for this procedure from the list below. All of these items fulfill various CMS LCD requirements except the new and changing color options. Render Post Care In The Note?: yes Detail Level: Zone Consent: The patient's consent was obtained including but not limited to risks of crusting, scabbing, blistering, scarring, darker or lighter pigmentary change, recurrence, incomplete removal and infection. Medical Necessity Clause: This procedure was medically necessary because the lesions that were treated were: Duration Of Freeze Thaw-Cycle (Seconds): 0 Number Of Freeze-Thaw Cycles: 1 freeze-thaw cycle

## 2023-12-20 NOTE — DISCHARGE NOTE NURSING/CASE MANAGEMENT/SOCIAL WORK - SOCIAL WORKER'S NAME
Keke Harman [facility] p:[phone] Keke Harman [facility] p:[phone]

## 2023-12-20 NOTE — H&P ADULT - NSHPLABSRESULTS_GEN_ALL_CORE
RECENT LABS/IMAGING                        14.0   6.90  )-----------( 166      ( 20 Dec 2023 06:25 )             38.7     12-20    140  |  104  |  11  ----------------------------<  122<H>  3.1<L>   |  26  |  0.48<L>    Ca    9.1      20 Dec 2023 13:01  Phos  3.3     12-20  Mg     1.8     12-20        Urinalysis Basic - ( 20 Dec 2023 13:01 )    Color: x / Appearance: x / SG: x / pH: x  Gluc: 122 mg/dL / Ketone: x  / Bili: x / Urobili: x   Blood: x / Protein: x / Nitrite: x   Leuk Esterase: x / RBC: x / WBC x   Sq Epi: x / Non Sq Epi: x / Bacteria: x      VA Duplex Lower Ext Vein Scan, Bilat (12.20.23 @ 10:29)     IMPRESSION:    Positive deep venous thrombosis below the right knee within the right  soleal vein.  Positive deep venous thrombosis below the left knee within the left soleal, peroneal and gastrocnemius veins.      CT Head No Cont (12.19.23 @ 17:23)     IMPRESSION:  Acute right parietal hematoma with measurements given above   as seen on the prior. No significant interval change in mass effect on   the right lateral ventricle and midline shift to the left. No change in   the size of the hematoma    X-ray Abdomen 1 View PORTABLE -Routine (Xray Abdomen 1 View PORTABLE -Routine .) (12.17.23 @ 15:14)     IMPRESSION:  Nonobstructive bowel gas pattern.    CT Head No Cont (12.17.23 @ 10:12)     IMPRESSION: Stable large right parietal temporal lobe hemorrhage with   dissection of hemorrhage into the right lateral ventricle. Stable mass   effect and midline shift.     CT Brain Stroke Protocol (12.14.23 @ 16:44)       IMPRESSION: Large right temporal parietal parenchymal hemorrhage with   mass effect on the right lateral ventricle and midline shift to the left.   No definite abnormal vascularity or high-grade stenosis.

## 2023-12-20 NOTE — PROGRESS NOTE ADULT - CRITICAL CARE ATTENDING COMMENT
Advanced care planning was discussed with patient and family.  Advanced care planning forms were reviewed and discussed as appropriate.  Differential diagnosis and plan of care discussed with patient after the evaluation.   Pain assessed and judicious use of narcotics when appropriate was discussed.  Importance of Fall prevention discussed.  Counseling on Smoking and Alcohol cessation was offered when appropriate.  Counseling on Diet, exercise, and medication compliance was done.
large right temperoparietal ICH with brain edema and midline shift, neuro exam, obtunded but follows commands, JAMES, right gaze preference, left visaul field defect, left hemineglect,  at least antigravity on the left side, repeat CT head today stable pending official read, CTA no vascular malformation, r/o amyloid angiopathy, MRI brain wwo pending, neuro checks q 1hr, brain edema with brain compression on  3 % 50 ml /hr , BMP  q 6 hr, sodium goal 140-145 baseline 136 ,   consult stroke team, SBP goal 100-150 mmhg, keep NPO , hold chemorpophylaxis possible OR if worsening neuro exam     risk for brain  herniation

## 2023-12-20 NOTE — H&P ADULT - NSHPPHYSICALEXAM_GEN_ALL_CORE
***Limited to patient participation as patient wanted to sleep***    Constitutional - NAD, Comfortable  Chest - good chest expansion, good respiratory effort, CTAB   Cardio - warm and well perfused, RRR, no murmur  Abdomen -  Soft, NTND  Extremities - No peripheral edema, + calf tenderness on left  Neurologic Exam:                    Cognitive -             Orientation: Awake, Alert, AAO to self     Speech - Fluent, Comprehensible, No dysarthria, No aphasia      Cranial Nerves - No facial asymmetry, Tongue midline, EOMI, Shoulder shrug intact     Motor -                      LEFT    UE - noted Shab weakness <3/5                    RIGHT UE - at least 3/5                    LEFT    LE - noted foot drop                    RIGHT LE - at least 3/5  Skin on admission: intact ***Limited to patient participation due to confusion and tangential**    Constitutional - NAD, Comfortable  Chest - good chest expansion, good respiratory effort, CTAB   Cardio - warm and well perfused, RRR, no murmur  Abdomen -  Soft, NTND  Extremities - No peripheral edema, + calf tenderness on left  Neurologic Exam:                    Cognitive -             Orientation: Awake, Alert, AAO to self, Hospital and month and year and diagnosis-- not "jennifer [place]" or date       Attention-- Impaired-- delayed processing,      Speech - Fluent, Comprehensible, No dysarthria, No aphasia      Cranial Nerves - No facial asymmetry, Tongue midline, EOMI, Shoulder shrug intact     Motor -                      LEFT    UE - noted Shab weakness <3/5                    RIGHT UE - at least 3/5                    LEFT    LE - noted foot drop                    RIGHT LE - at least 3/5  Skin on admission: intact ***Limited to patient participation due to confusion and tangential**    Constitutional - NAD, Comfortable  Chest - good chest expansion, good respiratory effort, CTAB   Cardio - warm and well perfused, RRR, no murmur  Abdomen -  Soft, NTND  Extremities - No peripheral edema, + calf tenderness on left  Neurologic Exam:                    Cognitive -             Orientation: Awake, Alert, AAO to self, Hospital and month and year and diagnosis-- not "jennifer [place]" or date       Attention-- Impaired-- delayed processing,      Speech - Fluent, Comprehensible, No dysarthria, No aphasia      Cranial Nerves - No facial asymmetry, Tongue midline, EOMI, Shoulder shrug intact     Motor -                      LEFT    UE - noted Shab weakness <3/5                    RIGHT UE - at least 3/5                    LEFT    LE - noted foot drop                    RIGHT LE - at least 3/5  Skin on admission: intact ***Limited to patient participation due to confusion and tangential**    Constitutional - NAD, Comfortable  Chest - good chest expansion, good respiratory effort, CTAB   Cardio - warm and well perfused, RRR, no murmur  Abdomen -  Soft, NTND  Extremities - No peripheral edema, + calf tenderness on left  Neurologic Exam:                    Cognitive -             Orientation: Awake, Alert, AAO to self, Hospital and month and year and diagnosis-- not "jennifer [place]" or date       Attention-- Impaired-- delayed processing, able to state DOWB with delay,  difficulty with serial 7's.  Left inattention      Memory-- impaired--     Speech - Fluent, Comprehensible, No dysarthria, No aphasia      Cranial Nerves - left VF impairment, No facial asymmetry, Tongue midline, EOMI, Shoulder shrug intact     Motor -                      LEFT    UE - noted Shab weakness <3/5                    RIGHT UE -5/5                    LEFT    LE - noted foot drop                    RIGHT LE - at least 3/5  Sensation-- Intact  Skin on admission: intact ***Limited to patient participation due to confusion and tangential**    Constitutional - NAD, Comfortable  Chest - good chest expansion, good respiratory effort, CTAB   Cardio - warm and well perfused, RRR, no murmur  Abdomen -  Soft, NTND  Extremities - No peripheral edema, + calf tenderness on left  Neurologic Exam:                    Cognitive -             Orientation: Awake, Alert, AAO to self, Hospital and month and year and diagnosis-- not "jennifer [place]" or date       Attention-- Impaired-- delayed processing, able to state DOWB with delay,  difficulty with serial 7's.  Left inattention      Memory-- impaired--     Speech - Fluent, Comprehensible, No dysarthria, No aphasia      Cranial Nerves - left VF impairment, No facial asymmetry, Tongue midline, EOMI, Shoulder shrug intact     Motor -                      LEFT    UE - noted Shab weakness <3/5                    RIGHT UE -5/5                    LEFT    LE - noted foot drop                    RIGHT LE - at least 3/5  Sensation-- Intact  Skin on admission: intact

## 2023-12-21 LAB
ALBUMIN SERPL ELPH-MCNC: 2.9 G/DL — LOW (ref 3.3–5)
ALBUMIN SERPL ELPH-MCNC: 2.9 G/DL — LOW (ref 3.3–5)
ALP SERPL-CCNC: 62 U/L — SIGNIFICANT CHANGE UP (ref 40–120)
ALP SERPL-CCNC: 62 U/L — SIGNIFICANT CHANGE UP (ref 40–120)
ALT FLD-CCNC: 55 U/L — HIGH (ref 10–45)
ALT FLD-CCNC: 55 U/L — HIGH (ref 10–45)
ANION GAP SERPL CALC-SCNC: 12 MMOL/L — SIGNIFICANT CHANGE UP (ref 5–17)
ANION GAP SERPL CALC-SCNC: 12 MMOL/L — SIGNIFICANT CHANGE UP (ref 5–17)
AST SERPL-CCNC: 52 U/L — HIGH (ref 10–40)
AST SERPL-CCNC: 52 U/L — HIGH (ref 10–40)
BILIRUB SERPL-MCNC: 0.8 MG/DL — SIGNIFICANT CHANGE UP (ref 0.2–1.2)
BILIRUB SERPL-MCNC: 0.8 MG/DL — SIGNIFICANT CHANGE UP (ref 0.2–1.2)
BUN SERPL-MCNC: 15 MG/DL — SIGNIFICANT CHANGE UP (ref 7–23)
BUN SERPL-MCNC: 15 MG/DL — SIGNIFICANT CHANGE UP (ref 7–23)
CALCIUM SERPL-MCNC: 9.1 MG/DL — SIGNIFICANT CHANGE UP (ref 8.4–10.5)
CALCIUM SERPL-MCNC: 9.1 MG/DL — SIGNIFICANT CHANGE UP (ref 8.4–10.5)
CHLORIDE SERPL-SCNC: 106 MMOL/L — SIGNIFICANT CHANGE UP (ref 96–108)
CHLORIDE SERPL-SCNC: 106 MMOL/L — SIGNIFICANT CHANGE UP (ref 96–108)
CO2 SERPL-SCNC: 25 MMOL/L — SIGNIFICANT CHANGE UP (ref 22–31)
CO2 SERPL-SCNC: 25 MMOL/L — SIGNIFICANT CHANGE UP (ref 22–31)
CREAT SERPL-MCNC: 0.52 MG/DL — SIGNIFICANT CHANGE UP (ref 0.5–1.3)
CREAT SERPL-MCNC: 0.52 MG/DL — SIGNIFICANT CHANGE UP (ref 0.5–1.3)
EGFR: 98 ML/MIN/1.73M2 — SIGNIFICANT CHANGE UP
EGFR: 98 ML/MIN/1.73M2 — SIGNIFICANT CHANGE UP
GLUCOSE SERPL-MCNC: 100 MG/DL — HIGH (ref 70–99)
GLUCOSE SERPL-MCNC: 100 MG/DL — HIGH (ref 70–99)
HCT VFR BLD CALC: 38.1 % — SIGNIFICANT CHANGE UP (ref 34.5–45)
HCT VFR BLD CALC: 38.1 % — SIGNIFICANT CHANGE UP (ref 34.5–45)
HGB BLD-MCNC: 13.8 G/DL — SIGNIFICANT CHANGE UP (ref 11.5–15.5)
HGB BLD-MCNC: 13.8 G/DL — SIGNIFICANT CHANGE UP (ref 11.5–15.5)
MCHC RBC-ENTMCNC: 31.2 PG — SIGNIFICANT CHANGE UP (ref 27–34)
MCHC RBC-ENTMCNC: 31.2 PG — SIGNIFICANT CHANGE UP (ref 27–34)
MCHC RBC-ENTMCNC: 36.2 GM/DL — HIGH (ref 32–36)
MCHC RBC-ENTMCNC: 36.2 GM/DL — HIGH (ref 32–36)
MCV RBC AUTO: 86 FL — SIGNIFICANT CHANGE UP (ref 80–100)
MCV RBC AUTO: 86 FL — SIGNIFICANT CHANGE UP (ref 80–100)
NRBC # BLD: 0 /100 WBCS — SIGNIFICANT CHANGE UP (ref 0–0)
NRBC # BLD: 0 /100 WBCS — SIGNIFICANT CHANGE UP (ref 0–0)
PLATELET # BLD AUTO: 162 K/UL — SIGNIFICANT CHANGE UP (ref 150–400)
PLATELET # BLD AUTO: 162 K/UL — SIGNIFICANT CHANGE UP (ref 150–400)
POTASSIUM SERPL-MCNC: 3.2 MMOL/L — LOW (ref 3.5–5.3)
POTASSIUM SERPL-MCNC: 3.2 MMOL/L — LOW (ref 3.5–5.3)
POTASSIUM SERPL-SCNC: 3.2 MMOL/L — LOW (ref 3.5–5.3)
POTASSIUM SERPL-SCNC: 3.2 MMOL/L — LOW (ref 3.5–5.3)
PROT SERPL-MCNC: 5.9 G/DL — LOW (ref 6–8.3)
PROT SERPL-MCNC: 5.9 G/DL — LOW (ref 6–8.3)
RBC # BLD: 4.43 M/UL — SIGNIFICANT CHANGE UP (ref 3.8–5.2)
RBC # BLD: 4.43 M/UL — SIGNIFICANT CHANGE UP (ref 3.8–5.2)
RBC # FLD: 12.5 % — SIGNIFICANT CHANGE UP (ref 10.3–14.5)
RBC # FLD: 12.5 % — SIGNIFICANT CHANGE UP (ref 10.3–14.5)
SODIUM SERPL-SCNC: 143 MMOL/L — SIGNIFICANT CHANGE UP (ref 135–145)
SODIUM SERPL-SCNC: 143 MMOL/L — SIGNIFICANT CHANGE UP (ref 135–145)
WBC # BLD: 8.06 K/UL — SIGNIFICANT CHANGE UP (ref 3.8–10.5)
WBC # BLD: 8.06 K/UL — SIGNIFICANT CHANGE UP (ref 3.8–10.5)
WBC # FLD AUTO: 8.06 K/UL — SIGNIFICANT CHANGE UP (ref 3.8–10.5)
WBC # FLD AUTO: 8.06 K/UL — SIGNIFICANT CHANGE UP (ref 3.8–10.5)

## 2023-12-21 PROCEDURE — 99232 SBSQ HOSP IP/OBS MODERATE 35: CPT

## 2023-12-21 PROCEDURE — 99223 1ST HOSP IP/OBS HIGH 75: CPT

## 2023-12-21 RX ORDER — LIDOCAINE 4 G/100G
1 CREAM TOPICAL
Refills: 0 | Status: DISCONTINUED | OUTPATIENT
Start: 2023-12-21 | End: 2023-12-28

## 2023-12-21 RX ORDER — ONDANSETRON 8 MG/1
4 TABLET, FILM COATED ORAL EVERY 6 HOURS
Refills: 0 | Status: DISCONTINUED | OUTPATIENT
Start: 2023-12-21 | End: 2024-01-11

## 2023-12-21 RX ORDER — POTASSIUM CHLORIDE 20 MEQ
40 PACKET (EA) ORAL ONCE
Refills: 0 | Status: COMPLETED | OUTPATIENT
Start: 2023-12-21 | End: 2023-12-21

## 2023-12-21 RX ADMIN — Medication 650 MILLIGRAM(S): at 18:45

## 2023-12-21 RX ADMIN — TRAMADOL HYDROCHLORIDE 50 MILLIGRAM(S): 50 TABLET ORAL at 23:30

## 2023-12-21 RX ADMIN — FAMOTIDINE 20 MILLIGRAM(S): 10 INJECTION INTRAVENOUS at 17:49

## 2023-12-21 RX ADMIN — Medication 5 MILLIGRAM(S): at 05:20

## 2023-12-21 RX ADMIN — TRAMADOL HYDROCHLORIDE 50 MILLIGRAM(S): 50 TABLET ORAL at 02:28

## 2023-12-21 RX ADMIN — Medication 1 DROP(S): at 11:47

## 2023-12-21 RX ADMIN — Medication 5 MILLIGRAM(S): at 11:46

## 2023-12-21 RX ADMIN — BRIVARACETAM 50 MILLIGRAM(S): 25 TABLET, FILM COATED ORAL at 17:48

## 2023-12-21 RX ADMIN — TRAMADOL HYDROCHLORIDE 50 MILLIGRAM(S): 50 TABLET ORAL at 22:45

## 2023-12-21 RX ADMIN — TRAMADOL HYDROCHLORIDE 50 MILLIGRAM(S): 50 TABLET ORAL at 03:28

## 2023-12-21 RX ADMIN — FAMOTIDINE 20 MILLIGRAM(S): 10 INJECTION INTRAVENOUS at 05:20

## 2023-12-21 RX ADMIN — SENNA PLUS 2 TABLET(S): 8.6 TABLET ORAL at 21:10

## 2023-12-21 RX ADMIN — Medication 100 MILLIGRAM(S): at 17:48

## 2023-12-21 RX ADMIN — Medication 650 MILLIGRAM(S): at 17:54

## 2023-12-21 RX ADMIN — TRAMADOL HYDROCHLORIDE 25 MILLIGRAM(S): 50 TABLET ORAL at 14:55

## 2023-12-21 RX ADMIN — POLYETHYLENE GLYCOL 3350 17 GRAM(S): 17 POWDER, FOR SOLUTION ORAL at 17:49

## 2023-12-21 RX ADMIN — BRIVARACETAM 50 MILLIGRAM(S): 25 TABLET, FILM COATED ORAL at 05:20

## 2023-12-21 RX ADMIN — Medication 100 MILLIGRAM(S): at 05:20

## 2023-12-21 RX ADMIN — LOSARTAN POTASSIUM 25 MILLIGRAM(S): 100 TABLET, FILM COATED ORAL at 09:12

## 2023-12-21 RX ADMIN — ENOXAPARIN SODIUM 40 MILLIGRAM(S): 100 INJECTION SUBCUTANEOUS at 17:46

## 2023-12-21 RX ADMIN — Medication 40 MILLIEQUIVALENT(S): at 11:45

## 2023-12-21 RX ADMIN — TRAMADOL HYDROCHLORIDE 25 MILLIGRAM(S): 50 TABLET ORAL at 13:53

## 2023-12-21 RX ADMIN — TRAMADOL HYDROCHLORIDE 50 MILLIGRAM(S): 50 TABLET ORAL at 09:55

## 2023-12-21 RX ADMIN — Medication 1 DROP(S): at 22:46

## 2023-12-21 RX ADMIN — TRAMADOL HYDROCHLORIDE 50 MILLIGRAM(S): 50 TABLET ORAL at 08:58

## 2023-12-21 NOTE — CONSULT NOTE ADULT - SUBJECTIVE AND OBJECTIVE BOX
HPI:  This is a 74 YO RIGHT handed woman with no PMH who presented to [facility] ED on 12/14/2023, as a transfer from [facility], as a CODE STROKE, with c/o R IPH.   CTH and CTA repeated - large R IPH (temporal/parietal). Presented to [place] today with c/o HA and AMS. Friend accompanying patient said that when visiting patient today - patient was not acting like herself and c/o HA. NIHSS 9.    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 Similar midline shift of 5 mm.  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 Briviact 50mg BID for seizure prophylaxis, EEG with increased risk of seizures in the right frontocentral region.TTE- EF 64%. Continue Losartan 25mg daily. 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 UA: positive for UTI, started on IV abx and then transitioned to oral, end date 12/22.      Patient was evaluated by PM&R and therapy for functional deficits, gait/ADL impairments and acute rehabilitation was recommended. Patient was medically optimized for discharge to [facility] IRU on 12/20/23. (20 Dec 2023 17:15)    Pt seen and examined. States she feels ok, no new complaints at this time.       PAST MEDICAL & SURGICAL HISTORY:  No pertinent past medical history        All other review of systems is negative unless indicated above.    MEDICATIONS  (STANDING):  bisacodyl 5 milliGRAM(s) Oral daily  brivaracetam 50 milliGRAM(s) Oral two times a day  cefpodoxime 100 milliGRAM(s) Oral every 12 hours  enoxaparin Injectable 40 milliGRAM(s) SubCutaneous <User Schedule>  famotidine    Tablet 20 milliGRAM(s) Oral two times a day  ketorolac 0.5% Ophthalmic Solution 1 Drop(s) Left EYE daily  losartan 25 milliGRAM(s) Oral daily  metoclopramide 5 milliGRAM(s) Oral every 6 hours  polyethylene glycol 3350 17 Gram(s) Oral two times a day  senna 2 Tablet(s) Oral at bedtime    MEDICATIONS  (PRN):  acetaminophen     Tablet .. 650 milliGRAM(s) Oral every 6 hours PRN Temp greater or equal to 38C (100.4F), Mild Pain (1 - 3)  aluminum hydroxide/magnesium hydroxide/simethicone Suspension 30 milliLiter(s) Oral every 6 hours PRN Dyspepsia  artificial tears (preservative free) Ophthalmic Solution 1 Drop(s) Both EYES every 4 hours PRN Dry Eyes  bisacodyl Suppository 10 milliGRAM(s) Rectal daily PRN Constipation  calcium carbonate    500 mG (Tums) Chewable 1 Tablet(s) Chew three times a day PRN Heartburn  sodium chloride 0.65% Nasal 1 Spray(s) Both Nostrils two times a day PRN Nasal Congestion  traMADol 25 milliGRAM(s) Oral every 4 hours PRN Moderate Pain (4 - 6)  traMADol 50 milliGRAM(s) Oral every 4 hours PRN Severe Pain (7 - 10)      Allergies    No Known Allergies    Intolerances    oxycodone (Nausea)      SOCIAL HISTORY:  Tobacco denies  Alcohol denies  Drugs denies    FAMILY HISTORY:  +CAD in father and mother  +Dementia in father  +ESRD on dialysis mother  ?brother with CAD    Vital Signs Last 24 Hrs  T(C): 36.7 (21 Dec 2023 09:02), Max: 36.9 (20 Dec 2023 21:00)  T(F): 98 (21 Dec 2023 09:02), Max: 98.4 (20 Dec 2023 21:00)  HR: 57 (21 Dec 2023 09:02) (47 - 57)  BP: 126/76 (21 Dec 2023 09:02) (113/71 - 151/67)  BP(mean): 96 (20 Dec 2023 14:00) (96 - 110)  RR: 16 (21 Dec 2023 09:02) (13 - 21)  SpO2: 98% (21 Dec 2023 09:02) (95% - 98%)    Parameters below as of 21 Dec 2023 09:02  Patient On (Oxygen Delivery Method): room air        Physical Exam:    Constitutional - NAD, Comfortable  Chest - good chest expansion, good respiratory effort, CTAB   Cardio - warm and well perfused, RRR, no murmur  Abdomen -  Soft, NTND  Extremities - No peripheral edema, + calf tenderness on left  Neurologic Exam: AAOx3, b/l weakness, some slurred speech, slow to verbal response  psych: flat affect, cooperative  Skin on admission: intact    LABS:                        13.8   8.06  )-----------( 162      ( 21 Dec 2023 05:57 )             38.1     12-20    140  |  104  |  11  ----------------------------<  122<H>  3.1<L>   |  26  |  0.48<L>    Ca    9.1      20 Dec 2023 13:01  Phos  3.3     12-20  Mg     1.8     12-20        Urinalysis Basic - ( 20 Dec 2023 13:01 )    Color: x / Appearance: x / SG: x / pH: x  Gluc: 122 mg/dL / Ketone: x  / Bili: x / Urobili: x   Blood: x / Protein: x / Nitrite: x   Leuk Esterase: x / RBC: x / WBC x   Sq Epi: x / Non Sq Epi: x / Bacteria: x        RADIOLOGY & ADDITIONAL STUDIES: HPI:  This is a 74 YO RIGHT handed woman with no PMH who presented to [facility] ED on 12/14/2023, as a transfer from [facility], as a CODE STROKE, with c/o R IPH.   CTH and CTA repeated - large R IPH (temporal/parietal). Presented to [place] today with c/o HA and AMS. Friend accompanying patient said that when visiting patient today - patient was not acting like herself and c/o HA. NIHSS 9.    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 Similar midline shift of 5 mm.  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 Briviact 50mg BID for seizure prophylaxis, EEG with increased risk of seizures in the right frontocentral region.TTE- EF 64%. Continue Losartan 25mg daily. 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 UA: positive for UTI, started on IV abx and then transitioned to oral, end date 12/22.      Patient was evaluated by PM&R and therapy for functional deficits, gait/ADL impairments and acute rehabilitation was recommended. Patient was medically optimized for discharge to [facility] IRU on 12/20/23. (20 Dec 2023 17:15)    Pt seen and examined. States she feels ok, no new complaints at this time.       PAST MEDICAL & SURGICAL HISTORY:  No pertinent past medical history        All other review of systems is negative unless indicated above.    MEDICATIONS  (STANDING):  bisacodyl 5 milliGRAM(s) Oral daily  brivaracetam 50 milliGRAM(s) Oral two times a day  cefpodoxime 100 milliGRAM(s) Oral every 12 hours  enoxaparin Injectable 40 milliGRAM(s) SubCutaneous <User Schedule>  famotidine    Tablet 20 milliGRAM(s) Oral two times a day  ketorolac 0.5% Ophthalmic Solution 1 Drop(s) Left EYE daily  losartan 25 milliGRAM(s) Oral daily  metoclopramide 5 milliGRAM(s) Oral every 6 hours  polyethylene glycol 3350 17 Gram(s) Oral two times a day  senna 2 Tablet(s) Oral at bedtime    MEDICATIONS  (PRN):  acetaminophen     Tablet .. 650 milliGRAM(s) Oral every 6 hours PRN Temp greater or equal to 38C (100.4F), Mild Pain (1 - 3)  aluminum hydroxide/magnesium hydroxide/simethicone Suspension 30 milliLiter(s) Oral every 6 hours PRN Dyspepsia  artificial tears (preservative free) Ophthalmic Solution 1 Drop(s) Both EYES every 4 hours PRN Dry Eyes  bisacodyl Suppository 10 milliGRAM(s) Rectal daily PRN Constipation  calcium carbonate    500 mG (Tums) Chewable 1 Tablet(s) Chew three times a day PRN Heartburn  sodium chloride 0.65% Nasal 1 Spray(s) Both Nostrils two times a day PRN Nasal Congestion  traMADol 25 milliGRAM(s) Oral every 4 hours PRN Moderate Pain (4 - 6)  traMADol 50 milliGRAM(s) Oral every 4 hours PRN Severe Pain (7 - 10)      Allergies    No Known Allergies    Intolerances    oxycodone (Nausea)      SOCIAL HISTORY:  Tobacco denies  Alcohol denies  Drugs denies    FAMILY HISTORY:  +CAD in father and mother  +Dementia in father  +ESRD on dialysis mother  ?brother with CAD    Vital Signs Last 24 Hrs  T(C): 36.7 (21 Dec 2023 09:02), Max: 36.9 (20 Dec 2023 21:00)  T(F): 98 (21 Dec 2023 09:02), Max: 98.4 (20 Dec 2023 21:00)  HR: 57 (21 Dec 2023 09:02) (47 - 57)  BP: 126/76 (21 Dec 2023 09:02) (113/71 - 151/67)  BP(mean): 96 (20 Dec 2023 14:00) (96 - 110)  RR: 16 (21 Dec 2023 09:02) (13 - 21)  SpO2: 98% (21 Dec 2023 09:02) (95% - 98%)    Parameters below as of 21 Dec 2023 09:02  Patient On (Oxygen Delivery Method): room air        Physical Exam:    Constitutional - NAD, Comfortable  Chest - good chest expansion, good respiratory effort, CTAB   Cardio - warm and well perfused, RRR, no murmur  Abdomen -  Soft, NTND  Extremities - No peripheral edema, + calf tenderness on left  Neurologic Exam: AAOx3, b/l weakness, some slurred speech, slow to verbal response  psych: flat affect, cooperative  Skin on admission: intact    LABS:                        13.8   8.06  )-----------( 162      ( 21 Dec 2023 05:57 )             38.1     12-20    140  |  104  |  11  ----------------------------<  122<H>  3.1<L>   |  26  |  0.48<L>    Ca    9.1      20 Dec 2023 13:01  Phos  3.3     12-20  Mg     1.8     12-20        Urinalysis Basic - ( 20 Dec 2023 13:01 )    Color: x / Appearance: x / SG: x / pH: x  Gluc: 122 mg/dL / Ketone: x  / Bili: x / Urobili: x   Blood: x / Protein: x / Nitrite: x   Leuk Esterase: x / RBC: x / WBC x   Sq Epi: x / Non Sq Epi: x / Bacteria: x        RADIOLOGY & ADDITIONAL STUDIES:

## 2023-12-21 NOTE — DIETITIAN INITIAL EVALUATION ADULT - OTHER INFO
This is a 74 YO RIGHT handed woman with no PMH who presented to [facility] ED on 12/14/2023, as a transfer from [facility], as a CODE STROKE where she was found to have R IPH. Hospital with repeat imaging showing stability, UTI s/p IV ABX,  new b/l DVTs, EEG with increased seizure risks. Patient now with Left sided weakness, Fazal-neglect, cognitive deficits, left Homonymous hemianopsia,  gait Instability, ADL impairments and Functional impairments.  Pt continues on soft & bite sized diet. Tolerating well with report of good appetite. UBW 144lbs, no current weight on file. Weight @ [facility] 149.7lbs. Height 5'1". Noted with hx constipation, now resolved. +BM today per pt, + miralax, dulcolax. Receptive to continue prunes qd. Pt dislikes sweets- receptive to plain Greek yogurt with meals in lieu of dessert to be used for medication administration +added protein source. Encouraged intake of high K+ foods (K+ 3.2).  This is a 72 YO RIGHT handed woman with no PMH who presented to [facility] ED on 12/14/2023, as a transfer from [facility], as a CODE STROKE where she was found to have R IPH. Hospital with repeat imaging showing stability, UTI s/p IV ABX,  new b/l DVTs, EEG with increased seizure risks. Patient now with Left sided weakness, Fazal-neglect, cognitive deficits, left Homonymous hemianopsia,  gait Instability, ADL impairments and Functional impairments.  Pt continues on soft & bite sized diet. Tolerating well with report of good appetite. UBW 144lbs, no current weight on file. Weight @ [facility] 149.7lbs. Height 5'1". Noted with hx constipation, now resolved. +BM today per pt, + miralax, dulcolax. Receptive to continue prunes qd. Pt dislikes sweets- receptive to plain Greek yogurt with meals in lieu of dessert to be used for medication administration +added protein source. Encouraged intake of high K+ foods (K+ 3.2).

## 2023-12-21 NOTE — DIETITIAN INITIAL EVALUATION ADULT - EDUCATION DIETARY MODIFICATIONS
reviewed current diet texture, optimal nutrition, fiber/fluids/(1) partially meets; needs review/practice/verbalization

## 2023-12-21 NOTE — CONSULT NOTE ADULT - ASSESSMENT
72 YO RIGHT handed woman with no PMH who presented to [facility] ED on 12/14/2023, as a transfer from [facility], as a CODE STROKE where she was found to have R IPH. Hospital with repeat imaging showing stability, UTI s/p IV ABX,  new b/l DVTs, increased seizure risks.Patient now with gait Instability, ADL impairments and Functional impairments.    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  - Start Comprehensive Rehab Program: PT/OT/ST, 3hours daily and 5 days weekly  - PT: Focused on improving strength, endurance, coordination, balance, functional mobility, and transfers  - OT: Focused on improving strength, fine motor skills, coordination, posture and ADLs.    - ST: to diagnose and treat deficits in swallowing, cognition and communication.   -  Briviact 50mg BID for seizure prophylaxis.  - EEG with increased risk of seizures in the right frontocentral region  - TTE- EF 64%      #HTN  - Losartan 25mg daily  - monitor    #bradycardia  -likely neurogenic as per cardio eval prev  -monitor hr, avoid rate controllers    #UTI  - Vantin 100mg BID   -  end date 12/22.      #Pain management  - Tylenol PRN  -tramadol PRN    #DVT   - b/l below the knee DVTs: right  soleal vein, left soleal, peroneal and gastrocnemius veins.  - Lovenox ppx dosing as iph, will benefit from hemat eval as outpt for workup  -  monitor with weekly US for propagation    #GI ppx  - Pepcid 20mg   - TUMS   - Maalox    #Bowel Regimen  - Senna, miralax PRN    #Bladder management  - BS on admission, and q 8 hours (SC if > 400)  - Monitor UO    #FEN   - Diet: Soft and Bite sized    #Skin:  - Skin on admission: intact  - Pressure injury/Skin: Turn Q2hrs while in bed, OOB to Chair, PT/OT     #Dysphagia    - SLP: evaluation and treatment    #Sleep:   - Maintain quiet hours and low stim environment.  - Melatonin PRN to maximize participation in therapy during the day.   - Monitor sleep logs    #Precaution  - Fall, Aspiration, cardiac, seizure    #GOC  CODE STATUS: FULL CODE   72 YO RIGHT handed woman with no PMH who presented to [facility] ED on 12/14/2023, as a transfer from [facility], as a CODE STROKE where she was found to have R IPH. Hospital with repeat imaging showing stability, UTI s/p IV ABX,  new b/l DVTs, increased seizure risks.Patient now with gait Instability, ADL impairments and Functional impairments.    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  - Start Comprehensive Rehab Program: PT/OT/ST, 3hours daily and 5 days weekly  - PT: Focused on improving strength, endurance, coordination, balance, functional mobility, and transfers  - OT: Focused on improving strength, fine motor skills, coordination, posture and ADLs.    - ST: to diagnose and treat deficits in swallowing, cognition and communication.   -  Briviact 50mg BID for seizure prophylaxis.  - EEG with increased risk of seizures in the right frontocentral region  - TTE- EF 64%      #HTN  - Losartan 25mg daily  - monitor    #bradycardia  -likely neurogenic as per cardio eval prev  -monitor hr, avoid rate controllers    #UTI  - Vantin 100mg BID   -  end date 12/22.      #Pain management  - Tylenol PRN  -tramadol PRN    #DVT   - b/l below the knee DVTs: right  soleal vein, left soleal, peroneal and gastrocnemius veins.  - Lovenox ppx dosing as iph, will benefit from hemat eval as outpt for workup  -  monitor with weekly US for propagation    #GI ppx  - Pepcid 20mg   - TUMS   - Maalox    #Bowel Regimen  - Senna, miralax PRN    #Bladder management  - BS on admission, and q 8 hours (SC if > 400)  - Monitor UO    #FEN   - Diet: Soft and Bite sized    #Skin:  - Skin on admission: intact  - Pressure injury/Skin: Turn Q2hrs while in bed, OOB to Chair, PT/OT     #Dysphagia    - SLP: evaluation and treatment    #Sleep:   - Maintain quiet hours and low stim environment.  - Melatonin PRN to maximize participation in therapy during the day.   - Monitor sleep logs    #Precaution  - Fall, Aspiration, cardiac, seizure    #GOC  CODE STATUS: FULL CODE   74 YO RIGHT handed woman with no PMH who presented to [facility] ED on 12/14/2023, as a transfer from [facility], as a CODE STROKE where she was found to have R IPH. Hospital with repeat imaging showing stability, UTI s/p IV ABX,  new b/l DVTs, increased seizure risks.Patient now with gait Instability, ADL impairments and Functional impairments.    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  - Start Comprehensive Rehab Program: PT/OT/ST, 3hours daily and 5 days weekly  - PT: Focused on improving strength, endurance, coordination, balance, functional mobility, and transfers  - OT: Focused on improving strength, fine motor skills, coordination, posture and ADLs.    - ST: to diagnose and treat deficits in swallowing, cognition and communication.   -  Briviact 50mg BID for seizure prophylaxis.  - EEG with increased risk of seizures in the right frontocentral region  - TTE- EF 64%      #HTN  - Losartan 25mg daily  - monitor    #hypokalemia-acute  -give 40meq once today  -check bmp tomorrow    #bradycardia  -likely neurogenic as per cardio eval prev  -monitor hr, avoid rate controllers    #UTI  - Vantin 100mg BID   -  end date 12/22.      #Pain management  - Tylenol PRN  -tramadol PRN    #DVT   - b/l below the knee DVTs: right  soleal vein, left soleal, peroneal and gastrocnemius veins.  - Lovenox ppx dosing as iph, will benefit from hemat eval as outpt for workup  -  monitor with weekly US for propagation    #GI ppx  - Pepcid 20mg   - TUMS   - Maalox    #Bowel Regimen  - Senna, miralax PRN    #Bladder management  - BS on admission, and q 8 hours (SC if > 400)  - Monitor UO    #FEN   - Diet: Soft and Bite sized    #Skin:  - Skin on admission: intact  - Pressure injury/Skin: Turn Q2hrs while in bed, OOB to Chair, PT/OT     #Dysphagia    - SLP: evaluation and treatment    #Sleep:   - Maintain quiet hours and low stim environment.  - Melatonin PRN to maximize participation in therapy during the day.   - Monitor sleep logs    #Precaution  - Fall, Aspiration, cardiac, seizure    #GOC  CODE STATUS: FULL CODE   74 YO RIGHT handed woman with no PMH who presented to [facility] ED on 12/14/2023, as a transfer from [facility], as a CODE STROKE where she was found to have R IPH. Hospital with repeat imaging showing stability, UTI s/p IV ABX,  new b/l DVTs, increased seizure risks.Patient now with gait Instability, ADL impairments and Functional impairments.    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  - Start Comprehensive Rehab Program: PT/OT/ST, 3hours daily and 5 days weekly  - PT: Focused on improving strength, endurance, coordination, balance, functional mobility, and transfers  - OT: Focused on improving strength, fine motor skills, coordination, posture and ADLs.    - ST: to diagnose and treat deficits in swallowing, cognition and communication.   -  Briviact 50mg BID for seizure prophylaxis.  - EEG with increased risk of seizures in the right frontocentral region  - TTE- EF 64%      #HTN  - Losartan 25mg daily  - monitor    #hypokalemia-acute  -give 40meq once today  -check bmp tomorrow    #bradycardia  -likely neurogenic as per cardio eval prev  -monitor hr, avoid rate controllers    #UTI  - Vantin 100mg BID   -  end date 12/22.      #Pain management  - Tylenol PRN  -tramadol PRN    #DVT   - b/l below the knee DVTs: right  soleal vein, left soleal, peroneal and gastrocnemius veins.  - Lovenox ppx dosing as iph, will benefit from hemat eval as outpt for workup  -  monitor with weekly US for propagation    #GI ppx  - Pepcid 20mg   - TUMS   - Maalox    #Bowel Regimen  - Senna, miralax PRN    #Bladder management  - BS on admission, and q 8 hours (SC if > 400)  - Monitor UO    #FEN   - Diet: Soft and Bite sized    #Skin:  - Skin on admission: intact  - Pressure injury/Skin: Turn Q2hrs while in bed, OOB to Chair, PT/OT     #Dysphagia    - SLP: evaluation and treatment    #Sleep:   - Maintain quiet hours and low stim environment.  - Melatonin PRN to maximize participation in therapy during the day.   - Monitor sleep logs    #Precaution  - Fall, Aspiration, cardiac, seizure    #GOC  CODE STATUS: FULL CODE

## 2023-12-21 NOTE — DIETITIAN INITIAL EVALUATION ADULT - ORAL INTAKE PTA/DIET HISTORY
Pt endorses good appetite PTA. Generally eats 3 meals/day, balanced +high fiber (whole grains, fruits, vegetables). Enjoys seafood. Dislike sweets. Watches her portion sizes. Endorses a very active lifestyle: daily walks, yoga...etc. Eats prunes to maintain regular BM's. NKFA.

## 2023-12-21 NOTE — DIETITIAN INITIAL EVALUATION ADULT - PERTINENT MEDS FT
MEDICATIONS  (STANDING):  bisacodyl 5 milliGRAM(s) Oral daily  brivaracetam 50 milliGRAM(s) Oral two times a day  cefpodoxime 100 milliGRAM(s) Oral every 12 hours  enoxaparin Injectable 40 milliGRAM(s) SubCutaneous <User Schedule>  famotidine    Tablet 20 milliGRAM(s) Oral two times a day  ketorolac 0.5% Ophthalmic Solution 1 Drop(s) Left EYE daily  losartan 25 milliGRAM(s) Oral daily  polyethylene glycol 3350 17 Gram(s) Oral two times a day  senna 2 Tablet(s) Oral at bedtime    MEDICATIONS  (PRN):  acetaminophen     Tablet .. 650 milliGRAM(s) Oral every 6 hours PRN Temp greater or equal to 38C (100.4F), Mild Pain (1 - 3)  aluminum hydroxide/magnesium hydroxide/simethicone Suspension 30 milliLiter(s) Oral every 6 hours PRN Dyspepsia  artificial tears (preservative free) Ophthalmic Solution 1 Drop(s) Both EYES every 4 hours PRN Dry Eyes  bisacodyl Suppository 10 milliGRAM(s) Rectal daily PRN Constipation  calcium carbonate    500 mG (Tums) Chewable 1 Tablet(s) Chew three times a day PRN Heartburn  ondansetron   Disintegrating Tablet 4 milliGRAM(s) Oral every 6 hours PRN Nausea and/or Vomiting  sodium chloride 0.65% Nasal 1 Spray(s) Both Nostrils two times a day PRN Nasal Congestion  traMADol 25 milliGRAM(s) Oral every 4 hours PRN Moderate Pain (4 - 6)  traMADol 50 milliGRAM(s) Oral every 4 hours PRN Severe Pain (7 - 10)

## 2023-12-21 NOTE — DIETITIAN INITIAL EVALUATION ADULT - PERTINENT LABORATORY DATA
12-21    143  |  106  |  15  ----------------------------<  100<H>  3.2<L>   |  25  |  0.52    Ca    9.1      21 Dec 2023 05:57  Phos  3.3     12-20  Mg     1.8     12-20    TPro  5.9<L>  /  Alb  2.9<L>  /  TBili  0.8  /  DBili  x   /  AST  52<H>  /  ALT  55<H>  /  AlkPhos  62  12-21  A1C with Estimated Average Glucose Result: 5.0 % (12-15-23 @ 04:03)

## 2023-12-22 LAB
-  AMOXICILLIN/CLAVULANIC ACID: SIGNIFICANT CHANGE UP
-  AMOXICILLIN/CLAVULANIC ACID: SIGNIFICANT CHANGE UP
-  AMPICILLIN/SULBACTAM: SIGNIFICANT CHANGE UP
-  AMPICILLIN/SULBACTAM: SIGNIFICANT CHANGE UP
-  AMPICILLIN: SIGNIFICANT CHANGE UP
-  AMPICILLIN: SIGNIFICANT CHANGE UP
-  AZTREONAM: SIGNIFICANT CHANGE UP
-  AZTREONAM: SIGNIFICANT CHANGE UP
-  CEFAZOLIN: SIGNIFICANT CHANGE UP
-  CEFAZOLIN: SIGNIFICANT CHANGE UP
-  CEFEPIME: SIGNIFICANT CHANGE UP
-  CEFEPIME: SIGNIFICANT CHANGE UP
-  CEFTRIAXONE: SIGNIFICANT CHANGE UP
-  CEFTRIAXONE: SIGNIFICANT CHANGE UP
-  CEFUROXIME: SIGNIFICANT CHANGE UP
-  CEFUROXIME: SIGNIFICANT CHANGE UP
-  CIPROFLOXACIN: SIGNIFICANT CHANGE UP
-  CIPROFLOXACIN: SIGNIFICANT CHANGE UP
-  ERTAPENEM: SIGNIFICANT CHANGE UP
-  ERTAPENEM: SIGNIFICANT CHANGE UP
-  GENTAMICIN: SIGNIFICANT CHANGE UP
-  GENTAMICIN: SIGNIFICANT CHANGE UP
-  IMIPENEM: SIGNIFICANT CHANGE UP
-  IMIPENEM: SIGNIFICANT CHANGE UP
-  LEVOFLOXACIN: SIGNIFICANT CHANGE UP
-  LEVOFLOXACIN: SIGNIFICANT CHANGE UP
-  MEROPENEM: SIGNIFICANT CHANGE UP
-  MEROPENEM: SIGNIFICANT CHANGE UP
-  NITROFURANTOIN: SIGNIFICANT CHANGE UP
-  NITROFURANTOIN: SIGNIFICANT CHANGE UP
-  PIPERACILLIN/TAZOBACTAM: SIGNIFICANT CHANGE UP
-  PIPERACILLIN/TAZOBACTAM: SIGNIFICANT CHANGE UP
-  TOBRAMYCIN: SIGNIFICANT CHANGE UP
-  TOBRAMYCIN: SIGNIFICANT CHANGE UP
-  TRIMETHOPRIM/SULFAMETHOXAZOLE: SIGNIFICANT CHANGE UP
-  TRIMETHOPRIM/SULFAMETHOXAZOLE: SIGNIFICANT CHANGE UP
ANION GAP SERPL CALC-SCNC: 9 MMOL/L — SIGNIFICANT CHANGE UP (ref 5–17)
ANION GAP SERPL CALC-SCNC: 9 MMOL/L — SIGNIFICANT CHANGE UP (ref 5–17)
BUN SERPL-MCNC: 16 MG/DL — SIGNIFICANT CHANGE UP (ref 7–23)
BUN SERPL-MCNC: 16 MG/DL — SIGNIFICANT CHANGE UP (ref 7–23)
CALCIUM SERPL-MCNC: 8.8 MG/DL — SIGNIFICANT CHANGE UP (ref 8.4–10.5)
CALCIUM SERPL-MCNC: 8.8 MG/DL — SIGNIFICANT CHANGE UP (ref 8.4–10.5)
CHLORIDE SERPL-SCNC: 106 MMOL/L — SIGNIFICANT CHANGE UP (ref 96–108)
CHLORIDE SERPL-SCNC: 106 MMOL/L — SIGNIFICANT CHANGE UP (ref 96–108)
CO2 SERPL-SCNC: 28 MMOL/L — SIGNIFICANT CHANGE UP (ref 22–31)
CO2 SERPL-SCNC: 28 MMOL/L — SIGNIFICANT CHANGE UP (ref 22–31)
CREAT SERPL-MCNC: 0.58 MG/DL — SIGNIFICANT CHANGE UP (ref 0.5–1.3)
CREAT SERPL-MCNC: 0.58 MG/DL — SIGNIFICANT CHANGE UP (ref 0.5–1.3)
EGFR: 96 ML/MIN/1.73M2 — SIGNIFICANT CHANGE UP
EGFR: 96 ML/MIN/1.73M2 — SIGNIFICANT CHANGE UP
GLUCOSE SERPL-MCNC: 99 MG/DL — SIGNIFICANT CHANGE UP (ref 70–99)
GLUCOSE SERPL-MCNC: 99 MG/DL — SIGNIFICANT CHANGE UP (ref 70–99)
METHOD TYPE: SIGNIFICANT CHANGE UP
METHOD TYPE: SIGNIFICANT CHANGE UP
POTASSIUM SERPL-MCNC: 3.1 MMOL/L — LOW (ref 3.5–5.3)
POTASSIUM SERPL-MCNC: 3.1 MMOL/L — LOW (ref 3.5–5.3)
POTASSIUM SERPL-SCNC: 3.1 MMOL/L — LOW (ref 3.5–5.3)
POTASSIUM SERPL-SCNC: 3.1 MMOL/L — LOW (ref 3.5–5.3)
SODIUM SERPL-SCNC: 143 MMOL/L — SIGNIFICANT CHANGE UP (ref 135–145)
SODIUM SERPL-SCNC: 143 MMOL/L — SIGNIFICANT CHANGE UP (ref 135–145)

## 2023-12-22 PROCEDURE — 99233 SBSQ HOSP IP/OBS HIGH 50: CPT

## 2023-12-22 RX ADMIN — Medication 1 DROP(S): at 11:05

## 2023-12-22 RX ADMIN — BRIVARACETAM 50 MILLIGRAM(S): 25 TABLET, FILM COATED ORAL at 05:10

## 2023-12-22 RX ADMIN — LOSARTAN POTASSIUM 25 MILLIGRAM(S): 100 TABLET, FILM COATED ORAL at 05:10

## 2023-12-22 RX ADMIN — BRIVARACETAM 50 MILLIGRAM(S): 25 TABLET, FILM COATED ORAL at 17:13

## 2023-12-22 RX ADMIN — Medication 5 MILLIGRAM(S): at 11:06

## 2023-12-22 RX ADMIN — ENOXAPARIN SODIUM 40 MILLIGRAM(S): 100 INJECTION SUBCUTANEOUS at 17:14

## 2023-12-22 RX ADMIN — Medication 100 MILLIGRAM(S): at 17:14

## 2023-12-22 RX ADMIN — LIDOCAINE 1 PATCH: 4 CREAM TOPICAL at 19:14

## 2023-12-22 RX ADMIN — LIDOCAINE 1 PATCH: 4 CREAM TOPICAL at 18:43

## 2023-12-22 RX ADMIN — POLYETHYLENE GLYCOL 3350 17 GRAM(S): 17 POWDER, FOR SOLUTION ORAL at 17:14

## 2023-12-22 RX ADMIN — SENNA PLUS 2 TABLET(S): 8.6 TABLET ORAL at 20:38

## 2023-12-22 RX ADMIN — POLYETHYLENE GLYCOL 3350 17 GRAM(S): 17 POWDER, FOR SOLUTION ORAL at 05:10

## 2023-12-22 RX ADMIN — Medication 100 MILLIGRAM(S): at 05:10

## 2023-12-22 RX ADMIN — TRAMADOL HYDROCHLORIDE 25 MILLIGRAM(S): 50 TABLET ORAL at 07:41

## 2023-12-22 RX ADMIN — LIDOCAINE 1 PATCH: 4 CREAM TOPICAL at 08:43

## 2023-12-22 RX ADMIN — FAMOTIDINE 20 MILLIGRAM(S): 10 INJECTION INTRAVENOUS at 17:14

## 2023-12-22 RX ADMIN — TRAMADOL HYDROCHLORIDE 25 MILLIGRAM(S): 50 TABLET ORAL at 08:42

## 2023-12-22 RX ADMIN — FAMOTIDINE 20 MILLIGRAM(S): 10 INJECTION INTRAVENOUS at 05:10

## 2023-12-22 NOTE — PROGRESS NOTE ADULT - SUBJECTIVE AND OBJECTIVE BOX
HPI:  This is a 72 YO RIGHT handed woman with no PMH who presented to [facility] ED on 12/14/2023, as a transfer from [facility], as a CODE STROKE, with c/o R IPH.   CTH and CTA repeated - large R IPH (temporal/parietal). Presented to [place] today with c/o HA and AMS. Friend accompanying patient said that when visiting patient today - patient was not acting like herself and c/o HA. NIHSS 9.    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 Similar midline shift of 5 mm.  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 Briviact 50mg BID for seizure prophylaxis, EEG with increased risk of seizures in the right frontocentral region.TTE- EF 64%. Continue Losartan 25mg daily. 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 UA: positive for UTI, started on IV abx and then transitioned to oral, end date 12/22.      Patient was evaluated by PM&R and therapy for functional deficits, gait/ADL impairments and acute rehabilitation was recommended. Patient was medically optimized for discharge to [facility] IRU on 12/20/23. (20 Dec 2023 17:15)        Subjective:        VITALS  Vital Signs Last 24 Hrs  T(C): 37.1 (22 Dec 2023 07:48), Max: 37.1 (21 Dec 2023 20:30)  T(F): 98.7 (22 Dec 2023 07:48), Max: 98.8 (21 Dec 2023 20:30)  HR: 56 (22 Dec 2023 07:48) (56 - 57)  BP: 114/68 (22 Dec 2023 07:48) (114/68 - 126/76)  BP(mean): --  RR: 16 (22 Dec 2023 07:48) (16 - 16)  SpO2: 98% (22 Dec 2023 07:48) (94% - 98%)    Parameters below as of 22 Dec 2023 07:48  Patient On (Oxygen Delivery Method): room air        REVIEW OF SYMPTOMS  Neurological deficits      PHYSICAL EXAM  Constitutional - NAD, Comfortable  Chest - good chest expansion, good respiratory effort, CTAB   Cardio - warm and well perfused, RRR, no murmur  Abdomen -  Soft, NTND  Extremities - No peripheral edema, + calf tenderness on left  Neurologic Exam:                    Cognitive -             Orientation: Awake, Alert, AAO to self, Hospital and month and year and diagnosis-- not "jennifer [place]" or date       Attention-- Impaired-- delayed processing, able to state DOWB with delay,  difficulty with serial 7's.  Left inattention      Memory-- impaired--     Speech - Fluent, Comprehensible, No dysarthria, No aphasia      Cranial Nerves - left VF impairment, No facial asymmetry, Tongue midline, EOMI, Shoulder shrug intact     Motor -                      LEFT    UE - noted Shab weakness <3/5                    RIGHT UE -5/5                    LEFT    LE - noted foot drop                    RIGHT LE - at least 3/5  Sensation-- Intact    RECENT LABS                        13.8   8.06  )-----------( 162      ( 21 Dec 2023 05:57 )             38.1     12-22    143  |  106  |  16  ----------------------------<  99  3.1<L>   |  28  |  0.58    Ca    8.8      22 Dec 2023 05:45    TPro  5.9<L>  /  Alb  2.9<L>  /  TBili  0.8  /  DBili  x   /  AST  52<H>  /  ALT  55<H>  /  AlkPhos  62  12-21      Urinalysis Basic - ( 22 Dec 2023 05:45 )    Color: x / Appearance: x / SG: x / pH: x  Gluc: 99 mg/dL / Ketone: x  / Bili: x / Urobili: x   Blood: x / Protein: x / Nitrite: x   Leuk Esterase: x / RBC: x / WBC x   Sq Epi: x / Non Sq Epi: x / Bacteria: x          RADIOLOGY/OTHER RESULTS      MEDICATIONS  (STANDING):  bisacodyl 5 milliGRAM(s) Oral daily  brivaracetam 50 milliGRAM(s) Oral two times a day  cefpodoxime 100 milliGRAM(s) Oral every 12 hours  enoxaparin Injectable 40 milliGRAM(s) SubCutaneous <User Schedule>  famotidine    Tablet 20 milliGRAM(s) Oral two times a day  ketorolac 0.5% Ophthalmic Solution 1 Drop(s) Left EYE daily  lidocaine   4% Patch 1 Patch Transdermal <User Schedule>  losartan 25 milliGRAM(s) Oral daily  polyethylene glycol 3350 17 Gram(s) Oral two times a day  senna 2 Tablet(s) Oral at bedtime    MEDICATIONS  (PRN):  acetaminophen     Tablet .. 650 milliGRAM(s) Oral every 6 hours PRN Temp greater or equal to 38C (100.4F), Mild Pain (1 - 3)  aluminum hydroxide/magnesium hydroxide/simethicone Suspension 30 milliLiter(s) Oral every 6 hours PRN Dyspepsia  artificial tears (preservative free) Ophthalmic Solution 1 Drop(s) Both EYES every 4 hours PRN Dry Eyes  bisacodyl Suppository 10 milliGRAM(s) Rectal daily PRN Constipation  calcium carbonate    500 mG (Tums) Chewable 1 Tablet(s) Chew three times a day PRN Heartburn  ondansetron   Disintegrating Tablet 4 milliGRAM(s) Oral every 6 hours PRN Nausea and/or Vomiting  sodium chloride 0.65% Nasal 1 Spray(s) Both Nostrils two times a day PRN Nasal Congestion  traMADol 25 milliGRAM(s) Oral every 4 hours PRN Moderate Pain (4 - 6)  traMADol 50 milliGRAM(s) Oral every 4 hours PRN Severe Pain (7 - 10)         HPI:  This is a 72 YO RIGHT handed woman with no PMH who presented to [facility] ED on 12/14/2023, as a transfer from [facility], as a CODE STROKE, with c/o R IPH.   CTH and CTA repeated - large R IPH (temporal/parietal). Presented to [place] today with c/o HA and AMS. Friend accompanying patient said that when visiting patient today - patient was not acting like herself and c/o HA. NIHSS 9.    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 Similar midline shift of 5 mm.  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 Briviact 50mg BID for seizure prophylaxis, EEG with increased risk of seizures in the right frontocentral region.TTE- EF 64%. Continue Losartan 25mg daily. 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 UA: positive for UTI, started on IV abx and then transitioned to oral, end date 12/22.      Patient was evaluated by PM&R and therapy for functional deficits, gait/ADL impairments and acute rehabilitation was recommended. Patient was medically optimized for discharge to [facility] IRU on 12/20/23. (20 Dec 2023 17:15)        Subjective:        VITALS  Vital Signs Last 24 Hrs  T(C): 37.1 (22 Dec 2023 07:48), Max: 37.1 (21 Dec 2023 20:30)  T(F): 98.7 (22 Dec 2023 07:48), Max: 98.8 (21 Dec 2023 20:30)  HR: 56 (22 Dec 2023 07:48) (56 - 57)  BP: 114/68 (22 Dec 2023 07:48) (114/68 - 126/76)  BP(mean): --  RR: 16 (22 Dec 2023 07:48) (16 - 16)  SpO2: 98% (22 Dec 2023 07:48) (94% - 98%)    Parameters below as of 22 Dec 2023 07:48  Patient On (Oxygen Delivery Method): room air        REVIEW OF SYMPTOMS  Neurological deficits      PHYSICAL EXAM  Constitutional - NAD, Comfortable  Chest - good chest expansion, good respiratory effort, CTAB   Cardio - warm and well perfused, RRR, no murmur  Abdomen -  Soft, NTND  Extremities - No peripheral edema, + calf tenderness on left  Neurologic Exam:                    Cognitive -             Orientation: Awake, Alert, AAO to self, Hospital and month and year and diagnosis-- not "jennifer [place]" or date       Attention-- Impaired-- delayed processing, able to state DOWB with delay,  difficulty with serial 7's.  Left inattention      Memory-- impaired--     Speech - Fluent, Comprehensible, No dysarthria, No aphasia      Cranial Nerves - left VF impairment, No facial asymmetry, Tongue midline, EOMI, Shoulder shrug intact     Motor -                      LEFT    UE - noted Shab weakness <3/5                    RIGHT UE -5/5                    LEFT    LE - noted foot drop                    RIGHT LE - at least 3/5  Sensation-- Intact    RECENT LABS                        13.8   8.06  )-----------( 162      ( 21 Dec 2023 05:57 )             38.1     12-22    143  |  106  |  16  ----------------------------<  99  3.1<L>   |  28  |  0.58    Ca    8.8      22 Dec 2023 05:45    TPro  5.9<L>  /  Alb  2.9<L>  /  TBili  0.8  /  DBili  x   /  AST  52<H>  /  ALT  55<H>  /  AlkPhos  62  12-21      Urinalysis Basic - ( 22 Dec 2023 05:45 )    Color: x / Appearance: x / SG: x / pH: x  Gluc: 99 mg/dL / Ketone: x  / Bili: x / Urobili: x   Blood: x / Protein: x / Nitrite: x   Leuk Esterase: x / RBC: x / WBC x   Sq Epi: x / Non Sq Epi: x / Bacteria: x          RADIOLOGY/OTHER RESULTS      MEDICATIONS  (STANDING):  bisacodyl 5 milliGRAM(s) Oral daily  brivaracetam 50 milliGRAM(s) Oral two times a day  cefpodoxime 100 milliGRAM(s) Oral every 12 hours  enoxaparin Injectable 40 milliGRAM(s) SubCutaneous <User Schedule>  famotidine    Tablet 20 milliGRAM(s) Oral two times a day  ketorolac 0.5% Ophthalmic Solution 1 Drop(s) Left EYE daily  lidocaine   4% Patch 1 Patch Transdermal <User Schedule>  losartan 25 milliGRAM(s) Oral daily  polyethylene glycol 3350 17 Gram(s) Oral two times a day  senna 2 Tablet(s) Oral at bedtime    MEDICATIONS  (PRN):  acetaminophen     Tablet .. 650 milliGRAM(s) Oral every 6 hours PRN Temp greater or equal to 38C (100.4F), Mild Pain (1 - 3)  aluminum hydroxide/magnesium hydroxide/simethicone Suspension 30 milliLiter(s) Oral every 6 hours PRN Dyspepsia  artificial tears (preservative free) Ophthalmic Solution 1 Drop(s) Both EYES every 4 hours PRN Dry Eyes  bisacodyl Suppository 10 milliGRAM(s) Rectal daily PRN Constipation  calcium carbonate    500 mG (Tums) Chewable 1 Tablet(s) Chew three times a day PRN Heartburn  ondansetron   Disintegrating Tablet 4 milliGRAM(s) Oral every 6 hours PRN Nausea and/or Vomiting  sodium chloride 0.65% Nasal 1 Spray(s) Both Nostrils two times a day PRN Nasal Congestion  traMADol 25 milliGRAM(s) Oral every 4 hours PRN Moderate Pain (4 - 6)  traMADol 50 milliGRAM(s) Oral every 4 hours PRN Severe Pain (7 - 10)         HPI:  This is a 74 YO RIGHT handed woman with no PMH who presented to [facility] ED on 12/14/2023, as a transfer from [facility], as a CODE STROKE, with c/o R IPH.   CTH and CTA repeated - large R IPH (temporal/parietal). Presented to [place] today with c/o HA and AMS. Friend accompanying patient said that when visiting patient today - patient was not acting like herself and c/o HA. NIHSS 9.    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 Similar midline shift of 5 mm.  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 Briviact 50mg BID for seizure prophylaxis, EEG with increased risk of seizures in the right frontocentral region.TTE- EF 64%. Continue Losartan 25mg daily. 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 UA: positive for UTI, started on IV abx and then transitioned to oral, end date 12/22.      Patient was evaluated by PM&R and therapy for functional deficits, gait/ADL impairments and acute rehabilitation was recommended. Patient was medically optimized for discharge to [facility] IRU on 12/20/23. (20 Dec 2023 17:15)        Subjective:  Seen this AM while eating breakfast.  Reports pain in neck, upper back but states lidocaine patch helps.   Reports poor sleep at night.  More awake today.       VITALS  Vital Signs Last 24 Hrs  T(C): 37.1 (22 Dec 2023 07:48), Max: 37.1 (21 Dec 2023 20:30)  T(F): 98.7 (22 Dec 2023 07:48), Max: 98.8 (21 Dec 2023 20:30)  HR: 56 (22 Dec 2023 07:48) (56 - 57)  BP: 114/68 (22 Dec 2023 07:48) (114/68 - 126/76)  BP(mean): --  RR: 16 (22 Dec 2023 07:48) (16 - 16)  SpO2: 98% (22 Dec 2023 07:48) (94% - 98%)    Parameters below as of 22 Dec 2023 07:48  Patient On (Oxygen Delivery Method): room air        REVIEW OF SYMPTOMS  Neurological deficits      PHYSICAL EXAM  Constitutional - NAD, Comfortable  Chest - good chest expansion, good respiratory effort, CTAB   Cardio - warm and well perfused, RRR, no murmur  Abdomen -  Soft, NTND  Extremities - No peripheral edema, + calf tenderness on left  Neurologic Exam:                    Cognitive -             Orientation: Awake, Alert, AAO to self, Hospital and month and year and diagnosis-- not "jennifer [place]" or date       Attention-- Impaired-- delayed processing, able to state DOWB with delay,  difficulty with serial 7's.  Left inattention      Memory-- impaired--     Speech - Fluent, Comprehensible, No dysarthria, No aphasia      Cranial Nerves - left VF impairment, No facial asymmetry, Tongue midline, EOMI, Shoulder shrug intact     Motor -                      LEFT    UE - noted Shab weakness <3/5                    RIGHT UE -5/5                    LEFT    LE - noted foot drop                    RIGHT LE - at least 3/5  Sensation-- Intact    RECENT LABS                        13.8   8.06  )-----------( 162      ( 21 Dec 2023 05:57 )             38.1     12-22    143  |  106  |  16  ----------------------------<  99  3.1<L>   |  28  |  0.58    Ca    8.8      22 Dec 2023 05:45    TPro  5.9<L>  /  Alb  2.9<L>  /  TBili  0.8  /  DBili  x   /  AST  52<H>  /  ALT  55<H>  /  AlkPhos  62  12-21      Urinalysis Basic - ( 22 Dec 2023 05:45 )    Color: x / Appearance: x / SG: x / pH: x  Gluc: 99 mg/dL / Ketone: x  / Bili: x / Urobili: x   Blood: x / Protein: x / Nitrite: x   Leuk Esterase: x / RBC: x / WBC x   Sq Epi: x / Non Sq Epi: x / Bacteria: x          RADIOLOGY/OTHER RESULTS      MEDICATIONS  (STANDING):  bisacodyl 5 milliGRAM(s) Oral daily  brivaracetam 50 milliGRAM(s) Oral two times a day  cefpodoxime 100 milliGRAM(s) Oral every 12 hours  enoxaparin Injectable 40 milliGRAM(s) SubCutaneous <User Schedule>  famotidine    Tablet 20 milliGRAM(s) Oral two times a day  ketorolac 0.5% Ophthalmic Solution 1 Drop(s) Left EYE daily  lidocaine   4% Patch 1 Patch Transdermal <User Schedule>  losartan 25 milliGRAM(s) Oral daily  polyethylene glycol 3350 17 Gram(s) Oral two times a day  senna 2 Tablet(s) Oral at bedtime    MEDICATIONS  (PRN):  acetaminophen     Tablet .. 650 milliGRAM(s) Oral every 6 hours PRN Temp greater or equal to 38C (100.4F), Mild Pain (1 - 3)  aluminum hydroxide/magnesium hydroxide/simethicone Suspension 30 milliLiter(s) Oral every 6 hours PRN Dyspepsia  artificial tears (preservative free) Ophthalmic Solution 1 Drop(s) Both EYES every 4 hours PRN Dry Eyes  bisacodyl Suppository 10 milliGRAM(s) Rectal daily PRN Constipation  calcium carbonate    500 mG (Tums) Chewable 1 Tablet(s) Chew three times a day PRN Heartburn  ondansetron   Disintegrating Tablet 4 milliGRAM(s) Oral every 6 hours PRN Nausea and/or Vomiting  sodium chloride 0.65% Nasal 1 Spray(s) Both Nostrils two times a day PRN Nasal Congestion  traMADol 25 milliGRAM(s) Oral every 4 hours PRN Moderate Pain (4 - 6)  traMADol 50 milliGRAM(s) Oral every 4 hours PRN Severe Pain (7 - 10)         HPI:  This is a 72 YO RIGHT handed woman with no PMH who presented to [facility] ED on 12/14/2023, as a transfer from [facility], as a CODE STROKE, with c/o R IPH.   CTH and CTA repeated - large R IPH (temporal/parietal). Presented to [place] today with c/o HA and AMS. Friend accompanying patient said that when visiting patient today - patient was not acting like herself and c/o HA. NIHSS 9.    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 Similar midline shift of 5 mm.  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 Briviact 50mg BID for seizure prophylaxis, EEG with increased risk of seizures in the right frontocentral region.TTE- EF 64%. Continue Losartan 25mg daily. 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 UA: positive for UTI, started on IV abx and then transitioned to oral, end date 12/22.      Patient was evaluated by PM&R and therapy for functional deficits, gait/ADL impairments and acute rehabilitation was recommended. Patient was medically optimized for discharge to [facility] IRU on 12/20/23. (20 Dec 2023 17:15)        Subjective:  Seen this AM while eating breakfast.  Reports pain in neck, upper back but states lidocaine patch helps.   Reports poor sleep at night.  More awake today.       VITALS  Vital Signs Last 24 Hrs  T(C): 37.1 (22 Dec 2023 07:48), Max: 37.1 (21 Dec 2023 20:30)  T(F): 98.7 (22 Dec 2023 07:48), Max: 98.8 (21 Dec 2023 20:30)  HR: 56 (22 Dec 2023 07:48) (56 - 57)  BP: 114/68 (22 Dec 2023 07:48) (114/68 - 126/76)  BP(mean): --  RR: 16 (22 Dec 2023 07:48) (16 - 16)  SpO2: 98% (22 Dec 2023 07:48) (94% - 98%)    Parameters below as of 22 Dec 2023 07:48  Patient On (Oxygen Delivery Method): room air        REVIEW OF SYMPTOMS  Neurological deficits      PHYSICAL EXAM  Constitutional - NAD, Comfortable  Chest - good chest expansion, good respiratory effort, CTAB   Cardio - warm and well perfused, RRR, no murmur  Abdomen -  Soft, NTND  Extremities - No peripheral edema, + calf tenderness on left  Neurologic Exam:                    Cognitive -             Orientation: Awake, Alert, AAO to self, Hospital and month and year and diagnosis-- not "jennifer [place]" or date       Attention-- Impaired-- delayed processing, able to state DOWB with delay,  difficulty with serial 7's.  Left inattention      Memory-- impaired--     Speech - Fluent, Comprehensible, No dysarthria, No aphasia      Cranial Nerves - left VF impairment, No facial asymmetry, Tongue midline, EOMI, Shoulder shrug intact     Motor -                      LEFT    UE - noted Shab weakness <3/5                    RIGHT UE -5/5                    LEFT    LE - noted foot drop                    RIGHT LE - at least 3/5  Sensation-- Intact    RECENT LABS                        13.8   8.06  )-----------( 162      ( 21 Dec 2023 05:57 )             38.1     12-22    143  |  106  |  16  ----------------------------<  99  3.1<L>   |  28  |  0.58    Ca    8.8      22 Dec 2023 05:45    TPro  5.9<L>  /  Alb  2.9<L>  /  TBili  0.8  /  DBili  x   /  AST  52<H>  /  ALT  55<H>  /  AlkPhos  62  12-21      Urinalysis Basic - ( 22 Dec 2023 05:45 )    Color: x / Appearance: x / SG: x / pH: x  Gluc: 99 mg/dL / Ketone: x  / Bili: x / Urobili: x   Blood: x / Protein: x / Nitrite: x   Leuk Esterase: x / RBC: x / WBC x   Sq Epi: x / Non Sq Epi: x / Bacteria: x          RADIOLOGY/OTHER RESULTS      MEDICATIONS  (STANDING):  bisacodyl 5 milliGRAM(s) Oral daily  brivaracetam 50 milliGRAM(s) Oral two times a day  cefpodoxime 100 milliGRAM(s) Oral every 12 hours  enoxaparin Injectable 40 milliGRAM(s) SubCutaneous <User Schedule>  famotidine    Tablet 20 milliGRAM(s) Oral two times a day  ketorolac 0.5% Ophthalmic Solution 1 Drop(s) Left EYE daily  lidocaine   4% Patch 1 Patch Transdermal <User Schedule>  losartan 25 milliGRAM(s) Oral daily  polyethylene glycol 3350 17 Gram(s) Oral two times a day  senna 2 Tablet(s) Oral at bedtime    MEDICATIONS  (PRN):  acetaminophen     Tablet .. 650 milliGRAM(s) Oral every 6 hours PRN Temp greater or equal to 38C (100.4F), Mild Pain (1 - 3)  aluminum hydroxide/magnesium hydroxide/simethicone Suspension 30 milliLiter(s) Oral every 6 hours PRN Dyspepsia  artificial tears (preservative free) Ophthalmic Solution 1 Drop(s) Both EYES every 4 hours PRN Dry Eyes  bisacodyl Suppository 10 milliGRAM(s) Rectal daily PRN Constipation  calcium carbonate    500 mG (Tums) Chewable 1 Tablet(s) Chew three times a day PRN Heartburn  ondansetron   Disintegrating Tablet 4 milliGRAM(s) Oral every 6 hours PRN Nausea and/or Vomiting  sodium chloride 0.65% Nasal 1 Spray(s) Both Nostrils two times a day PRN Nasal Congestion  traMADol 25 milliGRAM(s) Oral every 4 hours PRN Moderate Pain (4 - 6)  traMADol 50 milliGRAM(s) Oral every 4 hours PRN Severe Pain (7 - 10)         HPI:  This is a 74 YO RIGHT handed woman with no PMH who presented to [facility] ED on 12/14/2023, as a transfer from [facility], as a CODE STROKE, with c/o R IPH.   CTH and CTA repeated - large R IPH (temporal/parietal). Presented to [place] today with c/o HA and AMS. Friend accompanying patient said that when visiting patient today - patient was not acting like herself and c/o HA. NIHSS 9.    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 Similar midline shift of 5 mm.  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 Briviact 50mg BID for seizure prophylaxis, EEG with increased risk of seizures in the right frontocentral region.TTE- EF 64%. Continue Losartan 25mg daily. 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 UA: positive for UTI, started on IV abx and then transitioned to oral, end date 12/22.      Patient was evaluated by PM&R and therapy for functional deficits, gait/ADL impairments and acute rehabilitation was recommended. Patient was medically optimized for discharge to [facility] IRU on 12/20/23. (20 Dec 2023 17:15)        Subjective:  Seen this AM while eating breakfast.  Reports pain in neck, upper back but states lidocaine patch helps.   Reports poor sleep at night.  More awake today. Still very distractable.  Incontinent-- voiding-- PVR=160--not really true PVR      VITALS  Vital Signs Last 24 Hrs  T(C): 37.1 (22 Dec 2023 07:48), Max: 37.1 (21 Dec 2023 20:30)  T(F): 98.7 (22 Dec 2023 07:48), Max: 98.8 (21 Dec 2023 20:30)  HR: 56 (22 Dec 2023 07:48) (56 - 57)  BP: 114/68 (22 Dec 2023 07:48) (114/68 - 126/76)  BP(mean): --  RR: 16 (22 Dec 2023 07:48) (16 - 16)  SpO2: 98% (22 Dec 2023 07:48) (94% - 98%)    Parameters below as of 22 Dec 2023 07:48  Patient On (Oxygen Delivery Method): room air        REVIEW OF SYMPTOMS  Neurological deficits      PHYSICAL EXAM  Constitutional - NAD, Comfortable  Chest - good chest expansion, good respiratory effort, CTAB   Cardio - warm and well perfused, RRR, no murmur  Abdomen -  Soft, NTND  Extremities - No peripheral edema, + calf tenderness on left  Neurologic Exam:                    Cognitive -             Orientation: Awake, Alert, AAO to self, Hospital and month and year and diagnosis-- not "jennifer [place]" or date       Attention-- Impaired-- delayed processing, able to state DOWB with delay,  difficulty with serial 7's.  Left inattention      Memory-- impaired--     Speech - Fluent, Comprehensible, No dysarthria, No aphasia      Cranial Nerves - left VF impairment, No facial asymmetry, Tongue midline, EOMI, Shoulder shrug intact     Motor -  Limited by left inattention                    LEFT    UE - Shoulder 4-/5, Elbow, wrist and fingers 5/5                    RIGHT UE -5/5                    LEFT    LE - Hip 4/5, knee and ankle 5/5                    RIGHT LE - hip 4/5, knee and ankle 5/5  Sensation-- Impaired on left    RECENT LABS                        13.8   8.06  )-----------( 162      ( 21 Dec 2023 05:57 )             38.1     12-22    143  |  106  |  16  ----------------------------<  99  3.1<L>   |  28  |  0.58    Ca    8.8      22 Dec 2023 05:45    TPro  5.9<L>  /  Alb  2.9<L>  /  TBili  0.8  /  DBili  x   /  AST  52<H>  /  ALT  55<H>  /  AlkPhos  62  12-21      Urinalysis Basic - ( 22 Dec 2023 05:45 )    Color: x / Appearance: x / SG: x / pH: x  Gluc: 99 mg/dL / Ketone: x  / Bili: x / Urobili: x   Blood: x / Protein: x / Nitrite: x   Leuk Esterase: x / RBC: x / WBC x   Sq Epi: x / Non Sq Epi: x / Bacteria: x          RADIOLOGY/OTHER RESULTS      MEDICATIONS  (STANDING):  bisacodyl 5 milliGRAM(s) Oral daily  brivaracetam 50 milliGRAM(s) Oral two times a day  cefpodoxime 100 milliGRAM(s) Oral every 12 hours  enoxaparin Injectable 40 milliGRAM(s) SubCutaneous <User Schedule>  famotidine    Tablet 20 milliGRAM(s) Oral two times a day  ketorolac 0.5% Ophthalmic Solution 1 Drop(s) Left EYE daily  lidocaine   4% Patch 1 Patch Transdermal <User Schedule>  losartan 25 milliGRAM(s) Oral daily  polyethylene glycol 3350 17 Gram(s) Oral two times a day  senna 2 Tablet(s) Oral at bedtime    MEDICATIONS  (PRN):  acetaminophen     Tablet .. 650 milliGRAM(s) Oral every 6 hours PRN Temp greater or equal to 38C (100.4F), Mild Pain (1 - 3)  aluminum hydroxide/magnesium hydroxide/simethicone Suspension 30 milliLiter(s) Oral every 6 hours PRN Dyspepsia  artificial tears (preservative free) Ophthalmic Solution 1 Drop(s) Both EYES every 4 hours PRN Dry Eyes  bisacodyl Suppository 10 milliGRAM(s) Rectal daily PRN Constipation  calcium carbonate    500 mG (Tums) Chewable 1 Tablet(s) Chew three times a day PRN Heartburn  ondansetron   Disintegrating Tablet 4 milliGRAM(s) Oral every 6 hours PRN Nausea and/or Vomiting  sodium chloride 0.65% Nasal 1 Spray(s) Both Nostrils two times a day PRN Nasal Congestion  traMADol 25 milliGRAM(s) Oral every 4 hours PRN Moderate Pain (4 - 6)  traMADol 50 milliGRAM(s) Oral every 4 hours PRN Severe Pain (7 - 10)         HPI:  This is a 72 YO RIGHT handed woman with no PMH who presented to [facility] ED on 12/14/2023, as a transfer from [facility], as a CODE STROKE, with c/o R IPH.   CTH and CTA repeated - large R IPH (temporal/parietal). Presented to [facility] today with c/o HA and AMS. Friend accompanying patient said that when visiting patient today - patient was not acting like herself and c/o HA. NIHSS 9.    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 Similar midline shift of 5 mm.  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 Briviact 50mg BID for seizure prophylaxis, EEG with increased risk of seizures in the right frontocentral region.TTE- EF 64%. Continue Losartan 25mg daily. 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 UA: positive for UTI, started on IV abx and then transitioned to oral, end date 12/22.      Patient was evaluated by PM&R and therapy for functional deficits, gait/ADL impairments and acute rehabilitation was recommended. Patient was medically optimized for discharge to [facility] IRU on 12/20/23. (20 Dec 2023 17:15)        Subjective:  Seen this AM while eating breakfast.  Reports pain in neck, upper back but states lidocaine patch helps.   Reports poor sleep at night.  More awake today. Still very distractable.  Incontinent-- voiding-- PVR=160--not really true PVR      VITALS  Vital Signs Last 24 Hrs  T(C): 37.1 (22 Dec 2023 07:48), Max: 37.1 (21 Dec 2023 20:30)  T(F): 98.7 (22 Dec 2023 07:48), Max: 98.8 (21 Dec 2023 20:30)  HR: 56 (22 Dec 2023 07:48) (56 - 57)  BP: 114/68 (22 Dec 2023 07:48) (114/68 - 126/76)  BP(mean): --  RR: 16 (22 Dec 2023 07:48) (16 - 16)  SpO2: 98% (22 Dec 2023 07:48) (94% - 98%)    Parameters below as of 22 Dec 2023 07:48  Patient On (Oxygen Delivery Method): room air        REVIEW OF SYMPTOMS  Neurological deficits      PHYSICAL EXAM  Constitutional - NAD, Comfortable  Chest - good chest expansion, good respiratory effort, CTAB   Cardio - warm and well perfused, RRR, no murmur  Abdomen -  Soft, NTND  Extremities - No peripheral edema, + calf tenderness on left  Neurologic Exam:                    Cognitive -             Orientation: Awake, Alert, AAO to self, Hospital and month and year and diagnosis-- not "jennifer [place]" or date       Attention-- Impaired-- delayed processing, able to state DOWB with delay,  difficulty with serial 7's.  Left inattention      Memory-- impaired--     Speech - Fluent, Comprehensible, No dysarthria, No aphasia      Cranial Nerves - left VF impairment, No facial asymmetry, Tongue midline, EOMI, Shoulder shrug intact     Motor -  Limited by left inattention                    LEFT    UE - Shoulder 4-/5, Elbow, wrist and fingers 5/5                    RIGHT UE -5/5                    LEFT    LE - Hip 4/5, knee and ankle 5/5                    RIGHT LE - hip 4/5, knee and ankle 5/5  Sensation-- Impaired on left    RECENT LABS                        13.8   8.06  )-----------( 162      ( 21 Dec 2023 05:57 )             38.1     12-22    143  |  106  |  16  ----------------------------<  99  3.1<L>   |  28  |  0.58    Ca    8.8      22 Dec 2023 05:45    TPro  5.9<L>  /  Alb  2.9<L>  /  TBili  0.8  /  DBili  x   /  AST  52<H>  /  ALT  55<H>  /  AlkPhos  62  12-21      Urinalysis Basic - ( 22 Dec 2023 05:45 )    Color: x / Appearance: x / SG: x / pH: x  Gluc: 99 mg/dL / Ketone: x  / Bili: x / Urobili: x   Blood: x / Protein: x / Nitrite: x   Leuk Esterase: x / RBC: x / WBC x   Sq Epi: x / Non Sq Epi: x / Bacteria: x          RADIOLOGY/OTHER RESULTS      MEDICATIONS  (STANDING):  bisacodyl 5 milliGRAM(s) Oral daily  brivaracetam 50 milliGRAM(s) Oral two times a day  cefpodoxime 100 milliGRAM(s) Oral every 12 hours  enoxaparin Injectable 40 milliGRAM(s) SubCutaneous <User Schedule>  famotidine    Tablet 20 milliGRAM(s) Oral two times a day  ketorolac 0.5% Ophthalmic Solution 1 Drop(s) Left EYE daily  lidocaine   4% Patch 1 Patch Transdermal <User Schedule>  losartan 25 milliGRAM(s) Oral daily  polyethylene glycol 3350 17 Gram(s) Oral two times a day  senna 2 Tablet(s) Oral at bedtime    MEDICATIONS  (PRN):  acetaminophen     Tablet .. 650 milliGRAM(s) Oral every 6 hours PRN Temp greater or equal to 38C (100.4F), Mild Pain (1 - 3)  aluminum hydroxide/magnesium hydroxide/simethicone Suspension 30 milliLiter(s) Oral every 6 hours PRN Dyspepsia  artificial tears (preservative free) Ophthalmic Solution 1 Drop(s) Both EYES every 4 hours PRN Dry Eyes  bisacodyl Suppository 10 milliGRAM(s) Rectal daily PRN Constipation  calcium carbonate    500 mG (Tums) Chewable 1 Tablet(s) Chew three times a day PRN Heartburn  ondansetron   Disintegrating Tablet 4 milliGRAM(s) Oral every 6 hours PRN Nausea and/or Vomiting  sodium chloride 0.65% Nasal 1 Spray(s) Both Nostrils two times a day PRN Nasal Congestion  traMADol 25 milliGRAM(s) Oral every 4 hours PRN Moderate Pain (4 - 6)  traMADol 50 milliGRAM(s) Oral every 4 hours PRN Severe Pain (7 - 10)         HPI:  This is a 74 YO RIGHT handed woman with no PMH who presented to [facility] ED on 12/14/2023, as a transfer from [facility], as a CODE STROKE, with c/o R IPH.   CTH and CTA repeated - large R IPH (temporal/parietal). Presented to [facility] today with c/o HA and AMS. Friend accompanying patient said that when visiting patient today - patient was not acting like herself and c/o HA. NIHSS 9.    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 Similar midline shift of 5 mm.  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 Briviact 50mg BID for seizure prophylaxis, EEG with increased risk of seizures in the right frontocentral region.TTE- EF 64%. Continue Losartan 25mg daily. 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 UA: positive for UTI, started on IV abx and then transitioned to oral, end date 12/22.      Patient was evaluated by PM&R and therapy for functional deficits, gait/ADL impairments and acute rehabilitation was recommended. Patient was medically optimized for discharge to [facility] IRU on 12/20/23. (20 Dec 2023 17:15)      Subjective:  Seen this AM while eating breakfast.  Reports pain in neck, upper back but states lidocaine patch helps.   Reports poor sleep at night.  More awake today. Still very distractable.  Incontinent-- voiding-- PVR=160--not really true PVR      VITALS  Vital Signs Last 24 Hrs  T(C): 37.1 (22 Dec 2023 07:48), Max: 37.1 (21 Dec 2023 20:30)  T(F): 98.7 (22 Dec 2023 07:48), Max: 98.8 (21 Dec 2023 20:30)  HR: 56 (22 Dec 2023 07:48) (56 - 57)  BP: 114/68 (22 Dec 2023 07:48) (114/68 - 126/76)  BP(mean): --  RR: 16 (22 Dec 2023 07:48) (16 - 16)  SpO2: 98% (22 Dec 2023 07:48) (94% - 98%)    Parameters below as of 22 Dec 2023 07:48  Patient On (Oxygen Delivery Method): room air        REVIEW OF SYMPTOMS  Neurological deficits      PHYSICAL EXAM  Constitutional - NAD, Comfortable  Chest - good chest expansion, good respiratory effort, CTAB   Cardio - warm and well perfused, RRR, no murmur  Abdomen -  Soft, NTND  Extremities - No peripheral edema, + calf tenderness on left  Neurologic Exam:                    Cognitive -             Orientation: Awake, Alert, AAO to self, Hospital and month and year and diagnosis-- not "jennifer [place]" or date       Attention-- Impaired-- delayed processing, able to state DOWB with delay,  difficulty with serial 7's.  Left inattention      Memory-- impaired--     Speech - Fluent, Comprehensible, No dysarthria, No aphasia      Cranial Nerves - left VF impairment, No facial asymmetry, Tongue midline, EOMI, Shoulder shrug intact     Motor -  Limited by left inattention                    LEFT    UE - Shoulder 4-/5, Elbow, wrist and fingers 5/5                    RIGHT UE -5/5                    LEFT    LE - Hip 4/5, knee and ankle 5/5                    RIGHT LE - hip 4/5, knee and ankle 5/5  Sensation-- Impaired on left    RECENT LABS                        13.8   8.06  )-----------( 162      ( 21 Dec 2023 05:57 )             38.1     12-22    143  |  106  |  16  ----------------------------<  99  3.1<L>   |  28  |  0.58    Ca    8.8      22 Dec 2023 05:45    TPro  5.9<L>  /  Alb  2.9<L>  /  TBili  0.8  /  DBili  x   /  AST  52<H>  /  ALT  55<H>  /  AlkPhos  62  12-21      Urinalysis Basic - ( 22 Dec 2023 05:45 )    Color: x / Appearance: x / SG: x / pH: x  Gluc: 99 mg/dL / Ketone: x  / Bili: x / Urobili: x   Blood: x / Protein: x / Nitrite: x   Leuk Esterase: x / RBC: x / WBC x   Sq Epi: x / Non Sq Epi: x / Bacteria: x          RADIOLOGY/OTHER RESULTS      MEDICATIONS  (STANDING):  bisacodyl 5 milliGRAM(s) Oral daily  brivaracetam 50 milliGRAM(s) Oral two times a day  cefpodoxime 100 milliGRAM(s) Oral every 12 hours  enoxaparin Injectable 40 milliGRAM(s) SubCutaneous <User Schedule>  famotidine    Tablet 20 milliGRAM(s) Oral two times a day  ketorolac 0.5% Ophthalmic Solution 1 Drop(s) Left EYE daily  lidocaine   4% Patch 1 Patch Transdermal <User Schedule>  losartan 25 milliGRAM(s) Oral daily  polyethylene glycol 3350 17 Gram(s) Oral two times a day  senna 2 Tablet(s) Oral at bedtime    MEDICATIONS  (PRN):  acetaminophen     Tablet .. 650 milliGRAM(s) Oral every 6 hours PRN Temp greater or equal to 38C (100.4F), Mild Pain (1 - 3)  aluminum hydroxide/magnesium hydroxide/simethicone Suspension 30 milliLiter(s) Oral every 6 hours PRN Dyspepsia  artificial tears (preservative free) Ophthalmic Solution 1 Drop(s) Both EYES every 4 hours PRN Dry Eyes  bisacodyl Suppository 10 milliGRAM(s) Rectal daily PRN Constipation  calcium carbonate    500 mG (Tums) Chewable 1 Tablet(s) Chew three times a day PRN Heartburn  ondansetron   Disintegrating Tablet 4 milliGRAM(s) Oral every 6 hours PRN Nausea and/or Vomiting  sodium chloride 0.65% Nasal 1 Spray(s) Both Nostrils two times a day PRN Nasal Congestion  traMADol 25 milliGRAM(s) Oral every 4 hours PRN Moderate Pain (4 - 6)  traMADol 50 milliGRAM(s) Oral every 4 hours PRN Severe Pain (7 - 10)         HPI:  This is a 74 YO RIGHT handed woman with no PMH who presented to [facility] ED on 12/14/2023, as a transfer from [facility], as a CODE STROKE, with c/o R IPH.   CTH and CTA repeated - large R IPH (temporal/parietal). Presented to [facility] today with c/o HA and AMS. Friend accompanying patient said that when visiting patient today - patient was not acting like herself and c/o HA. NIHSS 9.    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 Similar midline shift of 5 mm.  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 Briviact 50mg BID for seizure prophylaxis, EEG with increased risk of seizures in the right frontocentral region.TTE- EF 64%. Continue Losartan 25mg daily. 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 UA: positive for UTI, started on IV abx and then transitioned to oral, end date 12/22.      Patient was evaluated by PM&R and therapy for functional deficits, gait/ADL impairments and acute rehabilitation was recommended. Patient was medically optimized for discharge to [facility] IRU on 12/20/23. (20 Dec 2023 17:15)      Subjective:  Seen this AM while eating breakfast.  Reports pain in neck, upper back but states lidocaine patch helps.   Reports poor sleep at night.  More awake today. Still very distractable.  Incontinent-- voiding-- PVR=160--not really true PVR      VITALS  Vital Signs Last 24 Hrs  T(C): 37.1 (22 Dec 2023 07:48), Max: 37.1 (21 Dec 2023 20:30)  T(F): 98.7 (22 Dec 2023 07:48), Max: 98.8 (21 Dec 2023 20:30)  HR: 56 (22 Dec 2023 07:48) (56 - 57)  BP: 114/68 (22 Dec 2023 07:48) (114/68 - 126/76)  BP(mean): --  RR: 16 (22 Dec 2023 07:48) (16 - 16)  SpO2: 98% (22 Dec 2023 07:48) (94% - 98%)    Parameters below as of 22 Dec 2023 07:48  Patient On (Oxygen Delivery Method): room air        REVIEW OF SYMPTOMS  Neurological deficits      PHYSICAL EXAM  Constitutional - NAD, Comfortable  Chest - good chest expansion, good respiratory effort, CTAB   Cardio - warm and well perfused, RRR, no murmur  Abdomen -  Soft, NTND  Extremities - No peripheral edema, + calf tenderness on left  Neurologic Exam:                    Cognitive -             Orientation: Awake, Alert, AAO to self, Hospital and month and year and diagnosis-- not "jennifer [place]" or date       Attention-- Impaired-- delayed processing, able to state DOWB with delay,  difficulty with serial 7's.  Left inattention      Memory-- impaired--     Speech - Fluent, Comprehensible, No dysarthria, No aphasia      Cranial Nerves - left VF impairment, No facial asymmetry, Tongue midline, EOMI, Shoulder shrug intact     Motor -  Limited by left inattention                    LEFT    UE - Shoulder 4-/5, Elbow, wrist and fingers 5/5                    RIGHT UE -5/5                    LEFT    LE - Hip 4/5, knee and ankle 5/5                    RIGHT LE - hip 4/5, knee and ankle 5/5  Sensation-- Impaired on left    RECENT LABS                        13.8   8.06  )-----------( 162      ( 21 Dec 2023 05:57 )             38.1     12-22    143  |  106  |  16  ----------------------------<  99  3.1<L>   |  28  |  0.58    Ca    8.8      22 Dec 2023 05:45    TPro  5.9<L>  /  Alb  2.9<L>  /  TBili  0.8  /  DBili  x   /  AST  52<H>  /  ALT  55<H>  /  AlkPhos  62  12-21      Urinalysis Basic - ( 22 Dec 2023 05:45 )    Color: x / Appearance: x / SG: x / pH: x  Gluc: 99 mg/dL / Ketone: x  / Bili: x / Urobili: x   Blood: x / Protein: x / Nitrite: x   Leuk Esterase: x / RBC: x / WBC x   Sq Epi: x / Non Sq Epi: x / Bacteria: x          RADIOLOGY/OTHER RESULTS      MEDICATIONS  (STANDING):  bisacodyl 5 milliGRAM(s) Oral daily  brivaracetam 50 milliGRAM(s) Oral two times a day  cefpodoxime 100 milliGRAM(s) Oral every 12 hours  enoxaparin Injectable 40 milliGRAM(s) SubCutaneous <User Schedule>  famotidine    Tablet 20 milliGRAM(s) Oral two times a day  ketorolac 0.5% Ophthalmic Solution 1 Drop(s) Left EYE daily  lidocaine   4% Patch 1 Patch Transdermal <User Schedule>  losartan 25 milliGRAM(s) Oral daily  polyethylene glycol 3350 17 Gram(s) Oral two times a day  senna 2 Tablet(s) Oral at bedtime    MEDICATIONS  (PRN):  acetaminophen     Tablet .. 650 milliGRAM(s) Oral every 6 hours PRN Temp greater or equal to 38C (100.4F), Mild Pain (1 - 3)  aluminum hydroxide/magnesium hydroxide/simethicone Suspension 30 milliLiter(s) Oral every 6 hours PRN Dyspepsia  artificial tears (preservative free) Ophthalmic Solution 1 Drop(s) Both EYES every 4 hours PRN Dry Eyes  bisacodyl Suppository 10 milliGRAM(s) Rectal daily PRN Constipation  calcium carbonate    500 mG (Tums) Chewable 1 Tablet(s) Chew three times a day PRN Heartburn  ondansetron   Disintegrating Tablet 4 milliGRAM(s) Oral every 6 hours PRN Nausea and/or Vomiting  sodium chloride 0.65% Nasal 1 Spray(s) Both Nostrils two times a day PRN Nasal Congestion  traMADol 25 milliGRAM(s) Oral every 4 hours PRN Moderate Pain (4 - 6)  traMADol 50 milliGRAM(s) Oral every 4 hours PRN Severe Pain (7 - 10)

## 2023-12-22 NOTE — CHART NOTE - NSCHARTNOTEFT_GEN_A_CORE
[facility] Plan of Care    REHABILITATION DIAGNOSIS:  Other nontraumatic intracerebral hemorrhage          COMORBIDITIES/COMPLICATING CONDITIONS IMPACTING REHABILITATION:  HEALTH ISSUES - PROBLEM Dx:        PAST MEDICAL & SURGICAL HISTORY:  No pertinent past medical history          Based upon consideration of the patient's impairments, functional status, complicating conditions and any other contributing factors and after information garnered from the assessments of all therapy disciplines involved in treating the patient and other pertinent clinicians:    INTERDISCIPLINARY REHABILITATION INTERVENTIONS:    [ X  ] Transfer Training  [ X  ] Bed Mobility  [ X  ] Therapeutic Exercise  [ X ] Balance/Coordination Exercises  [ X ] Locomotion retraining  [ X  ] Stairs  [  X ] Functional Transfer Training  [ X  ] Bowel/Bladder program  [ X  ] Pain Management  [ X  ] Skin/Wound Care  [ X  ] Visual/Perceptual Training  [ X  ] Therapeutic Recreation Activities  [  X ] Neuromuscular Re-education  [ X  ] Activities of Daily Living  [ X  ] Speech Exercise  [X   ] Swallowing Exercises  [   ] Vital Stim  [   ] Dietary Supplements  [   ] Calorie Count  [ X  ] Cognitive Exercises  [  X ] Congnitive/Linguistic Treatment  [  x ] Behavior Program  [  x ] Neuropsych Therapy  [ X  ] Patient/Family Counseling  [ X ] Family Training  [ X  ] Community Re-entry  [   ] Orthotic Evaluation  [   ] Prosthetic Eval/Training    MEDICAL PROGNOSIS: good      REHAB POTENTIAL:  Good    EXPECTED DAILY THERAPY:  3 hours/day           ESTIMATED LOS:  [  ] 5-7 Days  [  ] 7-10 Days  [  ] 10- 14 Days  [  ] 14- 18 Days  [  x] 18- 21 Days    ESTIMATED DISPOSITION:  [  ] Home   [  ] Home with Outpatient Therapies  [x  ] Home with Home Therapies  [ x ] [facility]  [  ] Nursing Home  [  ] Long Term Acute Care    INTERDISCIPLINARY FUNCTIONAL OUTCOMES/GOALS:         Gait/Mobility:  Minimal Assist       Transfers: Minimal Assist       ADLs: Minimal Assist       Functional Transfers: Minimal Assist       Medication Management: Minimal Assist       Communication:  Supervision       Cognitive: Minimal Assist       Dysphagia: Independent       Bladder Independent       Bowel: Independent     Functional Independent Measures:   7 = Independent  6 = Modified Independent  5 = Supervision  4 = Minimal Assist/ Contact Guard  3 = Moderate Assistance  2 = Maximum Assistance  1 = Total Assistance  0 = Unable to assess

## 2023-12-22 NOTE — PROGRESS NOTE ADULT - ASSESSMENT
ASSESSMENT/PLAN  This is a 72 YO RIGHT handed woman with no PMH who presented to [facility] ED on 12/14/2023, as a transfer from [facility], as a CODE STROKE where she was found to have R IPH. Hospital with repeat imaging showing stability, UTI s/p IV ABX,  new b/l DVTs, EEG with increased seizure risks. Patient now with Left sided weakness, Fazal-neglect, cognitive deficits, left Homonymous hemianopsia,  gait Instability, ADL impairments and Functional impairments.    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  - Start Comprehensive Rehab Program: PT/OT/ST, 3hours daily and 5 days weekly  - PT: Focused on improving strength, endurance, coordination, balance, functional mobility, and transfers  - OT: Focused on improving strength, fine motor skills, coordination, posture and ADLs.    - ST: to diagnose and treat deficits in swallowing, cognition and communication.   -  Briviact 50mg BID for seizure prophylaxis.  - EEG with increased risk of seizures in the right frontocentral region  - TTE- EF 64%      #HTN  - Losartan 25mg daily  - seen by cardiology for bradycardia thought to be likely neurogenic  - monitor    #UTI  - Vantin 100mg BID   -  end date 12/22.      #Pain management  - Tylenol PRN  -tramadol PRN    #DVT   - b/l below the knee DVTs: right  soleal vein, left soleal, peroneal and gastrocnemius veins.  - Lovenox 40 mg daily -- not cleared for full dose AC due to large ICH  -  monitor with weekly US for propagation  --f/u with Neurology toward the end of next week regarding timeline for starting full dose AC-- Consider f/u CT head next week.     #GI ppx  - Pepcid 20mg   - TUMS   - Maalox    #Bowel Regimen  - Senna, miralax PRN    #Bladder management  - Incontinent  --check PVRs, SC >400cc    #Dysphagia--   - Diet: Soft and Bite sized with thin liquids  - SLP: evaluation and treatment    #Skin:  - Skin on admission: intact  - Pressure injury/Skin:  OOB to Chair, PT/OT       #Sleep:   - Maintain quiet hours and low stim environment.  - Melatonin PRN to maximize participation in therapy during the day.   - Monitor sleep logs    #Precaution  - Fall, Aspiration, cardiac, seizure    #GOC  CODE STATUS: FULL CODE    Outpatient Follow-up (Specialty/Name of physician):    Emy Prakash  NP in [facility]  [address], [address]  [place] [address]  Phone: [phone]  Fax: [phone]  Follow Up Time: 2 weeks    Sergio Jurado  Cardiology  [address], [address]  [place] [address]  Phone: [phone]  Fax: [phone]  Follow Up Time: 1 month      MEDICAL PROGNOSIS: GOOD            REHAB POTENTIAL: GOOD             ESTIMATED DISPOSITION: HOME WITH HOME CARE            ELOS: 14 Days   EXPECTED THERAPY:     P.T. 1hr/day       O.T. 1hr/day      S.L.P. 1hr/day     P&O Unnecessary     EXP FREQUENCY: 5 days per 7 day period     PRESCREEN COMPARISON:   I have reviewed the prescreen information and I have found no relevant changes between the preadmission screening and my post admission evaluation     RATIONALE FOR INPATIENT ADMISSION - Patient demonstrates the following: (check all that apply)  [X] Medically appropriate for rehabilitation admission  [X] Has attainable rehab goals with an appropriate initial discharge plan  [X] Has rehabilitation potential (expected to make a significant improvement within a reasonable period of time)   [X] Requires close medical management by a rehab physician, rehab nursing care, Hospitalist and comprehensive interdisciplinary team (including PT, OT, & or SLP, Prosthetics and Orthotics)   ASSESSMENT/PLAN  This is a 72 YO RIGHT handed woman with no PMH who presented to [facility] ED on 12/14/2023, as a transfer from [facility], as a CODE STROKE where she was found to have R IPH. Hospital with repeat imaging showing stability, UTI s/p IV ABX,  new b/l DVTs, EEG with increased seizure risks. Patient now with Left sided weakness, Fazal-neglect, cognitive deficits, left Homonymous hemianopsia,  gait Instability, ADL impairments and Functional impairments.    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  - Start Comprehensive Rehab Program: PT/OT/ST, 3hours daily and 5 days weekly  - PT: Focused on improving strength, endurance, coordination, balance, functional mobility, and transfers  - OT: Focused on improving strength, fine motor skills, coordination, posture and ADLs.    - ST: to diagnose and treat deficits in swallowing, cognition and communication.   -  Briviact 50mg BID for seizure prophylaxis.  - EEG with increased risk of seizures in the right frontocentral region  - TTE- EF 64%      #HTN  - Losartan 25mg daily  - seen by cardiology for bradycardia thought to be likely neurogenic  - monitor    #UTI  - Vantin 100mg BID   -  end date 12/22.      #Pain management  - Tylenol PRN  -tramadol PRN    #DVT   - b/l below the knee DVTs: right  soleal vein, left soleal, peroneal and gastrocnemius veins.  - Lovenox 40 mg daily -- not cleared for full dose AC due to large ICH  -  monitor with weekly US for propagation  --f/u with Neurology toward the end of next week regarding timeline for starting full dose AC-- Consider f/u CT head next week.     #GI ppx  - Pepcid 20mg   - TUMS   - Maalox    #Bowel Regimen  - Senna, miralax PRN    #Bladder management  - Incontinent  --check PVRs, SC >400cc    #Dysphagia--   - Diet: Soft and Bite sized with thin liquids  - SLP: evaluation and treatment    #Skin:  - Skin on admission: intact  - Pressure injury/Skin:  OOB to Chair, PT/OT       #Sleep:   - Maintain quiet hours and low stim environment.  - Melatonin PRN to maximize participation in therapy during the day.   - Monitor sleep logs    #Precaution  - Fall, Aspiration, cardiac, seizure    #GOC  CODE STATUS: FULL CODE    Outpatient Follow-up (Specialty/Name of physician):    Eym Prakash  NP in [facility]  [address], [address]  [place] [address]  Phone: [phone]  Fax: [phone]  Follow Up Time: 2 weeks    Sergio Jurado  Cardiology  [address], [address]  [place] [address]  Phone: [phone]  Fax: [phone]  Follow Up Time: 1 month      MEDICAL PROGNOSIS: GOOD            REHAB POTENTIAL: GOOD             ESTIMATED DISPOSITION: HOME WITH HOME CARE            ELOS: 14 Days   EXPECTED THERAPY:     P.T. 1hr/day       O.T. 1hr/day      S.L.P. 1hr/day     P&O Unnecessary     EXP FREQUENCY: 5 days per 7 day period     PRESCREEN COMPARISON:   I have reviewed the prescreen information and I have found no relevant changes between the preadmission screening and my post admission evaluation     RATIONALE FOR INPATIENT ADMISSION - Patient demonstrates the following: (check all that apply)  [X] Medically appropriate for rehabilitation admission  [X] Has attainable rehab goals with an appropriate initial discharge plan  [X] Has rehabilitation potential (expected to make a significant improvement within a reasonable period of time)   [X] Requires close medical management by a rehab physician, rehab nursing care, Hospitalist and comprehensive interdisciplinary team (including PT, OT, & or SLP, Prosthetics and Orthotics)   ASSESSMENT/PLAN  This is a 74 YO RIGHT handed woman with no PMH who presented to [facility] ED on 12/14/2023, as a transfer from [facility], as a CODE STROKE where she was found to have R IPH. Hospital with repeat imaging showing stability, UTI s/p IV ABX,  new b/l DVTs, EEG with increased seizure risks. Patient now with Left sided weakness, Fazal-neglect, cognitive deficits, left Homonymous hemianopsia,  gait Instability, ADL impairments and Functional impairments.    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  - Cont Comprehensive Rehab Program: PT/OT/ST, 3hours daily and 5 days weekly  - PT: Focused on improving strength, endurance, coordination, balance, functional mobility, and transfers  - OT: Focused on improving strength, fine motor skills, coordination, posture and ADLs.    - ST: to diagnose and treat deficits in swallowing, cognition and communication.   -  Briviact 50mg BID for seizure prophylaxis.  - EEG with increased risk of seizures in the right frontocentral region  - TTE- EF 64%      #HTN  - Cont. Losartan 25mg daily  - BP controlled    #UTI  - Vantin 100mg BID   -  end date 12/22.      #Pain management  - Tylenol PRN  -tramadol PRN    #DVT   - b/l below the knee DVTs: right  soleal vein, left soleal, peroneal and gastrocnemius veins.  - Lovenox 40 mg daily -- not cleared for full dose AC due to large ICH  -  monitor with weekly US for propagation  --f/u with Neurology toward the end of next week regarding timeline for starting full dose AC-- Consider f/u CT head next week.     #GI ppx  - Pepcid 20mg   - TUMS   - Maalox    #Bowel Regimen  - Senna, miralax PRN    #Bladder management  - Incontinent  --Voiding with low PVRs,    #Dysphagia--   - Diet: Soft and Bite sized with thin liquids  - SLP: evaluation and treatment    #Skin:  - Skin on admission: intact  - Pressure injury/Skin:  OOB to Chair, PT/OT       #Sleep:   - Maintain quiet hours and low stim environment.  - Melatonin PRN to maximize participation in therapy during the day.   - Monitor sleep logs    #Precaution  - Fall, Aspiration, cardiac, seizure    #GOC  CODE STATUS: FULL CODE    Outpatient Follow-up (Specialty/Name of physician):    Emy Prakash  NP in [facility]  [address], [address]  [place] [address]  Phone: [phone]  Fax: [phone]  Follow Up Time: 2 weeks    Sergio Jurado  [address], [address]  [place] [address]  Phone: [phone]  Fax: [phone]  Follow Up Time: 1 month   ASSESSMENT/PLAN  This is a 74 YO RIGHT handed woman with no PMH who presented to [facility] ED on 12/14/2023, as a transfer from [facility], as a CODE STROKE where she was found to have R IPH. Hospital with repeat imaging showing stability, UTI s/p IV ABX,  new b/l DVTs, EEG with increased seizure risks. Patient now with Left sided weakness, Fazal-neglect, cognitive deficits, left Homonymous hemianopsia,  gait Instability, ADL impairments and Functional impairments.    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  - Cont Comprehensive Rehab Program: PT/OT/ST, 3hours daily and 5 days weekly  - PT: Focused on improving strength, endurance, coordination, balance, functional mobility, and transfers  - OT: Focused on improving strength, fine motor skills, coordination, posture and ADLs.    - ST: to diagnose and treat deficits in swallowing, cognition and communication.   -  Briviact 50mg BID for seizure prophylaxis.  - EEG with increased risk of seizures in the right frontocentral region  - TTE- EF 64%      #HTN  - Cont. Losartan 25mg daily  - BP controlled    #UTI  - Vantin 100mg BID   -  end date 12/22.      #Pain management  - Tylenol PRN  -tramadol PRN    #DVT   - b/l below the knee DVTs: right  soleal vein, left soleal, peroneal and gastrocnemius veins.  - Lovenox 40 mg daily -- not cleared for full dose AC due to large ICH  -  monitor with weekly US for propagation  --f/u with Neurology toward the end of next week regarding timeline for starting full dose AC-- Consider f/u CT head next week.     #GI ppx  - Pepcid 20mg   - TUMS   - Maalox    #Bowel Regimen  - Senna, miralax PRN    #Bladder management  - Incontinent  --Voiding with low PVRs,    #Dysphagia--   - Diet: Soft and Bite sized with thin liquids  - SLP: evaluation and treatment    #Skin:  - Skin on admission: intact  - Pressure injury/Skin:  OOB to Chair, PT/OT       #Sleep:   - Maintain quiet hours and low stim environment.  - Melatonin PRN to maximize participation in therapy during the day.   - Monitor sleep logs    #Precaution  - Fall, Aspiration, cardiac, seizure    #GOC  CODE STATUS: FULL CODE    Outpatient Follow-up (Specialty/Name of physician):    Emy Prakash  NP in Family [facility]  [address], [address]  [place] [address]  Phone: [phone]  Fax: [phone]  Follow Up Time: 2 weeks    Sergio Jurado  [address], [address]  [place] [address]  Phone: [phone]  Fax: [phone]  Follow Up Time: 1 month   ASSESSMENT/PLAN  This is a 72 YO RIGHT handed woman with no PMH who presented to [facility] ED on 12/14/2023, as a transfer from [facility], as a CODE STROKE where she was found to have R IPH. Hospital with repeat imaging showing stability, UTI s/p IV ABX,  new b/l DVTs, EEG with increased seizure risks. Patient now with Left sided weakness, Fazal-neglect, cognitive deficits, left Homonymous hemianopsia,  gait Instability, ADL impairments and Functional impairments.    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  - Cont Comprehensive Rehab Program: PT/OT/ST, 3hours daily and 5 days weekly  - PT: Focused on improving strength, endurance, coordination, balance, functional mobility, and transfers  - OT: Focused on improving strength, fine motor skills, coordination, posture and ADLs.    - ST: to diagnose and treat deficits in swallowing, cognition and communication.   -  Briviact 50mg BID for seizure prophylaxis.  - EEG with increased risk of seizures in the right frontocentral region  - TTE- EF 64%      #HTN  - Cont. Losartan 25mg daily  - BP controlled    #UTI  - Vantin 100mg BID   -  end date 12/22.      #Pain management  - Tylenol PRN  -tramadol PRN    #DVT   - b/l below the knee DVTs: right  soleal vein, left soleal, peroneal and gastrocnemius veins.  - Lovenox 40 mg daily -- not cleared for full dose AC due to large ICH  -  monitor with weekly US for propagation  --f/u with Neurology toward the end of next week regarding timeline for starting full dose AC-- Consider f/u CT head next week.     #GI ppx  - Pepcid 20mg   - TUMS   - Maalox    #Bowel Regimen  - Senna, miralax PRN    #Bladder management  - Incontinent  --Voiding with low PVRs,    #Dysphagia--   - Diet: Soft and Bite sized with thin liquids  - SLP: evaluation and treatment    #Skin:  - Skin on admission: intact  - Pressure injury/Skin:  OOB to Chair, PT/OT       #Sleep:   - Maintain quiet hours and low stim environment.  - Melatonin PRN to maximize participation in therapy during the day.   - Monitor sleep logs    #Precaution  - Fall, Aspiration, cardiac, seizure    ** Spoke with pt's  _HCP, Isabella_, to provide update on pt's status,  medical update, medications, rehab plan of care, ELOS and discharge needs/expectations.  Pt. has limited supervision at home but Isabella will look into options.  All questions answered.     #[facility]  CODE STATUS: FULL CODE    Outpatient Follow-up (Specialty/Name of physician):    Emy Prakash  NP in [facility]  [address] [place] [address], [address]  [place] [address]  Phone: [phone]  Fax: [phone]  Follow Up Time: 2 weeks    Sergio Jurado  [address] [place] [address], [address]  [place] [address]  Phone: [phone]  Fax: [phone]  Follow Up Time: 1 month   ASSESSMENT/PLAN  This is a 72 YO RIGHT handed woman with no PMH who presented to [facility] ED on 12/14/2023, as a transfer from [facility], as a CODE STROKE where she was found to have R IPH. Hospital with repeat imaging showing stability, UTI s/p IV ABX,  new b/l DVTs, EEG with increased seizure risks. Patient now with Left sided weakness, Fazal-neglect, cognitive deficits, left Homonymous hemianopsia,  gait Instability, ADL impairments and Functional impairments.    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  - Cont Comprehensive Rehab Program: PT/OT/ST, 3hours daily and 5 days weekly  - PT: Focused on improving strength, endurance, coordination, balance, functional mobility, and transfers  - OT: Focused on improving strength, fine motor skills, coordination, posture and ADLs.    - ST: to diagnose and treat deficits in swallowing, cognition and communication.   -  Briviact 50mg BID for seizure prophylaxis.  - EEG with increased risk of seizures in the right frontocentral region  - TTE- EF 64%      #HTN  - Cont. Losartan 25mg daily  - BP controlled    #UTI  - Vantin 100mg BID   -  end date 12/22.      #Pain management  - Tylenol PRN  -tramadol PRN    #DVT   - b/l below the knee DVTs: right  soleal vein, left soleal, peroneal and gastrocnemius veins.  - Lovenox 40 mg daily -- not cleared for full dose AC due to large ICH  -  monitor with weekly US for propagation  --f/u with Neurology toward the end of next week regarding timeline for starting full dose AC-- Consider f/u CT head next week.     #GI ppx  - Pepcid 20mg   - TUMS   - Maalox    #Bowel Regimen  - Senna, miralax PRN    #Bladder management  - Incontinent  --Voiding with low PVRs,    #Dysphagia--   - Diet: Soft and Bite sized with thin liquids  - SLP: evaluation and treatment    #Skin:  - Skin on admission: intact  - Pressure injury/Skin:  OOB to Chair, PT/OT       #Sleep:   - Maintain quiet hours and low stim environment.  - Melatonin PRN to maximize participation in therapy during the day.   - Monitor sleep logs    #Precaution  - Fall, Aspiration, cardiac, seizure    ** Spoke with pt's  _HCP, Isabella_, to provide update on pt's status,  medical update, medications, rehab plan of care, ELOS and discharge needs/expectations.  Pt. has limited supervision at home but Isabella will look into options.  All questions answered.     #[facility]  CODE STATUS: FULL CODE    Outpatient Follow-up (Specialty/Name of physician):    Emy Prakash  NP in [facility]  [address] [place] [address], [address]  [place] [address]  Phone: [phone]  Fax: [phone]  Follow Up Time: 2 weeks    Sergio Jurado  [address] [place] [address], [address]  [place] [address]  Phone: [phone]  Fax: [phone]  Follow Up Time: 1 month

## 2023-12-23 PROCEDURE — 99231 SBSQ HOSP IP/OBS SF/LOW 25: CPT | Mod: GC

## 2023-12-23 PROCEDURE — 99233 SBSQ HOSP IP/OBS HIGH 50: CPT

## 2023-12-23 RX ORDER — GABAPENTIN 400 MG/1
300 CAPSULE ORAL AT BEDTIME
Refills: 0 | Status: DISCONTINUED | OUTPATIENT
Start: 2023-12-23 | End: 2024-01-03

## 2023-12-23 RX ADMIN — TRAMADOL HYDROCHLORIDE 50 MILLIGRAM(S): 50 TABLET ORAL at 19:03

## 2023-12-23 RX ADMIN — ENOXAPARIN SODIUM 40 MILLIGRAM(S): 100 INJECTION SUBCUTANEOUS at 17:29

## 2023-12-23 RX ADMIN — LIDOCAINE 1 PATCH: 4 CREAM TOPICAL at 17:39

## 2023-12-23 RX ADMIN — TRAMADOL HYDROCHLORIDE 50 MILLIGRAM(S): 50 TABLET ORAL at 01:33

## 2023-12-23 RX ADMIN — TRAMADOL HYDROCHLORIDE 50 MILLIGRAM(S): 50 TABLET ORAL at 20:04

## 2023-12-23 RX ADMIN — LOSARTAN POTASSIUM 25 MILLIGRAM(S): 100 TABLET, FILM COATED ORAL at 05:22

## 2023-12-23 RX ADMIN — Medication 650 MILLIGRAM(S): at 05:22

## 2023-12-23 RX ADMIN — Medication 5 MILLIGRAM(S): at 12:13

## 2023-12-23 RX ADMIN — Medication 1 DROP(S): at 12:13

## 2023-12-23 RX ADMIN — FAMOTIDINE 20 MILLIGRAM(S): 10 INJECTION INTRAVENOUS at 17:30

## 2023-12-23 RX ADMIN — LIDOCAINE 1 PATCH: 4 CREAM TOPICAL at 07:47

## 2023-12-23 RX ADMIN — TRAMADOL HYDROCHLORIDE 50 MILLIGRAM(S): 50 TABLET ORAL at 02:32

## 2023-12-23 RX ADMIN — POLYETHYLENE GLYCOL 3350 17 GRAM(S): 17 POWDER, FOR SOLUTION ORAL at 05:23

## 2023-12-23 RX ADMIN — LIDOCAINE 1 PATCH: 4 CREAM TOPICAL at 17:40

## 2023-12-23 RX ADMIN — Medication 100 MILLIGRAM(S): at 05:22

## 2023-12-23 RX ADMIN — BRIVARACETAM 50 MILLIGRAM(S): 25 TABLET, FILM COATED ORAL at 17:29

## 2023-12-23 RX ADMIN — Medication 650 MILLIGRAM(S): at 06:22

## 2023-12-23 RX ADMIN — BRIVARACETAM 50 MILLIGRAM(S): 25 TABLET, FILM COATED ORAL at 05:22

## 2023-12-23 RX ADMIN — FAMOTIDINE 20 MILLIGRAM(S): 10 INJECTION INTRAVENOUS at 05:22

## 2023-12-23 RX ADMIN — GABAPENTIN 300 MILLIGRAM(S): 400 CAPSULE ORAL at 21:14

## 2023-12-23 NOTE — PROGRESS NOTE ADULT - SUBJECTIVE AND OBJECTIVE BOX
Patient is a 73y old  Female who presents with a chief complaint of ICH (23 Dec 2023 09:15)      INTERVAL HPI: Pt seen and examined. States she had alot of right sided posterior neck and headache on right side overnight, denies any other acute complaints at this time.     OVERNIGHT EVENTS: none noted  T(F): 98.1 (12-23-23 @ 07:43), Max: 98.8 (12-22-23 @ 20:31)  HR: 59 (12-23-23 @ 07:43) (59 - 64)  BP: 100/60 (12-23-23 @ 07:43) (100/60 - 123/78)  RR: 16 (12-23-23 @ 07:43) (15 - 16)  SpO2: 97% (12-23-23 @ 07:43) (97% - 98%)  I&O's Summary        PHYSICAL EXAM:  Constitutional - NAD, Comfortable  HEENT: unremark, lidocaine patch noted on post neck  Chest - good chest expansion, good respiratory effort, CTAB   Cardio - warm and well perfused, RRR, no murmur  Abdomen -  Soft, NTND  Extremities - No peripheral edema, + calf tenderness on left  Neurologic Exam: AAOx3, b/l weakness, some slurred speech, slow to verbal response  psych: flat affect, cooperative  Skin on admission: intact    LABS:    12-22    143  |  106  |  16  ----------------------------<  99  3.1<L>   |  28  |  0.58    Ca    8.8      22 Dec 2023 05:45        Urinalysis Basic - ( 22 Dec 2023 05:45 )    Color: x / Appearance: x / SG: x / pH: x  Gluc: 99 mg/dL / Ketone: x  / Bili: x / Urobili: x   Blood: x / Protein: x / Nitrite: x   Leuk Esterase: x / RBC: x / WBC x   Sq Epi: x / Non Sq Epi: x / Bacteria: x      CAPILLARY BLOOD GLUCOSE          12-19 @ 18:10   >100,000 CFU/ml Escherichia coli  --  Escherichia coli ESBL          MEDICATIONS  (STANDING):  bisacodyl 5 milliGRAM(s) Oral daily  brivaracetam 50 milliGRAM(s) Oral two times a day  enoxaparin Injectable 40 milliGRAM(s) SubCutaneous <User Schedule>  famotidine    Tablet 20 milliGRAM(s) Oral two times a day  gabapentin 300 milliGRAM(s) Oral at bedtime  ketorolac 0.5% Ophthalmic Solution 1 Drop(s) Left EYE daily  lidocaine   4% Patch 1 Patch Transdermal <User Schedule>  losartan 25 milliGRAM(s) Oral daily  polyethylene glycol 3350 17 Gram(s) Oral two times a day  senna 2 Tablet(s) Oral at bedtime    MEDICATIONS  (PRN):  acetaminophen     Tablet .. 650 milliGRAM(s) Oral every 6 hours PRN Temp greater or equal to 38C (100.4F), Mild Pain (1 - 3)  aluminum hydroxide/magnesium hydroxide/simethicone Suspension 30 milliLiter(s) Oral every 6 hours PRN Dyspepsia  artificial tears (preservative free) Ophthalmic Solution 1 Drop(s) Both EYES every 4 hours PRN Dry Eyes  bisacodyl Suppository 10 milliGRAM(s) Rectal daily PRN Constipation  calcium carbonate    500 mG (Tums) Chewable 1 Tablet(s) Chew three times a day PRN Heartburn  ondansetron   Disintegrating Tablet 4 milliGRAM(s) Oral every 6 hours PRN Nausea and/or Vomiting  sodium chloride 0.65% Nasal 1 Spray(s) Both Nostrils two times a day PRN Nasal Congestion  traMADol 25 milliGRAM(s) Oral every 4 hours PRN Moderate Pain (4 - 6)  traMADol 50 milliGRAM(s) Oral every 4 hours PRN Severe Pain (7 - 10)

## 2023-12-23 NOTE — PROGRESS NOTE ADULT - SUBJECTIVE AND OBJECTIVE BOX
Patient is a 73y old  Female who presents with a chief complaint of ICH (22 Dec 2023 08:39)    ---------------------------------------------------------------------  SUBJECTIVE:  Seen and evaluated at bedside this AM. No acute issues overnight.   Very distractible and perseverates.  Reports pain in right neck radiating to top of head and had difficulty sleeping overnight due to pain.   Lidocaine patch provides minimal relief.    Other ROS:  Denies: headache, lightheadedness, CP, SOB, abdominal pain, dysuria, nausea, constipation  ----------------------------------------------------------------------  PHYSICAL EXAM:    Vital Signs Last 24 Hrs  T(C): 36.7 (23 Dec 2023 07:43), Max: 37.1 (22 Dec 2023 20:31)  T(F): 98.1 (23 Dec 2023 07:43), Max: 98.8 (22 Dec 2023 20:31)  HR: 59 (23 Dec 2023 07:43) (59 - 64)  BP: 100/60 (23 Dec 2023 07:43) (100/60 - 123/78)  BP(mean): --  RR: 16 (23 Dec 2023 07:43) (15 - 16)  SpO2: 97% (23 Dec 2023 07:43) (97% - 98%)    Parameters below as of 23 Dec 2023 07:43  Patient On (Oxygen Delivery Method): room air      Daily     Daily     PHYSICAL EXAM:    General - NAD, alert, follows commands, perseverative  Heart- pulse regular  Lungs- breathing comfortably without respiratory distress  Abd- no visible abdominal distension   Ext- No calf pain, extremities well perfused  MSK: TTP right cervical paraspinals. Some neck discomfort with ROM.  Neuro- Exam unchanged, proximal weakness. Demonstrates antigravity strength  ----------------------------------------------------------------------  RECENT LABS:    12-22    143  |  106  |  16  ----------------------------<  99  3.1<L>   |  28  |  0.58    Ca    8.8      22 Dec 2023 05:45          Urinalysis Basic - ( 22 Dec 2023 05:45 )    Color: x / Appearance: x / SG: x / pH: x  Gluc: 99 mg/dL / Ketone: x  / Bili: x / Urobili: x   Blood: x / Protein: x / Nitrite: x   Leuk Esterase: x / RBC: x / WBC x   Sq Epi: x / Non Sq Epi: x / Bacteria: x      CAPILLARY BLOOD GLUCOSE        ----------------------------------------------------------------------  RECENT IMAGING:    ***  ----------------------------------------------------------------------  MEDICATIONS:  MEDICATIONS  (STANDING):  bisacodyl 5 milliGRAM(s) Oral daily  brivaracetam 50 milliGRAM(s) Oral two times a day  enoxaparin Injectable 40 milliGRAM(s) SubCutaneous <User Schedule>  famotidine    Tablet 20 milliGRAM(s) Oral two times a day  ketorolac 0.5% Ophthalmic Solution 1 Drop(s) Left EYE daily  lidocaine   4% Patch 1 Patch Transdermal <User Schedule>  losartan 25 milliGRAM(s) Oral daily  polyethylene glycol 3350 17 Gram(s) Oral two times a day  senna 2 Tablet(s) Oral at bedtime    MEDICATIONS  (PRN):  acetaminophen     Tablet .. 650 milliGRAM(s) Oral every 6 hours PRN Temp greater or equal to 38C (100.4F), Mild Pain (1 - 3)  aluminum hydroxide/magnesium hydroxide/simethicone Suspension 30 milliLiter(s) Oral every 6 hours PRN Dyspepsia  artificial tears (preservative free) Ophthalmic Solution 1 Drop(s) Both EYES every 4 hours PRN Dry Eyes  bisacodyl Suppository 10 milliGRAM(s) Rectal daily PRN Constipation  calcium carbonate    500 mG (Tums) Chewable 1 Tablet(s) Chew three times a day PRN Heartburn  ondansetron   Disintegrating Tablet 4 milliGRAM(s) Oral every 6 hours PRN Nausea and/or Vomiting  sodium chloride 0.65% Nasal 1 Spray(s) Both Nostrils two times a day PRN Nasal Congestion  traMADol 25 milliGRAM(s) Oral every 4 hours PRN Moderate Pain (4 - 6)  traMADol 50 milliGRAM(s) Oral every 4 hours PRN Severe Pain (7 - 10)    ----------------------------------------------------------------------

## 2023-12-23 NOTE — PROGRESS NOTE ADULT - ASSESSMENT
Imp: Patient with diagnosis of Right IPH admitted for comprehensive acute rehabilitation.    Plan:  - Continue comprehensive rehabilitation program. Patient requires active and ongoing therapeutic interventions of multiple disciplines and can tolerate 3h/d of therapies. Can actively participate and benefit from an intensive rehabilitation program. Requires supervision of a rehabilitation physician and a coordinated interdisciplinary approach to providing rehabilitation.   - DVT prophylaxis- Lovenox.   - Skin- Turn q2h, check skin daily  - Continue current medications; patient medically stable.   - Patient is stable to continue current rehabilitation program.   - Medical issues:     Neck pain/ occipital neuralgia - Continue lidocaine patch and tramadol as needed. Will add gabapentin 300 qHS. Can also help with sleep      B/l DVTs - on ppx lovenox. Not cleared for full dose AC. CTH next week.

## 2023-12-23 NOTE — PROGRESS NOTE ADULT - ASSESSMENT
72 YO RIGHT handed woman with no PMH who presented to [facility] ED on 12/14/2023, as a transfer from [facility], as a CODE STROKE where she was found to have R IPH. Hospital with repeat imaging showing stability, UTI s/p IV ABX,  new b/l DVTs, increased seizure risks.Patient now with gait Instability, ADL impairments and Functional impairments.    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  - Start Comprehensive Rehab Program: PT/OT/ST, 3hours daily and 5 days weekly  - PT: Focused on improving strength, endurance, coordination, balance, functional mobility, and transfers  - OT: Focused on improving strength, fine motor skills, coordination, posture and ADLs.    - ST: to diagnose and treat deficits in swallowing, cognition and communication.   -  Briviact 50mg BID for seizure prophylaxis.  - EEG with increased risk of seizures in the right frontocentral region  - TTE- EF 64%      #HTN  - Losartan 25mg daily  - monitor    #hypokalemia-acute  -give 40meq once today  -check bmp tomorrow    #bradycardia  -likely neurogenic as per cardio eval prev  -monitor hr, avoid rate controllers    as per pmr  #occipital neuralgia - Continue lidocaine patch and tramadol as needed. Will add gabapentin 300 qHS. Can also help with sleep    #UTI  - Vantin 100mg BID   -  end date 12/22.      #Pain management  - Tylenol PRN  -tramadol PRN    #DVT   - b/l below the knee DVTs: right  soleal vein, left soleal, peroneal and gastrocnemius veins.  - Lovenox ppx dosing as iph, will benefit from hemat eval as outpt for workup  -  monitor with weekly US for propagation    #GI ppx  - Pepcid 20mg   - TUMS   - Maalox    #Bowel Regimen  - Senna, miralax PRN    #Bladder management  - BS on admission, and q 8 hours (SC if > 400)  - Monitor UO    #FEN   - Diet: Soft and Bite sized    #Skin:  - Skin on admission: intact  - Pressure injury/Skin: Turn Q2hrs while in bed, OOB to Chair, PT/OT     #Dysphagia    - SLP: evaluation and treatment    #Sleep:   - Maintain quiet hours and low stim environment.  - Melatonin PRN to maximize participation in therapy during the day.   - Monitor sleep logs    #Precaution  - Fall, Aspiration, cardiac, seizure   on ppx lovenox. Not cleared for full dose AC. CTH next week.     #[facility]  CODE STATUS: FULL CODE   72 YO RIGHT handed woman with no PMH who presented to [facility] ED on 12/14/2023, as a transfer from [facility], as a CODE STROKE where she was found to have R IPH. Hospital with repeat imaging showing stability, UTI s/p IV ABX,  new b/l DVTs, increased seizure risks.Patient now with gait Instability, ADL impairments and Functional impairments.    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  - Start Comprehensive Rehab Program: PT/OT/ST, 3hours daily and 5 days weekly  - PT: Focused on improving strength, endurance, coordination, balance, functional mobility, and transfers  - OT: Focused on improving strength, fine motor skills, coordination, posture and ADLs.    - ST: to diagnose and treat deficits in swallowing, cognition and communication.   -  Briviact 50mg BID for seizure prophylaxis.  - EEG with increased risk of seizures in the right frontocentral region  - TTE- EF 64%      #HTN  - Losartan 25mg daily  - monitor    #hypokalemia-acute  -give 40meq once today  -check bmp tomorrow    #bradycardia  -likely neurogenic as per cardio eval prev  -monitor hr, avoid rate controllers    as per pmr  #occipital neuralgia - Continue lidocaine patch and tramadol as needed. Will add gabapentin 300 qHS. Can also help with sleep    #UTI  - Vantin 100mg BID   -  end date 12/22.      #Pain management  - Tylenol PRN  -tramadol PRN    #DVT   - b/l below the knee DVTs: right  soleal vein, left soleal, peroneal and gastrocnemius veins.  - Lovenox ppx dosing as iph, will benefit from hemat eval as outpt for workup  -  monitor with weekly US for propagation    #GI ppx  - Pepcid 20mg   - TUMS   - Maalox    #Bowel Regimen  - Senna, miralax PRN    #Bladder management  - BS on admission, and q 8 hours (SC if > 400)  - Monitor UO    #FEN   - Diet: Soft and Bite sized    #Skin:  - Skin on admission: intact  - Pressure injury/Skin: Turn Q2hrs while in bed, OOB to Chair, PT/OT     #Dysphagia    - SLP: evaluation and treatment    #Sleep:   - Maintain quiet hours and low stim environment.  - Melatonin PRN to maximize participation in therapy during the day.   - Monitor sleep logs    #Precaution  - Fall, Aspiration, cardiac, seizure   on ppx lovenox. Not cleared for full dose AC. CTH next week.     #[facility]  CODE STATUS: FULL CODE

## 2023-12-24 LAB
-  AMPICILLIN: SIGNIFICANT CHANGE UP
-  AMPICILLIN: SIGNIFICANT CHANGE UP
-  CIPROFLOXACIN: SIGNIFICANT CHANGE UP
-  CIPROFLOXACIN: SIGNIFICANT CHANGE UP
-  LEVOFLOXACIN: SIGNIFICANT CHANGE UP
-  LEVOFLOXACIN: SIGNIFICANT CHANGE UP
-  NITROFURANTOIN: SIGNIFICANT CHANGE UP
-  NITROFURANTOIN: SIGNIFICANT CHANGE UP
-  TETRACYCLINE: SIGNIFICANT CHANGE UP
-  TETRACYCLINE: SIGNIFICANT CHANGE UP
-  VANCOMYCIN: SIGNIFICANT CHANGE UP
-  VANCOMYCIN: SIGNIFICANT CHANGE UP
CULTURE RESULTS: ABNORMAL
CULTURE RESULTS: ABNORMAL
METHOD TYPE: SIGNIFICANT CHANGE UP
METHOD TYPE: SIGNIFICANT CHANGE UP
ORGANISM # SPEC MICROSCOPIC CNT: ABNORMAL
SPECIMEN SOURCE: SIGNIFICANT CHANGE UP
SPECIMEN SOURCE: SIGNIFICANT CHANGE UP

## 2023-12-24 PROCEDURE — 99232 SBSQ HOSP IP/OBS MODERATE 35: CPT

## 2023-12-24 PROCEDURE — 99232 SBSQ HOSP IP/OBS MODERATE 35: CPT | Mod: GC

## 2023-12-24 RX ORDER — POTASSIUM CHLORIDE 20 MEQ
20 PACKET (EA) ORAL
Refills: 0 | Status: DISCONTINUED | OUTPATIENT
Start: 2023-12-24 | End: 2023-12-24

## 2023-12-24 RX ORDER — POTASSIUM CHLORIDE 20 MEQ
40 PACKET (EA) ORAL
Refills: 0 | Status: COMPLETED | OUTPATIENT
Start: 2023-12-24 | End: 2023-12-27

## 2023-12-24 RX ADMIN — Medication 1 DROP(S): at 12:08

## 2023-12-24 RX ADMIN — LIDOCAINE 1 PATCH: 4 CREAM TOPICAL at 07:57

## 2023-12-24 RX ADMIN — Medication 1 TABLET(S): at 17:00

## 2023-12-24 RX ADMIN — GABAPENTIN 300 MILLIGRAM(S): 400 CAPSULE ORAL at 21:08

## 2023-12-24 RX ADMIN — LIDOCAINE 1 PATCH: 4 CREAM TOPICAL at 20:11

## 2023-12-24 RX ADMIN — LOSARTAN POTASSIUM 25 MILLIGRAM(S): 100 TABLET, FILM COATED ORAL at 05:13

## 2023-12-24 RX ADMIN — BRIVARACETAM 50 MILLIGRAM(S): 25 TABLET, FILM COATED ORAL at 17:00

## 2023-12-24 RX ADMIN — SENNA PLUS 2 TABLET(S): 8.6 TABLET ORAL at 21:08

## 2023-12-24 RX ADMIN — FAMOTIDINE 20 MILLIGRAM(S): 10 INJECTION INTRAVENOUS at 05:13

## 2023-12-24 RX ADMIN — LIDOCAINE 1 PATCH: 4 CREAM TOPICAL at 20:10

## 2023-12-24 RX ADMIN — Medication 650 MILLIGRAM(S): at 09:41

## 2023-12-24 RX ADMIN — ENOXAPARIN SODIUM 40 MILLIGRAM(S): 100 INJECTION SUBCUTANEOUS at 17:01

## 2023-12-24 RX ADMIN — Medication 5 MILLIGRAM(S): at 12:09

## 2023-12-24 RX ADMIN — FAMOTIDINE 20 MILLIGRAM(S): 10 INJECTION INTRAVENOUS at 17:01

## 2023-12-24 RX ADMIN — Medication 650 MILLIGRAM(S): at 10:41

## 2023-12-24 RX ADMIN — BRIVARACETAM 50 MILLIGRAM(S): 25 TABLET, FILM COATED ORAL at 05:13

## 2023-12-24 RX ADMIN — Medication 40 MILLIEQUIVALENT(S): at 17:00

## 2023-12-24 NOTE — PROGRESS NOTE ADULT - ASSESSMENT
Imp: Patient with diagnosis of Right IPH admitted for comprehensive acute rehabilitation.    Plan:  - Continue comprehensive rehabilitation program. Patient requires active and ongoing therapeutic interventions of multiple disciplines and can tolerate 3h/d of therapies. Can actively participate and benefit from an intensive rehabilitation program. Requires supervision of a rehabilitation physician and a coordinated interdisciplinary approach to providing rehabilitation.   - DVT prophylaxis- Lovenox.   - Skin- Turn q2h, check skin daily  - Continue current medications; patient medically stable.   - Medical issues:     Neck pain/ occipital neuralgia - Continue lidocaine patch and tramadol as needed. Started gabapentin 300 qHS 12/23. Reports improved sleep/pain. Tolerating well.    B/l DVTs - on ppx lovenox. Not cleared for full dose AC. CTH next week.

## 2023-12-24 NOTE — PROGRESS NOTE ADULT - ASSESSMENT
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      #HTN  - Losartan 25mg daily  - Goal BP <130mmhg Average. Good control cont care    #hypokalemia-acute  -give 40meq once today  -Still hypokalemic - Increase to BID and cmp and mag ordered for tomorow Dec24    #bradycardia  -likely neurogenic as per cardio eval prev  -monitor hr, avoid rate controllers    #UTI  - Vantin 100mg BID   -  end date 12/22.   resolved    #Pain management  - Tylenol PRN  -tramadol PRN    #DVT   - Distal assymptomatic DVT  Just  on profx dose rn    #

## 2023-12-24 NOTE — PROGRESS NOTE ADULT - SUBJECTIVE AND OBJECTIVE BOX
No events overnight  No new complaints offered to me    98.7; 66; 113/71; 15; 98%  Vital trends have been reviewed    Examination:  General: NAD, Comfortable  Pulm: CTA BL No Rhonchi Rales or wheezes  Neck: Supple, No JVD  CVS: RRR No rubs murmurs or gallops  Abdomen: Soft, Nontender, Nondistended; No masses or organomegally  Extremities: No calf tenderness, No pitting edema    Sodium: 143 mmol/L (12-22-23 @ 05:45)  Potassium: 3.1 mmol/L (12-22-23 @ 05:45)  Glucose: 99 mg/dL (12-22-23 @ 05:45)  Blood Urea Nitrogen: 16 mg/dL (12-22-23 @ 05:45)  Creatinine: 0.58 mg/dL (12-22-23 @ 05:45)

## 2023-12-24 NOTE — PROGRESS NOTE ADULT - SUBJECTIVE AND OBJECTIVE BOX
Patient is a 73y old  Female who presents with a chief complaint of ICH (23 Dec 2023 09:15)    ---------------------------------------------------------------------  SUBJECTIVE:  Seen and evaluated at bedside this AM. No acute issues overnight.   Reports improvement in neck pain and better sleep. Started gabapentin yesterday. Denies any side effects.    Other ROS:  Denies: headache, CP, SOB, pain, bowel or bladder issues  ----------------------------------------------------------------------  PHYSICAL EXAM:    Vital Signs Last 24 Hrs  T(C): 37.1 (24 Dec 2023 08:01), Max: 37.1 (23 Dec 2023 19:05)  T(F): 98.7 (24 Dec 2023 08:01), Max: 98.7 (23 Dec 2023 19:05)  HR: 66 (24 Dec 2023 08:01) (66 - 71)  BP: 113/71 (24 Dec 2023 08:01) (110/76 - 113/71)  BP(mean): --  RR: 15 (24 Dec 2023 08:01) (15 - 16)  SpO2: 98% (24 Dec 2023 08:01) (97% - 98%)    Parameters below as of 24 Dec 2023 08:01  Patient On (Oxygen Delivery Method): room air      General - NAD, alert, follows commands, perseverative  Heart- pulse regular  Lungs- breathing comfortably without respiratory distress  Abd- no visible abdominal distension   Ext- No calf pain, extremities well perfused  MSK: TTP right cervical paraspinals. Some neck discomfort with ROM.  Neuro- Exam unchanged, proximal weakness. Demonstrates antigravity strength  ----------------------------------------------------------------------  RECENT LABS:                CAPILLARY BLOOD GLUCOSE        ----------------------------------------------------------------------  RECENT IMAGING:    ***  ----------------------------------------------------------------------  MEDICATIONS:  MEDICATIONS  (STANDING):  bisacodyl 5 milliGRAM(s) Oral daily  brivaracetam 50 milliGRAM(s) Oral two times a day  enoxaparin Injectable 40 milliGRAM(s) SubCutaneous <User Schedule>  famotidine    Tablet 20 milliGRAM(s) Oral two times a day  gabapentin 300 milliGRAM(s) Oral at bedtime  ketorolac 0.5% Ophthalmic Solution 1 Drop(s) Left EYE daily  lidocaine   4% Patch 1 Patch Transdermal <User Schedule>  losartan 25 milliGRAM(s) Oral daily  polyethylene glycol 3350 17 Gram(s) Oral two times a day  senna 2 Tablet(s) Oral at bedtime    MEDICATIONS  (PRN):  acetaminophen     Tablet .. 650 milliGRAM(s) Oral every 6 hours PRN Temp greater or equal to 38C (100.4F), Mild Pain (1 - 3)  aluminum hydroxide/magnesium hydroxide/simethicone Suspension 30 milliLiter(s) Oral every 6 hours PRN Dyspepsia  artificial tears (preservative free) Ophthalmic Solution 1 Drop(s) Both EYES every 4 hours PRN Dry Eyes  bisacodyl Suppository 10 milliGRAM(s) Rectal daily PRN Constipation  calcium carbonate    500 mG (Tums) Chewable 1 Tablet(s) Chew three times a day PRN Heartburn  ondansetron   Disintegrating Tablet 4 milliGRAM(s) Oral every 6 hours PRN Nausea and/or Vomiting  sodium chloride 0.65% Nasal 1 Spray(s) Both Nostrils two times a day PRN Nasal Congestion  traMADol 25 milliGRAM(s) Oral every 4 hours PRN Moderate Pain (4 - 6)  traMADol 50 milliGRAM(s) Oral every 4 hours PRN Severe Pain (7 - 10)    ----------------------------------------------------------------------

## 2023-12-25 LAB
ALBUMIN SERPL ELPH-MCNC: 2.8 G/DL — LOW (ref 3.3–5)
ALBUMIN SERPL ELPH-MCNC: 2.8 G/DL — LOW (ref 3.3–5)
ALP SERPL-CCNC: 58 U/L — SIGNIFICANT CHANGE UP (ref 40–120)
ALP SERPL-CCNC: 58 U/L — SIGNIFICANT CHANGE UP (ref 40–120)
ALT FLD-CCNC: 81 U/L — HIGH (ref 10–45)
ALT FLD-CCNC: 81 U/L — HIGH (ref 10–45)
ANION GAP SERPL CALC-SCNC: 9 MMOL/L — SIGNIFICANT CHANGE UP (ref 5–17)
ANION GAP SERPL CALC-SCNC: 9 MMOL/L — SIGNIFICANT CHANGE UP (ref 5–17)
AST SERPL-CCNC: 30 U/L — SIGNIFICANT CHANGE UP (ref 10–40)
AST SERPL-CCNC: 30 U/L — SIGNIFICANT CHANGE UP (ref 10–40)
BILIRUB SERPL-MCNC: 0.8 MG/DL — SIGNIFICANT CHANGE UP (ref 0.2–1.2)
BILIRUB SERPL-MCNC: 0.8 MG/DL — SIGNIFICANT CHANGE UP (ref 0.2–1.2)
BUN SERPL-MCNC: 16 MG/DL — SIGNIFICANT CHANGE UP (ref 7–23)
BUN SERPL-MCNC: 16 MG/DL — SIGNIFICANT CHANGE UP (ref 7–23)
CALCIUM SERPL-MCNC: 8.6 MG/DL — SIGNIFICANT CHANGE UP (ref 8.4–10.5)
CALCIUM SERPL-MCNC: 8.6 MG/DL — SIGNIFICANT CHANGE UP (ref 8.4–10.5)
CHLORIDE SERPL-SCNC: 104 MMOL/L — SIGNIFICANT CHANGE UP (ref 96–108)
CHLORIDE SERPL-SCNC: 104 MMOL/L — SIGNIFICANT CHANGE UP (ref 96–108)
CO2 SERPL-SCNC: 26 MMOL/L — SIGNIFICANT CHANGE UP (ref 22–31)
CO2 SERPL-SCNC: 26 MMOL/L — SIGNIFICANT CHANGE UP (ref 22–31)
CREAT SERPL-MCNC: 0.58 MG/DL — SIGNIFICANT CHANGE UP (ref 0.5–1.3)
CREAT SERPL-MCNC: 0.58 MG/DL — SIGNIFICANT CHANGE UP (ref 0.5–1.3)
EGFR: 95 ML/MIN/1.73M2 — SIGNIFICANT CHANGE UP
EGFR: 95 ML/MIN/1.73M2 — SIGNIFICANT CHANGE UP
GLUCOSE SERPL-MCNC: 102 MG/DL — HIGH (ref 70–99)
GLUCOSE SERPL-MCNC: 102 MG/DL — HIGH (ref 70–99)
HCT VFR BLD CALC: 37.9 % — SIGNIFICANT CHANGE UP (ref 34.5–45)
HCT VFR BLD CALC: 37.9 % — SIGNIFICANT CHANGE UP (ref 34.5–45)
HGB BLD-MCNC: 13.3 G/DL — SIGNIFICANT CHANGE UP (ref 11.5–15.5)
HGB BLD-MCNC: 13.3 G/DL — SIGNIFICANT CHANGE UP (ref 11.5–15.5)
MAGNESIUM SERPL-MCNC: 1.8 MG/DL — SIGNIFICANT CHANGE UP (ref 1.6–2.6)
MAGNESIUM SERPL-MCNC: 1.8 MG/DL — SIGNIFICANT CHANGE UP (ref 1.6–2.6)
MCHC RBC-ENTMCNC: 30.6 PG — SIGNIFICANT CHANGE UP (ref 27–34)
MCHC RBC-ENTMCNC: 30.6 PG — SIGNIFICANT CHANGE UP (ref 27–34)
MCHC RBC-ENTMCNC: 35.1 GM/DL — SIGNIFICANT CHANGE UP (ref 32–36)
MCHC RBC-ENTMCNC: 35.1 GM/DL — SIGNIFICANT CHANGE UP (ref 32–36)
MCV RBC AUTO: 87.1 FL — SIGNIFICANT CHANGE UP (ref 80–100)
MCV RBC AUTO: 87.1 FL — SIGNIFICANT CHANGE UP (ref 80–100)
NRBC # BLD: 0 /100 WBCS — SIGNIFICANT CHANGE UP (ref 0–0)
NRBC # BLD: 0 /100 WBCS — SIGNIFICANT CHANGE UP (ref 0–0)
PLATELET # BLD AUTO: 169 K/UL — SIGNIFICANT CHANGE UP (ref 150–400)
PLATELET # BLD AUTO: 169 K/UL — SIGNIFICANT CHANGE UP (ref 150–400)
POTASSIUM SERPL-MCNC: 3.9 MMOL/L — SIGNIFICANT CHANGE UP (ref 3.5–5.3)
POTASSIUM SERPL-MCNC: 3.9 MMOL/L — SIGNIFICANT CHANGE UP (ref 3.5–5.3)
POTASSIUM SERPL-SCNC: 3.9 MMOL/L — SIGNIFICANT CHANGE UP (ref 3.5–5.3)
POTASSIUM SERPL-SCNC: 3.9 MMOL/L — SIGNIFICANT CHANGE UP (ref 3.5–5.3)
PROT SERPL-MCNC: 5.8 G/DL — LOW (ref 6–8.3)
PROT SERPL-MCNC: 5.8 G/DL — LOW (ref 6–8.3)
RBC # BLD: 4.35 M/UL — SIGNIFICANT CHANGE UP (ref 3.8–5.2)
RBC # BLD: 4.35 M/UL — SIGNIFICANT CHANGE UP (ref 3.8–5.2)
RBC # FLD: 12.9 % — SIGNIFICANT CHANGE UP (ref 10.3–14.5)
RBC # FLD: 12.9 % — SIGNIFICANT CHANGE UP (ref 10.3–14.5)
SODIUM SERPL-SCNC: 139 MMOL/L — SIGNIFICANT CHANGE UP (ref 135–145)
SODIUM SERPL-SCNC: 139 MMOL/L — SIGNIFICANT CHANGE UP (ref 135–145)
WBC # BLD: 9.25 K/UL — SIGNIFICANT CHANGE UP (ref 3.8–10.5)
WBC # BLD: 9.25 K/UL — SIGNIFICANT CHANGE UP (ref 3.8–10.5)
WBC # FLD AUTO: 9.25 K/UL — SIGNIFICANT CHANGE UP (ref 3.8–10.5)
WBC # FLD AUTO: 9.25 K/UL — SIGNIFICANT CHANGE UP (ref 3.8–10.5)

## 2023-12-25 PROCEDURE — 99231 SBSQ HOSP IP/OBS SF/LOW 25: CPT | Mod: GC

## 2023-12-25 PROCEDURE — 99232 SBSQ HOSP IP/OBS MODERATE 35: CPT

## 2023-12-25 RX ORDER — SODIUM CHLORIDE 0.65 %
1 AEROSOL, SPRAY (ML) NASAL
Refills: 0 | Status: DISCONTINUED | OUTPATIENT
Start: 2023-12-25 | End: 2024-01-11

## 2023-12-25 RX ADMIN — BRIVARACETAM 50 MILLIGRAM(S): 25 TABLET, FILM COATED ORAL at 06:02

## 2023-12-25 RX ADMIN — Medication 5 MILLIGRAM(S): at 11:19

## 2023-12-25 RX ADMIN — LIDOCAINE 1 PATCH: 4 CREAM TOPICAL at 19:45

## 2023-12-25 RX ADMIN — Medication 650 MILLIGRAM(S): at 01:59

## 2023-12-25 RX ADMIN — Medication 1 SPRAY(S): at 17:07

## 2023-12-25 RX ADMIN — FAMOTIDINE 20 MILLIGRAM(S): 10 INJECTION INTRAVENOUS at 17:07

## 2023-12-25 RX ADMIN — ENOXAPARIN SODIUM 40 MILLIGRAM(S): 100 INJECTION SUBCUTANEOUS at 17:07

## 2023-12-25 RX ADMIN — LIDOCAINE 1 PATCH: 4 CREAM TOPICAL at 08:52

## 2023-12-25 RX ADMIN — Medication 40 MILLIEQUIVALENT(S): at 06:02

## 2023-12-25 RX ADMIN — POLYETHYLENE GLYCOL 3350 17 GRAM(S): 17 POWDER, FOR SOLUTION ORAL at 17:08

## 2023-12-25 RX ADMIN — GABAPENTIN 300 MILLIGRAM(S): 400 CAPSULE ORAL at 21:33

## 2023-12-25 RX ADMIN — BRIVARACETAM 50 MILLIGRAM(S): 25 TABLET, FILM COATED ORAL at 17:07

## 2023-12-25 RX ADMIN — Medication 650 MILLIGRAM(S): at 02:59

## 2023-12-25 RX ADMIN — Medication 40 MILLIEQUIVALENT(S): at 17:07

## 2023-12-25 RX ADMIN — FAMOTIDINE 20 MILLIGRAM(S): 10 INJECTION INTRAVENOUS at 06:02

## 2023-12-25 RX ADMIN — LIDOCAINE 1 PATCH: 4 CREAM TOPICAL at 21:31

## 2023-12-25 RX ADMIN — Medication 1 DROP(S): at 11:20

## 2023-12-25 RX ADMIN — LOSARTAN POTASSIUM 25 MILLIGRAM(S): 100 TABLET, FILM COATED ORAL at 06:02

## 2023-12-25 NOTE — PROGRESS NOTE ADULT - ASSESSMENT
Imp: Patient with diagnosis of Right IPH admitted for comprehensive acute rehabilitation.    Plan:  - Continue comprehensive rehabilitation program. Patient requires active and ongoing therapeutic interventions of multiple disciplines and can tolerate 3h/d of therapies. Can actively participate and benefit from an intensive rehabilitation program. Requires supervision of a rehabilitation physician and a coordinated interdisciplinary approach to providing rehabilitation.   - DVT prophylaxis- Lovenox.   - Skin- Turn q2h, check skin daily  - Labs reviewed 12/25 - stable  - Medical issues:     Neck pain/ occipital neuralgia - Continue lidocaine patch and tramadol as needed. Started gabapentin 300 qHS 12/23. Reports improved sleep/pain. Tolerating well.    B/l DVTs - on ppx lovenox. Not cleared for full dose AC. CTH next week.     Nasal congestion - Scheduled nasal spray  - Discussed with resident on call

## 2023-12-25 NOTE — PROGRESS NOTE ADULT - SUBJECTIVE AND OBJECTIVE BOX
CC: Patient is a 73y old  Female who presents with a chief complaint of ICH (25 Dec 2023 09:15)      Interval History:  Patient seen and examined at bedside.  No overnight events  Has body pain    ALLERGIES:  No Known Allergies  oxycodone (Nausea)    MEDICATIONS  (STANDING):  bisacodyl 5 milliGRAM(s) Oral daily  brivaracetam 50 milliGRAM(s) Oral two times a day  enoxaparin Injectable 40 milliGRAM(s) SubCutaneous <User Schedule>  famotidine    Tablet 20 milliGRAM(s) Oral two times a day  gabapentin 300 milliGRAM(s) Oral at bedtime  ketorolac 0.5% Ophthalmic Solution 1 Drop(s) Left EYE daily  lidocaine   4% Patch 1 Patch Transdermal <User Schedule>  losartan 25 milliGRAM(s) Oral daily  polyethylene glycol 3350 17 Gram(s) Oral two times a day  potassium chloride    Tablet ER 40 milliEquivalent(s) Oral two times a day  senna 2 Tablet(s) Oral at bedtime  sodium chloride 0.65% Nasal 1 Spray(s) Both Nostrils two times a day    MEDICATIONS  (PRN):  acetaminophen     Tablet .. 650 milliGRAM(s) Oral every 6 hours PRN Temp greater or equal to 38C (100.4F), Mild Pain (1 - 3)  aluminum hydroxide/magnesium hydroxide/simethicone Suspension 30 milliLiter(s) Oral every 6 hours PRN Dyspepsia  artificial tears (preservative free) Ophthalmic Solution 1 Drop(s) Both EYES every 4 hours PRN Dry Eyes  bisacodyl Suppository 10 milliGRAM(s) Rectal daily PRN Constipation  calcium carbonate    500 mG (Tums) Chewable 1 Tablet(s) Chew three times a day PRN Heartburn  ondansetron   Disintegrating Tablet 4 milliGRAM(s) Oral every 6 hours PRN Nausea and/or Vomiting  traMADol 50 milliGRAM(s) Oral every 4 hours PRN Severe Pain (7 - 10)  traMADol 25 milliGRAM(s) Oral every 4 hours PRN Moderate Pain (4 - 6)    Vital Signs Last 24 Hrs  T(F): 97.9 (25 Dec 2023 11:21), Max: 98.2 (24 Dec 2023 20:16)  HR: 71 (25 Dec 2023 11:21) (69 - 74)  BP: 110/70 (25 Dec 2023 11:21) (110/69 - 115/62)  RR: 15 (25 Dec 2023 11:21) (15 - 16)  SpO2: 98% (25 Dec 2023 11:21) (95% - 98%)  I&O's Summary    BMI (kg/m2): 26.2 (12-21-23 @ 15:13)    PHYSICAL EXAM:  GENERAL: NAD  CHEST/LUNG: Clear to percussion bilaterally; No rales, rhonchi, wheezing, or rubs; normal respiratory effort, no intercostal retractions  HEART: Regular rate and rhythm; No murmurs, rubs, or gallops; No pitting edema  ABDOMEN: Soft, Nontender, Nondistended; Bowel sounds present; No HSM or masses  MUSCULOSKELETAL/EXTREMITIES:  2+ Peripheral Pulses, No clubbing or digital cyanosis; FROM of extremeties (pain, crepitation or contracture)  PSYCH: Appropriate affect, Alert & Awake    LABS:                        13.3   9.25  )-----------( 169      ( 25 Dec 2023 06:00 )             37.9       12-25    139  |  104  |  16  ----------------------------<  102  3.9   |  26  |  0.58    Ca    8.6      25 Dec 2023 06:00  Mg     1.8     12-25    TPro  5.8  /  Alb  2.8  /  TBili  0.8  /  DBili  x   /  AST  30  /  ALT  81  /  AlkPhos  58  12-25     12-15 Chol 197 mg/dL LDL -- HDL 58 mg/dL Trig 64 mg/dL    Urinalysis Basic - ( 25 Dec 2023 06:00 )  Color: x /Appearance: x / SG: x / pH: x  Gluc: 102 mg/dL / Ketone: x  / Bili: x / Urobili: x   Blood: x / Protein: x / Nitrite: x   Leuk Esterase: x / RBC: x / WBC x   Sq Epi: x / Non Sq Epi: x / Bacteria: x    Culture - Urine (collected 19 Dec 2023 18:10)  Source: Clean Catch Clean Catch (Midstream)  Final Report (24 Dec 2023 13:10):    >100,000 CFU/ml Escherichia coli ESBL    >100,000 CFU/ml Enterococcus faecalis  Organism: Escherichia coli ESBL  Enterococcus faecalis (24 Dec 2023 13:10)  Organism: Enterococcus faecalis (24 Dec 2023 13:10)      Method Type: MATTHEW      -  Ampicillin: S <=2 Predicts results to ampicillin/sulbactam, amoxacillin-clavulanate and  piperacillin-tazobactam.      -  Ciprofloxacin: I 2      -  Levofloxacin: S 2      -  Nitrofurantoin: S <=32 Should not be used to treat pyelonephritis.      -  Tetracycline: S <=1      -  Vancomycin: S 2  Organism: Escherichia coli ESBL (24 Dec 2023 13:10)      Method Type: MATTHEW      -  Amoxicillin/Clavulanic Acid: S <=8/4      -  Ampicillin: R >16 These ampicillin results predict results for amoxicillin      -  Ampicillin/Sulbactam: S 8/4      -  Aztreonam: R 16      -  Cefazolin: R >16 For uncomplicated UTI with K. pneumoniae, E. coli, or P. mirablis: MATTHEW <=16 is sensitive and MATTHEW >=32 is resistant. This also predicts results for oral agents cefaclor, cefdinir, cefpodoxime, cefprozil, cefuroxime axetil, cephalexin and locarbef for uncomplicated UTI. Note that some isolates may be susceptible to these agents while testing resistant to cefazolin.      -  Cefepime: R >16      -  Ceftriaxone: R >32      -  Cefuroxime: R >16      -  Ciprofloxacin: R >2      -  Ertapenem: S <=0.5      -  Gentamicin: S <=2      -  Imipenem: S <=1      -  Levofloxacin: R >4      -  Meropenem: S <=1      -  Nitrofurantoin: S <=32 Should not be used to treat pyelonephritis      -  Piperacillin/Tazobactam: S <=8      -  Tobramycin: S <=2      -  Trimethoprim/Sulfamethoxazole: S <=0.5/9.5    COVID-19 PCR: NotDebra (12-20-23 @ 22:30)    Care Discussed with Consultants/Other Providers: Yes

## 2023-12-25 NOTE — PROGRESS NOTE ADULT - SUBJECTIVE AND OBJECTIVE BOX
Patient is a 73y old  Female who presents with a chief complaint of ICH (24 Dec 2023 12:23)    ---------------------------------------------------------------------  SUBJECTIVE:  Seen and evaluated at bedside this AM. No acute issues overnight. Slept well but still reporting diffuse pains all over her body. She appears comfortable. Requesting nasal spray for nasal congestion. Denies fevers/chills.    Other ROS:  Denies: headache, CP, SOB, pain, bowel or bladder issues  ----------------------------------------------------------------------  PHYSICAL EXAM:    Vital Signs Last 24 Hrs  T(C): 36.8 (24 Dec 2023 20:16), Max: 36.8 (24 Dec 2023 20:16)  T(F): 98.2 (24 Dec 2023 20:16), Max: 98.2 (24 Dec 2023 20:16)  HR: 69 (25 Dec 2023 06:00) (69 - 74)  BP: 115/62 (25 Dec 2023 06:00) (110/69 - 115/62)  BP(mean): --  RR: 16 (25 Dec 2023 06:00) (16 - 16)  SpO2: 95% (25 Dec 2023 06:00) (95% - 95%)    Parameters below as of 25 Dec 2023 06:00  Patient On (Oxygen Delivery Method): room air      General - NAD, alert, follows commands, perseverative  Heart- pulse regular  Lungs- breathing comfortably without respiratory distress  Abd- no visible abdominal distension   Ext- No calf pain, extremities well perfused  MSK: TTP right cervical paraspinals. Some neck discomfort with ROM.  Neuro- Exam unchanged, proximal weakness. Demonstrates antigravity strength  ----------------------------------------------------------------------  RECENT LABS:                        13.3   9.25  )-----------( 169      ( 25 Dec 2023 06:00 )             37.9     12-25    139  |  104  |  16  ----------------------------<  102<H>  3.9   |  26  |  0.58    Ca    8.6      25 Dec 2023 06:00  Mg     1.8     12-25    TPro  5.8<L>  /  Alb  2.8<L>  /  TBili  0.8  /  DBili  x   /  AST  30  /  ALT  81<H>  /  AlkPhos  58  12-25    LIVER FUNCTIONS - ( 25 Dec 2023 06:00 )  Alb: 2.8 g/dL / Pro: 5.8 g/dL / ALK PHOS: 58 U/L / ALT: 81 U/L / AST: 30 U/L / GGT: x             Urinalysis Basic - ( 25 Dec 2023 06:00 )    Color: x / Appearance: x / SG: x / pH: x  Gluc: 102 mg/dL / Ketone: x  / Bili: x / Urobili: x   Blood: x / Protein: x / Nitrite: x   Leuk Esterase: x / RBC: x / WBC x   Sq Epi: x / Non Sq Epi: x / Bacteria: x      CAPILLARY BLOOD GLUCOSE        ----------------------------------------------------------------------  RECENT IMAGING:    ***  ----------------------------------------------------------------------  MEDICATIONS:  MEDICATIONS  (STANDING):  bisacodyl 5 milliGRAM(s) Oral daily  brivaracetam 50 milliGRAM(s) Oral two times a day  enoxaparin Injectable 40 milliGRAM(s) SubCutaneous <User Schedule>  famotidine    Tablet 20 milliGRAM(s) Oral two times a day  gabapentin 300 milliGRAM(s) Oral at bedtime  ketorolac 0.5% Ophthalmic Solution 1 Drop(s) Left EYE daily  lidocaine   4% Patch 1 Patch Transdermal <User Schedule>  losartan 25 milliGRAM(s) Oral daily  polyethylene glycol 3350 17 Gram(s) Oral two times a day  potassium chloride    Tablet ER 40 milliEquivalent(s) Oral two times a day  senna 2 Tablet(s) Oral at bedtime  sodium chloride 0.65% Nasal 1 Spray(s) Both Nostrils two times a day    MEDICATIONS  (PRN):  acetaminophen     Tablet .. 650 milliGRAM(s) Oral every 6 hours PRN Temp greater or equal to 38C (100.4F), Mild Pain (1 - 3)  aluminum hydroxide/magnesium hydroxide/simethicone Suspension 30 milliLiter(s) Oral every 6 hours PRN Dyspepsia  artificial tears (preservative free) Ophthalmic Solution 1 Drop(s) Both EYES every 4 hours PRN Dry Eyes  bisacodyl Suppository 10 milliGRAM(s) Rectal daily PRN Constipation  calcium carbonate    500 mG (Tums) Chewable 1 Tablet(s) Chew three times a day PRN Heartburn  ondansetron   Disintegrating Tablet 4 milliGRAM(s) Oral every 6 hours PRN Nausea and/or Vomiting  traMADol 50 milliGRAM(s) Oral every 4 hours PRN Severe Pain (7 - 10)  traMADol 25 milliGRAM(s) Oral every 4 hours PRN Moderate Pain (4 - 6)    ----------------------------------------------------------------------

## 2023-12-25 NOTE — PROGRESS NOTE ADULT - ASSESSMENT
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 Noted to have UTI, and BL Distal DVT    Intraparenchymal Hemorrhage  - Abnormal EEG and Briviact 50mg BID started for seizure profx   - TTE- EF 64%.    #HTN  - Losartan 25mg daily  - Goal BP <130mmhg Average. Good control cont care    #hypokalemia-acute  -give 40meq once today  -Still hypokalemic - Increase to BID and cmp and mag ordered for tomorow Dec24    #bradycardia  -likely neurogenic as per cardio eval prev  -monitor hr, avoid rate controllers    #UTI  - Vantin 100mg BID   -  end date 12/22.   resolved    #Pain management  - Tylenol PRN  -tramadol PRN    #DVT   - Distal assymptomatic DVT  - continue prophylactic dose

## 2023-12-26 PROCEDURE — 99232 SBSQ HOSP IP/OBS MODERATE 35: CPT

## 2023-12-26 RX ORDER — BRIVARACETAM 25 MG/1
50 TABLET, FILM COATED ORAL
Refills: 0 | Status: DISCONTINUED | OUTPATIENT
Start: 2023-12-26 | End: 2024-01-02

## 2023-12-26 RX ORDER — TRAMADOL HYDROCHLORIDE 50 MG/1
25 TABLET ORAL EVERY 4 HOURS
Refills: 0 | Status: DISCONTINUED | OUTPATIENT
Start: 2023-12-26 | End: 2023-12-30

## 2023-12-26 RX ADMIN — FAMOTIDINE 20 MILLIGRAM(S): 10 INJECTION INTRAVENOUS at 05:39

## 2023-12-26 RX ADMIN — ENOXAPARIN SODIUM 40 MILLIGRAM(S): 100 INJECTION SUBCUTANEOUS at 18:14

## 2023-12-26 RX ADMIN — GABAPENTIN 300 MILLIGRAM(S): 400 CAPSULE ORAL at 21:07

## 2023-12-26 RX ADMIN — LIDOCAINE 1 PATCH: 4 CREAM TOPICAL at 19:45

## 2023-12-26 RX ADMIN — Medication 1 DROP(S): at 11:34

## 2023-12-26 RX ADMIN — BRIVARACETAM 50 MILLIGRAM(S): 25 TABLET, FILM COATED ORAL at 05:38

## 2023-12-26 RX ADMIN — Medication 1 SPRAY(S): at 18:13

## 2023-12-26 RX ADMIN — FAMOTIDINE 20 MILLIGRAM(S): 10 INJECTION INTRAVENOUS at 18:15

## 2023-12-26 RX ADMIN — Medication 650 MILLIGRAM(S): at 20:10

## 2023-12-26 RX ADMIN — Medication 40 MILLIEQUIVALENT(S): at 05:39

## 2023-12-26 RX ADMIN — BRIVARACETAM 50 MILLIGRAM(S): 25 TABLET, FILM COATED ORAL at 18:14

## 2023-12-26 RX ADMIN — LIDOCAINE 1 PATCH: 4 CREAM TOPICAL at 21:10

## 2023-12-26 RX ADMIN — Medication 40 MILLIEQUIVALENT(S): at 18:14

## 2023-12-26 RX ADMIN — LOSARTAN POTASSIUM 25 MILLIGRAM(S): 100 TABLET, FILM COATED ORAL at 05:39

## 2023-12-26 RX ADMIN — Medication 1 SPRAY(S): at 05:39

## 2023-12-26 RX ADMIN — LIDOCAINE 1 PATCH: 4 CREAM TOPICAL at 09:17

## 2023-12-26 RX ADMIN — Medication 650 MILLIGRAM(S): at 21:05

## 2023-12-26 NOTE — PROGRESS NOTE ADULT - SUBJECTIVE AND OBJECTIVE BOX
Patient is a 73y old  Female who presents with a chief complaint of ICH (25 Dec 2023 11:52)      HPI:  This is a 72 YO RIGHT handed woman with no PMH who presented to [facility] ED on 12/14/2023, as a transfer from [facility], as a CODE STROKE, with c/o R IPH.   CTH and CTA repeated - large R IPH (temporal/parietal). Presented to [place] today with c/o HA and AMS. Friend accompanying patient said that when visiting patient today - patient was not acting like herself and c/o HA. NIHSS 9.    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 Similar midline shift of 5 mm.  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 Briviact 50mg BID for seizure prophylaxis, EEG with increased risk of seizures in the right frontocentral region.TTE- EF 64%. Continue Losartan 25mg daily. 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 UA: positive for UTI, started on IV abx and then transitioned to oral, end date 12/22.      Patient was evaluated by PM&R and therapy for functional deficits, gait/ADL impairments and acute rehabilitation was recommended. Patient was medically optimized for discharge to [facility] IRU on 12/20/23. (20 Dec 2023 17:15)      PAST MEDICAL & SURGICAL HISTORY:  No pertinent past medical history          MEDICATIONS  (STANDING):  bisacodyl 5 milliGRAM(s) Oral daily  brivaracetam 50 milliGRAM(s) Oral two times a day  enoxaparin Injectable 40 milliGRAM(s) SubCutaneous <User Schedule>  famotidine    Tablet 20 milliGRAM(s) Oral two times a day  gabapentin 300 milliGRAM(s) Oral at bedtime  ketorolac 0.5% Ophthalmic Solution 1 Drop(s) Left EYE daily  lidocaine   4% Patch 1 Patch Transdermal <User Schedule>  losartan 25 milliGRAM(s) Oral daily  polyethylene glycol 3350 17 Gram(s) Oral two times a day  potassium chloride    Tablet ER 40 milliEquivalent(s) Oral two times a day  senna 2 Tablet(s) Oral at bedtime  sodium chloride 0.65% Nasal 1 Spray(s) Both Nostrils two times a day    MEDICATIONS  (PRN):  acetaminophen     Tablet .. 650 milliGRAM(s) Oral every 6 hours PRN Temp greater or equal to 38C (100.4F), Mild Pain (1 - 3)  aluminum hydroxide/magnesium hydroxide/simethicone Suspension 30 milliLiter(s) Oral every 6 hours PRN Dyspepsia  artificial tears (preservative free) Ophthalmic Solution 1 Drop(s) Both EYES every 4 hours PRN Dry Eyes  bisacodyl Suppository 10 milliGRAM(s) Rectal daily PRN Constipation  calcium carbonate    500 mG (Tums) Chewable 1 Tablet(s) Chew three times a day PRN Heartburn  ondansetron   Disintegrating Tablet 4 milliGRAM(s) Oral every 6 hours PRN Nausea and/or Vomiting  traMADol 50 milliGRAM(s) Oral every 4 hours PRN Severe Pain (7 - 10)  traMADol 25 milliGRAM(s) Oral every 4 hours PRN Moderate Pain (4 - 6)      Allergies    No Known Allergies    Intolerances    oxycodone (Nausea)        VITALS  73y  Vital Signs Last 24 Hrs  T(C): 36.6 (26 Dec 2023 07:50), Max: 36.7 (25 Dec 2023 19:51)  T(F): 97.9 (26 Dec 2023 07:50), Max: 98.1 (25 Dec 2023 19:51)  HR: 63 (26 Dec 2023 07:50) (63 - 72)  BP: 91/60 (26 Dec 2023 07:50) (91/60 - 118/66)  BP(mean): --  RR: 16 (26 Dec 2023 07:50) (15 - 16)  SpO2: 96% (26 Dec 2023 07:50) (96% - 99%)    Parameters below as of 26 Dec 2023 07:50  Patient On (Oxygen Delivery Method): room air      Daily     Daily         RECENT LABS:                          13.3   9.25  )-----------( 169      ( 25 Dec 2023 06:00 )             37.9     12-25    139  |  104  |  16  ----------------------------<  102<H>  3.9   |  26  |  0.58    Ca    8.6      25 Dec 2023 06:00  Mg     1.8     12-25    TPro  5.8<L>  /  Alb  2.8<L>  /  TBili  0.8  /  DBili  x   /  AST  30  /  ALT  81<H>  /  AlkPhos  58  12-25    LIVER FUNCTIONS - ( 25 Dec 2023 06:00 )  Alb: 2.8 g/dL / Pro: 5.8 g/dL / ALK PHOS: 58 U/L / ALT: 81 U/L / AST: 30 U/L / GGT: x             Urinalysis Basic - ( 25 Dec 2023 06:00 )    Color: x / Appearance: x / SG: x / pH: x  Gluc: 102 mg/dL / Ketone: x  / Bili: x / Urobili: x   Blood: x / Protein: x / Nitrite: x   Leuk Esterase: x / RBC: x / WBC x   Sq Epi: x / Non Sq Epi: x / Bacteria: x          CAPILLARY BLOOD GLUCOSE                   Patient is a 73y old  Female who presents with a chief complaint of ICH (25 Dec 2023 11:52)      HPI:  This is a 72 YO RIGHT handed woman with no PMH who presented to [facility] ED on 12/14/2023, as a transfer from [facility], as a CODE STROKE, with c/o R IPH.   CTH and CTA repeated - large R IPH (temporal/parietal). Presented to [place] today with c/o HA and AMS. Friend accompanying patient said that when visiting patient today - patient was not acting like herself and c/o HA. NIHSS 9.    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 Similar midline shift of 5 mm.  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 Briviact 50mg BID for seizure prophylaxis, EEG with increased risk of seizures in the right frontocentral region.TTE- EF 64%. Continue Losartan 25mg daily. 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 UA: positive for UTI, started on IV abx and then transitioned to oral, end date 12/22.      Patient was evaluated by PM&R and therapy for functional deficits, gait/ADL impairments and acute rehabilitation was recommended. Patient was medically optimized for discharge to [facility] IRU on 12/20/23. (20 Dec 2023 17:15)      PAST MEDICAL & SURGICAL HISTORY:  No pertinent past medical history          MEDICATIONS  (STANDING):  bisacodyl 5 milliGRAM(s) Oral daily  brivaracetam 50 milliGRAM(s) Oral two times a day  enoxaparin Injectable 40 milliGRAM(s) SubCutaneous <User Schedule>  famotidine    Tablet 20 milliGRAM(s) Oral two times a day  gabapentin 300 milliGRAM(s) Oral at bedtime  ketorolac 0.5% Ophthalmic Solution 1 Drop(s) Left EYE daily  lidocaine   4% Patch 1 Patch Transdermal <User Schedule>  losartan 25 milliGRAM(s) Oral daily  polyethylene glycol 3350 17 Gram(s) Oral two times a day  potassium chloride    Tablet ER 40 milliEquivalent(s) Oral two times a day  senna 2 Tablet(s) Oral at bedtime  sodium chloride 0.65% Nasal 1 Spray(s) Both Nostrils two times a day    MEDICATIONS  (PRN):  acetaminophen     Tablet .. 650 milliGRAM(s) Oral every 6 hours PRN Temp greater or equal to 38C (100.4F), Mild Pain (1 - 3)  aluminum hydroxide/magnesium hydroxide/simethicone Suspension 30 milliLiter(s) Oral every 6 hours PRN Dyspepsia  artificial tears (preservative free) Ophthalmic Solution 1 Drop(s) Both EYES every 4 hours PRN Dry Eyes  bisacodyl Suppository 10 milliGRAM(s) Rectal daily PRN Constipation  calcium carbonate    500 mG (Tums) Chewable 1 Tablet(s) Chew three times a day PRN Heartburn  ondansetron   Disintegrating Tablet 4 milliGRAM(s) Oral every 6 hours PRN Nausea and/or Vomiting  traMADol 50 milliGRAM(s) Oral every 4 hours PRN Severe Pain (7 - 10)  traMADol 25 milliGRAM(s) Oral every 4 hours PRN Moderate Pain (4 - 6)      Allergies    No Known Allergies    Intolerances    oxycodone (Nausea)        VITALS  73y  Vital Signs Last 24 Hrs  T(C): 36.6 (26 Dec 2023 07:50), Max: 36.7 (25 Dec 2023 19:51)  T(F): 97.9 (26 Dec 2023 07:50), Max: 98.1 (25 Dec 2023 19:51)  HR: 63 (26 Dec 2023 07:50) (63 - 72)  BP: 91/60 (26 Dec 2023 07:50) (91/60 - 118/66)  BP(mean): --  RR: 16 (26 Dec 2023 07:50) (15 - 16)  SpO2: 96% (26 Dec 2023 07:50) (96% - 99%)    Parameters below as of 26 Dec 2023 07:50  Patient On (Oxygen Delivery Method): room air      Daily     Daily         RECENT LABS:                          13.3   9.25  )-----------( 169      ( 25 Dec 2023 06:00 )             37.9     12-25    139  |  104  |  16  ----------------------------<  102<H>  3.9   |  26  |  0.58    Ca    8.6      25 Dec 2023 06:00  Mg     1.8     12-25    TPro  5.8<L>  /  Alb  2.8<L>  /  TBili  0.8  /  DBili  x   /  AST  30  /  ALT  81<H>  /  AlkPhos  58  12-25    LIVER FUNCTIONS - ( 25 Dec 2023 06:00 )  Alb: 2.8 g/dL / Pro: 5.8 g/dL / ALK PHOS: 58 U/L / ALT: 81 U/L / AST: 30 U/L / GGT: x             Urinalysis Basic - ( 25 Dec 2023 06:00 )    Color: x / Appearance: x / SG: x / pH: x  Gluc: 102 mg/dL / Ketone: x  / Bili: x / Urobili: x   Blood: x / Protein: x / Nitrite: x   Leuk Esterase: x / RBC: x / WBC x   Sq Epi: x / Non Sq Epi: x / Bacteria: x          CAPILLARY BLOOD GLUCOSE                   Patient is a 73y old  Female who presents with a chief complaint of ICH (25 Dec 2023 11:52)      HPI:  This is a 74 YO RIGHT handed woman with no PMH who presented to [facility] ED on 12/14/2023, as a transfer from [facility], as a CODE STROKE, with c/o R IPH.   CTH and CTA repeated - large R IPH (temporal/parietal). Presented to [place] today with c/o HA and AMS. Friend accompanying patient said that when visiting patient today - patient was not acting like herself and c/o HA. NIHSS 9.    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 Similar midline shift of 5 mm.  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 Briviact 50mg BID for seizure prophylaxis, EEG with increased risk of seizures in the right frontocentral region.TTE- EF 64%. Continue Losartan 25mg daily. 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 UA: positive for UTI, started on IV abx and then transitioned to oral, end date 12/22.      Patient was evaluated by PM&R and therapy for functional deficits, gait/ADL impairments and acute rehabilitation was recommended. Patient was medically optimized for discharge to [facility] IRU on 12/20/23. (20 Dec 2023 17:15)      PAST MEDICAL & SURGICAL HISTORY:  No pertinent past medical history          MEDICATIONS  (STANDING):  bisacodyl 5 milliGRAM(s) Oral daily  brivaracetam 50 milliGRAM(s) Oral two times a day  enoxaparin Injectable 40 milliGRAM(s) SubCutaneous <User Schedule>  famotidine    Tablet 20 milliGRAM(s) Oral two times a day  gabapentin 300 milliGRAM(s) Oral at bedtime  ketorolac 0.5% Ophthalmic Solution 1 Drop(s) Left EYE daily  lidocaine   4% Patch 1 Patch Transdermal <User Schedule>  losartan 25 milliGRAM(s) Oral daily  polyethylene glycol 3350 17 Gram(s) Oral two times a day  potassium chloride    Tablet ER 40 milliEquivalent(s) Oral two times a day  senna 2 Tablet(s) Oral at bedtime  sodium chloride 0.65% Nasal 1 Spray(s) Both Nostrils two times a day    MEDICATIONS  (PRN):  acetaminophen     Tablet .. 650 milliGRAM(s) Oral every 6 hours PRN Temp greater or equal to 38C (100.4F), Mild Pain (1 - 3)  aluminum hydroxide/magnesium hydroxide/simethicone Suspension 30 milliLiter(s) Oral every 6 hours PRN Dyspepsia  artificial tears (preservative free) Ophthalmic Solution 1 Drop(s) Both EYES every 4 hours PRN Dry Eyes  bisacodyl Suppository 10 milliGRAM(s) Rectal daily PRN Constipation  calcium carbonate    500 mG (Tums) Chewable 1 Tablet(s) Chew three times a day PRN Heartburn  ondansetron   Disintegrating Tablet 4 milliGRAM(s) Oral every 6 hours PRN Nausea and/or Vomiting  traMADol 50 milliGRAM(s) Oral every 4 hours PRN Severe Pain (7 - 10)  traMADol 25 milliGRAM(s) Oral every 4 hours PRN Moderate Pain (4 - 6)      Allergies    No Known Allergies    Intolerances    oxycodone (Nausea)        VITALS  73y  Vital Signs Last 24 Hrs  T(C): 36.6 (26 Dec 2023 07:50), Max: 36.7 (25 Dec 2023 19:51)  T(F): 97.9 (26 Dec 2023 07:50), Max: 98.1 (25 Dec 2023 19:51)  HR: 63 (26 Dec 2023 07:50) (63 - 72)  BP: 91/60 (26 Dec 2023 07:50) (91/60 - 118/66)  BP(mean): --  RR: 16 (26 Dec 2023 07:50) (15 - 16)  SpO2: 96% (26 Dec 2023 07:50) (96% - 99%)    Parameters below as of 26 Dec 2023 07:50  Patient On (Oxygen Delivery Method): room air      Daily     Daily         RECENT LABS:                          13.3   9.25  )-----------( 169      ( 25 Dec 2023 06:00 )             37.9     12-25    139  |  104  |  16  ----------------------------<  102<H>  3.9   |  26  |  0.58    Ca    8.6      25 Dec 2023 06:00  Mg     1.8     12-25    TPro  5.8<L>  /  Alb  2.8<L>  /  TBili  0.8  /  DBili  x   /  AST  30  /  ALT  81<H>  /  AlkPhos  58  12-25    LIVER FUNCTIONS - ( 25 Dec 2023 06:00 )  Alb: 2.8 g/dL / Pro: 5.8 g/dL / ALK PHOS: 58 U/L / ALT: 81 U/L / AST: 30 U/L / GGT: x             Urinalysis Basic - ( 25 Dec 2023 06:00 )    Color: x / Appearance: x / SG: x / pH: x  Gluc: 102 mg/dL / Ketone: x  / Bili: x / Urobili: x   Blood: x / Protein: x / Nitrite: x   Leuk Esterase: x / RBC: x / WBC x   Sq Epi: x / Non Sq Epi: x / Bacteria: x          CAPILLARY BLOOD GLUCOSE                   Patient is a 73y old  Female who presents with a chief complaint of ICH (25 Dec 2023 11:52)      HPI:  This is a 72 YO RIGHT handed woman with no PMH who presented to [facility] ED on 12/14/2023, as a transfer from [facility], as a CODE STROKE, with c/o R IPH.   CTH and CTA repeated - large R IPH (temporal/parietal). Presented to [place] today with c/o HA and AMS. Friend accompanying patient said that when visiting patient today - patient was not acting like herself and c/o HA. NIHSS 9.    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 Similar midline shift of 5 mm.  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 Briviact 50mg BID for seizure prophylaxis, EEG with increased risk of seizures in the right frontocentral region.TTE- EF 64%. Continue Losartan 25mg daily. 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 UA: positive for UTI, started on IV abx and then transitioned to oral, end date 12/22.      Patient was evaluated by PM&R and therapy for functional deficits, gait/ADL impairments and acute rehabilitation was recommended. Patient was medically optimized for discharge to [facility] IRU on 12/20/23. (20 Dec 2023 17:15)      PAST MEDICAL & SURGICAL HISTORY:  No pertinent past medical history          MEDICATIONS  (STANDING):  bisacodyl 5 milliGRAM(s) Oral daily  brivaracetam 50 milliGRAM(s) Oral two times a day  enoxaparin Injectable 40 milliGRAM(s) SubCutaneous <User Schedule>  famotidine    Tablet 20 milliGRAM(s) Oral two times a day  gabapentin 300 milliGRAM(s) Oral at bedtime  ketorolac 0.5% Ophthalmic Solution 1 Drop(s) Left EYE daily  lidocaine   4% Patch 1 Patch Transdermal <User Schedule>  losartan 25 milliGRAM(s) Oral daily  polyethylene glycol 3350 17 Gram(s) Oral two times a day  potassium chloride    Tablet ER 40 milliEquivalent(s) Oral two times a day  senna 2 Tablet(s) Oral at bedtime  sodium chloride 0.65% Nasal 1 Spray(s) Both Nostrils two times a day    MEDICATIONS  (PRN):  acetaminophen     Tablet .. 650 milliGRAM(s) Oral every 6 hours PRN Temp greater or equal to 38C (100.4F), Mild Pain (1 - 3)  aluminum hydroxide/magnesium hydroxide/simethicone Suspension 30 milliLiter(s) Oral every 6 hours PRN Dyspepsia  artificial tears (preservative free) Ophthalmic Solution 1 Drop(s) Both EYES every 4 hours PRN Dry Eyes  bisacodyl Suppository 10 milliGRAM(s) Rectal daily PRN Constipation  calcium carbonate    500 mG (Tums) Chewable 1 Tablet(s) Chew three times a day PRN Heartburn  ondansetron   Disintegrating Tablet 4 milliGRAM(s) Oral every 6 hours PRN Nausea and/or Vomiting  traMADol 50 milliGRAM(s) Oral every 4 hours PRN Severe Pain (7 - 10)  traMADol 25 milliGRAM(s) Oral every 4 hours PRN Moderate Pain (4 - 6)      Allergies    No Known Allergies    Intolerances    oxycodone (Nausea)        VITALS  73y  Vital Signs Last 24 Hrs  T(C): 36.6 (26 Dec 2023 07:50), Max: 36.7 (25 Dec 2023 19:51)  T(F): 97.9 (26 Dec 2023 07:50), Max: 98.1 (25 Dec 2023 19:51)  HR: 63 (26 Dec 2023 07:50) (63 - 72)  BP: 91/60 (26 Dec 2023 07:50) (91/60 - 118/66)  BP(mean): --  RR: 16 (26 Dec 2023 07:50) (15 - 16)  SpO2: 96% (26 Dec 2023 07:50) (96% - 99%)    Parameters below as of 26 Dec 2023 07:50  Patient On (Oxygen Delivery Method): room air      Daily     Daily         RECENT LABS:                          13.3   9.25  )-----------( 169      ( 25 Dec 2023 06:00 )             37.9     12-25    139  |  104  |  16  ----------------------------<  102<H>  3.9   |  26  |  0.58    Ca    8.6      25 Dec 2023 06:00  Mg     1.8     12-25    TPro  5.8<L>  /  Alb  2.8<L>  /  TBili  0.8  /  DBili  x   /  AST  30  /  ALT  81<H>  /  AlkPhos  58  12-25    LIVER FUNCTIONS - ( 25 Dec 2023 06:00 )  Alb: 2.8 g/dL / Pro: 5.8 g/dL / ALK PHOS: 58 U/L / ALT: 81 U/L / AST: 30 U/L / GGT: x             Urinalysis Basic - ( 25 Dec 2023 06:00 )    Color: x / Appearance: x / SG: x / pH: x  Gluc: 102 mg/dL / Ketone: x  / Bili: x / Urobili: x   Blood: x / Protein: x / Nitrite: x   Leuk Esterase: x / RBC: x / WBC x   Sq Epi: x / Non Sq Epi: x / Bacteria: x          CAPILLARY BLOOD GLUCOSE

## 2023-12-26 NOTE — PROGRESS NOTE ADULT - ASSESSMENT
74 YO RIGHT handed woman with no PMH who presented to [facility] on 12/14/2023, as a transfer from [facility] with R IPH. Hospital course also significant for UTI, b/l DVTs, EEG with increased seizure risks.    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  - EEG with increased risk of seizures in the right frontocentral region  - Briviact 50mg BID for seizure prophylaxis.  - Cont Comprehensive Rehab Program: PT/OT/ST, 3hours daily and 5 days weekly    # HTN  - Cont. Losartan 25mg daily  - BP (91/60 - 118/66) 12/26    # UTI  - s/p Vantin 100mg BID     # Pain management  - Tylenol PRN  - tramadol PRN    # DVT   - b/l below the knee DVTs: right  soleal vein, left soleal, peroneal and gastrocnemius veins.  - Lovenox 40 mg daily. Not cleared for full dose AC due to large ICH  - Doppler 12/20: Positive deep venous thrombosis below the right knee within the right soleal vein. Positive deep venous thrombosis below the left knee within the left soleal, peroneal and gastrocnemius veins.  -  monitor with weekly US for propagation (next 12/27)  - plan to repeat Head CT 12/27 followed by neuro consult re: possible full dose AC (day 14 post bleed)    # GI ppx  - Pepcid 20mg   - TUMS, Maalox  - Senna, miralax PRN    # Dysphagia   - Diet: Soft and Bite sized with thin liquids    # LABS:   CBC and CMP reviewed 12/26, stable  doppler 12/27  Head CT 12/27    #[facility]  CODE STATUS: FULL CODE    Outpatient Follow-up (Specialty/Name of physician):    Emy Prakash  NP in [facility]  [address], [address]  [place] [address]  Phone: [phone]  Fax: [phone]  Follow Up Time: 2 weeks    Sregio Jurado  [facility]  [address], [address]  [place] [address]  Phone: [phone]  Fax: [phone]  Follow Up Time: 1 month   72 YO RIGHT handed woman with no PMH who presented to [facility] ED on 12/14/2023, as a transfer from [facility] with R IPH. Hospital course also significant for UTI, b/l DVTs, EEG with increased seizure risks.    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  - EEG with increased risk of seizures in the right frontocentral region  - Briviact 50mg BID for seizure prophylaxis.  - Cont Comprehensive Rehab Program: PT/OT/ST, 3hours daily and 5 days weekly    # HTN  - Cont. Losartan 25mg daily  - BP (91/60 - 118/66) 12/26    # UTI  - s/p Vantin 100mg BID     # Pain management  - Tylenol PRN  - tramadol PRN    # DVT   - b/l below the knee DVTs: right  soleal vein, left soleal, peroneal and gastrocnemius veins.  - Lovenox 40 mg daily. Not cleared for full dose AC due to large ICH  - Doppler 12/20: Positive deep venous thrombosis below the right knee within the right soleal vein. Positive deep venous thrombosis below the left knee within the left soleal, peroneal and gastrocnemius veins.  -  monitor with weekly US for propagation (next 12/27)  - plan to repeat Head CT 12/27 followed by neuro consult re: possible full dose AC (day 14 post bleed)    # GI ppx  - Pepcid 20mg   - TUMS, Maalox  - Senna, miralax PRN    # Dysphagia   - Diet: Soft and Bite sized with thin liquids    # LABS:   CBC and CMP reviewed 12/26, stable  doppler 12/27  Head CT 12/27    #[facility]  CODE STATUS: FULL CODE    Outpatient Follow-up (Specialty/Name of physician):    Emy Prakash  NP in [facility]  [address], [address]  [place] [address]  Phone: [phone]  Fax: [phone]  Follow Up Time: 2 weeks    Sergio Jurado  [facility]  [address], [address]  [place] [address]  Phone: [phone]  Fax: [phone]  Follow Up Time: 1 month   72 YO RIGHT handed woman with no PMH who presented to [facility] ED on 12/14/2023, as a transfer from [facility] with R IPH. Hospital course also significant for UTI, b/l DVTs, EEG with increased seizure risks.    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  - EEG with increased risk of seizures in the right frontocentral region  - Briviact 50mg BID for seizure prophylaxis.  - Cont Comprehensive Rehab Program: PT/OT/ST, 3hours daily and 5 days weekly    # reported history of ADHD and restless, distracted, poor attentoin  - psychiatry consult  - psychology consult  - recreation therapy (former )    # HTN  - Cont. Losartan 25mg daily  - BP (91/60 - 118/66) 12/26    # UTI  - s/p Vantin 100mg BID     # Pain management  - Tylenol PRN  - tramadol PRN    # DVT   - b/l below the knee DVTs: right  soleal vein, left soleal, peroneal and gastrocnemius veins.  - Lovenox 40 mg daily. Not cleared for full dose AC due to large ICH  - Doppler 12/20: Positive deep venous thrombosis below the right knee within the right soleal vein. Positive deep venous thrombosis below the left knee within the left soleal, peroneal and gastrocnemius veins.  -  monitor with weekly US for propagation (next 12/27)  - plan to repeat Head CT 12/27 followed by neuro consult re: possible full dose AC (day 14 post bleed)    # GI ppx  - Pepcid 20mg   - TUMS, Maalox  - Senna, miralax PRN    # Dysphagia   - Diet: Soft and Bite sized with thin liquids    # Case discussed in IDT rounds 12/26 (initial)  - ambulates 20 feet with RW and mod assist and WC follow with max cues, mod-max assist transfers, follows occasional single step commands with poor attention and max cues, tolerating soflt-bite sized and thin liquids, severe attention deficits, poor insight, supervision eating/hygiene, total assist tub transfers, max assist toileting  - goals: mod-max assist transfer due to need for cues and poor attention, mod assist baDLs  - target: dc SIVAN 1/3/23 for additional OT PT SLP (patient lives alone in apt with stairs)    # LABS:   CBC and CMP reviewed 12/26, stable  psychiatry consult re: ADHD  doppler 12/27  Head CT 12/27    #[facility]  CODE STATUS: FULL CODE    Outpatient Follow-up (Specialty/Name of physician):    Emy Prakash  NP in [address], [address]  [place] [address]  Phone: [phone]  Fax: [phone]  Follow Up Time: 2 weeks    Sergio Jurado  [address], [address]  [place] [address]  Phone: [phone]  Fax: [phone]  Follow Up Time: 1 month   72 YO RIGHT handed woman with no PMH who presented to [facility] on 12/14/2023, as a transfer from [facility] with R IPH. Hospital course also significant for UTI, b/l DVTs, EEG with increased seizure risks.    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  - EEG with increased risk of seizures in the right frontocentral region  - Briviact 50mg BID for seizure prophylaxis.  - Cont Comprehensive Rehab Program: PT/OT/ST, 3hours daily and 5 days weekly    # reported history of ADHD and restless, distracted, poor attentoin  - psychiatry consult  - psychology consult  - recreation therapy (former )    # HTN  - Cont. Losartan 25mg daily  - BP (91/60 - 118/66) 12/26    # UTI  - s/p Vantin 100mg BID     # Pain management  - Tylenol PRN  - tramadol PRN    # DVT   - b/l below the knee DVTs: right  soleal vein, left soleal, peroneal and gastrocnemius veins.  - Lovenox 40 mg daily. Not cleared for full dose AC due to large ICH  - Doppler 12/20: Positive deep venous thrombosis below the right knee within the right soleal vein. Positive deep venous thrombosis below the left knee within the left soleal, peroneal and gastrocnemius veins.  -  monitor with weekly US for propagation (next 12/27)  - plan to repeat Head CT 12/27 followed by neuro consult re: possible full dose AC (day 14 post bleed)    # GI ppx  - Pepcid 20mg   - TUMS, Maalox  - Senna, miralax PRN    # Dysphagia   - Diet: Soft and Bite sized with thin liquids    # Case discussed in IDT rounds 12/26 (initial)  - ambulates 20 feet with RW and mod assist and WC follow with max cues, mod-max assist transfers, follows occasional single step commands with poor attention and max cues, tolerating soflt-bite sized and thin liquids, severe attention deficits, poor insight, supervision eating/hygiene, total assist tub transfers, max assist toileting  - goals: mod-max assist transfer due to need for cues and poor attention, mod assist baDLs  - target: dc SIVAN 1/3/23 for additional OT PT SLP (patient lives alone in apt with stairs)    # LABS:   CBC and CMP reviewed 12/26, stable  psychiatry consult re: ADHD  doppler 12/27  Head CT 12/27    #[facility]  CODE STATUS: FULL CODE    Outpatient Follow-up (Specialty/Name of physician):    Emy Prakash  NP in [address], [address]  [place] [address]  Phone: [phone]  Fax: [phone]  Follow Up Time: 2 weeks    Sergio Jurado  [address], [address]  [place] [address]  Phone: [phone]  Fax: [phone]  Follow Up Time: 1 month   74 YO RIGHT handed woman with no PMH who presented to [facility] ED on 12/14/2023, as a transfer from [facility] with R IPH. Hospital course also significant for UTI, b/l DVTs, EEG with increased seizure risks.    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  - EEG with increased risk of seizures in the right frontocentral region  - Briviact 50mg BID for seizure prophylaxis.  - Cont Comprehensive Rehab Program: PT/OT/ST, 3hours daily and 5 days weekly    # reported history of ADHD and restless, distracted, poor attentoin  - psychiatry consult  - psychology consult  - recreation therapy (former )    # HTN  - Cont. Losartan 25mg daily  - BP (91/60 - 118/66) 12/26    # UTI  - s/p Vantin 100mg BID     # Pain management  - Tylenol PRN  - tramadol PRN--> patient wihtout pain complaints, look to wean. Reduce PRN tramadol from 50 to 25 q4 PRN, dc for moderate pain 12/26    # DVT   - b/l below the knee DVTs: right  soleal vein, left soleal, peroneal and gastrocnemius veins.  - Lovenox 40 mg daily. Not cleared for full dose AC due to large ICH  - Doppler 12/20: Positive deep venous thrombosis below the right knee within the right soleal vein. Positive deep venous thrombosis below the left knee within the left soleal, peroneal and gastrocnemius veins.  -  monitor with weekly US for propagation (next 12/27)  - plan to repeat Head CT 12/27 followed by neuro consult re: possible full dose AC (day 14 post bleed)    # GI ppx  - Pepcid 20mg   - TUMS, Maalox  - Senna, miralax PRN    # Dysphagia   - Diet: Soft and Bite sized with thin liquids    # Case discussed in IDT rounds 12/26 (initial)  - ambulates 20 feet with RW and mod assist and WC follow with max cues, mod-max assist transfers, follows occasional single step commands with poor attention and max cues, tolerating soflt-bite sized and thin liquids, severe attention deficits, poor insight, supervision eating/hygiene, total assist tub transfers, max assist toileting  - goals: mod-max assist transfer due to need for cues and poor attention, mod assist baDLs  - target: dc SIVAN 1/3/23 for additional OT PT SLP (patient lives alone in apt with stairs)    # LABS:   CBC and CMP reviewed 12/26, stable  psychiatry consult re: ADHD  doppler 12/27  Head CT 12/27    #[facility]  CODE STATUS: FULL CODE    Outpatient Follow-up (Specialty/Name of physician):    Emy Prakash  NP in [address], [address]  [place] [address]  Phone: [phone]  Fax: [phone]  Follow Up Time: 2 weeks    Sergio Jurado  Cardiology  [address], [address]  [place] [address]  Phone: [phone]  Fax: [phone]  Follow Up Time: 1 month   72 YO RIGHT handed woman with no PMH who presented to [facility] ED on 12/14/2023, as a transfer from [facility] with R IPH. Hospital course also significant for UTI, b/l DVTs, EEG with increased seizure risks.    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  - EEG with increased risk of seizures in the right frontocentral region  - Briviact 50mg BID for seizure prophylaxis.  - Cont Comprehensive Rehab Program: PT/OT/ST, 3hours daily and 5 days weekly    # reported history of ADHD and restless, distracted, poor attentoin  - psychiatry consult  - psychology consult  - recreation therapy (former )    # HTN  - Cont. Losartan 25mg daily  - BP (91/60 - 118/66) 12/26    # UTI  - s/p Vantin 100mg BID     # Pain management  - Tylenol PRN  - tramadol PRN--> patient wihtout pain complaints, look to wean. Reduce PRN tramadol from 50 to 25 q4 PRN, dc for moderate pain 12/26    # DVT   - b/l below the knee DVTs: right  soleal vein, left soleal, peroneal and gastrocnemius veins.  - Lovenox 40 mg daily. Not cleared for full dose AC due to large ICH  - Doppler 12/20: Positive deep venous thrombosis below the right knee within the right soleal vein. Positive deep venous thrombosis below the left knee within the left soleal, peroneal and gastrocnemius veins.  -  monitor with weekly US for propagation (next 12/27)  - plan to repeat Head CT 12/27 followed by neuro consult re: possible full dose AC (day 14 post bleed)    # GI ppx  - Pepcid 20mg   - TUMS, Maalox  - Senna, miralax PRN    # Dysphagia   - Diet: Soft and Bite sized with thin liquids    # Case discussed in IDT rounds 12/26 (initial)  - ambulates 20 feet with RW and mod assist and WC follow with max cues, mod-max assist transfers, follows occasional single step commands with poor attention and max cues, tolerating soflt-bite sized and thin liquids, severe attention deficits, poor insight, supervision eating/hygiene, total assist tub transfers, max assist toileting  - goals: mod-max assist transfer due to need for cues and poor attention, mod assist baDLs  - target: dc SIVAN 1/3/23 for additional OT PT SLP (patient lives alone in apt with stairs)    # LABS:   CBC and CMP reviewed 12/26, stable  psychiatry consult re: ADHD  doppler 12/27  Head CT 12/27    #[facility]  CODE STATUS: FULL CODE    Outpatient Follow-up (Specialty/Name of physician):    Emy Prakash  NP in [address], [address]  [place] [address]  Phone: [phone]  Fax: [phone]  Follow Up Time: 2 weeks    Sergio Jurado  Cardiology  [address], [address]  [place] [address]  Phone: [phone]  Fax: [phone]  Follow Up Time: 1 month

## 2023-12-27 PROCEDURE — 70450 CT HEAD/BRAIN W/O DYE: CPT | Mod: 26

## 2023-12-27 PROCEDURE — 93970 EXTREMITY STUDY: CPT | Mod: 26

## 2023-12-27 PROCEDURE — 90792 PSYCH DIAG EVAL W/MED SRVCS: CPT

## 2023-12-27 PROCEDURE — 99232 SBSQ HOSP IP/OBS MODERATE 35: CPT

## 2023-12-27 RX ADMIN — ENOXAPARIN SODIUM 40 MILLIGRAM(S): 100 INJECTION SUBCUTANEOUS at 17:40

## 2023-12-27 RX ADMIN — Medication 1 DROP(S): at 11:38

## 2023-12-27 RX ADMIN — LIDOCAINE 1 PATCH: 4 CREAM TOPICAL at 20:01

## 2023-12-27 RX ADMIN — FAMOTIDINE 20 MILLIGRAM(S): 10 INJECTION INTRAVENOUS at 17:40

## 2023-12-27 RX ADMIN — BRIVARACETAM 50 MILLIGRAM(S): 25 TABLET, FILM COATED ORAL at 05:11

## 2023-12-27 RX ADMIN — POLYETHYLENE GLYCOL 3350 17 GRAM(S): 17 POWDER, FOR SOLUTION ORAL at 17:40

## 2023-12-27 RX ADMIN — BRIVARACETAM 50 MILLIGRAM(S): 25 TABLET, FILM COATED ORAL at 17:40

## 2023-12-27 RX ADMIN — TRAMADOL HYDROCHLORIDE 25 MILLIGRAM(S): 50 TABLET ORAL at 20:04

## 2023-12-27 RX ADMIN — GABAPENTIN 300 MILLIGRAM(S): 400 CAPSULE ORAL at 21:26

## 2023-12-27 RX ADMIN — Medication 650 MILLIGRAM(S): at 06:11

## 2023-12-27 RX ADMIN — Medication 1 DROP(S): at 20:09

## 2023-12-27 RX ADMIN — Medication 1 SPRAY(S): at 05:11

## 2023-12-27 RX ADMIN — LIDOCAINE 1 PATCH: 4 CREAM TOPICAL at 08:25

## 2023-12-27 RX ADMIN — Medication 40 MILLIEQUIVALENT(S): at 05:11

## 2023-12-27 RX ADMIN — TRAMADOL HYDROCHLORIDE 25 MILLIGRAM(S): 50 TABLET ORAL at 21:01

## 2023-12-27 RX ADMIN — Medication 1 SPRAY(S): at 17:40

## 2023-12-27 RX ADMIN — LIDOCAINE 1 PATCH: 4 CREAM TOPICAL at 19:34

## 2023-12-27 RX ADMIN — Medication 1 TABLET(S): at 20:04

## 2023-12-27 RX ADMIN — FAMOTIDINE 20 MILLIGRAM(S): 10 INJECTION INTRAVENOUS at 05:11

## 2023-12-27 RX ADMIN — Medication 5 MILLIGRAM(S): at 11:38

## 2023-12-27 RX ADMIN — Medication 650 MILLIGRAM(S): at 05:12

## 2023-12-27 NOTE — BH CONSULTATION LIAISON ASSESSMENT NOTE - NSSUICPROTFACT_PSY_ALL_CORE
Identifies reasons for living/Fear of death or the actual act of killing self/Cultural, spiritual and/or moral attitudes against suicide/Positive therapeutic relationships/Frustration tolerance/[employer] beliefs Identifies reasons for living/Fear of death or the actual act of killing self/Cultural, spiritual and/or moral attitudes against suicide/Positive therapeutic relationships/Frustration tolerance/[employer]ab[employer]ism beliefs

## 2023-12-27 NOTE — BH CONSULTATION LIAISON ASSESSMENT NOTE - ATTENDING COMMENTS
Pt with reports of inattention that pre date her IPH, however she may have already had some compensatory skills given the fact that she did not have official neuropsych testing to confirm this diagnosis. Current cognitive issues may be due to neurological insult or even s/e of antiseizure medications.   Agree that if reaching clinical distress or interference with prognosis or performance that a low dose of Modafinil can be tried off label ( 50 mg) would start very slowly and monitor response to medications. Would defer use of traditional stimulants like Adderall or Ritalin unless ok through hospitalist or cardiology ( due to any potential to increase BP or heart rate).

## 2023-12-27 NOTE — BH CONSULTATION LIAISON ASSESSMENT NOTE - HPI (INCLUDE ILLNESS QUALITY, SEVERITY, DURATION, TIMING, CONTEXT, MODIFYING FACTORS, ASSOCIATED SIGNS AND SYMPTOMS)
Patient is a 73 year old femalewith no PMH who presented to [facility] ED on 12/14/2023, as a transfer from [facility], as a CODE STROKE. At [facility] patient was brought in by friend for headaches and AMS; NIHSS 9. CTH and CTA repeated - large R IPH (temporal/parietal). 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 Similar midline shift of 5 mm.  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  Patient was medically optimized for discharge to [facility] IRU on 12/20/23. Psychiatry consulted for severe cognitive impairment and ADHD.     C/L Psychiatry Note:  Chart reviewed and patient evaluated. Upon evaluation, the patient was preoccupied with a "chemical smell" in the room, which the interviewer did not detect. It was challenging to divert the patient's attention away from this concern and back to the interview. Throughout interview, patient has tangental thought processes and it is slightly difficult to interrupt or redirect her train of thought. She states that she has been through a lot over the last few weeks, but "in general, I'm okay." Patient denies any feelings of depression. She reports that at night, she gets intense headaches which become "so unbearable, that I wish I was dead." However, she denies any actual intent or plan. States that her suicidality is specifically related to pain and not to depressive symptoms. Aside from headaches intermittently waking her up at night, denies any other issues with sleep or appetite. 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 States that she would have trouble breathing at times from the stress, and did not realize it was panic attacks until her PCP clarified this diagnosis. However, does not experience them often and does not remember her last panic attack. Reviewed the patient's ADHD history with her. She elaborated, indicating that she previously had a particular issue where she would begin one task and then start another without finishing the first. States that it would happen for many tasks and knew it was an impairment. After the IPH, patient states she is aware her focus is much diminished. Reports that rehab therapists have communicated she has trouble focusing during sessions. Also endorses having trouble concentrating on television and will often fall asleep instead of concentrating. She denies any delusions, paranoia, or A/V hallucinations. Denies any current suicidal/homicidal ideation, intent, or plan.         Patient is a 73 year old femalewith no PMH who presented to [facility] ED on 12/14/2023, as a transfer from [facility], as a CODE STROKE. At [facility] patient was brought in by friend for headaches and AMS; NIHSS 9. CTH and CTA repeated - large R IPH (temporal/parietal). 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 Similar midline shift of 5 mm.  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  Patient was medically optimized for discharge to [facility] IRU on 12/20/23. Psychiatry consulted for severe cognitive impairment and ADHD.     C/L Psychiatry Note:  Chart reviewed and patient evaluated. Upon evaluation, the patient was preoccupied with a "chemical smell" in the room, which the interviewer did not detect. It was challenging to divert the patient's attention away from this concern and back to the interview. Throughout interview, patient has tangental thought processes and it is slightly difficult to interrupt or redirect her train of thought. She states that she has been through a lot over the last few weeks, but "in general, I'm okay." Patient denies any feelings of depression. She reports that at night, she gets intense headaches which become "so unbearable, that I wish I was dead." However, she denies any actual intent or plan. States that her suicidality is specifically related to pain and not to depressive symptoms. Aside from headaches intermittently waking her up at night, denies any other issues with sleep or appetite. 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 States that she would have trouble breathing at times from the stress, and did not realize it was panic attacks until her PCP clarified this diagnosis. However, does not experience them often and does not remember her last panic attack. Reviewed the patient's ADHD history with her. She elaborated, indicating that she previously had a particular issue where she would begin one task and then start another without finishing the first. States that it would happen for many tasks and knew it was an impairment. After the IPH, patient states she is aware her focus is much diminished. Reports that rehab therapists have communicated she has trouble focusing during sessions. Also endorses having trouble concentrating on television and will often fall asleep instead of concentrating. She denies any delusions, paranoia, or A/V hallucinations. Denies any current suicidal/homicidal ideation, intent, or plan.         Patient is a 73 year old femalewith no PMH who presented to [facility] ED on 12/14/2023, as a transfer from [facility], as a CODE STROKE. At [facility] patient was brought in by friend for headaches and AMS; NIHSS 9. CTH and CTA repeated - large R IPH (temporal/parietal). 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 Similar midline shift of 5 mm.  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  Patient was medically optimized for discharge to [facility] IRU on 12/20/23. Psychiatry consulted for severe cognitive impairment and ADHD.     C/L Psychiatry Note:  Chart reviewed and patient evaluated. Upon evaluation, the patient was preoccupied with a "chemical smell" in the room, which the interviewer did not detect. It was challenging to divert the patient's attention away from this concern and back to the interview. Throughout interview, patient has tangental thought processes and it is slightly difficult to interrupt or redirect her train of thought. She states that she has been through a lot over the last few weeks, but "in general, I'm okay." Patient denies any feelings of depression. She reports that at night, she gets intense headaches which become "so unbearable, that I wish I was dead." However, she denies any actual intent or plan. States that her suicidality is specifically related to pain and not to depressive symptoms. Aside from headaches intermittently waking her up at night, denies any other issues with sleep or appetite. 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 States that she would have trouble breathing at times from the stress, and did not realize it was panic attacks until her PCP clarified this diagnosis. However, does not experience them often and does not remember her last panic attack. Reviewed the patient's ADHD history with her. She elaborated, indicating that she previously had a particular issue where she would begin one task and then start another without finishing the first. States that it would happen for many tasks and knew it was an impairment. After the IPH, patient states she is aware her focus is much diminished. Reports that rehab therapists have communicated she has trouble focusing during sessions. Also endorses having trouble concentrating on television and will often fall asleep instead of concentrating. Reports issues with both short term and long term memory. States that she has to repeat names constantly to herself or else she will forget within 2 minutes. She denies any delusions, paranoia, or A/V hallucinations. Denies any current suicidal/homicidal ideation, intent, or plan.         Patient is a 73 year old femalewith no PMH who presented to [facility] ED on 12/14/2023, as a transfer from [facility], as a CODE STROKE. At [facility] patient was brought in by friend for headaches and AMS; NIHSS 9. CTH and CTA repeated - large R IPH (temporal/parietal). 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 Similar midline shift of 5 mm.  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  Patient was medically optimized for discharge to [facility] IRU on 12/20/23. Psychiatry consulted for severe cognitive impairment and ADHD.     C/L Psychiatry Note:  Chart reviewed and patient evaluated. Upon evaluation, the patient was preoccupied with a "chemical smell" in the room, which the interviewer did not detect. It was challenging to divert the patient's attention away from this concern and back to the interview. Throughout interview, patient has tangental thought processes and it is slightly difficult to interrupt or redirect her train of thought. She states that she has been through a lot over the last few weeks, but "in general, I'm okay." Patient denies any feelings of depression. She reports that at night, she gets intense headaches which become "so unbearable, that I wish I was dead." However, she denies any actual intent or plan. States that her suicidality is specifically related to pain and not to depressive symptoms. Aside from headaches intermittently waking her up at night, denies any other issues with sleep or appetite. 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 States that she would have trouble breathing at times from the stress, and did not realize it was panic attacks until her PCP clarified this diagnosis. However, does not experience them often and does not remember her last panic attack. Reviewed the patient's ADHD history with her. She elaborated, indicating that she previously had a particular issue where she would begin one task and then start another without finishing the first. States that it would happen for many tasks and knew it was an impairment. After the IPH, patient states she is aware her focus is much diminished. Reports that rehab therapists have communicated she has trouble focusing during sessions. Also endorses having trouble concentrating on television and will often fall asleep instead of concentrating. Reports issues with both short term and long term memory. States that she has to repeat names constantly to herself or else she will forget within 2 minutes. She denies any delusions, paranoia, or A/V hallucinations. Denies any current suicidal/homicidal ideation, intent, or plan.

## 2023-12-27 NOTE — BH CONSULTATION LIAISON ASSESSMENT NOTE - NSBHATTESTBILLING_PSY_A_CORE
[id number] diagnostic evaluation with medical services [id number] diagnostic evaluation with medical services

## 2023-12-27 NOTE — BH CONSULTATION LIAISON ASSESSMENT NOTE - OTHER
not tested.  Patient tends to be preoccupied and hyper fixated on topics, needs to be redirected many times as conversation transitions.

## 2023-12-27 NOTE — BH CONSULTATION LIAISON ASSESSMENT NOTE - RISK ASSESSMENT
Risk factors include passive SI associated with pain, ongoing medical issues, single older female.    Protective factors include no psychiatric history, no history of suicide attempts, strong friend support systems.

## 2023-12-27 NOTE — BH CONSULTATION LIAISON ASSESSMENT NOTE - DIFFERENTIAL
- ADHD, predominately inattentive type.   - Other specified mental disorders due to known physiological condition.  - Neurocognitive Disorder Due to Another Medical Condition.

## 2023-12-27 NOTE — BH CONSULTATION LIAISON ASSESSMENT NOTE - SUMMARY
Patient is a 73 year old femalewith no PMH who presented to [facility] ED on 12/14/2023, as a transfer from [facility], as a CODE STROKE. At [facility] patient was brought in by friend for headaches and AMS; NIHSS 9. CTH and CTA repeated - large R IPH (temporal/parietal). 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 Similar midline shift of 5 mm.  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  Patient was medically optimized for discharge to [facility] IRU on 12/20/23. Psychiatry consulted for severe cognitive impairment and ADHD.     Patient tends to be preoccupied and hyper fixated on topics, needs to be redirected many times as conversation transitions. Throughout interview, patient has tangental thought processes and it is slightly difficult to interrupt or redirect her train of thought. However, she has good insight into her condition and problems. She is aware that she most likely had mild ADHD throughout her life, and s/p IPH now had increased difficulty with sustaining focus. States that therapists have reported that she has trouble focusing during rehab sessions. Denies any mood symptoms. Endorses anxiety at times but denies any recent panic attacks. Reports headaches at night that tends to keep her up.      Patient is a 73 year old femalewith no PMH who presented to [facility] ED on 12/14/2023, as a transfer from [facility], as a CODE STROKE. At [facility] patient was brought in by friend for headaches and AMS; NIHSS 9. CTH and CTA repeated - large R IPH (temporal/parietal). 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 Similar midline shift of 5 mm.  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  Patient was medically optimized for discharge to [facility] IRU on 12/20/23. Psychiatry consulted for severe cognitive impairment and ADHD.     Patient tends to be preoccupied and hyper fixated on topics, needs to be redirected many times as conversation transitions. Throughout interview, patient has tangental thought processes and it is slightly difficult to interrupt or redirect her train of thought. However, she has good insight into her condition and problems. She is aware that she most likely had mild ADHD throughout her life, and s/p IPH now had increased difficulty with sustaining focus. States that therapists have reported that she has trouble focusing during rehab sessions. Denies any mood symptoms. Endorses anxiety at times but denies any recent panic attacks. Reports headaches at night that tends to keep her up.

## 2023-12-27 NOTE — BH CONSULTATION LIAISON ASSESSMENT NOTE - NSBHCHARTREVIEWLAB_PSY_A_CORE FT
Complete Blood Count (12.25.23 @ 06:00)   Nucleated RBC: 0 /100 WBCs  WBC Count: 9.25 K/uL  RBC Count: 4.35 M/uL  Hemoglobin: 13.3 g/dL  Hematocrit: 37.9 %  Mean Cell Volume: 87.1 fl  Mean Cell Hemoglobin: 30.6 pg  Mean Cell Hemoglobin Conc: 35.1 gm/dL  Red Cell Distrib Width: 12.9 %  Platelet Count - Automated: 169 K/uL    Comprehensive Metabolic Panel in AM (12.25.23 @ 06:00)   Sodium: 139 mmol/L  Potassium: 3.9 mmol/L  Chloride: 104 mmol/L  Carbon Dioxide: 26 mmol/L  Anion Gap: 9 mmol/L  Blood Urea Nitrogen: 16 mg/dL  Creatinine: 0.58 mg/dL  Glucose: 102 mg/dL  Calcium: 8.6 mg/dL  Protein Total: 5.8 g/dL  Albumin: 2.8 g/dL  Bilirubin Total: 0.8 mg/dL  Alkaline Phosphatase: 58 U/L  Aspartate Aminotransferase (AST/SGOT): 30 U/L  Alanine Aminotransferase (ALT/SGPT): 81 U/L  eGFR: 95  Magnesium: 1.8 mg/dL (12.25.23 @ 06:00)

## 2023-12-27 NOTE — PROGRESS NOTE ADULT - SUBJECTIVE AND OBJECTIVE BOX
Patient is a 73y old  Female who presents with a chief complaint of ICH (26 Dec 2023 08:28)      HPI:  This is a 74 YO RIGHT handed woman with no PMH who presented to [facility] ED on 12/14/2023, as a transfer from [facility], as a CODE STROKE, with c/o R IPH.   CTH and CTA repeated - large R IPH (temporal/parietal). Presented to [place] today with c/o HA and AMS. Friend accompanying patient said that when visiting patient today - patient was not acting like herself and c/o HA. NIHSS 9.    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 Similar midline shift of 5 mm.  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 Briviact 50mg BID for seizure prophylaxis, EEG with increased risk of seizures in the right frontocentral region.TTE- EF 64%. Continue Losartan 25mg daily. 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 UA: positive for UTI, started on IV abx and then transitioned to oral, end date 12/22.      Patient was evaluated by PM&R and therapy for functional deficits, gait/ADL impairments and acute rehabilitation was recommended. Patient was medically optimized for discharge to [facility] IRU on 12/20/23. (20 Dec 2023 17:15)      PAST MEDICAL & SURGICAL HISTORY:  No pertinent past medical history          MEDICATIONS  (STANDING):  bisacodyl 5 milliGRAM(s) Oral daily  brivaracetam 50 milliGRAM(s) Oral two times a day  enoxaparin Injectable 40 milliGRAM(s) SubCutaneous <User Schedule>  famotidine    Tablet 20 milliGRAM(s) Oral two times a day  gabapentin 300 milliGRAM(s) Oral at bedtime  ketorolac 0.5% Ophthalmic Solution 1 Drop(s) Left EYE daily  lidocaine   4% Patch 1 Patch Transdermal <User Schedule>  losartan 25 milliGRAM(s) Oral daily  polyethylene glycol 3350 17 Gram(s) Oral two times a day  senna 2 Tablet(s) Oral at bedtime  sodium chloride 0.65% Nasal 1 Spray(s) Both Nostrils two times a day    MEDICATIONS  (PRN):  acetaminophen     Tablet .. 650 milliGRAM(s) Oral every 6 hours PRN Temp greater or equal to 38C (100.4F), Mild Pain (1 - 3)  aluminum hydroxide/magnesium hydroxide/simethicone Suspension 30 milliLiter(s) Oral every 6 hours PRN Dyspepsia  artificial tears (preservative free) Ophthalmic Solution 1 Drop(s) Both EYES every 4 hours PRN Dry Eyes  bisacodyl Suppository 10 milliGRAM(s) Rectal daily PRN Constipation  calcium carbonate    500 mG (Tums) Chewable 1 Tablet(s) Chew three times a day PRN Heartburn  ondansetron   Disintegrating Tablet 4 milliGRAM(s) Oral every 6 hours PRN Nausea and/or Vomiting  traMADol 25 milliGRAM(s) Oral every 4 hours PRN Severe Pain (7 - 10)      Allergies    No Known Allergies    Intolerances    oxycodone (Nausea)        VITALS  73y  Vital Signs Last 24 Hrs  T(C): 36.6 (27 Dec 2023 07:23), Max: 36.6 (26 Dec 2023 20:15)  T(F): 97.8 (27 Dec 2023 07:23), Max: 97.9 (26 Dec 2023 20:15)  HR: 62 (27 Dec 2023 07:23) (61 - 66)  BP: 96/68 (27 Dec 2023 07:23) (95/56 - 106/63)  BP(mean): --  RR: 16 (27 Dec 2023 07:23) (16 - 16)  SpO2: 96% (27 Dec 2023 07:23) (96% - 96%)    Parameters below as of 27 Dec 2023 07:23  Patient On (Oxygen Delivery Method): room air      Daily     Daily         RECENT LABS:                      CAPILLARY BLOOD GLUCOSE                   Patient is a 73y old  Female who presents with a chief complaint of ICH (26 Dec 2023 08:28)      HPI:  This is a 74 YO RIGHT handed woman with no PMH who presented to [facility] ED on 12/14/2023, as a transfer from [facility], as a CODE STROKE, with c/o R IPH.   CTH and CTA repeated - large R IPH (temporal/parietal). Presented to [place] today with c/o HA and AMS. Friend accompanying patient said that when visiting patient today - patient was not acting like herself and c/o HA. NIHSS 9.    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 Similar midline shift of 5 mm.  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 Briviact 50mg BID for seizure prophylaxis, EEG with increased risk of seizures in the right frontocentral region.TTE- EF 64%. Continue Losartan 25mg daily. 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 UA: positive for UTI, started on IV abx and then transitioned to oral, end date 12/22.      Patient was evaluated by PM&R and therapy for functional deficits, gait/ADL impairments and acute rehabilitation was recommended. Patient was medically optimized for discharge to [facility] IRU on 12/20/23. (20 Dec 2023 17:15)      PAST MEDICAL & SURGICAL HISTORY:  No pertinent past medical history          MEDICATIONS  (STANDING):  bisacodyl 5 milliGRAM(s) Oral daily  brivaracetam 50 milliGRAM(s) Oral two times a day  enoxaparin Injectable 40 milliGRAM(s) SubCutaneous <User Schedule>  famotidine    Tablet 20 milliGRAM(s) Oral two times a day  gabapentin 300 milliGRAM(s) Oral at bedtime  ketorolac 0.5% Ophthalmic Solution 1 Drop(s) Left EYE daily  lidocaine   4% Patch 1 Patch Transdermal <User Schedule>  losartan 25 milliGRAM(s) Oral daily  polyethylene glycol 3350 17 Gram(s) Oral two times a day  senna 2 Tablet(s) Oral at bedtime  sodium chloride 0.65% Nasal 1 Spray(s) Both Nostrils two times a day    MEDICATIONS  (PRN):  acetaminophen     Tablet .. 650 milliGRAM(s) Oral every 6 hours PRN Temp greater or equal to 38C (100.4F), Mild Pain (1 - 3)  aluminum hydroxide/magnesium hydroxide/simethicone Suspension 30 milliLiter(s) Oral every 6 hours PRN Dyspepsia  artificial tears (preservative free) Ophthalmic Solution 1 Drop(s) Both EYES every 4 hours PRN Dry Eyes  bisacodyl Suppository 10 milliGRAM(s) Rectal daily PRN Constipation  calcium carbonate    500 mG (Tums) Chewable 1 Tablet(s) Chew three times a day PRN Heartburn  ondansetron   Disintegrating Tablet 4 milliGRAM(s) Oral every 6 hours PRN Nausea and/or Vomiting  traMADol 25 milliGRAM(s) Oral every 4 hours PRN Severe Pain (7 - 10)      Allergies    No Known Allergies    Intolerances    oxycodone (Nausea)        VITALS  73y  Vital Signs Last 24 Hrs  T(C): 36.6 (27 Dec 2023 07:23), Max: 36.6 (26 Dec 2023 20:15)  T(F): 97.8 (27 Dec 2023 07:23), Max: 97.9 (26 Dec 2023 20:15)  HR: 62 (27 Dec 2023 07:23) (61 - 66)  BP: 96/68 (27 Dec 2023 07:23) (95/56 - 106/63)  BP(mean): --  RR: 16 (27 Dec 2023 07:23) (16 - 16)  SpO2: 96% (27 Dec 2023 07:23) (96% - 96%)    Parameters below as of 27 Dec 2023 07:23  Patient On (Oxygen Delivery Method): room air      Daily     Daily         RECENT LABS:                      CAPILLARY BLOOD GLUCOSE

## 2023-12-27 NOTE — BH CONSULTATION LIAISON ASSESSMENT NOTE - NSBHCHARTREVIEWVS_PSY_A_CORE FT
Vital Signs Last 24 Hrs  T(C): 36.6 (27 Dec 2023 07:23), Max: 36.6 (26 Dec 2023 20:15)  T(F): 97.8 (27 Dec 2023 07:23), Max: 97.9 (26 Dec 2023 20:15)  HR: 62 (27 Dec 2023 07:23) (61 - 66)  BP: 96/68 (27 Dec 2023 07:23) (95/56 - 106/63)  BP(mean): --  RR: 16 (27 Dec 2023 07:23) (16 - 16)  SpO2: 96% (27 Dec 2023 07:23) (96% - 96%)    Parameters below as of 27 Dec 2023 07:23  Patient On (Oxygen Delivery Method): room air

## 2023-12-27 NOTE — PROGRESS NOTE ADULT - ASSESSMENT
72 YO RIGHT handed woman with no PMH who presented to [facility] ED on 12/14/2023, as a transfer from [facility] with R IPH. Hospital course also significant for UTI, b/l DVTs, EEG with increased seizure risks.    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  - EEG with increased risk of seizures in the right frontocentral region  - Briviact 50mg BID for seizure prophylaxis.  - Cont Comprehensive Rehab Program: PT/OT/ST, 3hours daily and 5 days weekly    # self-reported history of ADHD and restless, distracted, poor attention  - xanax Rx in I stop  - psychiatry consult  - psychology and recreation therapy (former )    # HTN  - Cont. Losartan 25mg daily  - BP  (95/56 - 106/63) 12/27. Will f/u hospitalist re: possible dc medication    # UTI  - s/p Vantin 100mg BID     # Pain management  - Tylenol PRN  -tramadol reduced to 25 q4 PRN severe pain, dc for moderate pain 12/26. Continue to wean    # DVT   - b/l below the knee DVTs: right  soleal vein, left soleal, peroneal and gastrocnemius veins.  - Lovenox 40 mg daily. Not cleared for full dose AC due to large ICH  - Doppler 12/20: Positive deep venous thrombosis below the right knee within the right soleal vein. Positive deep venous thrombosis below the left knee within the left soleal, peroneal and gastrocnemius veins.  -  Doppler 12/27 surveillance  - Repeat Head CT today 12/27 followed by neuro consult re: possible full dose AC (day 14 post bleed)    # GI ppx  - Pepcid 20mg   - TUMS, Maalox  - Senna, miralax PRN    # Dysphagia   - Diet: Soft and Bite sized with thin liquids    # Case discussed in IDT rounds 12/26 (initial)  - ambulates 20 feet with RW and mod assist and WC follow with max cues, mod-max assist transfers, follows occasional single step commands with poor attention and max cues, tolerating soflt-bite sized and thin liquids, severe attention deficits, poor insight, supervision eating/hygiene, total assist tub transfers, max assist toileting  - goals: mod-max assist transfer due to need for cues and poor attention, mod assist baDLs  - target: dc SIVAN 1/3/23 for additional OT PT SLP (patient lives alone in apt with stairs)    # LABS:   psychiatry consult re: ADHD  doppler 12/27  Head CT 12/27  CBC CMP 12/28    #GOC  CODE STATUS: FULL CODE    Outpatient Follow-up (Specialty/Name of physician):    Emy Prakash  NP in [address], [address]  [place] [address]  Phone: [phone]  Fax: [phone]  Follow Up Time: 2 weeks    Sergio Jurado  Cardiology  [address] [place] [address], [address]  [place] [address]  Phone: [phone]  Fax: [phone]  Follow Up Time: 1 month   74 YO RIGHT handed woman with no PMH who presented to [facility] ED on 12/14/2023, as a transfer from [facility] with R IPH. Hospital course also significant for UTI, b/l DVTs, EEG with increased seizure risks.    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  - EEG with increased risk of seizures in the right frontocentral region  - Briviact 50mg BID for seizure prophylaxis.  - Cont Comprehensive Rehab Program: PT/OT/ST, 3hours daily and 5 days weekly    # self-reported history of ADHD and restless, distracted, poor attention  - xanax Rx in I stop  - psychiatry consult  - psychology and recreation therapy (former )    # HTN  - Cont. Losartan 25mg daily  - BP  (95/56 - 106/63) 12/27. Will f/u hospitalist re: possible dc medication    # UTI  - s/p Vantin 100mg BID     # Pain management  - Tylenol PRN  -tramadol reduced to 25 q4 PRN severe pain, dc for moderate pain 12/26. Continue to wean    # DVT   - b/l below the knee DVTs: right  soleal vein, left soleal, peroneal and gastrocnemius veins.  - Lovenox 40 mg daily. Not cleared for full dose AC due to large ICH  - Doppler 12/20: Positive deep venous thrombosis below the right knee within the right soleal vein. Positive deep venous thrombosis below the left knee within the left soleal, peroneal and gastrocnemius veins.  -  Doppler 12/27 surveillance  - Repeat Head CT today 12/27 followed by neuro consult re: possible full dose AC (day 14 post bleed)    # GI ppx  - Pepcid 20mg   - TUMS, Maalox  - Senna, miralax PRN    # Dysphagia   - Diet: Soft and Bite sized with thin liquids    # Case discussed in IDT rounds 12/26 (initial)  - ambulates 20 feet with RW and mod assist and WC follow with max cues, mod-max assist transfers, follows occasional single step commands with poor attention and max cues, tolerating soflt-bite sized and thin liquids, severe attention deficits, poor insight, supervision eating/hygiene, total assist tub transfers, max assist toileting  - goals: mod-max assist transfer due to need for cues and poor attention, mod assist baDLs  - target: dc SIVAN 1/3/23 for additional OT PT SLP (patient lives alone in apt with stairs)    # LABS:   psychiatry consult re: ADHD  doppler 12/27  Head CT 12/27  CBC CMP 12/28    #GOC  CODE STATUS: FULL CODE    Outpatient Follow-up (Specialty/Name of physician):    Emy Prakash  NP in [address], [address]  [place] [address]  Phone: [phone]  Fax: [phone]  Follow Up Time: 2 weeks    Sergio Jurado  Cardiology  [address] [place] [address], [address]  [place] [address]  Phone: [phone]  Fax: [phone]  Follow Up Time: 1 month   72 YO RIGHT handed woman with no PMH who presented to [facility] ED on 12/14/2023, as a transfer from [facility] with R IPH. Hospital course also significant for UTI, b/l DVTs, EEG with increased seizure risks.    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  - EEG with increased risk of seizures in the right frontocentral region  - Briviact 50mg BID for seizure prophylaxis.  - Cont Comprehensive Rehab Program: PT/OT/ST, 3hours daily and 5 days weekly    # self-reported history of ADHD and restless, distracted, poor attention  - xanax Rx in I stop  - psychiatry consult  - psychology and recreation therapy (former )    # HTN  -  Losartan 25mg daily--> discontinued due to soft BP 12/27  - BP  (95/56 - 106/63) 12/27. Meds adjusted as above, hospitalist appreciated    # UTI  - s/p Vantin 100mg BID     # Pain management  - Tylenol PRN  - tramadol reduced to 25 q4 PRN severe pain, dc for moderate pain 12/26. Continue to wean as tolerated    # DVT   - b/l below the knee DVTs: right  soleal vein, left soleal, peroneal and gastrocnemius veins.  - Lovenox 40 mg daily. Not cleared for full dose AC due to large ICH  - Doppler 12/20: Positive deep venous thrombosis below the right knee within the right soleal vein. Positive deep venous thrombosis below the left knee within the left soleal, peroneal and gastrocnemius veins.  - Doppler 12/27 surveillance  - Repeat Head CT today 12/27 followed by neuro consult re: possible full dose AC (day 14 post bleed)    # GI ppx  - Pepcid 20mg   - TUMS, Maalox  - Senna, miralax PRN    # Dysphagia   - Diet: Soft and Bite sized with thin liquids    # Case discussed in IDT rounds 12/26 (initial)  - ambulates 20 feet with RW and mod assist and WC follow with max cues, mod-max assist transfers, follows occasional single step commands with poor attention and max cues, tolerating soflt-bite sized and thin liquids, severe attention deficits, poor insight, supervision eating/hygiene, total assist tub transfers, max assist toileting  - goals: mod-max assist transfer due to need for cues and poor attention, mod assist baDLs  - target: dc SIVAN 1/3/23 for additional OT PT SLP (patient lives alone in apt with stairs)    # LABS:   psychiatry consult re: ADHD  doppler 12/27  Head CT 12/27  CBC CMP 12/28    #GOC  CODE STATUS: FULL CODE    Outpatient Follow-up (Specialty/Name of physician):    Emy Prakash  NP in [facility]  [address] [place] [address], [address]  [place] [address]  Phone: [phone]  Fax: [phone]  Follow Up Time: 2 weeks    Sergio Jurado  Cardiology  [address] [facility], [address]  [place] [address]  Phone: [phone]  Fax: [phone]  Follow Up Time: 1 month   74 YO RIGHT handed woman with no PMH who presented to [facility] ED on 12/14/2023, as a transfer from [facility] with R IPH. Hospital course also significant for UTI, b/l DVTs, EEG with increased seizure risks.    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  - EEG with increased risk of seizures in the right frontocentral region  - Briviact 50mg BID for seizure prophylaxis.  - Cont Comprehensive Rehab Program: PT/OT/ST, 3hours daily and 5 days weekly    # self-reported history of ADHD and restless, distracted, poor attention  - xanax Rx in I stop  - psychiatry consult  - psychology and recreation therapy (former )    # HTN  -  Losartan 25mg daily--> discontinued due to soft BP 12/27  - BP  (95/56 - 106/63) 12/27. Meds adjusted as above, hospitalist appreciated    # UTI  - s/p Vantin 100mg BID     # Pain management  - Tylenol PRN  - tramadol reduced to 25 q4 PRN severe pain, dc for moderate pain 12/26. Continue to wean as tolerated    # DVT   - b/l below the knee DVTs: right  soleal vein, left soleal, peroneal and gastrocnemius veins.  - Lovenox 40 mg daily. Not cleared for full dose AC due to large ICH  - Doppler 12/20: Positive deep venous thrombosis below the right knee within the right soleal vein. Positive deep venous thrombosis below the left knee within the left soleal, peroneal and gastrocnemius veins.  - Doppler 12/27 surveillance  - Repeat Head CT today 12/27 followed by neuro consult re: possible full dose AC (day 14 post bleed)    # GI ppx  - Pepcid 20mg   - TUMS, Maalox  - Senna, miralax PRN    # Dysphagia   - Diet: Soft and Bite sized with thin liquids    # Case discussed in IDT rounds 12/26 (initial)  - ambulates 20 feet with RW and mod assist and WC follow with max cues, mod-max assist transfers, follows occasional single step commands with poor attention and max cues, tolerating soflt-bite sized and thin liquids, severe attention deficits, poor insight, supervision eating/hygiene, total assist tub transfers, max assist toileting  - goals: mod-max assist transfer due to need for cues and poor attention, mod assist baDLs  - target: dc SIVAN 1/3/23 for additional OT PT SLP (patient lives alone in apt with stairs)    # LABS:   psychiatry consult re: ADHD  doppler 12/27  Head CT 12/27  CBC CMP 12/28    #GOC  CODE STATUS: FULL CODE    Outpatient Follow-up (Specialty/Name of physician):    Emy Prakash  NP in [facility]  [address] [place] [address], [address]  [place] [address]  Phone: [phone]  Fax: [phone]  Follow Up Time: 2 weeks    Sergio Jurado  Cardiology  [address] [facility], [address]  [place] [address]  Phone: [phone]  Fax: [phone]  Follow Up Time: 1 month   72 YO RIGHT handed woman with no PMH who presented to [facility] ED on 12/14/2023, as a transfer from [facility] with R IPH. Hospital course also significant for UTI, b/l DVTs, EEG with increased seizure risks.    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  - EEG with increased risk of seizures in the right frontocentral region  - Briviact 50mg BID for seizure prophylaxis.  - Cont Comprehensive Rehab Program: PT/OT/ST, 3hours daily and 5 days weekly    # self-reported history of ADHD and restless, distracted, poor attention  - xanax Rx in I stop  - psychiatry consult  - psychology and recreation therapy (former )    # HTN  -  Losartan 25mg daily--> discontinued due to soft BP 12/27  - BP  (95/56 - 106/63) 12/27. Meds adjusted as above, hospitalist appreciated    # UTI  - s/p Vantin 100mg BID     # Pain management  - Tylenol PRN  - tramadol reduced to 25 q4 PRN severe pain, dc for moderate pain 12/26. Continue to wean as tolerated    # DVT   - b/l below the knee DVTs: right  soleal vein, left soleal, peroneal and gastrocnemius veins.  - Lovenox 40 mg daily. Not cleared for full dose AC due to large ICH  - Doppler 12/20: Positive deep venous thrombosis below the right knee within the right soleal vein. Positive deep venous thrombosis below the left knee within the left soleal, peroneal and gastrocnemius veins.  - Doppler 12/27 surveillance  - Repeat Head CT today 12/27 followed by neuro consult re: possible full dose AC (day 14 post bleed)    # GI ppx  - Pepcid 20mg   - TUMS, Maalox  - Senna, miralax PRN    # Dysphagia   - Diet: Soft and Bite sized with thin liquids    # Case discussed in IDT rounds 12/26 (initial)  - ambulates 20 feet with RW and mod assist and WC follow with max cues, mod-max assist transfers, follows occasional single step commands with poor attention and max cues, tolerating soflt-bite sized and thin liquids, severe attention deficits, poor insight, supervision eating/hygiene, total assist tub transfers, max assist toileting  - goals: mod-max assist transfer due to need for cues and poor attention, mod assist baDLs  - target: dc SIVAN 1/3/23 for additional OT PT SLP (patient lives alone in apt with stairs)    To inform Pauline's HCP Isabella () about the CT scan once completed    # LABS:   psychiatry consult re: ADHD  doppler 12/27  Head CT 12/27  CBC CMP 12/28    #GOC  CODE STATUS: FULL CODE    Outpatient Follow-up (Specialty/Name of physician):    Emy Prakash  NP in [address], [address]  [place] [address]  Phone: [phone]  Fax: [phone]  Follow Up Time: 2 weeks    Sergio Jurado  Cardiology  [address] [place] [address], [address]  [place] [address]  Phone: [phone]  Fax: [phone]  Follow Up Time: 1 month   72 YO RIGHT handed woman with no PMH who presented to [facility] ED on 12/14/2023, as a transfer from [facility] with R IPH. Hospital course also significant for UTI, b/l DVTs, EEG with increased seizure risks.    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  - EEG with increased risk of seizures in the right frontocentral region  - Briviact 50mg BID for seizure prophylaxis.  - Cont Comprehensive Rehab Program: PT/OT/ST, 3hours daily and 5 days weekly    # self-reported history of ADHD and restless, distracted, poor attention  - xanax Rx in I stop  - psychiatry consult  - psychology and recreation therapy (former )    # HTN  -  Losartan 25mg daily--> discontinued due to soft BP 12/27  - BP  (95/56 - 106/63) 12/27. Meds adjusted as above, hospitalist appreciated    # UTI  - s/p Vantin 100mg BID     # Pain management  - Tylenol PRN  - tramadol reduced to 25 q4 PRN severe pain, dc for moderate pain 12/26. Continue to wean as tolerated    # DVT   - b/l below the knee DVTs: right  soleal vein, left soleal, peroneal and gastrocnemius veins.  - Lovenox 40 mg daily. Not cleared for full dose AC due to large ICH  - Doppler 12/20: Positive deep venous thrombosis below the right knee within the right soleal vein. Positive deep venous thrombosis below the left knee within the left soleal, peroneal and gastrocnemius veins.  - Doppler 12/27 surveillance  - Repeat Head CT today 12/27 followed by neuro consult re: possible full dose AC (day 14 post bleed)    # GI ppx  - Pepcid 20mg   - TUMS, Maalox  - Senna, miralax PRN    # Dysphagia   - Diet: Soft and Bite sized with thin liquids    # Case discussed in IDT rounds 12/26 (initial)  - ambulates 20 feet with RW and mod assist and WC follow with max cues, mod-max assist transfers, follows occasional single step commands with poor attention and max cues, tolerating soflt-bite sized and thin liquids, severe attention deficits, poor insight, supervision eating/hygiene, total assist tub transfers, max assist toileting  - goals: mod-max assist transfer due to need for cues and poor attention, mod assist baDLs  - target: dc SIVAN 1/3/23 for additional OT PT SLP (patient lives alone in apt with stairs)    To inform Pauline's HCP Isabella () about the CT scan once completed    # LABS:   psychiatry consult re: ADHD  doppler 12/27  Head CT 12/27  CBC CMP 12/28    #GOC  CODE STATUS: FULL CODE    Outpatient Follow-up (Specialty/Name of physician):    Emy Prakash  NP in [address], [address]  [place] [address]  Phone: [phone]  Fax: [phone]  Follow Up Time: 2 weeks    Sergio Jurado  Cardiology  [address] [place] [address], [address]  [place] [address]  Phone: [phone]  Fax: [phone]  Follow Up Time: 1 month

## 2023-12-27 NOTE — BH CONSULTATION LIAISON ASSESSMENT NOTE - NSBHCHARTREVIEWINVESTIGATE_PSY_A_CORE FT
< from: 12 Lead ECG (12.17.23 @ 09:24) >      Ventricular Rate 49 BPM    Atrial Rate 49 BPM    P-R Interval 96 ms    QRS Duration 84 ms    Q-T Interval 488 ms    QTC Calculation(Bazett) 440 ms    P Axis 40 degrees    R Axis 5 degrees    T Axis 111 degrees    Diagnosis Line SINUS BRADYCARDIA  SEPTAL INFARCT (CITED ON OR BEFORE 17-DEC-2023)  ABNORMAL ECG  WHEN COMPARED WITH ECG OF 17-DEC-2023 00:13, (UNCONFIRMED)  no  SIGNIFICANT CHANGES HAVE OCCURRED  Confirmed by BRUNO BENITES MD ([id number]) on 12/17/2023 1:02:03 PM    < end of copied text >    < from: CT Head No Cont (12.27.23 @ 15:18) >      ACC: [id number] EXAM:  CT BRAIN   ORDERED BY: HORACIO CA     PROCEDURE DATE:  12/27/2023          INTERPRETATION:  CLINICAL INDICATION: Follow-up right parietal   parenchymal hemorrhage    5mm axial sections of the brain were obtained from base to vertex,   without the intravenous administration of contrast material. Coronal and   sagittal computer generated reconstructed views are available.    Comparison is made with the prior CT of 12/19/2023.    The right parietal parenchymal hemorrhage is slightly less dense and   slightly smaller measuring 3.0 cm in AP diameter by 4.3 cm transversely   compared with the prior of 4.1 cm in AP diameter by 5.9 cm transversely.   There is similar vasogenic edema. There is also similar mass effect on   the atrium of the right lateral ventricle with similar mild midline shift   to the left. There is resolution of the intraventricular hemorrhage in   the left occipital horn.    IMPRESSION: Decreased size in density to the right parietal parenchymal   hemorrhage since 12/19/2023. Resolution of intraventricular hemorrhage   left occipital horn.    --- End of Report ---            REGIS THOMAS MD; Attending Radiologist  This document has been electronically signed. Dec 27 2023  5:15PM    < end of copied text >     < from: 12 Lead ECG (12.17.23 @ 09:24) >      Ventricular Rate 49 BPM    Atrial Rate 49 BPM    P-R Interval 96 ms    QRS Duration 84 ms    Q-T Interval 488 ms    QTC Calculation(Bazett) 440 ms    P Axis 40 degrees    R Axis 5 degrees    T Axis 111 degrees    Diagnosis Line SINUS BRADYCARDIA  SEPTAL INFARCT (CITED ON OR BEFORE 17-DEC-2023)  ABNORMAL ECG  WHEN COMPARED WITH ECG OF 17-DEC-2023 00:13, (UNCONFIRMED)  no  SIGNIFICANT CHANGES HAVE OCCURRED  Confirmed by BRUNO BENITES MD ([id number]) on 12/17/2023 1:02:03 PM    < end of copied text >    < from: CT Head No Cont (12.27.23 @ 15:18) >      ACC: [id number] EXAM:  CT BRAIN   ORDERED BY: HROACIO CA     PROCEDURE DATE:  12/27/2023          INTERPRETATION:  CLINICAL INDICATION: Follow-up right parietal   parenchymal hemorrhage    5mm axial sections of the brain were obtained from base to vertex,   without the intravenous administration of contrast material. Coronal and   sagittal computer generated reconstructed views are available.    Comparison is made with the prior CT of 12/19/2023.    The right parietal parenchymal hemorrhage is slightly less dense and   slightly smaller measuring 3.0 cm in AP diameter by 4.3 cm transversely   compared with the prior of 4.1 cm in AP diameter by 5.9 cm transversely.   There is similar vasogenic edema. There is also similar mass effect on   the atrium of the right lateral ventricle with similar mild midline shift   to the left. There is resolution of the intraventricular hemorrhage in   the left occipital horn.    IMPRESSION: Decreased size in density to the right parietal parenchymal   hemorrhage since 12/19/2023. Resolution of intraventricular hemorrhage   left occipital horn.    --- End of Report ---            REGIS THOMAS MD; Attending Radiologist  This document has been electronically signed. Dec 27 2023  5:15PM    < end of copied text >

## 2023-12-27 NOTE — BH CONSULTATION LIAISON ASSESSMENT NOTE - CURRENT MEDICATION
MEDICATIONS  (STANDING):  bisacodyl 5 milliGRAM(s) Oral daily  brivaracetam 50 milliGRAM(s) Oral two times a day  enoxaparin Injectable 40 milliGRAM(s) SubCutaneous <User Schedule>  famotidine    Tablet 20 milliGRAM(s) Oral two times a day  gabapentin 300 milliGRAM(s) Oral at bedtime  ketorolac 0.5% Ophthalmic Solution 1 Drop(s) Left EYE daily  lidocaine   4% Patch 1 Patch Transdermal <User Schedule>  polyethylene glycol 3350 17 Gram(s) Oral two times a day  senna 2 Tablet(s) Oral at bedtime  sodium chloride 0.65% Nasal 1 Spray(s) Both Nostrils two times a day    MEDICATIONS  (PRN):  acetaminophen     Tablet .. 650 milliGRAM(s) Oral every 6 hours PRN Temp greater or equal to 38C (100.4F), Mild Pain (1 - 3)  aluminum hydroxide/magnesium hydroxide/simethicone Suspension 30 milliLiter(s) Oral every 6 hours PRN Dyspepsia  artificial tears (preservative free) Ophthalmic Solution 1 Drop(s) Both EYES every 4 hours PRN Dry Eyes  bisacodyl Suppository 10 milliGRAM(s) Rectal daily PRN Constipation  calcium carbonate    500 mG (Tums) Chewable 1 Tablet(s) Chew three times a day PRN Heartburn  ondansetron   Disintegrating Tablet 4 milliGRAM(s) Oral every 6 hours PRN Nausea and/or Vomiting  traMADol 25 milliGRAM(s) Oral every 4 hours PRN Severe Pain (7 - 10)

## 2023-12-27 NOTE — BH CONSULTATION LIAISON ASSESSMENT NOTE - DESCRIPTION
Patient was born and raised in [place], came to [place] in 1981.  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 However,   due to interpersonal issues. No children. 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 Has many friends as a support system.  Patient was born and raised in [place], came to [place] in 1981.  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 However,   due to interpersonal issues. No children. 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 Has many friends as a support system.

## 2023-12-27 NOTE — BH CONSULTATION LIAISON ASSESSMENT NOTE - NSICDXBHPRIMARYDX_PSY_ALL_CORE
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

## 2023-12-28 ENCOUNTER — TRANSCRIPTION ENCOUNTER (OUTPATIENT)
Age: 73
End: 2023-12-28

## 2023-12-28 LAB
ALBUMIN SERPL ELPH-MCNC: 2.9 G/DL — LOW (ref 3.3–5)
ALBUMIN SERPL ELPH-MCNC: 2.9 G/DL — LOW (ref 3.3–5)
ALP SERPL-CCNC: 59 U/L — SIGNIFICANT CHANGE UP (ref 40–120)
ALP SERPL-CCNC: 59 U/L — SIGNIFICANT CHANGE UP (ref 40–120)
ALT FLD-CCNC: 70 U/L — HIGH (ref 10–45)
ALT FLD-CCNC: 70 U/L — HIGH (ref 10–45)
ANION GAP SERPL CALC-SCNC: 11 MMOL/L — SIGNIFICANT CHANGE UP (ref 5–17)
ANION GAP SERPL CALC-SCNC: 11 MMOL/L — SIGNIFICANT CHANGE UP (ref 5–17)
AST SERPL-CCNC: 30 U/L — SIGNIFICANT CHANGE UP (ref 10–40)
AST SERPL-CCNC: 30 U/L — SIGNIFICANT CHANGE UP (ref 10–40)
BILIRUB SERPL-MCNC: 0.4 MG/DL — SIGNIFICANT CHANGE UP (ref 0.2–1.2)
BILIRUB SERPL-MCNC: 0.4 MG/DL — SIGNIFICANT CHANGE UP (ref 0.2–1.2)
BUN SERPL-MCNC: 18 MG/DL — SIGNIFICANT CHANGE UP (ref 7–23)
BUN SERPL-MCNC: 18 MG/DL — SIGNIFICANT CHANGE UP (ref 7–23)
CALCIUM SERPL-MCNC: 9.3 MG/DL — SIGNIFICANT CHANGE UP (ref 8.4–10.5)
CALCIUM SERPL-MCNC: 9.3 MG/DL — SIGNIFICANT CHANGE UP (ref 8.4–10.5)
CHLORIDE SERPL-SCNC: 102 MMOL/L — SIGNIFICANT CHANGE UP (ref 96–108)
CHLORIDE SERPL-SCNC: 102 MMOL/L — SIGNIFICANT CHANGE UP (ref 96–108)
CO2 SERPL-SCNC: 25 MMOL/L — SIGNIFICANT CHANGE UP (ref 22–31)
CO2 SERPL-SCNC: 25 MMOL/L — SIGNIFICANT CHANGE UP (ref 22–31)
CREAT SERPL-MCNC: 0.64 MG/DL — SIGNIFICANT CHANGE UP (ref 0.5–1.3)
CREAT SERPL-MCNC: 0.64 MG/DL — SIGNIFICANT CHANGE UP (ref 0.5–1.3)
EGFR: 93 ML/MIN/1.73M2 — SIGNIFICANT CHANGE UP
EGFR: 93 ML/MIN/1.73M2 — SIGNIFICANT CHANGE UP
GLUCOSE SERPL-MCNC: 72 MG/DL — SIGNIFICANT CHANGE UP (ref 70–99)
GLUCOSE SERPL-MCNC: 72 MG/DL — SIGNIFICANT CHANGE UP (ref 70–99)
HCT VFR BLD CALC: 40.3 % — SIGNIFICANT CHANGE UP (ref 34.5–45)
HCT VFR BLD CALC: 40.3 % — SIGNIFICANT CHANGE UP (ref 34.5–45)
HGB BLD-MCNC: 13.9 G/DL — SIGNIFICANT CHANGE UP (ref 11.5–15.5)
HGB BLD-MCNC: 13.9 G/DL — SIGNIFICANT CHANGE UP (ref 11.5–15.5)
MCHC RBC-ENTMCNC: 30.8 PG — SIGNIFICANT CHANGE UP (ref 27–34)
MCHC RBC-ENTMCNC: 30.8 PG — SIGNIFICANT CHANGE UP (ref 27–34)
MCHC RBC-ENTMCNC: 34.5 GM/DL — SIGNIFICANT CHANGE UP (ref 32–36)
MCHC RBC-ENTMCNC: 34.5 GM/DL — SIGNIFICANT CHANGE UP (ref 32–36)
MCV RBC AUTO: 89.2 FL — SIGNIFICANT CHANGE UP (ref 80–100)
MCV RBC AUTO: 89.2 FL — SIGNIFICANT CHANGE UP (ref 80–100)
NRBC # BLD: 0 /100 WBCS — SIGNIFICANT CHANGE UP (ref 0–0)
NRBC # BLD: 0 /100 WBCS — SIGNIFICANT CHANGE UP (ref 0–0)
PLATELET # BLD AUTO: 265 K/UL — SIGNIFICANT CHANGE UP (ref 150–400)
PLATELET # BLD AUTO: 265 K/UL — SIGNIFICANT CHANGE UP (ref 150–400)
POTASSIUM SERPL-MCNC: 4 MMOL/L — SIGNIFICANT CHANGE UP (ref 3.5–5.3)
POTASSIUM SERPL-MCNC: 4 MMOL/L — SIGNIFICANT CHANGE UP (ref 3.5–5.3)
POTASSIUM SERPL-SCNC: 4 MMOL/L — SIGNIFICANT CHANGE UP (ref 3.5–5.3)
POTASSIUM SERPL-SCNC: 4 MMOL/L — SIGNIFICANT CHANGE UP (ref 3.5–5.3)
PROT SERPL-MCNC: 6.4 G/DL — SIGNIFICANT CHANGE UP (ref 6–8.3)
PROT SERPL-MCNC: 6.4 G/DL — SIGNIFICANT CHANGE UP (ref 6–8.3)
RBC # BLD: 4.52 M/UL — SIGNIFICANT CHANGE UP (ref 3.8–5.2)
RBC # BLD: 4.52 M/UL — SIGNIFICANT CHANGE UP (ref 3.8–5.2)
RBC # FLD: 13.2 % — SIGNIFICANT CHANGE UP (ref 10.3–14.5)
RBC # FLD: 13.2 % — SIGNIFICANT CHANGE UP (ref 10.3–14.5)
SODIUM SERPL-SCNC: 138 MMOL/L — SIGNIFICANT CHANGE UP (ref 135–145)
SODIUM SERPL-SCNC: 138 MMOL/L — SIGNIFICANT CHANGE UP (ref 135–145)
WBC # BLD: 5.94 K/UL — SIGNIFICANT CHANGE UP (ref 3.8–10.5)
WBC # BLD: 5.94 K/UL — SIGNIFICANT CHANGE UP (ref 3.8–10.5)
WBC # FLD AUTO: 5.94 K/UL — SIGNIFICANT CHANGE UP (ref 3.8–10.5)
WBC # FLD AUTO: 5.94 K/UL — SIGNIFICANT CHANGE UP (ref 3.8–10.5)

## 2023-12-28 PROCEDURE — 99232 SBSQ HOSP IP/OBS MODERATE 35: CPT

## 2023-12-28 PROCEDURE — 99222 1ST HOSP IP/OBS MODERATE 55: CPT

## 2023-12-28 RX ORDER — LIDOCAINE 4 G/100G
1 CREAM TOPICAL
Refills: 0 | Status: DISCONTINUED | OUTPATIENT
Start: 2023-12-28 | End: 2023-12-28

## 2023-12-28 RX ORDER — MODAFINIL 200 MG/1
50 TABLET ORAL ONCE
Refills: 0 | Status: DISCONTINUED | OUTPATIENT
Start: 2023-12-28 | End: 2023-12-28

## 2023-12-28 RX ORDER — MODAFINIL 200 MG/1
50 TABLET ORAL
Refills: 0 | Status: DISCONTINUED | OUTPATIENT
Start: 2023-12-29 | End: 2024-01-04

## 2023-12-28 RX ORDER — LIDOCAINE 4 G/100G
1 CREAM TOPICAL
Refills: 0 | Status: DISCONTINUED | OUTPATIENT
Start: 2023-12-29 | End: 2024-01-11

## 2023-12-28 RX ORDER — LIDOCAINE 4 G/100G
1 CREAM TOPICAL
Refills: 0 | Status: DISCONTINUED | OUTPATIENT
Start: 2023-12-28 | End: 2024-01-11

## 2023-12-28 RX ADMIN — POLYETHYLENE GLYCOL 3350 17 GRAM(S): 17 POWDER, FOR SOLUTION ORAL at 06:05

## 2023-12-28 RX ADMIN — Medication 30 MILLILITER(S): at 20:40

## 2023-12-28 RX ADMIN — FAMOTIDINE 20 MILLIGRAM(S): 10 INJECTION INTRAVENOUS at 06:05

## 2023-12-28 RX ADMIN — LIDOCAINE 1 PATCH: 4 CREAM TOPICAL at 07:36

## 2023-12-28 RX ADMIN — ENOXAPARIN SODIUM 40 MILLIGRAM(S): 100 INJECTION SUBCUTANEOUS at 17:06

## 2023-12-28 RX ADMIN — Medication 1 DROP(S): at 12:44

## 2023-12-28 RX ADMIN — Medication 650 MILLIGRAM(S): at 06:05

## 2023-12-28 RX ADMIN — Medication 650 MILLIGRAM(S): at 06:52

## 2023-12-28 RX ADMIN — Medication 1 DROP(S): at 12:43

## 2023-12-28 RX ADMIN — BRIVARACETAM 50 MILLIGRAM(S): 25 TABLET, FILM COATED ORAL at 17:05

## 2023-12-28 RX ADMIN — LIDOCAINE 1 PATCH: 4 CREAM TOPICAL at 19:59

## 2023-12-28 RX ADMIN — Medication 1 SPRAY(S): at 17:07

## 2023-12-28 RX ADMIN — Medication 650 MILLIGRAM(S): at 22:50

## 2023-12-28 RX ADMIN — MODAFINIL 50 MILLIGRAM(S): 200 TABLET ORAL at 12:42

## 2023-12-28 RX ADMIN — FAMOTIDINE 20 MILLIGRAM(S): 10 INJECTION INTRAVENOUS at 17:05

## 2023-12-28 RX ADMIN — Medication 650 MILLIGRAM(S): at 22:20

## 2023-12-28 RX ADMIN — POLYETHYLENE GLYCOL 3350 17 GRAM(S): 17 POWDER, FOR SOLUTION ORAL at 17:06

## 2023-12-28 RX ADMIN — Medication 5 MILLIGRAM(S): at 12:43

## 2023-12-28 RX ADMIN — SENNA PLUS 2 TABLET(S): 8.6 TABLET ORAL at 21:05

## 2023-12-28 RX ADMIN — Medication 1 SPRAY(S): at 06:06

## 2023-12-28 RX ADMIN — BRIVARACETAM 50 MILLIGRAM(S): 25 TABLET, FILM COATED ORAL at 06:05

## 2023-12-28 RX ADMIN — GABAPENTIN 300 MILLIGRAM(S): 400 CAPSULE ORAL at 21:05

## 2023-12-28 NOTE — BH CONSULTATION LIAISON PROGRESS NOTE - OTHER
Patient tends to be preoccupied and hyper fixated on topics, needs to be redirected many times as conversation transitions.  not tested.

## 2023-12-28 NOTE — PROGRESS NOTE ADULT - SUBJECTIVE AND OBJECTIVE BOX
Hospitalist  Dr. Bonnie Jasmine  Progress note    CC: Patient is a 73y old  Female who presents with a chief complaint of ICH (25 Dec 2023 09:15)    Interval History:  Patient seen and examined at bedside. Reports her headache is better today. Denies any other pain.   No overnight events    ALLERGIES:  No Known Allergies  oxycodone (Nausea)    MEDICATIONS  (STANDING):  bisacodyl 5 milliGRAM(s) Oral daily  brivaracetam 50 milliGRAM(s) Oral two times a day  enoxaparin Injectable 40 milliGRAM(s) SubCutaneous <User Schedule>  famotidine    Tablet 20 milliGRAM(s) Oral two times a day  gabapentin 300 milliGRAM(s) Oral at bedtime  ketorolac 0.5% Ophthalmic Solution 1 Drop(s) Left EYE daily  lidocaine   4% Patch 1 Patch Transdermal <User Schedule>  losartan 25 milliGRAM(s) Oral daily  polyethylene glycol 3350 17 Gram(s) Oral two times a day  potassium chloride    Tablet ER 40 milliEquivalent(s) Oral two times a day  senna 2 Tablet(s) Oral at bedtime  sodium chloride 0.65% Nasal 1 Spray(s) Both Nostrils two times a day    MEDICATIONS  (PRN):  acetaminophen     Tablet .. 650 milliGRAM(s) Oral every 6 hours PRN Temp greater or equal to 38C (100.4F), Mild Pain (1 - 3)  aluminum hydroxide/magnesium hydroxide/simethicone Suspension 30 milliLiter(s) Oral every 6 hours PRN Dyspepsia  artificial tears (preservative free) Ophthalmic Solution 1 Drop(s) Both EYES every 4 hours PRN Dry Eyes  bisacodyl Suppository 10 milliGRAM(s) Rectal daily PRN Constipation  calcium carbonate    500 mG (Tums) Chewable 1 Tablet(s) Chew three times a day PRN Heartburn  ondansetron   Disintegrating Tablet 4 milliGRAM(s) Oral every 6 hours PRN Nausea and/or Vomiting  traMADol 50 milliGRAM(s) Oral every 4 hours PRN Severe Pain (7 - 10)  traMADol 25 milliGRAM(s) Oral every 4 hours PRN Moderate Pain (4 - 6)    Vital Signs Last 24 Hrs  T(C): 36.4 (28 Dec 2023 09:57), Max: 36.8 (27 Dec 2023 19:59)  T(F): 97.6 (28 Dec 2023 09:57), Max: 98.2 (27 Dec 2023 19:59)  HR: 68 (28 Dec 2023 09:57) (68 - 79)  BP: 100/65 (28 Dec 2023 09:57) (100/65 - 108/70)  BP(mean): --  RR: 15 (28 Dec 2023 09:57) (15 - 17)  SpO2: 97% (28 Dec 2023 09:57) (97% - 97%)    Parameters below as of 28 Dec 2023 09:57  Patient On (Oxygen Delivery Method): room air    PHYSICAL EXAM:  GENERAL: NAD, sitting in chair  CHEST/LUNG: Clear to percussion bilaterally; No rales, rhonchi, wheezing, or rubs; normal respiratory effort, no intercostal retractions  HEART: Regular rate and rhythm; No murmurs, rubs, or gallops; No pitting edema  ABDOMEN: Soft, Nontender, Nondistended; Bowel sounds present; No HSM or masses  MUSCULOSKELETAL/EXTREMITIES:  2+ Peripheral Pulses, No clubbing or digital cyanosis; FROM of extremeties (pain, crepitation or contracture)  PSYCH: Appropriate affect, Alert & Awake    LABS:                                   13.9   5.94  )-----------( 265      ( 28 Dec 2023 06:09 )             40.3   12-28    138  |  102  |  18  ----------------------------<  72  4.0   |  25  |  0.64    Ca    9.3      28 Dec 2023 06:09    TPro  6.4  /  Alb  2.9<L>  /  TBili  0.4  /  DBili  x   /  AST  30  /  ALT  70<H>  /  AlkPhos  59  12-28       12-15 Chol 197 mg/dL LDL -- HDL 58 mg/dL Trig 64 mg/dL    Urinalysis Basic - ( 25 Dec 2023 06:00 )  Color: x /Appearance: x / SG: x / pH: x  Gluc: 102 mg/dL / Ketone: x  / Bili: x / Urobili: x   Blood: x / Protein: x / Nitrite: x   Leuk Esterase: x / RBC: x / WBC x   Sq Epi: x / Non Sq Epi: x / Bacteria: x    Culture - Urine (collected 19 Dec 2023 18:10)  Source: Clean Catch Clean Catch (Midstream)  Final Report (24 Dec 2023 13:10):    >100,000 CFU/ml Escherichia coli ESBL    >100,000 CFU/ml Enterococcus faecalis  Organism: Escherichia coli ESBL  Enterococcus faecalis (24 Dec 2023 13:10)  Organism: Enterococcus faecalis (24 Dec 2023 13:10)      Method Type: MATTHEW      -  Ampicillin: S <=2 Predicts results to ampicillin/sulbactam, amoxacillin-clavulanate and  piperacillin-tazobactam.      -  Ciprofloxacin: I 2      -  Levofloxacin: S 2      -  Nitrofurantoin: S <=32 Should not be used to treat pyelonephritis.      -  Tetracycline: S <=1      -  Vancomycin: S 2  Organism: Escherichia coli ESBL (24 Dec 2023 13:10)      Method Type: MATTHEW      -  Amoxicillin/Clavulanic Acid: S <=8/4      -  Ampicillin: R >16 These ampicillin results predict results for amoxicillin      -  Ampicillin/Sulbactam: S 8/4      -  Aztreonam: R 16      -  Cefazolin: R >16 For uncomplicated UTI with K. pneumoniae, E. coli, or P. mirablis: MATTHEW <=16 is sensitive and MATTHEW >=32 is resistant. This also predicts results for oral agents cefaclor, cefdinir, cefpodoxime, cefprozil, cefuroxime axetil, cephalexin and locarbef for uncomplicated UTI. Note that some isolates may be susceptible to these agents while testing resistant to cefazolin.      -  Cefepime: R >16      -  Ceftriaxone: R >32      -  Cefuroxime: R >16      -  Ciprofloxacin: R >2      -  Ertapenem: S <=0.5      -  Gentamicin: S <=2      -  Imipenem: S <=1      -  Levofloxacin: R >4      -  Meropenem: S <=1      -  Nitrofurantoin: S <=32 Should not be used to treat pyelonephritis      -  Piperacillin/Tazobactam: S <=8      -  Tobramycin: S <=2      -  Trimethoprim/Sulfamethoxazole: S <=0.5/9.5    COVID-19 PCR: NotDetec (12-20-23 @ 22:30)    Care Discussed with Consultants/Other Providers: Yes

## 2023-12-28 NOTE — BH CONSULTATION LIAISON PROGRESS NOTE - NSBHCHARTREVIEWLAB_PSY_A_CORE FT
Complete Blood Count (12.28.23 @ 06:09)   Nucleated RBC: 0 /100 WBCs  WBC Count: 5.94 K/uL  RBC Count: 4.52 M/uL  Hemoglobin: 13.9 g/dL  Hematocrit: 40.3 %  Mean Cell Volume: 89.2 fl  Mean Cell Hemoglobin: 30.8 pg  Mean Cell Hemoglobin Conc: 34.5 gm/dL  Red Cell Distrib Width: 13.2 %  Platelet Count - Automated: 265 K/uL    Comprehensive Metabolic Panel (12.28.23 @ 06:09)   Sodium: 138 mmol/L  Potassium: 4.0 mmol/L  Chloride: 102 mmol/L  Carbon Dioxide: 25 mmol/L  Anion Gap: 11 mmol/L  Blood Urea Nitrogen: 18 mg/dL  Creatinine: 0.64 mg/dL  Glucose: 72 mg/dL  Calcium: 9.3 mg/dL  Protein Total: 6.4 g/dL  Albumin: 2.9 g/dL  Bilirubin Total: 0.4 mg/dL  Alkaline Phosphatase: 59 U/L  Aspartate Aminotransferase (AST/SGOT): 30 U/L  Alanine Aminotransferase (ALT/SGPT): 70 U/L  eGFR: 93

## 2023-12-28 NOTE — DISCHARGE NOTE PROVIDER - NPI NUMBER (FOR SYSADMIN USE ONLY) :
[[id number]],[[id number]] [[id number]],[[id number]],[[id number]] [[id number]],[[id number]],[[id number]] [[id number]],[[id number]],[[id number]],[[id number]] [[id number]],[[id number]],[[id number]],[[id number]] [[id number]],[[id number]],[[id number]],[[id number]] [[id number]],[[id number]],[[id number]],[[id number]],[[id number]] [[id number]],[[id number]],[[id number]],[[id number]],[[id number]] [[id number]],[[id number]],[[id number]],[[id number]],[[id number]]

## 2023-12-28 NOTE — DISCHARGE NOTE PROVIDER - NSDCMRMEDTOKEN_GEN_ALL_CORE_FT
aluminum hydroxide-magnesium hydroxide 200 mg-200 mg/5 mL oral suspension: 30 milliliter(s) orally every 6 hours As needed Dyspepsia  brivaracetam 50 mg oral tablet: 1 tab(s) orally 2 times a day  calcium carbonate 500 mg (200 mg elemental calcium) oral tablet, chewable: 1 tab(s) orally 3 times a day As needed Heartburn  cefpodoxime 100 mg oral tablet: 1 tab(s) orally 2 times a day end date 12/22  enoxaparin: 40 milligram(s) subcutaneous once a day (at bedtime)  famotidine 20 mg oral tablet: 1 tab(s) orally 2 times a day  ketorolac 0.5% ophthalmic solution: 1 drop(s) to each affected eye once a day  losartan 25 mg oral tablet: 1 tab(s) orally once a day  metoclopramide 5 mg oral tablet: 1 tab(s) orally every 6 hours  ocular lubricant ophthalmic solution: 1 drop(s) to each affected eye every 4 hours As needed Dry Eyes  polyethylene glycol 3350 oral powder for reconstitution: 17 gram(s) orally 2 times a day  senna leaf extract oral tablet: 2 tab(s) orally once a day (at bedtime)   acetaminophen 325 mg oral tablet: 2 tab(s) orally every 6 hours As needed Temp greater or equal to 38C (100.4F), Mild Pain (1 - 3)  aluminum hydroxide-magnesium hydroxide 200 mg-200 mg/5 mL oral suspension: 30 milliliter(s) orally every 6 hours As needed Dyspepsia  bisacodyl 10 mg rectal suppository: 1 suppository(ies) rectal once a day As needed Constipation  bisacodyl 5 mg oral delayed release tablet: 1 tab(s) orally once a day  brivaracetam 50 mg oral tablet: 1 tab(s) orally 2 times a day  calcium carbonate 500 mg (200 mg elemental calcium) oral tablet, chewable: 1 tab(s) orally 3 times a day As needed Heartburn  enoxaparin: 40 milligram(s) subcutaneous once a day (at bedtime)  famotidine 20 mg oral tablet: 1 tab(s) orally 2 times a day  gabapentin 300 mg oral capsule: 1 cap(s) orally once a day (at bedtime)  ketorolac 0.5% ophthalmic solution: 1 drop(s) to each affected eye once a day  lidocaine 4% topical film: Apply topically to affected area once a day up to 12 hours daily  ocular lubricant ophthalmic solution: 1 drop(s) to each affected eye every 4 hours As needed Dry Eyes  ondansetron 4 mg oral tablet, disintegratin tab(s) orally every 6 hours As needed Nausea and/or Vomiting  polyethylene glycol 3350 oral powder for reconstitution: 17 gram(s) orally 2 times a day  Provigil 100 mg oral tablet: 0.5 tab(s) orally once a day MDD: 100 mg  psyllium 3.4 g/7 g oral powder for reconstitution: 1 packet(s) orally once a day  senna leaf extract oral tablet: 2 tab(s) orally once a day (at bedtime)  sodium chloride 0.65% nasal spray: 1 spray(s) nasal 2 times a day PRN dry nasal passages  traMADol 50 mg oral tablet: 0.5 tab(s) orally every 4 hours As needed Severe Pain (7 - 10)   acetaminophen 325 mg oral tablet: 2 tab(s) orally every 6 hours As needed Temp greater or equal to 38C (100.4F), Mild Pain (1 - 3)  aluminum hydroxide-magnesium hydroxide 200 mg-200 mg/5 mL oral suspension: 30 milliliter(s) orally every 6 hours As needed Dyspepsia  atorvastatin 10 mg oral tablet: 1 tab(s) orally once a day (at bedtime)  bisacodyl 10 mg rectal suppository: 1 suppository(ies) rectal once a day As needed Constipation  bisacodyl 5 mg oral delayed release tablet: 1 tab(s) orally once a day  brivaracetam 50 mg oral tablet: 1 tab(s) orally 2 times a day  calcium carbonate 500 mg (200 mg elemental calcium) oral tablet, chewable: 1 tab(s) orally 3 times a day As needed Heartburn  enoxaparin: 40 milligram(s) subcutaneous once a day (at bedtime)  famotidine 20 mg oral tablet: 1 tab(s) orally 2 times a day  gabapentin 300 mg oral capsule: 1 cap(s) orally once a day (at bedtime)  ketorolac 0.5% ophthalmic solution: 1 drop(s) to each affected eye once a day  lidocaine 4% topical film: Apply topically to affected area once a day up to 12 hours daily  ocular lubricant ophthalmic solution: 1 drop(s) to each affected eye every 4 hours As needed Dry Eyes  ondansetron 4 mg oral tablet, disintegratin tab(s) orally every 6 hours As needed Nausea and/or Vomiting  polyethylene glycol 3350 oral powder for reconstitution: 17 gram(s) orally 2 times a day  Provigil 100 mg oral tablet: 0.5 tab(s) orally once a day MDD: 100 mg  psyllium 3.4 g/7 g oral powder for reconstitution: 1 packet(s) orally once a day  senna leaf extract oral tablet: 2 tab(s) orally once a day (at bedtime)  sodium chloride 0.65% nasal spray: 1 spray(s) nasal 2 times a day PRN dry nasal passages  traMADol 50 mg oral tablet: 0.5 tab(s) orally every 4 hours As needed Severe Pain (7 - 10)   acetaminophen 325 mg oral tablet: 2 tab(s) orally every 6 hours As needed Temp greater or equal to 38C (100.4F), Mild Pain (1 - 3)  aluminum hydroxide-magnesium hydroxide 200 mg-200 mg/5 mL oral suspension: 30 milliliter(s) orally every 6 hours As needed Dyspepsia  atorvastatin 10 mg oral tablet: 1 tab(s) orally once a day (at bedtime)  bisacodyl 10 mg rectal suppository: 1 suppository(ies) rectal once a day As needed Constipation  bisacodyl 5 mg oral delayed release tablet: 1 tab(s) orally once a day  brivaracetam 50 mg oral tablet: 1 tab(s) orally 2 times a day  calcium carbonate 500 mg (200 mg elemental calcium) oral tablet, chewable: 1 tab(s) orally 3 times a day As needed Heartburn  enoxaparin: 40 milligram(s) subcutaneous once a day (at bedtime)  famotidine 20 mg oral tablet: 1 tab(s) orally 2 times a day  gabapentin 400 mg oral capsule: 1 cap(s) orally once a day (at bedtime)  ketorolac 0.5% ophthalmic solution: 1 drop(s) to each affected eye once a day  lidocaine 4% topical film: Apply topically to affected area once a day up to 12 hours daily  melatonin 3 mg oral tablet: 1 tab(s) orally once a day (at bedtime) As needed Insomnia  modafinil 100 mg oral tablet: 1 tab(s) orally once a day  ocular lubricant ophthalmic solution: 1 drop(s) to each affected eye every 4 hours As needed Dry Eyes  polyethylene glycol 3350 oral powder for reconstitution: 17 gram(s) orally 2 times a day  Provigil 100 mg oral tablet: 0.5 tab(s) orally once a day MDD: 100 mg  psyllium 3.4 g/7 g oral powder for reconstitution: 1 packet(s) orally once a day  senna leaf extract oral tablet: 2 tab(s) orally once a day (at bedtime)  sodium chloride 0.65% nasal spray: 1 spray(s) nasal 2 times a day PRN dry nasal passages

## 2023-12-28 NOTE — PROGRESS NOTE ADULT - SUBJECTIVE AND OBJECTIVE BOX
Patient is a 73y old  Female who presents with a chief complaint of ICH (26 Dec 2023 08:28)    HPI:  This is a 74 YO RIGHT handed woman with no PMH who presented to [facility] ED on 12/14/2023, as a transfer from [facility], as a CODE STROKE, with c/o R IPH.   CTH and CTA repeated - large R IPH (temporal/parietal). Presented to [place] today with c/o HA and AMS. Friend accompanying patient said that when visiting patient today - patient was not acting like herself and c/o HA. NIHSS 9.    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 Similar midline shift of 5 mm.  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 Briviact 50mg BID for seizure prophylaxis, EEG with increased risk of seizures in the right frontocentral region.TTE- EF 64%. Continue Losartan 25mg daily. 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 UA: positive for UTI, started on IV abx and then transitioned to oral, end date 12/22.      Patient was evaluated by PM&R and therapy for functional deficits, gait/ADL impairments and acute rehabilitation was recommended. Patient was medically optimized for discharge to [facility] IRU on 12/20/23. (20 Dec 2023 17:15)      subj/interim: Patient seen and examined in  by nursing station.  She reports feeling well this morning. Endorses intermittent headache, mostly at night. Would like her lidocaine patch at bedtime. Endorses heartburn last night which has resolved. No current concerns.     ROS:  +headache intermittent  +left neck/shoulder hypertonicity  +neurological deficits, tangiential speech    MEDICATIONS  (STANDING):  bisacodyl 5 milliGRAM(s) Oral daily  brivaracetam 50 milliGRAM(s) Oral two times a day  enoxaparin Injectable 40 milliGRAM(s) SubCutaneous <User Schedule>  famotidine    Tablet 20 milliGRAM(s) Oral two times a day  gabapentin 300 milliGRAM(s) Oral at bedtime  ketorolac 0.5% Ophthalmic Solution 1 Drop(s) Left EYE daily  lidocaine   4% Patch 1 Patch Transdermal <User Schedule>  polyethylene glycol 3350 17 Gram(s) Oral two times a day  senna 2 Tablet(s) Oral at bedtime  sodium chloride 0.65% Nasal 1 Spray(s) Both Nostrils two times a day    MEDICATIONS  (PRN):  acetaminophen     Tablet .. 650 milliGRAM(s) Oral every 6 hours PRN Temp greater or equal to 38C (100.4F), Mild Pain (1 - 3)  aluminum hydroxide/magnesium hydroxide/simethicone Suspension 30 milliLiter(s) Oral every 6 hours PRN Dyspepsia  artificial tears (preservative free) Ophthalmic Solution 1 Drop(s) Both EYES every 4 hours PRN Dry Eyes  bisacodyl Suppository 10 milliGRAM(s) Rectal daily PRN Constipation  calcium carbonate    500 mG (Tums) Chewable 1 Tablet(s) Chew three times a day PRN Heartburn  ondansetron   Disintegrating Tablet 4 milliGRAM(s) Oral every 6 hours PRN Nausea and/or Vomiting  traMADol 25 milliGRAM(s) Oral every 4 hours PRN Severe Pain (7 - 10)        Allergies    No Known Allergies    Intolerances    oxycodone (Nausea)    RECENT LABS    Vital Signs Last 24 Hrs  T(C): 36.4 (28 Dec 2023 09:57), Max: 36.8 (27 Dec 2023 19:59)  T(F): 97.6 (28 Dec 2023 09:57), Max: 98.2 (27 Dec 2023 19:59)  HR: 68 (28 Dec 2023 09:57) (68 - 79)  BP: 100/65 (28 Dec 2023 09:57) (100/65 - 108/70)  BP(mean): --  RR: 15 (28 Dec 2023 09:57) (15 - 17)  SpO2: 97% (28 Dec 2023 09:57) (97% - 97%)    Parameters below as of 28 Dec 2023 09:57  Patient On (Oxygen Delivery Method): room air                          13.9   5.94  )-----------( 265      ( 28 Dec 2023 06:09 )             40.3     12-28    138  |  102  |  18  ----------------------------<  72  4.0   |  25  |  0.64    Ca    9.3      28 Dec 2023 06:09    TPro  6.4  /  Alb  2.9<L>  /  TBili  0.4  /  DBili  x   /  AST  30  /  ALT  70<H>  /  AlkPhos  59  12-28      Urinalysis Basic - ( 28 Dec 2023 06:09 )    Color: x / Appearance: x / SG: x / pH: x  Gluc: 72 mg/dL / Ketone: x  / Bili: x / Urobili: x   Blood: x / Protein: x / Nitrite: x   Leuk Esterase: x / RBC: x / WBC x   Sq Epi: x / Non Sq Epi: x / Bacteria: x    CAPILLARY BLOOD GLUCOSE    < from: US Duplex Venous Lower Ext Complete, Bilateral (12.27.23 @ 15:01) >  IMPRESSION:  No new deep venous thrombosis in bilateral lower extremities.  Persistent belowthe knee DVT in bilateral lower extremities, improved on   the left.  < end of copied text >    < from: CT Head No Cont (12.27.23 @ 15:18) >  IMPRESSION: Decreased size in density to the right parietal parenchymal   hemorrhage since 12/19/2023. Resolution of intraventricular hemorrhage   left occipital horn.  < end of copied text >    Physical Exam:   · Constitutional: thin female, kyphotic posture, reduced trunk control with right lean. eyes open spontaneously O x 2 GCS=14 (E4V4M6) Poor simple attention, restless without agitation  · Respiratory: Good inspiratory effort, NAD, no conversational dyspnea  · Cardiovascular: warm and well perfused  · Gastrointestinal	soft; nontender; nondistended  · Motor	no calf swelling or pedal edema. no TTP bilaterally LE   MSK: Hypertonicity of bilateral trapezius, greatest on left side, ttp on left           Patient is a 73y old  Female who presents with a chief complaint of ICH (26 Dec 2023 08:28)    HPI:  This is a 72 YO RIGHT handed woman with no PMH who presented to [facility] ED on 12/14/2023, as a transfer from [facility], as a CODE STROKE, with c/o R IPH.   CTH and CTA repeated - large R IPH (temporal/parietal). Presented to [place] today with c/o HA and AMS. Friend accompanying patient said that when visiting patient today - patient was not acting like herself and c/o HA. NIHSS 9.    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 Similar midline shift of 5 mm.  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 Briviact 50mg BID for seizure prophylaxis, EEG with increased risk of seizures in the right frontocentral region.TTE- EF 64%. Continue Losartan 25mg daily. 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 UA: positive for UTI, started on IV abx and then transitioned to oral, end date 12/22.      Patient was evaluated by PM&R and therapy for functional deficits, gait/ADL impairments and acute rehabilitation was recommended. Patient was medically optimized for discharge to [facility] IRU on 12/20/23. (20 Dec 2023 17:15)      subj/interim: Patient seen and examined in  by nursing station.  She reports feeling well this morning. Endorses intermittent headache, mostly at night. Would like her lidocaine patch at bedtime. Endorses heartburn last night which has resolved. No current concerns.     ROS:  +headache intermittent  +left neck/shoulder hypertonicity  +neurological deficits, tangiential speech    MEDICATIONS  (STANDING):  bisacodyl 5 milliGRAM(s) Oral daily  brivaracetam 50 milliGRAM(s) Oral two times a day  enoxaparin Injectable 40 milliGRAM(s) SubCutaneous <User Schedule>  famotidine    Tablet 20 milliGRAM(s) Oral two times a day  gabapentin 300 milliGRAM(s) Oral at bedtime  ketorolac 0.5% Ophthalmic Solution 1 Drop(s) Left EYE daily  lidocaine   4% Patch 1 Patch Transdermal <User Schedule>  polyethylene glycol 3350 17 Gram(s) Oral two times a day  senna 2 Tablet(s) Oral at bedtime  sodium chloride 0.65% Nasal 1 Spray(s) Both Nostrils two times a day    MEDICATIONS  (PRN):  acetaminophen     Tablet .. 650 milliGRAM(s) Oral every 6 hours PRN Temp greater or equal to 38C (100.4F), Mild Pain (1 - 3)  aluminum hydroxide/magnesium hydroxide/simethicone Suspension 30 milliLiter(s) Oral every 6 hours PRN Dyspepsia  artificial tears (preservative free) Ophthalmic Solution 1 Drop(s) Both EYES every 4 hours PRN Dry Eyes  bisacodyl Suppository 10 milliGRAM(s) Rectal daily PRN Constipation  calcium carbonate    500 mG (Tums) Chewable 1 Tablet(s) Chew three times a day PRN Heartburn  ondansetron   Disintegrating Tablet 4 milliGRAM(s) Oral every 6 hours PRN Nausea and/or Vomiting  traMADol 25 milliGRAM(s) Oral every 4 hours PRN Severe Pain (7 - 10)        Allergies    No Known Allergies    Intolerances    oxycodone (Nausea)    RECENT LABS    Vital Signs Last 24 Hrs  T(C): 36.4 (28 Dec 2023 09:57), Max: 36.8 (27 Dec 2023 19:59)  T(F): 97.6 (28 Dec 2023 09:57), Max: 98.2 (27 Dec 2023 19:59)  HR: 68 (28 Dec 2023 09:57) (68 - 79)  BP: 100/65 (28 Dec 2023 09:57) (100/65 - 108/70)  BP(mean): --  RR: 15 (28 Dec 2023 09:57) (15 - 17)  SpO2: 97% (28 Dec 2023 09:57) (97% - 97%)    Parameters below as of 28 Dec 2023 09:57  Patient On (Oxygen Delivery Method): room air                          13.9   5.94  )-----------( 265      ( 28 Dec 2023 06:09 )             40.3     12-28    138  |  102  |  18  ----------------------------<  72  4.0   |  25  |  0.64    Ca    9.3      28 Dec 2023 06:09    TPro  6.4  /  Alb  2.9<L>  /  TBili  0.4  /  DBili  x   /  AST  30  /  ALT  70<H>  /  AlkPhos  59  12-28      Urinalysis Basic - ( 28 Dec 2023 06:09 )    Color: x / Appearance: x / SG: x / pH: x  Gluc: 72 mg/dL / Ketone: x  / Bili: x / Urobili: x   Blood: x / Protein: x / Nitrite: x   Leuk Esterase: x / RBC: x / WBC x   Sq Epi: x / Non Sq Epi: x / Bacteria: x    CAPILLARY BLOOD GLUCOSE    < from: US Duplex Venous Lower Ext Complete, Bilateral (12.27.23 @ 15:01) >  IMPRESSION:  No new deep venous thrombosis in bilateral lower extremities.  Persistent belowthe knee DVT in bilateral lower extremities, improved on   the left.  < end of copied text >    < from: CT Head No Cont (12.27.23 @ 15:18) >  IMPRESSION: Decreased size in density to the right parietal parenchymal   hemorrhage since 12/19/2023. Resolution of intraventricular hemorrhage   left occipital horn.  < end of copied text >    Physical Exam:   · Constitutional: thin female, kyphotic posture, reduced trunk control with right lean. eyes open spontaneously O x 2 GCS=14 (E4V4M6) Poor simple attention, restless without agitation  · Respiratory: Good inspiratory effort, NAD, no conversational dyspnea  · Cardiovascular: warm and well perfused  · Gastrointestinal	soft; nontender; nondistended  · Motor	no calf swelling or pedal edema. no TTP bilaterally LE   MSK: Hypertonicity of bilateral trapezius, greatest on left side, ttp on left           Patient is a 73y old  Female who presents with a chief complaint of ICH (26 Dec 2023 08:28)    HPI:  This is a 72 YO RIGHT handed woman with no PMH who presented to [facility] ED on 12/14/2023, as a transfer from [facility], as a CODE STROKE, with c/o R IPH.   CTH and CTA repeated - large R IPH (temporal/parietal). Presented to [place] today with c/o HA and AMS. Friend accompanying patient said that when visiting patient today - patient was not acting like herself and c/o HA. NIHSS 9.    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 Similar midline shift of 5 mm.  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 Briviact 50mg BID for seizure prophylaxis, EEG with increased risk of seizures in the right frontocentral region.TTE- EF 64%. Continue Losartan 25mg daily. 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 UA: positive for UTI, started on IV abx and then transitioned to oral, end date 12/22.      Patient was evaluated by PM&R and therapy for functional deficits, gait/ADL impairments and acute rehabilitation was recommended. Patient was medically optimized for discharge to [facility] IRU on 12/20/23. (20 Dec 2023 17:15)      subj/interim: Patient seen and examined in  by nursing station.  She reports feeling well this morning. Endorses intermittent headache, mostly at night. Would like her lidocaine patch at bedtime. Endorses heartburn last night which has resolved. No current concerns.     ROS:  +headache intermittent  +left neck/shoulder hypertonicity  +neurological deficits, tangiential speech    MEDICATIONS  (STANDING):  bisacodyl 5 milliGRAM(s) Oral daily  brivaracetam 50 milliGRAM(s) Oral two times a day  enoxaparin Injectable 40 milliGRAM(s) SubCutaneous <User Schedule>  famotidine    Tablet 20 milliGRAM(s) Oral two times a day  gabapentin 300 milliGRAM(s) Oral at bedtime  ketorolac 0.5% Ophthalmic Solution 1 Drop(s) Left EYE daily  lidocaine   4% Patch 1 Patch Transdermal <User Schedule>  polyethylene glycol 3350 17 Gram(s) Oral two times a day  senna 2 Tablet(s) Oral at bedtime  sodium chloride 0.65% Nasal 1 Spray(s) Both Nostrils two times a day    MEDICATIONS  (PRN):  acetaminophen     Tablet .. 650 milliGRAM(s) Oral every 6 hours PRN Temp greater or equal to 38C (100.4F), Mild Pain (1 - 3)  aluminum hydroxide/magnesium hydroxide/simethicone Suspension 30 milliLiter(s) Oral every 6 hours PRN Dyspepsia  artificial tears (preservative free) Ophthalmic Solution 1 Drop(s) Both EYES every 4 hours PRN Dry Eyes  bisacodyl Suppository 10 milliGRAM(s) Rectal daily PRN Constipation  calcium carbonate    500 mG (Tums) Chewable 1 Tablet(s) Chew three times a day PRN Heartburn  ondansetron   Disintegrating Tablet 4 milliGRAM(s) Oral every 6 hours PRN Nausea and/or Vomiting  traMADol 25 milliGRAM(s) Oral every 4 hours PRN Severe Pain (7 - 10)        Allergies    No Known Allergies    Intolerances    oxycodone (Nausea)    RECENT LABS    Vital Signs Last 24 Hrs  T(C): 36.4 (28 Dec 2023 09:57), Max: 36.8 (27 Dec 2023 19:59)  T(F): 97.6 (28 Dec 2023 09:57), Max: 98.2 (27 Dec 2023 19:59)  HR: 68 (28 Dec 2023 09:57) (68 - 79)  BP: 100/65 (28 Dec 2023 09:57) (100/65 - 108/70)  BP(mean): --  RR: 15 (28 Dec 2023 09:57) (15 - 17)  SpO2: 97% (28 Dec 2023 09:57) (97% - 97%)    Parameters below as of 28 Dec 2023 09:57  Patient On (Oxygen Delivery Method): room air                          13.9   5.94  )-----------( 265      ( 28 Dec 2023 06:09 )             40.3     12-28    138  |  102  |  18  ----------------------------<  72  4.0   |  25  |  0.64    Ca    9.3      28 Dec 2023 06:09    TPro  6.4  /  Alb  2.9<L>  /  TBili  0.4  /  DBili  x   /  AST  30  /  ALT  70<H>  /  AlkPhos  59  12-28      Urinalysis Basic - ( 28 Dec 2023 06:09 )    Color: x / Appearance: x / SG: x / pH: x  Gluc: 72 mg/dL / Ketone: x  / Bili: x / Urobili: x   Blood: x / Protein: x / Nitrite: x   Leuk Esterase: x / RBC: x / WBC x   Sq Epi: x / Non Sq Epi: x / Bacteria: x    CAPILLARY BLOOD GLUCOSE    < from: US Duplex Venous Lower Ext Complete, Bilateral (12.27.23 @ 15:01) >  IMPRESSION:  No new deep venous thrombosis in bilateral lower extremities.  Persistent belowthe knee DVT in bilateral lower extremities, improved on   the left.  < end of copied text >    < from: CT Head No Cont (12.27.23 @ 15:18) >  IMPRESSION: Decreased size in density to the right parietal parenchymal   hemorrhage since 12/19/2023. Resolution of intraventricular hemorrhage   left occipital horn.  < end of copied text >    Physical Exam:   · Constitutional: thin female, kyphotic posture, reduced trunk control with right lean. eyes open spontaneously O x 2 GCS=14 (E4V4M6) Poor simple attention, restless without agitation  · Respiratory: Good inspiratory effort, NAD, no conversational dyspnea  · Cardiovascular: warm and well perfused  · Gastrointestinal	soft; nontender; nondistended  · Motor	no calf swelling or pedal edema. no TTP bilaterally LE   MSK: Hypertonicity of bilateral trapezius, greatest on left side, ttp on left           Patient is a 73y old  Female who presents with a chief complaint of ICH (26 Dec 2023 08:28)    HPI:  This is a 74 YO RIGHT handed woman with no PMH who presented to [facility] ED on 12/14/2023, as a transfer from [facility], as a CODE STROKE, with c/o R IPH.   CTH and CTA repeated - large R IPH (temporal/parietal). Presented to [place] today with c/o HA and AMS. Friend accompanying patient said that when visiting patient today - patient was not acting like herself and c/o HA. NIHSS 9.    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 Similar midline shift of 5 mm.  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 Briviact 50mg BID for seizure prophylaxis, EEG with increased risk of seizures in the right frontocentral region.TTE- EF 64%. Continue Losartan 25mg daily. 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 UA: positive for UTI, started on IV abx and then transitioned to oral, end date 12/22.      Patient was evaluated by PM&R and therapy for functional deficits, gait/ADL impairments and acute rehabilitation was recommended. Patient was medically optimized for discharge to [facility] IRU on 12/20/23. (20 Dec 2023 17:15)      subj/interim: Patient seen and examined in  by nursing station.  She reports feeling well this morning. Endorses intermittent headache, mostly at night. Would like her lidocaine patch at bedtime. Endorses heartburn last night which has resolved. No current concerns.     ROS:  +headache intermittent  +left neck/shoulder hypertonicity  +neurological deficits, tangiential speech    MEDICATIONS  (STANDING):  bisacodyl 5 milliGRAM(s) Oral daily  brivaracetam 50 milliGRAM(s) Oral two times a day  enoxaparin Injectable 40 milliGRAM(s) SubCutaneous <User Schedule>  famotidine    Tablet 20 milliGRAM(s) Oral two times a day  gabapentin 300 milliGRAM(s) Oral at bedtime  ketorolac 0.5% Ophthalmic Solution 1 Drop(s) Left EYE daily  lidocaine   4% Patch 1 Patch Transdermal <User Schedule>  polyethylene glycol 3350 17 Gram(s) Oral two times a day  senna 2 Tablet(s) Oral at bedtime  sodium chloride 0.65% Nasal 1 Spray(s) Both Nostrils two times a day    MEDICATIONS  (PRN):  acetaminophen     Tablet .. 650 milliGRAM(s) Oral every 6 hours PRN Temp greater or equal to 38C (100.4F), Mild Pain (1 - 3)  aluminum hydroxide/magnesium hydroxide/simethicone Suspension 30 milliLiter(s) Oral every 6 hours PRN Dyspepsia  artificial tears (preservative free) Ophthalmic Solution 1 Drop(s) Both EYES every 4 hours PRN Dry Eyes  bisacodyl Suppository 10 milliGRAM(s) Rectal daily PRN Constipation  calcium carbonate    500 mG (Tums) Chewable 1 Tablet(s) Chew three times a day PRN Heartburn  ondansetron   Disintegrating Tablet 4 milliGRAM(s) Oral every 6 hours PRN Nausea and/or Vomiting  traMADol 25 milliGRAM(s) Oral every 4 hours PRN Severe Pain (7 - 10)        Allergies    No Known Allergies    Intolerances    oxycodone (Nausea)    RECENT LABS    Vital Signs Last 24 Hrs  T(C): 36.4 (28 Dec 2023 09:57), Max: 36.8 (27 Dec 2023 19:59)  T(F): 97.6 (28 Dec 2023 09:57), Max: 98.2 (27 Dec 2023 19:59)  HR: 68 (28 Dec 2023 09:57) (68 - 79)  BP: 100/65 (28 Dec 2023 09:57) (100/65 - 108/70)  BP(mean): --  RR: 15 (28 Dec 2023 09:57) (15 - 17)  SpO2: 97% (28 Dec 2023 09:57) (97% - 97%)    Parameters below as of 28 Dec 2023 09:57  Patient On (Oxygen Delivery Method): room air                          13.9   5.94  )-----------( 265      ( 28 Dec 2023 06:09 )             40.3     12-28    138  |  102  |  18  ----------------------------<  72  4.0   |  25  |  0.64    Ca    9.3      28 Dec 2023 06:09    TPro  6.4  /  Alb  2.9<L>  /  TBili  0.4  /  DBili  x   /  AST  30  /  ALT  70<H>  /  AlkPhos  59  12-28      Urinalysis Basic - ( 28 Dec 2023 06:09 )    Color: x / Appearance: x / SG: x / pH: x  Gluc: 72 mg/dL / Ketone: x  / Bili: x / Urobili: x   Blood: x / Protein: x / Nitrite: x   Leuk Esterase: x / RBC: x / WBC x   Sq Epi: x / Non Sq Epi: x / Bacteria: x    CAPILLARY BLOOD GLUCOSE    < from: US Duplex Venous Lower Ext Complete, Bilateral (12.27.23 @ 15:01) >  IMPRESSION:  No new deep venous thrombosis in bilateral lower extremities.  Persistent belowthe knee DVT in bilateral lower extremities, improved on   the left.  < end of copied text >    < from: CT Head No Cont (12.27.23 @ 15:18) >  IMPRESSION: Decreased size in density to the right parietal parenchymal   hemorrhage since 12/19/2023. Resolution of intraventricular hemorrhage   left occipital horn.  < end of copied text >    Physical Exam:   · Constitutional: thin female, kyphotic posture, reduced trunk control with right lean. eyes open spontaneously O x 2 GCS=14 (E4V4M6) Poor simple attention, restless without agitation  · Respiratory: Good inspiratory effort, NAD, no conversational dyspnea  · Cardiovascular: warm and well perfused  · Gastrointestinal	soft; nontender; nondistended  · Motor	no calf swelling or pedal edema. no TTP bilaterally LE   MSK: Hypertonicity of bilateral trapezius, greatest on left side, ttp on left

## 2023-12-28 NOTE — DISCHARGE NOTE PROVIDER - CARE PROVIDER_API CALL
Judith Emery  Physical/Rehab Medicine  [address] [place] [address]  Phone: [phone]  Fax: [phone]  Follow Up Time: 1 month    Emy Prakash  NP in [facility]  [address] [place] [address], [address]  [place] [address]  Phone: [phone]  Fax: [phone]  Follow Up Time: 2 weeks    Sergio Jurado  [address], [address]  [place] [address]  Phone: [phone]  Fax: [phone]  Follow Up Time: 1 month   Judith Emery  Physical/Rehab Medicine  [address] [place] [address]  Phone: [phone]  Fax: [phone]  Follow Up Time: 1 month    Emy Prakash  NP in [facility]  [address] [place] [address], [address]  [place] [address]  Phone: [phone]  Fax: [phone]  Follow Up Time: 2 weeks    Sergio Jurado  [address], [address]  [place] [address]  Phone: [phone]  Fax: [phone]  Follow Up Time: 1 month   Judith Emery  Physical/Rehab Medicine  [address] [place] [address]  Phone: [phone]  Fax: [phone]  Follow Up Time: 1 month    Emy Prakash  NP in [facility]  [address] [place] [address], [address]  [place] [address]  Phone: [phone]  Fax: [phone]  Follow Up Time: 2 weeks    Sergio Jurado  [address], [address]  [place] [address]  Phone: [phone]  Fax: [phone]  Follow Up Time: 1 month   Judith Emery  Physical/Rehab Medicine  [address] [place] [address]  Phone: [phone]  Fax: [phone]  Follow Up Time: 1 month    Emy Prakash  NP in Family Health  [address] [facility], [address]  [place] [address]  Phone: [phone]  Fax: [phone]  Follow Up Time: 2 weeks    Sergio Jurado  [facility] [place] [facility]  [address] [facility], [address]  [place] [address]  Phone: [phone]  Fax: [phone]  Follow Up Time: 1 month    Dominguez Joyner  Ophthalmology  [address] [facility], [address]  [place] [address]  Phone: [phone]  Fax: [phone]  Follow Up Time: 1 week   Judith Emery  Physical/Rehab Medicine  [address] [place] [address]  Phone: [phone]  Fax: [phone]  Follow Up Time: 1 month    Emy Prakash  NP in Family Health  [address] [facility], [address]  [place] [address]  Phone: [phone]  Fax: [phone]  Follow Up Time: 2 weeks    Sergio Jurado  [facility]  [address] [facility], [address]  [place] [address]  Phone: [phone]  Fax: [phone]  Follow Up Time: 1 month    Dominguez Joyner  Ophthalmology  [address] [facility] [address] [facility], [address]  [place] [address]  Phone: [phone]  Fax: [phone]  Follow Up Time: 1 week   Judith Emery  Physical/Rehab Medicine  [address] [place] [address]  Phone: [phone]  Fax: [phone]  Follow Up Time: 1 month    Emy Prakash  NP in Family Health  [address] [facility], [address]  [place] [address]  Phone: [phone]  Fax: [phone]  Follow Up Time: 2 weeks    Sergoi Jurado  [facility]  [address] [facility], [address]  [place] [address]  Phone: [phone]  Fax: [phone]  Follow Up Time: 1 month    Dominguez Joyner  Ophthalmology  [address] [facility], [address]  [place] [address]  Phone: [phone]  Fax: [phone]  Follow Up Time: 1 week   Judith Emery  Physical/Rehab Medicine  [address] [place] [address]  Phone: [phone]  Fax: [phone]  Follow Up Time: 1 month    Emy Prakash  NP in Family Health  [address] [facility], [address]  [place] [address]  Phone: [phone]  Fax: [phone]  Follow Up Time: 2 weeks    Sergio Jurado  Cardiology  [address] [facility], [address]  [place] [address]  Phone: [phone]  Fax: [phone]  Follow Up Time: 1 month    Dominguez Joyner  Ophthalmology  [address] [facility], [address]  [place] [address]  Phone: [phone]  Fax: [phone]  Follow Up Time: 1 week    Natanael Vigil  Ophthalmology  [address] [facility], [address]  [place] [address]  Phone: [phone]  Fax: [phone]  Follow Up Time: 2 weeks   Judith Emery  Physical/Rehab Medicine  [address] [place] [address]  Phone: [phone]  Fax: [phone]  Follow Up Time: 1 month    Emy Prakash  NP in Family Health  [address] [facility], [address]  [place] [address]  Phone: [phone]  Fax: [phone]  Follow Up Time: 2 weeks    Sergio Jurado  Cardiology  [address] [facility], [address]  [place] [address]  Phone: [phone]  Fax: [phone]  Follow Up Time: 1 month    Dominguez Joyner  Ophthalmology  [address] [facility], [address]  [place] [address]  Phone: [phone]  Fax: [phone]  Follow Up Time: 1 week    Natanael Vigil  Ophthalmology  [address] [facility], [address]  [place] [address]  Phone: [phone]  Fax: [phone]  Follow Up Time: 2 weeks   Judith Emery  Physical/Rehab Medicine  [address] [place] [address]  Phone: [phone]  Fax: [phone]  Follow Up Time: 1 month    Emy Prakash  NP in Family Health  [address] [facility], [address]  [place] [address]  Phone: [phone]  Fax: [phone]  Follow Up Time: 2 weeks    Sergio Jurado  Cardiology  [address] [facility], [address]  [place] [address]  Phone: [phone]  Fax: [phone]  Follow Up Time: 1 month    Dominguez Joyner  Ophthalmology  [address] [facility], [address]  [place] [address]  Phone: [phone]  Fax: [phone]  Follow Up Time: 1 week    Natanael Vigil  Ophthalmology  [address] [facility], [address]  [place] [address]  Phone: [phone]  Fax: [phone]  Follow Up Time: 2 weeks

## 2023-12-28 NOTE — PROGRESS NOTE ADULT - ATTENDING COMMENTS
Progress note amended to include my discussions with patient, resident, hospitalist, RN, SW, PT and my findings    Patient in chair, +left navi inattention Alert. She is talkative, sl anxious about missing her dog and feeling like she is a burden to her friend who is acting as her HCP. She complains occasonally to staff re: headache but appears comfortable and reports sleeping well at night. Lidoderm patch was adjusted to [facility] scheduling for shoulder/neck discomfort.     Patient was seen by psychiatry for in attention, restlessness; Per psychiatry, patient with reports of inattention that pre date her IPH, however she may have already had some compensatory skills given the fact that she did not have official neuropsych testing to confirm this diagnosis. Current cognitive issues may be due to neurological insult or even s/e of antiseizure medications. Recommends trial of low dose of Modafinil can be tried off label ( 50 mg) would start very slowly and monitor response to medications. Will start and monitor for efficacy and SE. AT this time, due to reduced insight and reasoning/problem-solving, do not feel she has the capacity to make decisions re: discharge planning. She endorses that her friend Isabella is her HCP and she entrusts discussions of medical care/situation and decision-making to her (currently also taking care of her dog)    Doppler BLE without evidence progression clots in BLE. Head CT with improved but persistent intraparencymal hemorrhage and resolved IVH. Neuro greatly appreciated, recommend continue to hold AC and repeat Head CT ~ 2 weeks     Continue program, SIVAN in progress    ACC: [id number] EXAM:  CT BRAIN   ORDERED BY: HORACIO CA     PROCEDURE DATE:  12/27/2023          INTERPRETATION:  CLINICAL INDICATION: Follow-up right parietal   parenchymal hemorrhage    5mm axial sections of the brain were obtained from base to vertex,   without the intravenous administration of contrast material. Coronal and   sagittal computer generated reconstructed views are available.    Comparison is made with the prior CT of 12/19/2023.    The right parietal parenchymal hemorrhage is slightly less dense and   slightly smaller measuring 3.0 cm in AP diameter by 4.3 cm transversely   compared with the prior of 4.1 cm in AP diameter by 5.9 cm transversely.   There is similar vasogenic edema. There is also similar mass effect on   the atrium of the right lateral ventricle with similar mild midline shift   to the left. There is resolution of the intraventricular hemorrhage in   the left occipital horn.    IMPRESSION: Decreased size in density to the right parietal parenchymal   hemorrhage since 12/19/2023. Resolution of intraventricular hemorrhage   left occipital horn.    --- End of Report ---    RECENT LABS    Vital Signs Last 24 Hrs  T(C): 36.4 (28 Dec 2023 09:57), Max: 36.8 (27 Dec 2023 19:59)  T(F): 97.6 (28 Dec 2023 09:57), Max: 98.2 (27 Dec 2023 19:59)  HR: 68 (28 Dec 2023 09:57) (68 - 79)  BP: 100/65 (28 Dec 2023 09:57) (100/65 - 108/70)  BP(mean): --  RR: 15 (28 Dec 2023 09:57) (15 - 17)  SpO2: 97% (28 Dec 2023 09:57) (97% - 97%)    Parameters below as of 28 Dec 2023 09:57  Patient On (Oxygen Delivery Method): room air                              13.9   5.94  )-----------( 265      ( 28 Dec 2023 06:09 )             40.3     12-28    138  |  102  |  18  ----------------------------<  72  4.0   |  25  |  0.64    Ca    9.3      28 Dec 2023 06:09    TPro  6.4  /  Alb  2.9<L>  /  TBili  0.4  /  DBili  x   /  AST  30  /  ALT  70<H>  /  AlkPhos  59  12-28      Urinalysis Basic - ( 28 Dec 2023 06:09 )    Color: x / Appearance: x / SG: x / pH: x  Gluc: 72 mg/dL / Ketone: x  / Bili: x / Urobili: x   Blood: x / Protein: x / Nitrite: x   Leuk Esterase: x / RBC: x / WBC x   Sq Epi: x / Non Sq Epi: x / Bacteria: x      CAPILLARY BLOOD GLUCOSE Progress note amended to include my discussions with patient, resident, hospitalist, RN, SW, PT and my findings    Patient in chair, +left navi inattention Alert. She is talkative, sl anxious about missing her dog and feeling like she is a burden to her friend who is acting as her HCP. She complains occasonally to staff re: headache but appears comfortable and reports sleeping well at night. Lidoderm patch was adjusted to [facility] scheduling for shoulder/neck discomfort.     Patient was seen by psychiatry for in attention, restlessness; Per psychiatry, patient with reports of inattention that pre date her IPH, however she may have already had some compensatory skills given the fact that she did not have official neuropsych testing to confirm this diagnosis. Current cognitive issues may be due to neurological insult or even s/e of antiseizure medications. Recommends trial of low dose of Modafinil can be tried off label ( 50 mg) would start very slowly and monitor response to medications. Will start and monitor for efficacy and SE. AT this time, due to reduced insight and reasoning/problem-solving, do not feel she has the capacity to make decisions re: discharge planning. She endorses that her friend Isabella is her HCP and she entrusts discussions of medical care/situation and decision-making to her (currently also taking care of her dog)    Doppler BLE without evidence progression clots in BLE. Head CT with improved but persistent intraparencymal hemorrhage and resolved IVH. Neuro greatly appreciated, recommend continue to hold AC and repeat Head CT ~ 2 weeks     Continue program, SIVAN in progress    ACC: [id number] EXAM:  CT BRAIN   ORDERED BY: HORACIO CA     PROCEDURE DATE:  12/27/2023          INTERPRETATION:  CLINICAL INDICATION: Follow-up right parietal   parenchymal hemorrhage    5mm axial sections of the brain were obtained from base to vertex,   without the intravenous administration of contrast material. Coronal and   sagittal computer generated reconstructed views are available.    Comparison is made with the prior CT of 12/19/2023.    The right parietal parenchymal hemorrhage is slightly less dense and   slightly smaller measuring 3.0 cm in AP diameter by 4.3 cm transversely   compared with the prior of 4.1 cm in AP diameter by 5.9 cm transversely.   There is similar vasogenic edema. There is also similar mass effect on   the atrium of the right lateral ventricle with similar mild midline shift   to the left. There is resolution of the intraventricular hemorrhage in   the left occipital horn.    IMPRESSION: Decreased size in density to the right parietal parenchymal   hemorrhage since 12/19/2023. Resolution of intraventricular hemorrhage   left occipital horn.    --- End of Report ---    RECENT LABS    Vital Signs Last 24 Hrs  T(C): 36.4 (28 Dec 2023 09:57), Max: 36.8 (27 Dec 2023 19:59)  T(F): 97.6 (28 Dec 2023 09:57), Max: 98.2 (27 Dec 2023 19:59)  HR: 68 (28 Dec 2023 09:57) (68 - 79)  BP: 100/65 (28 Dec 2023 09:57) (100/65 - 108/70)  BP(mean): --  RR: 15 (28 Dec 2023 09:57) (15 - 17)  SpO2: 97% (28 Dec 2023 09:57) (97% - 97%)    Parameters below as of 28 Dec 2023 09:57  Patient On (Oxygen Delivery Method): room air                              13.9   5.94  )-----------( 265      ( 28 Dec 2023 06:09 )             40.3     12-28    138  |  102  |  18  ----------------------------<  72  4.0   |  25  |  0.64    Ca    9.3      28 Dec 2023 06:09    TPro  6.4  /  Alb  2.9<L>  /  TBili  0.4  /  DBili  x   /  AST  30  /  ALT  70<H>  /  AlkPhos  59  12-28      Urinalysis Basic - ( 28 Dec 2023 06:09 )    Color: x / Appearance: x / SG: x / pH: x  Gluc: 72 mg/dL / Ketone: x  / Bili: x / Urobili: x   Blood: x / Protein: x / Nitrite: x   Leuk Esterase: x / RBC: x / WBC x   Sq Epi: x / Non Sq Epi: x / Bacteria: x      CAPILLARY BLOOD GLUCOSE

## 2023-12-28 NOTE — DISCHARGE NOTE PROVIDER - CARE PROVIDERS DIRECT ADDRESSES
,[email or web address],DirectAddress_Unknown,DirectAddress_Unknown ,[email or web address],DirectAddress_Unknown,DirectAddress_Unknown ,[email or web address],DirectAddress_Unknown,DirectAddress_Unknown ,[email or web address],DirectAddress_Unknown,DirectAddress_Unknown,[email or web address] ,[email or web address],DirectAddress_Unknown,DirectAddress_Unknown,[email or web address] ,[email or web address],DirectAddress_Unknown,DirectAddress_Unknown,[email or web address] ,[email or web address],DirectAddress_Unknown,DirectAddress_Unknown,[email or web address] ,[email or web address],DirectAddress_Unknown,DirectAddress_Unknown,[email or web address] ,[email or web address],DirectAddress_Unknown,DirectAddress_Unknown,[email or web address]

## 2023-12-28 NOTE — CHART NOTE - NSCHARTNOTEFT_GEN_A_CORE
Nutrition Follow Up Note  Hospital Course   (Per Electronic Medical Record)    Source:  Family Member [X]   Nursing Staff [X]   Medical Record [X]      Diet: Diet, Soft and Bite Sized (12-20-23 @ 17:33) [Active]    At this time patient tolerating diet w/ adequate appetite/intake consuming % of meals. With good acceptance of Greek yogurt TID. Reviewed preferences and menu alternatives; requesting prunes at breakfast and banana at lunch noted in Kardex. Patient usually eats small frequent meals throughout the day, reviewed snacks preferences. Patient reports that a friend brought foods from home. No issues w/ dentition or chewing and swallowing current diet texture. Denies N/V/C/D, last BM 12/28 Per patient report.     Current Weight: 138.6lb on 12/21    Pertinent Medications: MEDICATIONS  (STANDING):  bisacodyl 5 milliGRAM(s) Oral daily  brivaracetam 50 milliGRAM(s) Oral two times a day  enoxaparin Injectable 40 milliGRAM(s) SubCutaneous <User Schedule>  famotidine    Tablet 20 milliGRAM(s) Oral two times a day  gabapentin 300 milliGRAM(s) Oral at bedtime  ketorolac 0.5% Ophthalmic Solution 1 Drop(s) Left EYE daily  lidocaine   4% Patch 1 Patch Transdermal <User Schedule>  lidocaine   4% Patch 1 Patch Transdermal <User Schedule>  modafinil 50 milliGRAM(s) Oral once  polyethylene glycol 3350 17 Gram(s) Oral two times a day  senna 2 Tablet(s) Oral at bedtime  sodium chloride 0.65% Nasal 1 Spray(s) Both Nostrils two times a day    MEDICATIONS  (PRN):  acetaminophen     Tablet .. 650 milliGRAM(s) Oral every 6 hours PRN Temp greater or equal to 38C (100.4F), Mild Pain (1 - 3)  aluminum hydroxide/magnesium hydroxide/simethicone Suspension 30 milliLiter(s) Oral every 6 hours PRN Dyspepsia  artificial tears (preservative free) Ophthalmic Solution 1 Drop(s) Both EYES every 4 hours PRN Dry Eyes  bisacodyl Suppository 10 milliGRAM(s) Rectal daily PRN Constipation  calcium carbonate    500 mG (Tums) Chewable 1 Tablet(s) Chew three times a day PRN Heartburn  ondansetron   Disintegrating Tablet 4 milliGRAM(s) Oral every 6 hours PRN Nausea and/or Vomiting  traMADol 25 milliGRAM(s) Oral every 4 hours PRN Severe Pain (7 - 10)      Pertinent Labs:  12-28 Na138 mmol/L Glu 72 mg/dL K+ 4.0 mmol/L Cr  0.64 mg/dL BUN 18 mg/dL 12-28 Alb 2.9 g/dL<L> 12-15 Chol 197 mg/dL LDL --    HDL 58 mg/dL Trig 64 mg/dL        Skin: No pressure injury per nursing flowsheets    Edema: No edema noted per nursing flowsheet    Last Bowel Movement: on 12/28 Per nursing flowsheets     Estimated Needs:   [X] No Change Since Previous Assessment    Previous Nutrition Diagnosis:   Increased Nutrient Needs...  micronutrients, protein  related to increased physiological demand s/p brain injury  as evidenced by s/p ICH    Nutrition Diagnosis is [X] Ongoing - addressed with greek yogurt TID & PO intake >75%     New Nutrition Diagnosis: [X] Not Applicable    Interventions:   1. Recommend continuing with current plan of care, diet consistency per SLP  2. Encourage PO intake  3. Obtain and honor food preferences as able  4. Ongoing diet education     Monitoring & Evaluation:   [X] Weights   [X] PO Intake   [X] Skin Integrity   [X] Follow Up (Per Protocol)  [X] Tolerance to Diet Prescription   [X] Other: Labs    Registered Dietitian/Nutritionist Remains Available.  Gayatri Crawley RD    Phone# [phone] Nutrition Follow Up Note  Hospital Course   (Per Electronic Medical Record)    Source:  Family Member [X]   Nursing Staff [X]   Medical Record [X]      Diet: Diet, Soft and Bite Sized (12-20-23 @ 17:33) [Active]    At this time patient tolerating diet w/ adequate appetite/intake consuming % of meals. With good acceptance of Greek yogurt TID. Reviewed preferences and menu alternatives; requesting prunes at breakfast and banana at lunch noted in Kardex. Patient usually eats small frequent meals throughout the day, reviewed snacks preferences. Patient reports that a friend brought foods from home. No issues w/ dentition or chewing and swallowing current diet texture. Denies N/V/C/D, last BM 12/28 Per patient report.     Current Weight: 138.6lb on 12/21    Pertinent Medications: MEDICATIONS  (STANDING):  bisacodyl 5 milliGRAM(s) Oral daily  brivaracetam 50 milliGRAM(s) Oral two times a day  enoxaparin Injectable 40 milliGRAM(s) SubCutaneous <User Schedule>  famotidine    Tablet 20 milliGRAM(s) Oral two times a day  gabapentin 300 milliGRAM(s) Oral at bedtime  ketorolac 0.5% Ophthalmic Solution 1 Drop(s) Left EYE daily  lidocaine   4% Patch 1 Patch Transdermal <User Schedule>  lidocaine   4% Patch 1 Patch Transdermal <User Schedule>  modafinil 50 milliGRAM(s) Oral once  polyethylene glycol 3350 17 Gram(s) Oral two times a day  senna 2 Tablet(s) Oral at bedtime  sodium chloride 0.65% Nasal 1 Spray(s) Both Nostrils two times a day    MEDICATIONS  (PRN):  acetaminophen     Tablet .. 650 milliGRAM(s) Oral every 6 hours PRN Temp greater or equal to 38C (100.4F), Mild Pain (1 - 3)  aluminum hydroxide/magnesium hydroxide/simethicone Suspension 30 milliLiter(s) Oral every 6 hours PRN Dyspepsia  artificial tears (preservative free) Ophthalmic Solution 1 Drop(s) Both EYES every 4 hours PRN Dry Eyes  bisacodyl Suppository 10 milliGRAM(s) Rectal daily PRN Constipation  calcium carbonate    500 mG (Tums) Chewable 1 Tablet(s) Chew three times a day PRN Heartburn  ondansetron   Disintegrating Tablet 4 milliGRAM(s) Oral every 6 hours PRN Nausea and/or Vomiting  traMADol 25 milliGRAM(s) Oral every 4 hours PRN Severe Pain (7 - 10)      Pertinent Labs:  12-28 Na138 mmol/L Glu 72 mg/dL K+ 4.0 mmol/L Cr  0.64 mg/dL BUN 18 mg/dL 12-28 Alb 2.9 g/dL<L> 12-15 Chol 197 mg/dL LDL --    HDL 58 mg/dL Trig 64 mg/dL        Skin: No pressure injury per nursing flowsheets    Edema: No edema noted per nursing flowsheet    Last Bowel Movement: on 12/28 Per nursing flowsheets     Estimated Needs:   [X] No Change Since Previous Assessment    Previous Nutrition Diagnosis:   Increased Nutrient Needs...  micronutrients, protein  related to increased physiological demand s/p brain injury  as evidenced by s/p ICH    Nutrition Diagnosis is [X] Ongoing - addressed with greek yogurt TID & PO intake >75%     New Nutrition Diagnosis: [X] Not Applicable    Interventions:   1. Recommend continuing with current plan of care, diet consistency per SLP  2. Encourage PO intake  3. Obtain and honor food preferences as able  4. Ongoing diet education     Monitoring & Evaluation:   [X] Weights   [X] PO Intake   [X] Skin Integrity   [X] Follow Up (Per Protocol)  [X] Tolerance to Diet Prescription   [X] Other: Labs    Registered Dietitian/Nutritionist Remains Available.  Gayatri Crawley RD    Phone# [phone]

## 2023-12-28 NOTE — DISCHARGE NOTE PROVIDER - PROVIDER TOKENS
PROVIDER:[TOKEN:[[id number]],FOLLOWUP:[1 month]],PROVIDER:[TOKEN:[[id number]],FOLLOWUP:[2 weeks]],PROVIDER:[TOKEN:[[id number]],FOLLOWUP:[1 month]] PROVIDER:[TOKEN:[[id number]],FOLLOWUP:[1 month]],PROVIDER:[TOKEN:[[id number]],FOLLOWUP:[2 weeks]],PROVIDER:[TOKEN:[[id number]],FOLLOWUP:[1 month]] PROVIDER:[TOKEN:[[id number]],FOLLOWUP:[1 month]],PROVIDER:[TOKEN:[[id number]],FOLLOWUP:[2 weeks]],PROVIDER:[TOKEN:[[id number]],FOLLOWUP:[1 month]] PROVIDER:[TOKEN:[[id number]],FOLLOWUP:[1 month]],PROVIDER:[TOKEN:[[id number]],FOLLOWUP:[2 weeks]],PROVIDER:[TOKEN:[[id number]],FOLLOWUP:[1 month]],PROVIDER:[TOKEN:[[id number]],FOLLOWUP:[1 week]] PROVIDER:[TOKEN:[[id number]],FOLLOWUP:[1 month]],PROVIDER:[TOKEN:[[id number]],FOLLOWUP:[2 weeks]],PROVIDER:[TOKEN:[[id number]],FOLLOWUP:[1 month]],PROVIDER:[TOKEN:[[id number]],FOLLOWUP:[1 week]] PROVIDER:[TOKEN:[[id number]],FOLLOWUP:[1 month]],PROVIDER:[TOKEN:[[id number]],FOLLOWUP:[2 weeks]],PROVIDER:[TOKEN:[[id number]],FOLLOWUP:[1 month]],PROVIDER:[TOKEN:[[id number]],FOLLOWUP:[1 week]] PROVIDER:[TOKEN:[[id number]],FOLLOWUP:[1 month]],PROVIDER:[TOKEN:[[id number]],FOLLOWUP:[2 weeks]],PROVIDER:[TOKEN:[[id number]],FOLLOWUP:[1 month]],PROVIDER:[TOKEN:[[id number]],FOLLOWUP:[1 week]],PROVIDER:[TOKEN:[[id number]:[id number]],FOLLOWUP:[2 weeks]] PROVIDER:[TOKEN:[[id number]],FOLLOWUP:[1 month]],PROVIDER:[TOKEN:[[id number]],FOLLOWUP:[2 weeks]],PROVIDER:[TOKEN:[[id number]],FOLLOWUP:[1 month]],PROVIDER:[TOKEN:[[id number]],FOLLOWUP:[1 week]],PROVIDER:[TOKEN:[[id number]:MIIS:[id number]],FOLLOWUP:[2 weeks]] PROVIDER:[TOKEN:[[id number]],FOLLOWUP:[1 month]],PROVIDER:[TOKEN:[[id number]],FOLLOWUP:[2 weeks]],PROVIDER:[TOKEN:[[id number]],FOLLOWUP:[1 month]],PROVIDER:[TOKEN:[[id number]],FOLLOWUP:[1 week]],PROVIDER:[TOKEN:[[id number]],FOLLOWUP:[2 weeks]]

## 2023-12-28 NOTE — BH CONSULTATION LIAISON PROGRESS NOTE - NSBHASSESSMENTFT_PSY_ALL_CORE
Patient is a 73 year old femalewith no PMH who presented to [facility] ED on 12/14/2023, as a transfer from Health system, as a CODE STROKE. At [facility] patient was brought in by friend for headaches and AMS; NIHSS 9. CTH and CTA repeated - large R IPH (temporal/parietal). 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 Similar midline shift of 5 mm.  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  Patient was medically optimized for discharge to [facility] IRU on 12/20/23. Psychiatry consulted for severe cognitive impairment and ADHD.     Patient with persistent difficulties with maintaining focus and shifting her attention. She often becomes fixated on a single idea and struggles to redirect her thoughts to other tasks or subjects, indicating challenges with cognitive flexibility and concentration. Patient insightful and creates small goals to accomplish to address her issues with focus.        Patient is a 73 year old femalewith no PMH who presented to [facility] ED on 12/14/2023, as a transfer from [facility], as a CODE STROKE. At [facility] patient was brought in by friend for headaches and AMS; NIHSS 9. CTH and CTA repeated - large R IPH (temporal/parietal). 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 Similar midline shift of 5 mm.  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  Patient was medically optimized for discharge to [facility] IRU on 12/20/23. Psychiatry consulted for severe cognitive impairment and ADHD.     Patient with persistent difficulties with maintaining focus and shifting her attention. She often becomes fixated on a single idea and struggles to redirect her thoughts to other tasks or subjects, indicating challenges with cognitive flexibility and concentration. Patient insightful and creates small goals to accomplish to address her issues with focus.

## 2023-12-28 NOTE — DISCHARGE NOTE PROVIDER - NSDCCAREPROVSEEN_GEN_ALL_CORE_FT
Flori, Vale Servin, Judith Desai Flori, Vale Servin, Jenna Emery, Judith Jasmine, Bonnie Moss, Chani Mane, Ольга Flori, Vale Servin, Jenna Emery, Judith Jasmine, Bonnie Moss, Chani Mane, Gabi Wallis

## 2023-12-28 NOTE — CONSULT NOTE ADULT - ASSESSMENT
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   Her repeat CT brain still show prominent blood, though it is decrease in size.  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   # intracerebral hemorrhage  Do not recommend full dose of AC at this time. Would wait couple weeks then repeat CT head again.  the rest per primary team. 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   Her repeat CT brain still show prominent blood, though it is decrease in size.  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   # intracerebral hemorrhage  Do not recommend full dose of AC at this time. Would wait couple weeks then repeat CT head again.  the rest per primary team.

## 2023-12-28 NOTE — DISCHARGE NOTE PROVIDER - HOSPITAL COURSE
HPI:  This is a 72 YO RIGHT handed woman with no PMH who presented to [facility] ED on 12/14/2023, as a transfer from [facility], as a CODE STROKE, with c/o R IPH.   CTH and CTA repeated - large R IPH (temporal/parietal). Presented to [place] today with c/o HA and AMS. Friend accompanying patient said that when visiting patient today - patient was not acting like herself and c/o HA. NIHSS 9.    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 Similar midline shift of 5 mm.  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 Briviact 50mg BID for seizure prophylaxis, EEG with increased risk of seizures in the right frontocentral region.TTE- EF 64%. Continue Losartan 25mg daily. 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 UA: positive for UTI, started on IV abx and then transitioned to oral, end date 12/22.      Patient was evaluated by PM&R and therapy for functional deficits, gait/ADL impairments and acute rehabilitation was recommended. Patient was medically optimized for discharge to [facility] IRU on 12/20/23. (20 Dec 2023 17:15)    Rehab course stable.     Functional Status:  # Case discussed in IDT rounds 12/26 (initial)  - ambulates 20 feet with RW and mod assist and WC follow with max cues, mod-max assist transfers, follows occasional single step commands with poor attention and max cues, tolerating soflt-bite sized and thin liquids, severe attention deficits, poor insight, supervision eating/hygiene, total assist tub transfers, max assist toileting  - goals: mod-max assist transfer due to need for cues and poor attention, mod assist baDLs  - target: dc [facility] 1/3/23 for additional OT PT SLP (patient lives alone in apt with stairs)      All other medical co-morbidities were stable. Patient tolerated course of inpatient PT/OT/SLP rehab with significant improvements and met rehab goals prior to discharge. Discharge instructions were discussed with patient and family. All questions answered and all concerns addressed. Patient was medically cleared for discharge to [facility].     Outpatient Follow-ups:  Emy Prakash  NP in [address], [address]  [place] [address]  Phone: [phone]  Fax: [phone]  Follow Up Time: 2 weeks    Sergio Jurado  Cardiology  [address] [place] [address], [address]  [place] [address]  Phone: [phone]  Fax: [phone]  Follow Up Time: 1 month    Dr. Judith Emery  PM&R  [facility] HPI:  This is a 72 YO RIGHT handed woman with no PMH who presented to [facility] ED on 12/14/2023, as a transfer from [facility], as a CODE STROKE, with c/o R IPH.   CTH and CTA repeated - large R IPH (temporal/parietal). Presented to [place] today with c/o HA and AMS. Friend accompanying patient said that when visiting patient today - patient was not acting like herself and c/o HA. NIHSS 9.    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 Similar midline shift of 5 mm.  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 Briviact 50mg BID for seizure prophylaxis, EEG with increased risk of seizures in the right frontocentral region.TTE- EF 64%. Continue Losartan 25mg daily. 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 UA: positive for UTI, started on IV abx and then transitioned to oral, end date 12/22.      Patient was evaluated by PM&R and therapy for functional deficits, gait/ADL impairments and acute rehabilitation was recommended. Patient was medically optimized for discharge to [facility] IRU on 12/20/23. (20 Dec 2023 17:15)    Rehab course stable.     Functional Status:  # Case discussed in IDT rounds 12/26 (initial)  - ambulates 20 feet with RW and mod assist and WC follow with max cues, mod-max assist transfers, follows occasional single step commands with poor attention and max cues, tolerating soflt-bite sized and thin liquids, severe attention deficits, poor insight, supervision eating/hygiene, total assist tub transfers, max assist toileting  - goals: mod-max assist transfer due to need for cues and poor attention, mod assist baDLs  - target: dc [facility] 1/3/23 for additional OT PT SLP (patient lives alone in apt with stairs)      All other medical co-morbidities were stable. Patient tolerated course of inpatient PT/OT/SLP rehab with significant improvements and met rehab goals prior to discharge. Discharge instructions were discussed with patient and family. All questions answered and all concerns addressed. Patient was medically cleared for discharge to [facility].     Outpatient Follow-ups:  Emy Prakash  NP in [address], [address]  [place] [address]  Phone: [phone]  Fax: [phone]  Follow Up Time: 2 weeks    Sergio Jurado  Cardiology  [address] [place] [address], [address]  [place] [address]  Phone: [phone]  Fax: [phone]  Follow Up Time: 1 month    Dr. Judith Emery  PM&R  [facility] HPI:  This is a 74 YO RIGHT handed woman with no PMH who presented to [facility] ED on 12/14/2023, as a transfer from [facility], as a CODE STROKE, with c/o R IPH.   CTH and CTA repeated - large R IPH (temporal/parietal). Presented to [place] today with c/o HA and AMS. Friend accompanying patient said that when visiting patient today - patient was not acting like herself and c/o HA. NIHSS 9.    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 Similar midline shift of 5 mm.  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 Briviact 50mg BID for seizure prophylaxis, EEG with increased risk of seizures in the right frontocentral region.TTE- EF 64%. Continue Losartan 25mg daily. 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 UA: positive for UTI, started on IV abx and then transitioned to oral, end date 12/22.      Patient was evaluated by PM&R and therapy for functional deficits, gait/ADL impairments and acute rehabilitation was recommended. Patient was medically optimized for discharge to [facility] IRU on 12/20/23. (20 Dec 2023 17:15)    Rehab course stable.     Functional Status:  # Case discussed in IDT rounds 12/26 (initial)  - ambulates 20 feet with RW and mod assist and WC follow with max cues, mod-max assist transfers, follows occasional single step commands with poor attention and max cues, tolerating soflt-bite sized and thin liquids, severe attention deficits, poor insight, supervision eating/hygiene, total assist tub transfers, max assist toileting  - goals: mod-max assist transfer due to need for cues and poor attention, mod assist baDLs  - target: dc [facility] 1/3/23 for additional OT PT SLP (patient lives alone in apt with stairs)      All other medical co-morbidities were stable. Patient tolerated course of inpatient PT/OT/SLP rehab with significant improvements and met rehab goals prior to discharge. Discharge instructions were discussed with patient and family. All questions answered and all concerns addressed. Patient was medically cleared for discharge to [facility].     Outpatient Follow-ups:  Emy Prakash  NP in [address], [address]  [place] [address]  Phone: [phone]  Fax: [phone]  Follow Up Time: 2 weeks    Sergio Jurado  Cardiology  [address] [place] [address], [address]  [place] [address]  Phone: [phone]  Fax: [phone]  Follow Up Time: 1 month    Dr. Judith Emery  PM&R  [facility] HPI:  This is a 72 YO RIGHT handed woman with no PMH who presented to [facility] ED on 12/14/2023, as a transfer from [facility], as a CODE STROKE, with c/o R IPH.   CTH and CTA repeated - large R IPH (temporal/parietal). Presented to [place] today with c/o HA and AMS. Friend accompanying patient said that when visiting patient today - patient was not acting like herself and c/o HA. NIHSS 9.    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 Similar midline shift of 5 mm.  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 Briviact 50mg BID for seizure prophylaxis, EEG with increased risk of seizures in the right frontocentral region.TTE- EF 64%. Continue Losartan 25mg daily. 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 UA: positive for UTI, started on IV abx and then transitioned to oral, end date 12/22.      Patient was evaluated by PM&R and therapy for functional deficits, gait/ADL impairments and acute rehabilitation was recommended. Patient was medically optimized for discharge to [facility] IRU on 12/20/23. (20 Dec 2023 17:15)    Rehab course stable. She had reported possible ADHD, was noted to be restless with poor simple attention and difficult to redirect. Psychiatry consulted, suggested off label trial low dose modafinil 50 mg, which was initiated 12/28, monitor for headaches. Surveillance dopplers performed which were stable, no evidence propagation bLE DVT (last 12/27). HEad CT also performed, stable/improved but neuro recommends continuing to withold full dose AC with repeat Head CT in 2 weeks:    ACC: [id number] EXAM:  CT BRAIN   ORDERED BY: HORACIO CA     PROCEDURE DATE:  12/27/2023          INTERPRETATION:  CLINICAL INDICATION: Follow-up right parietal   parenchymal hemorrhage    5mm axial sections of the brain were obtained from base to vertex,   without the intravenous administration of contrast material. Coronal and   sagittal computer generated reconstructed views are available.    Comparison is made with the prior CT of 12/19/2023.    The right parietal parenchymal hemorrhage is slightly less dense and   slightly smaller measuring 3.0 cm in AP diameter by 4.3 cm transversely   compared with the prior of 4.1 cm in AP diameter by 5.9 cm transversely.   There is similar vasogenic edema. There is also similar mass effect on   the atrium of the right lateral ventricle with similar mild midline shift   to the left. There is resolution of the intraventricular hemorrhage in   the left occipital horn.    IMPRESSION: Decreased size in density to the right parietal parenchymal   hemorrhage since 12/19/2023. Resolution of intraventricular hemorrhage   left occipital horn.    --- End of Report ---    Functional Status:  # Case discussed in IDT rounds 12/26 (initial)  - ambulates 20 feet with RW and mod assist and WC follow with max cues, mod-max assist transfers, follows occasional single step commands with poor attention and max cues, tolerating soflt-bite sized and thin liquids, severe attention deficits, poor insight, supervision eating/hygiene, total assist tub transfers, max assist toileting  - goals: mod-max assist transfer due to need for cues and poor attention, mod assist baDLs  - target: [facility] 1/3/23 for additional OT PT SLP (patient lives alone in apt with stairs)      All other medical co-morbidities were stable. Patient tolerated course of inpatient PT/OT/SLP rehab with significant improvements and met rehab goals prior to discharge. Discharge instructions were discussed with patient and family. All questions answered and all concerns addressed. Patient was medically cleared for discharge to [facility].     Outpatient Follow-ups:  Emy Prakash  NP in [address], [address]  [place] [address]  Phone: [phone]  Fax: [phone]  Follow Up Time: 2 weeks    Sergio Jurado  [address] [place] [address], [address]  [place] [address]  Phone: [phone]  Fax: [phone]  Follow Up Time: 1 month    Dr. Judith Emery  PM&R  [facility] HPI:  This is a 74 YO RIGHT handed woman with no PMH who presented to [facility] ED on 12/14/2023, as a transfer from [facility], as a CODE STROKE, with c/o R IPH.   CTH and CTA repeated - large R IPH (temporal/parietal). Presented to [place] today with c/o HA and AMS. Friend accompanying patient said that when visiting patient today - patient was not acting like herself and c/o HA. NIHSS 9.    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 Similar midline shift of 5 mm.  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 Briviact 50mg BID for seizure prophylaxis, EEG with increased risk of seizures in the right frontocentral region.TTE- EF 64%. Continue Losartan 25mg daily. 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 UA: positive for UTI, started on IV abx and then transitioned to oral, end date 12/22.      Patient was evaluated by PM&R and therapy for functional deficits, gait/ADL impairments and acute rehabilitation was recommended. Patient was medically optimized for discharge to [facility] IRU on 12/20/23. (20 Dec 2023 17:15)    Rehab course stable. She had reported possible ADHD, was noted to be restless with poor simple attention and difficult to redirect. Psychiatry consulted, suggested off label trial low dose modafinil 50 mg, which was initiated 12/28, monitor for headaches. Surveillance dopplers performed which were stable, no evidence propagation bLE DVT (last 12/27). HEad CT also performed, stable/improved but neuro recommends continuing to withold full dose AC with repeat Head CT in 2 weeks:    ACC: [id number] EXAM:  CT BRAIN   ORDERED BY: HORACIO CA     PROCEDURE DATE:  12/27/2023          INTERPRETATION:  CLINICAL INDICATION: Follow-up right parietal   parenchymal hemorrhage    5mm axial sections of the brain were obtained from base to vertex,   without the intravenous administration of contrast material. Coronal and   sagittal computer generated reconstructed views are available.    Comparison is made with the prior CT of 12/19/2023.    The right parietal parenchymal hemorrhage is slightly less dense and   slightly smaller measuring 3.0 cm in AP diameter by 4.3 cm transversely   compared with the prior of 4.1 cm in AP diameter by 5.9 cm transversely.   There is similar vasogenic edema. There is also similar mass effect on   the atrium of the right lateral ventricle with similar mild midline shift   to the left. There is resolution of the intraventricular hemorrhage in   the left occipital horn.    IMPRESSION: Decreased size in density to the right parietal parenchymal   hemorrhage since 12/19/2023. Resolution of intraventricular hemorrhage   left occipital horn.    --- End of Report ---    Functional Status:  # Case discussed in IDT rounds 12/26 (initial)  - ambulates 20 feet with RW and mod assist and WC follow with max cues, mod-max assist transfers, follows occasional single step commands with poor attention and max cues, tolerating soflt-bite sized and thin liquids, severe attention deficits, poor insight, supervision eating/hygiene, total assist tub transfers, max assist toileting  - goals: mod-max assist transfer due to need for cues and poor attention, mod assist baDLs  - target: [facility] 1/3/23 for additional OT PT SLP (patient lives alone in apt with stairs)      All other medical co-morbidities were stable. Patient tolerated course of inpatient PT/OT/SLP rehab with significant improvements and met rehab goals prior to discharge. Discharge instructions were discussed with patient and family. All questions answered and all concerns addressed. Patient was medically cleared for discharge to [facility].     Outpatient Follow-ups:  Emy Prakash  NP in [address], [address]  [place] [address]  Phone: [phone]  Fax: [phone]  Follow Up Time: 2 weeks    Sergio Jurado  [address] [place] [address], [address]  [place] [address]  Phone: [phone]  Fax: [phone]  Follow Up Time: 1 month    Dr. Judith Emery  PM&R  [facility] HPI:  This is a 72 YO RIGHT handed woman with no PMH who presented to [facility] ED on 12/14/2023[facility], as a CODE STROKE, with c/o R IPH.   CTH and CTA repeated - large R IPH (temporal/parietal). Presented to [place] today with c/o HA and AMS. Friend accompanying patient said that when visiting patient today - patient was not acting like herself and c/o HA. NIHSS 9.    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 Similar midline shift of 5 mm.  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 Briviact 50mg BID for seizure prophylaxis, EEG with increased risk of seizures in the right frontocentral region.TTE- EF 64%. Continue Losartan 25mg daily. 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 UA: positive for UTI, started on IV abx and then transitioned to oral, end date 12/22.      Patient was evaluated by PM&R and therapy for functional deficits, gait/ADL impairments and acute rehabilitation was recommended. Patient was medically optimized for discharge to [facility] IRU on 12/20/23. (20 Dec 2023 17:15)    Rehab course stable. She had reported possible ADHD, was noted to be restless with poor simple attention and difficult to redirect. Psychiatry consulted, suggested off label trial low dose modafinil 50 mg, which was initiated 12/28, monitor for headaches. Surveillance dopplers performed which were stable, no evidence propagation bLE DVT (last 12/27). HEad CT also performed, stable/improved but neuro recommends continuing to withold full dose AC with repeat Head CT in 2 weeks:    ACC: [id number] EXAM:  CT BRAIN   ORDERED BY: HORACIO CA     PROCEDURE DATE:  12/27/2023          INTERPRETATION:  CLINICAL INDICATION: Follow-up right parietal   parenchymal hemorrhage    5mm axial sections of the brain were obtained from base to vertex,   without the intravenous administration of contrast material. Coronal and   sagittal computer generated reconstructed views are available.    Comparison is made with the prior CT of 12/19/2023.    The right parietal parenchymal hemorrhage is slightly less dense and   slightly smaller measuring 3.0 cm in AP diameter by 4.3 cm transversely   compared with the prior of 4.1 cm in AP diameter by 5.9 cm transversely.   There is similar vasogenic edema. There is also similar mass effect on   the atrium of the right lateral ventricle with similar mild midline shift   to the left. There is resolution of the intraventricular hemorrhage in   the left occipital horn.    IMPRESSION: Decreased size in density to the right parietal parenchymal   hemorrhage since 12/19/2023. Resolution of intraventricular hemorrhage   left occipital horn.    --- End of Report ---    Functional Status:  # Case discussed in IDT rounds 12/26 (initial)  - ambulates 20 feet with RW and mod assist and WC follow with max cues, mod-max assist transfers, follows occasional single step commands with poor attention and max cues, tolerating soflt-bite sized and thin liquids, severe attention deficits, poor insight, supervision eating/hygiene, total assist tub transfers, max assist toileting  - goals: mod-max assist transfer due to need for cues and poor attention, mod assist baDLs  - target: [facility] 1/3/23 for additional OT PT SLP (patient lives alone in apt with stairs)      All other medical co-morbidities were stable. Patient tolerated course of inpatient PT/OT/SLP rehab with significant improvements and met rehab goals prior to discharge. Discharge instructions were discussed with patient and family. All questions answered and all concerns addressed. Patient was medically cleared for discharge to [facility].     Outpatient Follow-ups:  Emy Prakash  NP in [address], [address]  [place] [address]  Phone: [phone]  Fax: [phone]  Follow Up Time: 2 weeks    Sergio Jurado  [address] [place] [address], [address]  [place] [address]  Phone: [phone]  Fax: [phone]  Follow Up Time: 1 month    Dr. Judith Emery  PM&R  [facility] HPI:  This is a 74 YO RIGHT handed woman with no PMH who presented to [facility] ED on 12/14/2023, as a transfer from [facility], as a CODE STROKE, with c/o R IPH.   CTH and CTA repeated - large R IPH (temporal/parietal). Presented to [place] today with c/o HA and AMS. Friend accompanying patient said that when visiting patient today - patient was not acting like herself and c/o HA. NIHSS 9.    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 Similar midline shift of 5 mm.  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 Briviact 50mg BID for seizure prophylaxis, EEG with increased risk of seizures in the right frontocentral region.TTE- EF 64%. Continue Losartan 25mg daily. 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 UA: positive for UTI, started on IV abx and then transitioned to oral, end date 12/22.      Patient was evaluated by PM&R and therapy for functional deficits, gait/ADL impairments and acute rehabilitation was recommended. Patient was medically optimized for discharge to [facility] IRU on 12/20/23. (20 Dec 2023 17:15)    Rehab course stable. She had reported possible ADHD, was noted to be restless with poor simple attention and difficult to redirect. Psychiatry consulted, suggested off label trial low dose modafinil 50 mg, which was initiated 12/28, monitor for headaches. Surveillance dopplers performed which were stable, no evidence propagation bLE DVT (last 12/27). HEad CT also performed, stable/improved but neuro recommends continuing to withold full dose AC with repeat Head CT in 2 weeks. ascvd is moderate risk and started on atorvastatin 10 mg qhs 12/29    ACC: [id number] EXAM:  CT BRAIN   ORDERED BY: HORACIO CA     PROCEDURE DATE:  12/27/2023          INTERPRETATION:  CLINICAL INDICATION: Follow-up right parietal   parenchymal hemorrhage    5mm axial sections of the brain were obtained from base to vertex,   without the intravenous administration of contrast material. Coronal and   sagittal computer generated reconstructed views are available.    Comparison is made with the prior CT of 12/19/2023.    The right parietal parenchymal hemorrhage is slightly less dense and   slightly smaller measuring 3.0 cm in AP diameter by 4.3 cm transversely   compared with the prior of 4.1 cm in AP diameter by 5.9 cm transversely.   There is similar vasogenic edema. There is also similar mass effect on   the atrium of the right lateral ventricle with similar mild midline shift   to the left. There is resolution of the intraventricular hemorrhage in   the left occipital horn.    IMPRESSION: Decreased size in density to the right parietal parenchymal   hemorrhage since 12/19/2023. Resolution of intraventricular hemorrhage   left occipital horn.    --- End of Report ---    Functional Status:  # Case discussed in IDT rounds 12/26 (initial)  - ambulates 20 feet with RW and mod assist and WC follow with max cues, mod-max assist transfers, follows occasional single step commands with poor attention and max cues, tolerating soflt-bite sized and thin liquids, severe attention deficits, poor insight, supervision eating/hygiene, total assist tub transfers, max assist toileting  - goals: mod-max assist transfer due to need for cues and poor attention, mod assist baDLs  - target: [facility] 1/3/23 for additional OT PT SLP (patient lives alone in apt with stairs)      All other medical co-morbidities were stable. Patient tolerated course of inpatient PT/OT/SLP rehab with significant improvements and met rehab goals prior to discharge. Discharge instructions were discussed with patient and family. All questions answered and all concerns addressed. Patient was medically cleared for discharge to [facility].     Outpatient Follow-ups:  Emy Prakash  NP in [address], [address]  [place] [address]  Phone: [phone]  Fax: [phone]  Follow Up Time: 2 weeks    Sergio Jurado  Cardiology  [address] [place] [address], [address]  [place] [address]  Phone: [phone]  Fax: [phone]  Follow Up Time: 1 month    Dr. Judith Emery  PM&R  [facility] HPI:  This is a 74 YO RIGHT handed woman with no PMH who presented to [facility] ED on 12/14/2023, as a transfer from [facility], as a CODE STROKE, with c/o R IPH.   CTH and CTA repeated - large R IPH (temporal/parietal). Presented to [place] today with c/o HA and AMS. Friend accompanying patient said that when visiting patient today - patient was not acting like herself and c/o HA. NIHSS 9.    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 Similar midline shift of 5 mm.  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 Briviact 50mg BID for seizure prophylaxis, EEG with increased risk of seizures in the right frontocentral region.TTE- EF 64%. Continue Losartan 25mg daily. 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 UA: positive for UTI, started on IV abx and then transitioned to oral, end date 12/22.      Patient was evaluated by PM&R and therapy for functional deficits, gait/ADL impairments and acute rehabilitation was recommended. Patient was medically optimized for discharge to [facility] IRU on 12/20/23. (20 Dec 2023 17:15)    Rehab course stable. She had reported possible ADHD, was noted to be restless with poor simple attention and difficult to redirect. Psychiatry consulted, suggested off label trial low dose modafinil 50 mg, which was initiated 12/28, monitor for headaches. Surveillance dopplers performed which were stable, no evidence propagation bLE DVT (last 12/27). HEad CT also performed, stable/improved but neuro recommends continuing to withold full dose AC with repeat Head CT in 2 weeks. ascvd is moderate risk and started on atorvastatin 10 mg qhs 12/29    ACC: [id number] EXAM:  CT BRAIN   ORDERED BY: HORACIO CA     PROCEDURE DATE:  12/27/2023          INTERPRETATION:  CLINICAL INDICATION: Follow-up right parietal   parenchymal hemorrhage    5mm axial sections of the brain were obtained from base to vertex,   without the intravenous administration of contrast material. Coronal and   sagittal computer generated reconstructed views are available.    Comparison is made with the prior CT of 12/19/2023.    The right parietal parenchymal hemorrhage is slightly less dense and   slightly smaller measuring 3.0 cm in AP diameter by 4.3 cm transversely   compared with the prior of 4.1 cm in AP diameter by 5.9 cm transversely.   There is similar vasogenic edema. There is also similar mass effect on   the atrium of the right lateral ventricle with similar mild midline shift   to the left. There is resolution of the intraventricular hemorrhage in   the left occipital horn.    IMPRESSION: Decreased size in density to the right parietal parenchymal   hemorrhage since 12/19/2023. Resolution of intraventricular hemorrhage   left occipital horn.    --- End of Report ---    Functional Status:  # Case discussed in IDT rounds 12/26 (initial)  - ambulates 20 feet with RW and mod assist and WC follow with max cues, mod-max assist transfers, follows occasional single step commands with poor attention and max cues, tolerating soflt-bite sized and thin liquids, severe attention deficits, poor insight, supervision eating/hygiene, total assist tub transfers, max assist toileting  - goals: mod-max assist transfer due to need for cues and poor attention, mod assist baDLs  - [facility]: [facility] 1[facility]23 for additional OT PT SLP (patient lives alone in apt with stairs)      All other medical co-morbidities were stable. Patient tolerated course of inpatient PT/OT/SLP rehab with significant improvements and met rehab goals prior to discharge. Discharge instructions were discussed with patient and family. All questions answered and all concerns addressed. Patient was medically cleared for discharge to [facility]     Outpatient Follow-ups:  Emy Prakash  NP in [address], [address]  [place] [address]  Phone: [phone]  Fax: [phone]  Follow Up Time: 2 weeks    Sergio Jurado  Cardiology  [address] [place] [address], [address]  [place] [address]  Phone: [phone]  Fax: [phone]  Follow Up Time: 1 month    Dr. Judith Emery  PM&R  [facility] HPI:  This is a 72 YO RIGHT handed woman with no PMH who presented to [facility] ED on 12/14/2023, as a transfer from [facility], as a CODE STROKE, with c/o R IPH.   CTH and CTA repeated - large R IPH (temporal/parietal). Presented to [place] today with c/o HA and AMS. Friend accompanying patient said that when visiting patient today - patient was not acting like herself and c/o HA. NIHSS 9.    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 Similar midline shift of 5 mm.  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 Briviact 50mg BID for seizure prophylaxis, EEG with increased risk of seizures in the right frontocentral region.TTE- EF 64%. Continue Losartan 25mg daily. 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 UA: positive for UTI, started on IV abx and then transitioned to oral, end date 12/22.      Patient was evaluated by PM&R and therapy for functional deficits, gait/ADL impairments and acute rehabilitation was recommended. Patient was medically optimized for discharge to [facility] IRU on 12/20/23. (20 Dec 2023 17:15)    Rehab course stable. She had reported possible ADHD, was noted to be restless with poor simple attention and difficult to redirect. Psychiatry consulted, suggested off label trial low dose modafinil 50 mg, which was initiated 12/28, monitor for headaches. Surveillance dopplers performed which were stable, no evidence propagation bLE DVT (last 12/27). HEad CT also performed, stable/improved but neuro recommends continuing to withold full dose AC with repeat Head CT in 2 weeks. ascvd is moderate risk and started on atorvastatin 10 mg qhs 12/29    ACC: [id number] EXAM:  CT BRAIN   ORDERED BY: HORACIO CA     PROCEDURE DATE:  12/27/2023          INTERPRETATION:  CLINICAL INDICATION: Follow-up right parietal   parenchymal hemorrhage    5mm axial sections of the brain were obtained from base to vertex,   without the intravenous administration of contrast material. Coronal and   sagittal computer generated reconstructed views are available.    Comparison is made with the prior CT of 12/19/2023.    The right parietal parenchymal hemorrhage is slightly less dense and   slightly smaller measuring 3.0 cm in AP diameter by 4.3 cm transversely   compared with the prior of 4.1 cm in AP diameter by 5.9 cm transversely.   There is similar vasogenic edema. There is also similar mass effect on   the atrium of the right lateral ventricle with similar mild midline shift   to the left. There is resolution of the intraventricular hemorrhage in   the left occipital horn.    IMPRESSION: Decreased size in density to the right parietal parenchymal   hemorrhage since 12/19/2023. Resolution of intraventricular hemorrhage   left occipital horn.    --- End of Report ---    Functional Status:  # Case discussed in IDT rounds 12/26 (initial)  - ambulates 20 feet with RW and mod assist and WC follow with max cues, mod-max assist transfers, follows occasional single step commands with poor attention and max cues, tolerating soflt-bite sized and thin liquids, severe attention deficits, poor insight, supervision eating/hygiene, total assist tub transfers, max assist toileting  - goals: mod-max assist transfer due to need for cues and poor attention, mod assist baDLs  - target: [facility] 1/3/23 for additional OT PT SLP (patient lives alone in apt with stairs)      All other medical co-morbidities were stable. Patient tolerated course of inpatient PT/OT/SLP rehab with significant improvements and met rehab goals prior to discharge. Discharge instructions were discussed with patient and family. All questions answered and all concerns addressed. Patient was medically cleared for discharge to [facility].     Outpatient Follow-ups:  Emy Prakash  NP in [address], [address]  [place] [address]  Phone: [phone]  Fax: [phone]  Follow Up Time: 2 weeks    Sergio Jurado  Cardiology  [address] [place] [address], [address]  [place] [address]  Phone: [phone]  Fax: [phone]  Follow Up Time: 1 month    Dr. Judith Emery  PM&R  [facility] HPI:  This is a 74 YO RIGHT handed woman with no PMH who presented to [facility] ED on 12/14/2023, as a transfer from [facility], as a CODE STROKE, with c/o R IPH.   CTH and CTA repeated - large R IPH (temporal/parietal). Presented to [place] today with c/o HA and AMS. Friend accompanying patient said that when visiting patient today - patient was not acting like herself and c/o HA. NIHSS 9.    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 Similar midline shift of 5 mm.  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 Briviact 50mg BID for seizure prophylaxis, EEG with increased risk of seizures in the right frontocentral region.TTE- EF 64%. Continue Losartan 25mg daily. 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 UA: positive for UTI, started on IV abx and then transitioned to oral, end date 12/22.      Patient was evaluated by PM&R and therapy for functional deficits, gait/ADL impairments and acute rehabilitation was recommended. Patient was medically optimized for discharge to [facility] IRU on 12/20/23. (20 Dec 2023 17:15)    Rehab course stable. She had reported possible ADHD, was noted to be restless with poor simple attention and difficult to redirect. Psychiatry consulted, suggested off label trial low dose modafinil 50 mg, which was initiated 12/28 and tolerated well.  Dose was increased to 100mg with notable improvement in attention with functional tasks in therapy. Surveillance dopplers performed which were stable, no evidence propagation bLE DVT (last 12/27). Head CT also performed 12/27, stable/improved but neuro recommends continuing to withhold full dose AC with repeat Head CT in 2 weeks. ascvd is moderate risk and started on atorvastatin 10 mg qhs 12/29.  Repeat CTH for monitoring on 1/10 prior to discharge showed XXXX.  Patient to follow-up with neurology regarding full dose AC.          Functional Status:  #Case discussed in IDT rounds 1/9/24  -SLP: soft bite size; mild language deficits; mod-severe cognitive deficits,  left inattentiveness with max cueing, Impaired attention.   -OT: Eating/grooming setup; CG-Min A for toilet transfers; Min-Mod for shower transfers; CG for dressing  -PT: CS for bed mobility and transfers and 150ft ambulation with RW  - goals:   1. CS transfers and ambulation, CS bADLs;   Will need 24hr supervision  - target: dc [facility] 1/11/23    All other medical co-morbidities were stable. Patient tolerated course of inpatient PT/OT/SLP rehab with significant improvements and met rehab goals prior to discharge. Discharge instructions were discussed with patient and family. All questions answered and all concerns addressed. Patient was medically cleared for discharge to [facility].     Outpatient Follow-ups:  Emy Prakash  NP in [facility]  [address], [address]  [place] [address]  Phone: [phone]  Fax: [phone]  Follow Up Time: 2 weeks    Sergio Jurado  [address], [address]  [place] [address]  Phone: [phone]  Fax: [phone]  Follow Up Time: 1 month    Dr. Judith Emery  PM&R  [facility]      Dr. Dominguez Joyner  Opthalmology HPI:  This is a 74 YO RIGHT handed woman with no PMH who presented to [facility] ED on 12/14/2023, as a transfer from [facility], as a CODE STROKE, with c/o R IPH.   CTH and CTA repeated - large R IPH (temporal/parietal). Presented to [place] today with c/o HA and AMS. Friend accompanying patient said that when visiting patient today - patient was not acting like herself and c/o HA. NIHSS 9.    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 Similar midline shift of 5 mm.  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 Briviact 50mg BID for seizure prophylaxis, EEG with increased risk of seizures in the right frontocentral region.TTE- EF 64%. Continue Losartan 25mg daily. 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 UA: positive for UTI, started on IV abx and then transitioned to oral, end date 12/22.      Patient was evaluated by PM&R and therapy for functional deficits, gait/ADL impairments and acute rehabilitation was recommended. Patient was medically optimized for discharge to [facility] IRU on 12/20/23. (20 Dec 2023 17:15)    Rehab course stable. She had reported possible ADHD, was noted to be restless with poor simple attention and difficult to redirect. Psychiatry consulted, suggested off label trial low dose modafinil 50 mg, which was initiated 12/28 and tolerated well.  Dose was increased to 100mg with notable improvement in attention with functional tasks in therapy. Surveillance dopplers performed which were stable, no evidence propagation bLE DVT (last 12/27). Head CT also performed 12/27, stable/improved but neuro recommends continuing to withhold full dose AC with repeat Head CT in 2 weeks. ascvd is moderate risk and started on atorvastatin 10 mg qhs 12/29.  Repeat CTH for monitoring on 1/10 prior to discharge showed XXXX.  Patient to follow-up with neurology regarding full dose AC.          Functional Status:  #Case discussed in IDT rounds 1/9/24  -SLP: soft bite size; mild language deficits; mod-severe cognitive deficits,  left inattentiveness with max cueing, Impaired attention.   -OT: Eating/grooming setup; CG-Min A for toilet transfers; Min-Mod for shower transfers; CG for dressing  -PT: CS for bed mobility and transfers and 150ft ambulation with RW  - goals:   1. CS transfers and ambulation, CS bADLs;   Will need 24hr supervision  - target: dc [facility] 1/11/23    All other medical co-morbidities were stable. Patient tolerated course of inpatient PT/OT/SLP rehab with significant improvements and met rehab goals prior to discharge. Discharge instructions were discussed with patient and family. All questions answered and all concerns addressed. Patient was medically cleared for discharge to [facility].     Outpatient Follow-ups:  Emy Prakash  NP in [facility]  [address], [address]  [place] [address]  Phone: [phone]  Fax: [phone]  Follow Up Time: 2 weeks    Sergio Jurado  [address], [address]  [place] [address]  Phone: [phone]  Fax: [phone]  Follow Up Time: 1 month    Dr. Judith Emery  PM&R  [facility]      Dr. Dominguez Joyner  Opthalmology HPI:  This is a 72 YO RIGHT handed woman with no PMH who presented to [facility] ED on 12/14/2023, as a transfer from [facility], as a CODE STROKE, with c/o R IPH.   CTH and CTA repeated - large R IPH (temporal/parietal). Presented to [place] today with c/o HA and AMS. Friend accompanying patient said that when visiting patient today - patient was not acting like herself and c/o HA. NIHSS 9.    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 Similar midline shift of 5 mm.  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 Briviact 50mg BID for seizure prophylaxis, EEG with increased risk of seizures in the right frontocentral region.TTE- EF 64%. Continue Losartan 25mg daily. 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 UA: positive for UTI, started on IV abx and then transitioned to oral, end date 12/22.      Patient was evaluated by PM&R and therapy for functional deficits, gait/ADL impairments and acute rehabilitation was recommended. Patient was medically optimized for discharge to [facility] IRU on 12/20/23. (20 Dec 2023 17:15)    Rehab course stable. She had reported possible ADHD, was noted to be restless with poor simple attention and difficult to redirect. Psychiatry consulted, suggested off label trial low dose modafinil 50 mg, which was initiated 12/28 and tolerated well.  Dose was increased to 100mg with notable improvement in attention with functional tasks in therapy. Surveillance dopplers performed which were stable, no evidence propagation bLE DVT (last 12/27). Head CT also performed 12/27, stable/improved but neuro recommends continuing to withhold full dose AC with repeat Head CT in 2 weeks. ascvd is moderate risk and started on atorvastatin 10 mg qhs 12/29.  Repeat CTH for monitoring on 1/10 prior to discharge showed XXXX.  Patient to follow-up with neurology regarding full dose AC.          Functional Status:  #Case discussed in IDT rounds 1/9/24  -SLP: soft bite size; mild language deficits; mod-severe cognitive deficits,  left inattentiveness with max cueing, Impaired attention.   -OT: Eating/grooming setup; CG-Min A for toilet transfers; Min-Mod for shower transfers; CG for dressing  -PT: CS for bed mobility and transfers and 150ft ambulation with RW  - goals:   1. CS transfers and ambulation, CS bADLs;   Will need 24hr supervision  - target: dc [facility] 1/11/23    All other medical co-morbidities were stable. Patient tolerated course of inpatient PT/OT/SLP rehab with significant improvements and met rehab goals prior to discharge. Discharge instructions were discussed with patient and family. All questions answered and all concerns addressed. Patient was medically cleared for discharge to [facility].     Outpatient Follow-ups:  Emy Prakash  NP in [facility] [place]  [address], [address]  [place] [address]  Phone: [phone]  Fax: [phone]  Follow Up Time: 2 weeks    Sergio Jurado  [address], [address]  [place] [address]  Phone: [phone]  Fax: [phone]  Follow Up Time: 1 month    Dr. Judith Emery  PM&R  [facility]      Dr. Dominguez Joyner  Opthalmology HPI:  This is a 74 YO RIGHT handed woman with no PMH who presented to [facility] ED on 12/14/2023, as a transfer from [facility], as a CODE STROKE, with c/o R IPH.   CTH and CTA repeated - large R IPH (temporal/parietal). Presented to [place] today with c/o HA and AMS. Friend accompanying patient said that when visiting patient today - patient was not acting like herself and c/o HA. NIHSS 9.    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 Similar midline shift of 5 mm.  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 Briviact 50mg BID for seizure prophylaxis, EEG with increased risk of seizures in the right frontocentral region.TTE- EF 64%. Continue Losartan 25mg daily. 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 UA: positive for UTI, started on IV abx and then transitioned to oral, end date 12/22.      Patient was evaluated by PM&R and therapy for functional deficits, gait/ADL impairments and acute rehabilitation was recommended. Patient was medically optimized for discharge to [facility] IRU on 12/20/23. (20 Dec 2023 17:15)    Rehab course stable. She had reported possible ADHD, was noted to be restless with poor simple attention and difficult to redirect. Psychiatry consulted, suggested off label trial low dose modafinil 50 mg, which was initiated 12/28 and tolerated well.  Dose was increased to 100mg with notable improvement in attention with functional tasks in therapy. Surveillance dopplers performed which were stable, no evidence propagation bLE DVT (last 12/27). Head CT also performed 12/27, stable/improved but neuro recommends continuing to withhold full dose AC with repeat Head CT in 2 weeks. ascvd is moderate risk and started on atorvastatin 10 mg qhs 12/29.  Repeat CTH for monitoring on 1/10 prior to discharge showed XXXX.  Patient to follow-up with neurology regarding full dose AC.          Functional Status:  #Case discussed in IDT rounds 1/9/24  -SLP: soft bite size; mild language deficits; mod-severe cognitive deficits,  left inattentiveness with max cueing, Impaired attention.   -OT: Eating/grooming setup; CG-Min A for toilet transfers; Min-Mod for shower transfers; CG for dressing  -PT: CS for bed mobility and transfers and 150ft ambulation with RW  - goals:   1. CS transfers and ambulation, CS bADLs;   Will need 24hr supervision  - target: dc [facility] 1/11/23    All other medical co-morbidities were stable. Patient tolerated course of inpatient PT/OT/SLP rehab with significant improvements and met rehab goals prior to discharge. Discharge instructions were discussed with patient and family. All questions answered and all concerns addressed. Patient was medically cleared for discharge to [facility].     Outpatient Follow-ups:  Emy Prakash  NP in [facility]  [address], [address]  [place] [address]  Phone: [phone]  Fax: [phone]  Follow Up Time: 2 weeks    Sergio Jurado  [address], [address]  [place] [address]  Phone: [phone]  Fax: [phone]  Follow Up Time: 1 month    Dr. Judith Emery  PM&R  [facility]      Dr. Dominguez Joyner  Opthalmology  [phone] HPI:  This is a 74 YO RIGHT handed woman with no PMH who presented to [facility] ED on 12/14/2023, as a transfer from [facility], as a CODE STROKE, with c/o R IPH.   CTH and CTA repeated - large R IPH (temporal/parietal). Presented to [place] today with c/o HA and AMS. Friend accompanying patient said that when visiting patient today - patient was not acting like herself and c/o HA. NIHSS 9.    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 Similar midline shift of 5 mm.  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 Briviact 50mg BID for seizure prophylaxis, EEG with increased risk of seizures in the right frontocentral region.TTE- EF 64%. Continue Losartan 25mg daily. 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 UA: positive for UTI, started on IV abx and then transitioned to oral, end date 12/22.      Patient was evaluated by PM&R and therapy for functional deficits, gait/ADL impairments and acute rehabilitation was recommended. Patient was medically optimized for discharge to [facility] IRU on 12/20/23. (20 Dec 2023 17:15)    Rehab course stable. She had reported possible ADHD, was noted to be restless with poor simple attention and difficult to redirect. Psychiatry consulted, suggested off label trial low dose modafinil 50 mg, which was initiated 12/28 and tolerated well.  Dose was increased to 100mg with notable improvement in attention with functional tasks in therapy. Surveillance dopplers performed which were stable, no evidence propagation bLE DVT (last 12/27). Head CT also performed 12/27, stable/improved but neuro recommends continuing to withhold full dose AC with repeat Head CT in 2 weeks. ascvd is moderate risk and started on atorvastatin 10 mg qhs 12/29.  Repeat CTH for monitoring on 1/10 prior to discharge showed XXXX.  Patient to follow-up with neurology regarding full dose AC.          Functional Status:  #Case discussed in IDT rounds 1/9/24  -SLP: soft bite size; mild language deficits; mod-severe cognitive deficits,  left inattentiveness with max cueing, Impaired attention.   -OT: Eating/grooming setup; CG-Min A for toilet transfers; Min-Mod for shower transfers; CG for dressing  -PT: CS for bed mobility and transfers and 150ft ambulation with RW  - goals:   1. CS transfers and ambulation, CS bADLs;   Will need 24hr supervision  - target: dc [facility] 1/11/23    All other medical co-morbidities were stable. Patient tolerated course of inpatient PT/OT/SLP rehab with significant improvements and met rehab goals prior to discharge. Discharge instructions were discussed with patient and family. All questions answered and all concerns addressed. Patient was medically cleared for discharge to [facility].     Outpatient Follow-ups:  Emy Prakash  NP in [facility]  [address], [address]  [place] [address]  Phone: [phone]  Fax: [phone]  Follow Up Time: 2 weeks    Sergio Jurado  [address], [address]  [place] [address]  Phone: [phone]  Fax: [phone]  Follow Up Time: 1 month    Dr. Judith Emery  PM&R  [facility]      Dr. Dominguez Joyner  Opthalmology  [phone] HPI:  This is a 72 YO RIGHT handed woman with no PMH who presented to [facility] ED on 12/14/2023, as a transfer from [facility], as a CODE STROKE, with c/o R IPH.   CTH and CTA repeated - large R IPH (temporal/parietal). Presented to [place] today with c/o HA and AMS. Friend accompanying patient said that when visiting patient today - patient was not acting like herself and c/o HA. NIHSS 9.    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 Similar midline shift of 5 mm.  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 Briviact 50mg BID for seizure prophylaxis, EEG with increased risk of seizures in the right frontocentral region.TTE- EF 64%. Continue Losartan 25mg daily. 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 UA: positive for UTI, started on IV abx and then transitioned to oral, end date 12/22.      Patient was evaluated by PM&R and therapy for functional deficits, gait/ADL impairments and acute rehabilitation was recommended. Patient was medically optimized for discharge to [facility] IRU on 12/20/23. (20 Dec 2023 17:15)    Rehab course stable. She had reported possible ADHD, was noted to be restless with poor simple attention and difficult to redirect. Psychiatry consulted, suggested off label trial low dose modafinil 50 mg, which was initiated 12/28 and tolerated well.  Dose was increased to 100mg with notable improvement in attention with functional tasks in therapy. Surveillance dopplers performed which were stable, no evidence propagation bLE DVT (last 12/27). Head CT also performed 12/27, stable/improved but neuro recommends continuing to withhold full dose AC with repeat Head CT in 2 weeks. ascvd is moderate risk and started on atorvastatin 10 mg qhs 12/29.  Repeat CTH for monitoring on 1/10 prior to discharge showed XXXX.  Patient to follow-up with neurology regarding full dose AC.          Functional Status:  #Case discussed in IDT rounds 1/9/24  -SLP: soft bite size; mild language deficits; mod-severe cognitive deficits,  left inattentiveness with max cueing, Impaired attention.   -OT: Eating/grooming setup; CG-Min A for toilet transfers; Min-Mod for shower transfers; CG for dressing  -PT: CS for bed mobility and transfers and 150ft ambulation with RW  - goals:   1. CS transfers and ambulation, CS bADLs;   Will need 24hr supervision  - target: dc [facility] 1/11/23    All other medical co-morbidities were stable. Patient tolerated course of inpatient PT/OT/SLP rehab with significant improvements and met rehab goals prior to discharge. Discharge instructions were discussed with patient and family. All questions answered and all concerns addressed. Patient was medically cleared for discharge to [facility].     Outpatient Follow-ups:  Emy Prakash  NP in [facility]  [address], [address]  [place] [address]  Phone: [phone]  Fax: [phone]  Follow Up Time: 2 weeks    Sergio Jurado  [address], [address]  [place] [address]  Phone: [phone]  Fax: [phone]  Follow Up Time: 1 month    Dr. Judith Emery  PM&R  [facility]      Dr. Dominguez Joyner  Opthalmology  [phone] HPI:  This is a 72 YO RIGHT handed woman with no PMH who presented to [facility] ED on 12/14/2023, as a transfer from [facility], as a CODE STROKE, with c/o R IPH.   CTH and CTA repeated - large R IPH (temporal/parietal). Presented to [place] today with c/o HA and AMS. Friend accompanying patient said that when visiting patient today - patient was not acting like herself and c/o HA. NIHSS 9.    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 Similar midline shift of 5 mm.  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 Briviact 50mg BID for seizure prophylaxis, EEG with increased risk of seizures in the right frontocentral region.TTE- EF 64%. Continue Losartan 25mg daily. 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 UA: positive for UTI, started on IV abx and then transitioned to oral, end date 12/22.      Patient was evaluated by PM&R and therapy for functional deficits, gait/ADL impairments and acute rehabilitation was recommended. Patient was medically optimized for discharge to [facility] IRU on 12/20/23. (20 Dec 2023 17:15)    Rehab course stable. She had reported possible ADHD, was noted to be restless with poor simple attention and difficult to redirect. Psychiatry consulted, suggested off label trial low dose modafinil 50 mg, which was initiated 12/28 and tolerated well.  Dose was increased to 100mg with notable improvement in attention with functional tasks in therapy. Surveillance dopplers performed which were stable, no evidence propagation bLE DVT (last 12/27). Head CT also performed 12/27, stable/improved but neuro recommends continuing to withhold full dose AC with repeat Head CT in 2 weeks. ascvd is moderate risk and started on atorvastatin 10 mg qhs 12/29.  Repeat CTH for monitoring on 1/10 prior to discharge showed: Impression decreasing size and density to the right posterior temporal parenchymal hemorrhage compared with 12/27/2023.  Patient to follow-up with neurology regarding full dose AC.      Functional Status of 1/9/24 IDT rounds  -SLP: soft bite size; mild language deficits; mod-severe cognitive deficits,  left inattentiveness with max cueing, Impaired attention.   -OT: Eating/grooming setup; CG-Min A for toilet transfers; Min-Mod for shower transfers; CG for dressing  -PT: CS for bed mobility and transfers and 150ft ambulation with RW      All other medical co-morbidities were stable. Patient tolerated course of inpatient PT/OT/SLP rehab with significant improvements and met rehab goals prior to discharge. Discharge instructions were discussed with patient and family. All questions answered and all concerns addressed. Patient was medically cleared for discharge to [facility]     Outpatient Follow-ups:  Emy Prakash  NP in [address], [address]  [place] [address]  Phone: [phone]  Fax: [phone]  Follow Up Time: 2 weeks    Sergio Jurado  [address], [address]  [place] [address]  Phone: [phone]  Fax: [phone]  Follow Up Time: 1 month    Dr. Judith Emery  PM&R  [facility]      Dr. Dominguez Joyner  Opthalmology  [phone] HPI:  This is a 74 YO RIGHT handed woman with no PMH who presented to [facility] ED on 12/14/2023, as a transfer from [facility], as a CODE STROKE, with c/o R IPH.   CTH and CTA repeated - large R IPH (temporal/parietal). Presented to [place] today with c/o HA and AMS. Friend accompanying patient said that when visiting patient today - patient was not acting like herself and c/o HA. NIHSS 9.    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 Similar midline shift of 5 mm.  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 Briviact 50mg BID for seizure prophylaxis, EEG with increased risk of seizures in the right frontocentral region.TTE- EF 64%. Continue Losartan 25mg daily. 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 UA: positive for UTI, started on IV abx and then transitioned to oral, end date 12/22.      Patient was evaluated by PM&R and therapy for functional deficits, gait/ADL impairments and acute rehabilitation was recommended. Patient was medically optimized for discharge to [facility] IRU on 12/20/23. (20 Dec 2023 17:15)    Rehab course stable. She had reported possible ADHD, was noted to be restless with poor simple attention and difficult to redirect. Psychiatry consulted, suggested off label trial low dose modafinil 50 mg, which was initiated 12/28 and tolerated well.  Dose was increased to 100mg with notable improvement in attention with functional tasks in therapy. Surveillance dopplers performed which were stable, no evidence propagation bLE DVT (last 12/27). Head CT also performed 12/27, stable/improved but neuro recommends continuing to withhold full dose AC with repeat Head CT in 2 weeks. ascvd is moderate risk and started on atorvastatin 10 mg qhs 12/29.  Repeat CTH for monitoring on 1/10 prior to discharge showed: Impression decreasing size and density to the right posterior temporal parenchymal hemorrhage compared with 12/27/2023.  Patient to follow-up with neurology regarding full dose AC.      Functional Status of 1/9/24 IDT rounds  -SLP: soft bite size; mild language deficits; mod-severe cognitive deficits,  left inattentiveness with max cueing, Impaired attention.   -OT: Eating/grooming setup; CG-Min A for toilet transfers; Min-Mod for shower transfers; CG for dressing  -PT: CS for bed mobility and transfers and 150ft ambulation with RW      All other medical co-morbidities were stable. Patient tolerated course of inpatient PT/OT/SLP rehab with significant improvements and met rehab goals prior to discharge. Discharge instructions were discussed with patient and family. All questions answered and all concerns addressed. Patient was medically cleared for discharge to [facility].     Outpatient Follow-ups:  Emy Prakash  NP in [address], [address]  [place] [address]  Phone: [phone]  Fax: [phone]  Follow Up Time: 2 weeks    Sergio Jurado  [address], [address]  [place] [address]  Phone: [phone]  Fax: [phone]  Follow Up Time: 1 month    Dr. Judith Emery  PM&R  [facility]      Dr. Dominguez Joyner  Opthalmology  [phone] HPI:  This is a 72 YO RIGHT handed woman with no PMH who presented to [facility] ED on 12/14/2023, as a transfer from [facility], as a CODE STROKE, with c/o R IPH.   CTH and CTA repeated - large R IPH (temporal/parietal). Presented to [place] today with c/o HA and AMS. Friend accompanying patient said that when visiting patient today - patient was not acting like herself and c/o HA. NIHSS 9.    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 Similar midline shift of 5 mm.  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 Briviact 50mg BID for seizure prophylaxis, EEG with increased risk of seizures in the right frontocentral region.TTE- EF 64%. Continue Losartan 25mg daily. 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 UA: positive for UTI, started on IV abx and then transitioned to oral, end date 12/22.      Patient was evaluated by PM&R and therapy for functional deficits, gait/ADL impairments and acute rehabilitation was recommended. Patient was medically optimized for discharge to [facility] IRU on 12/20/23. (20 Dec 2023 17:15)    Rehab course stable. She had reported possible ADHD, was noted to be restless with poor simple attention and difficult to redirect. Psychiatry consulted, suggested off label trial low dose modafinil 50 mg, which was initiated 12/28 and tolerated well.  Dose was increased to 100mg with notable improvement in attention with functional tasks in therapy. Surveillance dopplers performed which were stable, no evidence propagation bLE DVT (last 12/27). Head CT also performed 12/27, stable/improved but neuro recommends continuing to withhold full dose AC with repeat Head CT in 2 weeks. ascvd is moderate risk and started on atorvastatin 10 mg qhs 12/29.  Repeat CTH for monitoring on 1/10 prior to discharge showed: Impression decreasing size and density to the right posterior temporal parenchymal hemorrhage compared with 12/27/2023.  Patient to follow-up with neurology regarding full dose AC.      Functional Status of 1/9/24 IDT rounds  -SLP: soft bite size; mild language deficits; mod-severe cognitive deficits,  left inattentiveness with max cueing, Impaired attention.   -OT: Eating/grooming setup; CG-Min A for toilet transfers; Min-Mod for shower transfers; CG for dressing  -PT: CS for bed mobility and transfers and 150ft ambulation with RW      All other medical co-morbidities were stable. Patient tolerated course of inpatient PT/OT/SLP rehab with significant improvements and met rehab goals prior to discharge. Discharge instructions were discussed with patient and family. All questions answered and all concerns addressed. Patient was medically cleared for discharge to [facility].     Outpatient Follow-ups:  Emy Prakash  NP in [address], [address]  [place] [address]  Phone: [phone]  Fax: [phone]  Follow Up Time: 2 weeks    Sergio Jurado  [address], [address]  [place] [address]  Phone: [phone]  Fax: [phone]  Follow Up Time: 1 month    Dr. Judith Emery  PM&R  [facility]      Dr. Dominguez Joyner  Opthalmology  [phone] HPI:  This is a 72 YO RIGHT handed woman with no PMH who presented to [facility] ED on 12/14/2023, as a transfer from [facility], as a CODE STROKE, with c/o R IPH.   CTH and CTA repeated - large R IPH (temporal/parietal). Presented to [place] today with c/o HA and AMS. Friend accompanying patient said that when visiting patient today - patient was not acting like herself and c/o HA. NIHSS 9.    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 Similar midline shift of 5 mm.  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 Briviact 50mg BID for seizure prophylaxis, EEG with increased risk of seizures in the right frontocentral region.TTE- EF 64%. Continue Losartan 25mg daily. 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 UA: positive for UTI, started on IV abx and then transitioned to oral, end date 12/22.      Patient was evaluated by PM&R and therapy for functional deficits, gait/ADL impairments and acute rehabilitation was recommended. Patient was medically optimized for discharge to [facility] IRU on 12/20/23. (20 Dec 2023 17:15)    Rehab course stable. She had reported possible ADHD, was noted to be restless with poor simple attention and difficult to redirect. Psychiatry consulted, suggested off label trial low dose modafinil 50 mg, which was initiated 12/28 and tolerated well.  Dose was increased to 100mg with notable improvement in attention with functional tasks in therapy. Monitor for restlessness, anxiety, but tolerating at present    Surveillance dopplers performed which were stable, no evidence propagation bLE DVT (last 12/27). Head CT also performed 12/27, stable/improved but neuro recommends continuing to withhold full dose AC with repeat Head CT in 2 weeks. ascvd is moderate risk and started on atorvastatin 10 mg qhs 12/29.  Repeat CTH for monitoring on 1/10 prior to discharge showed: Impression decreasing size and density to the right posterior temporal parenchymal hemorrhage compared with 12/27/2023.  Patient to follow-up with neurology regarding full dose AC. Also recommend outpatient neuro-opthalmology for visual perceptual deficits as well as opthalmology for macular degeneration.       Functional Status of 1/9/24 IDT rounds  -SLP: soft bite size; mild language deficits; mod-severe cognitive deficits,  left inattentiveness with max cueing, Impaired attention.   -OT: Eating/grooming setup; CG-Min A for toilet transfers; Min-Mod for shower transfers; CG for dressing  -PT: CS for bed mobility and transfers and 150ft ambulation with RW      All other medical co-morbidities were stable. Patient tolerated course of inpatient PT/OT/SLP rehab with significant improvements and met rehab goals prior to discharge. Discharge instructions were discussed with patient and family. All questions answered and all concerns addressed. Patient was medically cleared for discharge to [facility].     Outpatient Follow-ups:  Emy Prakash  NP in [address], [address]  [place] [address]  Phone: [phone]  Fax: [phone]  Follow Up Time: 2 weeks    Sergio Jurado  [address], [address]  [place] [address]  Phone: [phone]  Fax: [phone]  Follow Up Time: 1 month    Dr. Judith Emery  PM&R  [facility]      Dr. Dominguez Joyner  Opthalmology  [phone] HPI:  This is a 72 YO RIGHT handed woman with no PMH who presented to [facility] ED on 12/14/2023, as a transfer from [facility], as a CODE STROKE, with c/o R IPH.   CTH and CTA repeated - large R IPH (temporal/parietal). Presented to [place] today with c/o HA and AMS. Friend accompanying patient said that when visiting patient today - patient was not acting like herself and c/o HA. NIHSS 9.    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 Similar midline shift of 5 mm.  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 Briviact 50mg BID for seizure prophylaxis, EEG with increased risk of seizures in the right frontocentral region.TTE- EF 64%. Continue Losartan 25mg daily. 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 UA: positive for UTI, started on IV abx and then transitioned to oral, end date 12/22.      Patient was evaluated by PM&R and therapy for functional deficits, gait/ADL impairments and acute rehabilitation was recommended. Patient was medically optimized for discharge to [facility] IRU on 12/20/23. (20 Dec 2023 17:15)    Rehab course stable. She had reported possible ADHD, was noted to be restless with poor simple attention and difficult to redirect. Psychiatry consulted, suggested off label trial low dose modafinil 50 mg, which was initiated 12/28 and tolerated well.  Dose was increased to 100mg with notable improvement in attention with functional tasks in therapy. Monitor for restlessness, anxiety, but tolerating at present    Surveillance dopplers performed which were stable, no evidence propagation bLE DVT (last 12/27). Head CT also performed 12/27, stable/improved but neuro recommends continuing to withhold full dose AC with repeat Head CT in 2 weeks. ascvd is moderate risk and started on atorvastatin 10 mg qhs 12/29.  Repeat CTH for monitoring on 1/10 prior to discharge showed: Impression decreasing size and density to the right posterior temporal parenchymal hemorrhage compared with 12/27/2023.  Patient to follow-up with neurology regarding full dose AC. Also recommend outpatient neuro-opthalmology for visual perceptual deficits as well as opthalmology for macular degeneration.       Functional Status of 1/9/24 IDT rounds  -SLP: soft bite size; mild language deficits; mod-severe cognitive deficits,  left inattentiveness with max cueing, Impaired attention.   -OT: Eating/grooming setup; CG-Min A for toilet transfers; Min-Mod for shower transfers; CG for dressing  -PT: CS for bed mobility and transfers and 150ft ambulation with RW      All other medical co-morbidities were stable. Patient tolerated course of inpatient PT/OT/SLP rehab with significant improvements and met rehab goals prior to discharge. Discharge instructions were discussed with patient and family. All questions answered and all concerns addressed. Patient was medically cleared for discharge to [facility].     Outpatient Follow-ups:  Emy Prakash  NP in [address], [address]  [place] [address]  Phone: [phone]  Fax: [phone]  Follow Up Time: 2 weeks    Sergio Jurado  [address], [address]  [place] [address]  Phone: [phone]  Fax: [phone]  Follow Up Time: 1 month    Dr. Judith Emery  PM&R  [facility]      Dr. Dominguez Joyner  Opthalmology  [phone] HPI:  This is a 74 YO RIGHT handed woman with no PMH who presented to [facility] ED on 12/14/2023, as a transfer from [facility], as a CODE STROKE, with c/o R IPH.   CTH and CTA repeated - large R IPH (temporal/parietal). Presented to [place] today with c/o HA and AMS. Friend accompanying patient said that when visiting patient today - patient was not acting like herself and c/o HA. NIHSS 9.    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 Similar midline shift of 5 mm.  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 Briviact 50mg BID for seizure prophylaxis, EEG with increased risk of seizures in the right frontocentral region.TTE- EF 64%. Continue Losartan 25mg daily. 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 UA: positive for UTI, started on IV abx and then transitioned to oral, end date 12/22.      Patient was evaluated by PM&R and therapy for functional deficits, gait/ADL impairments and acute rehabilitation was recommended. Patient was medically optimized for discharge to [facility] IRU on 12/20/23. (20 Dec 2023 17:15)    Rehab course stable. She had reported possible ADHD, was noted to be restless with poor simple attention and difficult to redirect. Psychiatry consulted, suggested off label trial low dose modafinil 50 mg, which was initiated 12/28 and tolerated well.  Dose was increased to 100mg with notable improvement in attention with functional tasks in therapy. Monitor for restlessness, anxiety, but tolerating at present    Surveillance dopplers performed which were stable, no evidence propagation bLE DVT (last 12/27). Head CT also performed 12/27, stable/improved but neuro recommends continuing to withhold full dose AC with repeat Head CT in 2 weeks. ascvd is moderate risk and started on atorvastatin 10 mg qhs 12/29.  Repeat CTH for monitoring on 1/10 prior to discharge showed: Impression decreasing size and density to the right posterior temporal parenchymal hemorrhage compared with 12/27/2023.  Patient to follow-up with neurology regarding full dose AC. Also recommend outpatient neuro-opthalmology for visual perceptual deficits as well as opthalmology for macular degeneration.       Functional Status of 1/9/24 IDT rounds  -SLP: soft bite size; mild language deficits; mod-severe cognitive deficits,  left inattentiveness with max cueing, Impaired attention.   -OT: Eating/grooming setup; CG-Min A for toilet transfers; Min-Mod for shower transfers; CG for dressing  -PT: CS for bed mobility and transfers and 150ft ambulation with RW      All other medical co-morbidities were stable. Patient tolerated course of inpatient PT/OT/SLP rehab with significant improvements and met rehab goals prior to discharge. Discharge instructions were discussed with patient and family. All questions answered and all concerns addressed. Patient was medically cleared for discharge to [facility].     Outpatient Follow-ups:  Emy Prakash  NP in [address], [address]  [place] [address]  Phone: [phone]  Fax: [phone]  Follow Up Time: 2 weeks    Sergio Jurado  [address], [address]  [place] [address]  Phone: [phone]  Fax: [phone]  Follow Up Time: 1 month    Dr. Judith Emery  PM&R  [facility]      Dr. Dominguez Joyner  Opthalmology  [phone]

## 2023-12-28 NOTE — BH CONSULTATION LIAISON PROGRESS NOTE - NSBHFUPINTERVALHXFT_PSY_A_CORE
Patient is a 73 year old femalewith no PMH who presented to [facility] ED on 12/14/2023, as a transfer from [facility], as a CODE STROKE. At [facility] patient was brought in by friend for headaches and AMS; NIHSS 9. CTH and CTA repeated - large R IPH (temporal/parietal). 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 Similar midline shift of 5 mm.  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  Patient was medically optimized for discharge to [facility] IRU on 12/20/23. Psychiatry consulted for severe cognitive impairment and ADHD.     C/L Psychiatry Note:  Chart reviewed and patient evaluated. Patient presents sitting out of bed to wheel chair and eating lunch. The patient is experiencing persistent difficulties with maintaining focus and shifting her attention. She often becomes fixated on a single idea and struggles to redirect her thoughts to other tasks or subjects, indicating challenges with cognitive flexibility and concentration. However, she is in a good mood, denies any anxiety, and reports successful rehab sessions today. Additionally, denies any head aches today or over night, reporting that she slept well. Patient also reports that even with significant concentration issues, she was able to prepare tea for herself. States, "I am trying to concentrate on one thing at a time."         Patient is a 73 year old femalewith no PMH who presented to [facility] ED on 12/14/2023, as a transfer from [facility], as a CODE STROKE. At [facility] patient was brought in by friend for headaches and AMS; NIHSS 9. CTH and CTA repeated - large R IPH (temporal/parietal). 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 Similar midline shift of 5 mm.  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  Patient was medically optimized for discharge to [facility] IRU on 12/20/23. Psychiatry consulted for severe cognitive impairment and ADHD.     C/L Psychiatry Note:  Chart reviewed and patient evaluated. Patient presents sitting out of bed to wheel chair and eating lunch. The patient is experiencing persistent difficulties with maintaining focus and shifting her attention. She often becomes fixated on a single idea and struggles to redirect her thoughts to other tasks or subjects, indicating challenges with cognitive flexibility and concentration. However, she is in a good mood, denies any anxiety, and reports successful rehab sessions today. Additionally, denies any head aches today or over night, reporting that she slept well. Patient also reports that even with significant concentration issues, she was able to prepare tea for herself. States, "I am trying to concentrate on one thing at a time."

## 2023-12-28 NOTE — BH CONSULTATION LIAISON PROGRESS NOTE - NSBHATTESTBILLING_PSY_A_CORE
71777-[id number] OBS or IP - moderate complexity OR 35-49 mins 29065-[id number] OBS or IP - moderate complexity OR 35-49 mins

## 2023-12-28 NOTE — DISCHARGE NOTE PROVIDER - NSDCCPCAREPLAN_GEN_ALL_CORE_FT
PRINCIPAL DISCHARGE DIAGNOSIS  Diagnosis: Intraparenchymal hemorrhage of brain  Assessment and Plan of Treatment: You experienced a brain hemorrhage on the right temporal/parietal part of the brain leading to physical and cognitive deficits.  You have made functional gains while in rehab but should continue with therapies to achieve highest function.  Bleeding in the brain is a risk for seizure so you are currently on anti-seizure medication, brivaracetam.  Follow-up with neurology for assessment of continued need and management of your medication. Follow-up with the other physicians listed in this document.      SECONDARY DISCHARGE DIAGNOSES  Diagnosis: Hypertension  Assessment and Plan of Treatment:     Diagnosis: DVT, lower extremity  Assessment and Plan of Treatment:     Diagnosis: Dysphagia  Assessment and Plan of Treatment:     Diagnosis: Inattention  Assessment and Plan of Treatment:      PRINCIPAL DISCHARGE DIAGNOSIS  Diagnosis: Intraparenchymal hemorrhage of brain  Assessment and Plan of Treatment: You experienced a brain hemorrhage on the right temporal/parietal part of the brain leading to physical and cognitive deficits.  You have made functional gains while in rehab but should continue with therapies to achieve highest function.  Bleeding in the brain is a risk for seizure so you are currently on anti-seizure medication, brivaracetam.  Follow-up with neurology for assessment of continued need and management of your medication. Follow-up with the other physicians listed in this document.      SECONDARY DISCHARGE DIAGNOSES  Diagnosis: Hypertension  Assessment and Plan of Treatment: Your blood pressure has been stable without medication.  Continue to monitor your blood pressure and follow-up with primary care and cardiology.    Diagnosis: DVT, lower extremity  Assessment and Plan of Treatment: You have bilateral DVTs below the knees. You are taking daily lovenox anticoagulation but are not yet cleared by neurology for twice daily lovenox due to your recent brain bleed. On repeat ultrasound the DVT of your left leg is improving and the one on your right leg is stable.    Diagnosis: Dysphagia  Assessment and Plan of Treatment: Your swallowing function has been impaired.  You are safe to continue a soft and bite sized diet and thin liquids.  This diet was chosen to prevent choking and potential food in the airways which can cause pneumonia. Please stick to a soft and bite sized diet unless cleared for regular solids by your medical and speech teams.    Diagnosis: Inattention  Assessment and Plan of Treatment: You were started on a low dose of modafinil which is a stimulant to help with attention...tbc     PRINCIPAL DISCHARGE DIAGNOSIS  Diagnosis: Intraparenchymal hemorrhage of brain  Assessment and Plan of Treatment: You experienced a brain hemorrhage on the right temporal/parietal part of the brain leading to physical and cognitive deficits.  You have made functional gains while in rehab but should continue with therapies to achieve highest function.  Bleeding in the brain is a risk for seizure so you are currently on anti-seizure medication, brivaracetam.  Follow-up with neurology for assessment of continued need and management of your medication. Follow-up with the other physicians listed in this document.      SECONDARY DISCHARGE DIAGNOSES  Diagnosis: Hypertension  Assessment and Plan of Treatment: Your blood pressure has been stable without medication.  Continue to monitor your blood pressure and follow-up with primary care and cardiology.    Diagnosis: DVT, lower extremity  Assessment and Plan of Treatment: You have bilateral DVTs below the knees. You are taking daily lovenox anticoagulation but are not yet cleared by neurology for twice daily lovenox due to your recent brain bleed. On repeat ultrasound the DVT of your left leg is improving and the one on your right leg is stable.  Your latest CT head showedd XXX.  Please follow-up with neurology regarding changes to your blood thinner medications (lovenox).    Diagnosis: Inattention  Assessment and Plan of Treatment: You were started on modafinil which is a stimulant to help with attention.  Please follow up with your PCP for continued management    Diagnosis: Macular degeneration, wet  Assessment and Plan of Treatment: Please follow-up with Dr. Dominguez Joyner (ophthalmology) regarding resuming your Vabysmo.  Continue with eye drops and AREDs-2 vitamins

## 2023-12-28 NOTE — PROGRESS NOTE ADULT - ASSESSMENT
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 Noted to have UTI, and BL Distal DVT    Intraparenchymal Hemorrhage  - Abnormal EEG and Briviact 50mg BID started for seizure ppx  - TTE- EF 64%.    #HTN  -BP was noted to be low, losartan stopped   - Goal BP <130mmhg Average. Good control cont care    #bradycardia  -likely neurogenic as per cardio eval prev  -monitor hr, avoid rate controllers    #UTI  - s/p Vantin 100mg BID   -resolved    #Pain management  - Tylenol PRN  -tramadol PRN    #DVT   - Distal assymptomatic DVT  - continue prophylactic dose

## 2023-12-28 NOTE — CONSULT NOTE ADULT - SUBJECTIVE AND OBJECTIVE BOX
Neurology consult    MOISÉS [id number]    HPI:  This is a 74 YO RIGHT handed woman with no PMH who presented to [facility] ED on 12/14/2023, as a transfer from [facility], as a CODE STROKE, with c/o R IPH.   CTH and CTA repeated - large R IPH (temporal/parietal). Presented to [place] today with c/o HA and AMS. Friend accompanying patient said that when visiting patient today - patient was not acting like herself and c/o HA. NIHSS 9.    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 Similar midline shift of 5 mm.  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 Briviact 50mg BID for seizure prophylaxis, EEG with increased risk of seizures in the right frontocentral region.TTE- EF 64%. Continue Losartan 25mg daily. 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 UA: positive for UTI, started on IV abx and then transitioned to oral, end date 12/22.      Patient was evaluated by PM&R and therapy for functional deficits, gait/ADL impairments and acute rehabilitation was recommended. Patient was medically optimized for discharge to [facility] IRU on 12/20/23. (20 Dec 2023 17:15)          MEDICATIONS    acetaminophen     Tablet .. 650 milliGRAM(s) Oral every 6 hours PRN  aluminum hydroxide/magnesium hydroxide/simethicone Suspension 30 milliLiter(s) Oral every 6 hours PRN  artificial tears (preservative free) Ophthalmic Solution 1 Drop(s) Both EYES every 4 hours PRN  bisacodyl 5 milliGRAM(s) Oral daily  bisacodyl Suppository 10 milliGRAM(s) Rectal daily PRN  brivaracetam 50 milliGRAM(s) Oral two times a day  calcium carbonate    500 mG (Tums) Chewable 1 Tablet(s) Chew three times a day PRN  enoxaparin Injectable 40 milliGRAM(s) SubCutaneous <User Schedule>  famotidine    Tablet 20 milliGRAM(s) Oral two times a day  gabapentin 300 milliGRAM(s) Oral at bedtime  ketorolac 0.5% Ophthalmic Solution 1 Drop(s) Left EYE daily  lidocaine   4% Patch 1 Patch Transdermal <User Schedule>  ondansetron   Disintegrating Tablet 4 milliGRAM(s) Oral every 6 hours PRN  polyethylene glycol 3350 17 Gram(s) Oral two times a day  senna 2 Tablet(s) Oral at bedtime  sodium chloride 0.65% Nasal 1 Spray(s) Both Nostrils two times a day  traMADol 25 milliGRAM(s) Oral every 4 hours PRN         Family history: No history of dementia, strokes, or seizures   FAMILY HISTORY:    SOCIAL HISTORY -- No history of tobacco or alcohol use     Allergies    No Known Allergies    Intolerances    oxycodone (Nausea)          Vital Signs Last 24 Hrs  T(C): 36.8 (27 Dec 2023 19:59), Max: 36.8 (27 Dec 2023 19:59)  T(F): 98.2 (27 Dec 2023 19:59), Max: 98.2 (27 Dec 2023 19:59)  HR: 79 (27 Dec 2023 19:59) (79 - 79)  BP: 108/70 (27 Dec 2023 19:59) (108/70 - 108/70)  BP(mean): --  RR: 17 (27 Dec 2023 19:59) (17 - 17)  SpO2: 97% (27 Dec 2023 19:59) (97% - 97%)    Parameters below as of 27 Dec 2023 19:59  Patient On (Oxygen Delivery Method): room air          REVIEW OF SYSTEMS:    Constitutional: No fever, chills, fatigue, weakness  Eyes: no eye pain, visual disturbances, or discharge  ENT:  No difficulty hearing, tinnitus, vertigo; No sinus or throat pain  Neck: No pain or stiffness  Respiratory: No cough, dyspnea, wheezing   Cardiovascular: No chest pain, palpitations,   Gastrointestinal: No abdominal or epigastric pain. No nausea, vomiting  No diarrhea or constipation.   Genitourinary: No dysuria, frequency, hematuria or incontinence  Neurological: No headaches, lightheadedness, vertigo, numbness or tremors  Psychiatric: No depression, anxiety, mood swings or difficulty sleeping  Musculoskeletal: No joint pain or swelling; No muscle, back or extremity pain  Skin: No itching, burning, rashes or lesions   Lymph Nodes: No enlarged glands  Endocrine: No heat or cold intolerance; No hair loss, No h/o diabetes or thyroid dysfunction  Allergy and Immunologic: No hives or eczema    On Neurological Examination:    Mental Status - Patient is alert, awake, oriented X3. Confused Dementia Lethargic .     Follows commands well and able to answer questions appropriately. Mood and affect  normal  Follow simple commands  Follow complex commands  Does not follow commands    Speech -   Fluent    Dysarthria  Aphasia                              Cranial Nerves - Pupils 3 mm equal and reactive to light,   extraocular eye movements intact.     Motor Exam -   Right upper  Left upper  Right lower  Left lower     With stimuli positive movement of all 4 extremities    Muscle tone - is normal all over. No asymmetry is seen.      Sensory    Bilateral intact to light touch    Gait -  normal  ataxia     GENERAL Exam:     Nontoxic , No Acute Distress   	  HEENT:  normocephalic, atraumatic  		  LUNGS:	Clear bilaterally  No Wheeze  Decreased bilaterally  	  HEART:	 Normal S1S2   No murmur RRR        	  GI/ ABDOMEN:  Soft  Non tender    EXTREMITIES:   No Edema  No Clubbing  No Cyanosis No Edema    MUSCULOSKELETAL: Normal Range of Motion  	   SKIN:      Normal   No Ecchymosis               Complete Blood Count (12.25.23 @ 06:00)    Nucleated RBC: 0 /100 WBCs   WBC Count: 9.25 K/uL   RBC Count: 4.35 M/uL   Hemoglobin: 13.3 g/dL   Hematocrit: 37.9 %   Mean Cell Volume: 87.1 fl   Mean Cell Hemoglobin: 30.6 pg   Mean Cell Hemoglobin Conc: 35.1 gm/dL   Red Cell Distrib Width: 12.9 %   Platelet Count - Automated: 169 K/uL    Comprehensive Metabolic Panel in AM (12.25.23 @ 06:00)    Sodium: 139 mmol/L   Potassium: 3.9 mmol/L   Chloride: 104 mmol/L   Carbon Dioxide: 26 mmol/L   Anion Gap: 9 mmol/L   Blood Urea Nitrogen: 16 mg/dL   Creatinine: 0.58 mg/dL   Glucose: 102 mg/dL   Calcium: 8.6 mg/dL   Protein Total: 5.8 g/dL   Albumin: 2.8 g/dL   Bilirubin Total: 0.8 mg/dL   Alkaline Phosphatase: 58 U/L   Aspartate Aminotransferase (AST/SGOT): 30 U/L   Alanine Aminotransferase (ALT/SGPT): 81 U/L   eGFR: 95: The estimated glomerular filtration rate (eGFR) is calculated using the  2021 CKD-EPI creatinine equation, which does not have a coefficient for  race and is validated in individuals 18 years of age and older (N Engl J  Med 2021; 385:4778-9679). Creatinine-based eGFR may be inaccurate in  various situations including but not limited to extremes of muscle mass,  altered dietary protein intake, or medications that affect renal tubular  creatinine secretion. mL/min/1.73m2      ACC: [id number] EXAM:  CT BRAIN   ORDERED BY: HORACIO LYNN DATE:  12/27/2023          INTERPRETATION:  CLINICAL INDICATION: Follow-up right parietal   parenchymal hemorrhage    5mm axial sections of the brain were obtained from base to vertex,   without the intravenous administration of contrast material. Coronal and   sagittal computer generated reconstructed views are available.    Comparison is made with the prior CT of 12/19/2023.    The right parietal parenchymal hemorrhage is slightly less dense and   slightly smaller measuring 3.0 cm in AP diameter by 4.3 cm transversely   compared with the prior of 4.1 cm in AP diameter by 5.9 cm transversely.   There is similar vasogenic edema. There is also similar mass effect on   the atrium of the right lateral ventricle with similar mild midline shift   to the left. There is resolution of the intraventricular hemorrhage in   the left occipital horn.    IMPRESSION: Decreased size in density to the right parietal parenchymal   hemorrhage since 12/19/2023. Resolution of intraventricular hemorrhage   left occipital horn.    --- End of Report ---            REGIS THOMAS MD; Attending Radiologist  This document has been electronically signed. Dec 27 2023  5:15PM   Neurology consult    MOISÉS [id number]    HPI:  This is a 72 YO RIGHT handed woman with no PMH who presented to [facility] ED on 12/14/2023, as a transfer from [facility], as a CODE STROKE, with c/o R IPH.   CTH and CTA repeated - large R IPH (temporal/parietal). Presented to [place] today with c/o HA and AMS. Friend accompanying patient said that when visiting patient today - patient was not acting like herself and c/o HA. NIHSS 9.    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 Similar midline shift of 5 mm.  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 Briviact 50mg BID for seizure prophylaxis, EEG with increased risk of seizures in the right frontocentral region.TTE- EF 64%. Continue Losartan 25mg daily. 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 UA: positive for UTI, started on IV abx and then transitioned to oral, end date 12/22.      Patient was evaluated by PM&R and therapy for functional deficits, gait/ADL impairments and acute rehabilitation was recommended. Patient was medically optimized for discharge to [facility] IRU on 12/20/23. (20 Dec 2023 17:15)          MEDICATIONS    acetaminophen     Tablet .. 650 milliGRAM(s) Oral every 6 hours PRN  aluminum hydroxide/magnesium hydroxide/simethicone Suspension 30 milliLiter(s) Oral every 6 hours PRN  artificial tears (preservative free) Ophthalmic Solution 1 Drop(s) Both EYES every 4 hours PRN  bisacodyl 5 milliGRAM(s) Oral daily  bisacodyl Suppository 10 milliGRAM(s) Rectal daily PRN  brivaracetam 50 milliGRAM(s) Oral two times a day  calcium carbonate    500 mG (Tums) Chewable 1 Tablet(s) Chew three times a day PRN  enoxaparin Injectable 40 milliGRAM(s) SubCutaneous <User Schedule>  famotidine    Tablet 20 milliGRAM(s) Oral two times a day  gabapentin 300 milliGRAM(s) Oral at bedtime  ketorolac 0.5% Ophthalmic Solution 1 Drop(s) Left EYE daily  lidocaine   4% Patch 1 Patch Transdermal <User Schedule>  ondansetron   Disintegrating Tablet 4 milliGRAM(s) Oral every 6 hours PRN  polyethylene glycol 3350 17 Gram(s) Oral two times a day  senna 2 Tablet(s) Oral at bedtime  sodium chloride 0.65% Nasal 1 Spray(s) Both Nostrils two times a day  traMADol 25 milliGRAM(s) Oral every 4 hours PRN         Family history: No history of dementia, strokes, or seizures   FAMILY HISTORY:    SOCIAL HISTORY -- No history of tobacco or alcohol use     Allergies    No Known Allergies    Intolerances    oxycodone (Nausea)          Vital Signs Last 24 Hrs  T(C): 36.8 (27 Dec 2023 19:59), Max: 36.8 (27 Dec 2023 19:59)  T(F): 98.2 (27 Dec 2023 19:59), Max: 98.2 (27 Dec 2023 19:59)  HR: 79 (27 Dec 2023 19:59) (79 - 79)  BP: 108/70 (27 Dec 2023 19:59) (108/70 - 108/70)  BP(mean): --  RR: 17 (27 Dec 2023 19:59) (17 - 17)  SpO2: 97% (27 Dec 2023 19:59) (97% - 97%)    Parameters below as of 27 Dec 2023 19:59  Patient On (Oxygen Delivery Method): room air          REVIEW OF SYSTEMS:    Constitutional: No fever, chills, fatigue, weakness  Eyes: no eye pain, visual disturbances, or discharge  ENT:  No difficulty hearing, tinnitus, vertigo; No sinus or throat pain  Neck: No pain or stiffness  Respiratory: No cough, dyspnea, wheezing   Cardiovascular: No chest pain, palpitations,   Gastrointestinal: No abdominal or epigastric pain. No nausea, vomiting  No diarrhea or constipation.   Genitourinary: No dysuria, frequency, hematuria or incontinence  Neurological: No headaches, lightheadedness, vertigo, numbness or tremors  Psychiatric: No depression, anxiety, mood swings or difficulty sleeping  Musculoskeletal: No joint pain or swelling; No muscle, back or extremity pain  Skin: No itching, burning, rashes or lesions   Lymph Nodes: No enlarged glands  Endocrine: No heat or cold intolerance; No hair loss, No h/o diabetes or thyroid dysfunction  Allergy and Immunologic: No hives or eczema    On Neurological Examination:    Mental Status - Patient is alert, awake, oriented X3. Confused Dementia Lethargic .     Follows commands well and able to answer questions appropriately. Mood and affect  normal  Follow simple commands  Follow complex commands  Does not follow commands    Speech -   Fluent    Dysarthria  Aphasia                              Cranial Nerves - Pupils 3 mm equal and reactive to light,   extraocular eye movements intact.     Motor Exam -   Right upper  Left upper  Right lower  Left lower     With stimuli positive movement of all 4 extremities    Muscle tone - is normal all over. No asymmetry is seen.      Sensory    Bilateral intact to light touch    Gait -  normal  ataxia     GENERAL Exam:     Nontoxic , No Acute Distress   	  HEENT:  normocephalic, atraumatic  		  LUNGS:	Clear bilaterally  No Wheeze  Decreased bilaterally  	  HEART:	 Normal S1S2   No murmur RRR        	  GI/ ABDOMEN:  Soft  Non tender    EXTREMITIES:   No Edema  No Clubbing  No Cyanosis No Edema    MUSCULOSKELETAL: Normal Range of Motion  	   SKIN:      Normal   No Ecchymosis               Complete Blood Count (12.25.23 @ 06:00)    Nucleated RBC: 0 /100 WBCs   WBC Count: 9.25 K/uL   RBC Count: 4.35 M/uL   Hemoglobin: 13.3 g/dL   Hematocrit: 37.9 %   Mean Cell Volume: 87.1 fl   Mean Cell Hemoglobin: 30.6 pg   Mean Cell Hemoglobin Conc: 35.1 gm/dL   Red Cell Distrib Width: 12.9 %   Platelet Count - Automated: 169 K/uL    Comprehensive Metabolic Panel in AM (12.25.23 @ 06:00)    Sodium: 139 mmol/L   Potassium: 3.9 mmol/L   Chloride: 104 mmol/L   Carbon Dioxide: 26 mmol/L   Anion Gap: 9 mmol/L   Blood Urea Nitrogen: 16 mg/dL   Creatinine: 0.58 mg/dL   Glucose: 102 mg/dL   Calcium: 8.6 mg/dL   Protein Total: 5.8 g/dL   Albumin: 2.8 g/dL   Bilirubin Total: 0.8 mg/dL   Alkaline Phosphatase: 58 U/L   Aspartate Aminotransferase (AST/SGOT): 30 U/L   Alanine Aminotransferase (ALT/SGPT): 81 U/L   eGFR: 95: The estimated glomerular filtration rate (eGFR) is calculated using the  2021 CKD-EPI creatinine equation, which does not have a coefficient for  race and is validated in individuals 18 years of age and older (N Engl J  Med 2021; 385:3084-9734). Creatinine-based eGFR may be inaccurate in  various situations including but not limited to extremes of muscle mass,  altered dietary protein intake, or medications that affect renal tubular  creatinine secretion. mL/min/1.73m2      ACC: [id number] EXAM:  CT BRAIN   ORDERED BY: HORACIO LYNN DATE:  12/27/2023          INTERPRETATION:  CLINICAL INDICATION: Follow-up right parietal   parenchymal hemorrhage    5mm axial sections of the brain were obtained from base to vertex,   without the intravenous administration of contrast material. Coronal and   sagittal computer generated reconstructed views are available.    Comparison is made with the prior CT of 12/19/2023.    The right parietal parenchymal hemorrhage is slightly less dense and   slightly smaller measuring 3.0 cm in AP diameter by 4.3 cm transversely   compared with the prior of 4.1 cm in AP diameter by 5.9 cm transversely.   There is similar vasogenic edema. There is also similar mass effect on   the atrium of the right lateral ventricle with similar mild midline shift   to the left. There is resolution of the intraventricular hemorrhage in   the left occipital horn.    IMPRESSION: Decreased size in density to the right parietal parenchymal   hemorrhage since 12/19/2023. Resolution of intraventricular hemorrhage   left occipital horn.    --- End of Report ---            REGIS THOMAS MD; Attending Radiologist  This document has been electronically signed. Dec 27 2023  5:15PM   Neurology consult    MOISÉS [id number]    HPI:  This is a 74 YO RIGHT handed woman with no PMH who presented to [facility] ED on 12/14/2023, as a transfer from [facility], as a CODE STROKE, with c/o R IPH.   CTH and CTA repeated - large R IPH (temporal/parietal). Presented to [place] today with c/o HA and AMS. Friend accompanying patient said that when visiting patient today - patient was not acting like herself and c/o HA. NIHSS 9.    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 Similar midline shift of 5 mm.  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 Briviact 50mg BID for seizure prophylaxis, EEG with increased risk of seizures in the right frontocentral region.TTE- EF 64%. Continue Losartan 25mg daily. 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 UA: positive for UTI, started on IV abx and then transitioned to oral, end date 12/22.      Patient was evaluated by PM&R and therapy for functional deficits, gait/ADL impairments and acute rehabilitation was recommended. Patient was medically optimized for discharge to [facility] IRU on 12/20/23. (20 Dec 2023 17:15)      MEDICATIONS    acetaminophen     Tablet .. 650 milliGRAM(s) Oral every 6 hours PRN  aluminum hydroxide/magnesium hydroxide/simethicone Suspension 30 milliLiter(s) Oral every 6 hours PRN  artificial tears (preservative free) Ophthalmic Solution 1 Drop(s) Both EYES every 4 hours PRN  bisacodyl 5 milliGRAM(s) Oral daily  bisacodyl Suppository 10 milliGRAM(s) Rectal daily PRN  brivaracetam 50 milliGRAM(s) Oral two times a day  calcium carbonate    500 mG (Tums) Chewable 1 Tablet(s) Chew three times a day PRN  enoxaparin Injectable 40 milliGRAM(s) SubCutaneous <User Schedule>  famotidine    Tablet 20 milliGRAM(s) Oral two times a day  gabapentin 300 milliGRAM(s) Oral at bedtime  ketorolac 0.5% Ophthalmic Solution 1 Drop(s) Left EYE daily  lidocaine   4% Patch 1 Patch Transdermal <User Schedule>  ondansetron   Disintegrating Tablet 4 milliGRAM(s) Oral every 6 hours PRN  polyethylene glycol 3350 17 Gram(s) Oral two times a day  senna 2 Tablet(s) Oral at bedtime  sodium chloride 0.65% Nasal 1 Spray(s) Both Nostrils two times a day  traMADol 25 milliGRAM(s) Oral every 4 hours PRN         Family history: No history of dementia, strokes, or seizures   FAMILY HISTORY:    SOCIAL HISTORY -- No history of tobacco or alcohol use     Allergies    No Known Allergies    Intolerances    oxycodone (Nausea)          Vital Signs Last 24 Hrs  T(C): 36.8 (27 Dec 2023 19:59), Max: 36.8 (27 Dec 2023 19:59)  T(F): 98.2 (27 Dec 2023 19:59), Max: 98.2 (27 Dec 2023 19:59)  HR: 79 (27 Dec 2023 19:59) (79 - 79)  BP: 108/70 (27 Dec 2023 19:59) (108/70 - 108/70)  BP(mean): --  RR: 17 (27 Dec 2023 19:59) (17 - 17)  SpO2: 97% (27 Dec 2023 19:59) (97% - 97%)    Parameters below as of 27 Dec 2023 19:59  Patient On (Oxygen Delivery Method): room air          REVIEW OF SYSTEMS:    Constitutional: No fever, chills, fatigue, weakness  Eyes: no eye pain, visual disturbances, or discharge  ENT:  No difficulty hearing, tinnitus, vertigo; No sinus or throat pain  Neck: No pain or stiffness  Respiratory: No cough, dyspnea, wheezing   Cardiovascular: No chest pain, palpitations,   Gastrointestinal: No abdominal or epigastric pain. No nausea, vomiting  No diarrhea or constipation.   Genitourinary: No dysuria, frequency, hematuria or incontinence  Neurological: No headaches, lightheadedness, vertigo, numbness or tremors  Psychiatric: No depression, anxiety, mood swings or difficulty sleeping  Musculoskeletal: No joint pain or swelling; No muscle, back or extremity pain  Skin: No itching, burning, rashes or lesions   Lymph Nodes: No enlarged glands  Endocrine: No heat or cold intolerance; No hair loss, No h/o diabetes or thyroid dysfunction  Allergy and Immunologic: No hives or eczema    On Neurological Examination:    Mental Status - Patient is alert, awake, oriented X3.     Follows commands well and able to answer questions appropriately. Mood and affect  normal  Speech -   Fluent                             Cranial Nerves - Pupils 3 mm equal and reactive to light, extraocular eye movements intact. Left face weakness. tongue midline.    Motor Exam -   Right upper 5/5  Left upper 4+/5  Right lower 5/5  Left lower 4/5    Muscle tone -mild increase tone on the left side.  DTRs 1+ on the left side.   Sensory    Bilateral intact to light touch    Gait - deferred    Complete Blood Count (12.25.23 @ 06:00)    Nucleated RBC: 0 /100 WBCs   WBC Count: 9.25 K/uL   RBC Count: 4.35 M/uL   Hemoglobin: 13.3 g/dL   Hematocrit: 37.9 %   Mean Cell Volume: 87.1 fl   Mean Cell Hemoglobin: 30.6 pg   Mean Cell Hemoglobin Conc: 35.1 gm/dL   Red Cell Distrib Width: 12.9 %   Platelet Count - Automated: 169 K/uL    Comprehensive Metabolic Panel in AM (12.25.23 @ 06:00)    Sodium: 139 mmol/L   Potassium: 3.9 mmol/L   Chloride: 104 mmol/L   Carbon Dioxide: 26 mmol/L   Anion Gap: 9 mmol/L   Blood Urea Nitrogen: 16 mg/dL   Creatinine: 0.58 mg/dL   Glucose: 102 mg/dL   Calcium: 8.6 mg/dL   Protein Total: 5.8 g/dL   Albumin: 2.8 g/dL   Bilirubin Total: 0.8 mg/dL   Alkaline Phosphatase: 58 U/L   Aspartate Aminotransferase (AST/SGOT): 30 U/L   Alanine Aminotransferase (ALT/SGPT): 81 U/L   eGFR: 95: The estimated glomerular filtration rate (eGFR) is calculated using the  2021 CKD-EPI creatinine equation, which does not have a coefficient for  race and is validated in individuals 18 years of age and older (N Engl J  Med 2021; 385:5549-3582). Creatinine-based eGFR may be inaccurate in  various situations including but not limited to extremes of muscle mass,  altered dietary protein intake, or medications that affect renal tubular  creatinine secretion. mL/min/1.73m2      ACC: [id number] EXAM:  CT BRAIN   ORDERED BY: HORACIO CA     PROCEDURE DATE:  12/27/2023          INTERPRETATION:  CLINICAL INDICATION: Follow-up right parietal   parenchymal hemorrhage    5mm axial sections of the brain were obtained from base to vertex,   without the intravenous administration of contrast material. Coronal and   sagittal computer generated reconstructed views are available.    Comparison is made with the prior CT of 12/19/2023.    The right parietal parenchymal hemorrhage is slightly less dense and   slightly smaller measuring 3.0 cm in AP diameter by 4.3 cm transversely   compared with the prior of 4.1 cm in AP diameter by 5.9 cm transversely.   There is similar vasogenic edema. There is also similar mass effect on   the atrium of the right lateral ventricle with similar mild midline shift   to the left. There is resolution of the intraventricular hemorrhage in   the left occipital horn.    IMPRESSION: Decreased size in density to the right parietal parenchymal   hemorrhage since 12/19/2023. Resolution of intraventricular hemorrhage   left occipital horn.    --- End of Report ---      ACC: [id number] EXAM:  US DPLX LWR EXT VEINS COMPL BI   ORDERED BY: HORACIO CA     PROCEDURE DATE:  12/27/2023          INTERPRETATION:  CLINICAL INFORMATION: Bilateral calf DVTs, follow-up.    COMPARISON: 12/20/2023.    TECHNIQUE: Duplex sonographyof the BILATERAL LOWER extremity veins with   color and spectral Doppler, with and without compression.    FINDINGS:    RIGHT:  Normal compressibility of the RIGHT common femoral, femoral and popliteal   veins.  Doppler examination shows normal spontaneous and phasic flow.  Persistent thrombus in right soleal vein. Peroneal and posterior tibial   veins are patent.    LEFT:  Normal compressibility of the LEFT common femoral, femoral and popliteal   veins.  Doppler examination shows normal spontaneous and phasic flow.  Persistent thrombosis in peroneal and soleal veins. Gastrocnemius vein is   patent at this time. Posterior tibial veins are patent.    IMPRESSION:    No new deep venous thrombosis in bilateral lower extremities.  Persistent belowthe knee DVT in bilateral lower extremities, improved on   the left.    --- End of Report ---            RONDA SANCHEZ MD; Attending Radiologist  This document has been electronically signed. Dec 27 2023  9:14PM     Neurology consult    MOISÉS [id number]    HPI:  This is a 72 YO RIGHT handed woman with no PMH who presented to [facility] ED on 12/14/2023, as a transfer from [facility], as a CODE STROKE, with c/o R IPH.   CTH and CTA repeated - large R IPH (temporal/parietal). Presented to [place] today with c/o HA and AMS. Friend accompanying patient said that when visiting patient today - patient was not acting like herself and c/o HA. NIHSS 9.    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 Similar midline shift of 5 mm.  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 Briviact 50mg BID for seizure prophylaxis, EEG with increased risk of seizures in the right frontocentral region.TTE- EF 64%. Continue Losartan 25mg daily. 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 UA: positive for UTI, started on IV abx and then transitioned to oral, end date 12/22.      Patient was evaluated by PM&R and therapy for functional deficits, gait/ADL impairments and acute rehabilitation was recommended. Patient was medically optimized for discharge to [facility] IRU on 12/20/23. (20 Dec 2023 17:15)      MEDICATIONS    acetaminophen     Tablet .. 650 milliGRAM(s) Oral every 6 hours PRN  aluminum hydroxide/magnesium hydroxide/simethicone Suspension 30 milliLiter(s) Oral every 6 hours PRN  artificial tears (preservative free) Ophthalmic Solution 1 Drop(s) Both EYES every 4 hours PRN  bisacodyl 5 milliGRAM(s) Oral daily  bisacodyl Suppository 10 milliGRAM(s) Rectal daily PRN  brivaracetam 50 milliGRAM(s) Oral two times a day  calcium carbonate    500 mG (Tums) Chewable 1 Tablet(s) Chew three times a day PRN  enoxaparin Injectable 40 milliGRAM(s) SubCutaneous <User Schedule>  famotidine    Tablet 20 milliGRAM(s) Oral two times a day  gabapentin 300 milliGRAM(s) Oral at bedtime  ketorolac 0.5% Ophthalmic Solution 1 Drop(s) Left EYE daily  lidocaine   4% Patch 1 Patch Transdermal <User Schedule>  ondansetron   Disintegrating Tablet 4 milliGRAM(s) Oral every 6 hours PRN  polyethylene glycol 3350 17 Gram(s) Oral two times a day  senna 2 Tablet(s) Oral at bedtime  sodium chloride 0.65% Nasal 1 Spray(s) Both Nostrils two times a day  traMADol 25 milliGRAM(s) Oral every 4 hours PRN         Family history: No history of dementia, strokes, or seizures   FAMILY HISTORY:    SOCIAL HISTORY -- No history of tobacco or alcohol use     Allergies    No Known Allergies    Intolerances    oxycodone (Nausea)          Vital Signs Last 24 Hrs  T(C): 36.8 (27 Dec 2023 19:59), Max: 36.8 (27 Dec 2023 19:59)  T(F): 98.2 (27 Dec 2023 19:59), Max: 98.2 (27 Dec 2023 19:59)  HR: 79 (27 Dec 2023 19:59) (79 - 79)  BP: 108/70 (27 Dec 2023 19:59) (108/70 - 108/70)  BP(mean): --  RR: 17 (27 Dec 2023 19:59) (17 - 17)  SpO2: 97% (27 Dec 2023 19:59) (97% - 97%)    Parameters below as of 27 Dec 2023 19:59  Patient On (Oxygen Delivery Method): room air          REVIEW OF SYSTEMS:    Constitutional: No fever, chills, fatigue, weakness  Eyes: no eye pain, visual disturbances, or discharge  ENT:  No difficulty hearing, tinnitus, vertigo; No sinus or throat pain  Neck: No pain or stiffness  Respiratory: No cough, dyspnea, wheezing   Cardiovascular: No chest pain, palpitations,   Gastrointestinal: No abdominal or epigastric pain. No nausea, vomiting  No diarrhea or constipation.   Genitourinary: No dysuria, frequency, hematuria or incontinence  Neurological: No headaches, lightheadedness, vertigo, numbness or tremors  Psychiatric: No depression, anxiety, mood swings or difficulty sleeping  Musculoskeletal: No joint pain or swelling; No muscle, back or extremity pain  Skin: No itching, burning, rashes or lesions   Lymph Nodes: No enlarged glands  Endocrine: No heat or cold intolerance; No hair loss, No h/o diabetes or thyroid dysfunction  Allergy and Immunologic: No hives or eczema    On Neurological Examination:    Mental Status - Patient is alert, awake, oriented X3.     Follows commands well and able to answer questions appropriately. Mood and affect  normal  Speech -   Fluent                             Cranial Nerves - Pupils 3 mm equal and reactive to light, extraocular eye movements intact. Left face weakness. tongue midline.    Motor Exam -   Right upper 5/5  Left upper 4+/5  Right lower 5/5  Left lower 4/5    Muscle tone -mild increase tone on the left side.  DTRs 1+ on the left side.   Sensory    Bilateral intact to light touch    Gait - deferred    Complete Blood Count (12.25.23 @ 06:00)    Nucleated RBC: 0 /100 WBCs   WBC Count: 9.25 K/uL   RBC Count: 4.35 M/uL   Hemoglobin: 13.3 g/dL   Hematocrit: 37.9 %   Mean Cell Volume: 87.1 fl   Mean Cell Hemoglobin: 30.6 pg   Mean Cell Hemoglobin Conc: 35.1 gm/dL   Red Cell Distrib Width: 12.9 %   Platelet Count - Automated: 169 K/uL    Comprehensive Metabolic Panel in AM (12.25.23 @ 06:00)    Sodium: 139 mmol/L   Potassium: 3.9 mmol/L   Chloride: 104 mmol/L   Carbon Dioxide: 26 mmol/L   Anion Gap: 9 mmol/L   Blood Urea Nitrogen: 16 mg/dL   Creatinine: 0.58 mg/dL   Glucose: 102 mg/dL   Calcium: 8.6 mg/dL   Protein Total: 5.8 g/dL   Albumin: 2.8 g/dL   Bilirubin Total: 0.8 mg/dL   Alkaline Phosphatase: 58 U/L   Aspartate Aminotransferase (AST/SGOT): 30 U/L   Alanine Aminotransferase (ALT/SGPT): 81 U/L   eGFR: 95: The estimated glomerular filtration rate (eGFR) is calculated using the  2021 CKD-EPI creatinine equation, which does not have a coefficient for  race and is validated in individuals 18 years of age and older (N Engl J  Med 2021; 385:4739-3065). Creatinine-based eGFR may be inaccurate in  various situations including but not limited to extremes of muscle mass,  altered dietary protein intake, or medications that affect renal tubular  creatinine secretion. mL/min/1.73m2      ACC: [id number] EXAM:  CT BRAIN   ORDERED BY: HORACIO CA     PROCEDURE DATE:  12/27/2023          INTERPRETATION:  CLINICAL INDICATION: Follow-up right parietal   parenchymal hemorrhage    5mm axial sections of the brain were obtained from base to vertex,   without the intravenous administration of contrast material. Coronal and   sagittal computer generated reconstructed views are available.    Comparison is made with the prior CT of 12/19/2023.    The right parietal parenchymal hemorrhage is slightly less dense and   slightly smaller measuring 3.0 cm in AP diameter by 4.3 cm transversely   compared with the prior of 4.1 cm in AP diameter by 5.9 cm transversely.   There is similar vasogenic edema. There is also similar mass effect on   the atrium of the right lateral ventricle with similar mild midline shift   to the left. There is resolution of the intraventricular hemorrhage in   the left occipital horn.    IMPRESSION: Decreased size in density to the right parietal parenchymal   hemorrhage since 12/19/2023. Resolution of intraventricular hemorrhage   left occipital horn.    --- End of Report ---      ACC: [id number] EXAM:  US DPLX LWR EXT VEINS COMPL BI   ORDERED BY: HORACIO CA     PROCEDURE DATE:  12/27/2023          INTERPRETATION:  CLINICAL INFORMATION: Bilateral calf DVTs, follow-up.    COMPARISON: 12/20/2023.    TECHNIQUE: Duplex sonographyof the BILATERAL LOWER extremity veins with   color and spectral Doppler, with and without compression.    FINDINGS:    RIGHT:  Normal compressibility of the RIGHT common femoral, femoral and popliteal   veins.  Doppler examination shows normal spontaneous and phasic flow.  Persistent thrombus in right soleal vein. Peroneal and posterior tibial   veins are patent.    LEFT:  Normal compressibility of the LEFT common femoral, femoral and popliteal   veins.  Doppler examination shows normal spontaneous and phasic flow.  Persistent thrombosis in peroneal and soleal veins. Gastrocnemius vein is   patent at this time. Posterior tibial veins are patent.    IMPRESSION:    No new deep venous thrombosis in bilateral lower extremities.  Persistent belowthe knee DVT in bilateral lower extremities, improved on   the left.    --- End of Report ---            RONDA SANCHEZ MD; Attending Radiologist  This document has been electronically signed. Dec 27 2023  9:14PM

## 2023-12-28 NOTE — CONSULT NOTE ADULT - TIME BILLING
reviewed documents, labs, and imagings. face to face with patient, discussion diagnosis, prognosis, and treatment plan, and documentation.

## 2023-12-28 NOTE — PROGRESS NOTE ADULT - ASSESSMENT
74 YO RIGHT handed woman with no PMH who presented to [facility] ED on 12/14/2023, as a transfer from [facility] [place] with R IPH. Hospital course also significant for UTI, b/l DVTs, EEG with increased seizure risks.    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  - EEG with increased risk of seizures in the right frontocentral region  - Briviact 50mg BID for seizure prophylaxis.  - Cont Comprehensive Rehab Program: PT/OT/ST, 3hours daily and 5 days weekly    # self-reported history of ADHD and restless, distracted, poor attention  - xanax Rx in I stop  - psychiatry consult, recommendations appreciated (atomoxetine vs modafinil at low dose)  - Trial modafinil 50mg AM   - psychology and recreation therapy (former )    # HTN  -  Losartan 25mg daily--> discontinued due to soft BP 12/27  - BP  (95/56 - 106/63) 12/27. Meds adjusted as above, hospitalist appreciated    # UTI  - s/p Vantin 100mg BID     # Pain management  - Tylenol PRN  - tramadol reduced to 25 q4 PRN severe pain, dc for moderate pain 12/26. Continue to wean as tolerated  - Lidocaine patch timing changed to bedtime per request. States she uses Salon Pas on left neck/shoulder at home.     # DVT   - b/l below the knee DVTs: right soleal vein, left soleal, peroneal and gastrocnemius veins.  - Lovenox 40 mg daily. Not cleared for full dose AC due to large ICH  - Doppler 12/20: Positive deep venous thrombosis below the right knee within the right soleal vein. Positive deep venous thrombosis below the left knee within the left soleal, peroneal and gastrocnemius veins.  - Doppler 12/27 surveillance - DVTS known - stable right improved on left.  - Repeat Head CT 12/27 improved followed by neuro consult re: possible full dose AC (day 14 post bleed)  - Neuro consult pending    # GI ppx  - Pepcid 20mg   - TUMS, Maalox  - Senna, miralax PRN    # Dysphagia   - Diet: Soft and Bite sized with thin liquids    # Case discussed in IDT rounds 12/26 (initial)  - ambulates 20 feet with RW and mod assist and WC follow with max cues, mod-max assist transfers, follows occasional single step commands with poor attention and max cues, tolerating soflt-bite sized and thin liquids, severe attention deficits, poor insight, supervision eating/hygiene, total assist tub transfers, max assist toileting  - goals: mod-max assist transfer due to need for cues and poor attention, mod assist baDLs  - target: dc SIVAN 1/3/23 for additional OT PT SLP (patient lives alone in apt with stairs)    To inform Pauline's HCP Isabella () about the CT scan once completed    # LABS:   psychiatry consult re: ADHD  doppler 12/27  Head CT 12/27  CBC CMP 12/28    #GOC  CODE STATUS: FULL CODE    Outpatient Follow-up (Specialty/Name of physician):    Emy Prakash  NP in [address], [address]  [place] [address]  Phone: [phone]  Fax: [phone]  Follow Up Time: 2 weeks    Sergio Jurado  Cardiology  [address] [place] [address], [address]  [place] [address]  Phone: [phone]  Fax: [phone]  Follow Up Time: 1 month       74 YO RIGHT handed woman with no PMH who presented to [facility] ED on 12/14/2023, as a transfer from [facility] with R IPH. Hospital course also significant for UTI, b/l DVTs, EEG with increased seizure risks.    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  - EEG with increased risk of seizures in the right frontocentral region  - Briviact 50mg BID for seizure prophylaxis.  - Cont Comprehensive Rehab Program: PT/OT/ST, 3hours daily and 5 days weekly    # self-reported history of ADHD and restless, distracted, poor attention  - xanax Rx in I stop  - psychiatry consult, recommendations appreciated (atomoxetine vs modafinil at low dose)  - Trial modafinil 50mg AM   - psychology and recreation therapy (former )    # HTN  -  Losartan 25mg daily--> discontinued due to soft BP 12/27  - BP  (95/56 - 106/63) 12/27. Meds adjusted as above, hospitalist appreciated    # UTI  - s/p Vantin 100mg BID     # Pain management  - Tylenol PRN  - tramadol reduced to 25 q4 PRN severe pain, dc for moderate pain 12/26. Continue to wean as tolerated  - Lidocaine patch timing changed to bedtime per request. States she uses Salon Pas on left neck/shoulder at home.     # DVT   - b/l below the knee DVTs: right soleal vein, left soleal, peroneal and gastrocnemius veins.  - Lovenox 40 mg daily. Not cleared for full dose AC due to large ICH  - Doppler 12/20: Positive deep venous thrombosis below the right knee within the right soleal vein. Positive deep venous thrombosis below the left knee within the left soleal, peroneal and gastrocnemius veins.  - Doppler 12/27 surveillance - DVTS known - stable right improved on left.  - Repeat Head CT 12/27 improved followed by neuro consult re: possible full dose AC (day 14 post bleed)  - Neuro consult pending    # GI ppx  - Pepcid 20mg   - TUMS, Maalox  - Senna, miralax PRN    # Dysphagia   - Diet: Soft and Bite sized with thin liquids    # Case discussed in IDT rounds 12/26 (initial)  - ambulates 20 feet with RW and mod assist and WC follow with max cues, mod-max assist transfers, follows occasional single step commands with poor attention and max cues, tolerating soflt-bite sized and thin liquids, severe attention deficits, poor insight, supervision eating/hygiene, total assist tub transfers, max assist toileting  - goals: mod-max assist transfer due to need for cues and poor attention, mod assist baDLs  - target: dc SIVAN 1/3/23 for additional OT PT SLP (patient lives alone in apt with stairs)    To inform Pauline's HCP Isabella () about the CT scan once completed    # LABS:   psychiatry consult re: ADHD  doppler 12/27  Head CT 12/27  CBC CMP 12/28    #GOC  CODE STATUS: FULL CODE    Outpatient Follow-up (Specialty/Name of physician):    Emy Prakash  NP in [address], [address]  [place] [address]  Phone: [phone]  Fax: [phone]  Follow Up Time: 2 weeks    Sergio Jurado  Cardiology  [address] [place] [address], [address]  [place] [address]  Phone: [phone]  Fax: [phone]  Follow Up Time: 1 month       74 YO RIGHT handed woman with no PMH who presented to [facility] ED on 12/14/2023, as a transfer from [facility] with R IPH. Hospital course also significant for UTI, b/l DVTs, EEG with increased seizure risks.    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  - Head CT 12/27: The right parietal parenchymal hemorrhage is slightly less dense and slightly smaller measuring 3.0 cm in AP diameter by 4.3 cm transversely compared with the prior of 4.1 cm in AP diameter by 5.9 cm transversely. There is similar vasogenic edema. There is also similar mass effect on the atrium of the right lateral ventricle with similar mild midline shift to the left. There is resolution of the intraventricular hemorrhage in the left occipital horn.  - EEG with increased risk of seizures in the right frontocentral region  - Briviact 50mg BID for seizure prophylaxis.  - Cont Comprehensive Rehab Program: PT/OT/ST, 3hours daily and 5 days weekly    # self-reported history of ADHD and restless, distracted, poor attention  - xanax Rx in I stop  - psychiatry consult, recommendations appreciated (atomoxetine vs modafinil at low dose)  - Trial modafinil 50mg AM   - psychology and recreation therapy (former )    # HTN  -  Losartan discontinued due to soft BP 12/27  - BP   (100/65 - 108/70) 12/28    # UTI  - s/p Vantin 100mg BID     # Pain management  - Tylenol PRN  - tramadol reduced to 25 q4 PRN severe pain, dc for moderate pain 12/26. Continue to wean as tolerated  - Lidocaine patch timing changed to bedtime per request. States she uses Salon Pas on left neck/shoulder at home.     # DVT   - b/l below the knee DVTs: right soleal vein, left soleal, peroneal and gastrocnemius veins.  - Lovenox 40 mg daily. Not cleared for full dose AC due to large ICH  - Doppler 12/20: Positive deep venous thrombosis below the right knee within the right soleal vein. Positive deep venous thrombosis below the left knee within the left soleal, peroneal and gastrocnemius veins.  - Doppler 12/27 surveillance - DVTS known - stable right improved on left.  - Repeat Head CT 12/27 improved followed by neuro consult re: possible full dose AC (day 14 post bleed)  - Neuro consult appreciated 12/28. Do not recommend full dose of AC at this time.Repeat HEad CT ~ 2 weeks    # GI ppx  - Pepcid 20mg   - TUMS, Maalox  - Senna, miralax PRN    # Dysphagia   - Diet: Soft and Bite sized with thin liquids    # Case discussed in IDT rounds 12/26 (initial)  - ambulates 20 feet with RW and mod assist and WC follow with max cues, mod-max assist transfers, follows occasional single step commands with poor attention and max cues, tolerating soflt-bite sized and thin liquids, severe attention deficits, poor insight, supervision eating/hygiene, total assist tub transfers, max assist toileting  - goals: mod-max assist transfer due to need for cues and poor attention, mod assist baDLs  - target: dc SIVAN 1/3/23 for additional OT PT SLP (patient lives alone in apt with stairs)    To inform Pauline's HCP Isabella () about the CT scan once completed    # LABS:   CBC CMP 12/28: reviewed, stable    #GOC  CODE STATUS: FULL CODE    Outpatient Follow-up (Specialty/Name of physician):    Emy Prakash  NP in [address], [address]  [place] [address]  Phone: [phone]  Fax: [phone]  Follow Up Time: 2 weeks    Sergio Jurado  [address], [address]  [place] [address]  Phone: [phone]  Fax: [phone]  Follow Up Time: 1 month       72 YO RIGHT handed woman with no PMH who presented to [facility] ED on 12/14/2023, as a transfer from [facility] with R IPH. Hospital course also significant for UTI, b/l DVTs, EEG with increased seizure risks.    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  - Head CT 12/27: The right parietal parenchymal hemorrhage is slightly less dense and slightly smaller measuring 3.0 cm in AP diameter by 4.3 cm transversely compared with the prior of 4.1 cm in AP diameter by 5.9 cm transversely. There is similar vasogenic edema. There is also similar mass effect on the atrium of the right lateral ventricle with similar mild midline shift to the left. There is resolution of the intraventricular hemorrhage in the left occipital horn.  - EEG with increased risk of seizures in the right frontocentral region  - Briviact 50mg BID for seizure prophylaxis.  - Cont Comprehensive Rehab Program: PT/OT/ST, 3hours daily and 5 days weekly    # self-reported history of ADHD and restless, distracted, poor attention  - xanax Rx in I stop  - psychiatry consult, recommendations appreciated (atomoxetine vs modafinil at low dose)  - Trial modafinil 50mg AM   - psychology and recreation therapy (former )    # HTN  -  Losartan discontinued due to soft BP 12/27  - BP   (100/65 - 108/70) 12/28    # UTI  - s/p Vantin 100mg BID     # Pain management  - Tylenol PRN  - tramadol reduced to 25 q4 PRN severe pain, dc for moderate pain 12/26. Continue to wean as tolerated  - Lidocaine patch timing changed to bedtime per request. States she uses Salon Pas on left neck/shoulder at home.     # DVT   - b/l below the knee DVTs: right soleal vein, left soleal, peroneal and gastrocnemius veins.  - Lovenox 40 mg daily. Not cleared for full dose AC due to large ICH  - Doppler 12/20: Positive deep venous thrombosis below the right knee within the right soleal vein. Positive deep venous thrombosis below the left knee within the left soleal, peroneal and gastrocnemius veins.  - Doppler 12/27 surveillance - DVTS known - stable right improved on left.  - Repeat Head CT 12/27 improved followed by neuro consult re: possible full dose AC (day 14 post bleed)  - Neuro consult appreciated 12/28. Do not recommend full dose of AC at this time.Repeat HEad CT ~ 2 weeks    # GI ppx  - Pepcid 20mg   - TUMS, Maalox  - Senna, miralax PRN    # Dysphagia   - Diet: Soft and Bite sized with thin liquids    # Case discussed in IDT rounds 12/26 (initial)  - ambulates 20 feet with RW and mod assist and WC follow with max cues, mod-max assist transfers, follows occasional single step commands with poor attention and max cues, tolerating soflt-bite sized and thin liquids, severe attention deficits, poor insight, supervision eating/hygiene, total assist tub transfers, max assist toileting  - goals: mod-max assist transfer due to need for cues and poor attention, mod assist baDLs  - target: dc SIVAN 1/3/23 for additional OT PT SLP (patient lives alone in apt with stairs)    To inform Pauline's HCP Isabella () about the CT scan once completed    # LABS:   CBC CMP 12/28: reviewed, stable    #GOC  CODE STATUS: FULL CODE    Outpatient Follow-up (Specialty/Name of physician):    Emy Prakash  NP in [address], [address]  [place] [address]  Phone: [phone]  Fax: [phone]  Follow Up Time: 2 weeks    Sergio Jurado  [address], [address]  [place] [address]  Phone: [phone]  Fax: [phone]  Follow Up Time: 1 month

## 2023-12-29 PROCEDURE — 99232 SBSQ HOSP IP/OBS MODERATE 35: CPT

## 2023-12-29 RX ORDER — ATORVASTATIN CALCIUM 80 MG/1
1 TABLET, FILM COATED ORAL
Qty: 0 | Refills: 0 | DISCHARGE
Start: 2023-12-29

## 2023-12-29 RX ORDER — SENNA PLUS 8.6 MG/1
2 TABLET ORAL
Qty: 0 | Refills: 0 | DISCHARGE
Start: 2023-12-29

## 2023-12-29 RX ORDER — PSYLLIUM SEED (WITH DEXTROSE)
1 POWDER (GRAM) ORAL DAILY
Refills: 0 | Status: DISCONTINUED | OUTPATIENT
Start: 2023-12-29 | End: 2024-01-11

## 2023-12-29 RX ORDER — CALCIUM CARBONATE 500(1250)
1 TABLET ORAL
Qty: 0 | Refills: 0 | DISCHARGE
Start: 2023-12-29

## 2023-12-29 RX ORDER — GABAPENTIN 400 MG/1
1 CAPSULE ORAL
Qty: 0 | Refills: 0 | DISCHARGE
Start: 2023-12-29

## 2023-12-29 RX ORDER — KETOROLAC TROMETHAMINE 0.5 %
1 DROPS OPHTHALMIC (EYE)
Qty: 0 | Refills: 0 | DISCHARGE
Start: 2023-12-29

## 2023-12-29 RX ORDER — ATORVASTATIN CALCIUM 80 MG/1
10 TABLET, FILM COATED ORAL AT BEDTIME
Refills: 0 | Status: DISCONTINUED | OUTPATIENT
Start: 2023-12-29 | End: 2024-01-11

## 2023-12-29 RX ORDER — POLYETHYLENE GLYCOL 3350 17 G/17G
17 POWDER, FOR SOLUTION ORAL
Qty: 0 | Refills: 0 | DISCHARGE
Start: 2023-12-29

## 2023-12-29 RX ORDER — TRAMADOL HYDROCHLORIDE 50 MG/1
0.5 TABLET ORAL
Qty: 0 | Refills: 0 | DISCHARGE
Start: 2023-12-29

## 2023-12-29 RX ORDER — CEFPODOXIME PROXETIL 100 MG
1 TABLET ORAL
Qty: 0 | Refills: 0 | DISCHARGE

## 2023-12-29 RX ORDER — SODIUM CHLORIDE 0.65 %
1 AEROSOL, SPRAY (ML) NASAL
Qty: 0 | Refills: 0 | DISCHARGE
Start: 2023-12-29

## 2023-12-29 RX ORDER — MODAFINIL 200 MG/1
0.5 TABLET ORAL
Qty: 15 | Refills: 0
Start: 2023-12-29 | End: 2024-01-27

## 2023-12-29 RX ORDER — LIDOCAINE 4 G/100G
1 CREAM TOPICAL
Qty: 0 | Refills: 0 | DISCHARGE
Start: 2023-12-29

## 2023-12-29 RX ORDER — FAMOTIDINE 10 MG/ML
1 INJECTION INTRAVENOUS
Qty: 0 | Refills: 0 | DISCHARGE
Start: 2023-12-29

## 2023-12-29 RX ORDER — ACETAMINOPHEN 500 MG
2 TABLET ORAL
Qty: 0 | Refills: 0 | DISCHARGE
Start: 2023-12-29

## 2023-12-29 RX ORDER — PSYLLIUM SEED (WITH DEXTROSE)
1 POWDER (GRAM) ORAL
Qty: 0 | Refills: 0 | DISCHARGE
Start: 2023-12-29

## 2023-12-29 RX ORDER — ONDANSETRON 8 MG/1
1 TABLET, FILM COATED ORAL
Qty: 0 | Refills: 0 | DISCHARGE
Start: 2023-12-29

## 2023-12-29 RX ORDER — BRIVARACETAM 25 MG/1
1 TABLET, FILM COATED ORAL
Qty: 0 | Refills: 0 | DISCHARGE
Start: 2023-12-29

## 2023-12-29 RX ADMIN — Medication 650 MILLIGRAM(S): at 16:00

## 2023-12-29 RX ADMIN — LIDOCAINE 1 PATCH: 4 CREAM TOPICAL at 08:40

## 2023-12-29 RX ADMIN — GABAPENTIN 300 MILLIGRAM(S): 400 CAPSULE ORAL at 21:13

## 2023-12-29 RX ADMIN — Medication 1 PACKET(S): at 12:03

## 2023-12-29 RX ADMIN — LIDOCAINE 1 PATCH: 4 CREAM TOPICAL at 19:31

## 2023-12-29 RX ADMIN — MODAFINIL 50 MILLIGRAM(S): 200 TABLET ORAL at 05:20

## 2023-12-29 RX ADMIN — BRIVARACETAM 50 MILLIGRAM(S): 25 TABLET, FILM COATED ORAL at 05:17

## 2023-12-29 RX ADMIN — Medication 650 MILLIGRAM(S): at 15:27

## 2023-12-29 RX ADMIN — ENOXAPARIN SODIUM 40 MILLIGRAM(S): 100 INJECTION SUBCUTANEOUS at 17:10

## 2023-12-29 RX ADMIN — Medication 1 DROP(S): at 12:02

## 2023-12-29 RX ADMIN — ATORVASTATIN CALCIUM 10 MILLIGRAM(S): 80 TABLET, FILM COATED ORAL at 21:13

## 2023-12-29 RX ADMIN — FAMOTIDINE 20 MILLIGRAM(S): 10 INJECTION INTRAVENOUS at 17:09

## 2023-12-29 RX ADMIN — POLYETHYLENE GLYCOL 3350 17 GRAM(S): 17 POWDER, FOR SOLUTION ORAL at 05:17

## 2023-12-29 RX ADMIN — LIDOCAINE 1 PATCH: 4 CREAM TOPICAL at 20:02

## 2023-12-29 RX ADMIN — BRIVARACETAM 50 MILLIGRAM(S): 25 TABLET, FILM COATED ORAL at 17:09

## 2023-12-29 RX ADMIN — TRAMADOL HYDROCHLORIDE 25 MILLIGRAM(S): 50 TABLET ORAL at 22:45

## 2023-12-29 RX ADMIN — Medication 5 MILLIGRAM(S): at 12:03

## 2023-12-29 RX ADMIN — POLYETHYLENE GLYCOL 3350 17 GRAM(S): 17 POWDER, FOR SOLUTION ORAL at 17:10

## 2023-12-29 RX ADMIN — Medication 1 SPRAY(S): at 17:10

## 2023-12-29 RX ADMIN — TRAMADOL HYDROCHLORIDE 25 MILLIGRAM(S): 50 TABLET ORAL at 23:45

## 2023-12-29 RX ADMIN — FAMOTIDINE 20 MILLIGRAM(S): 10 INJECTION INTRAVENOUS at 05:17

## 2023-12-29 RX ADMIN — SENNA PLUS 2 TABLET(S): 8.6 TABLET ORAL at 21:14

## 2023-12-29 NOTE — BH CONSULTATION LIAISON PROGRESS NOTE - ATTENDING COMMENTS
Agree with plan of care, case discussed with psychiatric NP and attending of record Dr. Servin as well as with review of speech and language assessments.   While patient does have some preserved language skills and an engaging personality, problem solving remains a domain that is affected by her neurological insult.   Plan would be to seek substitutive judgment regarding dispositional  capacity ( of note pt in agreement with SIVAN placement). Continue to monitor response to Modafinil.

## 2023-12-29 NOTE — BH CONSULTATION LIAISON PROGRESS NOTE - CURRENT MEDICATION
MEDICATIONS  (STANDING):  atorvastatin 10 milliGRAM(s) Oral at bedtime  bisacodyl 5 milliGRAM(s) Oral daily  brivaracetam 50 milliGRAM(s) Oral two times a day  enoxaparin Injectable 40 milliGRAM(s) SubCutaneous <User Schedule>  famotidine    Tablet 20 milliGRAM(s) Oral two times a day  gabapentin 300 milliGRAM(s) Oral at bedtime  ketorolac 0.5% Ophthalmic Solution 1 Drop(s) Left EYE daily  lidocaine   4% Patch 1 Patch Transdermal <User Schedule>  lidocaine   4% Patch 1 Patch Transdermal <User Schedule>  modafinil 50 milliGRAM(s) Oral <User Schedule>  polyethylene glycol 3350 17 Gram(s) Oral two times a day  psyllium Powder 1 Packet(s) Oral daily  senna 2 Tablet(s) Oral at bedtime  sodium chloride 0.65% Nasal 1 Spray(s) Both Nostrils two times a day    MEDICATIONS  (PRN):  acetaminophen     Tablet .. 650 milliGRAM(s) Oral every 6 hours PRN Temp greater or equal to 38C (100.4F), Mild Pain (1 - 3)  aluminum hydroxide/magnesium hydroxide/simethicone Suspension 30 milliLiter(s) Oral every 6 hours PRN Dyspepsia  artificial tears (preservative free) Ophthalmic Solution 1 Drop(s) Both EYES every 4 hours PRN Dry Eyes  bisacodyl Suppository 10 milliGRAM(s) Rectal daily PRN Constipation  calcium carbonate    500 mG (Tums) Chewable 1 Tablet(s) Chew three times a day PRN Heartburn  ondansetron   Disintegrating Tablet 4 milliGRAM(s) Oral every 6 hours PRN Nausea and/or Vomiting  traMADol 25 milliGRAM(s) Oral every 4 hours PRN Severe Pain (7 - 10)  
MEDICATIONS  (STANDING):  bisacodyl 5 milliGRAM(s) Oral daily  brivaracetam 50 milliGRAM(s) Oral two times a day  enoxaparin Injectable 40 milliGRAM(s) SubCutaneous <User Schedule>  famotidine    Tablet 20 milliGRAM(s) Oral two times a day  gabapentin 300 milliGRAM(s) Oral at bedtime  ketorolac 0.5% Ophthalmic Solution 1 Drop(s) Left EYE daily  lidocaine   4% Patch 1 Patch Transdermal <User Schedule>  lidocaine   4% Patch 1 Patch Transdermal <User Schedule>  polyethylene glycol 3350 17 Gram(s) Oral two times a day  senna 2 Tablet(s) Oral at bedtime  sodium chloride 0.65% Nasal 1 Spray(s) Both Nostrils two times a day    MEDICATIONS  (PRN):  acetaminophen     Tablet .. 650 milliGRAM(s) Oral every 6 hours PRN Temp greater or equal to 38C (100.4F), Mild Pain (1 - 3)  aluminum hydroxide/magnesium hydroxide/simethicone Suspension 30 milliLiter(s) Oral every 6 hours PRN Dyspepsia  artificial tears (preservative free) Ophthalmic Solution 1 Drop(s) Both EYES every 4 hours PRN Dry Eyes  bisacodyl Suppository 10 milliGRAM(s) Rectal daily PRN Constipation  calcium carbonate    500 mG (Tums) Chewable 1 Tablet(s) Chew three times a day PRN Heartburn  ondansetron   Disintegrating Tablet 4 milliGRAM(s) Oral every 6 hours PRN Nausea and/or Vomiting  traMADol 25 milliGRAM(s) Oral every 4 hours PRN Severe Pain (7 - 10)

## 2023-12-29 NOTE — BH CONSULTATION LIAISON PROGRESS NOTE - NSBHCHARTREVIEWVS_PSY_A_CORE FT
Vital Signs Last 24 Hrs  T(C): 36.4 (28 Dec 2023 09:57), Max: 36.8 (27 Dec 2023 19:59)  T(F): 97.6 (28 Dec 2023 09:57), Max: 98.2 (27 Dec 2023 19:59)  HR: 68 (28 Dec 2023 09:57) (68 - 79)  BP: 100/65 (28 Dec 2023 09:57) (100/65 - 108/70)  BP(mean): --  RR: 15 (28 Dec 2023 09:57) (15 - 17)  SpO2: 97% (28 Dec 2023 09:57) (97% - 97%)    Parameters below as of 28 Dec 2023 09:57  Patient On (Oxygen Delivery Method): room air    
Vital Signs Last 24 Hrs  T(C): 36.6 (29 Dec 2023 08:37), Max: 36.6 (28 Dec 2023 20:05)  T(F): 97.8 (29 Dec 2023 08:37), Max: 97.8 (28 Dec 2023 20:05)  HR: 85 (29 Dec 2023 08:37) (82 - 85)  BP: 103/66 (29 Dec 2023 08:37) (103/66 - 120/74)  BP(mean): --  RR: 15 (29 Dec 2023 08:37) (15 - 15)  SpO2: 98% (29 Dec 2023 08:37) (96% - 98%)    Parameters below as of 29 Dec 2023 08:37  Patient On (Oxygen Delivery Method): room air

## 2023-12-29 NOTE — BH CONSULTATION LIAISON PROGRESS NOTE - NSICDXBHPRIMARYDX_PSY_ALL_CORE
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

## 2023-12-29 NOTE — BH CONSULTATION LIAISON PROGRESS NOTE - NSBHATTESTBILLING_PSY_A_CORE
36065-[id number] OBS or IP - moderate complexity OR 35-49 mins 26914-Uxizhiyfhn OBS or IP - moderate complexity OR 35-49 mins

## 2023-12-29 NOTE — BH CONSULTATION LIAISON PROGRESS NOTE - GENERAL APPEARANCE
Patient is an age-appropriate female, sitting in a wheelchair, wearing a hospital gown./No deformities present
Patient is an age-appropriate female, sitting in a wheelchair, wearing a hospital gown./No deformities present

## 2023-12-29 NOTE — BH CONSULTATION LIAISON PROGRESS NOTE - NSBHASSESSMENTFT_PSY_ALL_CORE
Patient is a 73 year old femalewith no PMH who presented to [facility] ED on 12/14/2023, as a transfer from [facility], as a CODE STROKE. At [facility] patient was brought in by friend for headaches and AMS; NIHSS 9. CTH and CTA repeated - large R IPH (temporal/parietal). 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 Similar midline shift of 5 mm.  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  Patient was medically optimized for discharge to [facility] IRU on 12/20/23. Psychiatry consulted for severe cognitive impairment and ADHD.     Patient with persistent difficulties with maintaining focus and shifting her attention. She often becomes fixated on a single idea and struggles to redirect her thoughts to other tasks or subjects, indicating challenges with cognitive flexibility and concentration. Patient insightful and creates small goals to accomplish to address her issues with focus. Reports headache today.    Per collaterals, patient with significant change to focus and attention after IPH.        Patient is a 73 year old femalewith no PMH who presented to [facility] ED on 12/14/2023, as a transfer from [facility], as a CODE STROKE. At [facility] patient was brought in by friend for headaches and AMS; NIHSS 9. CTH and CTA repeated - large R IPH (temporal/parietal). 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 Similar midline shift of 5 mm.  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  Patient was medically optimized for discharge to [facility] IRU on 12/20/23. Psychiatry consulted for severe cognitive impairment and ADHD.     Patient with persistent difficulties with maintaining focus and shifting her attention. She often becomes fixated on a single idea and struggles to redirect her thoughts to other tasks or subjects, indicating challenges with cognitive flexibility and concentration. Patient insightful and creates small goals to accomplish to address her issues with focus. Reports headache today.    Per collaterals, patient with significant change to focus and attention after IPH.

## 2023-12-29 NOTE — PROGRESS NOTE ADULT - ASSESSMENT
74 YO RIGHT handed woman with no PMH who presented to [facility] ED on 12/14/2023, as a transfer from [facility] with R IPH. Hospital course also significant for UTI, b/l DVTs, EEG with increased seizure risks.    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  - Head CT 12/27: The right parietal parenchymal hemorrhage is slightly less dense and slightly smaller measuring 3.0 cm in AP diameter by 4.3 cm transversely compared with the prior of 4.1 cm in AP diameter by 5.9 cm transversely. There is similar vasogenic edema. There is also similar mass effect on the atrium of the right lateral ventricle with similar mild midline shift to the left. There is resolution of the intraventricular hemorrhage in the left occipital horn.  - EEG with increased risk of seizures in the right frontocentral region  - Briviact 50mg BID for seizure prophylaxis.  - Cont Comprehensive Rehab Program: PT/OT/ST, 3hours daily and 5 days weekly. SIVAN in progress    # self-reported history of ADHD and restless, distracted, poor attention  - xanax Rx in I stop  - Trial modafinil 50mg AM. Psychiatry f/u appreciated, tolerating 12/29  - psychology and recreation therapy (former )    # HTN  -  Losartan discontinued due to soft BP 12/27  - /66 - 120/74) 12/29    # UTI  - s/p Vantin 100mg BID     # Pain management  - Tylenol PRN  - tramadol reduced to 25 q4 PRN severe pain, dc for moderate pain 12/26. Continue to wean as tolerated  - Lidocaine patch timing changed to bedtime per request. States she uses Salon Pas on left neck/shoulder at home.   - Cervical pillow for positoning midline and neck control, improved    # DVT   - b/l below the knee DVTs: right soleal vein, left soleal, peroneal and gastrocnemius veins.  - Lovenox 40 mg daily. Not cleared for full dose AC due to large ICH  - Doppler 12/20: Positive deep venous thrombosis below the right knee within the right soleal vein. Positive deep venous thrombosis below the left knee within the left soleal, peroneal and gastrocnemius veins.  - Doppler 12/27 surveillance - DVTS known - stable right improved on left.  - Repeat Head CT 12/27 improved followed by neuro consult re: possible full dose AC (day 14 post bleed)  - Neuro consult appreciated 12/28. Do not recommend full dose of AC at this time  -.Repeat HEad CT ~ 2 weeks    # GI ppx  - Pepcid 20mg   - TUMS, Maalox  - Senna, miralax PRN    # Dysphagia   - Diet: Soft and Bite sized with thin liquids    # Case discussed in IDT rounds 12/26 (initial)  - ambulates 20 feet with RW and mod assist and WC follow with max cues, mod-max assist transfers, follows occasional single step commands with poor attention and max cues, tolerating soflt-bite sized and thin liquids, severe attention deficits, poor insight, supervision eating/hygiene, total assist tub transfers, max assist toileting  - goals: mod-max assist transfer due to need for cues and poor attention, mod assist baDLs  - target: dc SIVAN 1/3/23 In progress    To inform Pauline's HCP Isabella () about the CT scan once completed    # LABS:   CBC CMP 1/2    #GOC  CODE STATUS: FULL CODE    Outpatient Follow-up (Specialty/Name of physician):    Emy Prakash  NP in [address], [address]  [place] [address]  Phone: [phone]  Fax: [phone]  Follow Up Time: 2 weeks    Sergio Jurado  [address], [address]  [place] [address]  Phone: [phone]  Fax: [phone]  Follow Up Time: 1 month       74 YO RIGHT handed woman with no PMH who presented to [facility] ED on 12/14/2023, as a transfer from [facility] with R IPH. Hospital course also significant for UTI, b/l DVTs, EEG with increased seizure risks.    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  - Head CT 12/27: The right parietal parenchymal hemorrhage is slightly less dense and slightly smaller measuring 3.0 cm in AP diameter by 4.3 cm transversely compared with the prior of 4.1 cm in AP diameter by 5.9 cm transversely. There is similar vasogenic edema. There is also similar mass effect on the atrium of the right lateral ventricle with similar mild midline shift to the left. There is resolution of the intraventricular hemorrhage in the left occipital horn.  - EEG with increased risk of seizures in the right frontocentral region  - Briviact 50mg BID for seizure prophylaxis.  - Cont Comprehensive Rehab Program: PT/OT/ST, 3hours daily and 5 days weekly. SIVAN in progress    # self-reported history of ADHD and restless, distracted, poor attention  - xanax Rx in I stop  - Trial modafinil 50mg AM. Psychiatry f/u appreciated, tolerating 12/29  - psychology and recreation therapy (former )    # HTN  -  Losartan discontinued due to soft BP 12/27  - /66 - 120/74) 12/29    # UTI  - s/p Vantin 100mg BID     # Pain management  - Tylenol PRN  - tramadol reduced to 25 q4 PRN severe pain, dc for moderate pain 12/26. Continue to wean as tolerated  - Lidocaine patch timing changed to bedtime per request. States she uses Salon Pas on left neck/shoulder at home.   - Cervical pillow for positoning midline and neck control, improved    # DVT   - b/l below the knee DVTs: right soleal vein, left soleal, peroneal and gastrocnemius veins.  - Lovenox 40 mg daily. Not cleared for full dose AC due to large ICH  - Doppler 12/20: Positive deep venous thrombosis below the right knee within the right soleal vein. Positive deep venous thrombosis below the left knee within the left soleal, peroneal and gastrocnemius veins.  - Doppler 12/27 surveillance - DVTS known - stable right improved on left.  - Repeat Head CT 12/27 improved followed by neuro consult re: possible full dose AC (day 14 post bleed)  - Neuro consult appreciated 12/28. Do not recommend full dose of AC at this time  -.Repeat HEad CT ~ 2 weeks    # GI ppx  - Pepcid 20mg   - TUMS, Maalox  - Senna, miralax PRN    # Dysphagia   - Diet: Soft and Bite sized with thin liquids    # Case discussed in IDT rounds 12/26 (initial)  - ambulates 20 feet with RW and mod assist and WC follow with max cues, mod-max assist transfers, follows occasional single step commands with poor attention and max cues, tolerating soflt-bite sized and thin liquids, severe attention deficits, poor insight, supervision eating/hygiene, total assist tub transfers, max assist toileting  - goals: mod-max assist transfer due to need for cues and poor attention, mod assist baDLs  - target: dc SIVAN 1/3/23 In progress    To inform Pauline's HCP Isabella () about the CT scan once completed    # LABS:   CBC CMP 1/2    #GOC  CODE STATUS: FULL CODE    Outpatient Follow-up (Specialty/Name of physician):    Emy Prakash  NP in [address], [address]  [place] [address]  Phone: [phone]  Fax: [phone]  Follow Up Time: 2 weeks    Sergio Jurado  [address], [address]  [place] [address]  Phone: [phone]  Fax: [phone]  Follow Up Time: 1 month       72 YO RIGHT handed woman with no PMH who presented to [facility] ED on 12/14/2023, as a transfer from [facility] with R IPH. Hospital course also significant for UTI, b/l DVTs, EEG with increased seizure risks.    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  - Head CT 12/27: The right parietal parenchymal hemorrhage is slightly less dense and slightly smaller measuring 3.0 cm in AP diameter by 4.3 cm transversely compared with the prior of 4.1 cm in AP diameter by 5.9 cm transversely. There is similar vasogenic edema. There is also similar mass effect on the atrium of the right lateral ventricle with similar mild midline shift to the left. There is resolution of the intraventricular hemorrhage in the left occipital horn.  - EEG with increased risk of seizures in the right frontocentral region  - Briviact 50mg BID for seizure prophylaxis.  - Cont Comprehensive Rehab Program: PT/OT/ST, 3hours daily and 5 days weekly. SIVAN in progress    # self-reported history of ADHD and restless, distracted, poor attention  - xanax Rx in I stop  - Trial modafinil 50mg AM. Psychiatry f/u appreciated, tolerating 12/29  - psychology and recreation therapy (former )    # HTN  -  Losartan discontinued due to soft BP 12/27  - /66 - 120/74) 12/29    # UTI  - s/p Vantin 100mg BID     # Pain management  - Tylenol PRN  - tramadol reduced to 25 q4 PRN severe pain, dc for moderate pain 12/26. Continue to wean as tolerated  - Lidocaine patch timing changed to bedtime per request. States she uses Salon Pas on left neck/shoulder at home.   - Cervical pillow for positoning midline and neck control, improved    # DVT   - b/l below the knee DVTs: right soleal vein, left soleal, peroneal and gastrocnemius veins.  - Lovenox 40 mg daily. Not cleared for full dose AC due to large ICH  - Doppler 12/20: Positive deep venous thrombosis below the right knee within the right soleal vein. Positive deep venous thrombosis below the left knee within the left soleal, peroneal and gastrocnemius veins.  - Doppler 12/27 surveillance - DVTS known - stable right improved on left.  - Repeat Head CT 12/27 improved followed by neuro consult re: possible full dose AC (day 14 post bleed)  - Neuro consult appreciated 12/28. Do not recommend full dose of AC at this time  -.Repeat HEad CT ~ 2 weeks    # GI ppx/h/o reflux  - Pepcid 20mg   - TUMS, Maalox  - Senna, miralax PRN  - metamucil added 12/29, patient reports was recommended by her PCP    # Dysphagia   - Diet: Soft and Bite sized with thin liquids    # Case discussed in IDT rounds 12/26 (initial)  - ambulates 20 feet with RW and mod assist and WC follow with max cues, mod-max assist transfers, follows occasional single step commands with poor attention and max cues, tolerating soflt-bite sized and thin liquids, severe attention deficits, poor insight, supervision eating/hygiene, total assist tub transfers, max assist toileting  - goals: mod-max assist transfer due to need for cues and poor attention, mod assist baDLs  - target: dc SIVAN 1/3/23 In progress    To inform Pauline's HCP Isabella () about the CT scan once completed    # LABS:   CBC CMP 1/2    #GOC  CODE STATUS: FULL CODE    Outpatient Follow-up (Specialty/Name of physician):    Emy Prakash  NP in [address], [address]  [place] [address]  Phone: [phone]  Fax: [phone]  Follow Up Time: 2 weeks    Sergio Jurado  [address], [address]  [place] [address]  Phone: [phone]  Fax: [phone]  Follow Up Time: 1 month       72 YO RIGHT handed woman with no PMH who presented to [facility] ED on 12/14/2023, as a transfer from [facility] with R IPH. Hospital course also significant for UTI, b/l DVTs, EEG with increased seizure risks.    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  - Head CT 12/27: The right parietal parenchymal hemorrhage is slightly less dense and slightly smaller measuring 3.0 cm in AP diameter by 4.3 cm transversely compared with the prior of 4.1 cm in AP diameter by 5.9 cm transversely. There is similar vasogenic edema. There is also similar mass effect on the atrium of the right lateral ventricle with similar mild midline shift to the left. There is resolution of the intraventricular hemorrhage in the left occipital horn.  - EEG with increased risk of seizures in the right frontocentral region  - Briviact 50mg BID for seizure prophylaxis.  - Cont Comprehensive Rehab Program: PT/OT/ST, 3hours daily and 5 days weekly. SIVAN in progress    # self-reported history of ADHD and restless, distracted, poor attention  - xanax Rx in I stop  - Trial modafinil 50mg AM. Psychiatry f/u appreciated, tolerating 12/29  - psychology and recreation therapy (former )    # HTN  -  Losartan discontinued due to soft BP 12/27  - /66 - 120/74) 12/29    # UTI  - s/p Vantin 100mg BID     # Pain management  - Tylenol PRN  - tramadol reduced to 25 q4 PRN severe pain, dc for moderate pain 12/26. Continue to wean as tolerated  - Lidocaine patch timing changed to bedtime per request. States she uses Salon Pas on left neck/shoulder at home.   - Cervical pillow for positoning midline and neck control, improved    # DVT   - b/l below the knee DVTs: right soleal vein, left soleal, peroneal and gastrocnemius veins.  - Lovenox 40 mg daily. Not cleared for full dose AC due to large ICH  - Doppler 12/20: Positive deep venous thrombosis below the right knee within the right soleal vein. Positive deep venous thrombosis below the left knee within the left soleal, peroneal and gastrocnemius veins.  - Doppler 12/27 surveillance - DVTS known - stable right improved on left.  - Repeat Head CT 12/27 improved followed by neuro consult re: possible full dose AC (day 14 post bleed)  - Neuro consult appreciated 12/28. Do not recommend full dose of AC at this time  -.Repeat HEad CT ~ 2 weeks    # GI ppx/h/o reflux  - Pepcid 20mg   - TUMS, Maalox  - Senna, miralax PRN  - metamucil added 12/29, patient reports was recommended by her PCP    # Dysphagia   - Diet: Soft and Bite sized with thin liquids    # Case discussed in IDT rounds 12/26 (initial)  - ambulates 20 feet with RW and mod assist and WC follow with max cues, mod-max assist transfers, follows occasional single step commands with poor attention and max cues, tolerating soflt-bite sized and thin liquids, severe attention deficits, poor insight, supervision eating/hygiene, total assist tub transfers, max assist toileting  - goals: mod-max assist transfer due to need for cues and poor attention, mod assist baDLs  - target: dc SIVAN 1/3/23 In progress    To inform Pauline's HCP Isabella () about the CT scan once completed    # LABS:   CBC CMP 1/2    #GOC  CODE STATUS: FULL CODE    Outpatient Follow-up (Specialty/Name of physician):    Emy Prakash  NP in [address], [address]  [place] [address]  Phone: [phone]  Fax: [phone]  Follow Up Time: 2 weeks    Sergio Jurado  [address], [address]  [place] [address]  Phone: [phone]  Fax: [phone]  Follow Up Time: 1 month       72 YO RIGHT handed woman with no PMH who presented to [facility] ED on 12/14/2023, as a transfer from [facility] with R IPH. Hospital course also significant for UTI, b/l DVTs, EEG with increased seizure risks.    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  - Head CT 12/27: The right parietal parenchymal hemorrhage is slightly less dense and slightly smaller measuring 3.0 cm in AP diameter by 4.3 cm transversely compared with the prior of 4.1 cm in AP diameter by 5.9 cm transversely. There is similar vasogenic edema. There is also similar mass effect on the atrium of the right lateral ventricle with similar mild midline shift to the left. There is resolution of the intraventricular hemorrhage in the left occipital horn.  - EEG with increased risk of seizures in the right frontocentral region  - Briviact 50mg BID for seizure prophylaxis.  - Cont Comprehensive Rehab Program: PT/OT/ST, 3hours daily and 5 days weekly. SIVAN in progress    # self-reported history of ADHD and restless, distracted, poor attention  - xanax Rx in I stop  - Trial modafinil 50mg AM. Psychiatry f/u appreciated, tolerating 12/29  - psychology and recreation therapy (former )    #   - discussed with hospitalist. Patient with ascvd is moderate risk  - start atorvasttin 10 mg qhs 12/29    # HTN  -  Losartan discontinued due to soft BP 12/27  - /66 - 120/74) 12/29    # UTI  - s/p Vantin 100mg BID     # Pain management  - Tylenol PRN  - tramadol reduced to 25 q4 PRN severe pain, dc for moderate pain 12/26. Continue to wean as tolerated  - Lidocaine patch timing changed to bedtime per request. States she uses Salon Pas on left neck/shoulder at home.   - Cervical pillow for positoning midline and neck control, improved    # DVT   - b/l below the knee DVTs: right soleal vein, left soleal, peroneal and gastrocnemius veins.  - Lovenox 40 mg daily. Not cleared for full dose AC due to large ICH  - Doppler 12/20: Positive deep venous thrombosis below the right knee within the right soleal vein. Positive deep venous thrombosis below the left knee within the left soleal, peroneal and gastrocnemius veins.  - Doppler 12/27 surveillance - DVTS known - stable right improved on left.  - Repeat Head CT 12/27 improved followed by neuro consult re: possible full dose AC (day 14 post bleed)  - Neuro consult appreciated 12/28. Do not recommend full dose of AC at this time  -.Repeat HEad CT ~ 2 weeks    # GI ppx/h/o reflux  - Pepcid 20mg   - TUMS, Maalox  - Senna, miralax PRN  - metamucil added 12/29, patient reports was recommended by her PCP    # Dysphagia   - Diet: Soft and Bite sized with thin liquids    # Case discussed in IDT rounds 12/26 (initial)  - ambulates 20 feet with RW and mod assist and WC follow with max cues, mod-max assist transfers, follows occasional single step commands with poor attention and max cues, tolerating soflt-bite sized and thin liquids, severe attention deficits, poor insight, supervision eating/hygiene, total assist tub transfers, max assist toileting  - goals: mod-max assist transfer due to need for cues and poor attention, mod assist baDLs  - target: dc SIVAN 1/3/23 In progress. DC summary prepped    To inform Pauline's HCP Isabella () about the CT scan once completed    # LABS:   CBC CMP 1/2    #GOC  CODE STATUS: FULL CODE    Outpatient Follow-up (Specialty/Name of physician):    Emy Prakash  NP in [facility]  [address] [place] [address], [address]  [place] [address]  Phone: [phone]  Fax: [phone]  Follow Up Time: 2 weeks    Sergio Jurado  Cardiology  [address] [facility], [address]  [place] [address]  Phone: [phone]  Fax: [phone]  Follow Up Time: 1 month       74 YO RIGHT handed woman with no PMH who presented to [facility] ED on 12/14/2023, as a transfer from [facility] with R IPH. Hospital course also significant for UTI, b/l DVTs, EEG with increased seizure risks.    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  - Head CT 12/27: The right parietal parenchymal hemorrhage is slightly less dense and slightly smaller measuring 3.0 cm in AP diameter by 4.3 cm transversely compared with the prior of 4.1 cm in AP diameter by 5.9 cm transversely. There is similar vasogenic edema. There is also similar mass effect on the atrium of the right lateral ventricle with similar mild midline shift to the left. There is resolution of the intraventricular hemorrhage in the left occipital horn.  - EEG with increased risk of seizures in the right frontocentral region  - Briviact 50mg BID for seizure prophylaxis.  - Cont Comprehensive Rehab Program: PT/OT/ST, 3hours daily and 5 days weekly. SIVAN in progress    # self-reported history of ADHD and restless, distracted, poor attention  - xanax Rx in I stop  - Trial modafinil 50mg AM. Psychiatry f/u appreciated, tolerating 12/29  - psychology and recreation therapy (former )    #   - discussed with hospitalist. Patient with ascvd is moderate risk  - start atorvasttin 10 mg qhs 12/29    # HTN  -  Losartan discontinued due to soft BP 12/27  - /66 - 120/74) 12/29    # UTI  - s/p Vantin 100mg BID     # Pain management  - Tylenol PRN  - tramadol reduced to 25 q4 PRN severe pain, dc for moderate pain 12/26. Continue to wean as tolerated  - Lidocaine patch timing changed to bedtime per request. States she uses Salon Pas on left neck/shoulder at home.   - Cervical pillow for positoning midline and neck control, improved    # DVT   - b/l below the knee DVTs: right soleal vein, left soleal, peroneal and gastrocnemius veins.  - Lovenox 40 mg daily. Not cleared for full dose AC due to large ICH  - Doppler 12/20: Positive deep venous thrombosis below the right knee within the right soleal vein. Positive deep venous thrombosis below the left knee within the left soleal, peroneal and gastrocnemius veins.  - Doppler 12/27 surveillance - DVTS known - stable right improved on left.  - Repeat Head CT 12/27 improved followed by neuro consult re: possible full dose AC (day 14 post bleed)  - Neuro consult appreciated 12/28. Do not recommend full dose of AC at this time  -.Repeat HEad CT ~ 2 weeks    # GI ppx/h/o reflux  - Pepcid 20mg   - TUMS, Maalox  - Senna, miralax PRN  - metamucil added 12/29, patient reports was recommended by her PCP    # Dysphagia   - Diet: Soft and Bite sized with thin liquids    # Case discussed in IDT rounds 12/26 (initial)  - ambulates 20 feet with RW and mod assist and WC follow with max cues, mod-max assist transfers, follows occasional single step commands with poor attention and max cues, tolerating soflt-bite sized and thin liquids, severe attention deficits, poor insight, supervision eating/hygiene, total assist tub transfers, max assist toileting  - goals: mod-max assist transfer due to need for cues and poor attention, mod assist baDLs  - target: dc SIVAN 1/3/23 In progress. DC summary prepped    To inform Pauline's HCP Isabella () about the CT scan once completed    # LABS:   CBC CMP 1/2    #GOC  CODE STATUS: FULL CODE    Outpatient Follow-up (Specialty/Name of physician):    Emy Prakash  NP in [facility]  [address] [place] [address], [address]  [place] [address]  Phone: [phone]  Fax: [phone]  Follow Up Time: 2 weeks    Sergio Jurado  Cardiology  [address] [facility], [address]  [place] [address]  Phone: [phone]  Fax: [phone]  Follow Up Time: 1 month

## 2023-12-29 NOTE — BH CONSULTATION LIAISON PROGRESS NOTE - NSBHCONSULTRECOMMENDOTHER_PSY_A_CORE FT
- Agree with plan to start patient on a trial of Modafinil 50mg. Patient with a history of headaches which could be exacerbated by Modafinil, would assess for tolerance. 
- Continue with trial of Modafinil, assess for tolerance.

## 2023-12-29 NOTE — BH CONSULTATION LIAISON PROGRESS NOTE - NSBHCONSULTFOLLOWAFTERCARE_PSY_A_CORE FT
No outpatient psychiatric care warranted at this time, call with questions. 
No outpatient psychiatric care warranted at this time, call with questions.

## 2023-12-29 NOTE — BH CONSULTATION LIAISON PROGRESS NOTE - NSICDXBHTERTIARYDX_PSY_ALL_CORE
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

## 2023-12-29 NOTE — PROGRESS NOTE ADULT - SUBJECTIVE AND OBJECTIVE BOX
Patient is a 73y old  Female who presents with a chief complaint of ICH (28 Dec 2023 16:21)      HPI:  This is a 72 YO RIGHT handed woman with no PMH who presented to [facility] ED on 2023, as a transfer from [facility], as a CODE STROKE, with c/o R IPH.   CTH and CTA repeated - large R IPH (temporal/parietal). Presented to [place] today with c/o HA and AMS. Friend accompanying patient said that when visiting patient today - patient was not acting like herself and c/o HA. NIHSS 9.    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 Similar midline shift of 5 mm.  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 Briviact 50mg BID for seizure prophylaxis, EEG with increased risk of seizures in the right frontocentral region.TTE- EF 64%. Continue Losartan 25mg daily. 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 UA: positive for UTI, started on IV abx and then transitioned to oral, end date .      Patient was evaluated by PM&R and therapy for functional deficits, gait/ADL impairments and acute rehabilitation was recommended. Patient was medically optimized for discharge to [facility] IRU on 23. (20 Dec 2023 17:15)      PAST MEDICAL & SURGICAL HISTORY:  No pertinent past medical history          MEDICATIONS  (STANDING):  bisacodyl 5 milliGRAM(s) Oral daily  brivaracetam 50 milliGRAM(s) Oral two times a day  enoxaparin Injectable 40 milliGRAM(s) SubCutaneous <User Schedule>  famotidine    Tablet 20 milliGRAM(s) Oral two times a day  gabapentin 300 milliGRAM(s) Oral at bedtime  ketorolac 0.5% Ophthalmic Solution 1 Drop(s) Left EYE daily  lidocaine   4% Patch 1 Patch Transdermal <User Schedule>  lidocaine   4% Patch 1 Patch Transdermal <User Schedule>  modafinil 50 milliGRAM(s) Oral <User Schedule>  polyethylene glycol 3350 17 Gram(s) Oral two times a day  senna 2 Tablet(s) Oral at bedtime  sodium chloride 0.65% Nasal 1 Spray(s) Both Nostrils two times a day    MEDICATIONS  (PRN):  acetaminophen     Tablet .. 650 milliGRAM(s) Oral every 6 hours PRN Temp greater or equal to 38C (100.4F), Mild Pain (1 - 3)  aluminum hydroxide/magnesium hydroxide/simethicone Suspension 30 milliLiter(s) Oral every 6 hours PRN Dyspepsia  artificial tears (preservative free) Ophthalmic Solution 1 Drop(s) Both EYES every 4 hours PRN Dry Eyes  bisacodyl Suppository 10 milliGRAM(s) Rectal daily PRN Constipation  calcium carbonate    500 mG (Tums) Chewable 1 Tablet(s) Chew three times a day PRN Heartburn  ondansetron   Disintegrating Tablet 4 milliGRAM(s) Oral every 6 hours PRN Nausea and/or Vomiting  traMADol 25 milliGRAM(s) Oral every 4 hours PRN Severe Pain (7 - 10)      Allergies    No Known Allergies    Intolerances    oxycodone (Nausea)        VITALS  73y  Vital Signs Last 24 Hrs  T(C): 36.6 (29 Dec 2023 08:37), Max: 36.6 (28 Dec 2023 20:05)  T(F): 97.8 (29 Dec 2023 08:37), Max: 97.8 (28 Dec 2023 20:05)  HR: 85 (29 Dec 2023 08:37) (82 - 85)  BP: 103/66 (29 Dec 2023 08:37) (103/66 - 120/74)  BP(mean): --  RR: 15 (29 Dec 2023 08:37) (15 - 15)  SpO2: 98% (29 Dec 2023 08:37) (96% - 98%)    Parameters below as of 29 Dec 2023 08:37  Patient On (Oxygen Delivery Method): room air      Daily     Daily Weight in k.6 (29 Dec 2023 01:00)        RECENT LABS:                          13.9   5.94  )-----------( 265      ( 28 Dec 2023 06:09 )             40.3         138  |  102  |  18  ----------------------------<  72  4.0   |  25  |  0.64    Ca    9.3      28 Dec 2023 06:09    TPro  6.4  /  Alb  2.9<L>  /  TBili  0.4  /  DBili  x   /  AST  30  /  ALT  70<H>  /  AlkPhos  59  12-28    LIVER FUNCTIONS - ( 28 Dec 2023 06:09 )  Alb: 2.9 g/dL / Pro: 6.4 g/dL / ALK PHOS: 59 U/L / ALT: 70 U/L / AST: 30 U/L / GGT: x             Urinalysis Basic - ( 28 Dec 2023 06:09 )    Color: x / Appearance: x / SG: x / pH: x  Gluc: 72 mg/dL / Ketone: x  / Bili: x / Urobili: x   Blood: x / Protein: x / Nitrite: x   Leuk Esterase: x / RBC: x / WBC x   Sq Epi: x / Non Sq Epi: x / Bacteria: x          CAPILLARY BLOOD GLUCOSE                   Patient is a 73y old  Female who presents with a chief complaint of ICH (28 Dec 2023 16:21)      HPI:  This is a 72 YO RIGHT handed woman with no PMH who presented to [facility] ED on 2023, as a transfer from [facility], as a CODE STROKE, with c/o R IPH.   CTH and CTA repeated - large R IPH (temporal/parietal). Presented to [place] today with c/o HA and AMS. Friend accompanying patient said that when visiting patient today - patient was not acting like herself and c/o HA. NIHSS 9.    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 Similar midline shift of 5 mm.  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 Briviact 50mg BID for seizure prophylaxis, EEG with increased risk of seizures in the right frontocentral region.TTE- EF 64%. Continue Losartan 25mg daily. 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 UA: positive for UTI, started on IV abx and then transitioned to oral, end date .      Patient was evaluated by PM&R and therapy for functional deficits, gait/ADL impairments and acute rehabilitation was recommended. Patient was medically optimized for discharge to [facility] IRU on 23. (20 Dec 2023 17:15)      PAST MEDICAL & SURGICAL HISTORY:  No pertinent past medical history          MEDICATIONS  (STANDING):  bisacodyl 5 milliGRAM(s) Oral daily  brivaracetam 50 milliGRAM(s) Oral two times a day  enoxaparin Injectable 40 milliGRAM(s) SubCutaneous <User Schedule>  famotidine    Tablet 20 milliGRAM(s) Oral two times a day  gabapentin 300 milliGRAM(s) Oral at bedtime  ketorolac 0.5% Ophthalmic Solution 1 Drop(s) Left EYE daily  lidocaine   4% Patch 1 Patch Transdermal <User Schedule>  lidocaine   4% Patch 1 Patch Transdermal <User Schedule>  modafinil 50 milliGRAM(s) Oral <User Schedule>  polyethylene glycol 3350 17 Gram(s) Oral two times a day  senna 2 Tablet(s) Oral at bedtime  sodium chloride 0.65% Nasal 1 Spray(s) Both Nostrils two times a day    MEDICATIONS  (PRN):  acetaminophen     Tablet .. 650 milliGRAM(s) Oral every 6 hours PRN Temp greater or equal to 38C (100.4F), Mild Pain (1 - 3)  aluminum hydroxide/magnesium hydroxide/simethicone Suspension 30 milliLiter(s) Oral every 6 hours PRN Dyspepsia  artificial tears (preservative free) Ophthalmic Solution 1 Drop(s) Both EYES every 4 hours PRN Dry Eyes  bisacodyl Suppository 10 milliGRAM(s) Rectal daily PRN Constipation  calcium carbonate    500 mG (Tums) Chewable 1 Tablet(s) Chew three times a day PRN Heartburn  ondansetron   Disintegrating Tablet 4 milliGRAM(s) Oral every 6 hours PRN Nausea and/or Vomiting  traMADol 25 milliGRAM(s) Oral every 4 hours PRN Severe Pain (7 - 10)      Allergies    No Known Allergies    Intolerances    oxycodone (Nausea)        VITALS  73y  Vital Signs Last 24 Hrs  T(C): 36.6 (29 Dec 2023 08:37), Max: 36.6 (28 Dec 2023 20:05)  T(F): 97.8 (29 Dec 2023 08:37), Max: 97.8 (28 Dec 2023 20:05)  HR: 85 (29 Dec 2023 08:37) (82 - 85)  BP: 103/66 (29 Dec 2023 08:37) (103/66 - 120/74)  BP(mean): --  RR: 15 (29 Dec 2023 08:37) (15 - 15)  SpO2: 98% (29 Dec 2023 08:37) (96% - 98%)    Parameters below as of 29 Dec 2023 08:37  Patient On (Oxygen Delivery Method): room air      Daily     Daily Weight in k.6 (29 Dec 2023 01:00)        RECENT LABS:                          13.9   5.94  )-----------( 265      ( 28 Dec 2023 06:09 )             40.3         138  |  102  |  18  ----------------------------<  72  4.0   |  25  |  0.64    Ca    9.3      28 Dec 2023 06:09    TPro  6.4  /  Alb  2.9<L>  /  TBili  0.4  /  DBili  x   /  AST  30  /  ALT  70<H>  /  AlkPhos  59  12-28    LIVER FUNCTIONS - ( 28 Dec 2023 06:09 )  Alb: 2.9 g/dL / Pro: 6.4 g/dL / ALK PHOS: 59 U/L / ALT: 70 U/L / AST: 30 U/L / GGT: x             Urinalysis Basic - ( 28 Dec 2023 06:09 )    Color: x / Appearance: x / SG: x / pH: x  Gluc: 72 mg/dL / Ketone: x  / Bili: x / Urobili: x   Blood: x / Protein: x / Nitrite: x   Leuk Esterase: x / RBC: x / WBC x   Sq Epi: x / Non Sq Epi: x / Bacteria: x          CAPILLARY BLOOD GLUCOSE

## 2023-12-29 NOTE — BH CONSULTATION LIAISON PROGRESS NOTE - NSBHFUPINTERVALHXFT_PSY_A_CORE
Patient is a 73 year old femalewith no PMH who presented to [facility] ED on 12/14/2023, as a transfer from [facility], as a CODE STROKE. At [facility] patient was brought in by friend for headaches and AMS; NIHSS 9. CTH and CTA repeated - large R IPH (temporal/parietal). 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 Similar midline shift of 5 mm.  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  Patient was medically optimized for discharge to [facility] IRU on 12/20/23. Psychiatry consulted for severe cognitive impairment and ADHD.     C/L Psychiatry Note:  Chart reviewed and patient evaluated. Patient presents with friends in her room. Her friends at bedside describe patient as possibly having ADHD in the past. They characterize the patient as always "being active," and constantly multitasking or engaging in many activities. However, patient was fully functional in all aspects of her life. However, her friends report that there is significant change in her focus and attention. She often becomes fixated on a single idea and struggles to redirect her thoughts to other tasks or subjects, indicating challenges with cognitive flexibility and concentration. Patient reports that she took Modafinil this morning. No changes in energy or concentration observed by patient. However, she reports having a headache (9/10 pain currently). Towards end of interview, patient becomes preoccupied with future [facility] placement. She faces difficulty with redirecting back to psychiatric interview.  Patient is a 73 year old femalewith no PMH who presented to [facility] ED on 12/14/2023, as a transfer from [facility], as a CODE STROKE. At [facility] patient was brought in by friend for headaches and AMS; NIHSS 9. CTH and CTA repeated - large R IPH (temporal/parietal). 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 Similar midline shift of 5 mm.  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  Patient was medically optimized for discharge to [facility] IRU on 12/20/23. Psychiatry consulted for severe cognitive impairment and ADHD.     C/L Psychiatry Note:  Chart reviewed and patient evaluated. Patient presents with friends in her room. Her friends at bedside describe patient as possibly having ADHD in the past. They characterize the patient as always "being active," and constantly multitasking or engaging in many activities. However, patient was fully functional in all aspects of her life. However, her friends report that there is significant change in her focus and attention. She often becomes fixated on a single idea and struggles to redirect her thoughts to other tasks or subjects, indicating challenges with cognitive flexibility and concentration. Patient reports that she took Modafinil this morning. No changes in energy or concentration observed by patient. However, she reports having a headache (9/10 pain currently). Towards end of interview, patient becomes preoccupied with future [facility] placement. She faces difficulty with redirecting back to psychiatric interview.

## 2023-12-29 NOTE — BH CONSULTATION LIAISON PROGRESS NOTE - NSBHMSEKNOWHOW_PSY_ALL_CORE
[facility] Warm Handoff Outcome Note    Patient name Vernon Barrios  Location ED-03/ED-03 MRN [id number]  Age: 60 y.o.          Plan Type:  Warm Handoff                                                                                    Plan Date: 12/12/2023  Service:  ED Warm Handoff      Substance Use History:  ETOH    Warm Handoff Update:  Spoke to Brittany from [facility] and she is with pt now looking for IP bed    Warm Handoff Outcome: Residential  Inpatient  
Vocabulary
Vocabulary

## 2023-12-30 PROCEDURE — 99232 SBSQ HOSP IP/OBS MODERATE 35: CPT | Mod: GC

## 2023-12-30 PROCEDURE — 99232 SBSQ HOSP IP/OBS MODERATE 35: CPT

## 2023-12-30 RX ADMIN — LIDOCAINE 1 PATCH: 4 CREAM TOPICAL at 19:40

## 2023-12-30 RX ADMIN — MODAFINIL 50 MILLIGRAM(S): 200 TABLET ORAL at 05:55

## 2023-12-30 RX ADMIN — LIDOCAINE 1 PATCH: 4 CREAM TOPICAL at 08:04

## 2023-12-30 RX ADMIN — GABAPENTIN 300 MILLIGRAM(S): 400 CAPSULE ORAL at 21:14

## 2023-12-30 RX ADMIN — SENNA PLUS 2 TABLET(S): 8.6 TABLET ORAL at 21:14

## 2023-12-30 RX ADMIN — TRAMADOL HYDROCHLORIDE 25 MILLIGRAM(S): 50 TABLET ORAL at 15:17

## 2023-12-30 RX ADMIN — Medication 1 DROP(S): at 12:28

## 2023-12-30 RX ADMIN — POLYETHYLENE GLYCOL 3350 17 GRAM(S): 17 POWDER, FOR SOLUTION ORAL at 05:54

## 2023-12-30 RX ADMIN — Medication 1 SPRAY(S): at 17:23

## 2023-12-30 RX ADMIN — ENOXAPARIN SODIUM 40 MILLIGRAM(S): 100 INJECTION SUBCUTANEOUS at 17:23

## 2023-12-30 RX ADMIN — LIDOCAINE 1 PATCH: 4 CREAM TOPICAL at 08:03

## 2023-12-30 RX ADMIN — TRAMADOL HYDROCHLORIDE 25 MILLIGRAM(S): 50 TABLET ORAL at 15:30

## 2023-12-30 RX ADMIN — Medication 1 SPRAY(S): at 05:55

## 2023-12-30 RX ADMIN — FAMOTIDINE 20 MILLIGRAM(S): 10 INJECTION INTRAVENOUS at 05:54

## 2023-12-30 RX ADMIN — ATORVASTATIN CALCIUM 10 MILLIGRAM(S): 80 TABLET, FILM COATED ORAL at 21:14

## 2023-12-30 RX ADMIN — Medication 1 PACKET(S): at 12:28

## 2023-12-30 RX ADMIN — POLYETHYLENE GLYCOL 3350 17 GRAM(S): 17 POWDER, FOR SOLUTION ORAL at 17:24

## 2023-12-30 RX ADMIN — Medication 5 MILLIGRAM(S): at 12:28

## 2023-12-30 RX ADMIN — BRIVARACETAM 50 MILLIGRAM(S): 25 TABLET, FILM COATED ORAL at 17:23

## 2023-12-30 RX ADMIN — FAMOTIDINE 20 MILLIGRAM(S): 10 INJECTION INTRAVENOUS at 17:24

## 2023-12-30 RX ADMIN — BRIVARACETAM 50 MILLIGRAM(S): 25 TABLET, FILM COATED ORAL at 05:56

## 2023-12-30 NOTE — PROGRESS NOTE ADULT - SUBJECTIVE AND OBJECTIVE BOX
Hospitalist  Dr. Bonnie Jasmine  Progress note    CC: Patient is a 73y old  Female who presents with a chief complaint of ICH (25 Dec 2023 09:15)    Interval History:  Patient seen and examined at bedside. Says she ate her breakfast. Feels well. Denies any new complaints.     No overnight events    ALLERGIES:  No Known Allergies  oxycodone (Nausea)    MEDICATIONS  (STANDING):  bisacodyl 5 milliGRAM(s) Oral daily  brivaracetam 50 milliGRAM(s) Oral two times a day  enoxaparin Injectable 40 milliGRAM(s) SubCutaneous <User Schedule>  famotidine    Tablet 20 milliGRAM(s) Oral two times a day  gabapentin 300 milliGRAM(s) Oral at bedtime  ketorolac 0.5% Ophthalmic Solution 1 Drop(s) Left EYE daily  lidocaine   4% Patch 1 Patch Transdermal <User Schedule>  losartan 25 milliGRAM(s) Oral daily  polyethylene glycol 3350 17 Gram(s) Oral two times a day  potassium chloride    Tablet ER 40 milliEquivalent(s) Oral two times a day  senna 2 Tablet(s) Oral at bedtime  sodium chloride 0.65% Nasal 1 Spray(s) Both Nostrils two times a day    MEDICATIONS  (PRN):  acetaminophen     Tablet .. 650 milliGRAM(s) Oral every 6 hours PRN Temp greater or equal to 38C (100.4F), Mild Pain (1 - 3)  aluminum hydroxide/magnesium hydroxide/simethicone Suspension 30 milliLiter(s) Oral every 6 hours PRN Dyspepsia  artificial tears (preservative free) Ophthalmic Solution 1 Drop(s) Both EYES every 4 hours PRN Dry Eyes  bisacodyl Suppository 10 milliGRAM(s) Rectal daily PRN Constipation  calcium carbonate    500 mG (Tums) Chewable 1 Tablet(s) Chew three times a day PRN Heartburn  ondansetron   Disintegrating Tablet 4 milliGRAM(s) Oral every 6 hours PRN Nausea and/or Vomiting  traMADol 50 milliGRAM(s) Oral every 4 hours PRN Severe Pain (7 - 10)  traMADol 25 milliGRAM(s) Oral every 4 hours PRN Moderate Pain (4 - 6)    Vital Signs Last 24 Hrs  T(C): 36.8 (29 Dec 2023 20:35), Max: 36.8 (29 Dec 2023 20:35)  T(F): 98.2 (29 Dec 2023 20:35), Max: 98.2 (29 Dec 2023 20:35)  HR: 79 (29 Dec 2023 20:35) (79 - 79)  BP: 101/62 (29 Dec 2023 20:35) (101/62 - 101/62)  BP(mean): --  RR: 15 (29 Dec 2023 20:35) (15 - 15)  SpO2: 98% (29 Dec 2023 20:35) (98% - 98%)    Parameters below as of 29 Dec 2023 20:35  Patient On (Oxygen Delivery Method): room air      PHYSICAL EXAM:  GENERAL: NAD, sitting in chair  CHEST/LUNG: Clear to percussion bilaterally; No rales, rhonchi, wheezing, or rubs; normal respiratory effort, no intercostal retractions  HEART: Regular rate and rhythm; No murmurs, rubs, or gallops; No pitting edema  ABDOMEN: Soft, Nontender, Nondistended; Bowel sounds present; No HSM or masses  MUSCULOSKELETAL/EXTREMITIES:  2+ Peripheral Pulses, No clubbing or digital cyanosis; FROM of extremeties (pain, crepitation or contracture)  PSYCH: Appropriate affect, Alert & Awake    LABS:                 Urinalysis Basic - ( 25 Dec 2023 06:00 )  Color: x /Appearance: x / SG: x / pH: x  Gluc: 102 mg/dL / Ketone: x  / Bili: x / Urobili: x   Blood: x / Protein: x / Nitrite: x   Leuk Esterase: x / RBC: x / WBC x   Sq Epi: x / Non Sq Epi: x / Bacteria: x    Culture - Urine (collected 19 Dec 2023 18:10)  Source: Clean Catch Clean Catch (Midstream)  Final Report (24 Dec 2023 13:10):    >100,000 CFU/ml Escherichia coli ESBL    >100,000 CFU/ml Enterococcus faecalis  Organism: Escherichia coli ESBL  Enterococcus faecalis (24 Dec 2023 13:10)  Organism: Enterococcus faecalis (24 Dec 2023 13:10)      Method Type: MATTHEW      -  Ampicillin: S <=2 Predicts results to ampicillin/sulbactam, amoxacillin-clavulanate and  piperacillin-tazobactam.      -  Ciprofloxacin: I 2      -  Levofloxacin: S 2      -  Nitrofurantoin: S <=32 Should not be used to treat pyelonephritis.      -  Tetracycline: S <=1      -  Vancomycin: S 2  Organism: Escherichia coli ESBL (24 Dec 2023 13:10)      Method Type: MATTHEW      -  Amoxicillin/Clavulanic Acid: S <=8/4      -  Ampicillin: R >16 These ampicillin results predict results for amoxicillin      -  Ampicillin/Sulbactam: S 8/4      -  Aztreonam: R 16      -  Cefazolin: R >16 For uncomplicated UTI with K. pneumoniae, E. coli, or P. mirablis: MATTHEW <=16 is sensitive and MATTHEW >=32 is resistant. This also predicts results for oral agents cefaclor, cefdinir, cefpodoxime, cefprozil, cefuroxime axetil, cephalexin and locarbef for uncomplicated UTI. Note that some isolates may be susceptible to these agents while testing resistant to cefazolin.      -  Cefepime: R >16      -  Ceftriaxone: R >32      -  Cefuroxime: R >16      -  Ciprofloxacin: R >2      -  Ertapenem: S <=0.5      -  Gentamicin: S <=2      -  Imipenem: S <=1      -  Levofloxacin: R >4      -  Meropenem: S <=1      -  Nitrofurantoin: S <=32 Should not be used to treat pyelonephritis      -  Piperacillin/Tazobactam: S <=8      -  Tobramycin: S <=2      -  Trimethoprim/Sulfamethoxazole: S <=0.5/9.5    COVID-19 PCR: NotDemaria t (12-20-23 @ 22:30)    Care Discussed with Consultants/Other Providers: Yes

## 2023-12-30 NOTE — PROGRESS NOTE ADULT - SUBJECTIVE AND OBJECTIVE BOX
Obesity discussed with patient  Plan for dietary changes and exercise.   Patient is a 73y old  Female who presents with a chief complaint of ICH (30 Dec 2023 10:05)      HPI:  This is a 72 YO RIGHT handed woman with no PMH who presented to [facility] ED on 12/14/2023, as a transfer from [facility], as a CODE STROKE, with c/o R IPH.   CTH and CTA repeated - large R IPH (temporal/parietal). Presented to [place] today with c/o HA and AMS. Friend accompanying patient said that when visiting patient today - patient was not acting like herself and c/o HA. NIHSS 9.    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 Similar midline shift of 5 mm.  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 Briviact 50mg BID for seizure prophylaxis, EEG with increased risk of seizures in the right frontocentral region.TTE- EF 64%. Continue Losartan 25mg daily. 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 UA: positive for UTI, started on IV abx and then transitioned to oral, end date 12/22.      Patient was evaluated by PM&R and therapy for functional deficits, gait/ADL impairments and acute rehabilitation was recommended. Patient was medically optimized for discharge to [facility] IRU on 12/20/23. (20 Dec 2023 17:15)        SUBJECTIVE: Patient seen and examined. No acute overnight events, reported to have slept.     REVIEW OF SYSTEMS  Constitutional: No fever, + fatigue  Cardio: No chest pain, No palpitations  Resp: No SOB, no respiratory distress   Neuro: No headaches +weakness      VITALS  73y  Vital Signs Last 24 Hrs  T(C): 36.8 (29 Dec 2023 20:35), Max: 36.8 (29 Dec 2023 20:35)  T(F): 98.2 (29 Dec 2023 20:35), Max: 98.2 (29 Dec 2023 20:35)  HR: 79 (29 Dec 2023 20:35) (79 - 79)  BP: 101/62 (29 Dec 2023 20:35) (101/62 - 101/62)  BP(mean): --  RR: 15 (29 Dec 2023 20:35) (15 - 15)  SpO2: 98% (29 Dec 2023 20:35) (98% - 98%)    Parameters below as of 29 Dec 2023 20:35  Patient On (Oxygen Delivery Method): room air      Daily     Daily         PHYSICAL EXAM:  Constitutional: NAD, sitting in wheelchair  Respiratory: breathing comfortably   Cardiovascular: R1R2  Gastrointestinal: Abdomen soft, nondistended  Neurological: easily distracted, restless, confused at times  Musculoskeletal: 4/5 throughout upper and lower extremities  Psychiatric: calm        CAPILLARY BLOOD GLUCOSE        MEDICATIONS:  MEDICATIONS  (STANDING):  atorvastatin 10 milliGRAM(s) Oral at bedtime  bisacodyl 5 milliGRAM(s) Oral daily  brivaracetam 50 milliGRAM(s) Oral two times a day  enoxaparin Injectable 40 milliGRAM(s) SubCutaneous <User Schedule>  famotidine    Tablet 20 milliGRAM(s) Oral two times a day  gabapentin 300 milliGRAM(s) Oral at bedtime  ketorolac 0.5% Ophthalmic Solution 1 Drop(s) Left EYE daily  lidocaine   4% Patch 1 Patch Transdermal <User Schedule>  lidocaine   4% Patch 1 Patch Transdermal <User Schedule>  modafinil 50 milliGRAM(s) Oral <User Schedule>  polyethylene glycol 3350 17 Gram(s) Oral two times a day  psyllium Powder 1 Packet(s) Oral daily  senna 2 Tablet(s) Oral at bedtime  sodium chloride 0.65% Nasal 1 Spray(s) Both Nostrils two times a day    MEDICATIONS  (PRN):  acetaminophen     Tablet .. 650 milliGRAM(s) Oral every 6 hours PRN Temp greater or equal to 38C (100.4F), Mild Pain (1 - 3)  aluminum hydroxide/magnesium hydroxide/simethicone Suspension 30 milliLiter(s) Oral every 6 hours PRN Dyspepsia  artificial tears (preservative free) Ophthalmic Solution 1 Drop(s) Both EYES every 4 hours PRN Dry Eyes  bisacodyl Suppository 10 milliGRAM(s) Rectal daily PRN Constipation  calcium carbonate    500 mG (Tums) Chewable 1 Tablet(s) Chew three times a day PRN Heartburn  ondansetron   Disintegrating Tablet 4 milliGRAM(s) Oral every 6 hours PRN Nausea and/or Vomiting  traMADol 25 milliGRAM(s) Oral every 4 hours PRN Severe Pain (7 - 10)     Patient is a 73y old  Female who presents with a chief complaint of ICH (30 Dec 2023 10:05)      HPI:  This is a 72 YO RIGHT handed woman with no PMH who presented to [facility] ED on 12/14/2023, as a transfer from [facility], as a CODE STROKE, with c/o R IPH.   CTH and CTA repeated - large R IPH (temporal/parietal). Presented to [place] today with c/o HA and AMS. Friend accompanying patient said that when visiting patient today - patient was not acting like herself and c/o HA. NIHSS 9.    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 Similar midline shift of 5 mm.  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 Briviact 50mg BID for seizure prophylaxis, EEG with increased risk of seizures in the right frontocentral region.TTE- EF 64%. Continue Losartan 25mg daily. 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 UA: positive for UTI, started on IV abx and then transitioned to oral, end date 12/22.      Patient was evaluated by PM&R and therapy for functional deficits, gait/ADL impairments and acute rehabilitation was recommended. Patient was medically optimized for discharge to [facility] IRU on 12/20/23. (20 Dec 2023 17:15)        SUBJECTIVE: Patient seen and examined. No acute overnight events, reported to have slept.     REVIEW OF SYSTEMS  Constitutional: No fever, + fatigue  Cardio: No chest pain, No palpitations  Resp: No SOB, no respiratory distress   Neuro: No headaches +weakness      VITALS  73y  Vital Signs Last 24 Hrs  T(C): 36.8 (29 Dec 2023 20:35), Max: 36.8 (29 Dec 2023 20:35)  T(F): 98.2 (29 Dec 2023 20:35), Max: 98.2 (29 Dec 2023 20:35)  HR: 79 (29 Dec 2023 20:35) (79 - 79)  BP: 101/62 (29 Dec 2023 20:35) (101/62 - 101/62)  BP(mean): --  RR: 15 (29 Dec 2023 20:35) (15 - 15)  SpO2: 98% (29 Dec 2023 20:35) (98% - 98%)    Parameters below as of 29 Dec 2023 20:35  Patient On (Oxygen Delivery Method): room air      Daily     Daily         PHYSICAL EXAM:  Constitutional: NAD, sitting in wheelchair  Respiratory: breathing comfortably   Cardiovascular: R1R2  Gastrointestinal: Abdomen soft, nondistended  Neurological: easily distracted, restless, confused at times  Musculoskeletal: 4/5 throughout upper and lower extremities  Psychiatric: calm        CAPILLARY BLOOD GLUCOSE        MEDICATIONS:  MEDICATIONS  (STANDING):  atorvastatin 10 milliGRAM(s) Oral at bedtime  bisacodyl 5 milliGRAM(s) Oral daily  brivaracetam 50 milliGRAM(s) Oral two times a day  enoxaparin Injectable 40 milliGRAM(s) SubCutaneous <User Schedule>  famotidine    Tablet 20 milliGRAM(s) Oral two times a day  gabapentin 300 milliGRAM(s) Oral at bedtime  ketorolac 0.5% Ophthalmic Solution 1 Drop(s) Left EYE daily  lidocaine   4% Patch 1 Patch Transdermal <User Schedule>  lidocaine   4% Patch 1 Patch Transdermal <User Schedule>  modafinil 50 milliGRAM(s) Oral <User Schedule>  polyethylene glycol 3350 17 Gram(s) Oral two times a day  psyllium Powder 1 Packet(s) Oral daily  senna 2 Tablet(s) Oral at bedtime  sodium chloride 0.65% Nasal 1 Spray(s) Both Nostrils two times a day    MEDICATIONS  (PRN):  acetaminophen     Tablet .. 650 milliGRAM(s) Oral every 6 hours PRN Temp greater or equal to 38C (100.4F), Mild Pain (1 - 3)  aluminum hydroxide/magnesium hydroxide/simethicone Suspension 30 milliLiter(s) Oral every 6 hours PRN Dyspepsia  artificial tears (preservative free) Ophthalmic Solution 1 Drop(s) Both EYES every 4 hours PRN Dry Eyes  bisacodyl Suppository 10 milliGRAM(s) Rectal daily PRN Constipation  calcium carbonate    500 mG (Tums) Chewable 1 Tablet(s) Chew three times a day PRN Heartburn  ondansetron   Disintegrating Tablet 4 milliGRAM(s) Oral every 6 hours PRN Nausea and/or Vomiting  traMADol 25 milliGRAM(s) Oral every 4 hours PRN Severe Pain (7 - 10)

## 2023-12-30 NOTE — PROGRESS NOTE ADULT - ASSESSMENT
74 YO RIGHT handed woman with no PMH who presented to [facility] ED on 12/14/2023, as a transfer from [facility] with R IPH. Hospital course also significant for UTI, b/l DVTs, EEG with increased seizure risks.    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  - Head CT 12/27: The right parietal parenchymal hemorrhage is slightly less dense and slightly smaller measuring 3.0 cm in AP diameter by 4.3 cm transversely compared with the prior of 4.1 cm in AP diameter by 5.9 cm transversely. There is similar vasogenic edema. There is also similar mass effect on the atrium of the right lateral ventricle with similar mild midline shift to the left. There is resolution of the intraventricular hemorrhage in the left occipital horn.  - EEG with increased risk of seizures in the right frontocentral region  - Briviact 50mg BID for seizure prophylaxis.  - Cont Comprehensive Rehab Program: PT/OT/ST, 3hours daily and 5 days weekly. SIVAN in progress    # self-reported history of ADHD and restless, distracted, poor attention  - xanax Rx in I stop  - Trial modafinil 50mg AM.   - psychology and recreation therapy (former )    #   - discussed with hospitalist. Patient with ascvd is moderate risk  - C/W atorvasttin 10 mg qhs 12/29    # HTN  -  Losartan discontinued due to soft BP 12/27  - BP stable, on lower side-- low 100's    # UTI  - s/p Vantin 100mg BID     # Pain management  - Tylenol PRN  - tramadol reduced to 25 q4 PRN severe pain, dc for moderate pain 12/26. Continue to wean as tolerated  - Lidocaine patch timing changed to bedtime per request. States she uses Salon Pas on left neck/shoulder at home.   - Cervical pillow for positoning midline and neck control, improved    # DVT   - b/l below the knee DVTs: right soleal vein, left soleal, peroneal and gastrocnemius veins.  - Lovenox 40 mg daily. Not cleared for full dose AC due to large ICH  - Doppler 12/20: Positive deep venous thrombosis below the right knee within the right soleal vein. Positive deep venous thrombosis below the left knee within the left soleal, peroneal and gastrocnemius veins.  - Doppler 12/27 surveillance - DVTS known - stable right improved on left.  - Repeat Head CT 12/27 improved followed by neuro consult re: possible full dose AC (day 14 post bleed)  - Neuro consult appreciated 12/28. Do not recommend full dose of AC at this time  -.Repeat HEad CT ~ 2 weeks    # GI ppx/h/o reflux  - Pepcid 20mg   - TUMS, Maalox  - Senna, miralax PRN  - metamucil added 12/29, patient reports was recommended by her PCP    # Dysphagia   - Diet: Soft and Bite sized with thin liquids    # Case discussed in IDT rounds 12/26 (initial)  - ambulates 20 feet with RW and mod assist and WC follow with max cues, mod-max assist transfers, follows occasional single step commands with poor attention and max cues, tolerating soflt-bite sized and thin liquids, severe attention deficits, poor insight, supervision eating/hygiene, total assist tub transfers, max assist toileting  - goals: mod-max assist transfer due to need for cues and poor attention, mod assist baDLs  - target: dc SIVAN 1/3/23 In progress    To inform Pauline's HCP Isabella () about the CT scan once completed    # LABS:   CBC CMP 1/2    #[facility]  CODE STATUS: FULL CODE    Outpatient Follow-up (Specialty/Name of physician):    Emy Prakash  NP in [address], [address]  [place] [address]  Phone: [phone]  Fax: [phone]  Follow Up Time: 2 weeks    Sergio Jurado  [address], [address]  [place] [address]  Phone: [phone]  Fax: [phone]  Follow Up Time: 1 month       72 YO RIGHT handed woman with no PMH who presented to [facility] ED on 12/14/2023, as a transfer from [facility] with R IPH. Hospital course also significant for UTI, b/l DVTs, EEG with increased seizure risks.    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  - Head CT 12/27: The right parietal parenchymal hemorrhage is slightly less dense and slightly smaller measuring 3.0 cm in AP diameter by 4.3 cm transversely compared with the prior of 4.1 cm in AP diameter by 5.9 cm transversely. There is similar vasogenic edema. There is also similar mass effect on the atrium of the right lateral ventricle with similar mild midline shift to the left. There is resolution of the intraventricular hemorrhage in the left occipital horn.  - EEG with increased risk of seizures in the right frontocentral region  - Briviact 50mg BID for seizure prophylaxis.  - Cont Comprehensive Rehab Program: PT/OT/ST, 3hours daily and 5 days weekly. SIVAN in progress    # self-reported history of ADHD and restless, distracted, poor attention  - xanax Rx in I stop  - Trial modafinil 50mg AM.   - psychology and recreation therapy (former )    #   - discussed with hospitalist. Patient with ascvd is moderate risk  - C/W atorvasttin 10 mg qhs 12/29    # HTN  -  Losartan discontinued due to soft BP 12/27  - BP stable, on lower side-- low 100's    # UTI  - s/p Vantin 100mg BID     # Pain management  - Tylenol PRN  - tramadol reduced to 25 q4 PRN severe pain, dc for moderate pain 12/26. Continue to wean as tolerated  - Lidocaine patch timing changed to bedtime per request. States she uses Salon Pas on left neck/shoulder at home.   - Cervical pillow for positoning midline and neck control, improved    # DVT   - b/l below the knee DVTs: right soleal vein, left soleal, peroneal and gastrocnemius veins.  - Lovenox 40 mg daily. Not cleared for full dose AC due to large ICH  - Doppler 12/20: Positive deep venous thrombosis below the right knee within the right soleal vein. Positive deep venous thrombosis below the left knee within the left soleal, peroneal and gastrocnemius veins.  - Doppler 12/27 surveillance - DVTS known - stable right improved on left.  - Repeat Head CT 12/27 improved followed by neuro consult re: possible full dose AC (day 14 post bleed)  - Neuro consult appreciated 12/28. Do not recommend full dose of AC at this time  -.Repeat HEad CT ~ 2 weeks    # GI ppx/h/o reflux  - Pepcid 20mg   - TUMS, Maalox  - Senna, miralax PRN  - metamucil added 12/29, patient reports was recommended by her PCP    # Dysphagia   - Diet: Soft and Bite sized with thin liquids    # Case discussed in IDT rounds 12/26 (initial)  - ambulates 20 feet with RW and mod assist and WC follow with max cues, mod-max assist transfers, follows occasional single step commands with poor attention and max cues, tolerating soflt-bite sized and thin liquids, severe attention deficits, poor insight, supervision eating/hygiene, total assist tub transfers, max assist toileting  - goals: mod-max assist transfer due to need for cues and poor attention, mod assist baDLs  - target: dc SIVAN 1/3/23 In progress    To inform Pauline's HCP Isabella () about the CT scan once completed    # LABS:   CBC CMP 1/2    #[facility]  CODE STATUS: FULL CODE    Outpatient Follow-up (Specialty/Name of physician):    Emy Prakash  NP in [address], [address]  [place] [address]  Phone: [phone]  Fax: [phone]  Follow Up Time: 2 weeks    Sergio Jurado  [address], [address]  [place] [address]  Phone: [phone]  Fax: [phone]  Follow Up Time: 1 month

## 2023-12-30 NOTE — PROGRESS NOTE ADULT - ASSESSMENT
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 Noted to have UTI, and BL Distal DVT    Intraparenchymal Hemorrhage  - Abnormal EEG and Briviact 50mg BID started for seizure ppx  - TTE- EF 64%.    #HTN  -BP was noted to be low, losartan stopped   - Goal BP <130mmhg Average. Good control cont care    #bradycardia  -likely neurogenic as per cardio eval prev  -monitor hr, avoid rate controllers    #UTI  - s/p Vantin 100mg BID   --resolved    #HLD  ASCVD moderate risk factor  started on low dose atorvastatin 10mg       #Pain management  - Tylenol PRN  -tramadol PRN    #DVT   - Distal asymptomatic DVT  - continue prophylactic dose

## 2023-12-31 PROCEDURE — 99232 SBSQ HOSP IP/OBS MODERATE 35: CPT | Mod: GC

## 2023-12-31 RX ADMIN — SENNA PLUS 2 TABLET(S): 8.6 TABLET ORAL at 21:05

## 2023-12-31 RX ADMIN — BRIVARACETAM 50 MILLIGRAM(S): 25 TABLET, FILM COATED ORAL at 05:45

## 2023-12-31 RX ADMIN — LIDOCAINE 1 PATCH: 4 CREAM TOPICAL at 07:22

## 2023-12-31 RX ADMIN — LIDOCAINE 1 PATCH: 4 CREAM TOPICAL at 18:32

## 2023-12-31 RX ADMIN — GABAPENTIN 300 MILLIGRAM(S): 400 CAPSULE ORAL at 21:05

## 2023-12-31 RX ADMIN — FAMOTIDINE 20 MILLIGRAM(S): 10 INJECTION INTRAVENOUS at 17:21

## 2023-12-31 RX ADMIN — ENOXAPARIN SODIUM 40 MILLIGRAM(S): 100 INJECTION SUBCUTANEOUS at 17:21

## 2023-12-31 RX ADMIN — Medication 5 MILLIGRAM(S): at 11:19

## 2023-12-31 RX ADMIN — POLYETHYLENE GLYCOL 3350 17 GRAM(S): 17 POWDER, FOR SOLUTION ORAL at 05:47

## 2023-12-31 RX ADMIN — Medication 650 MILLIGRAM(S): at 02:45

## 2023-12-31 RX ADMIN — LIDOCAINE 1 PATCH: 4 CREAM TOPICAL at 18:31

## 2023-12-31 RX ADMIN — LIDOCAINE 1 PATCH: 4 CREAM TOPICAL at 07:23

## 2023-12-31 RX ADMIN — ATORVASTATIN CALCIUM 10 MILLIGRAM(S): 80 TABLET, FILM COATED ORAL at 21:05

## 2023-12-31 RX ADMIN — Medication 1 DROP(S): at 11:18

## 2023-12-31 RX ADMIN — FAMOTIDINE 20 MILLIGRAM(S): 10 INJECTION INTRAVENOUS at 05:47

## 2023-12-31 RX ADMIN — MODAFINIL 50 MILLIGRAM(S): 200 TABLET ORAL at 05:46

## 2023-12-31 RX ADMIN — BRIVARACETAM 50 MILLIGRAM(S): 25 TABLET, FILM COATED ORAL at 17:21

## 2023-12-31 RX ADMIN — Medication 650 MILLIGRAM(S): at 03:45

## 2023-12-31 RX ADMIN — Medication 1 SPRAY(S): at 17:22

## 2023-12-31 RX ADMIN — Medication 1 PACKET(S): at 11:21

## 2023-12-31 RX ADMIN — POLYETHYLENE GLYCOL 3350 17 GRAM(S): 17 POWDER, FOR SOLUTION ORAL at 17:22

## 2023-12-31 RX ADMIN — Medication 1 SPRAY(S): at 05:47

## 2023-12-31 NOTE — PROGRESS NOTE ADULT - SUBJECTIVE AND OBJECTIVE BOX
Patient is a 73y old  Female who presents with a chief complaint of ICH (30 Dec 2023 12:55)      HPI:  This is a 74 YO RIGHT handed woman with no PMH who presented to [facility] ED on 12/14/2023, as a transfer from [facility], as a CODE STROKE, with c/o R IPH.   CTH and CTA repeated - large R IPH (temporal/parietal). Presented to [place] today with c/o HA and AMS. Friend accompanying patient said that when visiting patient today - patient was not acting like herself and c/o HA. NIHSS 9.    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 Similar midline shift of 5 mm.  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 Briviact 50mg BID for seizure prophylaxis, EEG with increased risk of seizures in the right frontocentral region.TTE- EF 64%. Continue Losartan 25mg daily. 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 UA: positive for UTI, started on IV abx and then transitioned to oral, end date 12/22.      Patient was evaluated by PM&R and therapy for functional deficits, gait/ADL impairments and acute rehabilitation was recommended. Patient was medically optimized for discharge to [facility] IRU on 12/20/23. (20 Dec 2023 17:15)        SUBJECTIVE: Patient seen and examined. No acute overnight events, slept well. No other complaints.       REVIEW OF SYSTEMS  Constitutional: No fever, + fatigue  Cardio: No chest pain, No palpitations  Resp: No SOB, no respiratory distress   Neuro: No headaches +weakness      VITALS  73y  Vital Signs Last 24 Hrs  T(C): 37.1 (31 Dec 2023 07:26), Max: 37.1 (31 Dec 2023 07:26)  T(F): 98.8 (31 Dec 2023 07:26), Max: 98.8 (31 Dec 2023 07:26)  HR: 68 (31 Dec 2023 07:26) (68 - 77)  BP: 100/60 (31 Dec 2023 07:26) (100/60 - 111/67)  BP(mean): --  RR: 16 (31 Dec 2023 07:26) (16 - 16)  SpO2: 98% (31 Dec 2023 07:26) (98% - 99%)    Parameters below as of 31 Dec 2023 07:26  Patient On (Oxygen Delivery Method): room air      Daily     Daily     PHYSICAL EXAM:  Constitutional: NAD, sitting in wheelchair  Respiratory: breathing comfortably   Cardiovascular: R1R2  Gastrointestinal: Abdomen soft, nondistended  Neurological: easily distracted, restless, confused at times  Musculoskeletal: 4/5 throughout upper and lower extremities  Psychiatric: calm          CAPILLARY BLOOD GLUCOSE          MEDICATIONS:  MEDICATIONS  (STANDING):  atorvastatin 10 milliGRAM(s) Oral at bedtime  bisacodyl 5 milliGRAM(s) Oral daily  brivaracetam 50 milliGRAM(s) Oral two times a day  enoxaparin Injectable 40 milliGRAM(s) SubCutaneous <User Schedule>  famotidine    Tablet 20 milliGRAM(s) Oral two times a day  gabapentin 300 milliGRAM(s) Oral at bedtime  ketorolac 0.5% Ophthalmic Solution 1 Drop(s) Left EYE daily  lidocaine   4% Patch 1 Patch Transdermal <User Schedule>  lidocaine   4% Patch 1 Patch Transdermal <User Schedule>  modafinil 50 milliGRAM(s) Oral <User Schedule>  polyethylene glycol 3350 17 Gram(s) Oral two times a day  psyllium Powder 1 Packet(s) Oral daily  senna 2 Tablet(s) Oral at bedtime  sodium chloride 0.65% Nasal 1 Spray(s) Both Nostrils two times a day    MEDICATIONS  (PRN):  acetaminophen     Tablet .. 650 milliGRAM(s) Oral every 6 hours PRN Temp greater or equal to 38C (100.4F), Mild Pain (1 - 3)  aluminum hydroxide/magnesium hydroxide/simethicone Suspension 30 milliLiter(s) Oral every 6 hours PRN Dyspepsia  artificial tears (preservative free) Ophthalmic Solution 1 Drop(s) Both EYES every 4 hours PRN Dry Eyes  bisacodyl Suppository 10 milliGRAM(s) Rectal daily PRN Constipation  calcium carbonate    500 mG (Tums) Chewable 1 Tablet(s) Chew three times a day PRN Heartburn  ondansetron   Disintegrating Tablet 4 milliGRAM(s) Oral every 6 hours PRN Nausea and/or Vomiting  traMADol 25 milliGRAM(s) Oral every 4 hours PRN Severe Pain (7 - 10)     Patient is a 73y old  Female who presents with a chief complaint of ICH (30 Dec 2023 12:55)      HPI:  This is a 74 YO RIGHT handed woman with no PMH who presented to [facility] ED on 12/14/2023, as a transfer from [facility], as a CODE STROKE, with c/o R IPH.   CTH and CTA repeated - large R IPH (temporal/parietal). Presented to [place] today with c/o HA and AMS. Friend accompanying patient said that when visiting patient today - patient was not acting like herself and c/o HA. NIHSS 9.    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 Similar midline shift of 5 mm.  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 Briviact 50mg BID for seizure prophylaxis, EEG with increased risk of seizures in the right frontocentral region.TTE- EF 64%. Continue Losartan 25mg daily. 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 UA: positive for UTI, started on IV abx and then transitioned to oral, end date 12/22.      Patient was evaluated by PM&R and therapy for functional deficits, gait/ADL impairments and acute rehabilitation was recommended. Patient was medically optimized for discharge to [facility] IRU on 12/20/23. (20 Dec 2023 17:15)        SUBJECTIVE: Patient seen and examined. No acute overnight events, slept well. No other complaints.       REVIEW OF SYSTEMS  Constitutional: No fever, + fatigue  Cardio: No chest pain, No palpitations  Resp: No SOB, no respiratory distress   Neuro: No headaches +weakness      VITALS  73y  Vital Signs Last 24 Hrs  T(C): 37.1 (31 Dec 2023 07:26), Max: 37.1 (31 Dec 2023 07:26)  T(F): 98.8 (31 Dec 2023 07:26), Max: 98.8 (31 Dec 2023 07:26)  HR: 68 (31 Dec 2023 07:26) (68 - 77)  BP: 100/60 (31 Dec 2023 07:26) (100/60 - 111/67)  BP(mean): --  RR: 16 (31 Dec 2023 07:26) (16 - 16)  SpO2: 98% (31 Dec 2023 07:26) (98% - 99%)    Parameters below as of 31 Dec 2023 07:26  Patient On (Oxygen Delivery Method): room air      Daily     Daily     PHYSICAL EXAM:  Constitutional: NAD, sitting in wheelchair  Respiratory: breathing comfortably   Cardiovascular: R1R2  Gastrointestinal: Abdomen soft, nondistended  Neurological: easily distracted, restless, confused at times  Musculoskeletal: 4/5 throughout upper and lower extremities  Psychiatric: calm          CAPILLARY BLOOD GLUCOSE          MEDICATIONS:  MEDICATIONS  (STANDING):  atorvastatin 10 milliGRAM(s) Oral at bedtime  bisacodyl 5 milliGRAM(s) Oral daily  brivaracetam 50 milliGRAM(s) Oral two times a day  enoxaparin Injectable 40 milliGRAM(s) SubCutaneous <User Schedule>  famotidine    Tablet 20 milliGRAM(s) Oral two times a day  gabapentin 300 milliGRAM(s) Oral at bedtime  ketorolac 0.5% Ophthalmic Solution 1 Drop(s) Left EYE daily  lidocaine   4% Patch 1 Patch Transdermal <User Schedule>  lidocaine   4% Patch 1 Patch Transdermal <User Schedule>  modafinil 50 milliGRAM(s) Oral <User Schedule>  polyethylene glycol 3350 17 Gram(s) Oral two times a day  psyllium Powder 1 Packet(s) Oral daily  senna 2 Tablet(s) Oral at bedtime  sodium chloride 0.65% Nasal 1 Spray(s) Both Nostrils two times a day    MEDICATIONS  (PRN):  acetaminophen     Tablet .. 650 milliGRAM(s) Oral every 6 hours PRN Temp greater or equal to 38C (100.4F), Mild Pain (1 - 3)  aluminum hydroxide/magnesium hydroxide/simethicone Suspension 30 milliLiter(s) Oral every 6 hours PRN Dyspepsia  artificial tears (preservative free) Ophthalmic Solution 1 Drop(s) Both EYES every 4 hours PRN Dry Eyes  bisacodyl Suppository 10 milliGRAM(s) Rectal daily PRN Constipation  calcium carbonate    500 mG (Tums) Chewable 1 Tablet(s) Chew three times a day PRN Heartburn  ondansetron   Disintegrating Tablet 4 milliGRAM(s) Oral every 6 hours PRN Nausea and/or Vomiting  traMADol 25 milliGRAM(s) Oral every 4 hours PRN Severe Pain (7 - 10)

## 2023-12-31 NOTE — PROGRESS NOTE ADULT - ASSESSMENT
74 YO RIGHT handed woman with no PMH who presented to [facility] ED on 12/14/2023, as a transfer from [facility] with R IPH. Hospital course also significant for UTI, b/l DVTs, EEG with increased seizure risks.    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  - Head CT 12/27: The right parietal parenchymal hemorrhage is slightly less dense and slightly smaller measuring 3.0 cm in AP diameter by 4.3 cm transversely compared with the prior of 4.1 cm in AP diameter by 5.9 cm transversely. There is similar vasogenic edema. There is also similar mass effect on the atrium of the right lateral ventricle with similar mild midline shift to the left. There is resolution of the intraventricular hemorrhage in the left occipital horn.  - EEG with increased risk of seizures in the right frontocentral region  - Briviact 50mg BID for seizure prophylaxis.  - Cont Comprehensive Rehab Program: PT/OT/ST, 3hours daily and 5 days weekly. SIVAN in progress    # self-reported history of ADHD and restless, distracted, poor attention  - xanax Rx in I stop  - Trial modafinil 50mg AM.   - psychology and recreation therapy (former )    #   - discussed with hospitalist. Patient with ascvd is moderate risk  - C/W atorvasttin 10 mg qhs 12/29    # HTN  -  Losartan discontinued due to soft BP 12/27  - BP stable, on lower side-- low 100's, 100/60 this AM, asymptomatic    # UTI- no urinary complaints- dysuria, urgency or increased frequency  - s/p Vantin 100mg BID     # Pain management  - Tylenol PRN  - tramadol reduced to 25 q4 PRN severe pain, dc for moderate pain 12/26. Continue to wean as tolerated  - Lidocaine patch timing changed to bedtime per request. States she uses Salon Pas on left neck/shoulder at home.   - Cervical pillow for positoning midline and neck control, improved    # DVT   - b/l below the knee DVTs: right soleal vein, left soleal, peroneal and gastrocnemius veins.  - Lovenox 40 mg daily. Not cleared for full dose AC due to large ICH  - Doppler 12/20: Positive deep venous thrombosis below the right knee within the right soleal vein. Positive deep venous thrombosis below the left knee within the left soleal, peroneal and gastrocnemius veins.  - Doppler 12/27 surveillance - DVTS known - stable right improved on left.  - Repeat Head CT 12/27 improved followed by neuro consult re: possible full dose AC (day 14 post bleed)  - Neuro consult appreciated 12/28. Do not recommend full dose of AC at this time  -.Repeat HEad CT ~ 2 weeks    # GI ppx/h/o reflux  - Pepcid 20mg   - TUMS, Maalox  - Senna, miralax PRN  - metamucil added 12/29, patient reports was recommended by her PCP    # Dysphagia   - Diet: Soft and Bite sized with thin liquids 72 YO RIGHT handed woman with no PMH who presented to [facility] ED on 12/14/2023, as a transfer from [facility] with R IPH. Hospital course also significant for UTI, b/l DVTs, EEG with increased seizure risks.    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  - Head CT 12/27: The right parietal parenchymal hemorrhage is slightly less dense and slightly smaller measuring 3.0 cm in AP diameter by 4.3 cm transversely compared with the prior of 4.1 cm in AP diameter by 5.9 cm transversely. There is similar vasogenic edema. There is also similar mass effect on the atrium of the right lateral ventricle with similar mild midline shift to the left. There is resolution of the intraventricular hemorrhage in the left occipital horn.  - EEG with increased risk of seizures in the right frontocentral region  - Briviact 50mg BID for seizure prophylaxis.  - Cont Comprehensive Rehab Program: PT/OT/ST, 3hours daily and 5 days weekly. SIVAN in progress    # self-reported history of ADHD and restless, distracted, poor attention  - xanax Rx in I stop  - Trial modafinil 50mg AM.   - psychology and recreation therapy (former )    #   - discussed with hospitalist. Patient with ascvd is moderate risk  - C/W atorvasttin 10 mg qhs 12/29    # HTN  -  Losartan discontinued due to soft BP 12/27  - BP stable, on lower side-- low 100's, 100/60 this AM, asymptomatic    # UTI- no urinary complaints- dysuria, urgency or increased frequency  - s/p Vantin 100mg BID     # Pain management  - Tylenol PRN  - tramadol reduced to 25 q4 PRN severe pain, dc for moderate pain 12/26. Continue to wean as tolerated  - Lidocaine patch timing changed to bedtime per request. States she uses Salon Pas on left neck/shoulder at home.   - Cervical pillow for positoning midline and neck control, improved    # DVT   - b/l below the knee DVTs: right soleal vein, left soleal, peroneal and gastrocnemius veins.  - Lovenox 40 mg daily. Not cleared for full dose AC due to large ICH  - Doppler 12/20: Positive deep venous thrombosis below the right knee within the right soleal vein. Positive deep venous thrombosis below the left knee within the left soleal, peroneal and gastrocnemius veins.  - Doppler 12/27 surveillance - DVTS known - stable right improved on left.  - Repeat Head CT 12/27 improved followed by neuro consult re: possible full dose AC (day 14 post bleed)  - Neuro consult appreciated 12/28. Do not recommend full dose of AC at this time  -.Repeat HEad CT ~ 2 weeks    # GI ppx/h/o reflux  - Pepcid 20mg   - TUMS, Maalox  - Senna, miralax PRN  - metamucil added 12/29, patient reports was recommended by her PCP    # Dysphagia   - Diet: Soft and Bite sized with thin liquids 74 YO RIGHT handed woman with no PMH who presented to [facility] ED on 12/14/2023, as a transfer from [facility] with R IPH. Hospital course also significant for UTI, b/l DVTs, EEG with increased seizure risks.    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  - Head CT 12/27: The right parietal parenchymal hemorrhage is slightly less dense and slightly smaller measuring 3.0 cm in AP diameter by 4.3 cm transversely compared with the prior of 4.1 cm in AP diameter by 5.9 cm transversely. There is similar vasogenic edema. There is also similar mass effect on the atrium of the right lateral ventricle with similar mild midline shift to the left. There is resolution of the intraventricular hemorrhage in the left occipital horn.  - EEG with increased risk of seizures in the right frontocentral region  - Briviact 50mg BID for seizure prophylaxis.  - Cont Comprehensive Rehab Program: PT/OT/ST, 3hours daily and 5 days weekly. SIVAN in progress    # self-reported history of ADHD and restless, distracted, poor attention  - xanax Rx in I stop  - Trial modafinil 50mg AM.   - psychology and recreation therapy (former )    #   - discussed with hospitalist. Patient with ascvd is moderate risk  - C/W atorvasttin 10 mg qhs 12/29    # HTN  -  Losartan discontinued due to soft BP 12/27  - BP stable, on lower side-- low 100's, 100/60 this AM, asymptomatic    # UTI- no urinary complaints- dysuria, urgency or increased frequency  - s/p Vantin 100mg BID     # Pain management  - Tylenol PRN  - tramadol reduced to 25 q4 PRN severe pain, dc for moderate pain 12/26. Continue to wean as tolerated  - Lidocaine patch timing changed to bedtime per request. States she uses Salon Pas on left neck/shoulder at home.   - Cervical pillow for positoning midline and neck control, improved    # DVT   - b/l below the knee DVTs: right soleal vein, left soleal, peroneal and gastrocnemius veins.  - Lovenox 40 mg daily. Not cleared for full dose AC due to large ICH  - Doppler 12/20: Positive deep venous thrombosis below the right knee within the right soleal vein. Positive deep venous thrombosis below the left knee within the left soleal, peroneal and gastrocnemius veins.  - Doppler 12/27 surveillance - DVTS known - stable right improved on left.  - Repeat Head CT 12/27 improved followed by neuro consult re: possible full dose AC (day 14 post bleed)  - Neuro consult appreciated 12/28. Do not recommend full dose of AC at this time  -.Repeat HEad CT ~ 2 weeks    # GI ppx/h/o reflux  - Pepcid 20mg   - TUMS, Maalox  - Senna, miralax PRN  - metamucil added 12/29, patient reports was recommended by her PCP    # Dysphagia   - Diet: Soft and Bite sized with thin liquids    Discussed ongoing treatment and plan with hospitalist and nursing during rounds today   72 YO RIGHT handed woman with no PMH who presented to [facility] ED on 12/14/2023, as a transfer from [facility] with R IPH. Hospital course also significant for UTI, b/l DVTs, EEG with increased seizure risks.    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  - Head CT 12/27: The right parietal parenchymal hemorrhage is slightly less dense and slightly smaller measuring 3.0 cm in AP diameter by 4.3 cm transversely compared with the prior of 4.1 cm in AP diameter by 5.9 cm transversely. There is similar vasogenic edema. There is also similar mass effect on the atrium of the right lateral ventricle with similar mild midline shift to the left. There is resolution of the intraventricular hemorrhage in the left occipital horn.  - EEG with increased risk of seizures in the right frontocentral region  - Briviact 50mg BID for seizure prophylaxis.  - Cont Comprehensive Rehab Program: PT/OT/ST, 3hours daily and 5 days weekly. SIVAN in progress    # self-reported history of ADHD and restless, distracted, poor attention  - xanax Rx in I stop  - Trial modafinil 50mg AM.   - psychology and recreation therapy (former )    #   - discussed with hospitalist. Patient with ascvd is moderate risk  - C/W atorvasttin 10 mg qhs 12/29    # HTN  -  Losartan discontinued due to soft BP 12/27  - BP stable, on lower side-- low 100's, 100/60 this AM, asymptomatic    # UTI- no urinary complaints- dysuria, urgency or increased frequency  - s/p Vantin 100mg BID     # Pain management  - Tylenol PRN  - tramadol reduced to 25 q4 PRN severe pain, dc for moderate pain 12/26. Continue to wean as tolerated  - Lidocaine patch timing changed to bedtime per request. States she uses Salon Pas on left neck/shoulder at home.   - Cervical pillow for positoning midline and neck control, improved    # DVT   - b/l below the knee DVTs: right soleal vein, left soleal, peroneal and gastrocnemius veins.  - Lovenox 40 mg daily. Not cleared for full dose AC due to large ICH  - Doppler 12/20: Positive deep venous thrombosis below the right knee within the right soleal vein. Positive deep venous thrombosis below the left knee within the left soleal, peroneal and gastrocnemius veins.  - Doppler 12/27 surveillance - DVTS known - stable right improved on left.  - Repeat Head CT 12/27 improved followed by neuro consult re: possible full dose AC (day 14 post bleed)  - Neuro consult appreciated 12/28. Do not recommend full dose of AC at this time  -.Repeat HEad CT ~ 2 weeks    # GI ppx/h/o reflux  - Pepcid 20mg   - TUMS, Maalox  - Senna, miralax PRN  - metamucil added 12/29, patient reports was recommended by her PCP    # Dysphagia   - Diet: Soft and Bite sized with thin liquids    Discussed ongoing treatment and plan with hospitalist and nursing during rounds today

## 2024-01-01 LAB
ALBUMIN SERPL ELPH-MCNC: 3 G/DL — LOW (ref 3.3–5)
ALBUMIN SERPL ELPH-MCNC: 3 G/DL — LOW (ref 3.3–5)
ALP SERPL-CCNC: 63 U/L — SIGNIFICANT CHANGE UP (ref 40–120)
ALP SERPL-CCNC: 63 U/L — SIGNIFICANT CHANGE UP (ref 40–120)
ALT FLD-CCNC: 49 U/L — HIGH (ref 10–45)
ALT FLD-CCNC: 49 U/L — HIGH (ref 10–45)
ANION GAP SERPL CALC-SCNC: 11 MMOL/L — SIGNIFICANT CHANGE UP (ref 5–17)
ANION GAP SERPL CALC-SCNC: 11 MMOL/L — SIGNIFICANT CHANGE UP (ref 5–17)
AST SERPL-CCNC: 24 U/L — SIGNIFICANT CHANGE UP (ref 10–40)
AST SERPL-CCNC: 24 U/L — SIGNIFICANT CHANGE UP (ref 10–40)
BILIRUB SERPL-MCNC: 0.4 MG/DL — SIGNIFICANT CHANGE UP (ref 0.2–1.2)
BILIRUB SERPL-MCNC: 0.4 MG/DL — SIGNIFICANT CHANGE UP (ref 0.2–1.2)
BUN SERPL-MCNC: 14 MG/DL — SIGNIFICANT CHANGE UP (ref 7–23)
BUN SERPL-MCNC: 14 MG/DL — SIGNIFICANT CHANGE UP (ref 7–23)
CALCIUM SERPL-MCNC: 9.2 MG/DL — SIGNIFICANT CHANGE UP (ref 8.4–10.5)
CALCIUM SERPL-MCNC: 9.2 MG/DL — SIGNIFICANT CHANGE UP (ref 8.4–10.5)
CHLORIDE SERPL-SCNC: 103 MMOL/L — SIGNIFICANT CHANGE UP (ref 96–108)
CHLORIDE SERPL-SCNC: 103 MMOL/L — SIGNIFICANT CHANGE UP (ref 96–108)
CO2 SERPL-SCNC: 23 MMOL/L — SIGNIFICANT CHANGE UP (ref 22–31)
CO2 SERPL-SCNC: 23 MMOL/L — SIGNIFICANT CHANGE UP (ref 22–31)
CREAT SERPL-MCNC: 0.62 MG/DL — SIGNIFICANT CHANGE UP (ref 0.5–1.3)
CREAT SERPL-MCNC: 0.62 MG/DL — SIGNIFICANT CHANGE UP (ref 0.5–1.3)
EGFR: 94 ML/MIN/1.73M2 — SIGNIFICANT CHANGE UP
EGFR: 94 ML/MIN/1.73M2 — SIGNIFICANT CHANGE UP
GLUCOSE SERPL-MCNC: 87 MG/DL — SIGNIFICANT CHANGE UP (ref 70–99)
GLUCOSE SERPL-MCNC: 87 MG/DL — SIGNIFICANT CHANGE UP (ref 70–99)
HCT VFR BLD CALC: 42.4 % — SIGNIFICANT CHANGE UP (ref 34.5–45)
HCT VFR BLD CALC: 42.4 % — SIGNIFICANT CHANGE UP (ref 34.5–45)
HGB BLD-MCNC: 15 G/DL — SIGNIFICANT CHANGE UP (ref 11.5–15.5)
HGB BLD-MCNC: 15 G/DL — SIGNIFICANT CHANGE UP (ref 11.5–15.5)
MCHC RBC-ENTMCNC: 31 PG — SIGNIFICANT CHANGE UP (ref 27–34)
MCHC RBC-ENTMCNC: 31 PG — SIGNIFICANT CHANGE UP (ref 27–34)
MCHC RBC-ENTMCNC: 35.4 GM/DL — SIGNIFICANT CHANGE UP (ref 32–36)
MCHC RBC-ENTMCNC: 35.4 GM/DL — SIGNIFICANT CHANGE UP (ref 32–36)
MCV RBC AUTO: 87.6 FL — SIGNIFICANT CHANGE UP (ref 80–100)
MCV RBC AUTO: 87.6 FL — SIGNIFICANT CHANGE UP (ref 80–100)
NRBC # BLD: 0 /100 WBCS — SIGNIFICANT CHANGE UP (ref 0–0)
NRBC # BLD: 0 /100 WBCS — SIGNIFICANT CHANGE UP (ref 0–0)
PLATELET # BLD AUTO: 316 K/UL — SIGNIFICANT CHANGE UP (ref 150–400)
PLATELET # BLD AUTO: 316 K/UL — SIGNIFICANT CHANGE UP (ref 150–400)
POTASSIUM SERPL-MCNC: 3.7 MMOL/L — SIGNIFICANT CHANGE UP (ref 3.5–5.3)
POTASSIUM SERPL-MCNC: 3.7 MMOL/L — SIGNIFICANT CHANGE UP (ref 3.5–5.3)
POTASSIUM SERPL-SCNC: 3.7 MMOL/L — SIGNIFICANT CHANGE UP (ref 3.5–5.3)
POTASSIUM SERPL-SCNC: 3.7 MMOL/L — SIGNIFICANT CHANGE UP (ref 3.5–5.3)
PROT SERPL-MCNC: 6.4 G/DL — SIGNIFICANT CHANGE UP (ref 6–8.3)
PROT SERPL-MCNC: 6.4 G/DL — SIGNIFICANT CHANGE UP (ref 6–8.3)
RBC # BLD: 4.84 M/UL — SIGNIFICANT CHANGE UP (ref 3.8–5.2)
RBC # BLD: 4.84 M/UL — SIGNIFICANT CHANGE UP (ref 3.8–5.2)
RBC # FLD: 12.9 % — SIGNIFICANT CHANGE UP (ref 10.3–14.5)
RBC # FLD: 12.9 % — SIGNIFICANT CHANGE UP (ref 10.3–14.5)
SODIUM SERPL-SCNC: 137 MMOL/L — SIGNIFICANT CHANGE UP (ref 135–145)
SODIUM SERPL-SCNC: 137 MMOL/L — SIGNIFICANT CHANGE UP (ref 135–145)
WBC # BLD: 6.65 K/UL — SIGNIFICANT CHANGE UP (ref 3.8–10.5)
WBC # BLD: 6.65 K/UL — SIGNIFICANT CHANGE UP (ref 3.8–10.5)
WBC # FLD AUTO: 6.65 K/UL — SIGNIFICANT CHANGE UP (ref 3.8–10.5)
WBC # FLD AUTO: 6.65 K/UL — SIGNIFICANT CHANGE UP (ref 3.8–10.5)

## 2024-01-01 PROCEDURE — 99232 SBSQ HOSP IP/OBS MODERATE 35: CPT

## 2024-01-01 PROCEDURE — 99233 SBSQ HOSP IP/OBS HIGH 50: CPT | Mod: GC

## 2024-01-01 RX ADMIN — Medication 1 DROP(S): at 12:32

## 2024-01-01 RX ADMIN — BRIVARACETAM 50 MILLIGRAM(S): 25 TABLET, FILM COATED ORAL at 05:33

## 2024-01-01 RX ADMIN — Medication 1 PACKET(S): at 12:30

## 2024-01-01 RX ADMIN — SENNA PLUS 2 TABLET(S): 8.6 TABLET ORAL at 21:10

## 2024-01-01 RX ADMIN — Medication 650 MILLIGRAM(S): at 03:58

## 2024-01-01 RX ADMIN — Medication 650 MILLIGRAM(S): at 21:10

## 2024-01-01 RX ADMIN — Medication 1 SPRAY(S): at 17:09

## 2024-01-01 RX ADMIN — LIDOCAINE 1 PATCH: 4 CREAM TOPICAL at 21:00

## 2024-01-01 RX ADMIN — POLYETHYLENE GLYCOL 3350 17 GRAM(S): 17 POWDER, FOR SOLUTION ORAL at 17:08

## 2024-01-01 RX ADMIN — Medication 650 MILLIGRAM(S): at 22:10

## 2024-01-01 RX ADMIN — Medication 5 MILLIGRAM(S): at 12:31

## 2024-01-01 RX ADMIN — FAMOTIDINE 20 MILLIGRAM(S): 10 INJECTION INTRAVENOUS at 05:33

## 2024-01-01 RX ADMIN — ATORVASTATIN CALCIUM 10 MILLIGRAM(S): 80 TABLET, FILM COATED ORAL at 21:10

## 2024-01-01 RX ADMIN — Medication 650 MILLIGRAM(S): at 04:58

## 2024-01-01 RX ADMIN — LIDOCAINE 1 PATCH: 4 CREAM TOPICAL at 08:00

## 2024-01-01 RX ADMIN — POLYETHYLENE GLYCOL 3350 17 GRAM(S): 17 POWDER, FOR SOLUTION ORAL at 05:33

## 2024-01-01 RX ADMIN — FAMOTIDINE 20 MILLIGRAM(S): 10 INJECTION INTRAVENOUS at 17:06

## 2024-01-01 RX ADMIN — GABAPENTIN 300 MILLIGRAM(S): 400 CAPSULE ORAL at 21:10

## 2024-01-01 RX ADMIN — LIDOCAINE 1 PATCH: 4 CREAM TOPICAL at 08:01

## 2024-01-01 RX ADMIN — ENOXAPARIN SODIUM 40 MILLIGRAM(S): 100 INJECTION SUBCUTANEOUS at 17:09

## 2024-01-01 RX ADMIN — Medication 1 SPRAY(S): at 05:33

## 2024-01-01 RX ADMIN — BRIVARACETAM 50 MILLIGRAM(S): 25 TABLET, FILM COATED ORAL at 17:08

## 2024-01-01 RX ADMIN — MODAFINIL 50 MILLIGRAM(S): 200 TABLET ORAL at 05:33

## 2024-01-01 NOTE — PROGRESS NOTE ADULT - ASSESSMENT
72 YO RIGHT handed woman with no PMH who presented to [facility] ED on 12/14/2023, as a transfer from [facility] with R IPH. Hospital course also significant for UTI, b/l DVTs, EEG with increased seizure risks.    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  - Head CT 12/27: The right parietal parenchymal hemorrhage is slightly less dense and slightly smaller measuring 3.0 cm in AP diameter by 4.3 cm transversely compared with the prior of 4.1 cm in AP diameter by 5.9 cm transversely. There is similar vasogenic edema. There is also similar mass effect on the atrium of the right lateral ventricle with similar mild midline shift to the left. There is resolution of the intraventricular hemorrhage in the left occipital horn.  - EEG with increased risk of seizures in the right frontocentral region  - Briviact 50mg BID for seizure prophylaxis.  - Cont Comprehensive Rehab Program: PT/OT/ST, 3hours daily and 5 days weekly. SIVAN in progress    # self-reported history of ADHD and restless, distracted, poor attention  - xanax Rx in I stop  - Trial modafinil 50mg AM.   - psychology and recreation therapy (former )    #   - discussed with hospitalist. Patient with ascvd is moderate risk  - C/W atorvasttin 10 mg qhs 12/29    # HTN  -  Losartan discontinued due to soft BP 12/27  - BP stable, on lower side-- 110/60 this AM    # UTI- no urinary complaints- dysuria, urgency or increased frequency  - s/p Vantin 100mg BID     # Pain management  - Tylenol PRN  - tramadol reduced to 25 q4 PRN severe pain, dc for moderate pain 12/26. Continue to wean as tolerated  - Lidocaine patch timing changed to bedtime per request. States she uses Salon Pas on left neck/shoulder at home.   - Cervical pillow for positoning midline and neck control, improved    # DVT   - b/l below the knee DVTs: right soleal vein, left soleal, peroneal and gastrocnemius veins.  - Lovenox 40 mg daily. Not cleared for full dose AC due to large ICH  - Doppler 12/20: Positive deep venous thrombosis below the right knee within the right soleal vein. Positive deep venous thrombosis below the left knee within the left soleal, peroneal and gastrocnemius veins.  - Doppler 12/27 surveillance - DVTS known - stable right improved on left.  - Repeat Head CT 12/27 improved followed by neuro consult re: possible full dose AC (day 14 post bleed)  - Neuro consult appreciated 12/28. Do not recommend full dose of AC at this time  -.Repeat HEad CT ~ 2 weeks    # GI ppx/h/o reflux  - Pepcid 20mg   - TUMS, Maalox  - Senna, miralax PRN  - metamucil added 12/29, patient reports was recommended by her PCP    # Dysphagia   - Diet: Soft and Bite sized with thin liquids    Reviewed labs: CBC/CMP-- HgB stable at 15  Discussed ongoing treatment and plan with hospitalist and nursing during rounds today   72 YO RIGHT handed woman with no PMH who presented to [facility] ED on 12/14/2023, as a transfer from [facility] with R IPH. Hospital course also significant for UTI, b/l DVTs, EEG with increased seizure risks.    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  - Head CT 12/27: The right parietal parenchymal hemorrhage is slightly less dense and slightly smaller measuring 3.0 cm in AP diameter by 4.3 cm transversely compared with the prior of 4.1 cm in AP diameter by 5.9 cm transversely. There is similar vasogenic edema. There is also similar mass effect on the atrium of the right lateral ventricle with similar mild midline shift to the left. There is resolution of the intraventricular hemorrhage in the left occipital horn.  - EEG with increased risk of seizures in the right frontocentral region  - Briviact 50mg BID for seizure prophylaxis.  - Cont Comprehensive Rehab Program: PT/OT/ST, 3hours daily and 5 days weekly. SIVAN in progress    # self-reported history of ADHD and restless, distracted, poor attention  - xanax Rx in I stop  - Trial modafinil 50mg AM.   - psychology and recreation therapy (former )    #   - discussed with hospitalist. Patient with ascvd is moderate risk  - C/W atorvasttin 10 mg qhs 12/29    # HTN  -  Losartan discontinued due to soft BP 12/27  - BP stable, on lower side-- 110/60 this AM    # UTI- no urinary complaints- dysuria, urgency or increased frequency  - s/p Vantin 100mg BID     # Pain management  - Tylenol PRN  - tramadol reduced to 25 q4 PRN severe pain, dc for moderate pain 12/26. Continue to wean as tolerated  - Lidocaine patch timing changed to bedtime per request. States she uses Salon Pas on left neck/shoulder at home.   - Cervical pillow for positoning midline and neck control, improved    # DVT   - b/l below the knee DVTs: right soleal vein, left soleal, peroneal and gastrocnemius veins.  - Lovenox 40 mg daily. Not cleared for full dose AC due to large ICH  - Doppler 12/20: Positive deep venous thrombosis below the right knee within the right soleal vein. Positive deep venous thrombosis below the left knee within the left soleal, peroneal and gastrocnemius veins.  - Doppler 12/27 surveillance - DVTS known - stable right improved on left.  - Repeat Head CT 12/27 improved followed by neuro consult re: possible full dose AC (day 14 post bleed)  - Neuro consult appreciated 12/28. Do not recommend full dose of AC at this time  -.Repeat HEad CT ~ 2 weeks    # GI ppx/h/o reflux  - Pepcid 20mg   - TUMS, Maalox  - Senna, miralax PRN  - metamucil added 12/29, patient reports was recommended by her PCP    # Dysphagia   - Diet: Soft and Bite sized with thin liquids    Reviewed labs: CBC/CMP-- HgB stable at 15  Discussed ongoing treatment and plan with hospitalist and nursing during rounds today

## 2024-01-01 NOTE — PROGRESS NOTE ADULT - SUBJECTIVE AND OBJECTIVE BOX
Patient is a 73y old  Female who presents with a chief complaint of ICH (31 Dec 2023 10:46)      HPI:  This is a 74 YO RIGHT handed woman with no PMH who presented to [facility] ED on 12/14/2023, as a transfer from [facility], as a CODE STROKE, with c/o R IPH.   CTH and CTA repeated - large R IPH (temporal/parietal). Presented to [place] today with c/o HA and AMS. Friend accompanying patient said that when visiting patient today - patient was not acting like herself and c/o HA. NIHSS 9.    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 Similar midline shift of 5 mm.  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 Briviact 50mg BID for seizure prophylaxis, EEG with increased risk of seizures in the right frontocentral region.TTE- EF 64%. Continue Losartan 25mg daily. 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 UA: positive for UTI, started on IV abx and then transitioned to oral, end date 12/22.      Patient was evaluated by PM&R and therapy for functional deficits, gait/ADL impairments and acute rehabilitation was recommended. Patient was medically optimized for discharge to [facility] IRU on 12/20/23. (20 Dec 2023 17:15)      SUBJECTIVE: Patient seen and examined. No acute overnight events, reported to have slept by nursing. Impaired attention and needs redirection.    REVIEW OF SYSTEMS  Constitutional: No fever, + fatigue  Cardio: No chest pain, No palpitations  Resp: No SOB, no respiratory distress   Neuro: No headaches +weakness    VITALS  73y  Vital Signs Last 24 Hrs  T(C): 36.6 (01 Jan 2024 08:34), Max: 36.6 (01 Jan 2024 08:34)  T(F): 97.8 (01 Jan 2024 08:34), Max: 97.8 (01 Jan 2024 08:34)  HR: 88 (01 Jan 2024 08:34) (87 - 88)  BP: 110/60 (01 Jan 2024 08:34) (107/72 - 110/60)  BP(mean): --  RR: 15 (01 Jan 2024 08:34) (15 - 15)  SpO2: 95% (01 Jan 2024 08:34) (95% - 97%)    Parameters below as of 01 Jan 2024 08:34  Patient On (Oxygen Delivery Method): room air        PHYSICAL EXAM:  Constitutional: NAD, sitting in wheelchair  Respiratory: breathing comfortably   Cardiovascular: R1R2  Gastrointestinal: Abdomen soft, nondistended  Neurological: easily distracted, restless, confused at times  Musculoskeletal: 4/5 throughout upper and lower extremities  Psychiatric: calm      RECENT LABS:                        15.0   6.65  )-----------( 316      ( 01 Jan 2024 06:41 )             42.4     01-01    137  |  103  |  14  ----------------------------<  87  3.7   |  23  |  0.62    Ca    9.2      01 Jan 2024 06:41    TPro  6.4  /  Alb  3.0<L>  /  TBili  0.4  /  DBili  x   /  AST  24  /  ALT  49<H>  /  AlkPhos  63  01-01    LIVER FUNCTIONS - ( 01 Jan 2024 06:41 )  Alb: 3.0 g/dL / Pro: 6.4 g/dL / ALK PHOS: 63 U/L / ALT: 49 U/L / AST: 24 U/L / GGT: x             Urinalysis Basic - ( 01 Jan 2024 06:41 )    Color: x / Appearance: x / SG: x / pH: x  Gluc: 87 mg/dL / Ketone: x  / Bili: x / Urobili: x   Blood: x / Protein: x / Nitrite: x   Leuk Esterase: x / RBC: x / WBC x   Sq Epi: x / Non Sq Epi: x / Bacteria: x          CAPILLARY BLOOD GLUCOSE            MEDICATIONS:  MEDICATIONS  (STANDING):  atorvastatin 10 milliGRAM(s) Oral at bedtime  bisacodyl 5 milliGRAM(s) Oral daily  brivaracetam 50 milliGRAM(s) Oral two times a day  enoxaparin Injectable 40 milliGRAM(s) SubCutaneous <User Schedule>  famotidine    Tablet 20 milliGRAM(s) Oral two times a day  gabapentin 300 milliGRAM(s) Oral at bedtime  ketorolac 0.5% Ophthalmic Solution 1 Drop(s) Left EYE daily  lidocaine   4% Patch 1 Patch Transdermal <User Schedule>  lidocaine   4% Patch 1 Patch Transdermal <User Schedule>  modafinil 50 milliGRAM(s) Oral <User Schedule>  polyethylene glycol 3350 17 Gram(s) Oral two times a day  psyllium Powder 1 Packet(s) Oral daily  senna 2 Tablet(s) Oral at bedtime  sodium chloride 0.65% Nasal 1 Spray(s) Both Nostrils two times a day    MEDICATIONS  (PRN):  acetaminophen     Tablet .. 650 milliGRAM(s) Oral every 6 hours PRN Temp greater or equal to 38C (100.4F), Mild Pain (1 - 3)  aluminum hydroxide/magnesium hydroxide/simethicone Suspension 30 milliLiter(s) Oral every 6 hours PRN Dyspepsia  artificial tears (preservative free) Ophthalmic Solution 1 Drop(s) Both EYES every 4 hours PRN Dry Eyes  bisacodyl Suppository 10 milliGRAM(s) Rectal daily PRN Constipation  calcium carbonate    500 mG (Tums) Chewable 1 Tablet(s) Chew three times a day PRN Heartburn  ondansetron   Disintegrating Tablet 4 milliGRAM(s) Oral every 6 hours PRN Nausea and/or Vomiting  traMADol 25 milliGRAM(s) Oral every 4 hours PRN Severe Pain (7 - 10)     Patient is a 73y old  Female who presents with a chief complaint of ICH (31 Dec 2023 10:46)      HPI:  This is a 72 YO RIGHT handed woman with no PMH who presented to [facility] ED on 12/14/2023, as a transfer from [facility], as a CODE STROKE, with c/o R IPH.   CTH and CTA repeated - large R IPH (temporal/parietal). Presented to [place] today with c/o HA and AMS. Friend accompanying patient said that when visiting patient today - patient was not acting like herself and c/o HA. NIHSS 9.    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 Similar midline shift of 5 mm.  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 Briviact 50mg BID for seizure prophylaxis, EEG with increased risk of seizures in the right frontocentral region.TTE- EF 64%. Continue Losartan 25mg daily. 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 UA: positive for UTI, started on IV abx and then transitioned to oral, end date 12/22.      Patient was evaluated by PM&R and therapy for functional deficits, gait/ADL impairments and acute rehabilitation was recommended. Patient was medically optimized for discharge to [facility] IRU on 12/20/23. (20 Dec 2023 17:15)      SUBJECTIVE: Patient seen and examined. No acute overnight events, reported to have slept by nursing. Impaired attention and needs redirection.    REVIEW OF SYSTEMS  Constitutional: No fever, + fatigue  Cardio: No chest pain, No palpitations  Resp: No SOB, no respiratory distress   Neuro: No headaches +weakness    VITALS  73y  Vital Signs Last 24 Hrs  T(C): 36.6 (01 Jan 2024 08:34), Max: 36.6 (01 Jan 2024 08:34)  T(F): 97.8 (01 Jan 2024 08:34), Max: 97.8 (01 Jan 2024 08:34)  HR: 88 (01 Jan 2024 08:34) (87 - 88)  BP: 110/60 (01 Jan 2024 08:34) (107/72 - 110/60)  BP(mean): --  RR: 15 (01 Jan 2024 08:34) (15 - 15)  SpO2: 95% (01 Jan 2024 08:34) (95% - 97%)    Parameters below as of 01 Jan 2024 08:34  Patient On (Oxygen Delivery Method): room air        PHYSICAL EXAM:  Constitutional: NAD, sitting in wheelchair  Respiratory: breathing comfortably   Cardiovascular: R1R2  Gastrointestinal: Abdomen soft, nondistended  Neurological: easily distracted, restless, confused at times  Musculoskeletal: 4/5 throughout upper and lower extremities  Psychiatric: calm      RECENT LABS:                        15.0   6.65  )-----------( 316      ( 01 Jan 2024 06:41 )             42.4     01-01    137  |  103  |  14  ----------------------------<  87  3.7   |  23  |  0.62    Ca    9.2      01 Jan 2024 06:41    TPro  6.4  /  Alb  3.0<L>  /  TBili  0.4  /  DBili  x   /  AST  24  /  ALT  49<H>  /  AlkPhos  63  01-01    LIVER FUNCTIONS - ( 01 Jan 2024 06:41 )  Alb: 3.0 g/dL / Pro: 6.4 g/dL / ALK PHOS: 63 U/L / ALT: 49 U/L / AST: 24 U/L / GGT: x             Urinalysis Basic - ( 01 Jan 2024 06:41 )    Color: x / Appearance: x / SG: x / pH: x  Gluc: 87 mg/dL / Ketone: x  / Bili: x / Urobili: x   Blood: x / Protein: x / Nitrite: x   Leuk Esterase: x / RBC: x / WBC x   Sq Epi: x / Non Sq Epi: x / Bacteria: x          CAPILLARY BLOOD GLUCOSE            MEDICATIONS:  MEDICATIONS  (STANDING):  atorvastatin 10 milliGRAM(s) Oral at bedtime  bisacodyl 5 milliGRAM(s) Oral daily  brivaracetam 50 milliGRAM(s) Oral two times a day  enoxaparin Injectable 40 milliGRAM(s) SubCutaneous <User Schedule>  famotidine    Tablet 20 milliGRAM(s) Oral two times a day  gabapentin 300 milliGRAM(s) Oral at bedtime  ketorolac 0.5% Ophthalmic Solution 1 Drop(s) Left EYE daily  lidocaine   4% Patch 1 Patch Transdermal <User Schedule>  lidocaine   4% Patch 1 Patch Transdermal <User Schedule>  modafinil 50 milliGRAM(s) Oral <User Schedule>  polyethylene glycol 3350 17 Gram(s) Oral two times a day  psyllium Powder 1 Packet(s) Oral daily  senna 2 Tablet(s) Oral at bedtime  sodium chloride 0.65% Nasal 1 Spray(s) Both Nostrils two times a day    MEDICATIONS  (PRN):  acetaminophen     Tablet .. 650 milliGRAM(s) Oral every 6 hours PRN Temp greater or equal to 38C (100.4F), Mild Pain (1 - 3)  aluminum hydroxide/magnesium hydroxide/simethicone Suspension 30 milliLiter(s) Oral every 6 hours PRN Dyspepsia  artificial tears (preservative free) Ophthalmic Solution 1 Drop(s) Both EYES every 4 hours PRN Dry Eyes  bisacodyl Suppository 10 milliGRAM(s) Rectal daily PRN Constipation  calcium carbonate    500 mG (Tums) Chewable 1 Tablet(s) Chew three times a day PRN Heartburn  ondansetron   Disintegrating Tablet 4 milliGRAM(s) Oral every 6 hours PRN Nausea and/or Vomiting  traMADol 25 milliGRAM(s) Oral every 4 hours PRN Severe Pain (7 - 10)

## 2024-01-01 NOTE — PROGRESS NOTE ADULT - SUBJECTIVE AND OBJECTIVE BOX
Hospitalist  Dr. Bonnie Jasmine  Progress note    CC: Patient is a 73y old  Female who presents with a chief complaint of ICH (25 Dec 2023 09:15)    Interval History:  Patient seen and examined at bedside. Wants to make sure she is getting metamucil. She is having BMs but reports its hard. I reassured her that she is getting metamucil.     No overnight events    ALLERGIES:  No Known Allergies  oxycodone (Nausea)    MEDICATIONS  (STANDING):  bisacodyl 5 milliGRAM(s) Oral daily  brivaracetam 50 milliGRAM(s) Oral two times a day  enoxaparin Injectable 40 milliGRAM(s) SubCutaneous <User Schedule>  famotidine    Tablet 20 milliGRAM(s) Oral two times a day  gabapentin 300 milliGRAM(s) Oral at bedtime  ketorolac 0.5% Ophthalmic Solution 1 Drop(s) Left EYE daily  lidocaine   4% Patch 1 Patch Transdermal <User Schedule>  losartan 25 milliGRAM(s) Oral daily  polyethylene glycol 3350 17 Gram(s) Oral two times a day  potassium chloride    Tablet ER 40 milliEquivalent(s) Oral two times a day  senna 2 Tablet(s) Oral at bedtime  sodium chloride 0.65% Nasal 1 Spray(s) Both Nostrils two times a day    MEDICATIONS  (PRN):  acetaminophen     Tablet .. 650 milliGRAM(s) Oral every 6 hours PRN Temp greater or equal to 38C (100.4F), Mild Pain (1 - 3)  aluminum hydroxide/magnesium hydroxide/simethicone Suspension 30 milliLiter(s) Oral every 6 hours PRN Dyspepsia  artificial tears (preservative free) Ophthalmic Solution 1 Drop(s) Both EYES every 4 hours PRN Dry Eyes  bisacodyl Suppository 10 milliGRAM(s) Rectal daily PRN Constipation  calcium carbonate    500 mG (Tums) Chewable 1 Tablet(s) Chew three times a day PRN Heartburn  ondansetron   Disintegrating Tablet 4 milliGRAM(s) Oral every 6 hours PRN Nausea and/or Vomiting  traMADol 50 milliGRAM(s) Oral every 4 hours PRN Severe Pain (7 - 10)  traMADol 25 milliGRAM(s) Oral every 4 hours PRN Moderate Pain (4 - 6)    Vital Signs Last 24 Hrs  T(C): 36.6 (01 Jan 2024 08:34), Max: 36.6 (01 Jan 2024 08:34)  T(F): 97.8 (01 Jan 2024 08:34), Max: 97.8 (01 Jan 2024 08:34)  HR: 88 (01 Jan 2024 08:34) (87 - 88)  BP: 110/60 (01 Jan 2024 08:34) (107/72 - 110/60)  BP(mean): --  RR: 15 (01 Jan 2024 08:34) (15 - 15)  SpO2: 95% (01 Jan 2024 08:34) (95% - 97%)    Parameters below as of 01 Jan 2024 08:34  Patient On (Oxygen Delivery Method): room air    PHYSICAL EXAM:  GENERAL: NAD, sitting in chair  CHEST/LUNG: Clear to percussion bilaterally; No rales, rhonchi, wheezing, or rubs; normal respiratory effort, no intercostal retractions  HEART: Regular rate and rhythm; No murmurs, rubs, or gallops; No pitting edema  ABDOMEN: Soft, Nontender, Nondistended; Bowel sounds present; No HSM or masses  MUSCULOSKELETAL/EXTREMITIES:  2+ Peripheral Pulses, No clubbing or digital cyanosis; FROM of extremities (pain, crepitation or contracture)  PSYCH: Appropriate affect, Alert & Awake    LABS:                 Urinalysis Basic - ( 25 Dec 2023 06:00 )  Color: x /Appearance: x / SG: x / pH: x  Gluc: 102 mg/dL / Ketone: x  / Bili: x / Urobili: x   Blood: x / Protein: x / Nitrite: x   Leuk Esterase: x / RBC: x / WBC x   Sq Epi: x / Non Sq Epi: x / Bacteria: x    Culture - Urine (collected 19 Dec 2023 18:10)  Source: Clean Catch Clean Catch (Midstream)  Final Report (24 Dec 2023 13:10):    >100,000 CFU/ml Escherichia coli ESBL    >100,000 CFU/ml Enterococcus faecalis  Organism: Escherichia coli ESBL  Enterococcus faecalis (24 Dec 2023 13:10)  Organism: Enterococcus faecalis (24 Dec 2023 13:10)      Method Type: MATTHEW      -  Ampicillin: S <=2 Predicts results to ampicillin/sulbactam, amoxacillin-clavulanate and  piperacillin-tazobactam.      -  Ciprofloxacin: I 2      -  Levofloxacin: S 2      -  Nitrofurantoin: S <=32 Should not be used to treat pyelonephritis.      -  Tetracycline: S <=1      -  Vancomycin: S 2  Organism: Escherichia coli ESBL (24 Dec 2023 13:10)      Method Type: MATTHEW      -  Amoxicillin/Clavulanic Acid: S <=8/4      -  Ampicillin: R >16 These ampicillin results predict results for amoxicillin      -  Ampicillin/Sulbactam: S 8/4      -  Aztreonam: R 16      -  Cefazolin: R >16 For uncomplicated UTI with K. pneumoniae, E. coli, or P. mirablis: MATTHEW <=16 is sensitive and MATTHEW >=32 is resistant. This also predicts results for oral agents cefaclor, cefdinir, cefpodoxime, cefprozil, cefuroxime axetil, cephalexin and locarbef for uncomplicated UTI. Note that some isolates may be susceptible to these agents while testing resistant to cefazolin.      -  Cefepime: R >16      -  Ceftriaxone: R >32      -  Cefuroxime: R >16      -  Ciprofloxacin: R >2      -  Ertapenem: S <=0.5      -  Gentamicin: S <=2      -  Imipenem: S <=1      -  Levofloxacin: R >4      -  Meropenem: S <=1      -  Nitrofurantoin: S <=32 Should not be used to treat pyelonephritis      -  Piperacillin/Tazobactam: S <=8      -  Tobramycin: S <=2      -  Trimethoprim/Sulfamethoxazole: S <=0.5/9.5    COVID-19 PCR: NotDebra (12-20-23 @ 22:30)    Care Discussed with Consultants/Other Providers: Yes

## 2024-01-02 PROCEDURE — 99232 SBSQ HOSP IP/OBS MODERATE 35: CPT

## 2024-01-02 PROCEDURE — 90832 PSYTX W PT 30 MINUTES: CPT

## 2024-01-02 RX ORDER — BRIVARACETAM 25 MG/1
50 TABLET, FILM COATED ORAL
Refills: 0 | Status: DISCONTINUED | OUTPATIENT
Start: 2024-01-02 | End: 2024-01-08

## 2024-01-02 RX ADMIN — LIDOCAINE 1 PATCH: 4 CREAM TOPICAL at 08:27

## 2024-01-02 RX ADMIN — GABAPENTIN 300 MILLIGRAM(S): 400 CAPSULE ORAL at 21:32

## 2024-01-02 RX ADMIN — LIDOCAINE 1 PATCH: 4 CREAM TOPICAL at 20:22

## 2024-01-02 RX ADMIN — Medication 650 MILLIGRAM(S): at 04:20

## 2024-01-02 RX ADMIN — LIDOCAINE 1 PATCH: 4 CREAM TOPICAL at 17:48

## 2024-01-02 RX ADMIN — MODAFINIL 50 MILLIGRAM(S): 200 TABLET ORAL at 05:42

## 2024-01-02 RX ADMIN — BRIVARACETAM 50 MILLIGRAM(S): 25 TABLET, FILM COATED ORAL at 17:53

## 2024-01-02 RX ADMIN — Medication 1 SPRAY(S): at 05:43

## 2024-01-02 RX ADMIN — ENOXAPARIN SODIUM 40 MILLIGRAM(S): 100 INJECTION SUBCUTANEOUS at 17:53

## 2024-01-02 RX ADMIN — FAMOTIDINE 20 MILLIGRAM(S): 10 INJECTION INTRAVENOUS at 05:42

## 2024-01-02 RX ADMIN — FAMOTIDINE 20 MILLIGRAM(S): 10 INJECTION INTRAVENOUS at 17:53

## 2024-01-02 RX ADMIN — Medication 1 SPRAY(S): at 17:57

## 2024-01-02 RX ADMIN — Medication 1 DROP(S): at 11:37

## 2024-01-02 RX ADMIN — BRIVARACETAM 50 MILLIGRAM(S): 25 TABLET, FILM COATED ORAL at 05:42

## 2024-01-02 RX ADMIN — Medication 650 MILLIGRAM(S): at 20:26

## 2024-01-02 RX ADMIN — Medication 650 MILLIGRAM(S): at 21:15

## 2024-01-02 RX ADMIN — Medication 650 MILLIGRAM(S): at 03:20

## 2024-01-02 RX ADMIN — ATORVASTATIN CALCIUM 10 MILLIGRAM(S): 80 TABLET, FILM COATED ORAL at 21:40

## 2024-01-02 RX ADMIN — Medication 1 DROP(S): at 04:03

## 2024-01-02 NOTE — PROGRESS NOTE ADULT - ASSESSMENT
74 YO RIGHT handed woman with no PMH who presented to [facility] ED on 12/14/2023, as a transfer from [facility] with R IPH. Hospital course also significant for UTI, b/l DVTs, EEG with increased seizure risks.    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  - Head CT 12/27: The right parietal parenchymal hemorrhage is slightly less dense and slightly smaller measuring 3.0 cm in AP diameter by 4.3 cm transversely compared with the prior of 4.1 cm in AP diameter by 5.9 cm transversely. There is similar vasogenic edema. There is also similar mass effect on the atrium of the right lateral ventricle with similar mild midline shift to the left. There is resolution of the intraventricular hemorrhage in the left occipital horn.  - EEG with increased risk of seizures in the right frontocentral region  - Briviact 50mg BID for seizure prophylaxis.  - Cont Comprehensive Rehab Program: PT/OT/ST, 3hours daily and 5 days weekly. SIVAN in progress    # self-reported history of ADHD and restless, distracted, poor attention  - xanax Rx in I stop  - Trial modafinil 50mg AM.   - psychology and recreation therapy (former )    #   - discussed with hospitalist. Patient with ascvd is moderate risk  - C/W atorvasttin 10 mg qhs 12/29    # HTN  -  Losartan discontinued due to soft BP 12/27  - BP stable, on lower side-- 110/60 this AM    # UTI- no urinary complaints- dysuria, urgency or increased frequency  - s/p Vantin 100mg BID     # Pain management  - Tylenol PRN  - tramadol reduced to 25 q4 PRN severe pain, dc for moderate pain 12/26. Continue to wean as tolerated  - Lidocaine patch timing changed to bedtime per request. States she uses Salon Pas on left neck/shoulder at home.   - Cervical pillow for positoning midline and neck control, improved    # DVT   - b/l below the knee DVTs: right soleal vein, left soleal, peroneal and gastrocnemius veins.  - Lovenox 40 mg daily. Not cleared for full dose AC due to large ICH  - Doppler 12/20: Positive deep venous thrombosis below the right knee within the right soleal vein. Positive deep venous thrombosis below the left knee within the left soleal, peroneal and gastrocnemius veins.  - Doppler 12/27 surveillance - DVTS known - stable right improved on left.  - Repeat Head CT 12/27 improved followed by neuro consult re: possible full dose AC (day 14 post bleed)  - Neuro consult appreciated 12/28. Do not recommend full dose of AC at this time  -.Repeat HEad CT ~ 2 weeks    # GI ppx/h/o reflux  - Pepcid 20mg   - TUMS, Maalox  - Senna, miralax PRN  - metamucil added 12/29, patient reports was recommended by her PCP    # Dysphagia   - Diet: Soft and Bite sized with thin liquids    Reviewed labs: CBC/CMP-- HgB stable at 15  Discussed ongoing treatment and plan with hospitalist and nursing during rounds today   72 YO RIGHT handed woman with no PMH who presented to [facility] ED on 12/14/2023, as a transfer from [facility] with R IPH. Hospital course also significant for UTI, b/l DVTs, EEG with increased seizure risks.    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  - Head CT 12/27: The right parietal parenchymal hemorrhage is slightly less dense and slightly smaller measuring 3.0 cm in AP diameter by 4.3 cm transversely compared with the prior of 4.1 cm in AP diameter by 5.9 cm transversely. There is similar vasogenic edema. There is also similar mass effect on the atrium of the right lateral ventricle with similar mild midline shift to the left. There is resolution of the intraventricular hemorrhage in the left occipital horn.  - EEG with increased risk of seizures in the right frontocentral region  - Briviact 50mg BID for seizure prophylaxis.  - Cont Comprehensive Rehab Program: PT/OT/ST, 3hours daily and 5 days weekly. SIVAN in progress    # self-reported history of ADHD and restless, distracted, poor attention  - xanax Rx in I stop  - Trial modafinil 50mg AM.   - psychology and recreation therapy (former )    #   - discussed with hospitalist. Patient with ascvd is moderate risk  - C/W atorvasttin 10 mg qhs 12/29    # HTN  -  Losartan discontinued due to soft BP 12/27  - BP stable, on lower side-- 110/60 this AM    # UTI- no urinary complaints- dysuria, urgency or increased frequency  - s/p Vantin 100mg BID     # Pain management  - Tylenol PRN  - tramadol reduced to 25 q4 PRN severe pain, dc for moderate pain 12/26. Continue to wean as tolerated  - Lidocaine patch timing changed to bedtime per request. States she uses Salon Pas on left neck/shoulder at home.   - Cervical pillow for positoning midline and neck control, improved    # DVT   - b/l below the knee DVTs: right soleal vein, left soleal, peroneal and gastrocnemius veins.  - Lovenox 40 mg daily. Not cleared for full dose AC due to large ICH  - Doppler 12/20: Positive deep venous thrombosis below the right knee within the right soleal vein. Positive deep venous thrombosis below the left knee within the left soleal, peroneal and gastrocnemius veins.  - Doppler 12/27 surveillance - DVTS known - stable right improved on left.  - Repeat Head CT 12/27 improved followed by neuro consult re: possible full dose AC (day 14 post bleed)  - Neuro consult appreciated 12/28. Do not recommend full dose of AC at this time  -.Repeat HEad CT ~ 2 weeks    # GI ppx/h/o reflux  - Pepcid 20mg   - TUMS, Maalox  - Senna, miralax PRN  - metamucil added 12/29, patient reports was recommended by her PCP    # Dysphagia   - Diet: Soft and Bite sized with thin liquids    Reviewed labs: CBC/CMP-- HgB stable at 15  Discussed ongoing treatment and plan with hospitalist and nursing during rounds today   74 YO RIGHT handed woman with no PMH who presented to [facility] ED on 12/14/2023, as a transfer from [facility] with R IPH. Hospital course also significant for UTI, b/l DVTs, EEG with increased seizure risks.    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  - Head CT 12/27: The right parietal parenchymal hemorrhage is slightly less dense and slightly smaller measuring 3.0 cm in AP diameter by 4.3 cm transversely compared with the prior of 4.1 cm in AP diameter by 5.9 cm transversely. There is similar vasogenic edema. There is also similar mass effect on the atrium of the right lateral ventricle with similar mild midline shift to the left. There is resolution of the intraventricular hemorrhage in the left occipital horn.  - EEG with increased risk of seizures in the right frontocentral region  - Briviact 50mg BID for seizure prophylaxis.  - Cont Comprehensive Rehab Program: PT/OT/ST, 3hours daily and 5 days weekly. SIVAN in progress    # self-reported history of ADHD and restless, distracted, poor attention  - xanax Rx in I stop  - Trial modafinil 50mg AM started 12/30  - psych consult appreciated  - psychology and recreation therapy (former )    #   - discussed with hospitalist. Patient with ascvd is moderate risk  - C/W atorvasttin 10 mg qhs 12/29    # HTN  -  Losartan discontinued due to soft BP 12/27  - BP stable continue monitor    # UTI  - s/p Vantin 100mg BID     # Pain management  - Tylenol PRN  - tramadol reduced to 25 q4 PRN severe pain, dc for moderate pain 12/26. Continue to wean as tolerated  - Lidocaine patch timing changed to bedtime per request. States she uses Salon Pas on left neck/shoulder at home.   - Cervical pillow for postioning midline and neck control, improved    # DVT   - b/l below the knee DVTs: right soleal vein, left soleal, peroneal and gastrocnemius veins.  - Lovenox 40 mg daily. Not cleared for full dose AC due to large ICH  - Doppler 12/20: Positive deep venous thrombosis below the right knee within the right soleal vein. Positive deep venous thrombosis below the left knee within the left soleal, peroneal and gastrocnemius veins.  - Doppler 12/27 surveillance - DVTS known - stable right improved on left.  - Repeat Head CT 12/27 improved followed by neuro consult re: possible full dose AC (day 14 post bleed)  - Neuro consult appreciated 12/28. Do not recommend full dose of AC at this time  -Repeat Head CT ~ 2 weeks    # GI ppx/h/o reflux  - Pepcid 20mg   - TUMS, Maalox  - Senna, miralax PRN  - metamucil added 12/29, patient reports was recommended by her PCP    # Dysphagia   - Diet: Soft and Bite sized with thin liquids  -c/w AREDS 2 vitamin (home med)    #Case discussed in IDT rounds 1/2/24  -SLP: soft bite size; severe cognitive deficits,  left inattentiveness with max cueing  -OT: Eating/grooming supervision; Footwear/showering Max A; UBD/LBD/Toileting Mod A  -PT: Transfers to the right Min-Mod A; Transfer to left Mod A; Ambulate 110ft Mod A with WC follow  - ambulates 20 feet with RW and mod assist and WC follow with max cues, mod-max assist transfers, follows occasional single step commands with poor attention and max cues, tolerating soflt-bite sized and thin liquids, severe attention deficits, poor insight, supervision eating/hygiene, total assist tub transfers, max assist toileting  - goals: min assist transfer due to need for cues and poor attention, min assist baDLs  - target: dc SIVAN 1/11/23 In progress    To inform Pauline's HCP Isabella () about the CT scan once completed    # LABS:   CBC CMP 1/2    #GOC  CODE STATUS: FULL CODE    Outpatient Follow-up (Specialty/Name of physician):    Emy Prakash  NP in [facility]  [address], [address]  [place] [address]  Phone: [phone]  Fax: [phone]  Follow Up Time: 2 weeks    Sergio Jurado  Cardiology  [address], [address]  [place] [address]  Phone: [phone]  Fax: [phone]  Follow Up Time: 1 month         74 YO RIGHT handed woman with no PMH who presented to [facility] ED on 12/14/2023, as a transfer from [facility] with R IPH. Hospital course also significant for UTI, b/l DVTs, EEG with increased seizure risks.    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  - Head CT 12/27: The right parietal parenchymal hemorrhage is slightly less dense and slightly smaller measuring 3.0 cm in AP diameter by 4.3 cm transversely compared with the prior of 4.1 cm in AP diameter by 5.9 cm transversely. There is similar vasogenic edema. There is also similar mass effect on the atrium of the right lateral ventricle with similar mild midline shift to the left. There is resolution of the intraventricular hemorrhage in the left occipital horn.  - EEG with increased risk of seizures in the right frontocentral region  - Briviact 50mg BID for seizure prophylaxis.  - Cont Comprehensive Rehab Program: PT/OT/ST, 3hours daily and 5 days weekly. SIVAN in progress    # self-reported history of ADHD and restless, distracted, poor attention  - xanax Rx in I stop  - Trial modafinil 50mg AM started 12/30  - psych consult appreciated  - psychology and recreation therapy (former )    #   - discussed with hospitalist. Patient with ascvd is moderate risk  - C/W atorvasttin 10 mg qhs 12/29    # HTN  -  Losartan discontinued due to soft BP 12/27  - BP stable continue monitor    # UTI  - s/p Vantin 100mg BID     # Pain management  - Tylenol PRN  - tramadol reduced to 25 q4 PRN severe pain, dc for moderate pain 12/26. Continue to wean as tolerated  - Lidocaine patch timing changed to bedtime per request. States she uses Salon Pas on left neck/shoulder at home.   - Cervical pillow for postioning midline and neck control, improved    # DVT   - b/l below the knee DVTs: right soleal vein, left soleal, peroneal and gastrocnemius veins.  - Lovenox 40 mg daily. Not cleared for full dose AC due to large ICH  - Doppler 12/20: Positive deep venous thrombosis below the right knee within the right soleal vein. Positive deep venous thrombosis below the left knee within the left soleal, peroneal and gastrocnemius veins.  - Doppler 12/27 surveillance - DVTS known - stable right improved on left.  - Repeat Head CT 12/27 improved followed by neuro consult re: possible full dose AC (day 14 post bleed)  - Neuro consult appreciated 12/28. Do not recommend full dose of AC at this time  -Repeat Head CT ~ 2 weeks    # GI ppx/h/o reflux  - Pepcid 20mg   - TUMS, Maalox  - Senna, miralax PRN  - metamucil added 12/29, patient reports was recommended by her PCP    # Dysphagia   - Diet: Soft and Bite sized with thin liquids  -c/w AREDS 2 vitamin (home med)    #Case discussed in IDT rounds 1/2/24  -SLP: soft bite size; severe cognitive deficits,  left inattentiveness with max cueing  -OT: Eating/grooming supervision; Footwear/showering Max A; UBD/LBD/Toileting Mod A  -PT: Transfers to the right Min-Mod A; Transfer to left Mod A; Ambulate 110ft Mod A with WC follow  - ambulates 20 feet with RW and mod assist and WC follow with max cues, mod-max assist transfers, follows occasional single step commands with poor attention and max cues, tolerating soflt-bite sized and thin liquids, severe attention deficits, poor insight, supervision eating/hygiene, total assist tub transfers, max assist toileting  - goals: min assist transfer due to need for cues and poor attention, min assist baDLs  - target: dc SIVAN 1/11/23 In progress    To inform Pauline's HCP Isabella () about the CT scan once completed    # LABS:   CBC CMP 1/2    #GOC  CODE STATUS: FULL CODE    Outpatient Follow-up (Specialty/Name of physician):    Emy Prakash  NP in [facility]  [address], [address]  [place] [address]  Phone: [phone]  Fax: [phone]  Follow Up Time: 2 weeks    Sergio Jurado  Cardiology  [address], [address]  [place] [address]  Phone: [phone]  Fax: [phone]  Follow Up Time: 1 month         72 YO RIGHT handed woman with no PMH who presented to [facility] ED on 12/14/2023, as a transfer from [facility] with R IPH. Hospital course also significant for UTI, b/l DVTs, EEG with increased seizure risks.    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  - Head CT 12/27: The right parietal parenchymal hemorrhage is slightly less dense and slightly smaller measuring 3.0 cm in AP diameter by 4.3 cm transversely compared with the prior of 4.1 cm in AP diameter by 5.9 cm transversely. There is similar vasogenic edema. There is also similar mass effect on the atrium of the right lateral ventricle with similar mild midline shift to the left. There is resolution of the intraventricular hemorrhage in the left occipital horn.  - EEG with increased risk of seizures in the right frontocentral region  - Briviact 50mg BID for seizure prophylaxis.  - Cont Comprehensive Rehab Program: PT/OT/ST, 3hours daily and 5 days weekly. SIVAN in progress    # self-reported history of ADHD and restless, distracted, poor attention  - xanax Rx in I stop  - Trial modafinil 50mg AM started 12/30  - psych consult appreciated  - psychology and recreation therapy (former )    #   - discussed with hospitalist. Patient with ascvd is moderate risk  - C/W atorvasttin 10 mg qhs 12/29    # HTN  -  Losartan discontinued due to soft BP 12/27  - BP stable continue monitor    # UTI  - s/p Vantin 100mg BID     # Pain management  - Tylenol PRN  - tramadol reduced to 25 q4 PRN severe pain, dc for moderate pain 12/26. Continue to wean as tolerated  - Lidocaine patch timing changed to bedtime per request. States she uses Salon Pas on left neck/shoulder at home.   - Cervical pillow for postioning midline and neck control, improved    # DVT   - b/l below the knee DVTs: right soleal vein, left soleal, peroneal and gastrocnemius veins.  - Lovenox 40 mg daily. Not cleared for full dose AC due to large ICH  - Doppler 12/20: Positive deep venous thrombosis below the right knee within the right soleal vein. Positive deep venous thrombosis below the left knee within the left soleal, peroneal and gastrocnemius veins.  - Doppler 12/27 surveillance - DVTS known - stable right improved on left.  - Repeat Head CT 12/27 improved followed by neuro consult re: possible full dose AC (day 14 post bleed)  - Neuro consult appreciated 12/28. Do not recommend full dose of AC at this time  -Repeat Head CT ~ 2 weeks    # GI ppx/h/o reflux  - Pepcid 20mg   - TUMS, Maalox  - Senna, miralax PRN  - metamucil added 12/29, patient reports was recommended by her PCP    # Dysphagia   - Diet: Soft and Bite sized with thin liquids  -c/w AREDS 2 vitamin (home med)    #Case discussed in IDT rounds 1/2/24  -SLP: soft bite size; severe cognitive deficits,  left inattentiveness with max cueing  -OT: Eating/grooming supervision; Footwear/showering Max A; UBD/LBD/Toileting Mod A  -PT: Transfers to the right Min-Mod A; Transfer to left Mod A; Ambulate 110ft Mod A with WC follow  - ambulates 20 feet with RW and mod assist and WC follow with max cues, mod-max assist transfers, follows occasional single step commands with poor attention and max cues, tolerating soflt-bite sized and thin liquids, severe attention deficits, poor insight, supervision eating/hygiene, total assist tub transfers, max assist toileting  - goals: min assist transfer due to need for cues and poor attention, min assist baDLs; Will need 24hr supervision  - target: dc SIVAN 1/11/23 In progress    To inform Pauline's HCP Isabella () about the CT scan once completed    # LABS:   CBC CMP 1/2    #GOC  CODE STATUS: FULL CODE    Outpatient Follow-up (Specialty/Name of physician):    Emy Prakash  NP in [facility]  [address], [address]  [place] [address]  Phone: [phone]  Fax: [phone]  Follow Up Time: 2 weeks    Sergio Jurado  [facility]  [address]  [place] [address]  Phone: [phone]  Fax: [phone]  Follow Up Time: 1 month         72 YO RIGHT handed woman with no PMH who presented to [facility] ED on 12/14/2023, as a transfer from [facility] with R IPH. Hospital course also significant for UTI, b/l DVTs, EEG with increased seizure risks.    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  - Head CT 12/27: The right parietal parenchymal hemorrhage is slightly less dense and slightly smaller measuring 3.0 cm in AP diameter by 4.3 cm transversely compared with the prior of 4.1 cm in AP diameter by 5.9 cm transversely. There is similar vasogenic edema. There is also similar mass effect on the atrium of the right lateral ventricle with similar mild midline shift to the left. There is resolution of the intraventricular hemorrhage in the left occipital horn.  - EEG with increased risk of seizures in the right frontocentral region  - Briviact 50mg BID for seizure prophylaxis.  - Cont Comprehensive Rehab Program: PT/OT/ST, 3hours daily and 5 days weekly. SIVAN in progress    # self-reported history of ADHD and restless, distracted, poor attention  - xanax Rx in I stop  - Trial modafinil 50mg AM started 12/30  - psych consult appreciated  - psychology and recreation therapy (former )    #   - discussed with hospitalist. Patient with ascvd is moderate risk  - C/W atorvasttin 10 mg qhs 12/29    # HTN  -  Losartan discontinued due to soft BP 12/27  - BP stable continue monitor    # UTI  - s/p Vantin 100mg BID     # Pain management  - Tylenol PRN  - tramadol reduced to 25 q4 PRN severe pain, dc for moderate pain 12/26. Continue to wean as tolerated  - Lidocaine patch timing changed to bedtime per request. States she uses Salon Pas on left neck/shoulder at home.   - Cervical pillow for postioning midline and neck control, improved    # DVT   - b/l below the knee DVTs: right soleal vein, left soleal, peroneal and gastrocnemius veins.  - Lovenox 40 mg daily. Not cleared for full dose AC due to large ICH  - Doppler 12/20: Positive deep venous thrombosis below the right knee within the right soleal vein. Positive deep venous thrombosis below the left knee within the left soleal, peroneal and gastrocnemius veins.  - Doppler 12/27 surveillance - DVTS known - stable right improved on left.  - Repeat Head CT 12/27 improved followed by neuro consult re: possible full dose AC (day 14 post bleed)  - Neuro consult appreciated 12/28. Do not recommend full dose of AC at this time  -Repeat Head CT ~ 2 weeks    # GI ppx/h/o reflux  - Pepcid 20mg   - TUMS, Maalox  - Senna, miralax PRN  - metamucil added 12/29, patient reports was recommended by her PCP    # Dysphagia   - Diet: Soft and Bite sized with thin liquids  -c/w AREDS 2 vitamin (home med)    #Case discussed in IDT rounds 1/2/24  -SLP: soft bite size; severe cognitive deficits,  left inattentiveness with max cueing  -OT: Eating/grooming supervision; Footwear/showering Max A; UBD/LBD/Toileting Mod A  -PT: Transfers to the right Min-Mod A; Transfer to left Mod A; Ambulate 110ft Mod A with WC follow  - ambulates 20 feet with RW and mod assist and WC follow with max cues, mod-max assist transfers, follows occasional single step commands with poor attention and max cues, tolerating soflt-bite sized and thin liquids, severe attention deficits, poor insight, supervision eating/hygiene, total assist tub transfers, max assist toileting  - goals: min assist transfer due to need for cues and poor attention, min assist baDLs; Will need 24hr supervision  - target: dc SIVAN 1/11/23 In progress    To inform Pauline's HCP Isabella () about the CT scan once completed    # LABS:   CBC CMP 1/2    #GOC  CODE STATUS: FULL CODE    Outpatient Follow-up (Specialty/Name of physician):    Emy Prakash  NP in [facility]  [address]  [place] [address]  Phone: [phone]  Fax: [phone]  Follow Up Time: 2 weeks    Sergio Jurado  [facility]  [address]  [place] [address]  Phone: [phone]  Fax: [phone]  Follow Up Time: 1 month         74 YO RIGHT handed woman with no PMH who presented to [facility] ED on 12/14/2023, as a transfer from [facility] with R IPH. Hospital course also significant for UTI, b/l DVTs, EEG with increased seizure risks.    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  - Head CT 12/27: The right parietal parenchymal hemorrhage is slightly less dense and slightly smaller measuring 3.0 cm in AP diameter by 4.3 cm transversely compared with the prior of 4.1 cm in AP diameter by 5.9 cm transversely. There is similar vasogenic edema. There is also similar mass effect on the atrium of the right lateral ventricle with similar mild midline shift to the left. There is resolution of the intraventricular hemorrhage in the left occipital horn.  - EEG with increased risk of seizures in the right frontocentral region  - Briviact 50mg BID for seizure prophylaxis.  - Cont Comprehensive Rehab Program: PT/OT/ST, 3hours daily and 5 days weekly. SIVAN in progress    # self-reported history of ADHD and restless, distracted, poor attention  - xanax Rx in I stop  - Trial modafinil 50mg AM started 12/30  - psych consult appreciated  - psychology and recreation therapy (former )    #   - discussed with hospitalist. Patient with ascvd is moderate risk  - C/W atorvasttin 10 mg qhs 12/29    # HTN  -  Losartan discontinued due to soft BP 12/27  - BP stable continue monitor    # UTI  - s/p Vantin 100mg BID     # Pain management  - Tylenol PRN  - tramadol 25 q4 PRN severe pain, dc for moderate pain 12/26. Continue to monitor Pain control  - Lidocaine patch timing changed to bedtime per request. States she uses Salon Pas on left neck/shoulder at home.   - Cervical pillow for postioning midline and neck control, improved    # DVT   - b/l below the knee DVTs: right soleal vein, left soleal, peroneal and gastrocnemius veins.  - Lovenox 40 mg daily. Not cleared for full dose AC due to large ICH  - Doppler 12/20: Positive deep venous thrombosis below the right knee within the right soleal vein. Positive deep venous thrombosis below the left knee within the left soleal, peroneal and gastrocnemius veins.  - Doppler 12/27 surveillance - DVTS known - stable right improved on left.  - Repeat Head CT 12/27 improved followed by neuro consult re: possible full dose AC (day 14 post bleed)  - Neuro consult appreciated 12/28. Do not recommend full dose of AC at this time  -Repeat Head CT ~ 2 weeks    # GI ppx/h/o reflux  - Pepcid 20mg   - TUMS, Maalox  - Senna, miralax PRN  - metamucil added 12/29, patient reports was recommended by her PCP    # Dysphagia   - Diet: Soft and Bite sized with thin liquids  -c/w AREDS 2 vitamin (home med)    #Case discussed in IDT rounds 1/2/24  -SLP: soft bite size; severe cognitive deficits,  left inattentiveness with max cueing, Impaired attention.   -OT: Eating/grooming supervision; Footwear/showering/Toileting-- Max A; UBD/LBD and toilet transfers-- Mod A  -PT: Transfers to the right Min-Mod A; Transfer to left Mod A; Ambulate 110ft Mod A with WC follow  - goals: 1. min assist transfers and ambulation, min assist bADLs; Will need 24hr supervision  - target: dc SIVAN 1/11/23      # LABS:   CBC CMP 1/2    #GOC  CODE STATUS: FULL CODE    Outpatient Follow-up (Specialty/Name of physician):    Emy Prakash  NP in [address], [facility] [address]  [place] [address]  Phone: [phone]  Fax: [phone]  Follow Up Time: 2 weeks    Sergio Jurado  Cardiology  [address] [facility], [address]  [place] [address]  Phone: [phone]  Fax: [phone]  Follow Up Time: 1 month         74 YO RIGHT handed woman with no PMH who presented to [facility] ED on 12/14/2023, as a transfer from [facility] with R IPH. Hospital course also significant for UTI, b/l DVTs, EEG with increased seizure risks.    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  - Head CT 12/27: The right parietal parenchymal hemorrhage is slightly less dense and slightly smaller measuring 3.0 cm in AP diameter by 4.3 cm transversely compared with the prior of 4.1 cm in AP diameter by 5.9 cm transversely. There is similar vasogenic edema. There is also similar mass effect on the atrium of the right lateral ventricle with similar mild midline shift to the left. There is resolution of the intraventricular hemorrhage in the left occipital horn.  - EEG with increased risk of seizures in the right frontocentral region  - Briviact 50mg BID for seizure prophylaxis.  - Cont Comprehensive Rehab Program: PT/OT/ST, 3hours daily and 5 days weekly. SIVAN in progress    # self-reported history of ADHD and restless, distracted, poor attention  - xanax Rx in I stop  - Trial modafinil 50mg AM started 12/30  - psych consult appreciated  - psychology and recreation therapy (former )    #   - discussed with hospitalist. Patient with ascvd is moderate risk  - C/W atorvasttin 10 mg qhs 12/29    # HTN  -  Losartan discontinued due to soft BP 12/27  - BP stable continue monitor    # UTI  - s/p Vantin 100mg BID     # Pain management  - Tylenol PRN  - tramadol 25 q4 PRN severe pain, dc for moderate pain 12/26. Continue to monitor Pain control  - Lidocaine patch timing changed to bedtime per request. States she uses Salon Pas on left neck/shoulder at home.   - Cervical pillow for postioning midline and neck control, improved    # DVT   - b/l below the knee DVTs: right soleal vein, left soleal, peroneal and gastrocnemius veins.  - Lovenox 40 mg daily. Not cleared for full dose AC due to large ICH  - Doppler 12/20: Positive deep venous thrombosis below the right knee within the right soleal vein. Positive deep venous thrombosis below the left knee within the left soleal, peroneal and gastrocnemius veins.  - Doppler 12/27 surveillance - DVTS known - stable right improved on left.  - Repeat Head CT 12/27 improved followed by neuro consult re: possible full dose AC (day 14 post bleed)  - Neuro consult appreciated 12/28. Do not recommend full dose of AC at this time  -Repeat Head CT ~ 2 weeks    # GI ppx/h/o reflux  - Pepcid 20mg   - TUMS, Maalox  - Senna, miralax PRN  - metamucil added 12/29, patient reports was recommended by her PCP    # Dysphagia   - Diet: Soft and Bite sized with thin liquids  -c/w AREDS 2 vitamin (home med)    #Case discussed in IDT rounds 1/2/24  -SLP: soft bite size; severe cognitive deficits,  left inattentiveness with max cueing, Impaired attention.   -OT: Eating/grooming supervision; Footwear/showering/Toileting-- Max A; UBD/LBD and toilet transfers-- Mod A  -PT: Transfers to the right Min-Mod A; Transfer to left Mod A; Ambulate 110ft Mod A with WC follow  - goals: 1. min assist transfers and ambulation, min assist bADLs; Will need 24hr supervision  - target: dc SIVAN 1/11/23      # LABS:   CBC CMP 1/2    #GOC  CODE STATUS: FULL CODE    Outpatient Follow-up (Specialty/Name of physician):    Emy Prakash  NP in [address], [facility] [address]  [place] [address]  Phone: [phone]  Fax: [phone]  Follow Up Time: 2 weeks    Sergio Jurado  Cardiology  [address] [facility], [address]  [place] [address]  Phone: [phone]  Fax: [phone]  Follow Up Time: 1 month         72 YO RIGHT handed woman with no PMH who presented to [facility] ED on 12/14/2023, as a transfer from [facility] with R IPH. Hospital course also significant for UTI, b/l DVTs, EEG with increased seizure risks.    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  - Head CT 12/27: The right parietal parenchymal hemorrhage is slightly less dense and slightly smaller measuring 3.0 cm in AP diameter by 4.3 cm transversely compared with the prior of 4.1 cm in AP diameter by 5.9 cm transversely. There is similar vasogenic edema. There is also similar mass effect on the atrium of the right lateral ventricle with similar mild midline shift to the left. There is resolution of the intraventricular hemorrhage in the left occipital horn.  - EEG with increased risk of seizures in the right frontocentral region  - Briviact 50mg BID for seizure prophylaxis.  - Cont Comprehensive Rehab Program: PT/OT/ST, 3hours daily and 5 days weekly. SIVAN in progress    # self-reported history of ADHD and restless, distracted, poor attention  - xanax Rx in I stop  - Trial modafinil 50mg AM started 12/30  - psych consult appreciated  - psychology and recreation therapy (former )    #   - discussed with hospitalist. Patient with ascvd is moderate risk  - C/W atorvasttin 10 mg qhs 12/29    # HTN  -  Losartan discontinued due to soft BP 12/27  - BP stable continue monitor    # UTI  - s/p Vantin 100mg BID     # Pain management  - Tylenol PRN  - tramadol 25 q4 PRN severe pain, dc for moderate pain 12/26. Continue to monitor Pain control  - Lidocaine patch timing changed to bedtime per request. States she uses Salon Pas on left neck/shoulder at home.   - Cervical pillow for postioning midline and neck control, improved    # DVT   - b/l below the knee DVTs: right soleal vein, left soleal, peroneal and gastrocnemius veins.  - Lovenox 40 mg daily. Not cleared for full dose AC due to large ICH  - Doppler 12/20: Positive deep venous thrombosis below the right knee within the right soleal vein. Positive deep venous thrombosis below the left knee within the left soleal, peroneal and gastrocnemius veins.  - Doppler 12/27 surveillance - DVTS known - stable right improved on left.  - Repeat Head CT 12/27 improved followed by neuro consult re: possible full dose AC (day 14 post bleed)  - Neuro consult appreciated 12/28. Do not recommend full dose of AC at this time  -Repeat Head CT ~ 2 weeks    # GI ppx/h/o reflux  - Pepcid 20mg   - TUMS, Maalox  - Senna, miralax PRN  - metamucil added 12/29, patient reports was recommended by her PCP    # Dysphagia   - Diet: Soft and Bite sized with thin liquids  -c/w AREDS 2 vitamin (home med)    #Case discussed in IDT rounds 1/2/24  -SLP: soft bite size; severe cognitive deficits,  left inattentiveness with max cueing, Impaired attention.   -OT: Eating/grooming supervision; Footwear/showering/Toileting-- Max A; UBD/LBD and toilet transfers-- Mod A  -PT: Transfers to the right Min-Mod A; Transfer to left Mod A; Ambulate 110ft Mod A with WC follow  - goals: 1. min assist transfers and ambulation, min assist bADLs; Will need 24hr supervision  - target: dc SIVAN 1/11/23    Left message for HCP Isabella to provide update on discharge planning 1/2/24    # LABS:   CBC CMP 1/2    #GOC  CODE STATUS: FULL CODE    Outpatient Follow-up (Specialty/Name of physician):    Emy Prakash  NP in [facility]  [address] [facility], [address]  [place] [address]  Phone: [phone]  Fax: [phone]  Follow Up Time: 2 weeks    Sergio Jurado  Cardiology  [address] [facility] [address], [address]  [place] [address]  Phone: [phone]  Fax: [phone]  Follow Up Time: 1 month         74 YO RIGHT handed woman with no PMH who presented to [facility] ED on 12/14/2023, as a transfer from [facility] with R IPH. Hospital course also significant for UTI, b/l DVTs, EEG with increased seizure risks.    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  - Head CT 12/27: The right parietal parenchymal hemorrhage is slightly less dense and slightly smaller measuring 3.0 cm in AP diameter by 4.3 cm transversely compared with the prior of 4.1 cm in AP diameter by 5.9 cm transversely. There is similar vasogenic edema. There is also similar mass effect on the atrium of the right lateral ventricle with similar mild midline shift to the left. There is resolution of the intraventricular hemorrhage in the left occipital horn.  - EEG with increased risk of seizures in the right frontocentral region  - Briviact 50mg BID for seizure prophylaxis.  - Cont Comprehensive Rehab Program: PT/OT/ST, 3hours daily and 5 days weekly. SIVAN in progress    # self-reported history of ADHD and restless, distracted, poor attention  - xanax Rx in I stop  - Trial modafinil 50mg AM started 12/30  - psych consult appreciated  - psychology and recreation therapy (former )    #   - discussed with hospitalist. Patient with ascvd is moderate risk  - C/W atorvasttin 10 mg qhs 12/29    # HTN  -  Losartan discontinued due to soft BP 12/27  - BP stable continue monitor    # UTI  - s/p Vantin 100mg BID     # Pain management  - Tylenol PRN  - tramadol 25 q4 PRN severe pain, dc for moderate pain 12/26. Continue to monitor Pain control  - Lidocaine patch timing changed to bedtime per request. States she uses Salon Pas on left neck/shoulder at home.   - Cervical pillow for postioning midline and neck control, improved    # DVT   - b/l below the knee DVTs: right soleal vein, left soleal, peroneal and gastrocnemius veins.  - Lovenox 40 mg daily. Not cleared for full dose AC due to large ICH  - Doppler 12/20: Positive deep venous thrombosis below the right knee within the right soleal vein. Positive deep venous thrombosis below the left knee within the left soleal, peroneal and gastrocnemius veins.  - Doppler 12/27 surveillance - DVTS known - stable right improved on left.  - Repeat Head CT 12/27 improved followed by neuro consult re: possible full dose AC (day 14 post bleed)  - Neuro consult appreciated 12/28. Do not recommend full dose of AC at this time  -Repeat Head CT ~ 2 weeks    # GI ppx/h/o reflux  - Pepcid 20mg   - TUMS, Maalox  - Senna, miralax PRN  - metamucil added 12/29, patient reports was recommended by her PCP    # Dysphagia   - Diet: Soft and Bite sized with thin liquids  -c/w AREDS 2 vitamin (home med)    #Case discussed in IDT rounds 1/2/24  -SLP: soft bite size; severe cognitive deficits,  left inattentiveness with max cueing, Impaired attention.   -OT: Eating/grooming supervision; Footwear/showering/Toileting-- Max A; UBD/LBD and toilet transfers-- Mod A  -PT: Transfers to the right Min-Mod A; Transfer to left Mod A; Ambulate 110ft Mod A with WC follow  - goals: 1. min assist transfers and ambulation, min assist bADLs; Will need 24hr supervision  - target: dc SIVAN 1/11/23    Left message for HCP Isabella to provide update on discharge planning 1/2/24    # LABS:   CBC CMP 1/2    #GOC  CODE STATUS: FULL CODE    Outpatient Follow-up (Specialty/Name of physician):    Emy Prakash  NP in [facility]  [address] [facility], [address]  [place] [address]  Phone: [phone]  Fax: [phone]  Follow Up Time: 2 weeks    Sergio Jurado  Cardiology  [address] [facility], [address]  [place] [address]  Phone: [phone]  Fax: [phone]  Follow Up Time: 1 month

## 2024-01-02 NOTE — PROGRESS NOTE ADULT - ASSESSMENT
Pt alert, fair attention, intact expressive language, thought processes - circumstantial, no abnormal thought contents were noted, mildly depressed affect, euthymic mood, denied AH/VH, denied SI/HI/I/P, calm behavior. Plan: Continue the assessment process.

## 2024-01-02 NOTE — PROGRESS NOTE ADULT - SUBJECTIVE AND OBJECTIVE BOX
Pt was seen for 30 minutes for supportive tx. Pt c/o worry, poor sleep, fatigue. Reviewed chart, approached Pt for an initial consult, introduced the role of neuropsychology in the rehab team, and explained the nature and purpose of the consult. Pt is a 72 y/o female who presented to [facility] ED on 12/14/2023, as a transfer from [facility], as a CODE STROKE, with c/o R IPH. CTH and CTA repeated - large R IPH (temporal/parietal). RI Brain on 12/15/23 showed Stable size of right parietotemporal intraparenchymal hemorrhage. Similar midline shift of 5 mm.  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 Pt agreed to participate. Pt was well related and verbose, but became increasingly fatigued over the course of the meeting. Session focused on establishing rapport with Pt, collecting biographical information, and assessing Pt's current emotional functioning. Pt provided significant information about her medical hx and social hx. Pt answered all questions during the clinical interview in order to elicit emotional symptoms. Pt reported experiencing worry, poor sleep, low energy and fatigue. Support and encouragement were provided.      Pt was seen for 30 minutes for supportive tx. Pt c/o worry, poor sleep, fatigue. Reviewed chart, approached Pt for an initial consult, introduced the role of neuropsychology in the rehab team, and explained the nature and purpose of the consult. Pt is a 74 y/o female who presented to [facility] ED on 12/14/2023, as a transfer from [facility], as a CODE STROKE, with c/o R IPH. CTH and CTA repeated - large R IPH (temporal/parietal). RI Brain on 12/15/23 showed Stable size of right parietotemporal intraparenchymal hemorrhage. Similar midline shift of 5 mm.  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 Pt agreed to participate. Pt was well related and verbose, but became increasingly fatigued over the course of the meeting. Session focused on establishing rapport with Pt, collecting biographical information, and assessing Pt's current emotional functioning. Pt provided significant information about her medical hx and social hx. Pt answered all questions during the clinical interview in order to elicit emotional symptoms. Pt reported experiencing worry, poor sleep, low energy and fatigue. Support and encouragement were provided.

## 2024-01-02 NOTE — PROGRESS NOTE ADULT - SUBJECTIVE AND OBJECTIVE BOX
Patient is a 73y old  Female who presents with a chief complaint of ICH (02 Jan 2024 09:52)      SUBJECTIVE:      ROS:  No fever, chills  No headaches, nausea, vomiting  No chest pain  No abdominal pain    VITALS  73y  Vital Signs Last 24 Hrs  T(C): 36.7 (02 Jan 2024 08:20), Max: 36.7 (01 Jan 2024 20:20)  T(F): 98.1 (02 Jan 2024 08:20), Max: 98.1 (02 Jan 2024 08:20)  HR: 88 (02 Jan 2024 08:20) (75 - 88)  BP: 102/71 (02 Jan 2024 08:20) (102/71 - 103/66)  BP(mean): --  RR: 16 (02 Jan 2024 08:20) (16 - 16)  SpO2: 98% (02 Jan 2024 08:20) (98% - 98%)    Parameters below as of 02 Jan 2024 08:20  Patient On (Oxygen Delivery Method): room air      Daily     Daily     PHYSICAL EXAM      MEDICATIONS  (STANDING):  atorvastatin 10 milliGRAM(s) Oral at bedtime  bisacodyl 5 milliGRAM(s) Oral daily  brivaracetam 50 milliGRAM(s) Oral two times a day  enoxaparin Injectable 40 milliGRAM(s) SubCutaneous <User Schedule>  famotidine    Tablet 20 milliGRAM(s) Oral two times a day  gabapentin 300 milliGRAM(s) Oral at bedtime  ketorolac 0.5% Ophthalmic Solution 1 Drop(s) Left EYE daily  lidocaine   4% Patch 1 Patch Transdermal <User Schedule>  lidocaine   4% Patch 1 Patch Transdermal <User Schedule>  modafinil 50 milliGRAM(s) Oral <User Schedule>  polyethylene glycol 3350 17 Gram(s) Oral two times a day  psyllium Powder 1 Packet(s) Oral daily  senna 2 Tablet(s) Oral at bedtime  sodium chloride 0.65% Nasal 1 Spray(s) Both Nostrils two times a day    MEDICATIONS  (PRN):  acetaminophen     Tablet .. 650 milliGRAM(s) Oral every 6 hours PRN Temp greater or equal to 38C (100.4F), Mild Pain (1 - 3)  aluminum hydroxide/magnesium hydroxide/simethicone Suspension 30 milliLiter(s) Oral every 6 hours PRN Dyspepsia  artificial tears (preservative free) Ophthalmic Solution 1 Drop(s) Both EYES every 4 hours PRN Dry Eyes  bisacodyl Suppository 10 milliGRAM(s) Rectal daily PRN Constipation  calcium carbonate    500 mG (Tums) Chewable 1 Tablet(s) Chew three times a day PRN Heartburn  ondansetron   Disintegrating Tablet 4 milliGRAM(s) Oral every 6 hours PRN Nausea and/or Vomiting  traMADol 25 milliGRAM(s) Oral every 4 hours PRN Severe Pain (7 - 10)      RECENT LABS:                          15.0   6.65  )-----------( 316      ( 01 Jan 2024 06:41 )             42.4     01-01    137  |  103  |  14  ----------------------------<  87  3.7   |  23  |  0.62    Ca    9.2      01 Jan 2024 06:41    TPro  6.4  /  Alb  3.0<L>  /  TBili  0.4  /  DBili  x   /  AST  24  /  ALT  49<H>  /  AlkPhos  63  01-01    LIVER FUNCTIONS - ( 01 Jan 2024 06:41 )  Alb: 3.0 g/dL / Pro: 6.4 g/dL / ALK PHOS: 63 U/L / ALT: 49 U/L / AST: 24 U/L / GGT: x             Urinalysis Basic - ( 01 Jan 2024 06:41 )    Color: x / Appearance: x / SG: x / pH: x  Gluc: 87 mg/dL / Ketone: x  / Bili: x / Urobili: x   Blood: x / Protein: x / Nitrite: x   Leuk Esterase: x / RBC: x / WBC x   Sq Epi: x / Non Sq Epi: x / Bacteria: x          CAPILLARY BLOOD GLUCOSE               Patient is a 73y old  Female who presents with a chief complaint of ICH (02 Jan 2024 09:52)      SUBJECTIVE:  Patient seen and evaluated at bedside during AM rounds.  Patient reports occasional right sided headache and ocular pain.  Reviewed PRN tramadol medications available.    ROS:  No fever, chills  + headaches  No chest pain  No abdominal pain    VITALS  73y  Vital Signs Last 24 Hrs  T(C): 36.7 (02 Jan 2024 08:20), Max: 36.7 (01 Jan 2024 20:20)  T(F): 98.1 (02 Jan 2024 08:20), Max: 98.1 (02 Jan 2024 08:20)  HR: 88 (02 Jan 2024 08:20) (75 - 88)  BP: 102/71 (02 Jan 2024 08:20) (102/71 - 103/66)  BP(mean): --  RR: 16 (02 Jan 2024 08:20) (16 - 16)  SpO2: 98% (02 Jan 2024 08:20) (98% - 98%)    Parameters below as of 02 Jan 2024 08:20  Patient On (Oxygen Delivery Method): room air      Daily     Daily     PHYSICAL EXAM  Constitutional: NAD, sitting in wheelchair  Respiratory: breathing comfortably   Cardiovascular: R1R2  Gastrointestinal: Abdomen soft, nondistended  Neurological: easily distracted, restless, confused at times  Musculoskeletal: 4/5 throughout upper and lower extremities  Psychiatric: calm    MEDICATIONS  (STANDING):  atorvastatin 10 milliGRAM(s) Oral at bedtime  bisacodyl 5 milliGRAM(s) Oral daily  brivaracetam 50 milliGRAM(s) Oral two times a day  enoxaparin Injectable 40 milliGRAM(s) SubCutaneous <User Schedule>  famotidine    Tablet 20 milliGRAM(s) Oral two times a day  gabapentin 300 milliGRAM(s) Oral at bedtime  ketorolac 0.5% Ophthalmic Solution 1 Drop(s) Left EYE daily  lidocaine   4% Patch 1 Patch Transdermal <User Schedule>  lidocaine   4% Patch 1 Patch Transdermal <User Schedule>  modafinil 50 milliGRAM(s) Oral <User Schedule>  polyethylene glycol 3350 17 Gram(s) Oral two times a day  psyllium Powder 1 Packet(s) Oral daily  senna 2 Tablet(s) Oral at bedtime  sodium chloride 0.65% Nasal 1 Spray(s) Both Nostrils two times a day    MEDICATIONS  (PRN):  acetaminophen     Tablet .. 650 milliGRAM(s) Oral every 6 hours PRN Temp greater or equal to 38C (100.4F), Mild Pain (1 - 3)  aluminum hydroxide/magnesium hydroxide/simethicone Suspension 30 milliLiter(s) Oral every 6 hours PRN Dyspepsia  artificial tears (preservative free) Ophthalmic Solution 1 Drop(s) Both EYES every 4 hours PRN Dry Eyes  bisacodyl Suppository 10 milliGRAM(s) Rectal daily PRN Constipation  calcium carbonate    500 mG (Tums) Chewable 1 Tablet(s) Chew three times a day PRN Heartburn  ondansetron   Disintegrating Tablet 4 milliGRAM(s) Oral every 6 hours PRN Nausea and/or Vomiting  traMADol 25 milliGRAM(s) Oral every 4 hours PRN Severe Pain (7 - 10)      RECENT LABS:                          15.0   6.65  )-----------( 316      ( 01 Jan 2024 06:41 )             42.4     01-01    137  |  103  |  14  ----------------------------<  87  3.7   |  23  |  0.62    Ca    9.2      01 Jan 2024 06:41    TPro  6.4  /  Alb  3.0<L>  /  TBili  0.4  /  DBili  x   /  AST  24  /  ALT  49<H>  /  AlkPhos  63  01-01    LIVER FUNCTIONS - ( 01 Jan 2024 06:41 )  Alb: 3.0 g/dL / Pro: 6.4 g/dL / ALK PHOS: 63 U/L / ALT: 49 U/L / AST: 24 U/L / GGT: x             Urinalysis Basic - ( 01 Jan 2024 06:41 )    Color: x / Appearance: x / SG: x / pH: x  Gluc: 87 mg/dL / Ketone: x  / Bili: x / Urobili: x   Blood: x / Protein: x / Nitrite: x   Leuk Esterase: x / RBC: x / WBC x   Sq Epi: x / Non Sq Epi: x / Bacteria: x          CAPILLARY BLOOD GLUCOSE               Patient is a 73y old  Female who presents with a chief complaint of ICH (02 Jan 2024 09:52)      SUBJECTIVE:  Patient seen and evaluated at bedside during AM rounds.  Patient reports occasional right sided headache and ocular pain.  no headache currently.  Reviewed PRN tramadol medications available.  Patient requests her ocular vitamin be added to her medication list. No nausea or vomiting.      ROS:  No fever, chills  + headaches  No chest pain  No abdominal pain    VITALS  73y  Vital Signs Last 24 Hrs  T(C): 36.7 (02 Jan 2024 08:20), Max: 36.7 (01 Jan 2024 20:20)  T(F): 98.1 (02 Jan 2024 08:20), Max: 98.1 (02 Jan 2024 08:20)  HR: 88 (02 Jan 2024 08:20) (75 - 88)  BP: 102/71 (02 Jan 2024 08:20) (102/71 - 103/66)  BP(mean): --  RR: 16 (02 Jan 2024 08:20) (16 - 16)  SpO2: 98% (02 Jan 2024 08:20) (98% - 98%)    Parameters below as of 02 Jan 2024 08:20  Patient On (Oxygen Delivery Method): room air      Daily     Daily     PHYSICAL EXAM  Constitutional: NAD, sitting in wheelchair  Respiratory: breathing comfortably   Cardiovascular: R1R2  Gastrointestinal: Abdomen soft, nondistended  Neurological: easily distracted, restless, confused at times  Musculoskeletal: 4/5 throughout upper and lower extremities  Psychiatric: calm    MEDICATIONS  (STANDING):  atorvastatin 10 milliGRAM(s) Oral at bedtime  bisacodyl 5 milliGRAM(s) Oral daily  brivaracetam 50 milliGRAM(s) Oral two times a day  enoxaparin Injectable 40 milliGRAM(s) SubCutaneous <User Schedule>  famotidine    Tablet 20 milliGRAM(s) Oral two times a day  gabapentin 300 milliGRAM(s) Oral at bedtime  ketorolac 0.5% Ophthalmic Solution 1 Drop(s) Left EYE daily  lidocaine   4% Patch 1 Patch Transdermal <User Schedule>  lidocaine   4% Patch 1 Patch Transdermal <User Schedule>  modafinil 50 milliGRAM(s) Oral <User Schedule>  polyethylene glycol 3350 17 Gram(s) Oral two times a day  psyllium Powder 1 Packet(s) Oral daily  senna 2 Tablet(s) Oral at bedtime  sodium chloride 0.65% Nasal 1 Spray(s) Both Nostrils two times a day    MEDICATIONS  (PRN):  acetaminophen     Tablet .. 650 milliGRAM(s) Oral every 6 hours PRN Temp greater or equal to 38C (100.4F), Mild Pain (1 - 3)  aluminum hydroxide/magnesium hydroxide/simethicone Suspension 30 milliLiter(s) Oral every 6 hours PRN Dyspepsia  artificial tears (preservative free) Ophthalmic Solution 1 Drop(s) Both EYES every 4 hours PRN Dry Eyes  bisacodyl Suppository 10 milliGRAM(s) Rectal daily PRN Constipation  calcium carbonate    500 mG (Tums) Chewable 1 Tablet(s) Chew three times a day PRN Heartburn  ondansetron   Disintegrating Tablet 4 milliGRAM(s) Oral every 6 hours PRN Nausea and/or Vomiting  traMADol 25 milliGRAM(s) Oral every 4 hours PRN Severe Pain (7 - 10)      RECENT LABS:                          15.0   6.65  )-----------( 316      ( 01 Jan 2024 06:41 )             42.4     01-01    137  |  103  |  14  ----------------------------<  87  3.7   |  23  |  0.62    Ca    9.2      01 Jan 2024 06:41    TPro  6.4  /  Alb  3.0<L>  /  TBili  0.4  /  DBili  x   /  AST  24  /  ALT  49<H>  /  AlkPhos  63  01-01    LIVER FUNCTIONS - ( 01 Jan 2024 06:41 )  Alb: 3.0 g/dL / Pro: 6.4 g/dL / ALK PHOS: 63 U/L / ALT: 49 U/L / AST: 24 U/L / GGT: x             Urinalysis Basic - ( 01 Jan 2024 06:41 )    Color: x / Appearance: x / SG: x / pH: x  Gluc: 87 mg/dL / Ketone: x  / Bili: x / Urobili: x   Blood: x / Protein: x / Nitrite: x   Leuk Esterase: x / RBC: x / WBC x   Sq Epi: x / Non Sq Epi: x / Bacteria: x          CAPILLARY BLOOD GLUCOSE

## 2024-01-02 NOTE — PROGRESS NOTE ADULT - ATTENDING COMMENTS
Pt. seen with resident.  Agree with documentation above as per resident with amendments made as appropriate. Patient medically stable. Making progress towards rehab goals.     Right ICH--    tramadol 25 q4 PRN severe pain, dc for moderate pain 12/26. Continue to monitor Pain control  VSS on Modafinil-- monitor tolerance.  Team meeting today-- [facility] discharge planned-- LOS extended to 1/11 c/w CMG date.  Pt. making progress and will benefit from more time.

## 2024-01-03 PROCEDURE — 99233 SBSQ HOSP IP/OBS HIGH 50: CPT

## 2024-01-03 RX ORDER — LANOLIN ALCOHOL/MO/W.PET/CERES
3 CREAM (GRAM) TOPICAL AT BEDTIME
Refills: 0 | Status: DISCONTINUED | OUTPATIENT
Start: 2024-01-03 | End: 2024-01-11

## 2024-01-03 RX ORDER — GABAPENTIN 400 MG/1
400 CAPSULE ORAL AT BEDTIME
Refills: 0 | Status: DISCONTINUED | OUTPATIENT
Start: 2024-01-03 | End: 2024-01-11

## 2024-01-03 RX ADMIN — POLYETHYLENE GLYCOL 3350 17 GRAM(S): 17 POWDER, FOR SOLUTION ORAL at 17:21

## 2024-01-03 RX ADMIN — Medication 1 PACKET(S): at 08:56

## 2024-01-03 RX ADMIN — Medication 5 MILLIGRAM(S): at 12:01

## 2024-01-03 RX ADMIN — LIDOCAINE 1 PATCH: 4 CREAM TOPICAL at 07:22

## 2024-01-03 RX ADMIN — MODAFINIL 50 MILLIGRAM(S): 200 TABLET ORAL at 05:39

## 2024-01-03 RX ADMIN — LIDOCAINE 1 PATCH: 4 CREAM TOPICAL at 19:13

## 2024-01-03 RX ADMIN — FAMOTIDINE 20 MILLIGRAM(S): 10 INJECTION INTRAVENOUS at 05:39

## 2024-01-03 RX ADMIN — Medication 3 MILLIGRAM(S): at 19:22

## 2024-01-03 RX ADMIN — BRIVARACETAM 50 MILLIGRAM(S): 25 TABLET, FILM COATED ORAL at 17:21

## 2024-01-03 RX ADMIN — ENOXAPARIN SODIUM 40 MILLIGRAM(S): 100 INJECTION SUBCUTANEOUS at 17:21

## 2024-01-03 RX ADMIN — FAMOTIDINE 20 MILLIGRAM(S): 10 INJECTION INTRAVENOUS at 17:21

## 2024-01-03 RX ADMIN — GABAPENTIN 400 MILLIGRAM(S): 400 CAPSULE ORAL at 19:20

## 2024-01-03 RX ADMIN — Medication 650 MILLIGRAM(S): at 15:18

## 2024-01-03 RX ADMIN — BRIVARACETAM 50 MILLIGRAM(S): 25 TABLET, FILM COATED ORAL at 05:39

## 2024-01-03 RX ADMIN — Medication 1 SPRAY(S): at 17:25

## 2024-01-03 RX ADMIN — ATORVASTATIN CALCIUM 10 MILLIGRAM(S): 80 TABLET, FILM COATED ORAL at 19:19

## 2024-01-03 RX ADMIN — Medication 650 MILLIGRAM(S): at 14:11

## 2024-01-03 RX ADMIN — Medication 1 SPRAY(S): at 05:39

## 2024-01-03 RX ADMIN — Medication 1 DROP(S): at 12:01

## 2024-01-03 RX ADMIN — Medication 650 MILLIGRAM(S): at 23:45

## 2024-01-03 NOTE — PROGRESS NOTE ADULT - SUBJECTIVE AND OBJECTIVE BOX
HPI:  This is a 72 YO RIGHT handed woman with no PMH who presented to [facility] ED on 12/14/2023, as a transfer from [facility], as a CODE STROKE, with c/o R IPH.   CTH and CTA repeated - large R IPH (temporal/parietal). Presented to [place] today with c/o HA and AMS. Friend accompanying patient said that when visiting patient today - patient was not acting like herself and c/o HA. NIHSS 9.    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 Similar midline shift of 5 mm.  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 Briviact 50mg BID for seizure prophylaxis, EEG with increased risk of seizures in the right frontocentral region.TTE- EF 64%. Continue Losartan 25mg daily. 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 UA: positive for UTI, started on IV abx and then transitioned to oral, end date 12/22.      Patient was evaluated by PM&R and therapy for functional deficits, gait/ADL impairments and acute rehabilitation was recommended. Patient was medically optimized for discharge to [facility] IRU on 12/20/23. (20 Dec 2023 17:15)          Subjective:  Seen this AM.  Pt's attention is better.  She reports having burning pain in her LEs which is more bothersome at night and some allodynia.  She reports she slept during the night.  Headache pain is better-- no headache this AM. pt. in pleasant mood,  Making jokes with staff.       VITALS  Vital Signs Last 24 Hrs  T(C): 36.6 (03 Jan 2024 07:26), Max: 36.6 (03 Jan 2024 07:26)  T(F): 97.9 (03 Jan 2024 07:26), Max: 97.9 (03 Jan 2024 07:26)  HR: 74 (03 Jan 2024 07:26) (74 - 75)  BP: 103/64 (03 Jan 2024 07:26) (100/61 - 103/64)  BP(mean): --  RR: 16 (03 Jan 2024 07:26) (16 - 16)  SpO2: 97% (03 Jan 2024 07:26) (97% - 98%)    Parameters below as of 03 Jan 2024 07:26  Patient On (Oxygen Delivery Method): room air        REVIEW OF SYMPTOMS  Neurological deficits      PHYSICAL EXAM  Constitutional: NAD, sitting in wheelchair  Respiratory: breathing comfortably   Cardiovascular: R1R2  Gastrointestinal: Abdomen soft, nondistended  Neurological: Attention improved.    Musculoskeletal: 4/5 throughout upper and lower extremities  Psychiatric: calm      RECENT LABS                  RADIOLOGY/OTHER RESULTS      MEDICATIONS  (STANDING):  atorvastatin 10 milliGRAM(s) Oral at bedtime  bisacodyl 5 milliGRAM(s) Oral daily  brivaracetam 50 milliGRAM(s) Oral two times a day  enoxaparin Injectable 40 milliGRAM(s) SubCutaneous <User Schedule>  famotidine    Tablet 20 milliGRAM(s) Oral two times a day  gabapentin 300 milliGRAM(s) Oral at bedtime  ketorolac 0.5% Ophthalmic Solution 1 Drop(s) Left EYE daily  lidocaine   4% Patch 1 Patch Transdermal <User Schedule>  lidocaine   4% Patch 1 Patch Transdermal <User Schedule>  modafinil 50 milliGRAM(s) Oral <User Schedule>  polyethylene glycol 3350 17 Gram(s) Oral two times a day  psyllium Powder 1 Packet(s) Oral daily  senna 2 Tablet(s) Oral at bedtime  sodium chloride 0.65% Nasal 1 Spray(s) Both Nostrils two times a day    MEDICATIONS  (PRN):  acetaminophen     Tablet .. 650 milliGRAM(s) Oral every 6 hours PRN Temp greater or equal to 38C (100.4F), Mild Pain (1 - 3)  aluminum hydroxide/magnesium hydroxide/simethicone Suspension 30 milliLiter(s) Oral every 6 hours PRN Dyspepsia  artificial tears (preservative free) Ophthalmic Solution 1 Drop(s) Both EYES every 4 hours PRN Dry Eyes  bisacodyl Suppository 10 milliGRAM(s) Rectal daily PRN Constipation  calcium carbonate    500 mG (Tums) Chewable 1 Tablet(s) Chew three times a day PRN Heartburn  ondansetron   Disintegrating Tablet 4 milliGRAM(s) Oral every 6 hours PRN Nausea and/or Vomiting         HPI:  This is a 72 YO RIGHT handed woman with no PMH who presented to [facility] ED on 12/14/2023, as a transfer from [facility], as a CODE STROKE, with c/o R IPH.   CTH and CTA repeated - large R IPH (temporal/parietal). Presented to [place] today with c/o HA and AMS. Friend accompanying patient said that when visiting patient today - patient was not acting like herself and c/o HA. NIHSS 9.    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 Similar midline shift of 5 mm.  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 Briviact 50mg BID for seizure prophylaxis, EEG with increased risk of seizures in the right frontocentral region.TTE- EF 64%. Continue Losartan 25mg daily. 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 UA: positive for UTI, started on IV abx and then transitioned to oral, end date 12/22.      Patient was evaluated by PM&R and therapy for functional deficits, gait/ADL impairments and acute rehabilitation was recommended. Patient was medically optimized for discharge to [facility] IRU on 12/20/23. (20 Dec 2023 17:15)          Subjective:  Seen this AM.  Pt's attention is better.  She reports having burning pain in her LEs which is more bothersome at night and some allodynia.  She reports she slept during the night.  Headache pain is better-- no headache this AM. pt. in pleasant mood,  Making jokes with staff.       VITALS  Vital Signs Last 24 Hrs  T(C): 36.6 (03 Jan 2024 07:26), Max: 36.6 (03 Jan 2024 07:26)  T(F): 97.9 (03 Jan 2024 07:26), Max: 97.9 (03 Jan 2024 07:26)  HR: 74 (03 Jan 2024 07:26) (74 - 75)  BP: 103/64 (03 Jan 2024 07:26) (100/61 - 103/64)  BP(mean): --  RR: 16 (03 Jan 2024 07:26) (16 - 16)  SpO2: 97% (03 Jan 2024 07:26) (97% - 98%)    Parameters below as of 03 Jan 2024 07:26  Patient On (Oxygen Delivery Method): room air        REVIEW OF SYMPTOMS  Neurological deficits      PHYSICAL EXAM  Constitutional: NAD, sitting in wheelchair  Respiratory: breathing comfortably   Cardiovascular: R1R2  Gastrointestinal: Abdomen soft, nondistended  Neurological: Attention improved.    Musculoskeletal: 4/5 throughout upper and lower extremities  Psychiatric: calm      RECENT LABS                  RADIOLOGY/OTHER RESULTS      MEDICATIONS  (STANDING):  atorvastatin 10 milliGRAM(s) Oral at bedtime  bisacodyl 5 milliGRAM(s) Oral daily  brivaracetam 50 milliGRAM(s) Oral two times a day  enoxaparin Injectable 40 milliGRAM(s) SubCutaneous <User Schedule>  famotidine    Tablet 20 milliGRAM(s) Oral two times a day  gabapentin 300 milliGRAM(s) Oral at bedtime  ketorolac 0.5% Ophthalmic Solution 1 Drop(s) Left EYE daily  lidocaine   4% Patch 1 Patch Transdermal <User Schedule>  lidocaine   4% Patch 1 Patch Transdermal <User Schedule>  modafinil 50 milliGRAM(s) Oral <User Schedule>  polyethylene glycol 3350 17 Gram(s) Oral two times a day  psyllium Powder 1 Packet(s) Oral daily  senna 2 Tablet(s) Oral at bedtime  sodium chloride 0.65% Nasal 1 Spray(s) Both Nostrils two times a day    MEDICATIONS  (PRN):  acetaminophen     Tablet .. 650 milliGRAM(s) Oral every 6 hours PRN Temp greater or equal to 38C (100.4F), Mild Pain (1 - 3)  aluminum hydroxide/magnesium hydroxide/simethicone Suspension 30 milliLiter(s) Oral every 6 hours PRN Dyspepsia  artificial tears (preservative free) Ophthalmic Solution 1 Drop(s) Both EYES every 4 hours PRN Dry Eyes  bisacodyl Suppository 10 milliGRAM(s) Rectal daily PRN Constipation  calcium carbonate    500 mG (Tums) Chewable 1 Tablet(s) Chew three times a day PRN Heartburn  ondansetron   Disintegrating Tablet 4 milliGRAM(s) Oral every 6 hours PRN Nausea and/or Vomiting

## 2024-01-03 NOTE — PROGRESS NOTE ADULT - ASSESSMENT
74 YO RIGHT handed woman with no PMH who presented to [facility] ED on 12/14/2023, as a transfer from [facility] with R IPH. Hospital course also significant for UTI, b/l DVTs, EEG with increased seizure risks.    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  - Head CT 12/27: The right parietal parenchymal hemorrhage is slightly less dense and slightly smaller measuring 3.0 cm in AP diameter by 4.3 cm transversely compared with the prior of 4.1 cm in AP diameter by 5.9 cm transversely. There is similar vasogenic edema. There is also similar mass effect on the atrium of the right lateral ventricle with similar mild midline shift to the left. There is resolution of the intraventricular hemorrhage in the left occipital horn.  - EEG with increased risk of seizures in the right frontocentral region  - Briviact 50mg BID for seizure prophylaxis.  - Cont Comprehensive Rehab Program: PT/OT/ST, 3hours daily and 5 days weekly.     # self-reported history of ADHD and restless, distracted, poor attention  - xanax Rx in I stop  - Cont. modafinil 50mg AM started 12/30--Improvement in attention  - psych consult 1/2 reviewed and appreciated  - psychology and recreation therapy (former )    #   - discussed with hospitalist. Patient with ascvd is moderate risk  - C/W atorvasttin 10 mg qhs 12/29    # HTN  -  Losartan discontinued due to soft BP 12/27  - BP stable continue monitor      # Pain management  - Tylenol PRN  - tramadol 25 q4 PRN severe pain,    - Lidocaine patch timing changed to bedtime per request. States she uses Salon Pas on left neck/shoulder at home.   - Cervical pillow for postioning midline and neck control, improved    # DVT   - b/l below the knee DVTs: right soleal vein, left soleal, peroneal and gastrocnemius veins.  - Lovenox 40 mg daily. Not cleared for full dose AC due to large ICH  - Doppler 12/20: Positive deep venous thrombosis below the right knee within the right soleal vein. Positive deep venous thrombosis below the left knee within the left soleal, peroneal and gastrocnemius veins.  - Doppler 12/27 surveillance - DVTS known - stable right improved on left.  - Repeat Head CT 12/27 improved followed by neuro consult re: possible full dose AC (day 14 post bleed)  - Neuro consult appreciated 12/28. Do not recommend full dose of AC at this time  -Repeat Head CT ~ 2 weeks-- on 1/10/24 prior to discharge.     # GI ppx/h/o reflux  - Pepcid 20mg   - TUMS, Maalox  - Senna, miralax PRN  - metamucil added 12/29, patient reports was recommended by her PCP    # Dysphagia   - Diet: Soft and Bite sized with thin liquids  -c/w AREDS 2 vitamin (home med)--will order     #Case discussed in IDT rounds 1/2/24  -SLP: soft bite size; severe cognitive deficits,  left inattentiveness with max cueing, Impaired attention.   -OT: Eating/grooming supervision; Footwear/showering/Toileting-- Max A; UBD/LBD and toilet transfers-- Mod A  -PT: Transfers to the right Min-Mod A; Transfer to left Mod A; Ambulate 110ft Mod A with WC follow  - goals: 1. min assist transfers and ambulation, min assist bADLs; Will need 24hr supervision  - target: dc [facility] 1/11/23    Left message for HCP Isabella to provide update on patients status and discharge planning 1/2/24 & 1/3/24.  Brock WOLFF, was able to reach her and updated on [facility]AN discharge plan for 1/11.     # LABS:   CBC CMP 1/2    #GOC  CODE STATUS: FULL CODE    Outpatient Follow-up (Specialty/Name of physician):    Emy Prakash  NP in [address], [address]  [place] [address]  Phone: [phone]  Fax: [phone]  Follow Up Time: 2 weeks    Sergio Jurado  Cardiology  [address] [place] [address], [address]  [place] [address]  Phone: [phone]  Fax: [phone]  Follow Up Time: 1 month         74 YO RIGHT handed woman with no PMH who presented to [facility] ED on 12/14/2023, as a transfer from [facility] with R IPH. Hospital course also significant for UTI, b/l DVTs, EEG with increased seizure risks.    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  - Head CT 12/27: The right parietal parenchymal hemorrhage is slightly less dense and slightly smaller measuring 3.0 cm in AP diameter by 4.3 cm transversely compared with the prior of 4.1 cm in AP diameter by 5.9 cm transversely. There is similar vasogenic edema. There is also similar mass effect on the atrium of the right lateral ventricle with similar mild midline shift to the left. There is resolution of the intraventricular hemorrhage in the left occipital horn.  - EEG with increased risk of seizures in the right frontocentral region  - Briviact 50mg BID for seizure prophylaxis.  - Cont Comprehensive Rehab Program: PT/OT/ST, 3hours daily and 5 days weekly.     # self-reported history of ADHD and restless, distracted, poor attention  - xanax Rx in I stop  - Cont. modafinil 50mg AM started 12/30--Improvement in attention  - psych consult 1/2 reviewed and appreciated  - psychology and recreation therapy (former )    #   - discussed with hospitalist. Patient with ascvd is moderate risk  - C/W atorvasttin 10 mg qhs 12/29    # HTN  -  Losartan discontinued due to soft BP 12/27  - BP stable continue monitor      # Pain management  - Tylenol PRN  - tramadol 25 q4 PRN severe pain,    - Lidocaine patch timing changed to bedtime per request. States she uses Salon Pas on left neck/shoulder at home.   - Cervical pillow for postioning midline and neck control, improved    # DVT   - b/l below the knee DVTs: right soleal vein, left soleal, peroneal and gastrocnemius veins.  - Lovenox 40 mg daily. Not cleared for full dose AC due to large ICH  - Doppler 12/20: Positive deep venous thrombosis below the right knee within the right soleal vein. Positive deep venous thrombosis below the left knee within the left soleal, peroneal and gastrocnemius veins.  - Doppler 12/27 surveillance - DVTS known - stable right improved on left.  - Repeat Head CT 12/27 improved followed by neuro consult re: possible full dose AC (day 14 post bleed)  - Neuro consult appreciated 12/28. Do not recommend full dose of AC at this time  -Repeat Head CT ~ 2 weeks-- on 1/10/24 prior to discharge.     # GI ppx/h/o reflux  - Pepcid 20mg   - TUMS, Maalox  - Senna, miralax PRN  - metamucil added 12/29, patient reports was recommended by her PCP    # Dysphagia   - Diet: Soft and Bite sized with thin liquids  -c/w AREDS 2 vitamin (home med)--will order     #Case discussed in IDT rounds 1/2/24  -SLP: soft bite size; severe cognitive deficits,  left inattentiveness with max cueing, Impaired attention.   -OT: Eating/grooming supervision; Footwear/showering/Toileting-- Max A; UBD/LBD and toilet transfers-- Mod A  -PT: Transfers to the right Min-Mod A; Transfer to left Mod A; Ambulate 110ft Mod A with WC follow  - goals: 1. min assist transfers and ambulation, min assist bADLs; Will need 24hr supervision  - target: dc [facility] 1/11/23    Left message for HCP Isabella to provide update on patients status and discharge planning 1/2/24 & 1/3/24.  Brock WOLFF, was able to reach her and updated on [facility]AN discharge plan for 1/11.     # LABS:   CBC CMP 1/2    #GOC  CODE STATUS: FULL CODE    Outpatient Follow-up (Specialty/Name of physician):    Emy Prakash  NP in [address], [address]  [place] [address]  Phone: [phone]  Fax: [phone]  Follow Up Time: 2 weeks    Sergio Jurado  Cardiology  [address] [place] [address], [address]  [place] [address]  Phone: [phone]  Fax: [phone]  Follow Up Time: 1 month         72 YO RIGHT handed woman with no PMH who presented to [facility] ED on 12/14/2023, as a transfer from [facility] with R IPH. Hospital course also significant for UTI, b/l DVTs, EEG with increased seizure risks.    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  - Head CT 12/27: The right parietal parenchymal hemorrhage is slightly less dense and slightly smaller measuring 3.0 cm in AP diameter by 4.3 cm transversely compared with the prior of 4.1 cm in AP diameter by 5.9 cm transversely. There is similar vasogenic edema. There is also similar mass effect on the atrium of the right lateral ventricle with similar mild midline shift to the left. There is resolution of the intraventricular hemorrhage in the left occipital horn.  - EEG with increased risk of seizures in the right frontocentral region  - Briviact 50mg BID for seizure prophylaxis.  - Cont Comprehensive Rehab Program: PT/OT/ST, 3hours daily and 5 days weekly.     # self-reported history of ADHD and restless, distracted, poor attention  - xanax Rx in I stop  - Cont. modafinil 50mg AM started 12/30--Improvement in attention  - psych consult 1/2 reviewed and appreciated  - psychology and recreation therapy (former )    #   - discussed with hospitalist. Patient with ascvd is moderate risk  - C/W atorvasttin 10 mg qhs 12/29    # HTN  -  Losartan discontinued due to soft BP 12/27  - BP stable continue monitor      # Pain management  - Tylenol PRN  - tramadol 25 q4 PRN severe pain,  --Increase Gabapentin to 400mg qhs for neuropathic pain.     - Lidocaine patch timing changed to bedtime per request. States she uses Salon Pas on left neck/shoulder at home.   - Cervical pillow for postioning midline and neck control, improved    # DVT   - b/l below the knee DVTs: right soleal vein, left soleal, peroneal and gastrocnemius veins.  - Lovenox 40 mg daily. Not cleared for full dose AC due to large ICH  - Doppler 12/20: Positive deep venous thrombosis below the right knee within the right soleal vein. Positive deep venous thrombosis below the left knee within the left soleal, peroneal and gastrocnemius veins.  - Doppler 12/27 surveillance - DVTS known - stable right improved on left.  - Repeat Head CT 12/27 improved followed by neuro consult re: possible full dose AC (day 14 post bleed)  - Neuro consult appreciated 12/28. Do not recommend full dose of AC at this time  -Repeat Head CT ~ 2 weeks-- on 1/10/24 prior to discharge.     # GI ppx/h/o reflux  - Pepcid 20mg   - TUMS, Maalox  - Senna, miralax PRN  - metamucil added 12/29, patient reports was recommended by her PCP    # Dysphagia   - Diet: Soft and Bite sized with thin liquids  -c/w AREDS 2 vitamin (home med)--will order     #Case discussed in IDT rounds 1/2/24  -SLP: soft bite size; severe cognitive deficits,  left inattentiveness with max cueing, Impaired attention.   -OT: Eating/grooming supervision; Footwear/showering/Toileting-- Max A; UBD/LBD and toilet transfers-- Mod A  -PT: Transfers to the right Min-Mod A; Transfer to left Mod A; Ambulate 110ft Mod A with WC follow  - goals: 1. min assist transfers and ambulation, min assist bADLs; Will need 24hr supervision  - target: dc [facility] 1/11/23    Left message for HCP Isabella to provide update on patients status and discharge planning 1/2/24 & 1/3/24.  Brock WOLFF, was able to reach her and updated on S[facility]AN discharge plan for 1/11.     # LABS:   CBC CMP 1/2    #GOC  CODE STATUS: FULL CODE    Outpatient Follow-up (Specialty/Name of physician):    Emy Prakash  NP in [facility]  [address], [address]  [place] [address]  Phone: [phone]  Fax: [phone]  Follow Up Time: 2 weeks    Sergio Jurado  Cardiology  [address] [place] [address], [address]  [place] [address]  Phone: [phone]  Fax: [phone]  Follow Up Time: 1 month         74 YO RIGHT handed woman with no PMH who presented to [facility] ED on 12/14/2023, as a transfer from [facility] with R IPH. Hospital course also significant for UTI, b/l DVTs, EEG with increased seizure risks.    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  - Head CT 12/27: The right parietal parenchymal hemorrhage is slightly less dense and slightly smaller measuring 3.0 cm in AP diameter by 4.3 cm transversely compared with the prior of 4.1 cm in AP diameter by 5.9 cm transversely. There is similar vasogenic edema. There is also similar mass effect on the atrium of the right lateral ventricle with similar mild midline shift to the left. There is resolution of the intraventricular hemorrhage in the left occipital horn.  - EEG with increased risk of seizures in the right frontocentral region  - Briviact 50mg BID for seizure prophylaxis.  - Cont Comprehensive Rehab Program: PT/OT/ST, 3hours daily and 5 days weekly.     # self-reported history of ADHD and restless, distracted, poor attention  - xanax Rx in I stop  - Cont. modafinil 50mg AM started 12/30--Improvement in attention  - psych consult 1/2 reviewed and appreciated  - psychology and recreation therapy (former )    #   - discussed with hospitalist. Patient with ascvd is moderate risk  - C/W atorvasttin 10 mg qhs 12/29    # HTN  -  Losartan discontinued due to soft BP 12/27  - BP stable continue monitor      # Pain management  - Tylenol PRN  - tramadol 25 q4 PRN severe pain,  --Increase Gabapentin to 400mg qhs for neuropathic pain.     - Lidocaine patch timing changed to bedtime per request. States she uses Salon Pas on left neck/shoulder at home.   - Cervical pillow for postioning midline and neck control, improved    # DVT   - b/l below the knee DVTs: right soleal vein, left soleal, peroneal and gastrocnemius veins.  - Lovenox 40 mg daily. Not cleared for full dose AC due to large ICH  - Doppler 12/20: Positive deep venous thrombosis below the right knee within the right soleal vein. Positive deep venous thrombosis below the left knee within the left soleal, peroneal and gastrocnemius veins.  - Doppler 12/27 surveillance - DVTS known - stable right improved on left.  - Repeat Head CT 12/27 improved followed by neuro consult re: possible full dose AC (day 14 post bleed)  - Neuro consult appreciated 12/28. Do not recommend full dose of AC at this time  -Repeat Head CT ~ 2 weeks-- on 1/10/24 prior to discharge.     # GI ppx/h/o reflux  - Pepcid 20mg   - TUMS, Maalox  - Senna, miralax PRN  - metamucil added 12/29, patient reports was recommended by her PCP    # Dysphagia   - Diet: Soft and Bite sized with thin liquids  -c/w AREDS 2 vitamin (home med)--will order     #Case discussed in IDT rounds 1/2/24  -SLP: soft bite size; severe cognitive deficits,  left inattentiveness with max cueing, Impaired attention.   -OT: Eating/grooming supervision; Footwear/showering/Toileting-- Max A; UBD/LBD and toilet transfers-- Mod A  -PT: Transfers to the right Min-Mod A; Transfer to left Mod A; Ambulate 110ft Mod A with WC follow  - goals: 1. min assist transfers and ambulation, min assist bADLs; Will need 24hr supervision  - target: dc [facility] 1/11/23    Left message for HCP Isabella to provide update on patients status and discharge planning 1/2/24 & 1/3/24.  Brock WOLFF, was able to reach her and updated on SIVAN discharge plan for 1/11.     # LABS:   CBC CMP 1/2    #GOC  CODE STATUS: FULL CODE    Outpatient Follow-up (Specialty/Name of physician):    Emy Prakash  NP in [facility]  [address], [address]  [place] [address]  Phone: [phone]  Fax: [phone]  Follow Up Time: 2 weeks    Sergio Jurado  Cardiology  [address] [place] [address], [address]  [place] [address]  Phone: [phone]  Fax: [phone]  Follow Up Time: 1 month         72 YO RIGHT handed woman with no PMH who presented to [facility] ED on 12/14/2023, as a transfer from [facility] with R IPH. Hospital course also significant for UTI, b/l DVTs, EEG with increased seizure risks.    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  - Head CT 12/27: The right parietal parenchymal hemorrhage is slightly less dense and slightly smaller measuring 3.0 cm in AP diameter by 4.3 cm transversely compared with the prior of 4.1 cm in AP diameter by 5.9 cm transversely. There is similar vasogenic edema. There is also similar mass effect on the atrium of the right lateral ventricle with similar mild midline shift to the left. There is resolution of the intraventricular hemorrhage in the left occipital horn.  - EEG with increased risk of seizures in the right frontocentral region  - Briviact 50mg BID for seizure prophylaxis.  - Cont Comprehensive Rehab Program: PT/OT/ST, 3hours daily and 5 days weekly.     # self-reported history of ADHD and restless, distracted, poor attention  - xanax Rx in I stop  - Cont. modafinil 50mg AM started 12/30--Improvement in attention  - psych consult 1/2 reviewed and appreciated  - psychology and recreation therapy (former )    #   - discussed with hospitalist. Patient with ascvd is moderate risk  - C/W atorvasttin 10 mg qhs 12/29    # HTN  -  Losartan discontinued due to soft BP 12/27  - BP stable continue monitor      # Pain management  - Tylenol PRN  - tramadol 25 q4 PRN severe pain,  --Increase Gabapentin to 400mg qhs for neuropathic pain.     - Lidocaine patch timing changed to bedtime per request. States she uses Salon Pas on left neck/shoulder at home.   - Cervical pillow for postioning midline and neck control, improved    # DVT   - b/l below the knee DVTs: right soleal vein, left soleal, peroneal and gastrocnemius veins.  - Lovenox 40 mg daily. Not cleared for full dose AC due to large ICH  - Doppler 12/20: Positive deep venous thrombosis below the right knee within the right soleal vein. Positive deep venous thrombosis below the left knee within the left soleal, peroneal and gastrocnemius veins.  - Doppler 12/27 surveillance - DVTS known - stable right improved on left.  - Repeat Head CT 12/27 improved followed by neuro consult re: possible full dose AC (day 14 post bleed)  - Neuro consult appreciated 12/28. Do not recommend full dose of AC at this time  -Repeat Head CT ~ 2 weeks-- on 1/10/24 prior to discharge.     # GI ppx/h/o reflux  - Pepcid 20mg   - TUMS, Maalox  - Senna, miralax PRN  - metamucil added 12/29, patient reports was recommended by her PCP    # Dysphagia   - Diet: Soft and Bite sized with thin liquids  -c/w AREDS 2 vitamin (home med)--will order     #Case discussed in IDT rounds 1/2/24  -SLP: soft bite size; severe cognitive deficits,  left inattentiveness with max cueing, Impaired attention.   -OT: Eating/grooming supervision; Footwear/showering/Toileting-- Max A; UBD/LBD and toilet transfers-- Mod A  -PT: Transfers to the right Min-Mod A; Transfer to left Mod A; Ambulate 110ft Mod A with WC follow  - goals: 1. min assist transfers and ambulation, min assist bADLs; Will need 24hr supervision  - target: dc [facility] 1/11/23    ** Spoke with pt's  HCP, Isabella____, to provide update on pt's status,  medical update, medications, progress in therapy, rehab plan of care, discharge plan to [facility] on 1/11/24 and discharge expectations and general prognosis.  All questions answered.     # LABS:   CBC CMP 1/2    #GOC  CODE STATUS: FULL CODE    Outpatient Follow-up (Specialty/Name of physician):    Emy Prakash  NP in [address], [address]  [place] [address]  Phone: [phone]  Fax: [phone]  Follow Up Time: 2 weeks    Sergio Jurado  Cardiology  [address] [place] [address], [address]  [place] [address]  Phone: [phone]  Fax: [phone]  Follow Up Time: 1 month         74 YO RIGHT handed woman with no PMH who presented to [facility] ED on 12/14/2023, as a transfer from [facility] with R IPH. Hospital course also significant for UTI, b/l DVTs, EEG with increased seizure risks.    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  - Head CT 12/27: The right parietal parenchymal hemorrhage is slightly less dense and slightly smaller measuring 3.0 cm in AP diameter by 4.3 cm transversely compared with the prior of 4.1 cm in AP diameter by 5.9 cm transversely. There is similar vasogenic edema. There is also similar mass effect on the atrium of the right lateral ventricle with similar mild midline shift to the left. There is resolution of the intraventricular hemorrhage in the left occipital horn.  - EEG with increased risk of seizures in the right frontocentral region  - Briviact 50mg BID for seizure prophylaxis.  - Cont Comprehensive Rehab Program: PT/OT/ST, 3hours daily and 5 days weekly.     # self-reported history of ADHD and restless, distracted, poor attention  - xanax Rx in I stop  - Cont. modafinil 50mg AM started 12/30--Improvement in attention  - psych consult 1/2 reviewed and appreciated  - psychology and recreation therapy (former )    #   - discussed with hospitalist. Patient with ascvd is moderate risk  - C/W atorvasttin 10 mg qhs 12/29    # HTN  -  Losartan discontinued due to soft BP 12/27  - BP stable continue monitor      # Pain management  - Tylenol PRN  - tramadol 25 q4 PRN severe pain,  --Increase Gabapentin to 400mg qhs for neuropathic pain.     - Lidocaine patch timing changed to bedtime per request. States she uses Salon Pas on left neck/shoulder at home.   - Cervical pillow for postioning midline and neck control, improved    # DVT   - b/l below the knee DVTs: right soleal vein, left soleal, peroneal and gastrocnemius veins.  - Lovenox 40 mg daily. Not cleared for full dose AC due to large ICH  - Doppler 12/20: Positive deep venous thrombosis below the right knee within the right soleal vein. Positive deep venous thrombosis below the left knee within the left soleal, peroneal and gastrocnemius veins.  - Doppler 12/27 surveillance - DVTS known - stable right improved on left.  - Repeat Head CT 12/27 improved followed by neuro consult re: possible full dose AC (day 14 post bleed)  - Neuro consult appreciated 12/28. Do not recommend full dose of AC at this time  -Repeat Head CT ~ 2 weeks-- on 1/10/24 prior to discharge.     # GI ppx/h/o reflux  - Pepcid 20mg   - TUMS, Maalox  - Senna, miralax PRN  - metamucil added 12/29, patient reports was recommended by her PCP    # Dysphagia   - Diet: Soft and Bite sized with thin liquids  -c/w AREDS 2 vitamin (home med)--will order     #Case discussed in IDT rounds 1/2/24  -SLP: soft bite size; severe cognitive deficits,  left inattentiveness with max cueing, Impaired attention.   -OT: Eating/grooming supervision; Footwear/showering/Toileting-- Max A; UBD/LBD and toilet transfers-- Mod A  -PT: Transfers to the right Min-Mod A; Transfer to left Mod A; Ambulate 110ft Mod A with WC follow  - goals: 1. min assist transfers and ambulation, min assist bADLs; Will need 24hr supervision  - target: dc [facility] 1/11/23    ** Spoke with pt's  HCP, Isabella____, to provide update on pt's status,  medical update, medications, progress in therapy, rehab plan of care, discharge plan to [facility] on 1/11/24 and discharge expectations and general prognosis.  All questions answered.     # LABS:   CBC CMP 1/2    #GOC  CODE STATUS: FULL CODE    Outpatient Follow-up (Specialty/Name of physician):    Emy Prakash  NP in [address], [address]  [place] [address]  Phone: [phone]  Fax: [phone]  Follow Up Time: 2 weeks    Sergio Jurado  Cardiology  [address] [place] [address], [address]  [place] [address]  Phone: [phone]  Fax: [phone]  Follow Up Time: 1 month

## 2024-01-04 LAB
ALBUMIN SERPL ELPH-MCNC: 3.1 G/DL — LOW (ref 3.3–5)
ALBUMIN SERPL ELPH-MCNC: 3.1 G/DL — LOW (ref 3.3–5)
ALP SERPL-CCNC: 65 U/L — SIGNIFICANT CHANGE UP (ref 40–120)
ALP SERPL-CCNC: 65 U/L — SIGNIFICANT CHANGE UP (ref 40–120)
ALT FLD-CCNC: 34 U/L — SIGNIFICANT CHANGE UP (ref 10–45)
ALT FLD-CCNC: 34 U/L — SIGNIFICANT CHANGE UP (ref 10–45)
ANION GAP SERPL CALC-SCNC: 11 MMOL/L — SIGNIFICANT CHANGE UP (ref 5–17)
ANION GAP SERPL CALC-SCNC: 11 MMOL/L — SIGNIFICANT CHANGE UP (ref 5–17)
AST SERPL-CCNC: 18 U/L — SIGNIFICANT CHANGE UP (ref 10–40)
AST SERPL-CCNC: 18 U/L — SIGNIFICANT CHANGE UP (ref 10–40)
BILIRUB SERPL-MCNC: 0.4 MG/DL — SIGNIFICANT CHANGE UP (ref 0.2–1.2)
BILIRUB SERPL-MCNC: 0.4 MG/DL — SIGNIFICANT CHANGE UP (ref 0.2–1.2)
BUN SERPL-MCNC: 18 MG/DL — SIGNIFICANT CHANGE UP (ref 7–23)
BUN SERPL-MCNC: 18 MG/DL — SIGNIFICANT CHANGE UP (ref 7–23)
CALCIUM SERPL-MCNC: 9.5 MG/DL — SIGNIFICANT CHANGE UP (ref 8.4–10.5)
CALCIUM SERPL-MCNC: 9.5 MG/DL — SIGNIFICANT CHANGE UP (ref 8.4–10.5)
CHLORIDE SERPL-SCNC: 105 MMOL/L — SIGNIFICANT CHANGE UP (ref 96–108)
CHLORIDE SERPL-SCNC: 105 MMOL/L — SIGNIFICANT CHANGE UP (ref 96–108)
CO2 SERPL-SCNC: 25 MMOL/L — SIGNIFICANT CHANGE UP (ref 22–31)
CO2 SERPL-SCNC: 25 MMOL/L — SIGNIFICANT CHANGE UP (ref 22–31)
CREAT SERPL-MCNC: 0.73 MG/DL — SIGNIFICANT CHANGE UP (ref 0.5–1.3)
CREAT SERPL-MCNC: 0.73 MG/DL — SIGNIFICANT CHANGE UP (ref 0.5–1.3)
EGFR: 87 ML/MIN/1.73M2 — SIGNIFICANT CHANGE UP
EGFR: 87 ML/MIN/1.73M2 — SIGNIFICANT CHANGE UP
GLUCOSE SERPL-MCNC: 95 MG/DL — SIGNIFICANT CHANGE UP (ref 70–99)
GLUCOSE SERPL-MCNC: 95 MG/DL — SIGNIFICANT CHANGE UP (ref 70–99)
HCT VFR BLD CALC: 38.7 % — SIGNIFICANT CHANGE UP (ref 34.5–45)
HCT VFR BLD CALC: 38.7 % — SIGNIFICANT CHANGE UP (ref 34.5–45)
HGB BLD-MCNC: 13.4 G/DL — SIGNIFICANT CHANGE UP (ref 11.5–15.5)
HGB BLD-MCNC: 13.4 G/DL — SIGNIFICANT CHANGE UP (ref 11.5–15.5)
MCHC RBC-ENTMCNC: 30.5 PG — SIGNIFICANT CHANGE UP (ref 27–34)
MCHC RBC-ENTMCNC: 30.5 PG — SIGNIFICANT CHANGE UP (ref 27–34)
MCHC RBC-ENTMCNC: 34.6 GM/DL — SIGNIFICANT CHANGE UP (ref 32–36)
MCHC RBC-ENTMCNC: 34.6 GM/DL — SIGNIFICANT CHANGE UP (ref 32–36)
MCV RBC AUTO: 88.2 FL — SIGNIFICANT CHANGE UP (ref 80–100)
MCV RBC AUTO: 88.2 FL — SIGNIFICANT CHANGE UP (ref 80–100)
NRBC # BLD: 0 /100 WBCS — SIGNIFICANT CHANGE UP (ref 0–0)
NRBC # BLD: 0 /100 WBCS — SIGNIFICANT CHANGE UP (ref 0–0)
PLATELET # BLD AUTO: 288 K/UL — SIGNIFICANT CHANGE UP (ref 150–400)
PLATELET # BLD AUTO: 288 K/UL — SIGNIFICANT CHANGE UP (ref 150–400)
POTASSIUM SERPL-MCNC: 3.9 MMOL/L — SIGNIFICANT CHANGE UP (ref 3.5–5.3)
POTASSIUM SERPL-MCNC: 3.9 MMOL/L — SIGNIFICANT CHANGE UP (ref 3.5–5.3)
POTASSIUM SERPL-SCNC: 3.9 MMOL/L — SIGNIFICANT CHANGE UP (ref 3.5–5.3)
POTASSIUM SERPL-SCNC: 3.9 MMOL/L — SIGNIFICANT CHANGE UP (ref 3.5–5.3)
PROT SERPL-MCNC: 6.4 G/DL — SIGNIFICANT CHANGE UP (ref 6–8.3)
PROT SERPL-MCNC: 6.4 G/DL — SIGNIFICANT CHANGE UP (ref 6–8.3)
RBC # BLD: 4.39 M/UL — SIGNIFICANT CHANGE UP (ref 3.8–5.2)
RBC # BLD: 4.39 M/UL — SIGNIFICANT CHANGE UP (ref 3.8–5.2)
RBC # FLD: 12.9 % — SIGNIFICANT CHANGE UP (ref 10.3–14.5)
RBC # FLD: 12.9 % — SIGNIFICANT CHANGE UP (ref 10.3–14.5)
SODIUM SERPL-SCNC: 141 MMOL/L — SIGNIFICANT CHANGE UP (ref 135–145)
SODIUM SERPL-SCNC: 141 MMOL/L — SIGNIFICANT CHANGE UP (ref 135–145)
WBC # BLD: 4.67 K/UL — SIGNIFICANT CHANGE UP (ref 3.8–10.5)
WBC # BLD: 4.67 K/UL — SIGNIFICANT CHANGE UP (ref 3.8–10.5)
WBC # FLD AUTO: 4.67 K/UL — SIGNIFICANT CHANGE UP (ref 3.8–10.5)
WBC # FLD AUTO: 4.67 K/UL — SIGNIFICANT CHANGE UP (ref 3.8–10.5)

## 2024-01-04 PROCEDURE — 99232 SBSQ HOSP IP/OBS MODERATE 35: CPT

## 2024-01-04 RX ORDER — MODAFINIL 200 MG/1
100 TABLET ORAL
Refills: 0 | Status: DISCONTINUED | OUTPATIENT
Start: 2024-01-05 | End: 2024-01-11

## 2024-01-04 RX ORDER — SODIUM CHLORIDE 9 MG/ML
500 INJECTION INTRAMUSCULAR; INTRAVENOUS; SUBCUTANEOUS ONCE
Refills: 0 | Status: COMPLETED | OUTPATIENT
Start: 2024-01-04 | End: 2024-01-04

## 2024-01-04 RX ADMIN — Medication 3 MILLIGRAM(S): at 21:26

## 2024-01-04 RX ADMIN — LIDOCAINE 1 PATCH: 4 CREAM TOPICAL at 07:46

## 2024-01-04 RX ADMIN — ENOXAPARIN SODIUM 40 MILLIGRAM(S): 100 INJECTION SUBCUTANEOUS at 17:35

## 2024-01-04 RX ADMIN — LIDOCAINE 1 PATCH: 4 CREAM TOPICAL at 20:19

## 2024-01-04 RX ADMIN — GABAPENTIN 400 MILLIGRAM(S): 400 CAPSULE ORAL at 21:26

## 2024-01-04 RX ADMIN — FAMOTIDINE 20 MILLIGRAM(S): 10 INJECTION INTRAVENOUS at 05:08

## 2024-01-04 RX ADMIN — SODIUM CHLORIDE 500 MILLILITER(S): 9 INJECTION INTRAMUSCULAR; INTRAVENOUS; SUBCUTANEOUS at 14:12

## 2024-01-04 RX ADMIN — MODAFINIL 50 MILLIGRAM(S): 200 TABLET ORAL at 05:17

## 2024-01-04 RX ADMIN — Medication 5 MILLIGRAM(S): at 11:19

## 2024-01-04 RX ADMIN — POLYETHYLENE GLYCOL 3350 17 GRAM(S): 17 POWDER, FOR SOLUTION ORAL at 17:36

## 2024-01-04 RX ADMIN — Medication 1 DROP(S): at 05:11

## 2024-01-04 RX ADMIN — BRIVARACETAM 50 MILLIGRAM(S): 25 TABLET, FILM COATED ORAL at 05:08

## 2024-01-04 RX ADMIN — LIDOCAINE 1 PATCH: 4 CREAM TOPICAL at 20:00

## 2024-01-04 RX ADMIN — Medication 1 PACKET(S): at 11:19

## 2024-01-04 RX ADMIN — FAMOTIDINE 20 MILLIGRAM(S): 10 INJECTION INTRAVENOUS at 17:36

## 2024-01-04 RX ADMIN — Medication 1 DROP(S): at 11:18

## 2024-01-04 RX ADMIN — Medication 1 DROP(S): at 11:26

## 2024-01-04 RX ADMIN — LIDOCAINE 1 PATCH: 4 CREAM TOPICAL at 20:18

## 2024-01-04 RX ADMIN — POLYETHYLENE GLYCOL 3350 17 GRAM(S): 17 POWDER, FOR SOLUTION ORAL at 05:09

## 2024-01-04 RX ADMIN — Medication 1 DROP(S): at 17:45

## 2024-01-04 RX ADMIN — Medication 1 SPRAY(S): at 17:35

## 2024-01-04 RX ADMIN — Medication 1 SPRAY(S): at 05:08

## 2024-01-04 RX ADMIN — ATORVASTATIN CALCIUM 10 MILLIGRAM(S): 80 TABLET, FILM COATED ORAL at 21:26

## 2024-01-04 RX ADMIN — BRIVARACETAM 50 MILLIGRAM(S): 25 TABLET, FILM COATED ORAL at 17:35

## 2024-01-04 RX ADMIN — LIDOCAINE 1 PATCH: 4 CREAM TOPICAL at 07:47

## 2024-01-04 RX ADMIN — Medication 650 MILLIGRAM(S): at 00:30

## 2024-01-04 NOTE — PROGRESS NOTE ADULT - ASSESSMENT
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 Noted to have UTI, and BL Distal DVT    Intraparenchymal Hemorrhage  - Abnormal EEG and Briviact 50mg BID started for seizure ppx  - TTE- EF 64%.    #HTN  -BP was noted to be low, losartan stopped   - Goal BP <130mmhg Average.   - episode of orthostasis this AM, recommend 500cc IVF bolus  - encourage PO hydration  - monitor BP    #bradycardia  -likely neurogenic as per cardio eval prev  -monitor hr, avoid rate controlling agents    #UTI  - s/p Vantin 100mg BID   --resolved    #HLD  ASCVD moderate risk factor  started on low dose atorvastatin 10mg       #Pain management  - Tylenol PRN  -tramadol PRN    #DVT   - Distal asymptomatic DVT  - continue prophylactic dose

## 2024-01-04 NOTE — CHART NOTE - NSCHARTNOTEFT_GEN_A_CORE
Nutrition Follow Up Note  Hospital Course   (Per Electronic Medical Record)    Source:  Patient [X]  Nursing Staff [X]   Medical Record [X]      Diet: Diet, Regular (01-04-24 @ 12:57) [Active]    At this time patient tolerating diet w/ adequate appetite/intake consuming % of meals. Reviewed preferences and menu alternatives noted in [facility]. Diet upgraded from soft and Bite Sized to Regular diet texture (1/4). No issues w/ chewing and swallowing current diet texture. Denies N/V/C/D, last BM 1/2 Per nursing flowsheets.     Current Weight: 138lb on 12/29  138.6lb on 12/21    Pertinent Medications: MEDICATIONS  (STANDING):  atorvastatin 10 milliGRAM(s) Oral at bedtime  bisacodyl 5 milliGRAM(s) Oral daily  brivaracetam 50 milliGRAM(s) Oral two times a day  enoxaparin Injectable 40 milliGRAM(s) SubCutaneous <User Schedule>  famotidine    Tablet 20 milliGRAM(s) Oral two times a day  gabapentin 400 milliGRAM(s) Oral at bedtime  ketorolac 0.5% Ophthalmic Solution 1 Drop(s) Left EYE daily  lidocaine   4% Patch 1 Patch Transdermal <User Schedule>  lidocaine   4% Patch 1 Patch Transdermal <User Schedule>  polyethylene glycol 3350 17 Gram(s) Oral two times a day  Preservision  Capsules AREDS 2 2 Capsule(s) 2 Capsule(s) Oral daily  psyllium Powder 1 Packet(s) Oral daily  senna 2 Tablet(s) Oral at bedtime  sodium chloride 0.65% Nasal 1 Spray(s) Both Nostrils two times a day    MEDICATIONS  (PRN):  acetaminophen     Tablet .. 650 milliGRAM(s) Oral every 6 hours PRN Temp greater or equal to 38C (100.4F), Mild Pain (1 - 3)  aluminum hydroxide/magnesium hydroxide/simethicone Suspension 30 milliLiter(s) Oral every 6 hours PRN Dyspepsia  artificial tears (preservative free) Ophthalmic Solution 1 Drop(s) Both EYES every 4 hours PRN Dry Eyes  bisacodyl Suppository 10 milliGRAM(s) Rectal daily PRN Constipation  calcium carbonate    500 mG (Tums) Chewable 1 Tablet(s) Chew three times a day PRN Heartburn  melatonin 3 milliGRAM(s) Oral at bedtime PRN Insomnia  ondansetron   Disintegrating Tablet 4 milliGRAM(s) Oral every 6 hours PRN Nausea and/or Vomiting      Pertinent Labs:  01-04 Na141 mmol/L Glu 95 mg/dL K+ 3.9 mmol/L Cr  0.73 mg/dL BUN 18 mg/dL 01-04 Alb 3.1 g/dL<L> 12-15 Chol 197 mg/dL LDL --    HDL 58 mg/dL Trig 64 mg/dL        Skin: No pressure injury per nursing flowsheets    Edema: No edema noted per nursing flowsheet    Last Bowel Movement: on 12/    Estimated Needs:   [X] No Change Since Previous Assessment    Previous Nutrition Diagnosis:   Increased Nutrient Needs...  micronutrients, protein  related to increased physiological demand s/p brain injury  as evidenced by s/p ICH    Nutrition Diagnosis is [X] Ongoing - addressed with greek yogurt TID & PO intake >75%    New Nutrition Diagnosis: [X] Not Applicable    Interventions:   1. Recommend continuing with current plan of care, diet consistency per SLP  2. Encourage PO intake  3. Obtain and honor food preferences as able  4. Ongoing diet education     Monitoring & Evaluation:   [X] Weights   [X] PO Intake   [X] Skin Integrity   [X] Follow Up (Per Protocol)  [X] Tolerance to Diet Prescription   [X] Other: Labs    Registered Dietitian/Nutritionist Remains Available.  Gayatri Crawley RD    Phone# [phone] Nutrition Follow Up Note  Hospital Course   (Per Electronic Medical Record)    Source:  Patient [X]  Nursing Staff [X]   Medical Record [X]      Diet: Diet, Regular (01-04-24 @ 12:57) [Active]    At this time patient tolerating diet w/ adequate appetite/intake consuming % of meals. Reviewed preferences and menu alternatives noted in [facility]. Diet upgraded from soft and Bite Sized to Regular diet texture (1/4). No issues w/ chewing and swallowing current diet texture. Denies N/V/C/D, last BM 1/2 Per nursing flowsheets.     Current Weight: 138lb on 12/29  138.6lb on 12/21    Pertinent Medications: MEDICATIONS  (STANDING):  atorvastatin 10 milliGRAM(s) Oral at bedtime  bisacodyl 5 milliGRAM(s) Oral daily  brivaracetam 50 milliGRAM(s) Oral two times a day  enoxaparin Injectable 40 milliGRAM(s) SubCutaneous <User Schedule>  famotidine    Tablet 20 milliGRAM(s) Oral two times a day  gabapentin 400 milliGRAM(s) Oral at bedtime  ketorolac 0.5% Ophthalmic Solution 1 Drop(s) Left EYE daily  lidocaine   4% Patch 1 Patch Transdermal <User Schedule>  lidocaine   4% Patch 1 Patch Transdermal <User Schedule>  polyethylene glycol 3350 17 Gram(s) Oral two times a day  Preservision  Capsules AREDS 2 2 Capsule(s) 2 Capsule(s) Oral daily  psyllium Powder 1 Packet(s) Oral daily  senna 2 Tablet(s) Oral at bedtime  sodium chloride 0.65% Nasal 1 Spray(s) Both Nostrils two times a day    MEDICATIONS  (PRN):  acetaminophen     Tablet .. 650 milliGRAM(s) Oral every 6 hours PRN Temp greater or equal to 38C (100.4F), Mild Pain (1 - 3)  aluminum hydroxide/magnesium hydroxide/simethicone Suspension 30 milliLiter(s) Oral every 6 hours PRN Dyspepsia  artificial tears (preservative free) Ophthalmic Solution 1 Drop(s) Both EYES every 4 hours PRN Dry Eyes  bisacodyl Suppository 10 milliGRAM(s) Rectal daily PRN Constipation  calcium carbonate    500 mG (Tums) Chewable 1 Tablet(s) Chew three times a day PRN Heartburn  melatonin 3 milliGRAM(s) Oral at bedtime PRN Insomnia  ondansetron   Disintegrating Tablet 4 milliGRAM(s) Oral every 6 hours PRN Nausea and/or Vomiting      Pertinent Labs:  01-04 Na141 mmol/L Glu 95 mg/dL K+ 3.9 mmol/L Cr  0.73 mg/dL BUN 18 mg/dL 01-04 Alb 3.1 g/dL<L> 12-15 Chol 197 mg/dL LDL --    HDL 58 mg/dL Trig 64 mg/dL        Skin: No pressure injury per nursing flowsheets    Edema: No edema noted per nursing flowsheet    Last Bowel Movement: on 12/    Estimated Needs:   [X] No Change Since Previous Assessment    Previous Nutrition Diagnosis:   Increased Nutrient Needs...  micronutrients, protein  related to increased physiological demand s/p brain injury  as evidenced by s/p ICH    Nutrition Diagnosis is [X] Ongoing - addressed with greek yogurt TID & PO intake >75%    New Nutrition Diagnosis: [X] Not Applicable    Interventions:   1. Recommend continuing with current plan of care, diet consistency per SLP  2. Encourage PO intake  3. Obtain and honor food preferences as able  4. Ongoing diet education     Monitoring & Evaluation:   [X] Weights   [X] PO Intake   [X] Skin Integrity   [X] Follow Up (Per Protocol)  [X] Tolerance to Diet Prescription   [X] Other: Labs    Registered Dietitian/Nutritionist Remains Available.  Gayatri Crawley RD    Phone# [phone]

## 2024-01-04 NOTE — PROGRESS NOTE ADULT - ASSESSMENT
74 YO RIGHT handed woman with no PMH who presented to [facility] ED on 12/14/2023, as a transfer from [facility] with R IPH. Hospital course also significant for UTI, b/l DVTs, EEG with increased seizure risks.    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  - Head CT 12/27: The right parietal parenchymal hemorrhage is slightly less dense and slightly smaller measuring 3.0 cm in AP diameter by 4.3 cm transversely compared with the prior of 4.1 cm in AP diameter by 5.9 cm transversely. There is similar vasogenic edema. There is also similar mass effect on the atrium of the right lateral ventricle with similar mild midline shift to the left. There is resolution of the intraventricular hemorrhage in the left occipital horn.  - EEG with increased risk of seizures in the right frontocentral region  - Briviact 50mg BID for seizure prophylaxis.  - Cont Comprehensive Rehab Program: PT/OT/ST, 3hours daily and 5 days weekly.     # self-reported history of ADHD and restless, distracted, poor attention  - xanax Rx in I stop  - Cont. modafinil 50mg AM started 12/30--Improvement in attention  - psych consult 1/2 reviewed and appreciated  - psychology and recreation therapy (former )    #   - discussed with hospitalist. Patient with ascvd is moderate risk  - C/W atorvasttin 10 mg qhs 12/29    # HTN  -  Losartan discontinued due to soft BP 12/27  - BP stable continue monitor      # Pain management  - Tylenol PRN  - tramadol 25 q4 PRN severe pain,  --Increase Gabapentin to 400mg qhs for neuropathic pain.     - Lidocaine patch timing changed to bedtime per request. States she uses Salon Pas on left neck/shoulder at home.   - Cervical pillow for postioning midline and neck control, improved    # DVT   - b/l below the knee DVTs: right soleal vein, left soleal, peroneal and gastrocnemius veins.  - Lovenox 40 mg daily. Not cleared for full dose AC due to large ICH  - Doppler 12/20: Positive deep venous thrombosis below the right knee within the right soleal vein. Positive deep venous thrombosis below the left knee within the left soleal, peroneal and gastrocnemius veins.  - Doppler 12/27 surveillance - DVTS known - stable right improved on left.  - Repeat Head CT 12/27 improved followed by neuro consult re: possible full dose AC (day 14 post bleed)  - Neuro consult appreciated 12/28. Do not recommend full dose of AC at this time  -Repeat Head CT ~ 2 weeks-- on 1/10/24 prior to discharge.     # GI ppx/h/o reflux  - Pepcid 20mg   - TUMS, Maalox  - Senna, miralax PRN  - metamucil added 12/29, patient reports was recommended by her PCP    # Dysphagia   - Diet: Soft and Bite sized with thin liquids  -c/w AREDS 2 vitamin (home med)--will order     #Case discussed in IDT rounds 1/2/24  -SLP: soft bite size; severe cognitive deficits,  left inattentiveness with max cueing, Impaired attention.   -OT: Eating/grooming supervision; Footwear/showering/Toileting-- Max A; UBD/LBD and toilet transfers-- Mod A  -PT: Transfers to the right Min-Mod A; Transfer to left Mod A; Ambulate 110ft Mod A with WC follow  - goals: 1. min assist transfers and ambulation, min assist bADLs; Will need 24hr supervision  - target: dc [facility] 1/11/23    ** Spoke with pt's  HCP, Isabella____, to provide update on pt's status,  medical update, medications, progress in therapy, rehab plan of care, discharge plan to [facility] on 1/11/24 and discharge expectations and general prognosis.  All questions answered.     # LABS:   CBC CMP 1/2    #GOC  CODE STATUS: FULL CODE    Outpatient Follow-up (Specialty/Name of physician):    Emy Prakash  NP in [address], [address]  [place] [address]  Phone: [phone]  Fax: [phone]  Follow Up Time: 2 weeks    Sergio Jurado  Cardiology  [address] [place] [address], [address]  [place] [address]  Phone: [phone]  Fax: [phone]  Follow Up Time: 1 month         72 YO RIGHT handed woman with no PMH who presented to [facility] ED on 12/14/2023, as a transfer from [facility] with R IPH. Hospital course also significant for UTI, b/l DVTs, EEG with increased seizure risks.    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  - Head CT 12/27: The right parietal parenchymal hemorrhage is slightly less dense and slightly smaller measuring 3.0 cm in AP diameter by 4.3 cm transversely compared with the prior of 4.1 cm in AP diameter by 5.9 cm transversely. There is similar vasogenic edema. There is also similar mass effect on the atrium of the right lateral ventricle with similar mild midline shift to the left. There is resolution of the intraventricular hemorrhage in the left occipital horn.  - EEG with increased risk of seizures in the right frontocentral region  - Briviact 50mg BID for seizure prophylaxis.  - Cont Comprehensive Rehab Program: PT/OT/ST, 3hours daily and 5 days weekly.     # self-reported history of ADHD and restless, distracted, poor attention  - xanax Rx in I stop  - Cont. modafinil 50mg AM started 12/30--Improvement in attention  - psych consult 1/2 reviewed and appreciated  - psychology and recreation therapy (former )    #   - discussed with hospitalist. Patient with ascvd is moderate risk  - C/W atorvasttin 10 mg qhs 12/29    # HTN  -  Losartan discontinued due to soft BP 12/27  - BP stable continue monitor      # Pain management  - Tylenol PRN  - tramadol 25 q4 PRN severe pain,  --Increase Gabapentin to 400mg qhs for neuropathic pain.     - Lidocaine patch timing changed to bedtime per request. States she uses Salon Pas on left neck/shoulder at home.   - Cervical pillow for postioning midline and neck control, improved    # DVT   - b/l below the knee DVTs: right soleal vein, left soleal, peroneal and gastrocnemius veins.  - Lovenox 40 mg daily. Not cleared for full dose AC due to large ICH  - Doppler 12/20: Positive deep venous thrombosis below the right knee within the right soleal vein. Positive deep venous thrombosis below the left knee within the left soleal, peroneal and gastrocnemius veins.  - Doppler 12/27 surveillance - DVTS known - stable right improved on left.  - Repeat Head CT 12/27 improved followed by neuro consult re: possible full dose AC (day 14 post bleed)  - Neuro consult appreciated 12/28. Do not recommend full dose of AC at this time  -Repeat Head CT ~ 2 weeks-- on 1/10/24 prior to discharge.     # GI ppx/h/o reflux  - Pepcid 20mg   - TUMS, Maalox  - Senna, miralax PRN  - metamucil added 12/29, patient reports was recommended by her PCP    # Dysphagia   - Diet: Soft and Bite sized with thin liquids  -c/w AREDS 2 vitamin (home med)--will order     #Case discussed in IDT rounds 1/2/24  -SLP: soft bite size; severe cognitive deficits,  left inattentiveness with max cueing, Impaired attention.   -OT: Eating/grooming supervision; Footwear/showering/Toileting-- Max A; UBD/LBD and toilet transfers-- Mod A  -PT: Transfers to the right Min-Mod A; Transfer to left Mod A; Ambulate 110ft Mod A with WC follow  - goals: 1. min assist transfers and ambulation, min assist bADLs; Will need 24hr supervision  - target: dc [facility] 1/11/23    ** Spoke with pt's  HCP, Isabella____, to provide update on pt's status,  medical update, medications, progress in therapy, rehab plan of care, discharge plan to [facility] on 1/11/24 and discharge expectations and general prognosis.  All questions answered.     # LABS:   CBC CMP 1/2    #GOC  CODE STATUS: FULL CODE    Outpatient Follow-up (Specialty/Name of physician):    Emy Prakash  NP in [address], [address]  [place] [address]  Phone: [phone]  Fax: [phone]  Follow Up Time: 2 weeks    Sergio Jurado  Cardiology  [address] [place] [address], [address]  [place] [address]  Phone: [phone]  Fax: [phone]  Follow Up Time: 1 month         74 YO RIGHT handed woman with no PMH who presented to [facility] ED on 12/14/2023, as a transfer from [facility] with R IPH. Hospital course also significant for UTI, b/l DVTs, EEG with increased seizure risks.    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  - Head CT 12/27: The right parietal parenchymal hemorrhage is slightly less dense and slightly smaller measuring 3.0 cm in AP diameter by 4.3 cm transversely compared with the prior of 4.1 cm in AP diameter by 5.9 cm transversely. There is similar vasogenic edema. There is also similar mass effect on the atrium of the right lateral ventricle with similar mild midline shift to the left. There is resolution of the intraventricular hemorrhage in the left occipital horn.  - EEG with increased risk of seizures in the right frontocentral region  - Briviact 50mg BID for seizure prophylaxis.  - Cont Comprehensive Rehab Program: PT/OT/ST, 3hours daily and 5 days weekly.     #Macular Degeneration  -Will reach out to pt physician, Dr. Dominguez Joyner ([phone]) regarding current treatment plan  -Will assess macular degeneration treatment plan and stroke risk per patient request    # self-reported history of ADHD and restless, distracted, poor attention  - xanax Rx in I stop  - Cont. modafinil 50mg AM started 12/30--Improvement in attention  - psych consult 1/2 reviewed and appreciated  - psychology and recreation therapy (former )    #   - discussed with hospitalist. Patient with ascvd is moderate risk  - C/W atorvastatin 10 mg qhs 12/29    # HTN  -  Losartan discontinued due to soft BP 12/27  - BP stable continue monitor      # Pain management  - Tylenol PRN  - tramadol 25 q4 PRN severe pain,  --Increase Gabapentin to 400mg qhs for neuropathic pain on 1/3     - Lidocaine patch timing changed to bedtime per request. States she uses Salon Pas on left neck/shoulder at home.   - Cervical pillow for postioning midline and neck control, improved    # DVT   - b/l below the knee DVTs: right soleal vein, left soleal, peroneal and gastrocnemius veins.  - Lovenox 40 mg daily. Not cleared for full dose AC due to large ICH  - Doppler 12/20: Positive deep venous thrombosis below the right knee within the right soleal vein. Positive deep venous thrombosis below the left knee within the left soleal, peroneal and gastrocnemius veins.  - Doppler 12/27 surveillance - DVTS known - stable right improved on left.  - Repeat Head CT 12/27 improved followed by neuro consult re: possible full dose AC (day 14 post bleed)  - Neuro consult appreciated 12/28. Do not recommend full dose of AC at this time  -Repeat Head CT ~ 2 weeks-- on 1/10/24 prior to discharge.     # GI ppx/h/o reflux  - Pepcid 20mg   - TUMS, Maalox  - Senna, miralax PRN  - metamucil added 12/29, patient reports was recommended by her PCP    # Dysphagia   - Diet: Soft and Bite sized with thin liquids  -c/w AREDS 2 vitamin (home med)    #Case discussed in IDT rounds 1/2/24  -SLP: soft bite size; severe cognitive deficits,  left inattentiveness with max cueing, Impaired attention.   -OT: Eating/grooming supervision; Footwear/showering/Toileting-- Max A; UBD/LBD and toilet transfers-- Mod A  -PT: Transfers to the right Min-Mod A; Transfer to left Mod A; Ambulate 110ft Mod A with WC follow  - goals: 1. min assist transfers and ambulation, min assist bADLs; Will need 24hr supervision  - target: dc [facility] 1/11/23    ** Spoke with pt's  HCP, Isabella on 1/3, to provide update on pt's status,  medical update, medications, progress in therapy, rehab plan of care, discharge plan to [facility] on 1/11/24 and discharge expectations and general prognosis.  All questions answered.     # LABS:   CBC CMP 1/4 --reviewed    #[facility]  CODE STATUS: FULL CODE    Outpatient Follow-up (Specialty/Name of physician):    Emy Prakash  NP in [address], [address]  [place] [address]  Phone: [phone]  Fax: [phone]  Follow Up Time: 2 weeks    Sergio Jurado  [address], [address]  [place] [address]  Phone: [phone]  Fax: [phone]  Follow Up Time: 1 month         74 YO RIGHT handed woman with no PMH who presented to [facility] ED on 12/14/2023, as a transfer from [facility] with R IPH. Hospital course also significant for UTI, b/l DVTs, EEG with increased seizure risks.    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  - Head CT 12/27: The right parietal parenchymal hemorrhage is slightly less dense and slightly smaller measuring 3.0 cm in AP diameter by 4.3 cm transversely compared with the prior of 4.1 cm in AP diameter by 5.9 cm transversely. There is similar vasogenic edema. There is also similar mass effect on the atrium of the right lateral ventricle with similar mild midline shift to the left. There is resolution of the intraventricular hemorrhage in the left occipital horn.  - EEG with increased risk of seizures in the right frontocentral region  - Briviact 50mg BID for seizure prophylaxis.  - Cont Comprehensive Rehab Program: PT/OT/ST, 3hours daily and 5 days weekly.     #Macular Degeneration  -Will reach out to pt physician, Dr. Dominguez Joyner ([phone]) regarding current treatment plan  -Will assess macular degeneration treatment plan and stroke risk per patient request    # self-reported history of ADHD and restless, distracted, poor attention  - xanax Rx in I stop  - Cont. modafinil 50mg AM started 12/30--Improvement in attention  - psych consult 1/2 reviewed and appreciated  - psychology and recreation therapy (former )    #   - discussed with hospitalist. Patient with ascvd is moderate risk  - C/W atorvastatin 10 mg qhs 12/29    # HTN  -  Losartan discontinued due to soft BP 12/27  - BP stable continue monitor      # Pain management  - Tylenol PRN  - tramadol 25 q4 PRN severe pain,  --Increase Gabapentin to 400mg qhs for neuropathic pain on 1/3     - Lidocaine patch timing changed to bedtime per request. States she uses Salon Pas on left neck/shoulder at home.   - Cervical pillow for postioning midline and neck control, improved    # DVT   - b/l below the knee DVTs: right soleal vein, left soleal, peroneal and gastrocnemius veins.  - Lovenox 40 mg daily. Not cleared for full dose AC due to large ICH  - Doppler 12/20: Positive deep venous thrombosis below the right knee within the right soleal vein. Positive deep venous thrombosis below the left knee within the left soleal, peroneal and gastrocnemius veins.  - Doppler 12/27 surveillance - DVTS known - stable right improved on left.  - Repeat Head CT 12/27 improved followed by neuro consult re: possible full dose AC (day 14 post bleed)  - Neuro consult appreciated 12/28. Do not recommend full dose of AC at this time  -Repeat Head CT ~ 2 weeks-- on 1/10/24 prior to discharge.     # GI ppx/h/o reflux  - Pepcid 20mg   - TUMS, Maalox  - Senna, miralax PRN  - metamucil added 12/29, patient reports was recommended by her PCP    # Dysphagia   - Diet: Soft and Bite sized with thin liquids  -c/w AREDS 2 vitamin (home med)    #Case discussed in IDT rounds 1/2/24  -SLP: soft bite size; severe cognitive deficits,  left inattentiveness with max cueing, Impaired attention.   -OT: Eating/grooming supervision; Footwear/showering/Toileting-- Max A; UBD/LBD and toilet transfers-- Mod A  -PT: Transfers to the right Min-Mod A; Transfer to left Mod A; Ambulate 110ft Mod A with WC follow  - goals: 1. min assist transfers and ambulation, min assist bADLs; Will need 24hr supervision  - target: dc [facility] 1/11/23    ** Spoke with pt's  HCP, Isabella on 1/3, to provide update on pt's status,  medical update, medications, progress in therapy, rehab plan of care, discharge plan to [facility] on 1/11/24 and discharge expectations and general prognosis.  All questions answered.     # LABS:   CBC CMP 1/4 --reviewed    #[facility]  CODE STATUS: FULL CODE    Outpatient Follow-up (Specialty/Name of physician):    Emy Prakash  NP in [address], [address]  [place] [address]  Phone: [phone]  Fax: [phone]  Follow Up Time: 2 weeks    Sergio Jurado  [address], [address]  [place] [address]  Phone: [phone]  Fax: [phone]  Follow Up Time: 1 month         72 YO RIGHT handed woman with no PMH who presented to [facility] ED on 12/14/2023, as a transfer from [facility] with R IPH. Hospital course also significant for UTI, b/l DVTs, EEG with increased seizure risks.    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  - Head CT 12/27: The right parietal parenchymal hemorrhage is slightly less dense and slightly smaller measuring 3.0 cm in AP diameter by 4.3 cm transversely compared with the prior of 4.1 cm in AP diameter by 5.9 cm transversely. There is similar vasogenic edema. There is also similar mass effect on the atrium of the right lateral ventricle with similar mild midline shift to the left. There is resolution of the intraventricular hemorrhage in the left occipital horn.  - EEG with increased risk of seizures in the right frontocentral region  - Briviact 50mg BID for seizure prophylaxis.  - Cont Comprehensive Rehab Program: PT/OT/ST, 3hours daily and 5 days weekly.     #Macular Degeneration  -Will reach out to pt physician, Dr. Dominguez Joyner ([phone]) regarding current treatment plan  -Will assess macular degeneration treatment plan and stroke risk per patient request    # self-reported history of ADHD and restless, distracted, poor attention  - xanax Rx in I stop  - Increased modafinil to 100mg to started 1/5  - psych consult 1/2 reviewed and appreciated  - psychology and recreation therapy (former )    #   - discussed with hospitalist. Patient with ascvd is moderate risk  - C/W atorvastatin 10 mg qhs 12/29    # HTN  -  Losartan discontinued due to soft BP 12/27  - BP stable continue monitor      # Pain management  - Tylenol PRN  - tramadol 25 q4 PRN severe pain,  --Increase Gabapentin to 400mg qhs for neuropathic pain on 1/3     - Lidocaine patch timing changed to bedtime per request. States she uses Salon Pas on left neck/shoulder at home.   - Cervical pillow for postioning midline and neck control, improved    # DVT   - b/l below the knee DVTs: right soleal vein, left soleal, peroneal and gastrocnemius veins.  - Lovenox 40 mg daily. Not cleared for full dose AC due to large ICH  - Doppler 12/20: Positive deep venous thrombosis below the right knee within the right soleal vein. Positive deep venous thrombosis below the left knee within the left soleal, peroneal and gastrocnemius veins.  - Doppler 12/27 surveillance - DVTS known - stable right improved on left.  - Repeat Head CT 12/27 improved followed by neuro consult re: possible full dose AC (day 14 post bleed)  - Neuro consult appreciated 12/28. Do not recommend full dose of AC at this time  -Repeat Head CT ~ 2 weeks-- on 1/10/24 prior to discharge.     # GI ppx/h/o reflux  - Pepcid 20mg   - TUMS, Maalox  - Senna, miralax PRN  - metamucil added 12/29, patient reports was recommended by her PCP    # Dysphagia   - Diet: Soft and Bite sized with thin liquids  -c/w AREDS 2 vitamin (home med)    #Case discussed in IDT rounds 1/2/24  -SLP: soft bite size; severe cognitive deficits,  left inattentiveness with max cueing, Impaired attention.   -OT: Eating/grooming supervision; Footwear/showering/Toileting-- Max A; UBD/LBD and toilet transfers-- Mod A  -PT: Transfers to the right Min-Mod A; Transfer to left Mod A; Ambulate 110ft Mod A with WC follow  - goals: 1. min assist transfers and ambulation, min assist bADLs; Will need 24hr supervision  - target: dc [facility] 1/11/23    ** Spoke with pt's  HCP, Isabella on 1/3, to provide update on pt's status,  medical update, medications, progress in therapy, rehab plan of care, discharge plan to [facility] on 1/11/24 and discharge expectations and general prognosis.  All questions answered.     # LABS:   CBC CMP 1/4 --reviewed    #[facility]  CODE STATUS: FULL CODE    Outpatient Follow-up (Specialty/Name of physician):    Emy Prakash  NP in [address], [address]  [place] [address]  Phone: [phone]  Fax: [phone]  Follow Up Time: 2 weeks    Sergio Jurado  [facility]  [address] [facility], [address]  [place] [address]  Phone: [phone]  Fax: [phone]  Follow Up Time: 1 month         72 YO RIGHT handed woman with no PMH who presented to [facility] ED on 12/14/2023, as a transfer from [facility] with R IPH. Hospital course also significant for UTI, b/l DVTs, EEG with increased seizure risks.    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  - Head CT 12/27: The right parietal parenchymal hemorrhage is slightly less dense and slightly smaller measuring 3.0 cm in AP diameter by 4.3 cm transversely compared with the prior of 4.1 cm in AP diameter by 5.9 cm transversely. There is similar vasogenic edema. There is also similar mass effect on the atrium of the right lateral ventricle with similar mild midline shift to the left. There is resolution of the intraventricular hemorrhage in the left occipital horn.  - EEG with increased risk of seizures in the right frontocentral region  - Briviact 50mg BID for seizure prophylaxis.  - Cont Comprehensive Rehab Program: PT/OT/ST, 3hours daily and 5 days weekly.     #Macular Degeneration  -Will reach out to pt physician, Dr. Dominguez Joyner ([phone]) regarding current treatment plan  -Will assess macular degeneration treatment plan and stroke risk per patient request    # self-reported history of ADHD and restless, distracted, poor attention  - xanax Rx in I stop  - Increased modafinil to 100mg to started 1/5  - psych consult 1/2 reviewed and appreciated  - psychology and recreation therapy (former )    #   - discussed with hospitalist. Patient with ascvd is moderate risk  - C/W atorvastatin 10 mg qhs 12/29    # HTN  -  Losartan discontinued due to soft BP 12/27  - BP stable continue monitor      # Pain management  - Tylenol PRN  - tramadol 25 q4 PRN severe pain,  --Increase Gabapentin to 400mg qhs for neuropathic pain on 1/3     - Lidocaine patch timing changed to bedtime per request. States she uses Salon Pas on left neck/shoulder at home.   - Cervical pillow for postioning midline and neck control, improved    # DVT   - b/l below the knee DVTs: right soleal vein, left soleal, peroneal and gastrocnemius veins.  - Lovenox 40 mg daily. Not cleared for full dose AC due to large ICH  - Doppler 12/20: Positive deep venous thrombosis below the right knee within the right soleal vein. Positive deep venous thrombosis below the left knee within the left soleal, peroneal and gastrocnemius veins.  - Doppler 12/27 surveillance - DVTS known - stable right improved on left.  - Repeat Head CT 12/27 improved followed by neuro consult re: possible full dose AC (day 14 post bleed)  - Neuro consult appreciated 12/28. Do not recommend full dose of AC at this time  -Repeat Head CT ~ 2 weeks-- on 1/10/24 prior to discharge.     # GI ppx/h/o reflux  - Pepcid 20mg   - TUMS, Maalox  - Senna, miralax PRN  - metamucil added 12/29, patient reports was recommended by her PCP    # Dysphagia   - Diet: Soft and Bite sized with thin liquids  -c/w AREDS 2 vitamin (home med)    #Case discussed in IDT rounds 1/2/24  -SLP: soft bite size; severe cognitive deficits,  left inattentiveness with max cueing, Impaired attention.   -OT: Eating/grooming supervision; Footwear/showering/Toileting-- Max A; UBD/LBD and toilet transfers-- Mod A  -PT: Transfers to the right Min-Mod A; Transfer to left Mod A; Ambulate 110ft Mod A with WC follow  - goals: 1. min assist transfers and ambulation, min assist bADLs; Will need 24hr supervision  - target: dc [facility] 1/11/23    ** Spoke with pt's  HCP, Isabella on 1/3, to provide update on pt's status,  medical update, medications, progress in therapy, rehab plan of care, discharge plan to [facility] on 1/11/24 and discharge expectations and general prognosis.  All questions answered.     # LABS:   CBC CMP 1/4 --reviewed    #[facility]  CODE STATUS: FULL CODE    Outpatient Follow-up (Specialty/Name of physician):    Emy Prakash  NP in [address], [address]  [place] [address]  Phone: [phone]  Fax: [phone]  Follow Up Time: 2 weeks    Sergio Jurado  [facility]  [address], [address]  [place] [address]  Phone: [phone]  Fax: [phone]  Follow Up Time: 1 month         74 YO RIGHT handed woman with no PMH who presented to [facility] ED on 12/14/2023, as a transfer from [facility] with R IPH. Hospital course also significant for UTI, b/l DVTs, EEG with increased seizure risks.    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  - Head CT 12/27: The right parietal parenchymal hemorrhage is slightly less dense and slightly smaller measuring 3.0 cm in AP diameter by 4.3 cm transversely compared with the prior of 4.1 cm in AP diameter by 5.9 cm transversely. There is similar vasogenic edema. There is also similar mass effect on the atrium of the right lateral ventricle with similar mild midline shift to the left. There is resolution of the intraventricular hemorrhage in the left occipital horn.  - EEG with increased risk of seizures in the right frontocentral region  - Briviact 50mg BID for seizure prophylaxis.  - Cont Comprehensive Rehab Program: PT/OT/ST, 3hours daily and 5 days weekly.     #Macular Degeneration  -Will reach out to pt physician, Dr. Dominguez Joyner ([phone]) regarding current treatment plan  -Will assess macular degeneration treatment plan and stroke risk per patient request    # self-reported history of ADHD and restless, distracted, poor attention  - xanax Rx in I stop  - Increased modafinil to 100mg to start 1/5  - psych consult 1/2 reviewed and appreciated  - psychology and recreation therapy (former )    Orthostatic Hypotension  --d/w hospitalist  --Rec. NS 500cc bolus x 1 and encourage PO hydration.      #   - discussed with hospitalist. Patient with ascvd is moderate risk  - C/W atorvastatin 10 mg qhs 12/29    # HTN  -  Losartan discontinued due to soft BP 12/27  - BP stable continue monitor      # Pain management  - Tylenol PRN  - tramadol 25 q4 PRN severe pain,  --Increased Gabapentin to 400mg qhs for neuropathic pain on 1/3     - Lidocaine patch timing changed to bedtime per request. States she uses [place] on left neck/shoulder at home.   - Cervical pillow for postioning midline and neck control, improved    # DVT   - b/l below the knee DVTs: right soleal vein, left soleal, peroneal and gastrocnemius veins.  - Lovenox 40 mg daily. Not cleared for full dose AC due to large ICH  - Doppler 12/20: Positive deep venous thrombosis below the right knee within the right soleal vein. Positive deep venous thrombosis below the left knee within the left soleal, peroneal and gastrocnemius veins.  - Doppler 12/27 surveillance - DVTS known - stable right improved on left.  - Repeat Head CT 12/27 improved followed by neuro consult re: possible full dose AC (day 14 post bleed)  - Neuro consult appreciated 12/28. Do not recommend full dose of AC at this time  -Repeat Head CT ~ 2 weeks-- on 1/10/24 prior to discharge.     # GI ppx/h/o reflux  - Pepcid 20mg   - TUMS, Maalox  - Senna, miralax PRN  - metamucil added 12/29, patient reports was recommended by her PCP    # Dysphagia --resolved  - Diet upgraded to Regular with thin liquids  -c/w AREDS 2 vitamin (home med)    #Case discussed in IDT rounds 1/2/24  -SLP: soft bite size; severe cognitive deficits,  left inattentiveness with max cueing, Impaired attention.   -OT: Eating/grooming supervision; Footwear/showering/Toileting-- Max A; UBD/LBD and toilet transfers-- Mod A  -PT: Transfers to the right Min-Mod A; Transfer to left Mod A; Ambulate 110ft Mod A with WC follow  - goals: 1. min assist transfers and ambulation, min assist bADLs; Will need 24hr supervision  - target: dc [facility] 1/11/23    ** Spoke with pt's  HCP, Isabella on 1/3, to provide update on pt's status,  medical update, medications, progress in therapy, rehab plan of care, discharge plan to [facility] on 1/11/24 and discharge expectations and general prognosis.  All questions answered.     # LABS:   CBC CMP 1/4 --reviewed    #[facility]  CODE STATUS: FULL CODE    Outpatient Follow-up (Specialty/Name of physician):    Emy Prakash  NP in [address], [address]  [place] [address]  Phone: [phone]  Fax: [phone]  Follow Up Time: 2 weeks    Sergio Jurado  Cardiology  [address] [place] [address], [address]  [place] [address]  Phone: [phone]  Fax: [phone]  Follow Up Time: 1 month         74 YO RIGHT handed woman with no PMH who presented to [facility] ED on 12/14/2023, as a transfer from [facility] with R IPH. Hospital course also significant for UTI, b/l DVTs, EEG with increased seizure risks.    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  - Head CT 12/27: The right parietal parenchymal hemorrhage is slightly less dense and slightly smaller measuring 3.0 cm in AP diameter by 4.3 cm transversely compared with the prior of 4.1 cm in AP diameter by 5.9 cm transversely. There is similar vasogenic edema. There is also similar mass effect on the atrium of the right lateral ventricle with similar mild midline shift to the left. There is resolution of the intraventricular hemorrhage in the left occipital horn.  - EEG with increased risk of seizures in the right frontocentral region  - Briviact 50mg BID for seizure prophylaxis.  - Cont Comprehensive Rehab Program: PT/OT/ST, 3hours daily and 5 days weekly.     #Macular Degeneration  -Will reach out to pt physician, Dr. Dominguez Joyner ([phone]) regarding current treatment plan  -Will assess macular degeneration treatment plan and stroke risk per patient request    # self-reported history of ADHD and restless, distracted, poor attention  - xanax Rx in I stop  - Increased modafinil to 100mg to start 1/5  - psych consult 1/2 reviewed and appreciated  - psychology and recreation therapy (former )    Orthostatic Hypotension  --d/w hospitalist  --Rec. NS 500cc bolus x 1 and encourage PO hydration.      #   - discussed with hospitalist. Patient with ascvd is moderate risk  - C/W atorvastatin 10 mg qhs 12/29    # HTN  -  Losartan discontinued due to soft BP 12/27  - BP stable continue monitor      # Pain management  - Tylenol PRN  - tramadol 25 q4 PRN severe pain,  --Increased Gabapentin to 400mg qhs for neuropathic pain on 1/3     - Lidocaine patch timing changed to bedtime per request. States she uses Sal[place] on left neck/shoulder at home.   - Cervical pillow for postioning midline and neck control, improved    # DVT   - b/l below the knee DVTs: right soleal vein, left soleal, peroneal and gastrocnemius veins.  - Lovenox 40 mg daily. Not cleared for full dose AC due to large ICH  - Doppler 12/20: Positive deep venous thrombosis below the right knee within the right soleal vein. Positive deep venous thrombosis below the left knee within the left soleal, peroneal and gastrocnemius veins.  - Doppler 12/27 surveillance - DVTS known - stable right improved on left.  - Repeat Head CT 12/27 improved followed by neuro consult re: possible full dose AC (day 14 post bleed)  - Neuro consult appreciated 12/28. Do not recommend full dose of AC at this time  -Repeat Head CT ~ 2 weeks-- on 1/10/24 prior to discharge.     # GI ppx/h/o reflux  - Pepcid 20mg   - TUMS, Maalox  - Senna, miralax PRN  - metamucil added 12/29, patient reports was recommended by her PCP    # Dysphagia --resolved  - Diet upgraded to Regular with thin liquids  -c/w AREDS 2 vitamin (home med)    #Case discussed in IDT rounds 1/2/24  -SLP: soft bite size; severe cognitive deficits,  left inattentiveness with max cueing, Impaired attention.   -OT: Eating/grooming supervision; Footwear/showering/Toileting-- Max A; UBD/LBD and toilet transfers-- Mod A  -PT: Transfers to the right Min-Mod A; Transfer to left Mod A; Ambulate 110ft Mod A with WC follow  - goals: 1. min assist transfers and ambulation, min assist bADLs; Will need 24hr supervision  - target: dc [facility] 1/11/23    ** Spoke with pt's  HCP, Isabella on 1/3, to provide update on pt's status,  medical update, medications, progress in therapy, rehab plan of care, discharge plan to [facility] on 1/11/24 and discharge expectations and general prognosis.  All questions answered.     # LABS:   CBC CMP 1/4 --reviewed    #[facility]  CODE STATUS: FULL CODE    Outpatient Follow-up (Specialty/Name of physician):    Emy Prakash  NP in [address], [address]  [place] [address]  Phone: [phone]  Fax: [phone]  Follow Up Time: 2 weeks    Sergio Jurado  Cardiology  [address] [place] [address], [address]  [place] [address]  Phone: [phone]  Fax: [phone]  Follow Up Time: 1 month

## 2024-01-04 NOTE — PROGRESS NOTE ADULT - SUBJECTIVE AND OBJECTIVE BOX
Patient is a 73y old  Female who presents with a chief complaint of ICH (03 Jan 2024 12:46)      SUBJECTIVE:      ROS:  No fever, chills  No headaches, nausea, vomiting  No chest pain  No abdominal pain    VITALS  73y  Vital Signs Last 24 Hrs  T(C): 36.6 (04 Jan 2024 07:43), Max: 36.8 (03 Jan 2024 19:20)  T(F): 97.8 (04 Jan 2024 07:43), Max: 98.2 (03 Jan 2024 19:20)  HR: 75 (04 Jan 2024 07:43) (75 - 80)  BP: 110/73 (04 Jan 2024 07:43) (110/73 - 119/68)  BP(mean): --  RR: 16 (04 Jan 2024 07:43) (16 - 16)  SpO2: 98% (04 Jan 2024 07:43) (97% - 98%)    Parameters below as of 04 Jan 2024 07:43  Patient On (Oxygen Delivery Method): room air      Daily     Daily     PHYSICAL EXAM      MEDICATIONS  (STANDING):  atorvastatin 10 milliGRAM(s) Oral at bedtime  bisacodyl 5 milliGRAM(s) Oral daily  brivaracetam 50 milliGRAM(s) Oral two times a day  enoxaparin Injectable 40 milliGRAM(s) SubCutaneous <User Schedule>  famotidine    Tablet 20 milliGRAM(s) Oral two times a day  gabapentin 400 milliGRAM(s) Oral at bedtime  ketorolac 0.5% Ophthalmic Solution 1 Drop(s) Left EYE daily  lidocaine   4% Patch 1 Patch Transdermal <User Schedule>  lidocaine   4% Patch 1 Patch Transdermal <User Schedule>  modafinil 50 milliGRAM(s) Oral <User Schedule>  polyethylene glycol 3350 17 Gram(s) Oral two times a day  Preservision  Capsules AREDS 2 2 Capsule(s) 2 Capsule(s) Oral daily  psyllium Powder 1 Packet(s) Oral daily  senna 2 Tablet(s) Oral at bedtime  sodium chloride 0.65% Nasal 1 Spray(s) Both Nostrils two times a day    MEDICATIONS  (PRN):  acetaminophen     Tablet .. 650 milliGRAM(s) Oral every 6 hours PRN Temp greater or equal to 38C (100.4F), Mild Pain (1 - 3)  aluminum hydroxide/magnesium hydroxide/simethicone Suspension 30 milliLiter(s) Oral every 6 hours PRN Dyspepsia  artificial tears (preservative free) Ophthalmic Solution 1 Drop(s) Both EYES every 4 hours PRN Dry Eyes  bisacodyl Suppository 10 milliGRAM(s) Rectal daily PRN Constipation  calcium carbonate    500 mG (Tums) Chewable 1 Tablet(s) Chew three times a day PRN Heartburn  melatonin 3 milliGRAM(s) Oral at bedtime PRN Insomnia  ondansetron   Disintegrating Tablet 4 milliGRAM(s) Oral every 6 hours PRN Nausea and/or Vomiting      RECENT LABS:                          13.4   4.67  )-----------( 288      ( 04 Jan 2024 07:22 )             38.7     01-04    141  |  105  |  18  ----------------------------<  95  3.9   |  25  |  0.73    Ca    9.5      04 Jan 2024 07:22    TPro  6.4  /  Alb  3.1<L>  /  TBili  0.4  /  DBili  x   /  AST  18  /  ALT  34  /  AlkPhos  65  01-04    LIVER FUNCTIONS - ( 04 Jan 2024 07:22 )  Alb: 3.1 g/dL / Pro: 6.4 g/dL / ALK PHOS: 65 U/L / ALT: 34 U/L / AST: 18 U/L / GGT: x             Urinalysis Basic - ( 04 Jan 2024 07:22 )    Color: x / Appearance: x / SG: x / pH: x  Gluc: 95 mg/dL / Ketone: x  / Bili: x / Urobili: x   Blood: x / Protein: x / Nitrite: x   Leuk Esterase: x / RBC: x / WBC x   Sq Epi: x / Non Sq Epi: x / Bacteria: x          CAPILLARY BLOOD GLUCOSE               Patient is a 73y old  Female who presents with a chief complaint of ICH (03 Jan 2024 12:46)      SUBJECTIVE:  Patient seen and evaluated during OT session.  She reports feeling stressed last night but unable to sleep requiring melatonin.  Her thought process and speech is tangential.  She also expresses concern and wishes us to reach out to macular degeneration physician Dr. Dominguez Joyner.  She is concerned about her long standing macular degeneration treatment and stroke risk.     ROS:  No fever, chills  No headaches, nausea, vomiting  No chest pain  No abdominal pain  +anxiety    VITALS  73y  Vital Signs Last 24 Hrs  T(C): 36.6 (04 Jan 2024 07:43), Max: 36.8 (03 Jan 2024 19:20)  T(F): 97.8 (04 Jan 2024 07:43), Max: 98.2 (03 Jan 2024 19:20)  HR: 75 (04 Jan 2024 07:43) (75 - 80)  BP: 110/73 (04 Jan 2024 07:43) (110/73 - 119/68)  BP(mean): --  RR: 16 (04 Jan 2024 07:43) (16 - 16)  SpO2: 98% (04 Jan 2024 07:43) (97% - 98%)    Parameters below as of 04 Jan 2024 07:43  Patient On (Oxygen Delivery Method): room air      Daily     Daily     PHYSICAL EXAM  Constitutional: NAD, sitting in wheelchair  Respiratory: breathing comfortably   Cardiovascular: R1R2  Gastrointestinal: Abdomen soft, nondistended  Neurological: perseverating and tangential  thoughts/speech but improved attention  Musculoskeletal: 4/5 throughout upper and lower extremities  Psychiatric: calm but reports feelings of anxiousness    MEDICATIONS  (STANDING):  atorvastatin 10 milliGRAM(s) Oral at bedtime  bisacodyl 5 milliGRAM(s) Oral daily  brivaracetam 50 milliGRAM(s) Oral two times a day  enoxaparin Injectable 40 milliGRAM(s) SubCutaneous <User Schedule>  famotidine    Tablet 20 milliGRAM(s) Oral two times a day  gabapentin 400 milliGRAM(s) Oral at bedtime  ketorolac 0.5% Ophthalmic Solution 1 Drop(s) Left EYE daily  lidocaine   4% Patch 1 Patch Transdermal <User Schedule>  lidocaine   4% Patch 1 Patch Transdermal <User Schedule>  modafinil 50 milliGRAM(s) Oral <User Schedule>  polyethylene glycol 3350 17 Gram(s) Oral two times a day  Preservision  Capsules AREDS 2 2 Capsule(s) 2 Capsule(s) Oral daily  psyllium Powder 1 Packet(s) Oral daily  senna 2 Tablet(s) Oral at bedtime  sodium chloride 0.65% Nasal 1 Spray(s) Both Nostrils two times a day    MEDICATIONS  (PRN):  acetaminophen     Tablet .. 650 milliGRAM(s) Oral every 6 hours PRN Temp greater or equal to 38C (100.4F), Mild Pain (1 - 3)  aluminum hydroxide/magnesium hydroxide/simethicone Suspension 30 milliLiter(s) Oral every 6 hours PRN Dyspepsia  artificial tears (preservative free) Ophthalmic Solution 1 Drop(s) Both EYES every 4 hours PRN Dry Eyes  bisacodyl Suppository 10 milliGRAM(s) Rectal daily PRN Constipation  calcium carbonate    500 mG (Tums) Chewable 1 Tablet(s) Chew three times a day PRN Heartburn  melatonin 3 milliGRAM(s) Oral at bedtime PRN Insomnia  ondansetron   Disintegrating Tablet 4 milliGRAM(s) Oral every 6 hours PRN Nausea and/or Vomiting      RECENT LABS:                          13.4   4.67  )-----------( 288      ( 04 Jan 2024 07:22 )             38.7     01-04    141  |  105  |  18  ----------------------------<  95  3.9   |  25  |  0.73    Ca    9.5      04 Jan 2024 07:22    TPro  6.4  /  Alb  3.1<L>  /  TBili  0.4  /  DBili  x   /  AST  18  /  ALT  34  /  AlkPhos  65  01-04    LIVER FUNCTIONS - ( 04 Jan 2024 07:22 )  Alb: 3.1 g/dL / Pro: 6.4 g/dL / ALK PHOS: 65 U/L / ALT: 34 U/L / AST: 18 U/L / GGT: x             Urinalysis Basic - ( 04 Jan 2024 07:22 )    Color: x / Appearance: x / SG: x / pH: x  Gluc: 95 mg/dL / Ketone: x  / Bili: x / Urobili: x   Blood: x / Protein: x / Nitrite: x   Leuk Esterase: x / RBC: x / WBC x   Sq Epi: x / Non Sq Epi: x / Bacteria: x          CAPILLARY BLOOD GLUCOSE               Patient is a 73y old  Female who presents with a chief complaint of ICH (03 Jan 2024 12:46)      SUBJECTIVE:  Patient seen and evaluated during OT session.  She reports feeling stressed last night but unable to sleep requiring melatonin.  Her thought process and speech is tangential.  Attention has improved since admission but still limited by Left inattention and distractibility in therapy. She also expresses concern and wishes us to reach out to macular degeneration physician Dr. Dominguez Joyner.  She is concerned about her long standing macular degeneration treatment and stroke risk.   OT reports pt. was orthostatic today in therapy-- sitting /77 and standing 99/67.  Pt.  c/o dizziness and had to sit down.      ROS:  No fever, chills  No headaches, nausea, vomiting  No chest pain  No abdominal pain  +anxiety    VITALS  73y  Vital Signs Last 24 Hrs  T(C): 36.6 (04 Jan 2024 07:43), Max: 36.8 (03 Jan 2024 19:20)  T(F): 97.8 (04 Jan 2024 07:43), Max: 98.2 (03 Jan 2024 19:20)  HR: 75 (04 Jan 2024 07:43) (75 - 80)  BP: 110/73 (04 Jan 2024 07:43) (110/73 - 119/68)  BP(mean): --  RR: 16 (04 Jan 2024 07:43) (16 - 16)  SpO2: 98% (04 Jan 2024 07:43) (97% - 98%)    Parameters below as of 04 Jan 2024 07:43  Patient On (Oxygen Delivery Method): room air      Daily     Daily     PHYSICAL EXAM  Constitutional: NAD, sitting in wheelchair  Respiratory: breathing comfortably   Cardiovascular: R1R2  Gastrointestinal: Abdomen soft, nondistended  Neurological: perseverating and tangential  thoughts/speech but improved attention  Musculoskeletal: 4/5 throughout upper and lower extremities  Psychiatric: calm but reports feelings of anxiousness    MEDICATIONS  (STANDING):  atorvastatin 10 milliGRAM(s) Oral at bedtime  bisacodyl 5 milliGRAM(s) Oral daily  brivaracetam 50 milliGRAM(s) Oral two times a day  enoxaparin Injectable 40 milliGRAM(s) SubCutaneous <User Schedule>  famotidine    Tablet 20 milliGRAM(s) Oral two times a day  gabapentin 400 milliGRAM(s) Oral at bedtime  ketorolac 0.5% Ophthalmic Solution 1 Drop(s) Left EYE daily  lidocaine   4% Patch 1 Patch Transdermal <User Schedule>  lidocaine   4% Patch 1 Patch Transdermal <User Schedule>  modafinil 50 milliGRAM(s) Oral <User Schedule>  polyethylene glycol 3350 17 Gram(s) Oral two times a day  Preservision  Capsules AREDS 2 2 Capsule(s) 2 Capsule(s) Oral daily  psyllium Powder 1 Packet(s) Oral daily  senna 2 Tablet(s) Oral at bedtime  sodium chloride 0.65% Nasal 1 Spray(s) Both Nostrils two times a day    MEDICATIONS  (PRN):  acetaminophen     Tablet .. 650 milliGRAM(s) Oral every 6 hours PRN Temp greater or equal to 38C (100.4F), Mild Pain (1 - 3)  aluminum hydroxide/magnesium hydroxide/simethicone Suspension 30 milliLiter(s) Oral every 6 hours PRN Dyspepsia  artificial tears (preservative free) Ophthalmic Solution 1 Drop(s) Both EYES every 4 hours PRN Dry Eyes  bisacodyl Suppository 10 milliGRAM(s) Rectal daily PRN Constipation  calcium carbonate    500 mG (Tums) Chewable 1 Tablet(s) Chew three times a day PRN Heartburn  melatonin 3 milliGRAM(s) Oral at bedtime PRN Insomnia  ondansetron   Disintegrating Tablet 4 milliGRAM(s) Oral every 6 hours PRN Nausea and/or Vomiting      RECENT LABS:                          13.4   4.67  )-----------( 288      ( 04 Jan 2024 07:22 )             38.7     01-04    141  |  105  |  18  ----------------------------<  95  3.9   |  25  |  0.73    Ca    9.5      04 Jan 2024 07:22    TPro  6.4  /  Alb  3.1<L>  /  TBili  0.4  /  DBili  x   /  AST  18  /  ALT  34  /  AlkPhos  65  01-04    LIVER FUNCTIONS - ( 04 Jan 2024 07:22 )  Alb: 3.1 g/dL / Pro: 6.4 g/dL / ALK PHOS: 65 U/L / ALT: 34 U/L / AST: 18 U/L / GGT: x             Urinalysis Basic - ( 04 Jan 2024 07:22 )    Color: x / Appearance: x / SG: x / pH: x  Gluc: 95 mg/dL / Ketone: x  / Bili: x / Urobili: x   Blood: x / Protein: x / Nitrite: x   Leuk Esterase: x / RBC: x / WBC x   Sq Epi: x / Non Sq Epi: x / Bacteria: x          CAPILLARY BLOOD GLUCOSE

## 2024-01-04 NOTE — PROGRESS NOTE ADULT - ATTENDING COMMENTS
Pt. seen with resident.  Agree with documentation above as per resident with amendments made as appropriate. Patient medically stable. Making progress towards rehab goals.     right ICH  Macular Degeneration  -Will reach out to pt physician, Dr. Dominguez Joyner ([phone]) regarding current treatment plan  -Will assess macular degeneration treatment plan and stroke risk per patient request    # self-reported history of ADHD and restless, distracted, poor attention  - xanax Rx in I stop  - Increased modafinil to 100mg to start 1/5 to improve attention  - psych consult 1/2 reviewed and appreciated    Diet upgraded to Regular with thins-- d/w speech therapy    Orthostatic Hypotension  --d/w hospitalist  --Rec. NS 500cc bolus x 1 and encourage PO hydration. Pt. seen with resident.  Agree with documentation above as per resident with amendments made as appropriate. Patient medically stable. Making progress towards rehab goals.     right ICH  Macular Degeneration  -Will reach out to pt physician, Dr. Dominguez Joyner ([phone]) regarding current treatment plan  -Will assess macular degeneration treatment plan and stroke risk per patient request    # self-reported history of ADHD and restless, distracted, poor attention  - xanax Rx in I stop  - Increased modafinil to 100mg to start 1/5 to improve attention  - psych consult 1/2 reviewed and appreciated    Diet upgraded to Regular with thins-- d/w speech therapy    Orthostatic Hypotension  --d/w hospitalist  --Rec. NS 500cc bolus x 1 and encourage PO hydration.

## 2024-01-05 PROCEDURE — 99232 SBSQ HOSP IP/OBS MODERATE 35: CPT

## 2024-01-05 PROCEDURE — 96116 NUBHVL XM PHYS/QHP 1ST HR: CPT

## 2024-01-05 RX ADMIN — ATORVASTATIN CALCIUM 10 MILLIGRAM(S): 80 TABLET, FILM COATED ORAL at 20:48

## 2024-01-05 RX ADMIN — BRIVARACETAM 50 MILLIGRAM(S): 25 TABLET, FILM COATED ORAL at 17:03

## 2024-01-05 RX ADMIN — LIDOCAINE 1 PATCH: 4 CREAM TOPICAL at 07:53

## 2024-01-05 RX ADMIN — Medication 650 MILLIGRAM(S): at 04:05

## 2024-01-05 RX ADMIN — LIDOCAINE 1 PATCH: 4 CREAM TOPICAL at 20:00

## 2024-01-05 RX ADMIN — FAMOTIDINE 20 MILLIGRAM(S): 10 INJECTION INTRAVENOUS at 06:17

## 2024-01-05 RX ADMIN — Medication 650 MILLIGRAM(S): at 18:03

## 2024-01-05 RX ADMIN — Medication 1 DROP(S): at 11:58

## 2024-01-05 RX ADMIN — Medication 3 MILLIGRAM(S): at 20:47

## 2024-01-05 RX ADMIN — Medication 1 SPRAY(S): at 06:44

## 2024-01-05 RX ADMIN — Medication 1 SPRAY(S): at 17:02

## 2024-01-05 RX ADMIN — LIDOCAINE 1 PATCH: 4 CREAM TOPICAL at 07:54

## 2024-01-05 RX ADMIN — ENOXAPARIN SODIUM 40 MILLIGRAM(S): 100 INJECTION SUBCUTANEOUS at 17:03

## 2024-01-05 RX ADMIN — FAMOTIDINE 20 MILLIGRAM(S): 10 INJECTION INTRAVENOUS at 17:03

## 2024-01-05 RX ADMIN — Medication 650 MILLIGRAM(S): at 17:03

## 2024-01-05 RX ADMIN — Medication 650 MILLIGRAM(S): at 05:05

## 2024-01-05 RX ADMIN — MODAFINIL 100 MILLIGRAM(S): 200 TABLET ORAL at 06:17

## 2024-01-05 RX ADMIN — GABAPENTIN 400 MILLIGRAM(S): 400 CAPSULE ORAL at 20:47

## 2024-01-05 RX ADMIN — BRIVARACETAM 50 MILLIGRAM(S): 25 TABLET, FILM COATED ORAL at 06:17

## 2024-01-05 NOTE — CONSULT NOTE ADULT - ASSESSMENT
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 Pt exhibits difficulties with delayed recall of verbal information, language (including repetition and fluency), visuospatial skills (both visuomotor integration and figure-ground perception), and aspects of executive functions (including organizational skills, initiation, and problem solving). Left visual inattention negatively impacted her performance in visually-based tasks. Pt's affect is mildly depressed though responsive to humor during the session. She reported a few adjustment symptoms in response to her current medical condition. FIM scores: Social Interaction 5; Problem Solving 5; Memory 5.    Plan: Individual supportive psychotherapy to monitor cognition, affect/mood, and behavior. Cognitive remediation during speech therapy and occupational therapy sessions is recommended. Participation in recreation/art therapy in order to have pleasure and mastery experiences and regain/reestablish a sense of routine.    Time spent with Pt, 45 minutes.

## 2024-01-05 NOTE — PROGRESS NOTE ADULT - ASSESSMENT
74 YO RIGHT handed woman with no PMH who presented to [facility] ED on 12/14/2023, as a transfer from [facility] with R IPH. Hospital course also significant for UTI, b/l DVTs, EEG with increased seizure risks.    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  - Head CT 12/27: The right parietal parenchymal hemorrhage is slightly less dense and slightly smaller measuring 3.0 cm in AP diameter by 4.3 cm transversely compared with the prior of 4.1 cm in AP diameter by 5.9 cm transversely. There is similar vasogenic edema. There is also similar mass effect on the atrium of the right lateral ventricle with similar mild midline shift to the left. There is resolution of the intraventricular hemorrhage in the left occipital horn.  - EEG with increased risk of seizures in the right frontocentral region  - Briviact 50mg BID for seizure prophylaxis.  - Cont Comprehensive Rehab Program: PT/OT/ST, 3hours daily and 5 days weekly.     #Macular Degeneration  -Will reach out to pt physician, Dr. Dominguez Joyner ([phone]) regarding current treatment plan  -Will assess macular degeneration treatment plan and stroke risk per patient request    # self-reported history of ADHD and restless, distracted, poor attention  - xanax Rx in I stop  - Increased modafinil to 100mg to start 1/5  - psych consult 1/2 reviewed and appreciated  - psychology and recreation therapy (former )    Orthostatic Hypotension  --d/w hospitalist  --Rec. NS 500cc bolus x 1 and encourage PO hydration.      #   - discussed with hospitalist. Patient with ascvd is moderate risk  - C/W atorvastatin 10 mg qhs 12/29    # HTN  -  Losartan discontinued due to soft BP 12/27  - BP stable continue monitor      # Pain management  - Tylenol PRN  - tramadol 25 q4 PRN severe pain,  --Increased Gabapentin to 400mg qhs for neuropathic pain on 1/3     - Lidocaine patch timing changed to bedtime per request. States she uses Salon Pas on left neck/shoulder at home.   - Cervical pillow for postioning midline and neck control, improved    # DVT   - b/l below the knee DVTs: right soleal vein, left soleal, peroneal and gastrocnemius veins.  - Lovenox 40 mg daily. Not cleared for full dose AC due to large ICH  - Doppler 12/20: Positive deep venous thrombosis below the right knee within the right soleal vein. Positive deep venous thrombosis below the left knee within the left soleal, peroneal and gastrocnemius veins.  - Doppler 12/27 surveillance - DVTS known - stable right improved on left.  - Repeat Head CT 12/27 improved followed by neuro consult re: possible full dose AC (day 14 post bleed)  - Neuro consult appreciated 12/28. Do not recommend full dose of AC at this time  -Repeat Head CT ~ 2 weeks-- on 1/10/24 prior to discharge.     # GI ppx/h/o reflux  - Pepcid 20mg   - TUMS, Maalox  - Senna, miralax PRN  - metamucil added 12/29, patient reports was recommended by her PCP    # Dysphagia --resolved  - Diet upgraded to Regular with thin liquids  -c/w AREDS 2 vitamin (home med)    #Case discussed in IDT rounds 1/2/24  -SLP: soft bite size; severe cognitive deficits,  left inattentiveness with max cueing, Impaired attention.   -OT: Eating/grooming supervision; Footwear/showering/Toileting-- Max A; UBD/LBD and toilet transfers-- Mod A  -PT: Transfers to the right Min-Mod A; Transfer to left Mod A; Ambulate 110ft Mod A with WC follow  - goals: 1. min assist transfers and ambulation, min assist bADLs; Will need 24hr supervision  - target: dc [facility] 1/11/23    ** Spoke with pt's  HCP, Isabella on 1/3, to provide update on pt's status,  medical update, medications, progress in therapy, rehab plan of care, discharge plan to [facility] on 1/11/24 and discharge expectations and general prognosis.  All questions answered.     # LABS:   CBC CMP 1/4 --reviewed    #[facility]  CODE STATUS: FULL CODE    Outpatient Follow-up (Specialty/Name of physician):    Emy Prakash  NP in [address], [address]  [place] [address]  Phone: [phone]  Fax: [phone]  Follow Up Time: 2 weeks    Sergio Jurado  Cardiology  [address] [place] [address], [address]  [place] [address]  Phone: [phone]  Fax: [phone]  Follow Up Time: 1 month         74 YO RIGHT handed woman with no PMH who presented to [facility] ED on 12/14/2023, as a transfer from [facility] with R IPH. Hospital course also significant for UTI, b/l DVTs, EEG with increased seizure risks.    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  - Head CT 12/27: The right parietal parenchymal hemorrhage is slightly less dense and slightly smaller measuring 3.0 cm in AP diameter by 4.3 cm transversely compared with the prior of 4.1 cm in AP diameter by 5.9 cm transversely. There is similar vasogenic edema. There is also similar mass effect on the atrium of the right lateral ventricle with similar mild midline shift to the left. There is resolution of the intraventricular hemorrhage in the left occipital horn.  - EEG with increased risk of seizures in the right frontocentral region  - Briviact 50mg BID for seizure prophylaxis.  - Cont Comprehensive Rehab Program: PT/OT/ST, 3hours daily and 5 days weekly.     #Macular Degeneration  -Will reach out to pt physician, Dr. Dominguez Joyner ([phone]) regarding current treatment plan  -Will assess macular degeneration treatment plan and stroke risk per patient request    # self-reported history of ADHD and restless, distracted, poor attention  - xanax Rx in I stop  - Increased modafinil to 100mg to start 1/5  - psych consult 1/2 reviewed and appreciated  - psychology and recreation therapy (former )    Orthostatic Hypotension  --d/w hospitalist  --Rec. NS 500cc bolus x 1 and encourage PO hydration.      #   - discussed with hospitalist. Patient with ascvd is moderate risk  - C/W atorvastatin 10 mg qhs 12/29    # HTN  -  Losartan discontinued due to soft BP 12/27  - BP stable continue monitor      # Pain management  - Tylenol PRN  - tramadol 25 q4 PRN severe pain,  --Increased Gabapentin to 400mg qhs for neuropathic pain on 1/3     - Lidocaine patch timing changed to bedtime per request. States she uses Salon Pas on left neck/shoulder at home.   - Cervical pillow for postioning midline and neck control, improved    # DVT   - b/l below the knee DVTs: right soleal vein, left soleal, peroneal and gastrocnemius veins.  - Lovenox 40 mg daily. Not cleared for full dose AC due to large ICH  - Doppler 12/20: Positive deep venous thrombosis below the right knee within the right soleal vein. Positive deep venous thrombosis below the left knee within the left soleal, peroneal and gastrocnemius veins.  - Doppler 12/27 surveillance - DVTS known - stable right improved on left.  - Repeat Head CT 12/27 improved followed by neuro consult re: possible full dose AC (day 14 post bleed)  - Neuro consult appreciated 12/28. Do not recommend full dose of AC at this time  -Repeat Head CT ~ 2 weeks-- on 1/10/24 prior to discharge.     # GI ppx/h/o reflux  - Pepcid 20mg   - TUMS, Maalox  - Senna, miralax PRN  - metamucil added 12/29, patient reports was recommended by her PCP    # Dysphagia --resolved  - Diet upgraded to Regular with thin liquids  -c/w AREDS 2 vitamin (home med)    #Case discussed in IDT rounds 1/2/24  -SLP: soft bite size; severe cognitive deficits,  left inattentiveness with max cueing, Impaired attention.   -OT: Eating/grooming supervision; Footwear/showering/Toileting-- Max A; UBD/LBD and toilet transfers-- Mod A  -PT: Transfers to the right Min-Mod A; Transfer to left Mod A; Ambulate 110ft Mod A with WC follow  - goals: 1. min assist transfers and ambulation, min assist bADLs; Will need 24hr supervision  - target: dc [facility] 1/11/23    ** Spoke with pt's  HCP, Isabella on 1/3, to provide update on pt's status,  medical update, medications, progress in therapy, rehab plan of care, discharge plan to [facility] on 1/11/24 and discharge expectations and general prognosis.  All questions answered.     # LABS:   CBC CMP 1/4 --reviewed    #[facility]  CODE STATUS: FULL CODE    Outpatient Follow-up (Specialty/Name of physician):    Emy Prakash  NP in [address], [address]  [place] [address]  Phone: [phone]  Fax: [phone]  Follow Up Time: 2 weeks    Sergio Jurado  Cardiology  [address] [place] [address], [address]  [place] [address]  Phone: [phone]  Fax: [phone]  Follow Up Time: 1 month         72 YO RIGHT handed woman with no PMH who presented to [facility] ED on 12/14/2023, as a transfer from [facility] with R IPH. Hospital course also significant for UTI, b/l DVTs, EEG with increased seizure risks.    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  - Head CT 12/27: The right parietal parenchymal hemorrhage is slightly less dense and slightly smaller measuring 3.0 cm in AP diameter by 4.3 cm transversely compared with the prior of 4.1 cm in AP diameter by 5.9 cm transversely. There is similar vasogenic edema. There is also similar mass effect on the atrium of the right lateral ventricle with similar mild midline shift to the left. There is resolution of the intraventricular hemorrhage in the left occipital horn.  - EEG with increased risk of seizures in the right frontocentral region  - Briviact 50mg BID for seizure prophylaxis.  - Cont Comprehensive Rehab Program: PT/OT/ST, 3hours daily and 5 days weekly.     #Wet Macular Degeneration  -Spoke with pt's home physician, Dr. Dominguez Joyner ([phone]) regarding current treatment plan.  Updated him on her status 1/4  -Patient was previously on Vabysmo injections every 4-5 weeks.  -Per Dr. Joyner she can return to office as outpt in January for her treatment  -Reviewed risk factor profile of Vabysmo which reports risk of thromboembolic events and explained this to patient on 1/5    # self-reported history of ADHD and restless, distracted, poor attention  - xanax Rx in I stop  - Increased modafinil to 100mg on 1/5  - psych consult 1/2 reviewed and appreciated  - psychology and recreation therapy (former )    #   - discussed with hospitalist. Patient with ascvd is moderate risk  - C/W atorvastatin 10 mg qhs 12/29    # HTN  -  Losartan discontinued due to soft BP 12/27  - BP stable continue monitor    #Orthostatic Hypotension  --OH noted on 1/4  --d/w hospitalist  --s/p NS 500cc bolus x 1 and encourage PO hydration on 1/4  --continue to monitor BP      # Pain management  - Tylenol PRN  - tramadol 25 q4 PRN severe pain,  --Gabapentin to 400mg qhs for neuropathic pain     - Lidocaine patch timing changed to bedtime per request. States she uses Salon Pas on left neck/shoulder at home.   - Cervical pillow for postioning midline and neck control, improved    # DVT   - b/l below the knee DVTs: right soleal vein, left soleal, peroneal and gastrocnemius veins.  - Lovenox 40 mg daily. Not cleared for full dose AC due to large ICH  - Doppler 12/20: Positive deep venous thrombosis below the right knee within the right soleal vein. Positive deep venous thrombosis below the left knee within the left soleal, peroneal and gastrocnemius veins.  - Doppler 12/27 surveillance - DVTS known - stable right improved on left.  - Repeat Head CT 12/27 improved followed by neuro consult re: possible full dose AC (day 14 post bleed)  - Neuro consult appreciated 12/28. Do not recommend full dose of AC at this time  -Repeat Head CT ~ 2 weeks-- on 1/10/24 prior to discharge.     # GI ppx/h/o reflux  - Pepcid 20mg   - TUMS, Maalox  - Senna  -Miralax dc'd due to multiple BMs  - metamucil home med added (patient preference for pills as oppose to powder as offered here) 1/5    # Dysphagia --resolved  - Diet upgraded to Regular with thin liquids  -c/w AREDS 2 vitamin (home med)    #Case discussed in IDT rounds 1/2/24  -SLP: soft bite size; severe cognitive deficits,  left inattentiveness with max cueing, Impaired attention.   -OT: Eating/grooming supervision; Footwear/showering/Toileting-- Max A; UBD/LBD and toilet transfers-- Mod A  -PT: Transfers to the right Min-Mod A; Transfer to left Mod A; Ambulate 110ft Mod A with WC follow  - goals: 1. min assist transfers and ambulation, min assist bADLs; Will need 24hr supervision  - target: dc [facility] 1/11/23    ** Spoke with pt's  HCP, Isabella on 1/3, to provide update on pt's status,  medical update, medications, progress in therapy, rehab plan of care, discharge plan to [facility] on 1/11/24 and discharge expectations and general prognosis.  All questions answered.     # LABS:   CBC CMP 1/8    #[facility]  CODE STATUS: FULL CODE    Outpatient Follow-up (Specialty/Name of physician):    Dr. Dominguez Joyner  Opthalmology   [phone]    Emy Prakash  NP in [address], [address]  [place] [address]  Phone: [phone]  Fax: [phone]  Follow Up Time: 2 weeks    Sergio Jurado  [address], [address]  [place] [address]  Phone: [phone]  Fax: [phone]  Follow Up Time: 1 month         74 YO RIGHT handed woman with no PMH who presented to [facility] ED on 12/14/2023, as a transfer from [facility] with R IPH. Hospital course also significant for UTI, b/l DVTs, EEG with increased seizure risks.    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  - Head CT 12/27: The right parietal parenchymal hemorrhage is slightly less dense and slightly smaller measuring 3.0 cm in AP diameter by 4.3 cm transversely compared with the prior of 4.1 cm in AP diameter by 5.9 cm transversely. There is similar vasogenic edema. There is also similar mass effect on the atrium of the right lateral ventricle with similar mild midline shift to the left. There is resolution of the intraventricular hemorrhage in the left occipital horn.  - EEG with increased risk of seizures in the right frontocentral region  - Briviact 50mg BID for seizure prophylaxis.  - Cont Comprehensive Rehab Program: PT/OT/ST, 3hours daily and 5 days weekly.     #Wet Macular Degeneration  -Spoke with pt's home physician, Dr. Dominguez Joyner ([phone]) regarding current treatment plan.  Updated him on her status 1/4  -Patient was previously on Vabysmo injections every 4-5 weeks.  -Per Dr. Joyner she can return to office as outpt in January for her treatment  -Reviewed risk factor profile of Vabysmo which reports risk of thromboembolic events and explained this to patient on 1/5    # self-reported history of ADHD and restless, distracted, poor attention  - xanax Rx in I stop  - Increased modafinil to 100mg on 1/5  - psych consult 1/2 reviewed and appreciated  - psychology and recreation therapy (former )    #   - discussed with hospitalist. Patient with ascvd is moderate risk  - C/W atorvastatin 10 mg qhs 12/29    # HTN  -  Losartan discontinued due to soft BP 12/27  - BP stable continue monitor    #Orthostatic Hypotension  --OH noted on 1/4  --d/w hospitalist  --s/p NS 500cc bolus x 1 and encourage PO hydration on 1/4  --continue to monitor BP      # Pain management  - Tylenol PRN  - tramadol 25 q4 PRN severe pain,  --Gabapentin to 400mg qhs for neuropathic pain     - Lidocaine patch timing changed to bedtime per request. States she uses Salon Pas on left neck/shoulder at home.   - Cervical pillow for postioning midline and neck control, improved    # DVT   - b/l below the knee DVTs: right soleal vein, left soleal, peroneal and gastrocnemius veins.  - Lovenox 40 mg daily. Not cleared for full dose AC due to large ICH  - Doppler 12/20: Positive deep venous thrombosis below the right knee within the right soleal vein. Positive deep venous thrombosis below the left knee within the left soleal, peroneal and gastrocnemius veins.  - Doppler 12/27 surveillance - DVTS known - stable right improved on left.  - Repeat Head CT 12/27 improved followed by neuro consult re: possible full dose AC (day 14 post bleed)  - Neuro consult appreciated 12/28. Do not recommend full dose of AC at this time  -Repeat Head CT ~ 2 weeks-- on 1/10/24 prior to discharge.     # GI ppx/h/o reflux  - Pepcid 20mg   - TUMS, Maalox  - Senna  -Miralax dc'd due to multiple BMs  - metamucil home med added (patient preference for pills as oppose to powder as offered here) 1/5    # Dysphagia --resolved  - Diet upgraded to Regular with thin liquids  -c/w AREDS 2 vitamin (home med)    #Case discussed in IDT rounds 1/2/24  -SLP: soft bite size; severe cognitive deficits,  left inattentiveness with max cueing, Impaired attention.   -OT: Eating/grooming supervision; Footwear/showering/Toileting-- Max A; UBD/LBD and toilet transfers-- Mod A  -PT: Transfers to the right Min-Mod A; Transfer to left Mod A; Ambulate 110ft Mod A with WC follow  - goals: 1. min assist transfers and ambulation, min assist bADLs; Will need 24hr supervision  - target: dc [facility] 1/11/23    ** Spoke with pt's  HCP, Isabella on 1/3, to provide update on pt's status,  medical update, medications, progress in therapy, rehab plan of care, discharge plan to [facility] on 1/11/24 and discharge expectations and general prognosis.  All questions answered.     # LABS:   CBC CMP 1/8    #[facility]  CODE STATUS: FULL CODE    Outpatient Follow-up (Specialty/Name of physician):    Dr. Dominguez Joyner  Opthalmology   [phone]    Emy Prakash  NP in [address], [address]  [place] [address]  Phone: [phone]  Fax: [phone]  Follow Up Time: 2 weeks    Sergio Jurado  [address], [address]  [place] [address]  Phone: [phone]  Fax: [phone]  Follow Up Time: 1 month

## 2024-01-05 NOTE — CONSULT NOTE ADULT - SUBJECTIVE AND OBJECTIVE BOX
Pt is a 72 y/o right-handed female with no PMHx who presented to [facility] ED on 12/14/2023, as a transfer from [facility], as a code stroke, with c/o R IPH.   CTH and CTA repeated - large R IPH (temporal/parietal). Presented to [place] with c/o HA and AMS. Friend accompanying Pt said that when visiting Pt today - Pt was not acting like herself and c/o HA. NIHSS 9. 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 Similar midline shift of 5 mm.  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 Briviact 50mg BID for seizure prophylaxis, EEG with increased risk of seizures in the right frontocentral region.TTE- EF 64%. Continue Losartan 25mg daily. 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 UA: positive for UTI, started on IV abx and then transitioned to oral, end date 12/22. Pt was evaluated by PM&R and therapy for functional deficits, gait/ADL impairments and acute rehabilitation was recommended. Pt was medically optimized for discharge to [facility] IRU on 12/20/23. PMHx: As noted above. Current meds: Provigil 100 mg at ^30 a.m. Please see list in Pt’s chart. Social Hx: Pt is  and has no children. Pt has master's level education and is a retired . Pt lachx hx of mental illness and substance abuse. Pt is Gnosticism. 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    Findings: Pt was seen for an initial assessment of her cognitive and emotional functioning. The Modified MMSE (3MS) was administered; Pt’s results (73/100) were in the Moderately Impaired range. Her scores in geriatric mood measures suggested minimal levels of anxiety and depression (GAS = /30; GDS = 0/15). Pt was alert, fully Ox3, and her attitude was cooperative. Attn/Conc: Simple auditory attention - intact.  Concentration/Working memory for reversed counting and spelling – intact (7/7). Language: Pt’s speech was of normal volume, pitch and pace, and verbose. Naming - intact. Sentence repetition - mildly impaired (2/3). Auditory Comprehension - impaired (1/3 steps in a verbal command correctly executed). Reading - intact, but left visual inattention was noted. Writing - intact. Memory: Encoding of 3 words was intact (3/3); short-delayed verbal recall – intact (3/3); long-delayed verbal recall – mildly impaired (2/3, improving to 3/3 with saturated cueing). LTM was intact for US presidents (4/4). Visual memory – impaired. Visuospatial: Visuomotor integration – moderately impaired for copy of a 2D figure (4/10), distortion, poor integration, closure difficulty were noted. Figure-ground discrimination was impaired (3/4) for the Poppelreuter figure, left visual inattention was noted. Executive Functions: Motor Planning - moderately impaired (1/3 steps in a verbal command correctly executed). Organizational skills - moderately impaired. Abstract reasoning - intact (6/6) for similarities. Initiation/Phonemic Fluency - moderately impaired (5/10). Verbal problem solving – mildly impaired. Emotional functioning: Affect - mildly depressed, responsive to humor. Mood - euthymic ("good"); Pt reported experiencing poor sleep, worry. On mood measures she additionally reported .  Thought processes were circumstantial.  No abnormal thought contents were observed.  Pt denied any AH/VH. Pt also denied SI/HI/I/P. Insight - WFL. Judgment - fair.     Pt is a 74 y/o right-handed female with no PMHx who presented to [facility] ED on 12/14/2023, as a transfer from [facility], as a code stroke, with c/o R IPH.   CTH and CTA repeated - large R IPH (temporal/parietal). Presented to [place] with c/o HA and AMS. Friend accompanying Pt said that when visiting Pt today - Pt was not acting like herself and c/o HA. NIHSS 9. 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 Similar midline shift of 5 mm.  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 Briviact 50mg BID for seizure prophylaxis, EEG with increased risk of seizures in the right frontocentral region.TTE- EF 64%. Continue Losartan 25mg daily. 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 UA: positive for UTI, started on IV abx and then transitioned to oral, end date 12/22. Pt was evaluated by PM&R and therapy for functional deficits, gait/ADL impairments and acute rehabilitation was recommended. Pt was medically optimized for discharge to [facility] IRU on 12/20/23. PMHx: As noted above. Current meds: Provigil 100 mg at ^30 a.m. Please see list in Pt’s chart. Social Hx: Pt is  and has no children. Pt has master's level education and is a retired . Pt lachx hx of mental illness and substance abuse. Pt is Faith. 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    Findings: Pt was seen for an initial assessment of her cognitive and emotional functioning. The Modified MMSE (3MS) was administered; Pt’s results (73/100) were in the Moderately Impaired range. Her scores in geriatric mood measures suggested minimal levels of anxiety and depression (GAS = /30; GDS = 0/15). Pt was alert, fully Ox3, and her attitude was cooperative. Attn/Conc: Simple auditory attention - intact.  Concentration/Working memory for reversed counting and spelling – intact (7/7). Language: Pt’s speech was of normal volume, pitch and pace, and verbose. Naming - intact. Sentence repetition - mildly impaired (2/3). Auditory Comprehension - impaired (1/3 steps in a verbal command correctly executed). Reading - intact, but left visual inattention was noted. Writing - intact. Memory: Encoding of 3 words was intact (3/3); short-delayed verbal recall – intact (3/3); long-delayed verbal recall – mildly impaired (2/3, improving to 3/3 with saturated cueing). LTM was intact for US presidents (4/4). Visual memory – impaired. Visuospatial: Visuomotor integration – moderately impaired for copy of a 2D figure (4/10), distortion, poor integration, closure difficulty were noted. Figure-ground discrimination was impaired (3/4) for the Poppelreuter figure, left visual inattention was noted. Executive Functions: Motor Planning - moderately impaired (1/3 steps in a verbal command correctly executed). Organizational skills - moderately impaired. Abstract reasoning - intact (6/6) for similarities. Initiation/Phonemic Fluency - moderately impaired (5/10). Verbal problem solving – mildly impaired. Emotional functioning: Affect - mildly depressed, responsive to humor. Mood - euthymic ("good"); Pt reported experiencing poor sleep, worry. On mood measures she additionally reported .  Thought processes were circumstantial.  No abnormal thought contents were observed.  Pt denied any AH/VH. Pt also denied SI/HI/I/P. Insight - WFL. Judgment - fair.

## 2024-01-05 NOTE — PROGRESS NOTE ADULT - ATTENDING COMMENTS
Pt. seen with resident.  Agree with documentation above as per resident with amendments made as appropriate. Patient medically stable. Making progress towards rehab goals.     right ICH  Wet Macular Degeneration  -Spoke with pt's home physician, Dr. Dominguez Joyner ([phone]) regarding current treatment plan.  Updated him on her status 1/4  -Patient was previously on Vabysmo injections every 4-5 weeks.  -Per Dr. Joyner she can return to office as outpt in January for her treatment  -Reviewed risk factor profile of Vabysmo which reports risk of thromboembolic events and explained this to patient on 1/5    # self-reported history of ADHD and restless, distracted, poor attention  - xanax Rx in I stop  - Increased modafinil to 100mg on 1/5 Pt. seen with resident.  Agree with documentation above as per resident with amendments made as appropriate. Patient medically stable. Making progress towards rehab goals.     right ICH  Wet Macular Degeneration  -Spoke with pt's home physician, Dr. Dominguez Joyner ([phone]) regarding current treatment plan.  Updated him on her status 1/4  -Patient was previously on Vabysmo injections every 4-5 weeks.  -Per Dr. Joyner she can return to office as outpt in January for her treatment  -Reviewed risk factor profile of Vabysmo which reports risk of thromboembolic events and explained this to patient on 1/5    # self-reported history of ADHD and restless, distracted, poor attention  - xanax Rx in I stop  - Increased modafinil to 100mg on 1/5

## 2024-01-05 NOTE — PROGRESS NOTE ADULT - SUBJECTIVE AND OBJECTIVE BOX
Patient is a 73y old  Female who presents with a chief complaint of ICH (04 Jan 2024 11:22)      SUBJECTIVE:      ROS:  No fever, chills  No headaches, nausea, vomiting  No chest pain  No abdominal pain    VITALS  73y  Vital Signs Last 24 Hrs  T(C): 36.6 (05 Jan 2024 07:51), Max: 36.6 (05 Jan 2024 07:51)  T(F): 97.9 (05 Jan 2024 07:51), Max: 97.9 (05 Jan 2024 07:51)  HR: 77 (05 Jan 2024 07:51) (77 - 91)  BP: 111/71 (05 Jan 2024 07:51) (104/59 - 111/71)  BP(mean): --  RR: 15 (05 Jan 2024 07:51) (14 - 16)  SpO2: 100% (05 Jan 2024 07:51) (97% - 100%)    Parameters below as of 05 Jan 2024 07:51  Patient On (Oxygen Delivery Method): room air      Daily     Daily     PHYSICAL EXAM      MEDICATIONS  (STANDING):  atorvastatin 10 milliGRAM(s) Oral at bedtime  bisacodyl 5 milliGRAM(s) Oral daily  brivaracetam 50 milliGRAM(s) Oral two times a day  enoxaparin Injectable 40 milliGRAM(s) SubCutaneous <User Schedule>  famotidine    Tablet 20 milliGRAM(s) Oral two times a day  gabapentin 400 milliGRAM(s) Oral at bedtime  ketorolac 0.5% Ophthalmic Solution 1 Drop(s) Left EYE daily  lidocaine   4% Patch 1 Patch Transdermal <User Schedule>  lidocaine   4% Patch 1 Patch Transdermal <User Schedule>  modafinil 100 milliGRAM(s) Oral <User Schedule>  Preservision  Capsules AREDS 2 2 Capsule(s) 2 Capsule(s) Oral daily  psyllium Powder 1 Packet(s) Oral daily  senna 2 Tablet(s) Oral at bedtime  sodium chloride 0.65% Nasal 1 Spray(s) Both Nostrils two times a day    MEDICATIONS  (PRN):  acetaminophen     Tablet .. 650 milliGRAM(s) Oral every 6 hours PRN Temp greater or equal to 38C (100.4F), Mild Pain (1 - 3)  aluminum hydroxide/magnesium hydroxide/simethicone Suspension 30 milliLiter(s) Oral every 6 hours PRN Dyspepsia  artificial tears (preservative free) Ophthalmic Solution 1 Drop(s) Both EYES every 4 hours PRN Dry Eyes  bisacodyl Suppository 10 milliGRAM(s) Rectal daily PRN Constipation  calcium carbonate    500 mG (Tums) Chewable 1 Tablet(s) Chew three times a day PRN Heartburn  melatonin 3 milliGRAM(s) Oral at bedtime PRN Insomnia  ondansetron   Disintegrating Tablet 4 milliGRAM(s) Oral every 6 hours PRN Nausea and/or Vomiting      RECENT LABS:                          13.4   4.67  )-----------( 288      ( 04 Jan 2024 07:22 )             38.7     01-04    141  |  105  |  18  ----------------------------<  95  3.9   |  25  |  0.73    Ca    9.5      04 Jan 2024 07:22    TPro  6.4  /  Alb  3.1<L>  /  TBili  0.4  /  DBili  x   /  AST  18  /  ALT  34  /  AlkPhos  65  01-04    LIVER FUNCTIONS - ( 04 Jan 2024 07:22 )  Alb: 3.1 g/dL / Pro: 6.4 g/dL / ALK PHOS: 65 U/L / ALT: 34 U/L / AST: 18 U/L / GGT: x             Urinalysis Basic - ( 04 Jan 2024 07:22 )    Color: x / Appearance: x / SG: x / pH: x  Gluc: 95 mg/dL / Ketone: x  / Bili: x / Urobili: x   Blood: x / Protein: x / Nitrite: x   Leuk Esterase: x / RBC: x / WBC x   Sq Epi: x / Non Sq Epi: x / Bacteria: x          CAPILLARY BLOOD GLUCOSE               Patient is a 73y old  Female who presents with a chief complaint of ICH (04 Jan 2024 11:22)      SUBJECTIVE:  Patient seen and evaluated on AM rounds.  Patient is awake, very talkative and tangential but in good spirits.  Discussed with patient the conversation with myself and Dr. Joyner regarding her macular degeneration treatment plan and general risks associated with that injection.  Patient would like to resume her home metamucil as it is pill form which she prefers.  She reports sleeping well.    ROS:  No fever, chills  No headaches, nausea, vomiting  No chest pain  No abdominal pain  +neurological deficits    VITALS  73y  Vital Signs Last 24 Hrs  T(C): 36.6 (05 Jan 2024 07:51), Max: 36.6 (05 Jan 2024 07:51)  T(F): 97.9 (05 Jan 2024 07:51), Max: 97.9 (05 Jan 2024 07:51)  HR: 77 (05 Jan 2024 07:51) (77 - 91)  BP: 111/71 (05 Jan 2024 07:51) (104/59 - 111/71)  BP(mean): --  RR: 15 (05 Jan 2024 07:51) (14 - 16)  SpO2: 100% (05 Jan 2024 07:51) (97% - 100%)    Parameters below as of 05 Jan 2024 07:51  Patient On (Oxygen Delivery Method): room air      Daily     Daily     PHYSICAL EXAM  Constitutional: NAD, sitting in wheelchair  Respiratory: breathing comfortably   Cardiovascular: R1R2  Gastrointestinal: Abdomen soft, nondistended  Neurological: tangential  thoughts/speech but less perseverating; left inattentiveness but able to cross midline  Musculoskeletal: 4/5 throughout upper and lower extremities  Coordination: +Dysmetria  Psychiatric: calm       MEDICATIONS  (STANDING):  atorvastatin 10 milliGRAM(s) Oral at bedtime  bisacodyl 5 milliGRAM(s) Oral daily  brivaracetam 50 milliGRAM(s) Oral two times a day  enoxaparin Injectable 40 milliGRAM(s) SubCutaneous <User Schedule>  famotidine    Tablet 20 milliGRAM(s) Oral two times a day  gabapentin 400 milliGRAM(s) Oral at bedtime  ketorolac 0.5% Ophthalmic Solution 1 Drop(s) Left EYE daily  lidocaine   4% Patch 1 Patch Transdermal <User Schedule>  lidocaine   4% Patch 1 Patch Transdermal <User Schedule>  modafinil 100 milliGRAM(s) Oral <User Schedule>  Preservision  Capsules AREDS 2 2 Capsule(s) 2 Capsule(s) Oral daily  psyllium Powder 1 Packet(s) Oral daily  senna 2 Tablet(s) Oral at bedtime  sodium chloride 0.65% Nasal 1 Spray(s) Both Nostrils two times a day    MEDICATIONS  (PRN):  acetaminophen     Tablet .. 650 milliGRAM(s) Oral every 6 hours PRN Temp greater or equal to 38C (100.4F), Mild Pain (1 - 3)  aluminum hydroxide/magnesium hydroxide/simethicone Suspension 30 milliLiter(s) Oral every 6 hours PRN Dyspepsia  artificial tears (preservative free) Ophthalmic Solution 1 Drop(s) Both EYES every 4 hours PRN Dry Eyes  bisacodyl Suppository 10 milliGRAM(s) Rectal daily PRN Constipation  calcium carbonate    500 mG (Tums) Chewable 1 Tablet(s) Chew three times a day PRN Heartburn  melatonin 3 milliGRAM(s) Oral at bedtime PRN Insomnia  ondansetron   Disintegrating Tablet 4 milliGRAM(s) Oral every 6 hours PRN Nausea and/or Vomiting      RECENT LABS:                          13.4   4.67  )-----------( 288      ( 04 Jan 2024 07:22 )             38.7     01-04    141  |  105  |  18  ----------------------------<  95  3.9   |  25  |  0.73    Ca    9.5      04 Jan 2024 07:22    TPro  6.4  /  Alb  3.1<L>  /  TBili  0.4  /  DBili  x   /  AST  18  /  ALT  34  /  AlkPhos  65  01-04    LIVER FUNCTIONS - ( 04 Jan 2024 07:22 )  Alb: 3.1 g/dL / Pro: 6.4 g/dL / ALK PHOS: 65 U/L / ALT: 34 U/L / AST: 18 U/L / GGT: x             Urinalysis Basic - ( 04 Jan 2024 07:22 )    Color: x / Appearance: x / SG: x / pH: x  Gluc: 95 mg/dL / Ketone: x  / Bili: x / Urobili: x   Blood: x / Protein: x / Nitrite: x   Leuk Esterase: x / RBC: x / WBC x   Sq Epi: x / Non Sq Epi: x / Bacteria: x          CAPILLARY BLOOD GLUCOSE

## 2024-01-06 PROCEDURE — 99232 SBSQ HOSP IP/OBS MODERATE 35: CPT

## 2024-01-06 RX ADMIN — FAMOTIDINE 20 MILLIGRAM(S): 10 INJECTION INTRAVENOUS at 18:08

## 2024-01-06 RX ADMIN — Medication 650 MILLIGRAM(S): at 04:51

## 2024-01-06 RX ADMIN — LIDOCAINE 1 PATCH: 4 CREAM TOPICAL at 21:56

## 2024-01-06 RX ADMIN — ENOXAPARIN SODIUM 40 MILLIGRAM(S): 100 INJECTION SUBCUTANEOUS at 18:08

## 2024-01-06 RX ADMIN — BRIVARACETAM 50 MILLIGRAM(S): 25 TABLET, FILM COATED ORAL at 05:36

## 2024-01-06 RX ADMIN — Medication 1 SPRAY(S): at 18:08

## 2024-01-06 RX ADMIN — LIDOCAINE 1 PATCH: 4 CREAM TOPICAL at 09:55

## 2024-01-06 RX ADMIN — Medication 650 MILLIGRAM(S): at 20:28

## 2024-01-06 RX ADMIN — Medication 1 DROP(S): at 11:01

## 2024-01-06 RX ADMIN — LIDOCAINE 1 PATCH: 4 CREAM TOPICAL at 09:54

## 2024-01-06 RX ADMIN — ATORVASTATIN CALCIUM 10 MILLIGRAM(S): 80 TABLET, FILM COATED ORAL at 21:51

## 2024-01-06 RX ADMIN — FAMOTIDINE 20 MILLIGRAM(S): 10 INJECTION INTRAVENOUS at 05:36

## 2024-01-06 RX ADMIN — MODAFINIL 100 MILLIGRAM(S): 200 TABLET ORAL at 05:36

## 2024-01-06 RX ADMIN — Medication 3 MILLIGRAM(S): at 21:51

## 2024-01-06 RX ADMIN — Medication 5 MILLIGRAM(S): at 11:01

## 2024-01-06 RX ADMIN — BRIVARACETAM 50 MILLIGRAM(S): 25 TABLET, FILM COATED ORAL at 18:08

## 2024-01-06 RX ADMIN — Medication 650 MILLIGRAM(S): at 03:51

## 2024-01-06 RX ADMIN — Medication 1 SPRAY(S): at 05:36

## 2024-01-06 RX ADMIN — LIDOCAINE 1 PATCH: 4 CREAM TOPICAL at 19:27

## 2024-01-06 RX ADMIN — Medication 650 MILLIGRAM(S): at 19:45

## 2024-01-06 RX ADMIN — GABAPENTIN 400 MILLIGRAM(S): 400 CAPSULE ORAL at 21:51

## 2024-01-06 NOTE — PROGRESS NOTE ADULT - ASSESSMENT
74 YO RIGHT handed woman with no PMH who presented to [facility] ED on 12/14/2023, as a transfer from [facility] with R IPH. Hospital course also significant for UTI, b/l DVTs, EEG with increased seizure risks.    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  - Head CT 12/27: The right parietal parenchymal hemorrhage is slightly less dense and slightly smaller measuring 3.0 cm in AP diameter by 4.3 cm transversely compared with the prior of 4.1 cm in AP diameter by 5.9 cm transversely. There is similar vasogenic edema. There is also similar mass effect on the atrium of the right lateral ventricle with similar mild midline shift to the left. There is resolution of the intraventricular hemorrhage in the left occipital horn.  - EEG with increased risk of seizures in the right frontocentral region  - Briviact 50mg BID for seizure prophylaxis.  - Cont Comprehensive Rehab Program: PT/OT/ST, 3hours daily and 5 days weekly.     #Wet Macular Degeneration  -Spoke with pt's home physician, Dr. Dominguez Joyner ([phone]) regarding current treatment plan.  Updated him on her status 1/4  -Patient was previously on Vabysmo injections every 4-5 weeks.  -Per Dr. Joyner she can return to office as outpt in January for her treatment  -Reviewed risk factor profile of Vabysmo which reports risk of thromboembolic events and explained this to patient on 1/5    # self-reported history of ADHD and restless, distracted, poor attention  - xanax Rx in I stop  - Increased modafinil to 100mg on 1/5  - psych consult 1/2 reviewed and appreciated  - psychology and recreation therapy (former )    #   - discussed with hospitalist. Patient with ascvd is moderate risk  - C/W atorvastatin 10 mg qhs 12/29    # HTN  -  Losartan discontinued due to soft BP 12/27  - BP stable continue monitor    #Orthostatic Hypotension  --OH noted on 1/4  --d/w hospitalist  --s/p NS 500cc bolus x 1 and encourage PO hydration on 1/4  --continue to monitor BP      # Pain management  - Tylenol PRN  - tramadol 25 q4 PRN severe pain,  --Gabapentin  400mg qhs for neuropathic pain     - Lidocaine patch timing changed to bedtime per request. States she uses Salon Pas on left neck/shoulder at home.   - Cervical pillow for postioning midline and neck control, improved    # DVT   - b/l below the knee DVTs: right soleal vein, left soleal, peroneal and gastrocnemius veins.  - Lovenox 40 mg daily. Not cleared for full dose AC due to large ICH  - Doppler 12/20: Positive deep venous thrombosis below the right knee within the right soleal vein. Positive deep venous thrombosis below the left knee within the left soleal, peroneal and gastrocnemius veins.  - Doppler 12/27 surveillance - DVTS known - stable right improved on left.  - Repeat Head CT 12/27 improved followed by neuro consult re: possible full dose AC (day 14 post bleed)  - Neuro consult appreciated 12/28. Do not recommend full dose of AC at this time  -Repeat Head CT ~ 2 weeks-- on 1/10/24 prior to discharge.     # GI ppx/h/o reflux  - Pepcid 20mg   - TUMS, Maalox  - Senna  - metamucil home med added (patient preference for pills as oppose to powder as offered here) 1/5    # Dysphagia --resolved  - Diet upgraded to Regular with thin liquids  -c/w AREDS 2 vitamin (home med)    #Case discussed in IDT rounds 1/2/24  -SLP: soft bite size; severe cognitive deficits,  left inattentiveness with max cueing, Impaired attention.   -OT: Eating/grooming supervision; Footwear/showering/Toileting-- Max A; UBD/LBD and toilet transfers-- Mod A  -PT: Transfers to the right Min-Mod A; Transfer to left Mod A; Ambulate 110ft Mod A with WC follow  - goals: 1. min assist transfers and ambulation, min assist bADLs; Will need 24hr supervision  - target: dc [facility] 1/11/23    ** Spoke with pt's  HCP, Isabella on 1/3, to provide update on pt's status,  medical update, medications, progress in therapy, rehab plan of care, discharge plan to [facility] on 1/11/24 and discharge expectations and general prognosis.  All questions answered.     # LABS:   CBC CMP 1/8    #GOC  CODE STATUS: FULL CODE    Outpatient Follow-up (Specialty/Name of physician):    Dr. Dominguez Joyner  Opthalmology   [phone]    Emy Prakash  NP in [address]  [place] [address]  Phone: [phone]  Fax: [phone]  Follow Up Time: 2 weeks    Sergio Jurado  [address], [address]  [place] [address]  Phone: [phone]  Fax: [phone]  Follow Up Time: 1 month         72 YO RIGHT handed woman with no PMH who presented to [facility] ED on 12/14/2023, as a transfer from [facility] with R IPH. Hospital course also significant for UTI, b/l DVTs, EEG with increased seizure risks.    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  - Head CT 12/27: The right parietal parenchymal hemorrhage is slightly less dense and slightly smaller measuring 3.0 cm in AP diameter by 4.3 cm transversely compared with the prior of 4.1 cm in AP diameter by 5.9 cm transversely. There is similar vasogenic edema. There is also similar mass effect on the atrium of the right lateral ventricle with similar mild midline shift to the left. There is resolution of the intraventricular hemorrhage in the left occipital horn.  - EEG with increased risk of seizures in the right frontocentral region  - Briviact 50mg BID for seizure prophylaxis.  - Cont Comprehensive Rehab Program: PT/OT/ST, 3hours daily and 5 days weekly.     #Wet Macular Degeneration  -Spoke with pt's home physician, Dr. Dominguez Joyner ([phone]) regarding current treatment plan.  Updated him on her status 1/4  -Patient was previously on Vabysmo injections every 4-5 weeks.  -Per Dr. Joyner she can return to office as outpt in January for her treatment  -Reviewed risk factor profile of Vabysmo which reports risk of thromboembolic events and explained this to patient on 1/5    # self-reported history of ADHD and restless, distracted, poor attention  - xanax Rx in I stop  - Increased modafinil to 100mg on 1/5  - psych consult 1/2 reviewed and appreciated  - psychology and recreation therapy (former )    #   - discussed with hospitalist. Patient with ascvd is moderate risk  - C/W atorvastatin 10 mg qhs 12/29    # HTN  -  Losartan discontinued due to soft BP 12/27  - BP stable continue monitor    #Orthostatic Hypotension  --OH noted on 1/4  --d/w hospitalist  --s/p NS 500cc bolus x 1 and encourage PO hydration on 1/4  --continue to monitor BP      # Pain management  - Tylenol PRN  - tramadol 25 q4 PRN severe pain,  --Gabapentin  400mg qhs for neuropathic pain     - Lidocaine patch timing changed to bedtime per request. States she uses Salon Pas on left neck/shoulder at home.   - Cervical pillow for postioning midline and neck control, improved    # DVT   - b/l below the knee DVTs: right soleal vein, left soleal, peroneal and gastrocnemius veins.  - Lovenox 40 mg daily. Not cleared for full dose AC due to large ICH  - Doppler 12/20: Positive deep venous thrombosis below the right knee within the right soleal vein. Positive deep venous thrombosis below the left knee within the left soleal, peroneal and gastrocnemius veins.  - Doppler 12/27 surveillance - DVTS known - stable right improved on left.  - Repeat Head CT 12/27 improved followed by neuro consult re: possible full dose AC (day 14 post bleed)  - Neuro consult appreciated 12/28. Do not recommend full dose of AC at this time  -Repeat Head CT ~ 2 weeks-- on 1/10/24 prior to discharge.     # GI ppx/h/o reflux  - Pepcid 20mg   - TUMS, Maalox  - Senna  - metamucil home med added (patient preference for pills as oppose to powder as offered here) 1/5    # Dysphagia --resolved  - Diet upgraded to Regular with thin liquids  -c/w AREDS 2 vitamin (home med)    #Case discussed in IDT rounds 1/2/24  -SLP: soft bite size; severe cognitive deficits,  left inattentiveness with max cueing, Impaired attention.   -OT: Eating/grooming supervision; Footwear/showering/Toileting-- Max A; UBD/LBD and toilet transfers-- Mod A  -PT: Transfers to the right Min-Mod A; Transfer to left Mod A; Ambulate 110ft Mod A with WC follow  - goals: 1. min assist transfers and ambulation, min assist bADLs; Will need 24hr supervision  - target: dc [facility] 1/11/23    ** Spoke with pt's  HCP, Isabella on 1/3, to provide update on pt's status,  medical update, medications, progress in therapy, rehab plan of care, discharge plan to [facility] on 1/11/24 and discharge expectations and general prognosis.  All questions answered.     # LABS:   CBC CMP 1/8    #GOC  CODE STATUS: FULL CODE    Outpatient Follow-up (Specialty/Name of physician):    Dr. Dominguez Joyner  Opthalmology   [phone]    Emy Prakash  NP in [address]  [place] [address]  Phone: [phone]  Fax: [phone]  Follow Up Time: 2 weeks    Sergio Jurado  [address], [address]  [place] [address]  Phone: [phone]  Fax: [phone]  Follow Up Time: 1 month

## 2024-01-06 NOTE — PROGRESS NOTE ADULT - SUBJECTIVE AND OBJECTIVE BOX
HPI:  This is a 72 YO RIGHT handed woman with no PMH who presented to [facility] ED on 12/14/2023, as a transfer from [facility], as a CODE STROKE, with c/o R IPH.   CTH and CTA repeated - large R IPH (temporal/parietal). Presented to [place] today with c/o HA and AMS. Friend accompanying patient said that when visiting patient today - patient was not acting like herself and c/o HA. NIHSS 9.    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 Similar midline shift of 5 mm.  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 Briviact 50mg BID for seizure prophylaxis, EEG with increased risk of seizures in the right frontocentral region.TTE- EF 64%. Continue Losartan 25mg daily. 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 UA: positive for UTI, started on IV abx and then transitioned to oral, end date 12/22.      Patient was evaluated by PM&R and therapy for functional deficits, gait/ADL impairments and acute rehabilitation was recommended. Patient was medically optimized for discharge to [facility] IRU on 12/20/23. (20 Dec 2023 17:15)          Subjective:  Seen this AM.  No new issues.       VITALS  Vital Signs Last 24 Hrs  T(C): 36.4 (05 Jan 2024 19:48), Max: 36.4 (05 Jan 2024 19:48)  T(F): 97.6 (05 Jan 2024 19:48), Max: 97.6 (05 Jan 2024 19:48)  HR: 75 (05 Jan 2024 19:48) (75 - 85)  BP: 101/61 (05 Jan 2024 19:48) (101/61 - 105/70)  BP(mean): --  RR: 14 (05 Jan 2024 19:48) (14 - 16)  SpO2: 99% (05 Jan 2024 19:48) (98% - 99%)    Parameters below as of 05 Jan 2024 19:48  Patient On (Oxygen Delivery Method): room air        REVIEW OF SYMPTOMS  Neurological deficits      PHYSICAL EXAM  Constitutional: NAD, sitting in wheelchair  Respiratory: breathing comfortably   Cardiovascular: R1R2  Gastrointestinal: Abdomen soft, nondistended  Neurological: tangential  thoughts/speech but less perseverating; left inattentiveness but able to cross midline  Musculoskeletal: 4/5 throughout upper and lower extremities  Coordination: +Dysmetria  Psychiatric: calm       RECENT LABS                  RADIOLOGY/OTHER RESULTS      MEDICATIONS  (STANDING):  atorvastatin 10 milliGRAM(s) Oral at bedtime  bisacodyl 5 milliGRAM(s) Oral daily  brivaracetam 50 milliGRAM(s) Oral two times a day  enoxaparin Injectable 40 milliGRAM(s) SubCutaneous <User Schedule>  famotidine    Tablet 20 milliGRAM(s) Oral two times a day  gabapentin 400 milliGRAM(s) Oral at bedtime  ketorolac 0.5% Ophthalmic Solution 1 Drop(s) Left EYE daily  lidocaine   4% Patch 1 Patch Transdermal <User Schedule>  lidocaine   4% Patch 1 Patch Transdermal <User Schedule>  modafinil 100 milliGRAM(s) Oral <User Schedule>  Preservision  Capsules AREDS 2 2 Capsule(s) 2 Capsule(s) Oral daily  psyllium Powder 1 Packet(s) Oral daily  senna 2 Tablet(s) Oral at bedtime  sodium chloride 0.65% Nasal 1 Spray(s) Both Nostrils two times a day    MEDICATIONS  (PRN):  acetaminophen     Tablet .. 650 milliGRAM(s) Oral every 6 hours PRN Temp greater or equal to 38C (100.4F), Mild Pain (1 - 3)  aluminum hydroxide/magnesium hydroxide/simethicone Suspension 30 milliLiter(s) Oral every 6 hours PRN Dyspepsia  artificial tears (preservative free) Ophthalmic Solution 1 Drop(s) Both EYES every 4 hours PRN Dry Eyes  bisacodyl Suppository 10 milliGRAM(s) Rectal daily PRN Constipation  calcium carbonate    500 mG (Tums) Chewable 1 Tablet(s) Chew three times a day PRN Heartburn  melatonin 3 milliGRAM(s) Oral at bedtime PRN Insomnia  ondansetron   Disintegrating Tablet 4 milliGRAM(s) Oral every 6 hours PRN Nausea and/or Vomiting         HPI:  This is a 72 YO RIGHT handed woman with no PMH who presented to [facility] ED on 12/14/2023, as a transfer from [facility], as a CODE STROKE, with c/o R IPH.   CTH and CTA repeated - large R IPH (temporal/parietal). Presented to [place] today with c/o HA and AMS. Friend accompanying patient said that when visiting patient today - patient was not acting like herself and c/o HA. NIHSS 9.    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 Similar midline shift of 5 mm.  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 Briviact 50mg BID for seizure prophylaxis, EEG with increased risk of seizures in the right frontocentral region.TTE- EF 64%. Continue Losartan 25mg daily. 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 UA: positive for UTI, started on IV abx and then transitioned to oral, end date 12/22.      Patient was evaluated by PM&R and therapy for functional deficits, gait/ADL impairments and acute rehabilitation was recommended. Patient was medically optimized for discharge to [facility] IRU on 12/20/23. (20 Dec 2023 17:15)          Subjective:  Seen this AM.  No new issues.       VITALS  Vital Signs Last 24 Hrs  T(C): 36.4 (05 Jan 2024 19:48), Max: 36.4 (05 Jan 2024 19:48)  T(F): 97.6 (05 Jan 2024 19:48), Max: 97.6 (05 Jan 2024 19:48)  HR: 75 (05 Jan 2024 19:48) (75 - 85)  BP: 101/61 (05 Jan 2024 19:48) (101/61 - 105/70)  BP(mean): --  RR: 14 (05 Jan 2024 19:48) (14 - 16)  SpO2: 99% (05 Jan 2024 19:48) (98% - 99%)    Parameters below as of 05 Jan 2024 19:48  Patient On (Oxygen Delivery Method): room air        REVIEW OF SYMPTOMS  Neurological deficits      PHYSICAL EXAM  Constitutional: NAD, sitting in wheelchair  Respiratory: breathing comfortably   Cardiovascular: R1R2  Gastrointestinal: Abdomen soft, nondistended  Neurological: tangential  thoughts/speech but less perseverating; left inattentiveness but able to cross midline  Musculoskeletal: 4/5 throughout upper and lower extremities  Coordination: +Dysmetria  Psychiatric: calm       RECENT LABS                  RADIOLOGY/OTHER RESULTS      MEDICATIONS  (STANDING):  atorvastatin 10 milliGRAM(s) Oral at bedtime  bisacodyl 5 milliGRAM(s) Oral daily  brivaracetam 50 milliGRAM(s) Oral two times a day  enoxaparin Injectable 40 milliGRAM(s) SubCutaneous <User Schedule>  famotidine    Tablet 20 milliGRAM(s) Oral two times a day  gabapentin 400 milliGRAM(s) Oral at bedtime  ketorolac 0.5% Ophthalmic Solution 1 Drop(s) Left EYE daily  lidocaine   4% Patch 1 Patch Transdermal <User Schedule>  lidocaine   4% Patch 1 Patch Transdermal <User Schedule>  modafinil 100 milliGRAM(s) Oral <User Schedule>  Preservision  Capsules AREDS 2 2 Capsule(s) 2 Capsule(s) Oral daily  psyllium Powder 1 Packet(s) Oral daily  senna 2 Tablet(s) Oral at bedtime  sodium chloride 0.65% Nasal 1 Spray(s) Both Nostrils two times a day    MEDICATIONS  (PRN):  acetaminophen     Tablet .. 650 milliGRAM(s) Oral every 6 hours PRN Temp greater or equal to 38C (100.4F), Mild Pain (1 - 3)  aluminum hydroxide/magnesium hydroxide/simethicone Suspension 30 milliLiter(s) Oral every 6 hours PRN Dyspepsia  artificial tears (preservative free) Ophthalmic Solution 1 Drop(s) Both EYES every 4 hours PRN Dry Eyes  bisacodyl Suppository 10 milliGRAM(s) Rectal daily PRN Constipation  calcium carbonate    500 mG (Tums) Chewable 1 Tablet(s) Chew three times a day PRN Heartburn  melatonin 3 milliGRAM(s) Oral at bedtime PRN Insomnia  ondansetron   Disintegrating Tablet 4 milliGRAM(s) Oral every 6 hours PRN Nausea and/or Vomiting

## 2024-01-06 NOTE — PROGRESS NOTE ADULT - SUBJECTIVE AND OBJECTIVE BOX
CC: Patient is a 73y old  Female who presents with a chief complaint of ICH (05 Jan 2024 14:00)      Interval History:  Patient seen and examined at bedside.  No overnight events  reports shin pain    ALLERGIES:  No Known Allergies  oxycodone (Nausea)    MEDICATIONS  (STANDING):  atorvastatin 10 milliGRAM(s) Oral at bedtime  bisacodyl 5 milliGRAM(s) Oral daily  brivaracetam 50 milliGRAM(s) Oral two times a day  enoxaparin Injectable 40 milliGRAM(s) SubCutaneous <User Schedule>  famotidine    Tablet 20 milliGRAM(s) Oral two times a day  gabapentin 400 milliGRAM(s) Oral at bedtime  ketorolac 0.5% Ophthalmic Solution 1 Drop(s) Left EYE daily  lidocaine   4% Patch 1 Patch Transdermal <User Schedule>  lidocaine   4% Patch 1 Patch Transdermal <User Schedule>  modafinil 100 milliGRAM(s) Oral <User Schedule>  Preservision  Capsules AREDS 2 2 Capsule(s) 2 Capsule(s) Oral daily  psyllium Powder 1 Packet(s) Oral daily  senna 2 Tablet(s) Oral at bedtime  sodium chloride 0.65% Nasal 1 Spray(s) Both Nostrils two times a day    MEDICATIONS  (PRN):  acetaminophen     Tablet .. 650 milliGRAM(s) Oral every 6 hours PRN Temp greater or equal to 38C (100.4F), Mild Pain (1 - 3)  aluminum hydroxide/magnesium hydroxide/simethicone Suspension 30 milliLiter(s) Oral every 6 hours PRN Dyspepsia  artificial tears (preservative free) Ophthalmic Solution 1 Drop(s) Both EYES every 4 hours PRN Dry Eyes  bisacodyl Suppository 10 milliGRAM(s) Rectal daily PRN Constipation  calcium carbonate    500 mG (Tums) Chewable 1 Tablet(s) Chew three times a day PRN Heartburn  melatonin 3 milliGRAM(s) Oral at bedtime PRN Insomnia  ondansetron   Disintegrating Tablet 4 milliGRAM(s) Oral every 6 hours PRN Nausea and/or Vomiting    Vital Signs Last 24 Hrs  T(F): 97.6 (05 Jan 2024 19:48), Max: 97.6 (05 Jan 2024 19:48)  HR: 75 (05 Jan 2024 19:48) (75 - 85)  BP: 101/61 (05 Jan 2024 19:48) (101/61 - 105/70)  RR: 14 (05 Jan 2024 19:48) (14 - 16)  SpO2: 99% (05 Jan 2024 19:48) (98% - 99%)  I&O's Summary        PHYSICAL EXAM:  GENERAL: NAD  NERVOUS SYSTEM:  CN II - XII intact; Sensation intact; follows commands  CHEST/LUNG: Clear to percussion bilaterally; No rales, rhonchi, wheezing, or rubs; normal respiratory effort, no intercostal retractions  HEART: Regular rate and rhythm; No murmurs, rubs, or gallops; No pitting edema  ABDOMEN: Soft, Nontender, Nondistended; Bowel sounds present; No HSM or masses  MUSCULOSKELETAL/EXTREMITIES:  2+ Peripheral Pulses, No clubbing or digital cyanosis  PSYCH: Appropriate affect, Alert & awake    LABS:                        13.4   4.67  )-----------( 288      ( 04 Jan 2024 07:22 )             38.7       01-04    141  |  105  |  18  ----------------------------<  95  3.9   |  25  |  0.73    Ca    9.5      04 Jan 2024 07:22    TPro  6.4  /  Alb  3.1  /  TBili  0.4  /  DBili  x   /  AST  18  /  ALT  34  /  AlkPhos  65  01-04     12-15 Chol 197 mg/dL LDL -- HDL 58 mg/dL Trig 64 mg/dL    Urinalysis Basic - ( 04 Jan 2024 07:22 )  Color: x / Appearance: x / SG: x / pH: x  Gluc: 95 mg/dL / Ketone: x  / Bili: x / Urobili: x   Blood: x / Protein: x / Nitrite: x   Leuk Esterase: x / RBC: x / WBC x   Sq Epi: x / Non Sq Epi: x / Bacteria: x    COVID-19 PCR: NotDetec (12-20-23 @ 22:30)    Care Discussed with Consultants/Other Providers: Yes

## 2024-01-06 NOTE — PROGRESS NOTE ADULT - ASSESSMENT
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 Noted to have UTI, and BL Distal DVT    Intraparenchymal Hemorrhage  - Abnormal EEG and Briviact 50mg BID started for seizure ppx  - TTE- EF 64%.    #HTN  -BP was noted to be low, losartan stopped   - Goal BP <130mmhg Average.   - encourage PO hydration  - monitor BP    #bradycardia  -likely neurogenic as per cardio eval prev  -monitor hr, avoid rate controlling agents    #UTI  - s/p Vantin 100mg BID (completed)  --resolved    #HLD  ASCVD moderate risk factor  started on low dose atorvastatin 10mg       #DVT   - Distal asymptomatic DVT  - continue prophylactic dose

## 2024-01-07 DIAGNOSIS — F90.9 ATTENTION-DEFICIT HYPERACTIVITY DISORDER, UNSPECIFIED TYPE: ICD-10-CM

## 2024-01-07 DIAGNOSIS — I61.9 NONTRAUMATIC INTRACEREBRAL HEMORRHAGE, UNSPECIFIED: ICD-10-CM

## 2024-01-07 DIAGNOSIS — E78.5 HYPERLIPIDEMIA, UNSPECIFIED: ICD-10-CM

## 2024-01-07 DIAGNOSIS — I10 ESSENTIAL (PRIMARY) HYPERTENSION: ICD-10-CM

## 2024-01-07 PROCEDURE — 99232 SBSQ HOSP IP/OBS MODERATE 35: CPT

## 2024-01-07 RX ADMIN — Medication 1 SPRAY(S): at 18:45

## 2024-01-07 RX ADMIN — Medication 1 DROP(S): at 12:07

## 2024-01-07 RX ADMIN — ATORVASTATIN CALCIUM 10 MILLIGRAM(S): 80 TABLET, FILM COATED ORAL at 20:10

## 2024-01-07 RX ADMIN — LIDOCAINE 1 PATCH: 4 CREAM TOPICAL at 10:14

## 2024-01-07 RX ADMIN — BRIVARACETAM 50 MILLIGRAM(S): 25 TABLET, FILM COATED ORAL at 05:38

## 2024-01-07 RX ADMIN — MODAFINIL 100 MILLIGRAM(S): 200 TABLET ORAL at 05:38

## 2024-01-07 RX ADMIN — FAMOTIDINE 20 MILLIGRAM(S): 10 INJECTION INTRAVENOUS at 05:37

## 2024-01-07 RX ADMIN — LIDOCAINE 1 PATCH: 4 CREAM TOPICAL at 10:13

## 2024-01-07 RX ADMIN — LIDOCAINE 1 PATCH: 4 CREAM TOPICAL at 18:42

## 2024-01-07 RX ADMIN — LIDOCAINE 1 PATCH: 4 CREAM TOPICAL at 22:14

## 2024-01-07 RX ADMIN — BRIVARACETAM 50 MILLIGRAM(S): 25 TABLET, FILM COATED ORAL at 18:44

## 2024-01-07 RX ADMIN — GABAPENTIN 400 MILLIGRAM(S): 400 CAPSULE ORAL at 20:10

## 2024-01-07 RX ADMIN — ENOXAPARIN SODIUM 40 MILLIGRAM(S): 100 INJECTION SUBCUTANEOUS at 18:44

## 2024-01-07 RX ADMIN — Medication 3 MILLIGRAM(S): at 20:10

## 2024-01-07 RX ADMIN — FAMOTIDINE 20 MILLIGRAM(S): 10 INJECTION INTRAVENOUS at 18:44

## 2024-01-07 RX ADMIN — Medication 1 SPRAY(S): at 05:38

## 2024-01-07 NOTE — PROGRESS NOTE ADULT - ASSESSMENT
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 Noted to have UTI, and BL Distal DVT    Intraparenchymal Hemorrhage  - Abnormal EEG and Briviact 50mg BID started for seizure ppx  - TTE- EF 64%.  - consider dc modafinil pt quite alert and talkative may not need     #HTN  -BP was noted to be low, losartan stopped   - Goal BP <130mmhg Average.   - encourage PO hydration  - monitor BP    #bradycardia  -likely neurogenic as per cardio eval prev  -monitor hr, avoid rate controlling agents    #UTI  - s/p Vantin 100mg BID (completed)  --resolved    #HLD  ASCVD moderate risk factor  started on low dose atorvastatin 10mg       #DVT   - Distal asymptomatic DVT  - continue prophylactic dose

## 2024-01-07 NOTE — PROGRESS NOTE ADULT - NSPROGADDITIONALINFOA_GEN_ALL_CORE
I have personally interviewed and examined this patient, reviewed pertinent clinical information, and performed the evaluation and management services provided at today's visit for inpatient medical follow up    I am available to discuss any issues related to the medical care of this patient on the unit this morning, through [employer] Teams Chat or by phone at [phone] I have personally interviewed and examined this patient, reviewed pertinent clinical information, and performed the evaluation and management services provided at today's visit for inpatient medical follow up    I am available to discuss any issues related to the medical care of this patient on the unit this morning, through [employer] Teams Chat or by phone at [phone]

## 2024-01-07 NOTE — PROGRESS NOTE ADULT - SUBJECTIVE AND OBJECTIVE BOX
HPI:  This is a 74 YO RIGHT handed woman with no PMH who presented to [facility] ED on 12/14/2023, as a transfer from [facility], as a CODE STROKE, with c/o R IPH.   CTH and CTA repeated - large R IPH (temporal/parietal). Presented to [place] today with c/o HA and AMS. Friend accompanying patient said that when visiting patient today - patient was not acting like herself and c/o HA. NIHSS 9.    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 Similar midline shift of 5 mm.  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 Briviact 50mg BID for seizure prophylaxis, EEG with increased risk of seizures in the right frontocentral region.TTE- EF 64%. Continue Losartan 25mg daily. 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 UA: positive for UTI, started on IV abx and then transitioned to oral, end date 12/22.      Patient was evaluated by PM&R and therapy for functional deficits, gait/ADL impairments and acute rehabilitation was recommended. Patient was medically optimized for discharge to [facility] IRU on 12/20/23. (20 Dec 2023 17:15)          Subjective:  Seen this AM while she was painting.  Slept during the night.  No new complaints.  Making progress.        VITALS  Vital Signs Last 24 Hrs  T(C): 36.7 (07 Jan 2024 08:05), Max: 36.7 (06 Jan 2024 19:40)  T(F): 98 (07 Jan 2024 08:05), Max: 98 (06 Jan 2024 19:40)  HR: 78 (07 Jan 2024 08:05) (78 - 91)  BP: 112/76 (07 Jan 2024 08:05) (103/64 - 112/76)  BP(mean): --  RR: 16 (07 Jan 2024 08:05) (16 - 16)  SpO2: 100% (07 Jan 2024 08:05) (98% - 100%)    Parameters below as of 07 Jan 2024 08:05  Patient On (Oxygen Delivery Method): room air        REVIEW OF SYMPTOMS  Neurological deficits      PHYSICAL EXAM  Constitutional: NAD, sitting in wheelchair  Respiratory: breathing comfortably   Cardiovascular: R1R2  Gastrointestinal: Abdomen soft, nondistended  Neurological: tangential  thoughts/speech but less perseverating; left inattentiveness but able to cross midline  Musculoskeletal: 4/5 throughout upper and lower extremities  Coordination: +Dysmetria  Psychiatric: calm       RECENT LABS                  RADIOLOGY/OTHER RESULTS      MEDICATIONS  (STANDING):  atorvastatin 10 milliGRAM(s) Oral at bedtime  bisacodyl 5 milliGRAM(s) Oral daily  brivaracetam 50 milliGRAM(s) Oral two times a day  enoxaparin Injectable 40 milliGRAM(s) SubCutaneous <User Schedule>  famotidine    Tablet 20 milliGRAM(s) Oral two times a day  gabapentin 400 milliGRAM(s) Oral at bedtime  ketorolac 0.5% Ophthalmic Solution 1 Drop(s) Left EYE daily  lidocaine   4% Patch 1 Patch Transdermal <User Schedule>  lidocaine   4% Patch 1 Patch Transdermal <User Schedule>  modafinil 100 milliGRAM(s) Oral <User Schedule>  Preservision  Capsules AREDS 2 2 Capsule(s) 2 Capsule(s) Oral daily  psyllium Powder 1 Packet(s) Oral daily  senna 2 Tablet(s) Oral at bedtime  sodium chloride 0.65% Nasal 1 Spray(s) Both Nostrils two times a day    MEDICATIONS  (PRN):  acetaminophen     Tablet .. 650 milliGRAM(s) Oral every 6 hours PRN Temp greater or equal to 38C (100.4F), Mild Pain (1 - 3)  aluminum hydroxide/magnesium hydroxide/simethicone Suspension 30 milliLiter(s) Oral every 6 hours PRN Dyspepsia  artificial tears (preservative free) Ophthalmic Solution 1 Drop(s) Both EYES every 4 hours PRN Dry Eyes  bisacodyl Suppository 10 milliGRAM(s) Rectal daily PRN Constipation  calcium carbonate    500 mG (Tums) Chewable 1 Tablet(s) Chew three times a day PRN Heartburn  melatonin 3 milliGRAM(s) Oral at bedtime PRN Insomnia  ondansetron   Disintegrating Tablet 4 milliGRAM(s) Oral every 6 hours PRN Nausea and/or Vomiting         HPI:  This is a 72 YO RIGHT handed woman with no PMH who presented to [facility] ED on 12/14/2023, as a transfer from Health system, as a CODE STROKE, with c/o R IPH.   CTH and CTA repeated - large R IPH (temporal/parietal). Presented to [place] today with c/o HA and AMS. Friend accompanying patient said that when visiting patient today - patient was not acting like herself and c/o HA. NIHSS 9.    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 Similar midline shift of 5 mm.  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 Briviact 50mg BID for seizure prophylaxis, EEG with increased risk of seizures in the right frontocentral region.TTE- EF 64%. Continue Losartan 25mg daily. 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 UA: positive for UTI, started on IV abx and then transitioned to oral, end date 12/22.      Patient was evaluated by PM&R and therapy for functional deficits, gait/ADL impairments and acute rehabilitation was recommended. Patient was medically optimized for discharge to [facility] IRU on 12/20/23. (20 Dec 2023 17:15)          Subjective:  Seen this AM while she was painting.  Slept during the night.  No new complaints.  Making progress.        VITALS  Vital Signs Last 24 Hrs  T(C): 36.7 (07 Jan 2024 08:05), Max: 36.7 (06 Jan 2024 19:40)  T(F): 98 (07 Jan 2024 08:05), Max: 98 (06 Jan 2024 19:40)  HR: 78 (07 Jan 2024 08:05) (78 - 91)  BP: 112/76 (07 Jan 2024 08:05) (103/64 - 112/76)  BP(mean): --  RR: 16 (07 Jan 2024 08:05) (16 - 16)  SpO2: 100% (07 Jan 2024 08:05) (98% - 100%)    Parameters below as of 07 Jan 2024 08:05  Patient On (Oxygen Delivery Method): room air        REVIEW OF SYMPTOMS  Neurological deficits      PHYSICAL EXAM  Constitutional: NAD, sitting in wheelchair  Respiratory: breathing comfortably   Cardiovascular: R1R2  Gastrointestinal: Abdomen soft, nondistended  Neurological: tangential  thoughts/speech but less perseverating; left inattentiveness but able to cross midline  Musculoskeletal: 4/5 throughout upper and lower extremities  Coordination: +Dysmetria  Psychiatric: calm       RECENT LABS                  RADIOLOGY/OTHER RESULTS      MEDICATIONS  (STANDING):  atorvastatin 10 milliGRAM(s) Oral at bedtime  bisacodyl 5 milliGRAM(s) Oral daily  brivaracetam 50 milliGRAM(s) Oral two times a day  enoxaparin Injectable 40 milliGRAM(s) SubCutaneous <User Schedule>  famotidine    Tablet 20 milliGRAM(s) Oral two times a day  gabapentin 400 milliGRAM(s) Oral at bedtime  ketorolac 0.5% Ophthalmic Solution 1 Drop(s) Left EYE daily  lidocaine   4% Patch 1 Patch Transdermal <User Schedule>  lidocaine   4% Patch 1 Patch Transdermal <User Schedule>  modafinil 100 milliGRAM(s) Oral <User Schedule>  Preservision  Capsules AREDS 2 2 Capsule(s) 2 Capsule(s) Oral daily  psyllium Powder 1 Packet(s) Oral daily  senna 2 Tablet(s) Oral at bedtime  sodium chloride 0.65% Nasal 1 Spray(s) Both Nostrils two times a day    MEDICATIONS  (PRN):  acetaminophen     Tablet .. 650 milliGRAM(s) Oral every 6 hours PRN Temp greater or equal to 38C (100.4F), Mild Pain (1 - 3)  aluminum hydroxide/magnesium hydroxide/simethicone Suspension 30 milliLiter(s) Oral every 6 hours PRN Dyspepsia  artificial tears (preservative free) Ophthalmic Solution 1 Drop(s) Both EYES every 4 hours PRN Dry Eyes  bisacodyl Suppository 10 milliGRAM(s) Rectal daily PRN Constipation  calcium carbonate    500 mG (Tums) Chewable 1 Tablet(s) Chew three times a day PRN Heartburn  melatonin 3 milliGRAM(s) Oral at bedtime PRN Insomnia  ondansetron   Disintegrating Tablet 4 milliGRAM(s) Oral every 6 hours PRN Nausea and/or Vomiting

## 2024-01-07 NOTE — PROGRESS NOTE ADULT - ASSESSMENT
72 YO RIGHT handed woman with no PMH who presented to [facility] ED on 12/14/2023, as a transfer from [facility] with R IPH. Hospital course also significant for UTI, b/l DVTs, EEG with increased seizure risks.    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  - Head CT 12/27: The right parietal parenchymal hemorrhage is slightly less dense and slightly smaller measuring 3.0 cm in AP diameter by 4.3 cm transversely compared with the prior of 4.1 cm in AP diameter by 5.9 cm transversely. There is similar vasogenic edema. There is also similar mass effect on the atrium of the right lateral ventricle with similar mild midline shift to the left. There is resolution of the intraventricular hemorrhage in the left occipital horn.  - EEG with increased risk of seizures in the right frontocentral region  - Briviact 50mg BID for seizure prophylaxis.  - Cont Comprehensive Rehab Program: PT/OT/ST, 3hours daily and 5 days weekly.     #Wet Macular Degeneration  -Spoke with pt's home physician, Dr. Dominguez Joyner ([phone]) regarding current treatment plan.  Updated him on her status 1/4  -Patient was previously on Vabysmo injections every 4-5 weeks.  -Per Dr. Joyner she can return to office as outpt in January for her treatment  -Reviewed risk factor profile of Vabysmo which reports risk of thromboembolic events and explained this to patient on 1/5    # self-reported history of ADHD and restless, distracted, poor attention  - xanax Rx in I stop  - cont. modafinil  100mg in AM--increased on 1/5  - psych consult 1/2 reviewed and appreciated  - psychology and recreation therapy (former )    #   - discussed with hospitalist. Patient with ascvd is moderate risk  - C/W atorvastatin 10 mg qhs 12/29    # HTN  -  Losartan discontinued due to soft BP 12/27  - BP stable continue monitor        # Pain management  - Tylenol PRN  - tramadol 25 q4 PRN severe pain,  --Gabapentin  400mg qhs for neuropathic pain     - Lidocaine patch timing changed to bedtime per request. States she uses Salon Pas on left neck/shoulder at home.   - Cervical pillow for postioning midline and neck control, improved    # DVT   - b/l below the knee DVTs: right soleal vein, left soleal, peroneal and gastrocnemius veins.  - Lovenox 40 mg daily. Not cleared for full dose AC due to large ICH  - Doppler 12/20: Positive deep venous thrombosis below the right knee within the right soleal vein. Positive deep venous thrombosis below the left knee within the left soleal, peroneal and gastrocnemius veins.  - Doppler 12/27 surveillance - DVTS known - stable right improved on left.  - Repeat Head CT 12/27 improved followed by neuro consult re: possible full dose AC (day 14 post bleed)  - Neuro consult appreciated 12/28. Do not recommend full dose of AC at this time  -Repeat Head CT ~ 2 weeks-- on 1/10/24 prior to discharge.     # GI ppx/h/o reflux  - Pepcid 20mg   - TUMS, Maalox  - Senna  - metamucil home med added (patient preference for pills as oppose to powder as offered here) 1/5    # Dysphagia --resolved  - Diet upgraded to Regular with thin liquids  -c/w AREDS 2 vitamin (home med)    #Case discussed in IDT rounds 1/2/24  -SLP: soft bite size; severe cognitive deficits,  left inattentiveness with max cueing, Impaired attention.   -OT: Eating/grooming supervision; Footwear/showering/Toileting-- Max A; UBD/LBD and toilet transfers-- Mod A  -PT: Transfers to the right Min-Mod A; Transfer to left Mod A; Ambulate 110ft Mod A with WC follow  - goals: 1. min assist transfers and ambulation, min assist bADLs; Will need 24hr supervision  - target: dc [facility] 1/11/23    ** Spoke with pt's  HCP, Isabella on 1/3, to provide update on pt's status,  medical update, medications, progress in therapy, rehab plan of care, discharge plan to [facility] on 1/11/24 and discharge expectations and general prognosis.  All questions answered.     # LABS:   CBC CMP 1/8    #GOC  CODE STATUS: FULL CODE    Outpatient Follow-up (Specialty/Name of physician):    Dr. Dominguez Joyner  Opthalmology   [phone]    Emy Prakash  NP in Family Health  [address], [address]  [place] [address]  Phone: [phone]  Fax: [phone]  Follow Up Time: 2 weeks    Sergio Jurado  [address], [address]  [place] [address]  Phone: [phone]  Fax: [phone]  Follow Up Time: 1 month         72 YO RIGHT handed woman with no PMH who presented to [facility] ED on 12/14/2023, as a transfer from [facility] with R IPH. Hospital course also significant for UTI, b/l DVTs, EEG with increased seizure risks.    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  - Head CT 12/27: The right parietal parenchymal hemorrhage is slightly less dense and slightly smaller measuring 3.0 cm in AP diameter by 4.3 cm transversely compared with the prior of 4.1 cm in AP diameter by 5.9 cm transversely. There is similar vasogenic edema. There is also similar mass effect on the atrium of the right lateral ventricle with similar mild midline shift to the left. There is resolution of the intraventricular hemorrhage in the left occipital horn.  - EEG with increased risk of seizures in the right frontocentral region  - Briviact 50mg BID for seizure prophylaxis.  - Cont Comprehensive Rehab Program: PT/OT/ST, 3hours daily and 5 days weekly.     #Wet Macular Degeneration  -Spoke with pt's home physician, Dr. Dominguez Joyner ([phone]) regarding current treatment plan.  Updated him on her status 1/4  -Patient was previously on Vabysmo injections every 4-5 weeks.  -Per Dr. Joyner she can return to office as outpt in January for her treatment  -Reviewed risk factor profile of Vabysmo which reports risk of thromboembolic events and explained this to patient on 1/5    # self-reported history of ADHD and restless, distracted, poor attention  - xanax Rx in I stop  - cont. modafinil  100mg in AM--increased on 1/5  - psych consult 1/2 reviewed and appreciated  - psychology and recreation therapy (former )    #   - discussed with hospitalist. Patient with ascvd is moderate risk  - C/W atorvastatin 10 mg qhs 12/29    # HTN  -  Losartan discontinued due to soft BP 12/27  - BP stable continue monitor        # Pain management  - Tylenol PRN  - tramadol 25 q4 PRN severe pain,  --Gabapentin  400mg qhs for neuropathic pain     - Lidocaine patch timing changed to bedtime per request. States she uses Salon Pas on left neck/shoulder at home.   - Cervical pillow for postioning midline and neck control, improved    # DVT   - b/l below the knee DVTs: right soleal vein, left soleal, peroneal and gastrocnemius veins.  - Lovenox 40 mg daily. Not cleared for full dose AC due to large ICH  - Doppler 12/20: Positive deep venous thrombosis below the right knee within the right soleal vein. Positive deep venous thrombosis below the left knee within the left soleal, peroneal and gastrocnemius veins.  - Doppler 12/27 surveillance - DVTS known - stable right improved on left.  - Repeat Head CT 12/27 improved followed by neuro consult re: possible full dose AC (day 14 post bleed)  - Neuro consult appreciated 12/28. Do not recommend full dose of AC at this time  -Repeat Head CT ~ 2 weeks-- on 1/10/24 prior to discharge.     # GI ppx/h/o reflux  - Pepcid 20mg   - TUMS, Maalox  - Senna  - metamucil home med added (patient preference for pills as oppose to powder as offered here) 1/5    # Dysphagia --resolved  - Diet upgraded to Regular with thin liquids  -c/w AREDS 2 vitamin (home med)    #Case discussed in IDT rounds 1/2/24  -SLP: soft bite size; severe cognitive deficits,  left inattentiveness with max cueing, Impaired attention.   -OT: Eating/grooming supervision; Footwear/showering/Toileting-- Max A; UBD/LBD and toilet transfers-- Mod A  -PT: Transfers to the right Min-Mod A; Transfer to left Mod A; Ambulate 110ft Mod A with WC follow  - goals: 1. min assist transfers and ambulation, min assist bADLs; Will need 24hr supervision  - target: dc [facility] 1/11/23    ** Spoke with pt's  HCP, Isabella on 1/3, to provide update on pt's status,  medical update, medications, progress in therapy, rehab plan of care, discharge plan to [facility] on 1/11/24 and discharge expectations and general prognosis.  All questions answered.     # LABS:   CBC CMP 1/8    #GOC  CODE STATUS: FULL CODE    Outpatient Follow-up (Specialty/Name of physician):    Dr. Dominguez Joyner  Opthalmology   [phone]    Emy Prakash  NP in Family Health  [address], [address]  [place] [address]  Phone: [phone]  Fax: [phone]  Follow Up Time: 2 weeks    Sergio Jurado  [address], [address]  [place] [address]  Phone: [phone]  Fax: [phone]  Follow Up Time: 1 month

## 2024-01-07 NOTE — PROGRESS NOTE ADULT - SUBJECTIVE AND OBJECTIVE BOX
Patient is a 73y old  Female who presents with a chief complaint of ICH (06 Jan 2024 16:07)      History, interim events and clinically pertinent issues reviewed; patient interviewed and examined.   She has no acute complaints sitting in hallway having breakfast; REVIEW OF SYMPTOMS: patient denies HA's, CP, palpitations, shortness of breath or upper respiratory symptoms, nausea, vomiting, diarrhea, constipation, dysuria, bruising/bleeding and all other systems were reviewed as negative      ALLERGIES:  No Known Allergies  oxycodone (Nausea)    MEDICATIONS  (STANDING):  atorvastatin 10 milliGRAM(s) Oral at bedtime  bisacodyl 5 milliGRAM(s) Oral daily  brivaracetam 50 milliGRAM(s) Oral two times a day  enoxaparin Injectable 40 milliGRAM(s) SubCutaneous <User Schedule>  famotidine    Tablet 20 milliGRAM(s) Oral two times a day  gabapentin 400 milliGRAM(s) Oral at bedtime  ketorolac 0.5% Ophthalmic Solution 1 Drop(s) Left EYE daily  lidocaine   4% Patch 1 Patch Transdermal <User Schedule>  lidocaine   4% Patch 1 Patch Transdermal <User Schedule>  modafinil 100 milliGRAM(s) Oral <User Schedule>  Preservision  Capsules AREDS 2 2 Capsule(s) 2 Capsule(s) Oral daily  psyllium Powder 1 Packet(s) Oral daily  senna 2 Tablet(s) Oral at bedtime  sodium chloride 0.65% Nasal 1 Spray(s) Both Nostrils two times a day    MEDICATIONS  (PRN):  acetaminophen     Tablet .. 650 milliGRAM(s) Oral every 6 hours PRN Temp greater or equal to 38C (100.4F), Mild Pain (1 - 3)  aluminum hydroxide/magnesium hydroxide/simethicone Suspension 30 milliLiter(s) Oral every 6 hours PRN Dyspepsia  artificial tears (preservative free) Ophthalmic Solution 1 Drop(s) Both EYES every 4 hours PRN Dry Eyes  bisacodyl Suppository 10 milliGRAM(s) Rectal daily PRN Constipation  calcium carbonate    500 mG (Tums) Chewable 1 Tablet(s) Chew three times a day PRN Heartburn  melatonin 3 milliGRAM(s) Oral at bedtime PRN Insomnia  ondansetron   Disintegrating Tablet 4 milliGRAM(s) Oral every 6 hours PRN Nausea and/or Vomiting    Vital Signs Last 24 Hrs  T(F): 98 (07 Jan 2024 08:05), Max: 98 (06 Jan 2024 19:40)  HR: 78 (07 Jan 2024 08:05) (78 - 91)  BP: 112/76 (07 Jan 2024 08:05) (103/64 - 112/76)  RR: 16 (07 Jan 2024 08:05) (16 - 16)  SpO2: 100% (07 Jan 2024 08:05) (98% - 100%)  I&O's Summary              PHYSICAL EXAM:  General: NAD, A/O x 3 quite talkative  ENT: MMM  Neck: Supple, No JVD  Lungs: Clear to auscultation bilaterally  Cardio: RRR, S1/S2, No murmurs  Abdomen: Soft, Nontender, Nondistended; Bowel sounds present  Extremities: No calf tenderness, No pitting edema  Neuro speech fluent moving all extremities    LABS: There are no new laboratory or radiologic studies resulted at the time this progress note was authored                          12-15 Chol 197 mg/dL LDL -- HDL 58 mg/dL Trig 64 mg/dL                COVID-19 PCR: NotDetec (12-20-23 @ 22:30)

## 2024-01-08 LAB
ALBUMIN SERPL ELPH-MCNC: 3.2 G/DL — LOW (ref 3.3–5)
ALBUMIN SERPL ELPH-MCNC: 3.2 G/DL — LOW (ref 3.3–5)
ALP SERPL-CCNC: 64 U/L — SIGNIFICANT CHANGE UP (ref 40–120)
ALP SERPL-CCNC: 64 U/L — SIGNIFICANT CHANGE UP (ref 40–120)
ALT FLD-CCNC: 40 U/L — SIGNIFICANT CHANGE UP (ref 10–45)
ALT FLD-CCNC: 40 U/L — SIGNIFICANT CHANGE UP (ref 10–45)
ANION GAP SERPL CALC-SCNC: 8 MMOL/L — SIGNIFICANT CHANGE UP (ref 5–17)
ANION GAP SERPL CALC-SCNC: 8 MMOL/L — SIGNIFICANT CHANGE UP (ref 5–17)
AST SERPL-CCNC: 24 U/L — SIGNIFICANT CHANGE UP (ref 10–40)
AST SERPL-CCNC: 24 U/L — SIGNIFICANT CHANGE UP (ref 10–40)
BILIRUB SERPL-MCNC: 0.4 MG/DL — SIGNIFICANT CHANGE UP (ref 0.2–1.2)
BILIRUB SERPL-MCNC: 0.4 MG/DL — SIGNIFICANT CHANGE UP (ref 0.2–1.2)
BUN SERPL-MCNC: 11 MG/DL — SIGNIFICANT CHANGE UP (ref 7–23)
BUN SERPL-MCNC: 11 MG/DL — SIGNIFICANT CHANGE UP (ref 7–23)
CALCIUM SERPL-MCNC: 9.4 MG/DL — SIGNIFICANT CHANGE UP (ref 8.4–10.5)
CALCIUM SERPL-MCNC: 9.4 MG/DL — SIGNIFICANT CHANGE UP (ref 8.4–10.5)
CHLORIDE SERPL-SCNC: 105 MMOL/L — SIGNIFICANT CHANGE UP (ref 96–108)
CHLORIDE SERPL-SCNC: 105 MMOL/L — SIGNIFICANT CHANGE UP (ref 96–108)
CO2 SERPL-SCNC: 28 MMOL/L — SIGNIFICANT CHANGE UP (ref 22–31)
CO2 SERPL-SCNC: 28 MMOL/L — SIGNIFICANT CHANGE UP (ref 22–31)
CREAT SERPL-MCNC: 0.67 MG/DL — SIGNIFICANT CHANGE UP (ref 0.5–1.3)
CREAT SERPL-MCNC: 0.67 MG/DL — SIGNIFICANT CHANGE UP (ref 0.5–1.3)
EGFR: 92 ML/MIN/1.73M2 — SIGNIFICANT CHANGE UP
EGFR: 92 ML/MIN/1.73M2 — SIGNIFICANT CHANGE UP
GLUCOSE SERPL-MCNC: 91 MG/DL — SIGNIFICANT CHANGE UP (ref 70–99)
GLUCOSE SERPL-MCNC: 91 MG/DL — SIGNIFICANT CHANGE UP (ref 70–99)
HCT VFR BLD CALC: 38.7 % — SIGNIFICANT CHANGE UP (ref 34.5–45)
HCT VFR BLD CALC: 38.7 % — SIGNIFICANT CHANGE UP (ref 34.5–45)
HGB BLD-MCNC: 13.5 G/DL — SIGNIFICANT CHANGE UP (ref 11.5–15.5)
HGB BLD-MCNC: 13.5 G/DL — SIGNIFICANT CHANGE UP (ref 11.5–15.5)
MCHC RBC-ENTMCNC: 30.5 PG — SIGNIFICANT CHANGE UP (ref 27–34)
MCHC RBC-ENTMCNC: 30.5 PG — SIGNIFICANT CHANGE UP (ref 27–34)
MCHC RBC-ENTMCNC: 34.9 GM/DL — SIGNIFICANT CHANGE UP (ref 32–36)
MCHC RBC-ENTMCNC: 34.9 GM/DL — SIGNIFICANT CHANGE UP (ref 32–36)
MCV RBC AUTO: 87.6 FL — SIGNIFICANT CHANGE UP (ref 80–100)
MCV RBC AUTO: 87.6 FL — SIGNIFICANT CHANGE UP (ref 80–100)
NRBC # BLD: 0 /100 WBCS — SIGNIFICANT CHANGE UP (ref 0–0)
NRBC # BLD: 0 /100 WBCS — SIGNIFICANT CHANGE UP (ref 0–0)
PLATELET # BLD AUTO: 239 K/UL — SIGNIFICANT CHANGE UP (ref 150–400)
PLATELET # BLD AUTO: 239 K/UL — SIGNIFICANT CHANGE UP (ref 150–400)
POTASSIUM SERPL-MCNC: 3.6 MMOL/L — SIGNIFICANT CHANGE UP (ref 3.5–5.3)
POTASSIUM SERPL-MCNC: 3.6 MMOL/L — SIGNIFICANT CHANGE UP (ref 3.5–5.3)
POTASSIUM SERPL-SCNC: 3.6 MMOL/L — SIGNIFICANT CHANGE UP (ref 3.5–5.3)
POTASSIUM SERPL-SCNC: 3.6 MMOL/L — SIGNIFICANT CHANGE UP (ref 3.5–5.3)
PROT SERPL-MCNC: 6.5 G/DL — SIGNIFICANT CHANGE UP (ref 6–8.3)
PROT SERPL-MCNC: 6.5 G/DL — SIGNIFICANT CHANGE UP (ref 6–8.3)
RBC # BLD: 4.42 M/UL — SIGNIFICANT CHANGE UP (ref 3.8–5.2)
RBC # BLD: 4.42 M/UL — SIGNIFICANT CHANGE UP (ref 3.8–5.2)
RBC # FLD: 12.9 % — SIGNIFICANT CHANGE UP (ref 10.3–14.5)
RBC # FLD: 12.9 % — SIGNIFICANT CHANGE UP (ref 10.3–14.5)
SODIUM SERPL-SCNC: 141 MMOL/L — SIGNIFICANT CHANGE UP (ref 135–145)
SODIUM SERPL-SCNC: 141 MMOL/L — SIGNIFICANT CHANGE UP (ref 135–145)
WBC # BLD: 5.45 K/UL — SIGNIFICANT CHANGE UP (ref 3.8–10.5)
WBC # BLD: 5.45 K/UL — SIGNIFICANT CHANGE UP (ref 3.8–10.5)
WBC # FLD AUTO: 5.45 K/UL — SIGNIFICANT CHANGE UP (ref 3.8–10.5)
WBC # FLD AUTO: 5.45 K/UL — SIGNIFICANT CHANGE UP (ref 3.8–10.5)

## 2024-01-08 PROCEDURE — 99233 SBSQ HOSP IP/OBS HIGH 50: CPT

## 2024-01-08 RX ADMIN — Medication 1 DROP(S): at 11:02

## 2024-01-08 RX ADMIN — LIDOCAINE 1 PATCH: 4 CREAM TOPICAL at 18:00

## 2024-01-08 RX ADMIN — Medication 1 SPRAY(S): at 06:25

## 2024-01-08 RX ADMIN — Medication 1 PACKET(S): at 11:03

## 2024-01-08 RX ADMIN — FAMOTIDINE 20 MILLIGRAM(S): 10 INJECTION INTRAVENOUS at 06:23

## 2024-01-08 RX ADMIN — LIDOCAINE 1 PATCH: 4 CREAM TOPICAL at 17:59

## 2024-01-08 RX ADMIN — BRIVARACETAM 50 MILLIGRAM(S): 25 TABLET, FILM COATED ORAL at 06:22

## 2024-01-08 RX ADMIN — Medication 1 SPRAY(S): at 17:17

## 2024-01-08 RX ADMIN — Medication 5 MILLIGRAM(S): at 11:03

## 2024-01-08 RX ADMIN — MODAFINIL 100 MILLIGRAM(S): 200 TABLET ORAL at 06:22

## 2024-01-08 RX ADMIN — Medication 650 MILLIGRAM(S): at 20:04

## 2024-01-08 RX ADMIN — BRIVARACETAM 50 MILLIGRAM(S): 25 TABLET, FILM COATED ORAL at 17:17

## 2024-01-08 RX ADMIN — FAMOTIDINE 20 MILLIGRAM(S): 10 INJECTION INTRAVENOUS at 17:18

## 2024-01-08 RX ADMIN — LIDOCAINE 1 PATCH: 4 CREAM TOPICAL at 07:32

## 2024-01-08 RX ADMIN — Medication 650 MILLIGRAM(S): at 21:04

## 2024-01-08 RX ADMIN — GABAPENTIN 400 MILLIGRAM(S): 400 CAPSULE ORAL at 21:30

## 2024-01-08 RX ADMIN — ENOXAPARIN SODIUM 40 MILLIGRAM(S): 100 INJECTION SUBCUTANEOUS at 17:17

## 2024-01-08 RX ADMIN — LIDOCAINE 1 PATCH: 4 CREAM TOPICAL at 07:31

## 2024-01-08 RX ADMIN — ATORVASTATIN CALCIUM 10 MILLIGRAM(S): 80 TABLET, FILM COATED ORAL at 21:30

## 2024-01-08 NOTE — PROGRESS NOTE ADULT - ASSESSMENT
74 YO RIGHT handed woman with no PMH who presented to [facility] ED on 12/14/2023, as a transfer from [facility] with R IPH. Hospital course also significant for UTI, b/l DVTs, EEG with increased seizure risks.    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  - Head CT 12/27: The right parietal parenchymal hemorrhage is slightly less dense and slightly smaller measuring 3.0 cm in AP diameter by 4.3 cm transversely compared with the prior of 4.1 cm in AP diameter by 5.9 cm transversely. There is similar vasogenic edema. There is also similar mass effect on the atrium of the right lateral ventricle with similar mild midline shift to the left. There is resolution of the intraventricular hemorrhage in the left occipital horn.  - EEG with increased risk of seizures in the right frontocentral region  - Briviact 50mg BID for seizure prophylaxis.  - Cont Comprehensive Rehab Program: PT/OT/ST, 3hours daily and 5 days weekly.     #Wet Macular Degeneration  -Spoke with pt's home physician, Dr. Dominguez Joyner ([phone]) regarding current treatment plan.  Updated him on her status 1/4  -Patient was previously on Vabysmo injections every 4-5 weeks.  -Per Dr. Joyner she can return to office as outpt in January for her treatment  -Reviewed risk factor profile of Vabysmo which reports risk of thromboembolic events and explained this to patient on 1/5    # self-reported history of ADHD and restless, distracted, poor attention  - xanax Rx in I stop  - cont. modafinil  100mg in AM--increased on 1/5  - psych consult 1/2 reviewed and appreciated  - psychology and recreation therapy (former )    #   - discussed with hospitalist. Patient with ascvd is moderate risk  - C/W atorvastatin 10 mg qhs 12/29    # HTN  -  Losartan discontinued due to soft BP 12/27  - BP stable continue monitor        # Pain management  - Tylenol PRN  - tramadol 25 q4 PRN severe pain,  --Gabapentin  400mg qhs for neuropathic pain     - Lidocaine patch timing changed to bedtime per request. States she uses Salon Pas on left neck/shoulder at home.   - Cervical pillow for postioning midline and neck control, improved    # DVT   - b/l below the knee DVTs: right soleal vein, left soleal, peroneal and gastrocnemius veins.  - Lovenox 40 mg daily. Not cleared for full dose AC due to large ICH  - Doppler 12/20: Positive deep venous thrombosis below the right knee within the right soleal vein. Positive deep venous thrombosis below the left knee within the left soleal, peroneal and gastrocnemius veins.  - Doppler 12/27 surveillance - DVTS known - stable right improved on left.  - Repeat Head CT 12/27 improved followed by neuro consult re: possible full dose AC (day 14 post bleed)  - Neuro consult appreciated 12/28. Do not recommend full dose of AC at this time  -Repeat Head CT ~ 2 weeks-- on 1/10/24 prior to discharge.     # GI ppx/h/o reflux  - Pepcid 20mg   - TUMS, Maalox  - Senna  - metamucil home med unable to be verified by pharmacy (patient preference for pills as oppose to powder as offered here)     # Dysphagia --resolved  - Diet upgraded to Regular with thin liquids  -c/w AREDS 2 vitamin (home med)    #Case discussed in IDT rounds 1/2/24  -SLP: soft bite size; severe cognitive deficits,  left inattentiveness with max cueing, Impaired attention.   -OT: Eating/grooming supervision; Footwear/showering/Toileting-- Max A; UBD/LBD and toilet transfers-- Mod A  -PT: Transfers to the right Min-Mod A; Transfer to left Mod A; Ambulate 110ft Mod A with WC follow  - goals: 1. min assist transfers and ambulation, min assist bADLs; Will need 24hr supervision  - target: dc [facility] 1/11/23    ** Spoke with pt's  HCP, Isabella on 1/3, to provide update on pt's status,  medical update, medications, progress in therapy, rehab plan of care, discharge plan to [facility] on 1/11/24 and discharge expectations and general prognosis.  All questions answered.     # LABS:   CBC CMP 1/8 reviewed    #GOC  CODE STATUS: FULL CODE    Outpatient Follow-up (Specialty/Name of physician):    Dr. Dominguez Joyner  Opthalmology   [phone]    Emy Prakash  NP in Family Health  [address] [facility], [address]  [place] [address]  Phone: [phone]  Fax: [phone]  Follow Up Time: 2 weeks    Sergio Jurado  [address], [address]  [place] [address]  Phone: [phone]  Fax: [phone]  Follow Up Time: 1 month         72 YO RIGHT handed woman with no PMH who presented to [facility] ED on 12/14/2023, as a transfer from [facility] with R IPH. Hospital course also significant for UTI, b/l DVTs, EEG with increased seizure risks.    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  - Head CT 12/27: The right parietal parenchymal hemorrhage is slightly less dense and slightly smaller measuring 3.0 cm in AP diameter by 4.3 cm transversely compared with the prior of 4.1 cm in AP diameter by 5.9 cm transversely. There is similar vasogenic edema. There is also similar mass effect on the atrium of the right lateral ventricle with similar mild midline shift to the left. There is resolution of the intraventricular hemorrhage in the left occipital horn.  - EEG with increased risk of seizures in the right frontocentral region  - Briviact 50mg BID for seizure prophylaxis.  - Cont Comprehensive Rehab Program: PT/OT/ST, 3hours daily and 5 days weekly.     #Wet Macular Degeneration  -Spoke with pt's home physician, Dr. Dominguez Joyner ([phone]) regarding current treatment plan.  Updated him on her status 1/4  -Patient was previously on Vabysmo injections every 4-5 weeks.  -Per Dr. Joyner she can return to office as outpt in January for her treatment  -Reviewed risk factor profile of Vabysmo which reports risk of thromboembolic events and explained this to patient on 1/5    # self-reported history of ADHD and restless, distracted, poor attention  - xanax Rx in I stop  - cont. modafinil  100mg in AM--increased on 1/5  - psych consult 1/2 reviewed and appreciated  - psychology and recreation therapy (former )    #   - discussed with hospitalist. Patient with ascvd is moderate risk  - C/W atorvastatin 10 mg qhs 12/29    # HTN  -  Losartan discontinued due to soft BP 12/27  - BP stable continue monitor        # Pain management  - Tylenol PRN  - tramadol 25 q4 PRN severe pain,  --Gabapentin  400mg qhs for neuropathic pain     - Lidocaine patch timing changed to bedtime per request. States she uses Salon Pas on left neck/shoulder at home.   - Cervical pillow for postioning midline and neck control, improved    # DVT   - b/l below the knee DVTs: right soleal vein, left soleal, peroneal and gastrocnemius veins.  - Lovenox 40 mg daily. Not cleared for full dose AC due to large ICH  - Doppler 12/20: Positive deep venous thrombosis below the right knee within the right soleal vein. Positive deep venous thrombosis below the left knee within the left soleal, peroneal and gastrocnemius veins.  - Doppler 12/27 surveillance - DVTS known - stable right improved on left.  - Repeat Head CT 12/27 improved followed by neuro consult re: possible full dose AC (day 14 post bleed)  - Neuro consult appreciated 12/28. Do not recommend full dose of AC at this time  -Repeat Head CT ~ 2 weeks-- on 1/10/24 prior to discharge.     # GI ppx/h/o reflux  - Pepcid 20mg   - TUMS, Maalox  - Senna  - metamucil home med unable to be verified by pharmacy (patient preference for pills as oppose to powder as offered here)     # Dysphagia --resolved  - Diet upgraded to Regular with thin liquids  -c/w AREDS 2 vitamin (home med)    #Case discussed in IDT rounds 1/2/24  -SLP: soft bite size; severe cognitive deficits,  left inattentiveness with max cueing, Impaired attention.   -OT: Eating/grooming supervision; Footwear/showering/Toileting-- Max A; UBD/LBD and toilet transfers-- Mod A  -PT: Transfers to the right Min-Mod A; Transfer to left Mod A; Ambulate 110ft Mod A with WC follow  - goals: 1. min assist transfers and ambulation, min assist bADLs; Will need 24hr supervision  - target: dc [facility] 1/11/23    ** Spoke with pt's  HCP, Isabella on 1/3, to provide update on pt's status,  medical update, medications, progress in therapy, rehab plan of care, discharge plan to [facility] on 1/11/24 and discharge expectations and general prognosis.  All questions answered.     # LABS:   CBC CMP 1/8 reviewed    #GOC  CODE STATUS: FULL CODE    Outpatient Follow-up (Specialty/Name of physician):    Dr. Dominguez Joyner  Opthalmology   [phone]    Emy Prakash  NP in Family Health  [address] [facility], [address]  [place] [address]  Phone: [phone]  Fax: [phone]  Follow Up Time: 2 weeks    Sergio Jurado  [address], [address]  [place] [address]  Phone: [phone]  Fax: [phone]  Follow Up Time: 1 month         74 YO RIGHT handed woman with no PMH who presented to [facility] ED on 12/14/2023, as a transfer from [facility] with R IPH. Hospital course also significant for UTI, b/l DVTs, EEG with increased seizure risks.    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  - Head CT 12/27: The right parietal parenchymal hemorrhage is slightly less dense and slightly smaller measuring 3.0 cm in AP diameter by 4.3 cm transversely compared with the prior of 4.1 cm in AP diameter by 5.9 cm transversely. There is similar vasogenic edema. There is also similar mass effect on the atrium of the right lateral ventricle with similar mild midline shift to the left. There is resolution of the intraventricular hemorrhage in the left occipital horn.  - EEG with increased risk of seizures in the right frontocentral region  - Briviact 50mg BID for seizure prophylaxis.  - Cont Comprehensive Rehab Program: PT/OT/ST, 3hours daily and 5 days weekly.     #Wet Macular Degeneration  -Spoke with pt's home physician, Dr. Dominguez Joyner ([phone]) regarding current treatment plan.  Updated him on her status 1/4  -Patient was previously on Vabysmo injections every 4-5 weeks.  -Per Dr. Joyner she can return to office as outpt in January for her treatment  -Reviewed risk factor profile of Vabysmo which reports risk of thromboembolic events and explained this to patient on 1/5    # self-reported history of ADHD and restless, distracted, poor attention  - xanax Rx in I stop  - cont. modafinil  100mg in AM--increased on 1/5  - psych consult 1/2 reviewed and appreciated  - psychology and recreation therapy (former )    #   - discussed with hospitalist. Patient with ascvd is moderate risk  - C/W atorvastatin 10 mg qhs 12/29    # HTN  -  Losartan discontinued due to soft BP 12/27  - BP stable continue monitor        # Pain management  - Tylenol PRN  - tramadol 25 q4 PRN severe pain,  --Gabapentin  400mg qhs for neuropathic pain     - Lidocaine patch timing changed to bedtime per request. States she uses Salon Pas on left neck/shoulder at home.   - Cervical pillow for postioning midline and neck control, improved    # DVT   - b/l below the knee DVTs: right soleal vein, left soleal, peroneal and gastrocnemius veins.  - Lovenox 40 mg daily. Not cleared for full dose AC due to large ICH  - Doppler 12/20: Positive deep venous thrombosis below the right knee within the right soleal vein. Positive deep venous thrombosis below the left knee within the left soleal, peroneal and gastrocnemius veins.  - Doppler 12/27 surveillance - DVTS known - stable right improved on left.  - Repeat Head CT 12/27 improved followed by neuro consult re: possible full dose AC (day 14 post bleed)  - Neuro consult appreciated 12/28. Do not recommend full dose of AC at this time  -Repeat Head CT ~ 2 weeks-- on 1/10/24 prior to discharge.     # GI ppx/h/o reflux  - Pepcid 20mg   - TUMS, Maalox  - Senna  - metamucil home med unable to be verified by pharmacy (patient preference for pills as oppose to powder as offered here)     # Dysphagia --resolved  - Diet upgraded to Regular with thin liquids  -c/w AREDS 2 vitamin (home med)    #Case discussed in IDT rounds 1/2/24  -SLP: soft bite size; severe cognitive deficits,  left inattentiveness with max cueing, Impaired attention.   -OT: Eating/grooming supervision; Footwear/showering/Toileting-- Max A; UBD/LBD and toilet transfers-- Mod A  -PT: Transfers to the right Min-Mod A; Transfer to left Mod A; Ambulate 110ft Mod A with WC follow  - goals: 1. min assist transfers and ambulation, min assist bADLs; Will need 24hr supervision  - target: dc [facility] 1/11/23    ** Spoke with pt's  HCP, Isabella on 1/3, to provide update on pt's status,  medical update, medications, progress in therapy, rehab plan of care, discharge plan to [facility] on 1/11/24 and discharge expectations and general prognosis.  All questions answered.     **Called HCP Isabella on 1/8 to discuss modafinil dosing but she did not answer with no option to leave a message.    # LABS:   CBC CMP 1/8 reviewed    #GOC  CODE STATUS: FULL CODE    Outpatient Follow-up (Specialty/Name of physician):    Dr. Dominguez Joyner  Opthalmology   [phone]    Emy Prakash  NP in [address], [facility] [address]  [place] [address]  Phone: [phone]  Fax: [phone]  Follow Up Time: 2 weeks    Sergio Jurado  [address], [address]  [place] [address]  Phone: [phone]  Fax: [phone]  Follow Up Time: 1 month         72 YO RIGHT handed woman with no PMH who presented to [facility] ED on 12/14/2023, as a transfer from [facility] with R IPH. Hospital course also significant for UTI, b/l DVTs, EEG with increased seizure risks.    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  - Head CT 12/27: The right parietal parenchymal hemorrhage is slightly less dense and slightly smaller measuring 3.0 cm in AP diameter by 4.3 cm transversely compared with the prior of 4.1 cm in AP diameter by 5.9 cm transversely. There is similar vasogenic edema. There is also similar mass effect on the atrium of the right lateral ventricle with similar mild midline shift to the left. There is resolution of the intraventricular hemorrhage in the left occipital horn.  - EEG with increased risk of seizures in the right frontocentral region  - Briviact 50mg BID for seizure prophylaxis.  - Cont Comprehensive Rehab Program: PT/OT/ST, 3hours daily and 5 days weekly.     #Wet Macular Degeneration  -Spoke with pt's home physician, Dr. Dominguez Joyner ([phone]) regarding current treatment plan.  Updated him on her status 1/4  -Patient was previously on Vabysmo injections every 4-5 weeks.  -Per Dr. Joyner she can return to office as outpt in January for her treatment  -Reviewed risk factor profile of Vabysmo which reports risk of thromboembolic events and explained this to patient on 1/5    # self-reported history of ADHD and restless, distracted, poor attention  - xanax Rx in I stop  - cont. modafinil  100mg in AM--increased on 1/5  - psych consult 1/2 reviewed and appreciated  - psychology and recreation therapy (former )    #   - discussed with hospitalist. Patient with ascvd is moderate risk  - C/W atorvastatin 10 mg qhs 12/29    # HTN  -  Losartan discontinued due to soft BP 12/27  - BP stable continue monitor        # Pain management  - Tylenol PRN  - tramadol 25 q4 PRN severe pain,  --Gabapentin  400mg qhs for neuropathic pain     - Lidocaine patch timing changed to bedtime per request. States she uses Salon Pas on left neck/shoulder at home.   - Cervical pillow for postioning midline and neck control, improved    # DVT   - b/l below the knee DVTs: right soleal vein, left soleal, peroneal and gastrocnemius veins.  - Lovenox 40 mg daily. Not cleared for full dose AC due to large ICH  - Doppler 12/20: Positive deep venous thrombosis below the right knee within the right soleal vein. Positive deep venous thrombosis below the left knee within the left soleal, peroneal and gastrocnemius veins.  - Doppler 12/27 surveillance - DVTS known - stable right improved on left.  - Repeat Head CT 12/27 improved followed by neuro consult re: possible full dose AC (day 14 post bleed)  - Neuro consult appreciated 12/28. Do not recommend full dose of AC at this time  -Repeat Head CT ~ 2 weeks-- on 1/10/24 prior to discharge.     # GI ppx/h/o reflux  - Pepcid 20mg   - TUMS, Maalox  - Senna  - metamucil home med unable to be verified by pharmacy (patient preference for pills as oppose to powder as offered here)     # Dysphagia --resolved  - Diet upgraded to Regular with thin liquids  -c/w AREDS 2 vitamin (home med)    #Case discussed in IDT rounds 1/2/24  -SLP: soft bite size; severe cognitive deficits,  left inattentiveness with max cueing, Impaired attention.   -OT: Eating/grooming supervision; Footwear/showering/Toileting-- Max A; UBD/LBD and toilet transfers-- Mod A  -PT: Transfers to the right Min-Mod A; Transfer to left Mod A; Ambulate 110ft Mod A with WC follow  - goals: 1. min assist transfers and ambulation, min assist bADLs; Will need 24hr supervision  - target: dc [facility] 1/11/23    ** Spoke with pt's  HCP, Isabella on 1/3, to provide update on pt's status,  medical update, medications, progress in therapy, rehab plan of care, discharge plan to [facility] on 1/11/24 and discharge expectations and general prognosis.  All questions answered.     **Called HCP Isabella on 1/8 to discuss modafinil dosing but she did not answer with no option to leave a message.    # LABS:   CBC CMP 1/8 reviewed    #GOC  CODE STATUS: FULL CODE    Outpatient Follow-up (Specialty/Name of physician):    Dr. Dominguez Joyner  Opthalmology   [phone]    Emy Prakash  NP in [address], [facility] [address]  [place] [address]  Phone: [phone]  Fax: [phone]  Follow Up Time: 2 weeks    Sergio Jurado  [address], [address]  [place] [address]  Phone: [phone]  Fax: [phone]  Follow Up Time: 1 month         74 YO RIGHT handed woman with no PMH who presented to [facility] ED on 12/14/2023, as a transfer from [facility] with R IPH. Hospital course also significant for UTI, b/l DVTs, EEG with increased seizure risks.    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  - Head CT 12/27: The right parietal parenchymal hemorrhage is slightly less dense and slightly smaller measuring 3.0 cm in AP diameter by 4.3 cm transversely compared with the prior of 4.1 cm in AP diameter by 5.9 cm transversely. There is similar vasogenic edema. There is also similar mass effect on the atrium of the right lateral ventricle with similar mild midline shift to the left. There is resolution of the intraventricular hemorrhage in the left occipital horn.  - EEG with increased risk of seizures in the right frontocentral region  - Briviact 50mg BID for seizure prophylaxis.  - Cont Comprehensive Rehab Program: PT/OT/ST, 3hours daily and 5 days weekly.     #Wet Macular Degeneration  -Spoke with pt's home physician, Dr. Dominguez Joyner ([phone]) regarding current treatment plan.  Updated him on her status 1/4  -Patient was previously on Vabysmo injections every 4-5 weeks.  -Per Dr. Joyner she can return to office as outpt in January for her treatment  -Reviewed risk factor profile of Vabysmo which reports risk of thromboembolic events and explained this to patient on 1/5    # self-reported history of ADHD and restless, distracted, poor attention  - xanax Rx in I stop  - on. modafinil  100mg in AM--increased on 1/5--  --Pt's HCP called concerned that pt. seemed to be more anxious on the weekend and difficult to redirect during conversations  --Reached out to pt's PT, OT and speech therapists today to inquire about patient's cognition and mood and if any changes this week-- OT noted her attention and focus is better with directed tasks, but when she starts conversing its still difficult to redirect her.  Speech therapist noted pt. was less confabulatory and more aware of her deficits and focus on task was better today.   - psych consult 1/2 reviewed and appreciated  - psychology and recreation therapy (former )    #   - discussed with hospitalist. Patient with ascvd is moderate risk  - C/W atorvastatin 10 mg qhs 12/29    # HTN  -  Losartan discontinued due to soft BP 12/27  - BP stable continue monitor        # Pain management  - Tylenol PRN  - tramadol 25 q4 PRN severe pain,  --Gabapentin  400mg qhs for neuropathic pain     - Lidocaine patch timing changed to bedtime per request. States she uses Salon Pas on left neck/shoulder at home.   - Cervical pillow for postioning midline and neck control, improved    # DVT   - b/l below the knee DVTs: right soleal vein, left soleal, peroneal and gastrocnemius veins.  - Lovenox 40 mg daily. Not cleared for full dose AC due to large ICH  - Doppler 12/20: Positive deep venous thrombosis below the right knee within the right soleal vein. Positive deep venous thrombosis below the left knee within the left soleal, peroneal and gastrocnemius veins.  - Doppler 12/27 surveillance - DVTS known - stable right improved on left.  - Repeat Head CT 12/27 improved followed by neuro consult re: possible full dose AC (day 14 post bleed)  - Neuro consult appreciated 12/28. Do not recommend full dose of AC at this time  -Repeat Head CT ~ 2 weeks-- on 1/10/24 prior to discharge.     # GI ppx/h/o reflux  - Pepcid 20mg   - TUMS, Maalox  - Senna  - metamucil home med unable to be verified by pharmacy (patient preference for pills as oppose to powder as offered here)     # Dysphagia --resolved  - Diet upgraded to Regular with thin liquids  -c/w AREDS 2 vitamin (home med)    #Case discussed in IDT rounds 1/2/24  -SLP: soft bite size; severe cognitive deficits,  left inattentiveness with max cueing, Impaired attention.   -OT: Eating/grooming supervision; Footwear/showering/Toileting-- Max A; UBD/LBD and toilet transfers-- Mod A  -PT: Transfers to the right Min-Mod A; Transfer to left Mod A; Ambulate 110ft Mod A with WC follow  - goals: 1. min assist transfers and ambulation, min assist bADLs; Will need 24hr supervision  - target: dc [facility] 1/11/23    ** Spoke with pt's  HCP, Isabella on 1/3, to provide update on pt's status,  medical update, medications, progress in therapy, rehab plan of care, discharge plan to [facility] on 1/11/24 and discharge expectations and general prognosis.  All questions answered.     **Called HCP Isabella on 1/8 to discuss modafinil dosing but she did not answer with no option to leave a message.    # LABS:   CBC CMP 1/8 reviewed    #GOC  CODE STATUS: FULL CODE    Outpatient Follow-up (Specialty/Name of physician):    Dr. Dominguez Joyner  Opthalmology   [phone]    Emy Prakash  NP in [address], [address]  [place] [address]  Phone: [phone]  Fax: [phone]  Follow Up Time: 2 weeks    Sergio Jurado  [address], [address]  [place] [address]  Phone: [phone]  Fax: [phone]  Follow Up Time: 1 month         74 YO RIGHT handed woman with no PMH who presented to [facility] ED on 12/14/2023, as a transfer from [facility] with R IPH. Hospital course also significant for UTI, b/l DVTs, EEG with increased seizure risks.    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  - Head CT 12/27: The right parietal parenchymal hemorrhage is slightly less dense and slightly smaller measuring 3.0 cm in AP diameter by 4.3 cm transversely compared with the prior of 4.1 cm in AP diameter by 5.9 cm transversely. There is similar vasogenic edema. There is also similar mass effect on the atrium of the right lateral ventricle with similar mild midline shift to the left. There is resolution of the intraventricular hemorrhage in the left occipital horn.  - EEG with increased risk of seizures in the right frontocentral region  - Briviact 50mg BID for seizure prophylaxis.  - Cont Comprehensive Rehab Program: PT/OT/ST, 3hours daily and 5 days weekly.     #Wet Macular Degeneration  -Spoke with pt's home physician, Dr. Dominguez Joyner ([phone]) regarding current treatment plan.  Updated him on her status 1/4  -Patient was previously on Vabysmo injections every 4-5 weeks.  -Per Dr. Joyner she can return to office as outpt in January for her treatment  -Reviewed risk factor profile of Vabysmo which reports risk of thromboembolic events and explained this to patient on 1/5    # self-reported history of ADHD and restless, distracted, poor attention  - xanax Rx in I stop  - on. modafinil  100mg in AM--increased on 1/5--  --Pt's HCP called concerned that pt. seemed to be more anxious on the weekend and difficult to redirect during conversations  --Reached out to pt's PT, OT and speech therapists today to inquire about patient's cognition and mood and if any changes this week-- OT noted her attention and focus is better with directed tasks, but when she starts conversing its still difficult to redirect her.  Speech therapist noted pt. was less confabulatory and more aware of her deficits and focus on task was better today.   - psych consult 1/2 reviewed and appreciated  - psychology and recreation therapy (former )    #   - discussed with hospitalist. Patient with ascvd is moderate risk  - C/W atorvastatin 10 mg qhs 12/29    # HTN  -  Losartan discontinued due to soft BP 12/27  - BP stable continue monitor        # Pain management  - Tylenol PRN  - tramadol 25 q4 PRN severe pain,  --Gabapentin  400mg qhs for neuropathic pain     - Lidocaine patch timing changed to bedtime per request. States she uses [place] on left neck/shoulder at home.   - Cervical pillow for postioning midline and neck control, improved    # DVT   - b/l below the knee DVTs: right soleal vein, left soleal, peroneal and gastrocnemius veins.  - Lovenox 40 mg daily. Not cleared for full dose AC due to large ICH  - Doppler 12/20: Positive deep venous thrombosis below the right knee within the right soleal vein. Positive deep venous thrombosis below the left knee within the left soleal, peroneal and gastrocnemius veins.  - Doppler 12/27 surveillance - DVTS known - stable right improved on left.  - Repeat Head CT 12/27 improved followed by neuro consult re: possible full dose AC (day 14 post bleed)  - Neuro consult appreciated 12/28. Do not recommend full dose of AC at this time  -Repeat Head CT ~ 2 weeks-- on 1/10/24 prior to discharge.     # GI ppx/h/o reflux  - Pepcid 20mg   - TUMS, Maalox  - Senna  - metamucil home med unable to be verified by pharmacy (patient preference for pills as oppose to powder as offered here)     # Dysphagia --resolved  - Diet upgraded to Regular with thin liquids  -c/w AREDS 2 vitamin (home med)    #Case discussed in IDT rounds 1/2/24  -SLP: soft bite size; severe cognitive deficits,  left inattentiveness with max cueing, Impaired attention.   -OT: Eating/grooming supervision; Footwear/showering/Toileting-- Max A; UBD/LBD and toilet transfers-- Mod A  -PT: Transfers to the right Min-Mod A; Transfer to left Mod A; Ambulate 110ft Mod A with WC follow  - goals: 1. min assist transfers and ambulation, min assist bADLs; Will need 24hr supervision  - target: dc SIVAN 1/11/23    ** Spoke with pt's  HCP, Isabella on 1/3, to provide update on pt's status,  medical update, medications, progress in therapy, rehab plan of care, discharge plan to [facility] on 1/11/24 and discharge expectations and general prognosis.  All questions answered.     **Called HCP Isabella on 1/8 to discuss modafinil dosing but she did not answer with no option to leave a message.    # LABS:   CBC CMP 1/8 reviewed    #GOC  CODE STATUS: FULL CODE    Outpatient Follow-up (Specialty/Name of physician):    Dr. Dominguez Joyner  Opthalmology   [phone]    Emy Prakash  NP in [address], [address]  [place] [address]  Phone: [phone]  Fax: [phone]  Follow Up Time: 2 weeks    Sergio Jurado  [address], [address]  [place] [address]  Phone: [phone]  Fax: [phone]  Follow Up Time: 1 month

## 2024-01-08 NOTE — PROGRESS NOTE ADULT - SUBJECTIVE AND OBJECTIVE BOX
Patient is a 73y old  Female who presents with a chief complaint of ICH (07 Jan 2024 14:00)      SUBJECTIVE:  Patient seen and evaluation on AM rounds.  Patient's attention seems improved.  She is in good spirits and able to correctly read the clock.  She requires cueing to maintain leftward gaze.  States her left foot numbness is overall improved    ROS:  +neurological deficits  Denies headache    VITALS  73y  Vital Signs Last 24 Hrs  T(C): 36.4 (08 Jan 2024 07:33), Max: 36.6 (07 Jan 2024 19:55)  T(F): 97.6 (08 Jan 2024 07:33), Max: 97.9 (07 Jan 2024 19:55)  HR: 89 (08 Jan 2024 07:33) (77 - 89)  BP: 115/68 (08 Jan 2024 07:33) (108/69 - 115/68)  BP(mean): --  RR: 16 (08 Jan 2024 07:33) (16 - 16)  SpO2: 97% (08 Jan 2024 07:33) (97% - 97%)    Parameters below as of 08 Jan 2024 07:33  Patient On (Oxygen Delivery Method): room air      Daily     Daily     PHYSICAL EXAM  Constitutional: NAD, sitting in wheelchair  Respiratory: breathing comfortably   Cardiovascular: R1R2  Gastrointestinal: Abdomen soft, nondistended  Neurological: tangential  thoughts/speech but less perseverating; left inattentiveness but able to cross midline; Able to read a clock  Musculoskeletal: 4/5 throughout upper and lower extremities  Coordination: +Dysmetria  Psychiatric: calm     MEDICATIONS  (STANDING):  atorvastatin 10 milliGRAM(s) Oral at bedtime  bisacodyl 5 milliGRAM(s) Oral daily  brivaracetam 50 milliGRAM(s) Oral two times a day  enoxaparin Injectable 40 milliGRAM(s) SubCutaneous <User Schedule>  famotidine    Tablet 20 milliGRAM(s) Oral two times a day  gabapentin 400 milliGRAM(s) Oral at bedtime  ketorolac 0.5% Ophthalmic Solution 1 Drop(s) Left EYE daily  lidocaine   4% Patch 1 Patch Transdermal <User Schedule>  lidocaine   4% Patch 1 Patch Transdermal <User Schedule>  modafinil 100 milliGRAM(s) Oral <User Schedule>  Preservision  Capsules AREDS 2 2 Capsule(s) 2 Capsule(s) Oral daily  psyllium Powder 1 Packet(s) Oral daily  senna 2 Tablet(s) Oral at bedtime  sodium chloride 0.65% Nasal 1 Spray(s) Both Nostrils two times a day    MEDICATIONS  (PRN):  acetaminophen     Tablet .. 650 milliGRAM(s) Oral every 6 hours PRN Temp greater or equal to 38C (100.4F), Mild Pain (1 - 3)  aluminum hydroxide/magnesium hydroxide/simethicone Suspension 30 milliLiter(s) Oral every 6 hours PRN Dyspepsia  artificial tears (preservative free) Ophthalmic Solution 1 Drop(s) Both EYES every 4 hours PRN Dry Eyes  bisacodyl Suppository 10 milliGRAM(s) Rectal daily PRN Constipation  calcium carbonate    500 mG (Tums) Chewable 1 Tablet(s) Chew three times a day PRN Heartburn  melatonin 3 milliGRAM(s) Oral at bedtime PRN Insomnia  ondansetron   Disintegrating Tablet 4 milliGRAM(s) Oral every 6 hours PRN Nausea and/or Vomiting      RECENT LABS:                          13.5   5.45  )-----------( 239      ( 08 Jan 2024 06:34 )             38.7     01-08    141  |  105  |  11  ----------------------------<  91  3.6   |  28  |  0.67    Ca    9.4      08 Jan 2024 06:34    TPro  6.5  /  Alb  3.2<L>  /  TBili  0.4  /  DBili  x   /  AST  24  /  ALT  40  /  AlkPhos  64  01-08    LIVER FUNCTIONS - ( 08 Jan 2024 06:34 )  Alb: 3.2 g/dL / Pro: 6.5 g/dL / ALK PHOS: 64 U/L / ALT: 40 U/L / AST: 24 U/L / GGT: x             Urinalysis Basic - ( 08 Jan 2024 06:34 )    Color: x / Appearance: x / SG: x / pH: x  Gluc: 91 mg/dL / Ketone: x  / Bili: x / Urobili: x   Blood: x / Protein: x / Nitrite: x   Leuk Esterase: x / RBC: x / WBC x   Sq Epi: x / Non Sq Epi: x / Bacteria: x          CAPILLARY BLOOD GLUCOSE

## 2024-01-08 NOTE — PROGRESS NOTE ADULT - ATTENDING COMMENTS
Pt. seen with resident.  Agree with documentation above as per resident with amendments made as appropriate. Patient medically stable. Making progress towards rehab goals.     right ICH--   on. modafinil  100mg in AM--increased on 1/5--  --Pt's HCP called concerned that pt. seemed to be more anxious on the weekend and difficult to redirect during conversations  --Reached out to pt's PT, OT and speech therapists today to inquire about patient's cognition and mood and if any changes this week-- OT noted her attention and focus is better with directed tasks, but when she starts conversing its still difficult to redirect her.  Speech therapist noted pt. was less confabulatory and more aware of her deficits and focus on task was better today.     ** Spoke with pt's  __HCP, Isabella__, to provide update on pt's status,  medical update, medications, progress in therapy, and plan.  Isabella had to end the conversation before we completed as she was at the vet with Rose Mary's dog and needed to attend to him.  We agreed we would observe Rose Mary for another day and discuss further whether to cut or keep dose of modafinil after Team meeting today.  All questions answered.

## 2024-01-09 PROCEDURE — 99233 SBSQ HOSP IP/OBS HIGH 50: CPT

## 2024-01-09 PROCEDURE — 99232 SBSQ HOSP IP/OBS MODERATE 35: CPT

## 2024-01-09 RX ORDER — BRIVARACETAM 25 MG/1
50 TABLET, FILM COATED ORAL
Refills: 0 | Status: DISCONTINUED | OUTPATIENT
Start: 2024-01-09 | End: 2024-01-11

## 2024-01-09 RX ADMIN — LIDOCAINE 1 PATCH: 4 CREAM TOPICAL at 17:52

## 2024-01-09 RX ADMIN — Medication 1 SPRAY(S): at 17:11

## 2024-01-09 RX ADMIN — ENOXAPARIN SODIUM 40 MILLIGRAM(S): 100 INJECTION SUBCUTANEOUS at 17:10

## 2024-01-09 RX ADMIN — LIDOCAINE 1 PATCH: 4 CREAM TOPICAL at 17:53

## 2024-01-09 RX ADMIN — FAMOTIDINE 20 MILLIGRAM(S): 10 INJECTION INTRAVENOUS at 05:35

## 2024-01-09 RX ADMIN — GABAPENTIN 400 MILLIGRAM(S): 400 CAPSULE ORAL at 21:11

## 2024-01-09 RX ADMIN — ATORVASTATIN CALCIUM 10 MILLIGRAM(S): 80 TABLET, FILM COATED ORAL at 21:11

## 2024-01-09 RX ADMIN — MODAFINIL 100 MILLIGRAM(S): 200 TABLET ORAL at 05:35

## 2024-01-09 RX ADMIN — FAMOTIDINE 20 MILLIGRAM(S): 10 INJECTION INTRAVENOUS at 17:10

## 2024-01-09 RX ADMIN — LIDOCAINE 1 PATCH: 4 CREAM TOPICAL at 07:22

## 2024-01-09 RX ADMIN — Medication 1 SPRAY(S): at 05:36

## 2024-01-09 RX ADMIN — LIDOCAINE 1 PATCH: 4 CREAM TOPICAL at 07:21

## 2024-01-09 RX ADMIN — Medication 1 DROP(S): at 11:48

## 2024-01-09 RX ADMIN — Medication 3 MILLIGRAM(S): at 21:11

## 2024-01-09 RX ADMIN — BRIVARACETAM 50 MILLIGRAM(S): 25 TABLET, FILM COATED ORAL at 17:10

## 2024-01-09 NOTE — PROGRESS NOTE ADULT - SUBJECTIVE AND OBJECTIVE BOX
Patient is a 73y old  Female who presents with a chief complaint of ICH     patient seen and examined. says she has left foot pain last night but has now resolved. denies other complants, no n/v, no sob.  overall doing well.    Vital Signs Last 24 Hrs  T(C): 36.3 (09 Jan 2024 07:27), Max: 36.4 (08 Jan 2024 20:08)  T(F): 97.4 (09 Jan 2024 07:27), Max: 97.5 (08 Jan 2024 20:08)  HR: 78 (09 Jan 2024 07:27) (74 - 78)  BP: 100/66 (09 Jan 2024 07:27) (100/66 - 109/72)  BP(mean): --  RR: 16 (09 Jan 2024 07:27) (15 - 16)  SpO2: 98% (09 Jan 2024 07:27) (98% - 98%)    Parameters below as of 09 Jan 2024 07:27  Patient On (Oxygen Delivery Method): room air    GENERAL- NAD  EAR/NOSE/MOUTH/THROAT -  MMM  EYES- JAMES, conjunctiva and Sclera clear  NECK- supple  RESPIRATORY-  clear to auscultation bilaterally, non laboured breathing  CARDIOVASCULAR - SIS2, RRR  GI - soft NT BS present  EXTREMITIES- no pedal edema  NEUROLOGY- mild LE weakness   PSYCHIATRY- AAO X 3                  13.5                 141  | 28   | 11           5.45  >-----------< 239     ------------------------< 91                    38.7                 3.6  | 105  | 0.67                                         Ca 9.4   Mg x     Ph x        MEDICATIONS  (STANDING):  atorvastatin 10 milliGRAM(s) Oral at bedtime  bisacodyl 5 milliGRAM(s) Oral daily  brivaracetam 50 milliGRAM(s) Oral two times a day  enoxaparin Injectable 40 milliGRAM(s) SubCutaneous <User Schedule>  famotidine    Tablet 20 milliGRAM(s) Oral two times a day  gabapentin 400 milliGRAM(s) Oral at bedtime  ketorolac 0.5% Ophthalmic Solution 1 Drop(s) Left EYE daily  lidocaine   4% Patch 1 Patch Transdermal <User Schedule>  lidocaine   4% Patch 1 Patch Transdermal <User Schedule>  modafinil 100 milliGRAM(s) Oral <User Schedule>  Preservision  Capsules AREDS 2 2 Capsule(s) 2 Capsule(s) Oral daily  psyllium Powder 1 Packet(s) Oral daily  senna 2 Tablet(s) Oral at bedtime  sodium chloride 0.65% Nasal 1 Spray(s) Both Nostrils two times a day    MEDICATIONS  (PRN):  acetaminophen     Tablet .. 650 milliGRAM(s) Oral every 6 hours PRN Temp greater or equal to 38C (100.4F), Mild Pain (1 - 3)  aluminum hydroxide/magnesium hydroxide/simethicone Suspension 30 milliLiter(s) Oral every 6 hours PRN Dyspepsia  artificial tears (preservative free) Ophthalmic Solution 1 Drop(s) Both EYES every 4 hours PRN Dry Eyes  bisacodyl Suppository 10 milliGRAM(s) Rectal daily PRN Constipation  calcium carbonate    500 mG (Tums) Chewable 1 Tablet(s) Chew three times a day PRN Heartburn  melatonin 3 milliGRAM(s) Oral at bedtime PRN Insomnia  ondansetron   Disintegrating Tablet 4 milliGRAM(s) Oral every 6 hours PRN Nausea and/or Vomiting

## 2024-01-09 NOTE — PROGRESS NOTE ADULT - TIME BILLING
Time spent includes direct patient care  (interview and examination of patient), discussion with other providers, support staff and/or patient's family members, review of medical records, ordering diagnostic tests and analyzing results, and documentation.
Time spent includes direct patient care  (interview and examination of patient), discussion with other providers, support staff and/or patient's family members, review of medical records, ordering diagnostic tests and analyzing results, and documentation.
care coordination, plan of care discussed with patient face to face, PMR weekend team
Time spent includes direct patient care  (interview and examination of patient), discussion with other providers, support staff and/or patient's family members, review of medical records, ordering diagnostic tests and analyzing results, and documentation.
Time spent includes direct patient care  (interview and examination of patient), discussion with other providers, support staff and/or patient's family members, review of medical records, ordering diagnostic tests and analyzing results, and documentation.
Review and preparation to see patient, examination and assessment of patient, obtaining nursing subjective report as pt. confused,  Discussion of management with following consultants: Hospitalist, ....  ,  Discussion of rehabilitation plan of care during huddle with SW, therapy and nursing.   Discussion with HCP., to update on patient status, rehab plan of care, progress in therapy, general prognosis, and, discharge planning.
Review and preparation to see patient, examination and assessment of patient, obtaining nursing subjective report as pt. confused,  Discussion of management with following consultants: Hospitalist, ....  ,  Discussion of rehabilitation plan of care during team meeting with SW, Speech, OT, PT and nursing.  Review of labs.   Discussion with HCP , to update on patient status, rehab plan of care, progress in therapy,--
Review and preparation to see patient, examination and assessment of patient, obtaining nursing subjective report as pt. confused,  Discussion of management with following consultants: Hospitalist, ....  ,  Discussion of rehabilitation plan of care during team meeting with SW, Speech, OT, PT and nursing.   Discussion with family, .HCP--Isabella..., to update on patient status, rehab plan of care, progress in therapy, general prognosis, and discharge planning.
Review and preparation to see patient, examination and assessment of patient, obtaining nursing subjective report as pt. confused,  Discussion of management with following consultants: Hospitalist, ....  ,  Discussion of rehabilitation plan of care during huddle with SW, and nursing.    Discussion with HCPIsabella.., to update on patient status, rehab plan of care, , general prognosis, ELOS, discharge options.

## 2024-01-09 NOTE — PROGRESS NOTE ADULT - ATTENDING COMMENTS
Pt. seen with resident.  Agree with documentation above as per resident with amendments made as appropriate. Patient medically stable. Making progress towards rehab goals.       on. modafinil  100mg in AM--increased on 1/5--  --Pt's HCP called concerned that pt. seemed to be more anxious on the weekend and difficult to redirect during conversations  --Reached out to pt's PT, OT and speech therapists 1/8 to inquire about patient's cognition and mood and if any changes this week-- OT noted her attention and focus is better with directed tasks, but when she starts conversing its still difficult to redirect her.  Speech therapist noted pt. was less confabulatory and more aware of her deficits and focus on task was better today. PT also notes notable improvement in her attention and function.   Pt. does not seem to have significant anxiety in therapy.    ** Spoke with pt's  _HCP Isabella__, to provide update on pt's status,  medical update, medications,  progress in therapy, rehab plan of care, general prognosis, discharge plan to [facility] on 1/11/24 and discharge expectations.    All questions answered. Pt. seen with resident.  Agree with documentation above as per resident with amendments made as appropriate. Patient medically stable. Making progress towards rehab goals.       on. modafinil  100mg in AM--increased on 1/5--  --Pt's HCP called concerned that pt. seemed to be more anxious on the weekend and difficult to redirect during conversations  --Reached out to pt's PT, OT and speech therapists 1/8 to inquire about patient's cognition and mood and if any changes this week-- OT noted her attention and focus is better with directed tasks, but when she starts conversing its still difficult to redirect her.  Speech therapist noted pt. was less confabulatory and more aware of her deficits and focus on task was better today. PT also notes notable improvement in her attention and function.   Pt. does not seem to have significant anxiety in therapy.    ** Spoke with pt's  _HCP Isabella__, to provide update on pt's status,  medical update, medications,  progress in therapy, rehab plan of care, general prognosis, discharge plan to [facility] on 1/11/24 and discharge expectations.    All questions answered.

## 2024-01-09 NOTE — PROGRESS NOTE ADULT - SUBJECTIVE AND OBJECTIVE BOX
Patient is a 73y old  Female who presents with a chief complaint of ICH (08 Jan 2024 12:04)      SUBJECTIVE:      ROS:  No fever, chills  No headaches, nausea, vomiting  No chest pain  No abdominal pain    VITALS  73y  Vital Signs Last 24 Hrs  T(C): 36.3 (09 Jan 2024 07:27), Max: 36.4 (08 Jan 2024 20:08)  T(F): 97.4 (09 Jan 2024 07:27), Max: 97.5 (08 Jan 2024 20:08)  HR: 78 (09 Jan 2024 07:27) (74 - 78)  BP: 100/66 (09 Jan 2024 07:27) (100/66 - 109/72)  BP(mean): --  RR: 16 (09 Jan 2024 07:27) (15 - 16)  SpO2: 98% (09 Jan 2024 07:27) (98% - 98%)    Parameters below as of 09 Jan 2024 07:27  Patient On (Oxygen Delivery Method): room air      Daily     Daily     PHYSICAL EXAM      MEDICATIONS  (STANDING):  atorvastatin 10 milliGRAM(s) Oral at bedtime  bisacodyl 5 milliGRAM(s) Oral daily  brivaracetam 50 milliGRAM(s) Oral two times a day  enoxaparin Injectable 40 milliGRAM(s) SubCutaneous <User Schedule>  famotidine    Tablet 20 milliGRAM(s) Oral two times a day  gabapentin 400 milliGRAM(s) Oral at bedtime  ketorolac 0.5% Ophthalmic Solution 1 Drop(s) Left EYE daily  lidocaine   4% Patch 1 Patch Transdermal <User Schedule>  lidocaine   4% Patch 1 Patch Transdermal <User Schedule>  modafinil 100 milliGRAM(s) Oral <User Schedule>  Preservision  Capsules AREDS 2 2 Capsule(s) 2 Capsule(s) Oral daily  psyllium Powder 1 Packet(s) Oral daily  senna 2 Tablet(s) Oral at bedtime  sodium chloride 0.65% Nasal 1 Spray(s) Both Nostrils two times a day    MEDICATIONS  (PRN):  acetaminophen     Tablet .. 650 milliGRAM(s) Oral every 6 hours PRN Temp greater or equal to 38C (100.4F), Mild Pain (1 - 3)  aluminum hydroxide/magnesium hydroxide/simethicone Suspension 30 milliLiter(s) Oral every 6 hours PRN Dyspepsia  artificial tears (preservative free) Ophthalmic Solution 1 Drop(s) Both EYES every 4 hours PRN Dry Eyes  bisacodyl Suppository 10 milliGRAM(s) Rectal daily PRN Constipation  calcium carbonate    500 mG (Tums) Chewable 1 Tablet(s) Chew three times a day PRN Heartburn  melatonin 3 milliGRAM(s) Oral at bedtime PRN Insomnia  ondansetron   Disintegrating Tablet 4 milliGRAM(s) Oral every 6 hours PRN Nausea and/or Vomiting      RECENT LABS:                          13.5   5.45  )-----------( 239      ( 08 Jan 2024 06:34 )             38.7     01-08    141  |  105  |  11  ----------------------------<  91  3.6   |  28  |  0.67    Ca    9.4      08 Jan 2024 06:34    TPro  6.5  /  Alb  3.2<L>  /  TBili  0.4  /  DBili  x   /  AST  24  /  ALT  40  /  AlkPhos  64  01-08    LIVER FUNCTIONS - ( 08 Jan 2024 06:34 )  Alb: 3.2 g/dL / Pro: 6.5 g/dL / ALK PHOS: 64 U/L / ALT: 40 U/L / AST: 24 U/L / GGT: x             Urinalysis Basic - ( 08 Jan 2024 06:34 )    Color: x / Appearance: x / SG: x / pH: x  Gluc: 91 mg/dL / Ketone: x  / Bili: x / Urobili: x   Blood: x / Protein: x / Nitrite: x   Leuk Esterase: x / RBC: x / WBC x   Sq Epi: x / Non Sq Epi: x / Bacteria: x          CAPILLARY BLOOD GLUCOSE               Patient is a 73y old  Female who presents with a chief complaint of ICH (08 Jan 2024 12:04)      SUBJECTIVE:  Patient seen and evaluated on AM rounds.  Patient is calm and in good spirits.  She reports feeling like she had a "good work out" in therapy today.  She reports improved sensation in her left foot with ambulation.  She denies feeling anxious or frustrated.    ROS:  No fever, chills  No headaches, nausea, vomiting  No chest pain  No abdominal pain    VITALS  73y  Vital Signs Last 24 Hrs  T(C): 36.3 (09 Jan 2024 07:27), Max: 36.4 (08 Jan 2024 20:08)  T(F): 97.4 (09 Jan 2024 07:27), Max: 97.5 (08 Jan 2024 20:08)  HR: 78 (09 Jan 2024 07:27) (74 - 78)  BP: 100/66 (09 Jan 2024 07:27) (100/66 - 109/72)  BP(mean): --  RR: 16 (09 Jan 2024 07:27) (15 - 16)  SpO2: 98% (09 Jan 2024 07:27) (98% - 98%)    Parameters below as of 09 Jan 2024 07:27  Patient On (Oxygen Delivery Method): room air      Daily     Daily     PHYSICAL EXAM  Constitutional: NAD, sitting in wheelchair  Respiratory: breathing comfortably   Cardiovascular: warm and well perfused, RRR  Gastrointestinal: Abdomen soft, nondistended  Neurological: tangential  thoughts/speech but less perseverating; left inattentiveness but able to cross midline and maintain with cueing -- improved from prior exam  Sensation: intact to LT in BL LE  Musculoskeletal: 4/5 throughout upper and lower extremities  Coordination: +Dysmetria --improved from prior  Psychiatric: calm     MEDICATIONS  (STANDING):  atorvastatin 10 milliGRAM(s) Oral at bedtime  bisacodyl 5 milliGRAM(s) Oral daily  brivaracetam 50 milliGRAM(s) Oral two times a day  enoxaparin Injectable 40 milliGRAM(s) SubCutaneous <User Schedule>  famotidine    Tablet 20 milliGRAM(s) Oral two times a day  gabapentin 400 milliGRAM(s) Oral at bedtime  ketorolac 0.5% Ophthalmic Solution 1 Drop(s) Left EYE daily  lidocaine   4% Patch 1 Patch Transdermal <User Schedule>  lidocaine   4% Patch 1 Patch Transdermal <User Schedule>  modafinil 100 milliGRAM(s) Oral <User Schedule>  Preservision  Capsules AREDS 2 2 Capsule(s) 2 Capsule(s) Oral daily  psyllium Powder 1 Packet(s) Oral daily  senna 2 Tablet(s) Oral at bedtime  sodium chloride 0.65% Nasal 1 Spray(s) Both Nostrils two times a day    MEDICATIONS  (PRN):  acetaminophen     Tablet .. 650 milliGRAM(s) Oral every 6 hours PRN Temp greater or equal to 38C (100.4F), Mild Pain (1 - 3)  aluminum hydroxide/magnesium hydroxide/simethicone Suspension 30 milliLiter(s) Oral every 6 hours PRN Dyspepsia  artificial tears (preservative free) Ophthalmic Solution 1 Drop(s) Both EYES every 4 hours PRN Dry Eyes  bisacodyl Suppository 10 milliGRAM(s) Rectal daily PRN Constipation  calcium carbonate    500 mG (Tums) Chewable 1 Tablet(s) Chew three times a day PRN Heartburn  melatonin 3 milliGRAM(s) Oral at bedtime PRN Insomnia  ondansetron   Disintegrating Tablet 4 milliGRAM(s) Oral every 6 hours PRN Nausea and/or Vomiting      RECENT LABS:                          13.5   5.45  )-----------( 239      ( 08 Jan 2024 06:34 )             38.7     01-08    141  |  105  |  11  ----------------------------<  91  3.6   |  28  |  0.67    Ca    9.4      08 Jan 2024 06:34    TPro  6.5  /  Alb  3.2<L>  /  TBili  0.4  /  DBili  x   /  AST  24  /  ALT  40  /  AlkPhos  64  01-08    LIVER FUNCTIONS - ( 08 Jan 2024 06:34 )  Alb: 3.2 g/dL / Pro: 6.5 g/dL / ALK PHOS: 64 U/L / ALT: 40 U/L / AST: 24 U/L / GGT: x             Urinalysis Basic - ( 08 Jan 2024 06:34 )    Color: x / Appearance: x / SG: x / pH: x  Gluc: 91 mg/dL / Ketone: x  / Bili: x / Urobili: x   Blood: x / Protein: x / Nitrite: x   Leuk Esterase: x / RBC: x / WBC x   Sq Epi: x / Non Sq Epi: x / Bacteria: x          CAPILLARY BLOOD GLUCOSE               Patient is a 73y old  Female who presents with a chief complaint of ICH (08 Jan 2024 12:04)      SUBJECTIVE:  Patient seen and evaluated on AM rounds.  Patient is calm and in good spirits.  She reports feeling like she had a "good work out" in therapy today.  She reports improved sensation in her left foot with ambulation.  She denies feeling anxious or frustrated. Calm this AM.     ROS:  No fever, chills  No headaches, nausea, vomiting  No chest pain  No abdominal pain    VITALS  73y  Vital Signs Last 24 Hrs  T(C): 36.3 (09 Jan 2024 07:27), Max: 36.4 (08 Jan 2024 20:08)  T(F): 97.4 (09 Jan 2024 07:27), Max: 97.5 (08 Jan 2024 20:08)  HR: 78 (09 Jan 2024 07:27) (74 - 78)  BP: 100/66 (09 Jan 2024 07:27) (100/66 - 109/72)  BP(mean): --  RR: 16 (09 Jan 2024 07:27) (15 - 16)  SpO2: 98% (09 Jan 2024 07:27) (98% - 98%)    Parameters below as of 09 Jan 2024 07:27  Patient On (Oxygen Delivery Method): room air      Daily     Daily     PHYSICAL EXAM  Constitutional: NAD, sitting in wheelchair  Respiratory: breathing comfortably   Cardiovascular: warm and well perfused, RRR  Gastrointestinal: Abdomen soft, nondistended  Neurological: tangential  thoughts/speech but less perseverating; left inattentiveness but able to cross midline and maintain with cueing -- improved from prior exam  Sensation: intact to LT in BL LE  Musculoskeletal: 4/5 throughout upper and lower extremities  Coordination: +Dysmetria --improved from prior  Psychiatric: calm     MEDICATIONS  (STANDING):  atorvastatin 10 milliGRAM(s) Oral at bedtime  bisacodyl 5 milliGRAM(s) Oral daily  brivaracetam 50 milliGRAM(s) Oral two times a day  enoxaparin Injectable 40 milliGRAM(s) SubCutaneous <User Schedule>  famotidine    Tablet 20 milliGRAM(s) Oral two times a day  gabapentin 400 milliGRAM(s) Oral at bedtime  ketorolac 0.5% Ophthalmic Solution 1 Drop(s) Left EYE daily  lidocaine   4% Patch 1 Patch Transdermal <User Schedule>  lidocaine   4% Patch 1 Patch Transdermal <User Schedule>  modafinil 100 milliGRAM(s) Oral <User Schedule>  Preservision  Capsules AREDS 2 2 Capsule(s) 2 Capsule(s) Oral daily  psyllium Powder 1 Packet(s) Oral daily  senna 2 Tablet(s) Oral at bedtime  sodium chloride 0.65% Nasal 1 Spray(s) Both Nostrils two times a day    MEDICATIONS  (PRN):  acetaminophen     Tablet .. 650 milliGRAM(s) Oral every 6 hours PRN Temp greater or equal to 38C (100.4F), Mild Pain (1 - 3)  aluminum hydroxide/magnesium hydroxide/simethicone Suspension 30 milliLiter(s) Oral every 6 hours PRN Dyspepsia  artificial tears (preservative free) Ophthalmic Solution 1 Drop(s) Both EYES every 4 hours PRN Dry Eyes  bisacodyl Suppository 10 milliGRAM(s) Rectal daily PRN Constipation  calcium carbonate    500 mG (Tums) Chewable 1 Tablet(s) Chew three times a day PRN Heartburn  melatonin 3 milliGRAM(s) Oral at bedtime PRN Insomnia  ondansetron   Disintegrating Tablet 4 milliGRAM(s) Oral every 6 hours PRN Nausea and/or Vomiting      RECENT LABS:                          13.5   5.45  )-----------( 239      ( 08 Jan 2024 06:34 )             38.7     01-08    141  |  105  |  11  ----------------------------<  91  3.6   |  28  |  0.67    Ca    9.4      08 Jan 2024 06:34    TPro  6.5  /  Alb  3.2<L>  /  TBili  0.4  /  DBili  x   /  AST  24  /  ALT  40  /  AlkPhos  64  01-08    LIVER FUNCTIONS - ( 08 Jan 2024 06:34 )  Alb: 3.2 g/dL / Pro: 6.5 g/dL / ALK PHOS: 64 U/L / ALT: 40 U/L / AST: 24 U/L / GGT: x             Urinalysis Basic - ( 08 Jan 2024 06:34 )    Color: x / Appearance: x / SG: x / pH: x  Gluc: 91 mg/dL / Ketone: x  / Bili: x / Urobili: x   Blood: x / Protein: x / Nitrite: x   Leuk Esterase: x / RBC: x / WBC x   Sq Epi: x / Non Sq Epi: x / Bacteria: x          CAPILLARY BLOOD GLUCOSE

## 2024-01-09 NOTE — PROGRESS NOTE ADULT - ASSESSMENT
74 YO RIGHT handed woman with no PMH who presented to [facility] ED on 12/14/2023, as a transfer from [facility] with R IPH. Hospital course also significant for UTI, b/l DVTs, EEG with increased seizure risks.    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  - Head CT 12/27: The right parietal parenchymal hemorrhage is slightly less dense and slightly smaller measuring 3.0 cm in AP diameter by 4.3 cm transversely compared with the prior of 4.1 cm in AP diameter by 5.9 cm transversely. There is similar vasogenic edema. There is also similar mass effect on the atrium of the right lateral ventricle with similar mild midline shift to the left. There is resolution of the intraventricular hemorrhage in the left occipital horn.  - EEG with increased risk of seizures in the right frontocentral region  - Briviact 50mg BID for seizure prophylaxis.  - Cont Comprehensive Rehab Program: PT/OT/ST, 3hours daily and 5 days weekly.     #Wet Macular Degeneration  -Spoke with pt's home physician, Dr. Dominguez Joyner ([phone]) regarding current treatment plan.  Updated him on her status 1/4  -Patient was previously on Vabysmo injections every 4-5 weeks.  -Per Dr. Joyner she can return to office as outpt in January for her treatment  -Reviewed risk factor profile of Vabysmo which reports risk of thromboembolic events and explained this to patient on 1/5    # self-reported history of ADHD and restless, distracted, poor attention  - xanax Rx in I stop  - on. modafinil  100mg in AM--increased on 1/5--  --Pt's HCP called concerned that pt. seemed to be more anxious on the weekend and difficult to redirect during conversations  --Reached out to pt's PT, OT and speech therapists today to inquire about patient's cognition and mood and if any changes this week-- OT noted her attention and focus is better with directed tasks, but when she starts conversing its still difficult to redirect her.  Speech therapist noted pt. was less confabulatory and more aware of her deficits and focus on task was better today.   - psych consult 1/2 reviewed and appreciated  - psychology and recreation therapy (former )    #   - discussed with hospitalist. Patient with ascvd is moderate risk  - C/W atorvastatin 10 mg qhs 12/29    # HTN  -  Losartan discontinued due to soft BP 12/27  - BP stable continue monitor        # Pain management  - Tylenol PRN  - tramadol 25 q4 PRN severe pain,  --Gabapentin  400mg qhs for neuropathic pain     - Lidocaine patch timing changed to bedtime per request. States she uses Salon Pas on left neck/shoulder at home.   - Cervical pillow for postioning midline and neck control, improved    # DVT   - b/l below the knee DVTs: right soleal vein, left soleal, peroneal and gastrocnemius veins.  - Lovenox 40 mg daily. Not cleared for full dose AC due to large ICH  - Doppler 12/20: Positive deep venous thrombosis below the right knee within the right soleal vein. Positive deep venous thrombosis below the left knee within the left soleal, peroneal and gastrocnemius veins.  - Doppler 12/27 surveillance - DVTS known - stable right improved on left.  - Repeat Head CT 12/27 improved followed by neuro consult re: possible full dose AC (day 14 post bleed)  - Neuro consult appreciated 12/28. Do not recommend full dose of AC at this time  -Repeat Head CT ~ 2 weeks-- on 1/10/24 prior to discharge.     # GI ppx/h/o reflux  - Pepcid 20mg   - TUMS, Maalox  - Senna  - metamucil home med unable to be verified by pharmacy (patient preference for pills as oppose to powder as offered here)     # Dysphagia --resolved  - Diet upgraded to Regular with thin liquids  -c/w AREDS 2 vitamin (home med)    #Case discussed in IDT rounds 1/2/24  -SLP: soft bite size; severe cognitive deficits,  left inattentiveness with max cueing, Impaired attention.   -OT: Eating/grooming supervision; Footwear/showering/Toileting-- Max A; UBD/LBD and toilet transfers-- Mod A  -PT: Transfers to the right Min-Mod A; Transfer to left Mod A; Ambulate 110ft Mod A with WC follow  - goals: 1. min assist transfers and ambulation, min assist bADLs; Will need 24hr supervision  - target: dc SIVAN 1/11/23    ** Spoke with pt's  HCP, Isabella on 1/3, to provide update on pt's status,  medical update, medications, progress in therapy, rehab plan of care, discharge plan to [facility] on 1/11/24 and discharge expectations and general prognosis.  All questions answered.     **Called HCP Isabella on 1/8 to discuss modafinil dosing but she did not answer with no option to leave a message.    # LABS:   CBC CMP 1/8 reviewed    #GOC  CODE STATUS: FULL CODE    Outpatient Follow-up (Specialty/Name of physician):    Dr. Dominguez Joyner  Opthalmology   [phone]    Emy Prakash  NP in [address], [address]  [place] [address]  Phone: [phone]  Fax: [phone]  Follow Up Time: 2 weeks    Sergio Jurado  [address], [address]  [place] [address]  Phone: [phone]  Fax: [phone]  Follow Up Time: 1 month         74 YO RIGHT handed woman with no PMH who presented to [facility] ED on 12/14/2023, as a transfer from [facility] with R IPH. Hospital course also significant for UTI, b/l DVTs, EEG with increased seizure risks.    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  - Head CT 12/27: The right parietal parenchymal hemorrhage is slightly less dense and slightly smaller measuring 3.0 cm in AP diameter by 4.3 cm transversely compared with the prior of 4.1 cm in AP diameter by 5.9 cm transversely. There is similar vasogenic edema. There is also similar mass effect on the atrium of the right lateral ventricle with similar mild midline shift to the left. There is resolution of the intraventricular hemorrhage in the left occipital horn.  - EEG with increased risk of seizures in the right frontocentral region  - Briviact 50mg BID for seizure prophylaxis.  - Cont Comprehensive Rehab Program: PT/OT/ST, 3hours daily and 5 days weekly.     #Wet Macular Degeneration  -Spoke with pt's home physician, Dr. Dominguez Joyner ([phone]) regarding current treatment plan.  Updated him on her status 1/4  -Patient was previously on Vabysmo injections every 4-5 weeks.  -Per Dr. Joyner she can return to office as outpt in January for her treatment  -Reviewed risk factor profile of Vabysmo which reports risk of thromboembolic events and explained this to patient on 1/5    # self-reported history of ADHD and restless, distracted, poor attention  - xanax Rx in I stop  - on. modafinil  100mg in AM--increased on 1/5--  --Pt's HCP called concerned that pt. seemed to be more anxious on the weekend and difficult to redirect during conversations  --Reached out to pt's PT, OT and speech therapists today to inquire about patient's cognition and mood and if any changes this week-- OT noted her attention and focus is better with directed tasks, but when she starts conversing its still difficult to redirect her.  Speech therapist noted pt. was less confabulatory and more aware of her deficits and focus on task was better today.   - psych consult 1/2 reviewed and appreciated  - psychology and recreation therapy (former )    #   - discussed with hospitalist. Patient with ascvd is moderate risk  - C/W atorvastatin 10 mg qhs 12/29    # HTN  -  Losartan discontinued due to soft BP 12/27  - BP stable continue monitor        # Pain management  - Tylenol PRN  - tramadol 25 q4 PRN severe pain,  --Gabapentin  400mg qhs for neuropathic pain     - Lidocaine patch timing changed to bedtime per request. States she uses Salon Pas on left neck/shoulder at home.   - Cervical pillow for postioning midline and neck control, improved    # DVT   - b/l below the knee DVTs: right soleal vein, left soleal, peroneal and gastrocnemius veins.  - Lovenox 40 mg daily. Not cleared for full dose AC due to large ICH  - Doppler 12/20: Positive deep venous thrombosis below the right knee within the right soleal vein. Positive deep venous thrombosis below the left knee within the left soleal, peroneal and gastrocnemius veins.  - Doppler 12/27 surveillance - DVTS known - stable right improved on left.  - Repeat Head CT 12/27 improved followed by neuro consult re: possible full dose AC (day 14 post bleed)  - Neuro consult appreciated 12/28. Do not recommend full dose of AC at this time  -Repeat Head CT ~ 2 weeks-- on 1/10/24 prior to discharge.     # GI ppx/h/o reflux  - Pepcid 20mg   - TUMS, Maalox  - Senna  - metamucil home med unable to be verified by pharmacy (patient preference for pills as oppose to powder as offered here)     # Dysphagia --resolved  - Diet upgraded to Regular with thin liquids  -c/w AREDS 2 vitamin (home med)    #Case discussed in IDT rounds 1/2/24  -SLP: soft bite size; severe cognitive deficits,  left inattentiveness with max cueing, Impaired attention.   -OT: Eating/grooming supervision; Footwear/showering/Toileting-- Max A; UBD/LBD and toilet transfers-- Mod A  -PT: Transfers to the right Min-Mod A; Transfer to left Mod A; Ambulate 110ft Mod A with WC follow  - goals: 1. min assist transfers and ambulation, min assist bADLs; Will need 24hr supervision  - target: dc SIVAN 1/11/23    ** Spoke with pt's  HCP, Isabella on 1/3, to provide update on pt's status,  medical update, medications, progress in therapy, rehab plan of care, discharge plan to [facility] on 1/11/24 and discharge expectations and general prognosis.  All questions answered.     **Called HCP Isabella on 1/8 to discuss modafinil dosing but she did not answer with no option to leave a message.    # LABS:   CBC CMP 1/8 reviewed    #GOC  CODE STATUS: FULL CODE    Outpatient Follow-up (Specialty/Name of physician):    Dr. Dominguez Joyner  Opthalmology   [phone]    Emy Prakash  NP in [address], [address]  [place] [address]  Phone: [phone]  Fax: [phone]  Follow Up Time: 2 weeks    Sergio Jurado  [address], [address]  [place] [address]  Phone: [phone]  Fax: [phone]  Follow Up Time: 1 month         74 YO RIGHT handed woman with no PMH who presented to [facility] ED on 12/14/2023, as a transfer from [facility] with R IPH. Hospital course also significant for UTI, b/l DVTs, EEG with increased seizure risks.    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  - Head CT 12/27: The right parietal parenchymal hemorrhage is slightly less dense and slightly smaller measuring 3.0 cm in AP diameter by 4.3 cm transversely compared with the prior of 4.1 cm in AP diameter by 5.9 cm transversely. There is similar vasogenic edema. There is also similar mass effect on the atrium of the right lateral ventricle with similar mild midline shift to the left. There is resolution of the intraventricular hemorrhage in the left occipital horn.  - EEG with increased risk of seizures in the right frontocentral region  - Briviact 50mg BID for seizure prophylaxis.  - Cont Comprehensive Rehab Program: PT/OT/ST, 3hours daily and 5 days weekly.     #Wet Macular Degeneration  -Spoke with pt's home physician, Dr. Dominguez Joyner ([phone]) regarding current treatment plan.  Updated him on her status 1/4  -Patient was previously on Vabysmo injections every 4-5 weeks.  -Per Dr. Joyner she can return to office as outpt in January for her treatment  -Reviewed risk factor profile of Vabysmo which reports risk of thromboembolic events and explained this to patient on 1/5    # self-reported history of ADHD and restless, distracted, poor attention  - xanax Rx in I stop  - on. modafinil  100mg in AM--increased on 1/5--  --Pt's HCP called concerned that pt. seemed to be more anxious on the weekend and difficult to redirect during conversations  --Reached out to pt's PT, OT and speech therapists 1/8 to inquire about patient's cognition and mood and if any changes this week-- OT noted her attention and focus is better with directed tasks, but when she starts conversing its still difficult to redirect her.  Speech therapist noted pt. was less confabulatory and more aware of her deficits and focus on task was better today.   - psych consult 1/2 reviewed and appreciated  - psychology and recreation therapy (former )    #   - discussed with hospitalist. Patient with ascvd is moderate risk  - C/W atorvastatin 10 mg qhs 12/29    # HTN  -  Losartan discontinued due to soft BP 12/27  - BP stable continue monitor        # Pain management  - Tylenol PRN  - tramadol 25 --auto-discontinued 12/30 pain has been stable off  --Gabapentin  400mg qhs for neuropathic pain     - Lidocaine patch timing changed to bedtime per request. States she uses Salon Pas on left neck/shoulder at home.   - Cervical pillow for postioning midline and neck control, improved    # DVT   - b/l below the knee DVTs: right soleal vein, left soleal, peroneal and gastrocnemius veins.  - Lovenox 40 mg daily. Not cleared for full dose AC due to large ICH  - Doppler 12/20: Positive deep venous thrombosis below the right knee within the right soleal vein. Positive deep venous thrombosis below the left knee within the left soleal, peroneal and gastrocnemius veins.  - Doppler 12/27 surveillance - DVTS known - stable right improved on left.  - Repeat Head CT 12/27 improved followed by neuro consult re: possible full dose AC (day 14 post bleed)  - Neuro consult appreciated 12/28. Do not recommend full dose of AC at this time  -Repeat Head CT ~ 2 weeks-- on 1/10/24 prior to discharge.     # GI ppx/h/o reflux  - Pepcid 20mg   - TUMS, Maalox  - Senna  - metamucil home med unable to be verified by pharmacy (patient preference for pills as oppose to powder as offered here)     # Dysphagia --resolved  - Diet upgraded to Regular with thin liquids  -c/w AREDS 2 vitamin (home med)    #Case discussed in IDT rounds 1/2/24  -SLP: soft bite size; severe cognitive deficits,  left inattentiveness with max cueing, Impaired attention.   -OT: Eating/grooming supervision; Footwear/showering/Toileting-- Max A; UBD/LBD and toilet transfers-- Mod A  -PT: Transfers to the right Min-Mod A; Transfer to left Mod A; Ambulate 110ft Mod A with WC follow  - goals: 1. min assist transfers and ambulation, min assist bADLs; Will need 24hr supervision  - target: dc SIVAN 1/11/23    ** On 1/8 Spoke with pt's  __HCP, Isabella__, to provide update on pt's status,  medical update, medications, progress in therapy, and plan.  Isabella had to end the conversation before we completed as she was at the vet with Rose Mary's dog and needed to attend to him.  We agreed we would observe Rose Mary for another day and discuss further whether to cut or keep dose of modafinil after Team meeting today.  All questions answered.      # LABS:   CTH 1/10    #[facility]  CODE STATUS: FULL CODE    Outpatient Follow-up (Specialty/Name of physician):    Dr. Domignuez Joyner  Opthalmology   [phone]    Emy Prakash  NP in [address], [address]  [place] [address]  Phone: [phone]  Fax: [phone]  Follow Up Time: 2 weeks    Sergio Jurado  Cardiology  [address] [facility], [address]  [place] [address]  Phone: [phone]  Fax: [phone]  Follow Up Time: 1 month         72 YO RIGHT handed woman with no PMH who presented to [facility] ED on 12/14/2023, as a transfer from [facility] with R IPH. Hospital course also significant for UTI, b/l DVTs, EEG with increased seizure risks.    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  - Head CT 12/27: The right parietal parenchymal hemorrhage is slightly less dense and slightly smaller measuring 3.0 cm in AP diameter by 4.3 cm transversely compared with the prior of 4.1 cm in AP diameter by 5.9 cm transversely. There is similar vasogenic edema. There is also similar mass effect on the atrium of the right lateral ventricle with similar mild midline shift to the left. There is resolution of the intraventricular hemorrhage in the left occipital horn.  - EEG with increased risk of seizures in the right frontocentral region  - Briviact 50mg BID for seizure prophylaxis.  - Cont Comprehensive Rehab Program: PT/OT/ST, 3hours daily and 5 days weekly.     #Wet Macular Degeneration  -Spoke with pt's home physician, Dr. Dominguez Joyner ([phone]) regarding current treatment plan.  Updated him on her status 1/4  -Patient was previously on Vabysmo injections every 4-5 weeks.  -Per Dr. Joyner she can return to office as outpt in January for her treatment  -Reviewed risk factor profile of Vabysmo which reports risk of thromboembolic events and explained this to patient on 1/5    # self-reported history of ADHD and restless, distracted, poor attention  - xanax Rx in I stop  - on. modafinil  100mg in AM--increased on 1/5--  --Pt's HCP called concerned that pt. seemed to be more anxious on the weekend and difficult to redirect during conversations  --Reached out to pt's PT, OT and speech therapists 1/8 to inquire about patient's cognition and mood and if any changes this week-- OT noted her attention and focus is better with directed tasks, but when she starts conversing its still difficult to redirect her.  Speech therapist noted pt. was less confabulatory and more aware of her deficits and focus on task was better today.   - psych consult 1/2 reviewed and appreciated  - psychology and recreation therapy (former )    #   - discussed with hospitalist. Patient with ascvd is moderate risk  - C/W atorvastatin 10 mg qhs 12/29    # HTN  -  Losartan discontinued due to soft BP 12/27  - BP stable continue monitor        # Pain management  - Tylenol PRN  - tramadol 25 --auto-discontinued 12/30 pain has been stable off  --Gabapentin  400mg qhs for neuropathic pain     - Lidocaine patch timing changed to bedtime per request. States she uses Salon Pas on left neck/shoulder at home.   - Cervical pillow for postioning midline and neck control, improved    # DVT   - b/l below the knee DVTs: right soleal vein, left soleal, peroneal and gastrocnemius veins.  - Lovenox 40 mg daily. Not cleared for full dose AC due to large ICH  - Doppler 12/20: Positive deep venous thrombosis below the right knee within the right soleal vein. Positive deep venous thrombosis below the left knee within the left soleal, peroneal and gastrocnemius veins.  - Doppler 12/27 surveillance - DVTS known - stable right improved on left.  - Repeat Head CT 12/27 improved followed by neuro consult re: possible full dose AC (day 14 post bleed)  - Neuro consult appreciated 12/28. Do not recommend full dose of AC at this time  -Repeat Head CT ~ 2 weeks-- on 1/10/24 prior to discharge.     # GI ppx/h/o reflux  - Pepcid 20mg   - TUMS, Maalox  - Senna  - metamucil home med unable to be verified by pharmacy (patient preference for pills as oppose to powder as offered here)     # Dysphagia --resolved  - Diet upgraded to Regular with thin liquids  -c/w AREDS 2 vitamin (home med)    #Case discussed in IDT rounds 1/2/24  -SLP: soft bite size; severe cognitive deficits,  left inattentiveness with max cueing, Impaired attention.   -OT: Eating/grooming supervision; Footwear/showering/Toileting-- Max A; UBD/LBD and toilet transfers-- Mod A  -PT: Transfers to the right Min-Mod A; Transfer to left Mod A; Ambulate 110ft Mod A with WC follow  - goals: 1. min assist transfers and ambulation, min assist bADLs; Will need 24hr supervision  - target: dc SIVAN 1/11/23    ** On 1/8 Spoke with pt's  __HCP, Isabella__, to provide update on pt's status,  medical update, medications, progress in therapy, and plan.  Isabella had to end the conversation before we completed as she was at the vet with Rose Mary's dog and needed to attend to him.  We agreed we would observe Rose Mary for another day and discuss further whether to cut or keep dose of modafinil after Team meeting today.  All questions answered.      # LABS:   CTH 1/10    #[facility]  CODE STATUS: FULL CODE    Outpatient Follow-up (Specialty/Name of physician):    Dr. Dominguez Joyner  Opthalmology   [phone]    Emy Prakash  NP in [address], [address]  [place] [address]  Phone: [phone]  Fax: [phone]  Follow Up Time: 2 weeks    Sergio Jurado  Cardiology  [address] [facility], [address]  [place] [address]  Phone: [phone]  Fax: [phone]  Follow Up Time: 1 month         74 YO RIGHT handed woman with no PMH who presented to [facility] ED on 12/14/2023, as a transfer from [facility] with R IPH. Hospital course also significant for UTI, b/l DVTs, EEG with increased seizure risks.    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  - Head CT 12/27: The right parietal parenchymal hemorrhage is slightly less dense and slightly smaller measuring 3.0 cm in AP diameter by 4.3 cm transversely compared with the prior of 4.1 cm in AP diameter by 5.9 cm transversely. There is similar vasogenic edema. There is also similar mass effect on the atrium of the right lateral ventricle with similar mild midline shift to the left. There is resolution of the intraventricular hemorrhage in the left occipital horn.  - EEG with increased risk of seizures in the right frontocentral region  - Briviact 50mg BID for seizure prophylaxis.  - Cont Comprehensive Rehab Program: PT/OT/ST, 3hours daily and 5 days weekly.     #Wet Macular Degeneration  -Spoke with pt's home physician, Dr. Dominguez Joyner ([phone]) regarding current treatment plan.  Updated him on her status 1/4  -Patient was previously on Vabysmo injections every 4-5 weeks.  -Per Dr. Joyner she can return to office as outpt in January for her treatment  -Reviewed risk factor profile of Vabysmo which reports risk of thromboembolic events and explained this to patient on 1/5    # self-reported history of ADHD and restless, distracted, poor attention  - xanax Rx in I stop  - on. modafinil  100mg in AM--increased on 1/5--  --Pt's HCP called concerned that pt. seemed to be more anxious on the weekend and difficult to redirect during conversations  --Reached out to pt's PT, OT and speech therapists 1/8 to inquire about patient's cognition and mood and if any changes this week-- OT noted her attention and focus is better with directed tasks, but when she starts conversing its still difficult to redirect her.  Speech therapist noted pt. was less confabulatory and more aware of her deficits and focus on task was better today.   - psych consult 1/2 reviewed and appreciated  - psychology and recreation therapy (former )    #   - discussed with hospitalist. Patient with ascvd is moderate risk  - C/W atorvastatin 10 mg qhs 12/29    # HTN  -  Losartan discontinued due to soft BP 12/27  - BP stable continue monitor        # Pain management  - Tylenol PRN  - tramadol 25 --auto-discontinued 12/30 pain has been stable off  --Gabapentin  400mg qhs for neuropathic pain     - Lidocaine patch timing changed to bedtime per request. States she uses Salon Pas on left neck/shoulder at home.   - Cervical pillow for postioning midline and neck control, improved    # DVT   - b/l below the knee DVTs: right soleal vein, left soleal, peroneal and gastrocnemius veins.  - Lovenox 40 mg daily. Not cleared for full dose AC due to large ICH  - Doppler 12/20: Positive deep venous thrombosis below the right knee within the right soleal vein. Positive deep venous thrombosis below the left knee within the left soleal, peroneal and gastrocnemius veins.  - Doppler 12/27 surveillance - DVTS known - stable right improved on left.  - Repeat Head CT 12/27 improved followed by neuro consult re: possible full dose AC (day 14 post bleed)  - Neuro consult appreciated 12/28. Do not recommend full dose of AC at this time  -Repeat Head CT ~ 2 weeks-- on 1/10/24 prior to discharge.     # GI ppx/h/o reflux  - Pepcid 20mg   - TUMS, Maalox  - Senna  - metamucil home med unable to be verified by pharmacy (patient preference for pills as oppose to powder as offered here)     # Dysphagia --resolved  - Diet upgraded to Regular with thin liquids  -c/w AREDS 2 vitamin (home med)    #Case discussed in IDT rounds 1/9/24  -SLP: soft bite size; mild language deficits; mod-severe cognitive deficits,  left inattentiveness with max cueing, Impaired attention.   -OT: Eating/grooming setup; CG-Min A for toilet transfers; Min-Mod for shower transfers; CG for dressing  -PT: CS for bed mobility and transfers and 150ft ambulation with RW  - goals:   1. CS transfers and ambulation, CS bADLs;   Will need 24hr supervision  - target: dc SIVAN 1/11/23    ** On 1/8 Spoke with pt's  __HCP, Fredkarishma__, to provide update on pt's status,  medical update, medications, progress in therapy, and plan.  Isabella had to end the conversation before we completed as she was at the vet with Rose Mary's dog and needed to attend to him.  We agreed we would observe Rose Mary for another day and discuss further whether to cut or keep dose of modafinil after Team meeting today.  All questions answered.      # LABS:   CTH 1/10    #[facility]  CODE STATUS: FULL CODE    Outpatient Follow-up (Specialty/Name of physician):    Dr. Dominguez Joyner  Opthalmology   [phone]    Emy Prakash  NP in [address], [address]  [place] [address]  Phone: [phone]  Fax: [phone]  Follow Up Time: 2 weeks    Sergio Jurado  Cardiology  [address] [place] [address], [address]  [place] [address]  Phone: [phone]  Fax: [phone]  Follow Up Time: 1 month         74 YO RIGHT handed woman with no PMH who presented to [facility] ED on 12/14/2023, as a transfer from [facility] with R IPH. Hospital course also significant for UTI, b/l DVTs, EEG with increased seizure risks.    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  - Head CT 12/27: The right parietal parenchymal hemorrhage is slightly less dense and slightly smaller measuring 3.0 cm in AP diameter by 4.3 cm transversely compared with the prior of 4.1 cm in AP diameter by 5.9 cm transversely. There is similar vasogenic edema. There is also similar mass effect on the atrium of the right lateral ventricle with similar mild midline shift to the left. There is resolution of the intraventricular hemorrhage in the left occipital horn.  - EEG with increased risk of seizures in the right frontocentral region  - Briviact 50mg BID for seizure prophylaxis.  - Cont Comprehensive Rehab Program: PT/OT/ST, 3hours daily and 5 days weekly.     #Wet Macular Degeneration  -Spoke with pt's home physician, Dr. Dominguez Joyner ([phone]) regarding current treatment plan.  Updated him on her status 1/4  -Patient was previously on Vabysmo injections every 4-5 weeks.  -Per Dr. Joyner she can return to office as outpt in January for her treatment  -Reviewed risk factor profile of Vabysmo which reports risk of thromboembolic events and explained this to patient on 1/5    # self-reported history of ADHD and restless, distracted, poor attention  - xanax Rx in I stop  - on. modafinil  100mg in AM--increased on 1/5--  --Pt's HCP called concerned that pt. seemed to be more anxious on the weekend and difficult to redirect during conversations  --Reached out to pt's PT, OT and speech therapists 1/8 to inquire about patient's cognition and mood and if any changes this week-- OT noted her attention and focus is better with directed tasks, but when she starts conversing its still difficult to redirect her.  Speech therapist noted pt. was less confabulatory and more aware of her deficits and focus on task was better today.   - psych consult 1/2 reviewed and appreciated  - psychology and recreation therapy (former )    #   - discussed with hospitalist. Patient with ascvd is moderate risk  - C/W atorvastatin 10 mg qhs 12/29    # HTN  -  Losartan discontinued due to soft BP 12/27  - BP stable continue monitor        # Pain management  - Tylenol PRN  - tramadol 25 --auto-discontinued 12/30 pain has been stable off  --Gabapentin  400mg qhs for neuropathic pain     - Lidocaine patch timing changed to bedtime per request. States she uses Salon Pas on left neck/shoulder at home.   - Cervical pillow for postioning midline and neck control, improved    # DVT   - b/l below the knee DVTs: right soleal vein, left soleal, peroneal and gastrocnemius veins.  - Lovenox 40 mg daily. Not cleared for full dose AC due to large ICH  - Doppler 12/20: Positive deep venous thrombosis below the right knee within the right soleal vein. Positive deep venous thrombosis below the left knee within the left soleal, peroneal and gastrocnemius veins.  - Doppler 12/27 surveillance - DVTS known - stable right improved on left.  - Repeat Head CT 12/27 improved followed by neuro consult re: possible full dose AC (day 14 post bleed)  - Neuro consult appreciated 12/28. Do not recommend full dose of AC at this time  -Repeat Head CT ~ 2 weeks-- on 1/10/24 prior to discharge.     # GI ppx/h/o reflux  - Pepcid 20mg   - TUMS, Maalox  - Senna  - metamucil home med unable to be verified by pharmacy (patient preference for pills as oppose to powder as offered here)     # Dysphagia --resolved  - Diet upgraded to Regular with thin liquids  -c/w AREDS 2 vitamin (home med)    #Case discussed in IDT rounds 1/9/24  -SLP: soft bite size; mild language deficits; mod-severe cognitive deficits,  left inattentiveness with max cueing, Impaired attention.   -OT: Eating/grooming setup; CG-Min A for toilet transfers; Min-Mod for shower transfers; CG for dressing  -PT: CS for bed mobility and transfers and 150ft ambulation with RW  - goals:   1. CS transfers and ambulation, CS bADLs;   Will need 24hr supervision  - target: dc SIVAN 1/11/23    ** On 1/8 Spoke with pt's  __HCP, Fredkarishma__, to provide update on pt's status,  medical update, medications, progress in therapy, and plan.  Isabella had to end the conversation before we completed as she was at the vet with Rose Mary's dog and needed to attend to him.  We agreed we would observe Rose Mary for another day and discuss further whether to cut or keep dose of modafinil after Team meeting today.  All questions answered.      # LABS:   CTH 1/10    #[facility]  CODE STATUS: FULL CODE    Outpatient Follow-up (Specialty/Name of physician):    Dr. Dominguez Joyner  Opthalmology   [phone]    Emy Prakash  NP in [address], [address]  [place] [address]  Phone: [phone]  Fax: [phone]  Follow Up Time: 2 weeks    Sergio Jurado  Cardiology  [address] [place] [address], [address]  [place] [address]  Phone: [phone]  Fax: [phone]  Follow Up Time: 1 month         74 YO RIGHT handed woman with no PMH who presented to [facility] ED on 12/14/2023, as a transfer from [facility] with R IPH. Hospital course also significant for UTI, b/l DVTs, EEG with increased seizure risks.    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  - Head CT 12/27: The right parietal parenchymal hemorrhage is slightly less dense and slightly smaller measuring 3.0 cm in AP diameter by 4.3 cm transversely compared with the prior of 4.1 cm in AP diameter by 5.9 cm transversely. There is similar vasogenic edema. There is also similar mass effect on the atrium of the right lateral ventricle with similar mild midline shift to the left. There is resolution of the intraventricular hemorrhage in the left occipital horn.  - EEG with increased risk of seizures in the right frontocentral region  - Briviact 50mg BID for seizure prophylaxis.  - Cont Comprehensive Rehab Program: PT/OT/ST, 3hours daily and 5 days weekly.     #Wet Macular Degeneration  -Spoke with pt's home physician, Dr. Dominguez Joyner ([phone]) regarding current treatment plan.  Updated him on her status 1/4  -Patient was previously on Vabysmo injections every 4-5 weeks.  -Per Dr. Joyner she can return to office as outpt in January for her treatment  -Reviewed risk factor profile of Vabysmo which reports risk of thromboembolic events and explained this to patient on 1/5    # self-reported history of ADHD and restless, distracted, poor attention  - xanax Rx in I stop  - on. modafinil  100mg in AM--increased on 1/5--  --Pt's HCP called concerned that pt. seemed to be more anxious on the weekend and difficult to redirect during conversations  --Reached out to pt's PT, OT and speech therapists 1/8 to inquire about patient's cognition and mood and if any changes this week-- OT noted her attention and focus is better with directed tasks, but when she starts conversing its still difficult to redirect her.  Speech therapist noted pt. was less confabulatory and more aware of her deficits and focus on task was better today. PT also notes notable improvement in her attention and function.   Pt. does not seem to have significant anxiety in therapy.   - psych consult 1/2 reviewed and appreciated  - psychology and recreation therapy (former )    #   - discussed with hospitalist. Patient with ascvd is moderate risk  - C/W atorvastatin 10 mg qhs 12/29    # HTN  -  Losartan discontinued due to soft BP 12/27  - BP stable continue monitor        # Pain management  - Tylenol PRN  - tramadol 25 --auto-discontinued 12/30 pain has been stable off  --Gabapentin  400mg qhs for neuropathic pain     - Lidocaine patch timing changed to bedtime per request. States she uses Salon Pas on left neck/shoulder at home.   - Cervical pillow for postioning midline and neck control, improved    # DVT   - b/l below the knee DVTs: right soleal vein, left soleal, peroneal and gastrocnemius veins.  - Lovenox 40 mg daily. Not cleared for full dose AC due to large ICH  - Doppler 12/20: Positive deep venous thrombosis below the right knee within the right soleal vein. Positive deep venous thrombosis below the left knee within the left soleal, peroneal and gastrocnemius veins.  - Doppler 12/27 surveillance - DVTS known - stable right improved on left.  - Repeat Head CT 12/27 improved followed by neuro consult re: possible full dose AC (day 14 post bleed)  - Neuro consult appreciated 12/28. Do not recommend full dose of AC at this time  -Repeat Head CT ~ 2 weeks-- on 1/10/24 prior to discharge.     # GI ppx/h/o reflux  - Pepcid 20mg   - TUMS, Maalox  - Senna  - metamucil home med unable to be verified by pharmacy (patient preference for pills as oppose to powder as offered here)     # Dysphagia --resolved  - Diet upgraded to Regular with thin liquids  -c/w AREDS 2 vitamin (home med)    #Case discussed in IDT rounds 1/9/24  -SLP: soft bite size; mild language deficits; mod-severe cognitive deficits,  left inattentiveness with max cueing, Impaired attention.   -OT: Eating/grooming setup; CG-Min A for toilet transfers; Min-Mod for shower transfers; CG for dressing  -PT: CS for bed mobility and transfers and 150ft ambulation with RW  - goals:   1. CS transfers and ambulation, CS bADLs;   Will need 24hr supervision  - target: dc SIVAN 1/11/23        # LABS:   CTH 1/10    #[facility]  CODE STATUS: FULL CODE    Outpatient Follow-up (Specialty/Name of physician):    Dr. Dominguez Joyner  Opthalmology   [phone]    Emy Prakash  NP in [address], [address]  [place] [address]  Phone: [phone]  Fax: [phone]  Follow Up Time: 2 weeks    Sergio Jurado  [address], [address]  [place] [address]  Phone: [phone]  Fax: [phone]  Follow Up Time: 1 month         72 YO RIGHT handed woman with no PMH who presented to [facility] ED on 12/14/2023, as a transfer from [facility] with R IPH. Hospital course also significant for UTI, b/l DVTs, EEG with increased seizure risks.    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  - Head CT 12/27: The right parietal parenchymal hemorrhage is slightly less dense and slightly smaller measuring 3.0 cm in AP diameter by 4.3 cm transversely compared with the prior of 4.1 cm in AP diameter by 5.9 cm transversely. There is similar vasogenic edema. There is also similar mass effect on the atrium of the right lateral ventricle with similar mild midline shift to the left. There is resolution of the intraventricular hemorrhage in the left occipital horn.  - EEG with increased risk of seizures in the right frontocentral region  - Briviact 50mg BID for seizure prophylaxis.  - Cont Comprehensive Rehab Program: PT/OT/ST, 3hours daily and 5 days weekly.     #Wet Macular Degeneration  -Spoke with pt's home physician, Dr. Dominguez Joyner ([phone]) regarding current treatment plan.  Updated him on her status 1/4  -Patient was previously on Vabysmo injections every 4-5 weeks.  -Per Dr. Joyner she can return to office as outpt in January for her treatment  -Reviewed risk factor profile of Vabysmo which reports risk of thromboembolic events and explained this to patient on 1/5    # self-reported history of ADHD and restless, distracted, poor attention  - xanax Rx in I stop  - on. modafinil  100mg in AM--increased on 1/5--  --Pt's HCP called concerned that pt. seemed to be more anxious on the weekend and difficult to redirect during conversations  --Reached out to pt's PT, OT and speech therapists 1/8 to inquire about patient's cognition and mood and if any changes this week-- OT noted her attention and focus is better with directed tasks, but when she starts conversing its still difficult to redirect her.  Speech therapist noted pt. was less confabulatory and more aware of her deficits and focus on task was better today. PT also notes notable improvement in her attention and function.   Pt. does not seem to have significant anxiety in therapy.   - psych consult 1/2 reviewed and appreciated  - psychology and recreation therapy (former )    #   - discussed with hospitalist. Patient with ascvd is moderate risk  - C/W atorvastatin 10 mg qhs 12/29    # HTN  -  Losartan discontinued due to soft BP 12/27  - BP stable continue monitor        # Pain management  - Tylenol PRN  - tramadol 25 --auto-discontinued 12/30 pain has been stable off  --Gabapentin  400mg qhs for neuropathic pain     - Lidocaine patch timing changed to bedtime per request. States she uses Salon Pas on left neck/shoulder at home.   - Cervical pillow for postioning midline and neck control, improved    # DVT   - b/l below the knee DVTs: right soleal vein, left soleal, peroneal and gastrocnemius veins.  - Lovenox 40 mg daily. Not cleared for full dose AC due to large ICH  - Doppler 12/20: Positive deep venous thrombosis below the right knee within the right soleal vein. Positive deep venous thrombosis below the left knee within the left soleal, peroneal and gastrocnemius veins.  - Doppler 12/27 surveillance - DVTS known - stable right improved on left.  - Repeat Head CT 12/27 improved followed by neuro consult re: possible full dose AC (day 14 post bleed)  - Neuro consult appreciated 12/28. Do not recommend full dose of AC at this time  -Repeat Head CT ~ 2 weeks-- on 1/10/24 prior to discharge.     # GI ppx/h/o reflux  - Pepcid 20mg   - TUMS, Maalox  - Senna  - metamucil home med unable to be verified by pharmacy (patient preference for pills as oppose to powder as offered here)     # Dysphagia --resolved  - Diet upgraded to Regular with thin liquids  -c/w AREDS 2 vitamin (home med)    #Case discussed in IDT rounds 1/9/24  -SLP: soft bite size; mild language deficits; mod-severe cognitive deficits,  left inattentiveness with max cueing, Impaired attention.   -OT: Eating/grooming setup; CG-Min A for toilet transfers; Min-Mod for shower transfers; CG for dressing  -PT: CS for bed mobility and transfers and 150ft ambulation with RW  - goals:   1. CS transfers and ambulation, CS bADLs;   Will need 24hr supervision  - target: dc SIVAN 1/11/23        # LABS:   CTH 1/10    #[facility]  CODE STATUS: FULL CODE    Outpatient Follow-up (Specialty/Name of physician):    Dr. Dominguez Joyner  Opthalmology   [phone]    Emy Prakash  NP in [address], [address]  [place] [address]  Phone: [phone]  Fax: [phone]  Follow Up Time: 2 weeks    Sergio Jurado  [address], [address]  [place] [address]  Phone: [phone]  Fax: [phone]  Follow Up Time: 1 month

## 2024-01-10 PROCEDURE — 90834 PSYTX W PT 45 MINUTES: CPT

## 2024-01-10 PROCEDURE — 99232 SBSQ HOSP IP/OBS MODERATE 35: CPT

## 2024-01-10 PROCEDURE — 70450 CT HEAD/BRAIN W/O DYE: CPT | Mod: 26

## 2024-01-10 RX ORDER — LANOLIN ALCOHOL/MO/W.PET/CERES
1 CREAM (GRAM) TOPICAL
Qty: 0 | Refills: 0 | DISCHARGE
Start: 2024-01-10

## 2024-01-10 RX ORDER — GABAPENTIN 400 MG/1
1 CAPSULE ORAL
Qty: 0 | Refills: 0 | DISCHARGE
Start: 2024-01-10

## 2024-01-10 RX ORDER — MODAFINIL 200 MG/1
1 TABLET ORAL
Qty: 0 | Refills: 0 | DISCHARGE
Start: 2024-01-10

## 2024-01-10 RX ADMIN — LIDOCAINE 1 PATCH: 4 CREAM TOPICAL at 00:00

## 2024-01-10 RX ADMIN — ENOXAPARIN SODIUM 40 MILLIGRAM(S): 100 INJECTION SUBCUTANEOUS at 17:08

## 2024-01-10 RX ADMIN — GABAPENTIN 400 MILLIGRAM(S): 400 CAPSULE ORAL at 21:41

## 2024-01-10 RX ADMIN — FAMOTIDINE 20 MILLIGRAM(S): 10 INJECTION INTRAVENOUS at 05:37

## 2024-01-10 RX ADMIN — ATORVASTATIN CALCIUM 10 MILLIGRAM(S): 80 TABLET, FILM COATED ORAL at 21:41

## 2024-01-10 RX ADMIN — LIDOCAINE 1 PATCH: 4 CREAM TOPICAL at 07:45

## 2024-01-10 RX ADMIN — LIDOCAINE 1 PATCH: 4 CREAM TOPICAL at 19:44

## 2024-01-10 RX ADMIN — BRIVARACETAM 50 MILLIGRAM(S): 25 TABLET, FILM COATED ORAL at 05:37

## 2024-01-10 RX ADMIN — FAMOTIDINE 20 MILLIGRAM(S): 10 INJECTION INTRAVENOUS at 17:08

## 2024-01-10 RX ADMIN — Medication 650 MILLIGRAM(S): at 20:41

## 2024-01-10 RX ADMIN — Medication 1 DROP(S): at 11:25

## 2024-01-10 RX ADMIN — Medication 1 SPRAY(S): at 05:38

## 2024-01-10 RX ADMIN — Medication 3 MILLIGRAM(S): at 21:42

## 2024-01-10 RX ADMIN — BRIVARACETAM 50 MILLIGRAM(S): 25 TABLET, FILM COATED ORAL at 17:08

## 2024-01-10 RX ADMIN — MODAFINIL 100 MILLIGRAM(S): 200 TABLET ORAL at 05:37

## 2024-01-10 RX ADMIN — Medication 1 SPRAY(S): at 17:09

## 2024-01-10 RX ADMIN — Medication 1 DROP(S): at 11:24

## 2024-01-10 RX ADMIN — Medication 650 MILLIGRAM(S): at 11:24

## 2024-01-10 RX ADMIN — Medication 650 MILLIGRAM(S): at 12:55

## 2024-01-10 NOTE — PROGRESS NOTE ADULT - ASSESSMENT
74 YO RIGHT handed woman with no PMH who presented to [facility] ED on 12/14/2023, as a transfer from [facility] with R IPH. Hospital course also significant for UTI, b/l DVTs, EEG with increased seizure risks.    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  - Head CT 12/27: The right parietal parenchymal hemorrhage is slightly less dense and slightly smaller measuring 3.0 cm in AP diameter by 4.3 cm transversely compared with the prior of 4.1 cm in AP diameter by 5.9 cm transversely. There is similar vasogenic edema. There is also similar mass effect on the atrium of the right lateral ventricle with similar mild midline shift to the left. There is resolution of the intraventricular hemorrhage in the left occipital horn.  - EEG with increased risk of seizures in the right frontocentral region  - Briviact 50mg BID for seizure prophylaxis.  - Cont Comprehensive Rehab Program: PT/OT/ST, 3hours daily and 5 days weekly.     #Wet Macular Degeneration  -Spoke with pt's home physician, Dr. Dominguez Joyner ([phone]) regarding current treatment plan.  Updated him on her status 1/4  -Patient was previously on Vabysmo injections every 4-5 weeks.  -Per Dr. Joyner she can return to office as outpt in January for her treatment  -Reviewed risk factor profile of Vabysmo which reports risk of thromboembolic events and explained this to patient on 1/5    # self-reported history of ADHD and restless, distracted, poor attention  - xanax Rx in I stop  - on. modafinil  100mg in AM--increased on 1/5--  --Pt's HCP called concerned that pt. seemed to be more anxious on the weekend and difficult to redirect during conversations  --Reached out to pt's PT, OT and speech therapists 1/8 to inquire about patient's cognition and mood and if any changes this week-- OT noted her attention and focus is better with directed tasks, but when she starts conversing its still difficult to redirect her.  Speech therapist noted pt. was less confabulatory and more aware of her deficits and focus on task was better today. PT also notes notable improvement in her attention and function.   Pt. does not seem to have significant anxiety in therapy.   - psych consult 1/2 reviewed and appreciated  - psychology and recreation therapy (former )    #   - discussed with hospitalist. Patient with ascvd is moderate risk  - C/W atorvastatin 10 mg qhs 12/29    # HTN  -  Losartan discontinued due to soft BP 12/27  - BP stable continue monitor        # Pain management  - Tylenol PRN--Nursing order written to offer prior to speech therapy  --Gabapentin  400mg qhs for neuropathic pain     - Lidocaine patch timing changed to bedtime per request. States she uses Salon Pas on left neck/shoulder at home.   - Cervical pillow for postioning midline and neck control, improved    # DVT   - b/l below the knee DVTs: right soleal vein, left soleal, peroneal and gastrocnemius veins.  - Lovenox 40 mg daily. Not cleared for full dose AC due to large ICH  - Doppler 12/20: Positive deep venous thrombosis below the right knee within the right soleal vein. Positive deep venous thrombosis below the left knee within the left soleal, peroneal and gastrocnemius veins.  - Doppler 12/27 surveillance - DVTS known - stable right improved on left.  - Repeat Head CT 12/27 improved followed by neuro consult re: possible full dose AC (day 14 post bleed)  - Neuro consult appreciated 12/28. Do not recommend full dose of AC at this time  -Repeat Head CT ~ 2 weeks-- on 1/10/24 prior to discharge.     # GI ppx/h/o reflux  - Pepcid 20mg   - TUMS, Maalox  - Senna  - metamucil home med unable to be verified by pharmacy (patient preference for pills as oppose to powder as offered here)     # Dysphagia --resolved  - Diet upgraded to Regular with thin liquids  -c/w AREDS 2 vitamin (home med)    #Case discussed in IDT rounds 1/9/24  -SLP: soft bite size; mild language deficits; mod-severe cognitive deficits,  left inattentiveness with max cueing, Impaired attention.   -OT: Eating/grooming setup; CG-Min A for toilet transfers; Min-Mod for shower transfers; CG for dressing  -PT: CS for bed mobility and transfers and 150ft ambulation with RW  - goals:   1. CS transfers and ambulation, CS bADLs;   Will need 24hr supervision  - target: dc SIVAN 1/11/23        # LABS:   [facility] 1/10    #[place] [facility]  CODE STATUS: FULL CODE    Outpatient Follow-up (Specialty/Name of physician):    Dr. Dominguez Joyner  Opthalmology   [phone]    Emy Prakash  NP in [address], [address]  [place] [address]  Phone: [phone]  Fax: [phone]  Follow Up Time: 2 weeks    Sergio Jurado  [address], [address]  [place] [address]  Phone: [phone]  Fax: [phone]  Follow Up Time: 1 month         72 YO RIGHT handed woman with no PMH who presented to [facility] ED on 12/14/2023, as a transfer from [facility] with R IPH. Hospital course also significant for UTI, b/l DVTs, EEG with increased seizure risks.    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  - Head CT 12/27: The right parietal parenchymal hemorrhage is slightly less dense and slightly smaller measuring 3.0 cm in AP diameter by 4.3 cm transversely compared with the prior of 4.1 cm in AP diameter by 5.9 cm transversely. There is similar vasogenic edema. There is also similar mass effect on the atrium of the right lateral ventricle with similar mild midline shift to the left. There is resolution of the intraventricular hemorrhage in the left occipital horn.  - EEG with increased risk of seizures in the right frontocentral region  - Briviact 50mg BID for seizure prophylaxis.  - Cont Comprehensive Rehab Program: PT/OT/ST, 3hours daily and 5 days weekly.     #Wet Macular Degeneration  -Spoke with pt's home physician, Dr. Dominguez Joyner ([phone]) regarding current treatment plan.  Updated him on her status 1/4  -Patient was previously on Vabysmo injections every 4-5 weeks.  -Per Dr. Joyner she can return to office as outpt in January for her treatment  -Reviewed risk factor profile of Vabysmo which reports risk of thromboembolic events and explained this to patient on 1/5    # self-reported history of ADHD and restless, distracted, poor attention  - xanax Rx in I stop  - on. modafinil  100mg in AM--increased on 1/5--  --Pt's HCP called concerned that pt. seemed to be more anxious on the weekend and difficult to redirect during conversations  --Reached out to pt's PT, OT and speech therapists 1/8 to inquire about patient's cognition and mood and if any changes this week-- OT noted her attention and focus is better with directed tasks, but when she starts conversing its still difficult to redirect her.  Speech therapist noted pt. was less confabulatory and more aware of her deficits and focus on task was better today. PT also notes notable improvement in her attention and function.   Pt. does not seem to have significant anxiety in therapy.   - psych consult 1/2 reviewed and appreciated  - psychology and recreation therapy (former )    #   - discussed with hospitalist. Patient with ascvd is moderate risk  - C/W atorvastatin 10 mg qhs 12/29    # HTN  -  Losartan discontinued due to soft BP 12/27  - BP stable continue monitor        # Pain management  - Tylenol PRN--Nursing order written to offer prior to speech therapy  --Gabapentin  400mg qhs for neuropathic pain     - Lidocaine patch timing changed to bedtime per request. States she uses Salon Pas on left neck/shoulder at home.   - Cervical pillow for postioning midline and neck control, improved    # DVT   - b/l below the knee DVTs: right soleal vein, left soleal, peroneal and gastrocnemius veins.  - Lovenox 40 mg daily. Not cleared for full dose AC due to large ICH  - Doppler 12/20: Positive deep venous thrombosis below the right knee within the right soleal vein. Positive deep venous thrombosis below the left knee within the left soleal, peroneal and gastrocnemius veins.  - Doppler 12/27 surveillance - DVTS known - stable right improved on left.  - Repeat Head CT 12/27 improved followed by neuro consult re: possible full dose AC (day 14 post bleed)  - Neuro consult appreciated 12/28. Do not recommend full dose of AC at this time  -Repeat Head CT ~ 2 weeks-- on 1/10/24 prior to discharge.     # GI ppx/h/o reflux  - Pepcid 20mg   - TUMS, Maalox  - Senna  - metamucil home med unable to be verified by pharmacy (patient preference for pills as oppose to powder as offered here)     # Dysphagia --resolved  - Diet upgraded to Regular with thin liquids  -c/w AREDS 2 vitamin (home med)    #Case discussed in IDT rounds 1/9/24  -SLP: soft bite size; mild language deficits; mod-severe cognitive deficits,  left inattentiveness with max cueing, Impaired attention.   -OT: Eating/grooming setup; CG-Min A for toilet transfers; Min-Mod for shower transfers; CG for dressing  -PT: CS for bed mobility and transfers and 150ft ambulation with RW  - goals:   1. CS transfers and ambulation, CS bADLs;   Will need 24hr supervision  - target: dc SIVAN 1/11/23        # LABS:   [facility]H 1/10    #[facility]  CODE STATUS: FULL CODE    Outpatient Follow-up (Specialty/Name of physician):    Dr. Dominguez Joyner  Opthalmology   [phone]    Emy Prakash  NP in [address], [address]  [place] [address]  Phone: [phone]  Fax: [phone]  Follow Up Time: 2 weeks    Sergio Jurado  [address], [address]  [place] [address]  Phone: [phone]  Fax: [phone]  Follow Up Time: 1 month

## 2024-01-10 NOTE — PROGRESS NOTE ADULT - ASSESSMENT
Pt alert, attentive, intact expressive language, thought processes - circumstantial, thought contents – no morbid contents noted, depressed affect, anxious mood, denied AH/VH, denied SI/HI/I/P, calm behavior, making efforts to cope by realistic means. Plan:  Continue with individual supportive tx.

## 2024-01-10 NOTE — PROGRESS NOTE ADULT - SUBJECTIVE AND OBJECTIVE BOX
Patient is a 73y old  Female who presents with a chief complaint of ICH     patient seen and examined.  denies complaints no n/v, no sob.  overall doing well.    Vital Signs Last 24 Hrs  T(C): 36.6 (10 Paulo 2024 08:45), Max: 36.6 (10 Paulo 2024 08:45)  T(F): 97.9 (10 Paulo 2024 08:45), Max: 97.9 (10 Paulo 2024 08:45)  HR: 82 (10 Paulo 2024 08:45) (82 - 83)  BP: 104/71 (10 Paulo 2024 08:45) (104/71 - 112/63)  BP(mean): --  RR: 16 (10 Paulo 2024 08:45) (15 - 16)  SpO2: 98% (10 Paulo 2024 08:45) (98% - 98%)    Parameters below as of 10 Paulo 2024 08:45  Patient On (Oxygen Delivery Method): room air      GENERAL- NAD  EAR/NOSE/MOUTH/THROAT -  MMM  EYES- JAMES, conjunctiva and Sclera clear  NECK- supple  RESPIRATORY-  clear to auscultation bilaterally, non laboured breathing  CARDIOVASCULAR - SIS2, RRR  GI - soft NT BS present  EXTREMITIES- no pedal edema  NEUROLOGY- mild LE weakness           MEDICATIONS  (STANDING):  atorvastatin 10 milliGRAM(s) Oral at bedtime  bisacodyl 5 milliGRAM(s) Oral daily  brivaracetam 50 milliGRAM(s) Oral two times a day  enoxaparin Injectable 40 milliGRAM(s) SubCutaneous <User Schedule>  famotidine    Tablet 20 milliGRAM(s) Oral two times a day  gabapentin 400 milliGRAM(s) Oral at bedtime  ketorolac 0.5% Ophthalmic Solution 1 Drop(s) Left EYE daily  lidocaine   4% Patch 1 Patch Transdermal <User Schedule>  lidocaine   4% Patch 1 Patch Transdermal <User Schedule>  modafinil 100 milliGRAM(s) Oral <User Schedule>  Preservision  Capsules AREDS 2 2 Capsule(s) 2 Capsule(s) Oral daily  psyllium Powder 1 Packet(s) Oral daily  senna 2 Tablet(s) Oral at bedtime  sodium chloride 0.65% Nasal 1 Spray(s) Both Nostrils two times a day    MEDICATIONS  (PRN):  acetaminophen     Tablet .. 650 milliGRAM(s) Oral every 6 hours PRN Temp greater or equal to 38C (100.4F), Mild Pain (1 - 3)  aluminum hydroxide/magnesium hydroxide/simethicone Suspension 30 milliLiter(s) Oral every 6 hours PRN Dyspepsia  artificial tears (preservative free) Ophthalmic Solution 1 Drop(s) Both EYES every 4 hours PRN Dry Eyes  bisacodyl Suppository 10 milliGRAM(s) Rectal daily PRN Constipation  calcium carbonate    500 mG (Tums) Chewable 1 Tablet(s) Chew three times a day PRN Heartburn  melatonin 3 milliGRAM(s) Oral at bedtime PRN Insomnia  ondansetron   Disintegrating Tablet 4 milliGRAM(s) Oral every 6 hours PRN Nausea and/or Vomiting   Patient is a 73y old  Female who presents with a chief complaint of ICH     patient seen and examined.  denies complaints no n/v, no sob.  overall doing well.    Vital Signs Last 24 Hrs  T(C): 36.6 (10 Paulo 2024 08:45), Max: 36.6 (10 Paulo 2024 08:45)  T(F): 97.9 (10 Paulo 2024 08:45), Max: 97.9 (10 Pualo 2024 08:45)  HR: 82 (10 Paulo 2024 08:45) (82 - 83)  BP: 104/71 (10 Paulo 2024 08:45) (104/71 - 112/63)  BP(mean): --  RR: 16 (10 Paulo 2024 08:45) (15 - 16)  SpO2: 98% (10 Paulo 2024 08:45) (98% - 98%)    Parameters below as of 10 Paulo 2024 08:45  Patient On (Oxygen Delivery Method): room air      GENERAL- NAD  EAR/NOSE/MOUTH/THROAT -  MMM  EYES- JAMES, conjunctiva and Sclera clear  NECK- supple  RESPIRATORY-  clear to auscultation bilaterally, non laboured breathing  CARDIOVASCULAR - SIS2, RRR  GI - soft NT BS present  EXTREMITIES- no pedal edema  NEUROLOGY- mild LE weakness           MEDICATIONS  (STANDING):  atorvastatin 10 milliGRAM(s) Oral at bedtime  bisacodyl 5 milliGRAM(s) Oral daily  brivaracetam 50 milliGRAM(s) Oral two times a day  enoxaparin Injectable 40 milliGRAM(s) SubCutaneous <User Schedule>  famotidine    Tablet 20 milliGRAM(s) Oral two times a day  gabapentin 400 milliGRAM(s) Oral at bedtime  ketorolac 0.5% Ophthalmic Solution 1 Drop(s) Left EYE daily  lidocaine   4% Patch 1 Patch Transdermal <User Schedule>  lidocaine   4% Patch 1 Patch Transdermal <User Schedule>  modafinil 100 milliGRAM(s) Oral <User Schedule>  Preservision  Capsules AREDS 2 2 Capsule(s) 2 Capsule(s) Oral daily  psyllium Powder 1 Packet(s) Oral daily  senna 2 Tablet(s) Oral at bedtime  sodium chloride 0.65% Nasal 1 Spray(s) Both Nostrils two times a day    MEDICATIONS  (PRN):  acetaminophen     Tablet .. 650 milliGRAM(s) Oral every 6 hours PRN Temp greater or equal to 38C (100.4F), Mild Pain (1 - 3)  aluminum hydroxide/magnesium hydroxide/simethicone Suspension 30 milliLiter(s) Oral every 6 hours PRN Dyspepsia  artificial tears (preservative free) Ophthalmic Solution 1 Drop(s) Both EYES every 4 hours PRN Dry Eyes  bisacodyl Suppository 10 milliGRAM(s) Rectal daily PRN Constipation  calcium carbonate    500 mG (Tums) Chewable 1 Tablet(s) Chew three times a day PRN Heartburn  melatonin 3 milliGRAM(s) Oral at bedtime PRN Insomnia  ondansetron   Disintegrating Tablet 4 milliGRAM(s) Oral every 6 hours PRN Nausea and/or Vomiting

## 2024-01-10 NOTE — PROGRESS NOTE ADULT - ASSESSMENT
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 Noted to have UTI, and BL Distal DVT, now admitted for rehab- pt/ot/dvt ppx    Intraparenchymal Hemorrhage  - Abnormal EEG and Briviact 50mg BID started for seizure ppx  - TTE- EF 64%.    #HTN  -BP was noted to be low, losartan stopped   - Goal BP <130mmhg Average.   - encourage PO hydration  - monitor BP    #bradycardia- improved   -likely neurogenic as per cardio eval prev  -monitor hr, avoid rate controlling agents    #UTI  - s/p Vantin 100mg BID (completed)  --resolved    #HLD  atorvastatin     #DVT   - Distal asymptomatic DVT  - continue prophylactic dose    will follow  d/w rehab team

## 2024-01-10 NOTE — PROGRESS NOTE ADULT - SUBJECTIVE AND OBJECTIVE BOX
HPI:  This is a 72 YO RIGHT handed woman with no PMH who presented to [facility] ED on 12/14/2023, as a transfer from [facility], as a CODE STROKE, with c/o R IPH.   CTH and CTA repeated - large R IPH (temporal/parietal). Presented to [place] today with c/o HA and AMS. Friend accompanying patient said that when visiting patient today - patient was not acting like herself and c/o HA. NIHSS 9.    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 Similar midline shift of 5 mm.  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 Briviact 50mg BID for seizure prophylaxis, EEG with increased risk of seizures in the right frontocentral region.TTE- EF 64%. Continue Losartan 25mg daily. 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 UA: positive for UTI, started on IV abx and then transitioned to oral, end date 12/22.      Patient was evaluated by PM&R and therapy for functional deficits, gait/ADL impairments and acute rehabilitation was recommended. Patient was medically optimized for discharge to [facility] IRU on 12/20/23. (20 Dec 2023 17:15)          Subjective:      VITALS  Vital Signs Last 24 Hrs  T(C): 36.6 (10 Paulo 2024 08:45), Max: 36.6 (10 Paulo 2024 08:45)  T(F): 97.9 (10 Paulo 2024 08:45), Max: 97.9 (10 Paulo 2024 08:45)  HR: 82 (10 Paulo 2024 08:45) (82 - 83)  BP: 104/71 (10 Paulo 2024 08:45) (104/71 - 112/63)  BP(mean): --  RR: 16 (10 Paulo 2024 08:45) (15 - 16)  SpO2: 98% (10 Paulo 2024 08:45) (98% - 98%)    Parameters below as of 10 Paulo 2024 08:45  Patient On (Oxygen Delivery Method): room air        REVIEW OF SYMPTOMS  Neurological deficits      PHYSICAL EXAM  Constitutional - NAD, Comfortable  HEENT - NCAT, EOMI  Chest - CTA bilaterally, Breathing comfortably on RA  Cardiovascular - RRR,  warm well perfused  Abdomen - BS+, Soft, NTND  Extremities - No edema, No calf tenderness   Neurologic Exam -                    Cognitive - Awake, Alert, AAO to self, place, date, year, situation     Communication - Fluent, No dysarthria,  Aphasia,      Cranial Nerves - PERRLA, EOMI, VF intact, No facial assymmetry, sensation intact, tongue midline, +dysphagia, +dysarthria     Motor - No focal deficits                    LEFT    UE - ShAB 5/5, EF 5/5, EE 5/5, WE 5/5,  5/5                    RIGHT UE - ShAB 5/5, EF 5/5, EE 5/5, WE 5/5,  5/5                    LEFT    LE - HF 5/5, KE 5/5, DF 5/5, PF 5/5                    RIGHT LE - HF 5/5, KE 5/5, DF 5/5, PF 5/5        Sensory - Intact to LT     Reflexes - DTR Intact, No primitive reflexive     Coordination - FTN intact  Psychiatric - Mood stable, Affect WNL  Skin -   Wounds -      RECENT LABS                  RADIOLOGY/OTHER RESULTS      MEDICATIONS  (STANDING):  atorvastatin 10 milliGRAM(s) Oral at bedtime  bisacodyl 5 milliGRAM(s) Oral daily  brivaracetam 50 milliGRAM(s) Oral two times a day  enoxaparin Injectable 40 milliGRAM(s) SubCutaneous <User Schedule>  famotidine    Tablet 20 milliGRAM(s) Oral two times a day  gabapentin 400 milliGRAM(s) Oral at bedtime  ketorolac 0.5% Ophthalmic Solution 1 Drop(s) Left EYE daily  lidocaine   4% Patch 1 Patch Transdermal <User Schedule>  lidocaine   4% Patch 1 Patch Transdermal <User Schedule>  modafinil 100 milliGRAM(s) Oral <User Schedule>  Preservision  Capsules AREDS 2 2 Capsule(s) 2 Capsule(s) Oral daily  psyllium Powder 1 Packet(s) Oral daily  senna 2 Tablet(s) Oral at bedtime  sodium chloride 0.65% Nasal 1 Spray(s) Both Nostrils two times a day    MEDICATIONS  (PRN):  acetaminophen     Tablet .. 650 milliGRAM(s) Oral every 6 hours PRN Temp greater or equal to 38C (100.4F), Mild Pain (1 - 3)  aluminum hydroxide/magnesium hydroxide/simethicone Suspension 30 milliLiter(s) Oral every 6 hours PRN Dyspepsia  artificial tears (preservative free) Ophthalmic Solution 1 Drop(s) Both EYES every 4 hours PRN Dry Eyes  bisacodyl Suppository 10 milliGRAM(s) Rectal daily PRN Constipation  calcium carbonate    500 mG (Tums) Chewable 1 Tablet(s) Chew three times a day PRN Heartburn  melatonin 3 milliGRAM(s) Oral at bedtime PRN Insomnia  ondansetron   Disintegrating Tablet 4 milliGRAM(s) Oral every 6 hours PRN Nausea and/or Vomiting         HPI:  This is a 72 YO RIGHT handed woman with no PMH who presented to [facility] ED on 12/14/2023, as a transfer from [facility], as a CODE STROKE, with c/o R IPH.   CTH and CTA repeated - large R IPH (temporal/parietal). Presented to [place] today with c/o HA and AMS. Friend accompanying patient said that when visiting patient today - patient was not acting like herself and c/o HA. NIHSS 9.    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 Similar midline shift of 5 mm.  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 Briviact 50mg BID for seizure prophylaxis, EEG with increased risk of seizures in the right frontocentral region.TTE- EF 64%. Continue Losartan 25mg daily. 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 UA: positive for UTI, started on IV abx and then transitioned to oral, end date 12/22.      Patient was evaluated by PM&R and therapy for functional deficits, gait/ADL impairments and acute rehabilitation was recommended. Patient was medically optimized for discharge to [facility] IRU on 12/20/23. (20 Dec 2023 17:15)          Subjective:      VITALS  Vital Signs Last 24 Hrs  T(C): 36.6 (10 Paulo 2024 08:45), Max: 36.6 (10 Paulo 2024 08:45)  T(F): 97.9 (10 Paulo 2024 08:45), Max: 97.9 (10 Paulo 2024 08:45)  HR: 82 (10 Paulo 2024 08:45) (82 - 83)  BP: 104/71 (10 Paulo 2024 08:45) (104/71 - 112/63)  BP(mean): --  RR: 16 (10 Paulo 2024 08:45) (15 - 16)  SpO2: 98% (10 Paulo 2024 08:45) (98% - 98%)    Parameters below as of 10 Paulo 2024 08:45  Patient On (Oxygen Delivery Method): room air        REVIEW OF SYMPTOMS  Neurological deficits      PHYSICAL EXAM  Constitutional - NAD, Comfortable  HEENT - NCAT, EOMI  Chest - CTA bilaterally, Breathing comfortably on RA  Cardiovascular - RRR,  warm well perfused  Abdomen - BS+, Soft, NTND  Extremities - No edema, No calf tenderness   Neurologic Exam -                    Cognitive - Awake, Alert, AAO to self, place, date, year, situation     Communication - Fluent, No dysarthria,  Aphasia,      Cranial Nerves - PERRLA, EOMI, VF intact, No facial assymmetry, sensation intact, tongue midline, +dysphagia, +dysarthria     Motor - No focal deficits                    LEFT    UE - ShAB 5/5, EF 5/5, EE 5/5, WE 5/5,  5/5                    RIGHT UE - ShAB 5/5, EF 5/5, EE 5/5, WE 5/5,  5/5                    LEFT    LE - HF 5/5, KE 5/5, DF 5/5, PF 5/5                    RIGHT LE - HF 5/5, KE 5/5, DF 5/5, PF 5/5        Sensory - Intact to LT     Reflexes - DTR Intact, No primitive reflexive     Coordination - FTN intact  Psychiatric - Mood stable, Affect WNL  Skin -   Wounds -      RECENT LABS                  RADIOLOGY/OTHER RESULTS      MEDICATIONS  (STANDING):  atorvastatin 10 milliGRAM(s) Oral at bedtime  bisacodyl 5 milliGRAM(s) Oral daily  brivaracetam 50 milliGRAM(s) Oral two times a day  enoxaparin Injectable 40 milliGRAM(s) SubCutaneous <User Schedule>  famotidine    Tablet 20 milliGRAM(s) Oral two times a day  gabapentin 400 milliGRAM(s) Oral at bedtime  ketorolac 0.5% Ophthalmic Solution 1 Drop(s) Left EYE daily  lidocaine   4% Patch 1 Patch Transdermal <User Schedule>  lidocaine   4% Patch 1 Patch Transdermal <User Schedule>  modafinil 100 milliGRAM(s) Oral <User Schedule>  Preservision  Capsules AREDS 2 2 Capsule(s) 2 Capsule(s) Oral daily  psyllium Powder 1 Packet(s) Oral daily  senna 2 Tablet(s) Oral at bedtime  sodium chloride 0.65% Nasal 1 Spray(s) Both Nostrils two times a day    MEDICATIONS  (PRN):  acetaminophen     Tablet .. 650 milliGRAM(s) Oral every 6 hours PRN Temp greater or equal to 38C (100.4F), Mild Pain (1 - 3)  aluminum hydroxide/magnesium hydroxide/simethicone Suspension 30 milliLiter(s) Oral every 6 hours PRN Dyspepsia  artificial tears (preservative free) Ophthalmic Solution 1 Drop(s) Both EYES every 4 hours PRN Dry Eyes  bisacodyl Suppository 10 milliGRAM(s) Rectal daily PRN Constipation  calcium carbonate    500 mG (Tums) Chewable 1 Tablet(s) Chew three times a day PRN Heartburn  melatonin 3 milliGRAM(s) Oral at bedtime PRN Insomnia  ondansetron   Disintegrating Tablet 4 milliGRAM(s) Oral every 6 hours PRN Nausea and/or Vomiting         HPI:  This is a 72 YO RIGHT handed woman with no PMH who presented to [facility] ED on 12/14/2023, as a transfer from [facility], as a CODE STROKE, with c/o R IPH.   CTH and CTA repeated - large R IPH (temporal/parietal). Presented to [place] today with c/o HA and AMS. Friend accompanying patient said that when visiting patient today - patient was not acting like herself and c/o HA. NIHSS 9.    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 Similar midline shift of 5 mm.  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 Briviact 50mg BID for seizure prophylaxis, EEG with increased risk of seizures in the right frontocentral region.TTE- EF 64%. Continue Losartan 25mg daily. 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 UA: positive for UTI, started on IV abx and then transitioned to oral, end date 12/22.      Patient was evaluated by PM&R and therapy for functional deficits, gait/ADL impairments and acute rehabilitation was recommended. Patient was medically optimized for discharge to [facility] IRU on 12/20/23. (20 Dec 2023 17:15)          Subjective:  Seen this AM.  Slept during the night.  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  Asking if she should take tylenol prior to speech therapy.   She notes some slight tremors in her hands but they are not limiting her or her ablity to complete functional tasks       VITALS  Vital Signs Last 24 Hrs  T(C): 36.6 (10 Paulo 2024 08:45), Max: 36.6 (10 Paulo 2024 08:45)  T(F): 97.9 (10 Paulo 2024 08:45), Max: 97.9 (10 Paulo 2024 08:45)  HR: 82 (10 Paulo 2024 08:45) (82 - 83)  BP: 104/71 (10 Paulo 2024 08:45) (104/71 - 112/63)  BP(mean): --  RR: 16 (10 Paulo 2024 08:45) (15 - 16)  SpO2: 98% (10 Paulo 2024 08:45) (98% - 98%)    Parameters below as of 10 Paulo 2024 08:45  Patient On (Oxygen Delivery Method): room air        REVIEW OF SYMPTOMS  Neurological deficits      PHYSICAL EXAM  Constitutional: NAD, sitting in wheelchair  Respiratory: breathing comfortably   Cardiovascular: warm and well perfused, RRR  Gastrointestinal: Abdomen soft, nondistended  Neurological: Left inattention-- improving,  still needs cueing but more aware of her deficit.    Sensation: intact to LT in BL LE  Musculoskeletal: 4/5 throughout upper and lower extremities  Coordination: +Dysmetria --improved from prior  Psychiatric: calm       RECENT LABS                  RADIOLOGY/OTHER RESULTS      MEDICATIONS  (STANDING):  atorvastatin 10 milliGRAM(s) Oral at bedtime  bisacodyl 5 milliGRAM(s) Oral daily  brivaracetam 50 milliGRAM(s) Oral two times a day  enoxaparin Injectable 40 milliGRAM(s) SubCutaneous <User Schedule>  famotidine    Tablet 20 milliGRAM(s) Oral two times a day  gabapentin 400 milliGRAM(s) Oral at bedtime  ketorolac 0.5% Ophthalmic Solution 1 Drop(s) Left EYE daily  lidocaine   4% Patch 1 Patch Transdermal <User Schedule>  lidocaine   4% Patch 1 Patch Transdermal <User Schedule>  modafinil 100 milliGRAM(s) Oral <User Schedule>  Preservision  Capsules AREDS 2 2 Capsule(s) 2 Capsule(s) Oral daily  psyllium Powder 1 Packet(s) Oral daily  senna 2 Tablet(s) Oral at bedtime  sodium chloride 0.65% Nasal 1 Spray(s) Both Nostrils two times a day    MEDICATIONS  (PRN):  acetaminophen     Tablet .. 650 milliGRAM(s) Oral every 6 hours PRN Temp greater or equal to 38C (100.4F), Mild Pain (1 - 3)  aluminum hydroxide/magnesium hydroxide/simethicone Suspension 30 milliLiter(s) Oral every 6 hours PRN Dyspepsia  artificial tears (preservative free) Ophthalmic Solution 1 Drop(s) Both EYES every 4 hours PRN Dry Eyes  bisacodyl Suppository 10 milliGRAM(s) Rectal daily PRN Constipation  calcium carbonate    500 mG (Tums) Chewable 1 Tablet(s) Chew three times a day PRN Heartburn  melatonin 3 milliGRAM(s) Oral at bedtime PRN Insomnia  ondansetron   Disintegrating Tablet 4 milliGRAM(s) Oral every 6 hours PRN Nausea and/or Vomiting         HPI:  This is a 74 YO RIGHT handed woman with no PMH who presented to [facility] ED on 12/14/2023, as a transfer from [facility], as a CODE STROKE, with c/o R IPH.   CTH and CTA repeated - large R IPH (temporal/parietal). Presented to [place] today with c/o HA and AMS. Friend accompanying patient said that when visiting patient today - patient was not acting like herself and c/o HA. NIHSS 9.    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 Similar midline shift of 5 mm.  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 Briviact 50mg BID for seizure prophylaxis, EEG with increased risk of seizures in the right frontocentral region.TTE- EF 64%. Continue Losartan 25mg daily. 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 UA: positive for UTI, started on IV abx and then transitioned to oral, end date 12/22.      Patient was evaluated by PM&R and therapy for functional deficits, gait/ADL impairments and acute rehabilitation was recommended. Patient was medically optimized for discharge to [facility] IRU on 12/20/23. (20 Dec 2023 17:15)          Subjective:  Seen this AM.  Slept during the night.  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  Asking if she should take tylenol prior to speech therapy.   She notes some slight tremors in her hands but they are not limiting her or her ablity to complete functional tasks       VITALS  Vital Signs Last 24 Hrs  T(C): 36.6 (10 Paulo 2024 08:45), Max: 36.6 (10 Paulo 2024 08:45)  T(F): 97.9 (10 Paulo 2024 08:45), Max: 97.9 (10 Paulo 2024 08:45)  HR: 82 (10 Paulo 2024 08:45) (82 - 83)  BP: 104/71 (10 Paulo 2024 08:45) (104/71 - 112/63)  BP(mean): --  RR: 16 (10 Paulo 2024 08:45) (15 - 16)  SpO2: 98% (10 Paulo 2024 08:45) (98% - 98%)    Parameters below as of 10 Paulo 2024 08:45  Patient On (Oxygen Delivery Method): room air        REVIEW OF SYMPTOMS  Neurological deficits      PHYSICAL EXAM  Constitutional: NAD, sitting in wheelchair  Respiratory: breathing comfortably   Cardiovascular: warm and well perfused, RRR  Gastrointestinal: Abdomen soft, nondistended  Neurological: Left inattention-- improving,  still needs cueing but more aware of her deficit.    Sensation: intact to LT in BL LE  Musculoskeletal: 4/5 throughout upper and lower extremities  Coordination: +Dysmetria --improved from prior  Psychiatric: calm       RECENT LABS                  RADIOLOGY/OTHER RESULTS      MEDICATIONS  (STANDING):  atorvastatin 10 milliGRAM(s) Oral at bedtime  bisacodyl 5 milliGRAM(s) Oral daily  brivaracetam 50 milliGRAM(s) Oral two times a day  enoxaparin Injectable 40 milliGRAM(s) SubCutaneous <User Schedule>  famotidine    Tablet 20 milliGRAM(s) Oral two times a day  gabapentin 400 milliGRAM(s) Oral at bedtime  ketorolac 0.5% Ophthalmic Solution 1 Drop(s) Left EYE daily  lidocaine   4% Patch 1 Patch Transdermal <User Schedule>  lidocaine   4% Patch 1 Patch Transdermal <User Schedule>  modafinil 100 milliGRAM(s) Oral <User Schedule>  Preservision  Capsules AREDS 2 2 Capsule(s) 2 Capsule(s) Oral daily  psyllium Powder 1 Packet(s) Oral daily  senna 2 Tablet(s) Oral at bedtime  sodium chloride 0.65% Nasal 1 Spray(s) Both Nostrils two times a day    MEDICATIONS  (PRN):  acetaminophen     Tablet .. 650 milliGRAM(s) Oral every 6 hours PRN Temp greater or equal to 38C (100.4F), Mild Pain (1 - 3)  aluminum hydroxide/magnesium hydroxide/simethicone Suspension 30 milliLiter(s) Oral every 6 hours PRN Dyspepsia  artificial tears (preservative free) Ophthalmic Solution 1 Drop(s) Both EYES every 4 hours PRN Dry Eyes  bisacodyl Suppository 10 milliGRAM(s) Rectal daily PRN Constipation  calcium carbonate    500 mG (Tums) Chewable 1 Tablet(s) Chew three times a day PRN Heartburn  melatonin 3 milliGRAM(s) Oral at bedtime PRN Insomnia  ondansetron   Disintegrating Tablet 4 milliGRAM(s) Oral every 6 hours PRN Nausea and/or Vomiting         HPI:  This is a 74 YO RIGHT handed woman with no PMH who presented to [facility] ED on 12/14/2023, as a transfer from [facility], as a CODE STROKE, with c/o R IPH.   CTH and CTA repeated - large R IPH (temporal/parietal). Presented to [place] today with c/o HA and AMS. Friend accompanying patient said that when visiting patient today - patient was not acting like herself and c/o HA. NIHSS 9.    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 Similar midline shift of 5 mm.  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 Briviact 50mg BID for seizure prophylaxis, EEG with increased risk of seizures in the right frontocentral region.TTE- EF 64%. Continue Losartan 25mg daily. 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 UA: positive for UTI, started on IV abx and then transitioned to oral, end date 12/22.      Patient was evaluated by PM&R and therapy for functional deficits, gait/ADL impairments and acute rehabilitation was recommended. Patient was medically optimized for discharge to [facility] IRU on 12/20/23. (20 Dec 2023 17:15)          Subjective:  Seen this AM.  Slept during the night.  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  Asking if she should take tylenol prior to speech therapy.   She notes some slight tremors in her hands but they are not limiting her or her ablity to complete functional tasks.        VITALS  Vital Signs Last 24 Hrs  T(C): 36.6 (10 Paulo 2024 08:45), Max: 36.6 (10 Paulo 2024 08:45)  T(F): 97.9 (10 Paulo 2024 08:45), Max: 97.9 (10 Paulo 2024 08:45)  HR: 82 (10 Paulo 2024 08:45) (82 - 83)  BP: 104/71 (10 Paulo 2024 08:45) (104/71 - 112/63)  BP(mean): --  RR: 16 (10 Paulo 2024 08:45) (15 - 16)  SpO2: 98% (10 Paulo 2024 08:45) (98% - 98%)    Parameters below as of 10 Paulo 2024 08:45  Patient On (Oxygen Delivery Method): room air        REVIEW OF SYMPTOMS  Neurological deficits      PHYSICAL EXAM  Constitutional: NAD, sitting in wheelchair  Respiratory: breathing comfortably   Cardiovascular: warm and well perfused, RRR  Gastrointestinal: Abdomen soft, nondistended  Neurological: Left inattention-- improving,  still needs cueing but more aware of her deficit.    Sensation: intact to LT in BL LE  Musculoskeletal: 4/5 throughout upper and lower extremities  Coordination: +Dysmetria --improved from prior  Psychiatric: calm       RECENT LABS                  RADIOLOGY/OTHER RESULTS      MEDICATIONS  (STANDING):  atorvastatin 10 milliGRAM(s) Oral at bedtime  bisacodyl 5 milliGRAM(s) Oral daily  brivaracetam 50 milliGRAM(s) Oral two times a day  enoxaparin Injectable 40 milliGRAM(s) SubCutaneous <User Schedule>  famotidine    Tablet 20 milliGRAM(s) Oral two times a day  gabapentin 400 milliGRAM(s) Oral at bedtime  ketorolac 0.5% Ophthalmic Solution 1 Drop(s) Left EYE daily  lidocaine   4% Patch 1 Patch Transdermal <User Schedule>  lidocaine   4% Patch 1 Patch Transdermal <User Schedule>  modafinil 100 milliGRAM(s) Oral <User Schedule>  Preservision  Capsules AREDS 2 2 Capsule(s) 2 Capsule(s) Oral daily  psyllium Powder 1 Packet(s) Oral daily  senna 2 Tablet(s) Oral at bedtime  sodium chloride 0.65% Nasal 1 Spray(s) Both Nostrils two times a day    MEDICATIONS  (PRN):  acetaminophen     Tablet .. 650 milliGRAM(s) Oral every 6 hours PRN Temp greater or equal to 38C (100.4F), Mild Pain (1 - 3)  aluminum hydroxide/magnesium hydroxide/simethicone Suspension 30 milliLiter(s) Oral every 6 hours PRN Dyspepsia  artificial tears (preservative free) Ophthalmic Solution 1 Drop(s) Both EYES every 4 hours PRN Dry Eyes  bisacodyl Suppository 10 milliGRAM(s) Rectal daily PRN Constipation  calcium carbonate    500 mG (Tums) Chewable 1 Tablet(s) Chew three times a day PRN Heartburn  melatonin 3 milliGRAM(s) Oral at bedtime PRN Insomnia  ondansetron   Disintegrating Tablet 4 milliGRAM(s) Oral every 6 hours PRN Nausea and/or Vomiting         HPI:  This is a 72 YO RIGHT handed woman with no PMH who presented to [facility] ED on 12/14/2023, as a transfer from [facility], as a CODE STROKE, with c/o R IPH.   CTH and CTA repeated - large R IPH (temporal/parietal). Presented to [place] today with c/o HA and AMS. Friend accompanying patient said that when visiting patient today - patient was not acting like herself and c/o HA. NIHSS 9.    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 Similar midline shift of 5 mm.  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 Briviact 50mg BID for seizure prophylaxis, EEG with increased risk of seizures in the right frontocentral region.TTE- EF 64%. Continue Losartan 25mg daily. 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 UA: positive for UTI, started on IV abx and then transitioned to oral, end date 12/22.      Patient was evaluated by PM&R and therapy for functional deficits, gait/ADL impairments and acute rehabilitation was recommended. Patient was medically optimized for discharge to [facility] IRU on 12/20/23. (20 Dec 2023 17:15)          Subjective:  Seen this AM.  Slept during the night.  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  Asking if she should take tylenol prior to speech therapy.   She notes some slight tremors in her hands but they are not limiting her or her ablity to complete functional tasks.        VITALS  Vital Signs Last 24 Hrs  T(C): 36.6 (10 Paulo 2024 08:45), Max: 36.6 (10 Paulo 2024 08:45)  T(F): 97.9 (10 Paulo 2024 08:45), Max: 97.9 (10 Paulo 2024 08:45)  HR: 82 (10 Paulo 2024 08:45) (82 - 83)  BP: 104/71 (10 Paulo 2024 08:45) (104/71 - 112/63)  BP(mean): --  RR: 16 (10 Paulo 2024 08:45) (15 - 16)  SpO2: 98% (10 Paulo 2024 08:45) (98% - 98%)    Parameters below as of 10 Paulo 2024 08:45  Patient On (Oxygen Delivery Method): room air        REVIEW OF SYMPTOMS  Neurological deficits      PHYSICAL EXAM  Constitutional: NAD, sitting in wheelchair  Respiratory: breathing comfortably   Cardiovascular: warm and well perfused, RRR  Gastrointestinal: Abdomen soft, nondistended  Neurological: Left inattention-- improving,  still needs cueing but more aware of her deficit.    Sensation: intact to LT in BL LE  Musculoskeletal: 4/5 throughout upper and lower extremities  Coordination: +Dysmetria --improved from prior  Psychiatric: calm       RECENT LABS                  RADIOLOGY/OTHER RESULTS      MEDICATIONS  (STANDING):  atorvastatin 10 milliGRAM(s) Oral at bedtime  bisacodyl 5 milliGRAM(s) Oral daily  brivaracetam 50 milliGRAM(s) Oral two times a day  enoxaparin Injectable 40 milliGRAM(s) SubCutaneous <User Schedule>  famotidine    Tablet 20 milliGRAM(s) Oral two times a day  gabapentin 400 milliGRAM(s) Oral at bedtime  ketorolac 0.5% Ophthalmic Solution 1 Drop(s) Left EYE daily  lidocaine   4% Patch 1 Patch Transdermal <User Schedule>  lidocaine   4% Patch 1 Patch Transdermal <User Schedule>  modafinil 100 milliGRAM(s) Oral <User Schedule>  Preservision  Capsules AREDS 2 2 Capsule(s) 2 Capsule(s) Oral daily  psyllium Powder 1 Packet(s) Oral daily  senna 2 Tablet(s) Oral at bedtime  sodium chloride 0.65% Nasal 1 Spray(s) Both Nostrils two times a day    MEDICATIONS  (PRN):  acetaminophen     Tablet .. 650 milliGRAM(s) Oral every 6 hours PRN Temp greater or equal to 38C (100.4F), Mild Pain (1 - 3)  aluminum hydroxide/magnesium hydroxide/simethicone Suspension 30 milliLiter(s) Oral every 6 hours PRN Dyspepsia  artificial tears (preservative free) Ophthalmic Solution 1 Drop(s) Both EYES every 4 hours PRN Dry Eyes  bisacodyl Suppository 10 milliGRAM(s) Rectal daily PRN Constipation  calcium carbonate    500 mG (Tums) Chewable 1 Tablet(s) Chew three times a day PRN Heartburn  melatonin 3 milliGRAM(s) Oral at bedtime PRN Insomnia  ondansetron   Disintegrating Tablet 4 milliGRAM(s) Oral every 6 hours PRN Nausea and/or Vomiting

## 2024-01-10 NOTE — PROGRESS NOTE ADULT - SUBJECTIVE AND OBJECTIVE BOX
Pt was seen for 40 minutes for supportive tx. Pt c/o anxiety, worry. Pt reported doing well since last seen. She continues to fully participate in all her rehab tx and to notice improvement in all areas, including ambulation with walker in PT, performance of her ADLs, and everyday cognition. Pt expressed concern about noticing tremor in her hands, particularly her right hand, during certain activities. Explored Pt’s pattern of tremor, mild, which appears to be related to exertion and holding for long times a bag with her belongings. Agreed to inform her attending doctor, Dr. Emery, and attempted to reassure Pt that at this stage in her recovery occasional tremor, due to medication s/e and other sources, was relatively common. Pt also expressed concern about recurrence of CVA as she was wondering what she’d do if she had another CVA. Education was provided on CVA issues focusing on prevention, and reassured Pt that her d/c plan would include elements for prevention to be f/u with her outpatient providers. Pt also expressed concern about transfer to [facility], and fearing not being able to have the same connection she has with her current team. Normalized her feelings and pointed to her strengths in starting and maintaining relationships, as well as elements of the culture of rehabilitation that will help her to adjust to the new placement; Pt expressed optimism about it. Support and encouragement were provided.      Pt was seen for 40 minutes for supportive tx. Pt c/o anxiety, worry. Pt reported doing well since last seen. She continues to fully participate in all her rehab tx and to notice improvement in all areas, including ambulation with walker in PT, performance of her ADLs, and everyday cognition. Pt expressed concern about noticing tremor in her hands, particularly her right hand, during certain activities. Explored Pt’s pattern of tremor, mild, which appears to be related to exertion and holding for long times a bag with her belongings. Agreed to inform her attending doctor, Dr. Emery, and attempted to reassure Pt that at this stage in her recovery occasional tremor, due to medication s/e and other sources, was relatively common. Pt also expressed concern about recurrence of CVA as she was wondering what she’d do if she had another CVA. Education was provided on CVA issues focusing on prevention, and reassured Pt that her d/c plan would include elements for prevention to be f/u with her outpatient providers. Pt also expressed concern about transfer to [facility], and fearing not being able to have the same connection she has with her current team. Normalized her feelings and pointed to her strengths in starting and maintaining relationships, as well as elements of the culture of rehabilitation that will help her to adjust to the new placement; Pt expressed optimism about it. Support and encouragement were provided.

## 2024-01-11 ENCOUNTER — TRANSCRIPTION ENCOUNTER (OUTPATIENT)
Age: 74
End: 2024-01-11

## 2024-01-11 VITALS
TEMPERATURE: 97 F | RESPIRATION RATE: 14 BRPM | HEART RATE: 98 BPM | SYSTOLIC BLOOD PRESSURE: 123 MMHG | OXYGEN SATURATION: 98 % | DIASTOLIC BLOOD PRESSURE: 80 MMHG

## 2024-01-11 LAB
ALBUMIN SERPL ELPH-MCNC: 3 G/DL — LOW (ref 3.3–5)
ALBUMIN SERPL ELPH-MCNC: 3 G/DL — LOW (ref 3.3–5)
ALP SERPL-CCNC: 58 U/L — SIGNIFICANT CHANGE UP (ref 40–120)
ALP SERPL-CCNC: 58 U/L — SIGNIFICANT CHANGE UP (ref 40–120)
ALT FLD-CCNC: 40 U/L — SIGNIFICANT CHANGE UP (ref 10–45)
ALT FLD-CCNC: 40 U/L — SIGNIFICANT CHANGE UP (ref 10–45)
ANION GAP SERPL CALC-SCNC: 11 MMOL/L — SIGNIFICANT CHANGE UP (ref 5–17)
ANION GAP SERPL CALC-SCNC: 11 MMOL/L — SIGNIFICANT CHANGE UP (ref 5–17)
AST SERPL-CCNC: 21 U/L — SIGNIFICANT CHANGE UP (ref 10–40)
AST SERPL-CCNC: 21 U/L — SIGNIFICANT CHANGE UP (ref 10–40)
BILIRUB SERPL-MCNC: 0.3 MG/DL — SIGNIFICANT CHANGE UP (ref 0.2–1.2)
BILIRUB SERPL-MCNC: 0.3 MG/DL — SIGNIFICANT CHANGE UP (ref 0.2–1.2)
BUN SERPL-MCNC: 11 MG/DL — SIGNIFICANT CHANGE UP (ref 7–23)
BUN SERPL-MCNC: 11 MG/DL — SIGNIFICANT CHANGE UP (ref 7–23)
CALCIUM SERPL-MCNC: 9.2 MG/DL — SIGNIFICANT CHANGE UP (ref 8.4–10.5)
CALCIUM SERPL-MCNC: 9.2 MG/DL — SIGNIFICANT CHANGE UP (ref 8.4–10.5)
CHLORIDE SERPL-SCNC: 105 MMOL/L — SIGNIFICANT CHANGE UP (ref 96–108)
CHLORIDE SERPL-SCNC: 105 MMOL/L — SIGNIFICANT CHANGE UP (ref 96–108)
CO2 SERPL-SCNC: 25 MMOL/L — SIGNIFICANT CHANGE UP (ref 22–31)
CO2 SERPL-SCNC: 25 MMOL/L — SIGNIFICANT CHANGE UP (ref 22–31)
CREAT SERPL-MCNC: 0.64 MG/DL — SIGNIFICANT CHANGE UP (ref 0.5–1.3)
CREAT SERPL-MCNC: 0.64 MG/DL — SIGNIFICANT CHANGE UP (ref 0.5–1.3)
EGFR: 93 ML/MIN/1.73M2 — SIGNIFICANT CHANGE UP
EGFR: 93 ML/MIN/1.73M2 — SIGNIFICANT CHANGE UP
GLUCOSE SERPL-MCNC: 82 MG/DL — SIGNIFICANT CHANGE UP (ref 70–99)
GLUCOSE SERPL-MCNC: 82 MG/DL — SIGNIFICANT CHANGE UP (ref 70–99)
HCT VFR BLD CALC: 37 % — SIGNIFICANT CHANGE UP (ref 34.5–45)
HCT VFR BLD CALC: 37 % — SIGNIFICANT CHANGE UP (ref 34.5–45)
HGB BLD-MCNC: 13 G/DL — SIGNIFICANT CHANGE UP (ref 11.5–15.5)
HGB BLD-MCNC: 13 G/DL — SIGNIFICANT CHANGE UP (ref 11.5–15.5)
MCHC RBC-ENTMCNC: 31 PG — SIGNIFICANT CHANGE UP (ref 27–34)
MCHC RBC-ENTMCNC: 31 PG — SIGNIFICANT CHANGE UP (ref 27–34)
MCHC RBC-ENTMCNC: 35.1 GM/DL — SIGNIFICANT CHANGE UP (ref 32–36)
MCHC RBC-ENTMCNC: 35.1 GM/DL — SIGNIFICANT CHANGE UP (ref 32–36)
MCV RBC AUTO: 88.3 FL — SIGNIFICANT CHANGE UP (ref 80–100)
MCV RBC AUTO: 88.3 FL — SIGNIFICANT CHANGE UP (ref 80–100)
NRBC # BLD: 0 /100 WBCS — SIGNIFICANT CHANGE UP (ref 0–0)
NRBC # BLD: 0 /100 WBCS — SIGNIFICANT CHANGE UP (ref 0–0)
PLATELET # BLD AUTO: 227 K/UL — SIGNIFICANT CHANGE UP (ref 150–400)
PLATELET # BLD AUTO: 227 K/UL — SIGNIFICANT CHANGE UP (ref 150–400)
POTASSIUM SERPL-MCNC: 3.7 MMOL/L — SIGNIFICANT CHANGE UP (ref 3.5–5.3)
POTASSIUM SERPL-MCNC: 3.7 MMOL/L — SIGNIFICANT CHANGE UP (ref 3.5–5.3)
POTASSIUM SERPL-SCNC: 3.7 MMOL/L — SIGNIFICANT CHANGE UP (ref 3.5–5.3)
POTASSIUM SERPL-SCNC: 3.7 MMOL/L — SIGNIFICANT CHANGE UP (ref 3.5–5.3)
PROT SERPL-MCNC: 6.1 G/DL — SIGNIFICANT CHANGE UP (ref 6–8.3)
PROT SERPL-MCNC: 6.1 G/DL — SIGNIFICANT CHANGE UP (ref 6–8.3)
RBC # BLD: 4.19 M/UL — SIGNIFICANT CHANGE UP (ref 3.8–5.2)
RBC # BLD: 4.19 M/UL — SIGNIFICANT CHANGE UP (ref 3.8–5.2)
RBC # FLD: 13 % — SIGNIFICANT CHANGE UP (ref 10.3–14.5)
RBC # FLD: 13 % — SIGNIFICANT CHANGE UP (ref 10.3–14.5)
SODIUM SERPL-SCNC: 141 MMOL/L — SIGNIFICANT CHANGE UP (ref 135–145)
SODIUM SERPL-SCNC: 141 MMOL/L — SIGNIFICANT CHANGE UP (ref 135–145)
WBC # BLD: 4.51 K/UL — SIGNIFICANT CHANGE UP (ref 3.8–10.5)
WBC # BLD: 4.51 K/UL — SIGNIFICANT CHANGE UP (ref 3.8–10.5)
WBC # FLD AUTO: 4.51 K/UL — SIGNIFICANT CHANGE UP (ref 3.8–10.5)
WBC # FLD AUTO: 4.51 K/UL — SIGNIFICANT CHANGE UP (ref 3.8–10.5)

## 2024-01-11 PROCEDURE — 99239 HOSP IP/OBS DSCHRG MGMT >30: CPT

## 2024-01-11 RX ADMIN — MODAFINIL 100 MILLIGRAM(S): 200 TABLET ORAL at 05:38

## 2024-01-11 RX ADMIN — BRIVARACETAM 50 MILLIGRAM(S): 25 TABLET, FILM COATED ORAL at 17:24

## 2024-01-11 RX ADMIN — ENOXAPARIN SODIUM 40 MILLIGRAM(S): 100 INJECTION SUBCUTANEOUS at 17:24

## 2024-01-11 RX ADMIN — FAMOTIDINE 20 MILLIGRAM(S): 10 INJECTION INTRAVENOUS at 05:37

## 2024-01-11 RX ADMIN — Medication 1 DROP(S): at 12:08

## 2024-01-11 RX ADMIN — Medication 1 SPRAY(S): at 17:24

## 2024-01-11 RX ADMIN — FAMOTIDINE 20 MILLIGRAM(S): 10 INJECTION INTRAVENOUS at 17:25

## 2024-01-11 RX ADMIN — Medication 1 SPRAY(S): at 05:37

## 2024-01-11 RX ADMIN — BRIVARACETAM 50 MILLIGRAM(S): 25 TABLET, FILM COATED ORAL at 05:37

## 2024-01-11 NOTE — PROGRESS NOTE ADULT - RESPIRATORY
normal/clear to auscultation bilaterally/no wheezes/no rales/no rhonchi
no respiratory distress

## 2024-01-11 NOTE — PROGRESS NOTE ADULT - SUBJECTIVE AND OBJECTIVE BOX
Patient is a 73y old  Female who presents with a chief complaint of ICH (10 Paulo 2024 10:55)      HPI:  This is a 74 YO RIGHT handed woman with no PMH who presented to [facility] ED on 12/14/2023, as a transfer from [facility], as a CODE STROKE, with c/o R IPH.   CTH and CTA repeated - large R IPH (temporal/parietal). Presented to [place] today with c/o HA and AMS. Friend accompanying patient said that when visiting patient today - patient was not acting like herself and c/o HA. NIHSS 9.    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 Similar midline shift of 5 mm.  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 Briviact 50mg BID for seizure prophylaxis, EEG with increased risk of seizures in the right frontocentral region.TTE- EF 64%. Continue Losartan 25mg daily. 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 UA: positive for UTI, started on IV abx and then transitioned to oral, end date 12/22.      Patient was evaluated by PM&R and therapy for functional deficits, gait/ADL impairments and acute rehabilitation was recommended. Patient was medically optimized for discharge to [facility] IRU on 12/20/23. (20 Dec 2023 17:15)      PAST MEDICAL & SURGICAL HISTORY:  No pertinent past medical history          MEDICATIONS  (STANDING):  atorvastatin 10 milliGRAM(s) Oral at bedtime  bisacodyl 5 milliGRAM(s) Oral daily  brivaracetam 50 milliGRAM(s) Oral two times a day  enoxaparin Injectable 40 milliGRAM(s) SubCutaneous <User Schedule>  famotidine    Tablet 20 milliGRAM(s) Oral two times a day  gabapentin 400 milliGRAM(s) Oral at bedtime  ketorolac 0.5% Ophthalmic Solution 1 Drop(s) Left EYE daily  lidocaine   4% Patch 1 Patch Transdermal <User Schedule>  lidocaine   4% Patch 1 Patch Transdermal <User Schedule>  modafinil 100 milliGRAM(s) Oral <User Schedule>  Preservision  Capsules AREDS 2 2 Capsule(s) 2 Capsule(s) Oral daily  psyllium Powder 1 Packet(s) Oral daily  senna 2 Tablet(s) Oral at bedtime  sodium chloride 0.65% Nasal 1 Spray(s) Both Nostrils two times a day    MEDICATIONS  (PRN):  acetaminophen     Tablet .. 650 milliGRAM(s) Oral every 6 hours PRN Temp greater or equal to 38C (100.4F), Mild Pain (1 - 3)  aluminum hydroxide/magnesium hydroxide/simethicone Suspension 30 milliLiter(s) Oral every 6 hours PRN Dyspepsia  artificial tears (preservative free) Ophthalmic Solution 1 Drop(s) Both EYES every 4 hours PRN Dry Eyes  bisacodyl Suppository 10 milliGRAM(s) Rectal daily PRN Constipation  calcium carbonate    500 mG (Tums) Chewable 1 Tablet(s) Chew three times a day PRN Heartburn  melatonin 3 milliGRAM(s) Oral at bedtime PRN Insomnia  ondansetron   Disintegrating Tablet 4 milliGRAM(s) Oral every 6 hours PRN Nausea and/or Vomiting      Allergies    No Known Allergies    Intolerances    oxycodone (Nausea)        VITALS  73y  Vital Signs Last 24 Hrs  T(C): 36.6 (10 Paulo 2024 21:30), Max: 36.6 (10 Paulo 2024 08:45)  T(F): 97.8 (10 Paulo 2024 21:30), Max: 97.9 (10 Paulo 2024 08:45)  HR: 77 (10 Paulo 2024 21:30) (77 - 82)  BP: 108/69 (10 Paulo 2024 21:30) (104/71 - 108/69)  BP(mean): --  RR: 14 (10 Paulo 2024 21:30) (14 - 16)  SpO2: 97% (10 Paulo 2024 21:30) (97% - 98%)    Parameters below as of 10 Paulo 2024 21:30  Patient On (Oxygen Delivery Method): room air      Daily     Daily         RECENT LABS:                          13.0   4.51  )-----------( 227      ( 11 Jan 2024 05:30 )             37.0     01-11    141  |  105  |  11  ----------------------------<  82  3.7   |  25  |  0.64    Ca    9.2      11 Jan 2024 05:30    TPro  6.1  /  Alb  3.0<L>  /  TBili  0.3  /  DBili  x   /  AST  21  /  ALT  40  /  AlkPhos  58  01-11    LIVER FUNCTIONS - ( 11 Jan 2024 05:30 )  Alb: 3.0 g/dL / Pro: 6.1 g/dL / ALK PHOS: 58 U/L / ALT: 40 U/L / AST: 21 U/L / GGT: x             Urinalysis Basic - ( 11 Jan 2024 05:30 )    Color: x / Appearance: x / SG: x / pH: x  Gluc: 82 mg/dL / Ketone: x  / Bili: x / Urobili: x   Blood: x / Protein: x / Nitrite: x   Leuk Esterase: x / RBC: x / WBC x   Sq Epi: x / Non Sq Epi: x / Bacteria: x          CAPILLARY BLOOD GLUCOSE                   Patient is a 73y old  Female who presents with a chief complaint of ICH (10 Paulo 2024 10:55)      HPI:  This is a 72 YO RIGHT handed woman with no PMH who presented to [facility] ED on 12/14/2023, as a transfer from [facility], as a CODE STROKE, with c/o R IPH.   CTH and CTA repeated - large R IPH (temporal/parietal). Presented to [place] today with c/o HA and AMS. Friend accompanying patient said that when visiting patient today - patient was not acting like herself and c/o HA. NIHSS 9.    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 Similar midline shift of 5 mm.  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 Briviact 50mg BID for seizure prophylaxis, EEG with increased risk of seizures in the right frontocentral region.TTE- EF 64%. Continue Losartan 25mg daily. 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 UA: positive for UTI, started on IV abx and then transitioned to oral, end date 12/22.      Patient was evaluated by PM&R and therapy for functional deficits, gait/ADL impairments and acute rehabilitation was recommended. Patient was medically optimized for discharge to [facility] IRU on 12/20/23. (20 Dec 2023 17:15)      PAST MEDICAL & SURGICAL HISTORY:  No pertinent past medical history          MEDICATIONS  (STANDING):  atorvastatin 10 milliGRAM(s) Oral at bedtime  bisacodyl 5 milliGRAM(s) Oral daily  brivaracetam 50 milliGRAM(s) Oral two times a day  enoxaparin Injectable 40 milliGRAM(s) SubCutaneous <User Schedule>  famotidine    Tablet 20 milliGRAM(s) Oral two times a day  gabapentin 400 milliGRAM(s) Oral at bedtime  ketorolac 0.5% Ophthalmic Solution 1 Drop(s) Left EYE daily  lidocaine   4% Patch 1 Patch Transdermal <User Schedule>  lidocaine   4% Patch 1 Patch Transdermal <User Schedule>  modafinil 100 milliGRAM(s) Oral <User Schedule>  Preservision  Capsules AREDS 2 2 Capsule(s) 2 Capsule(s) Oral daily  psyllium Powder 1 Packet(s) Oral daily  senna 2 Tablet(s) Oral at bedtime  sodium chloride 0.65% Nasal 1 Spray(s) Both Nostrils two times a day    MEDICATIONS  (PRN):  acetaminophen     Tablet .. 650 milliGRAM(s) Oral every 6 hours PRN Temp greater or equal to 38C (100.4F), Mild Pain (1 - 3)  aluminum hydroxide/magnesium hydroxide/simethicone Suspension 30 milliLiter(s) Oral every 6 hours PRN Dyspepsia  artificial tears (preservative free) Ophthalmic Solution 1 Drop(s) Both EYES every 4 hours PRN Dry Eyes  bisacodyl Suppository 10 milliGRAM(s) Rectal daily PRN Constipation  calcium carbonate    500 mG (Tums) Chewable 1 Tablet(s) Chew three times a day PRN Heartburn  melatonin 3 milliGRAM(s) Oral at bedtime PRN Insomnia  ondansetron   Disintegrating Tablet 4 milliGRAM(s) Oral every 6 hours PRN Nausea and/or Vomiting      Allergies    No Known Allergies    Intolerances    oxycodone (Nausea)        VITALS  73y  Vital Signs Last 24 Hrs  T(C): 36.6 (10 Paulo 2024 21:30), Max: 36.6 (10 Paulo 2024 08:45)  T(F): 97.8 (10 Paulo 2024 21:30), Max: 97.9 (10 Paulo 2024 08:45)  HR: 77 (10 Paulo 2024 21:30) (77 - 82)  BP: 108/69 (10 Paulo 2024 21:30) (104/71 - 108/69)  BP(mean): --  RR: 14 (10 Paulo 2024 21:30) (14 - 16)  SpO2: 97% (10 Paulo 2024 21:30) (97% - 98%)    Parameters below as of 10 Paulo 2024 21:30  Patient On (Oxygen Delivery Method): room air      Daily     Daily         RECENT LABS:                          13.0   4.51  )-----------( 227      ( 11 Jan 2024 05:30 )             37.0     01-11    141  |  105  |  11  ----------------------------<  82  3.7   |  25  |  0.64    Ca    9.2      11 Jan 2024 05:30    TPro  6.1  /  Alb  3.0<L>  /  TBili  0.3  /  DBili  x   /  AST  21  /  ALT  40  /  AlkPhos  58  01-11    LIVER FUNCTIONS - ( 11 Jan 2024 05:30 )  Alb: 3.0 g/dL / Pro: 6.1 g/dL / ALK PHOS: 58 U/L / ALT: 40 U/L / AST: 21 U/L / GGT: x             Urinalysis Basic - ( 11 Jan 2024 05:30 )    Color: x / Appearance: x / SG: x / pH: x  Gluc: 82 mg/dL / Ketone: x  / Bili: x / Urobili: x   Blood: x / Protein: x / Nitrite: x   Leuk Esterase: x / RBC: x / WBC x   Sq Epi: x / Non Sq Epi: x / Bacteria: x          CAPILLARY BLOOD GLUCOSE

## 2024-01-11 NOTE — PROGRESS NOTE ADULT - CONSTITUTIONAL
+left navi inattention, improved.
thin female, kyphotic posture, reduced trunk control with right lean

## 2024-01-11 NOTE — PROGRESS NOTE ADULT - GASTROINTESTINAL
normal/soft/nontender/nondistended/normal active bowel sounds/no guarding
normal/soft/nontender/nondistended/normal active bowel sounds
soft/nontender/nondistended
soft/nontender

## 2024-01-11 NOTE — DISCHARGE NOTE NURSING/CASE MANAGEMENT/SOCIAL WORK - PATIENT PORTAL LINK FT
You can access the FollowMyHealth Patient Portal offered by [facility] by registering at the following website: [email or web address]. By joining [facility]’s FollowMyHealth portal, you will also be able to view your health information using other applications (apps) compatible with our system. You can access the FollowMyHealth Patient Portal offered by [facility] by registering at the following website: [email or web address]. By joining [facility]’s FollowMyHealth portal, you will also be able to view your health information using other applications (apps) compatible with our system.

## 2024-01-11 NOTE — PROGRESS NOTE ADULT - COMMENTS
Patient sittingi n WC, using cervical pillow to help keep head midline as well as improved head control and neck extension +left navi inattention, but it is improved with pillow. Denies H.A currently, however she reports that she has some nausea. Known history of reflux, on pepcid and was recommended for metamucil at home which she didn't take because o f "going regular" She did have large BM yesterday "couldn't control" but no loose stool or diarrhea reported this morning
Patient with low vocal volume. Poor simple attention but not agitated, however needs constant redirection to follow simple commands. Repoirts feeling cold and asks for blanket, thanks me when done. Word finding for simple conversational speech intact, poor recall. She had lower BP but denies dizziness, H/A    She had informed staff that she had history of ADHD. Currently not on medications, will request psychiatry consult
Patient in bed. Alert, does not appear overly anxious, and has noted improved simple attention, much less restless. She reports noticing that her vision is altered, both from macular degeneration (due for injection soon) as well as "brain doesn't perceive things" correctly. Her left navi inattention is improved, but still with deficits, and she notices it in the way she evaltutes her drawing    For SIVAN today. Discussed in detail with patient, who is aware and agreeable
Patient pleasant, socially interactive but remains very distracted, with significant left Fazal inattention . She looks comfortable, denies any H/A or pain. Her BP has been persistently soft, no N/V or dizziness

## 2024-01-11 NOTE — DISCHARGE NOTE NURSING/CASE MANAGEMENT/SOCIAL WORK - NSDCPEFALRISK_GEN_ALL_CORE
For information on Fall & Injury Prevention, visit: [email or web address] OR  [email or web address] OR  https://www.cdc.gov/steadi/patient.html For information on Fall & Injury Prevention, visit: [email or web address] OR  [email or web address] OR  https://www.cdc.gov/steadi/patient.html

## 2024-01-11 NOTE — PROGRESS NOTE ADULT - CARDIOVASCULAR
normal/regular rate and rhythm/S1 S2 present/no gallops/no rub/no murmur
normal/regular rate and rhythm/S1 S2 present/no gallops/no rub/no murmur
regular rate and rhythm
regular rate and rhythm

## 2024-01-11 NOTE — CHART NOTE - NSCHARTNOTEFT_GEN_A_CORE
NUTRITION Follow Up Note    SOURCE: Patient [X]  Medical Record [X]  Nursing Staff [X]  Family Member []    DIET: Diet, Regular (01-04-24 @ 12:57) [Active]    Patient noted with variable PO intakes, consuming 0-100% of meals per nursing flowsheets. Electrolytes stable at this time. Electrolytes stable at this time.     EDEMA: no edema noted    LAST BM: 11-Jan-2024    SKIN: no pressure injuries noted    WEIGHT TRENDS: 62.9 kG (12/21/23)                             62.6 kG (12/29/23)      PERTINENT MEDICATIONS: MEDICATIONS  (STANDING):  atorvastatin 10 milliGRAM(s) Oral at bedtime  bisacodyl 5 milliGRAM(s) Oral daily  brivaracetam 50 milliGRAM(s) Oral two times a day  enoxaparin Injectable 40 milliGRAM(s) SubCutaneous <User Schedule>  famotidine    Tablet 20 milliGRAM(s) Oral two times a day  gabapentin 400 milliGRAM(s) Oral at bedtime  ketorolac 0.5% Ophthalmic Solution 1 Drop(s) Left EYE daily  lidocaine   4% Patch 1 Patch Transdermal <User Schedule>  lidocaine   4% Patch 1 Patch Transdermal <User Schedule>  modafinil 100 milliGRAM(s) Oral <User Schedule>  Preservision  Capsules AREDS 2 2 Capsule(s) 2 Capsule(s) Oral daily  psyllium Powder 1 Packet(s) Oral daily  senna 2 Tablet(s) Oral at bedtime  sodium chloride 0.65% Nasal 1 Spray(s) Both Nostrils two times a day    MEDICATIONS  (PRN):  acetaminophen     Tablet .. 650 milliGRAM(s) Oral every 6 hours PRN Temp greater or equal to 38C (100.4F), Mild Pain (1 - 3)  aluminum hydroxide/magnesium hydroxide/simethicone Suspension 30 milliLiter(s) Oral every 6 hours PRN Dyspepsia  artificial tears (preservative free) Ophthalmic Solution 1 Drop(s) Both EYES every 4 hours PRN Dry Eyes  bisacodyl Suppository 10 milliGRAM(s) Rectal daily PRN Constipation  calcium carbonate    500 mG (Tums) Chewable 1 Tablet(s) Chew three times a day PRN Heartburn  melatonin 3 milliGRAM(s) Oral at bedtime PRN Insomnia  ondansetron   Disintegrating Tablet 4 milliGRAM(s) Oral every 6 hours PRN Nausea and/or Vomiting      PERTINENT LABS:  01-11 Na141 mmol/L Glu 82 mg/dL K+ 3.7 mmol/L Cr  0.64 mg/dL BUN 11 mg/dL 01-11 Alb 3.0 g/dL<L> 12-15 Chol 197 mg/dL LDL --    HDL 58 mg/dL Trig 64 mg/dL        ESTIMATED NEEDS:   [X] No Change Since Previous Assessment    PREVIOUS NUTRITION DIAGNOSIS:  No active nutrition Dx at this present time  *******Copy From Nutrition Assessment*************    NUTRITION DIAGNOSIS IS [X] Ongoing -     [X] Resolved -    [X] Not Applicable     NEW NUTRITION DIAGNOSIS: [X] Not Applicable    INTERVENTIONS:   1.     MONITORING & EVALUATION:   1. Weights   2. PO intakes   3. Skin integrity   4. Tolerance to diet prescription   5. Labs & POCT  6. Follow up (per protocol)    Registered Dietitian/Nutritionist Remains Available.  Nicolas Kumar RD, CDN    Contact: [phone] or via [place] TEAMS NUTRITION Follow Up Note    SOURCE: Patient [X]  Medical Record [X]  Nursing Staff [X]  Family Member []    DIET: Diet, Regular (01-04-24 @ 12:57) [Active]    Patient noted with variable PO intakes, consuming 0-100% of meals per nursing flowsheets. Electrolytes stable at this time. Electrolytes stable at this time.     EDEMA: no edema noted    LAST BM: 11-Jan-2024    SKIN: no pressure injuries noted    WEIGHT TRENDS: 62.9 kG (12/21/23)                             62.6 kG (12/29/23)      PERTINENT MEDICATIONS: MEDICATIONS  (STANDING):  atorvastatin 10 milliGRAM(s) Oral at bedtime  bisacodyl 5 milliGRAM(s) Oral daily  brivaracetam 50 milliGRAM(s) Oral two times a day  enoxaparin Injectable 40 milliGRAM(s) SubCutaneous <User Schedule>  famotidine    Tablet 20 milliGRAM(s) Oral two times a day  gabapentin 400 milliGRAM(s) Oral at bedtime  ketorolac 0.5% Ophthalmic Solution 1 Drop(s) Left EYE daily  lidocaine   4% Patch 1 Patch Transdermal <User Schedule>  lidocaine   4% Patch 1 Patch Transdermal <User Schedule>  modafinil 100 milliGRAM(s) Oral <User Schedule>  Preservision  Capsules AREDS 2 2 Capsule(s) 2 Capsule(s) Oral daily  psyllium Powder 1 Packet(s) Oral daily  senna 2 Tablet(s) Oral at bedtime  sodium chloride 0.65% Nasal 1 Spray(s) Both Nostrils two times a day    MEDICATIONS  (PRN):  acetaminophen     Tablet .. 650 milliGRAM(s) Oral every 6 hours PRN Temp greater or equal to 38C (100.4F), Mild Pain (1 - 3)  aluminum hydroxide/magnesium hydroxide/simethicone Suspension 30 milliLiter(s) Oral every 6 hours PRN Dyspepsia  artificial tears (preservative free) Ophthalmic Solution 1 Drop(s) Both EYES every 4 hours PRN Dry Eyes  bisacodyl Suppository 10 milliGRAM(s) Rectal daily PRN Constipation  calcium carbonate    500 mG (Tums) Chewable 1 Tablet(s) Chew three times a day PRN Heartburn  melatonin 3 milliGRAM(s) Oral at bedtime PRN Insomnia  ondansetron   Disintegrating Tablet 4 milliGRAM(s) Oral every 6 hours PRN Nausea and/or Vomiting      PERTINENT LABS:  01-11 Na141 mmol/L Glu 82 mg/dL K+ 3.7 mmol/L Cr  0.64 mg/dL BUN 11 mg/dL 01-11 Alb 3.0 g/dL<L> 12-15 Chol 197 mg/dL LDL --    HDL 58 mg/dL Trig 64 mg/dL        ESTIMATED NEEDS:   [X] No Change Since Previous Assessment    PREVIOUS NUTRITION DIAGNOSIS:  No active nutrition Dx at this present time  *******Copy From Nutrition Assessment*************    NUTRITION DIAGNOSIS IS [X] Ongoing -     [X] Resolved -    [X] Not Applicable     NEW NUTRITION DIAGNOSIS: [X] Not Applicable    INTERVENTIONS:   1.     MONITORING & EVALUATION:   1. Weights   2. PO intakes   3. Skin integrity   4. Tolerance to diet prescription   5. Labs & POCT  6. Follow up (per protocol)    Registered Dietitian/Nutritionist Remains Available.  Nicolas Kumar RD, CDN    Contact: [phone] or via MS TEAMS NUTRITION Follow Up Note    SOURCE: Patient [X]  Medical Record [X]  Nursing Staff [X]  Family Member []    DIET: Diet, Regular (01-04-24 @ 12:57) [Active]    Patient noted with variable PO intakes, consuming 0-100% of meals per nursing flowsheets. Electrolytes stable at this time. Electrolytes stable at this time. Recommend Ensure Plus High Protein 8oz PO Daily (Provides 350kcal & 20grams of Protein) to enhance PO intakes and optimize nutritional status.    EDEMA: no edema noted    LAST BM: 11-Jan-2024    SKIN: no pressure injuries noted    WEIGHT TRENDS: 62.9 kG (12/21/23)                             62.6 kG (12/29/23)      PERTINENT MEDICATIONS: MEDICATIONS  (STANDING):  atorvastatin 10 milliGRAM(s) Oral at bedtime  bisacodyl 5 milliGRAM(s) Oral daily  brivaracetam 50 milliGRAM(s) Oral two times a day  enoxaparin Injectable 40 milliGRAM(s) SubCutaneous <User Schedule>  famotidine    Tablet 20 milliGRAM(s) Oral two times a day  gabapentin 400 milliGRAM(s) Oral at bedtime  ketorolac 0.5% Ophthalmic Solution 1 Drop(s) Left EYE daily  lidocaine   4% Patch 1 Patch Transdermal <User Schedule>  lidocaine   4% Patch 1 Patch Transdermal <User Schedule>  modafinil 100 milliGRAM(s) Oral <User Schedule>  Preservision  Capsules AREDS 2 2 Capsule(s) 2 Capsule(s) Oral daily  psyllium Powder 1 Packet(s) Oral daily  senna 2 Tablet(s) Oral at bedtime  sodium chloride 0.65% Nasal 1 Spray(s) Both Nostrils two times a day    MEDICATIONS  (PRN):  acetaminophen     Tablet .. 650 milliGRAM(s) Oral every 6 hours PRN Temp greater or equal to 38C (100.4F), Mild Pain (1 - 3)  aluminum hydroxide/magnesium hydroxide/simethicone Suspension 30 milliLiter(s) Oral every 6 hours PRN Dyspepsia  artificial tears (preservative free) Ophthalmic Solution 1 Drop(s) Both EYES every 4 hours PRN Dry Eyes  bisacodyl Suppository 10 milliGRAM(s) Rectal daily PRN Constipation  calcium carbonate    500 mG (Tums) Chewable 1 Tablet(s) Chew three times a day PRN Heartburn  melatonin 3 milliGRAM(s) Oral at bedtime PRN Insomnia  ondansetron   Disintegrating Tablet 4 milliGRAM(s) Oral every 6 hours PRN Nausea and/or Vomiting      PERTINENT LABS:  01-11 Na141 mmol/L Glu 82 mg/dL K+ 3.7 mmol/L Cr  0.64 mg/dL BUN 11 mg/dL 01-11 Alb 3.0 g/dL<L> 12-15 Chol 197 mg/dL LDL --    HDL 58 mg/dL Trig 64 mg/dL        ESTIMATED NEEDS:   [X] No Change Since Previous Assessment    PREVIOUS NUTRITION DIAGNOSIS:  1. No active nutrition Dx at this present time    NEW NUTRITION DIAGNOSIS:  1. Inadequate Protein Energy Intake    INTERVENTIONS:   1. Add Ensure Plus High Protein 8oz PO Daily (Provides 350kcal & 20grams of Protein)   2. Honor patients daily food preferences as feasible with prescribed diet   3. Monitor daily PO intakes, GI tolerance, labs, weights, skin integrity, & BM regularity     MONITORING & EVALUATION:   1. Weights   2. PO intakes   3. Skin integrity   4. Tolerance to diet prescription   5. Labs & POCT  6. Follow up (per protocol)    Registered Dietitian/Nutritionist Remains Available.  Nicolas Kumar RD, CDN    Contact: [phone] or via MS TEAMS NUTRITION Follow Up Note    SOURCE: Patient [X]  Medical Record [X]  Nursing Staff [X]  Family Member []    DIET: Diet, Regular (01-04-24 @ 12:57) [Active]    Patient noted with variable PO intakes, consuming 0-100% of meals per nursing flowsheets. Electrolytes stable at this time. Electrolytes stable at this time. Recommend Ensure Plus High Protein 8oz PO Daily (Provides 350kcal & 20grams of Protein) to enhance PO intakes and optimize nutritional status.    EDEMA: no edema noted    LAST BM: 11-Jan-2024    SKIN: no pressure injuries noted    WEIGHT TRENDS: 62.9 kG (12/21/23)                             62.6 kG (12/29/23)      PERTINENT MEDICATIONS: MEDICATIONS  (STANDING):  atorvastatin 10 milliGRAM(s) Oral at bedtime  bisacodyl 5 milliGRAM(s) Oral daily  brivaracetam 50 milliGRAM(s) Oral two times a day  enoxaparin Injectable 40 milliGRAM(s) SubCutaneous <User Schedule>  famotidine    Tablet 20 milliGRAM(s) Oral two times a day  gabapentin 400 milliGRAM(s) Oral at bedtime  ketorolac 0.5% Ophthalmic Solution 1 Drop(s) Left EYE daily  lidocaine   4% Patch 1 Patch Transdermal <User Schedule>  lidocaine   4% Patch 1 Patch Transdermal <User Schedule>  modafinil 100 milliGRAM(s) Oral <User Schedule>  Preservision  Capsules AREDS 2 2 Capsule(s) 2 Capsule(s) Oral daily  psyllium Powder 1 Packet(s) Oral daily  senna 2 Tablet(s) Oral at bedtime  sodium chloride 0.65% Nasal 1 Spray(s) Both Nostrils two times a day    MEDICATIONS  (PRN):  acetaminophen     Tablet .. 650 milliGRAM(s) Oral every 6 hours PRN Temp greater or equal to 38C (100.4F), Mild Pain (1 - 3)  aluminum hydroxide/magnesium hydroxide/simethicone Suspension 30 milliLiter(s) Oral every 6 hours PRN Dyspepsia  artificial tears (preservative free) Ophthalmic Solution 1 Drop(s) Both EYES every 4 hours PRN Dry Eyes  bisacodyl Suppository 10 milliGRAM(s) Rectal daily PRN Constipation  calcium carbonate    500 mG (Tums) Chewable 1 Tablet(s) Chew three times a day PRN Heartburn  melatonin 3 milliGRAM(s) Oral at bedtime PRN Insomnia  ondansetron   Disintegrating Tablet 4 milliGRAM(s) Oral every 6 hours PRN Nausea and/or Vomiting      PERTINENT LABS:  01-11 Na141 mmol/L Glu 82 mg/dL K+ 3.7 mmol/L Cr  0.64 mg/dL BUN 11 mg/dL 01-11 Alb 3.0 g/dL<L> 12-15 Chol 197 mg/dL LDL --    HDL 58 mg/dL Trig 64 mg/dL        ESTIMATED NEEDS:   [X] No Change Since Previous Assessment    PREVIOUS NUTRITION DIAGNOSIS:  1. No active nutrition Dx at this present time    NEW NUTRITION DIAGNOSIS:  1. Inadequate Protein Energy Intake    INTERVENTIONS:   1. Add Ensure Plus High Protein 8oz PO Daily (Provides 350kcal & 20grams of Protein)   2. Honor patients daily food preferences as feasible with prescribed diet   3. Monitor daily PO intakes, GI tolerance, labs, weights, skin integrity, & BM regularity     MONITORING & EVALUATION:   1. Weights   2. PO intakes   3. Skin integrity   4. Tolerance to diet prescription   5. Labs & POCT  6. Follow up (per protocol)    Registered Dietitian/Nutritionist Remains Available.  Nicolas Kumar RD, CDN    Contact: [phone] or via [place] TEAMS

## 2024-01-11 NOTE — PROGRESS NOTE ADULT - PROVIDER SPECIALTY LIST ADULT
Hospitalist
Physiatry
Rehab Medicine
Hospitalist
Hospitalist
Physiatry
Hospitalist
Physiatry
Rehab Medicine
Hospitalist
Hospitalist
Physiatry
Physiatry
Internal Medicine
Neuropsychology
Physiatry
Physiatry
Rehab Medicine
Hospitalist
Hospitalist
Neuropsychology
Hospitalist
Physiatry

## 2024-01-11 NOTE — PROGRESS NOTE ADULT - CONSTITUTIONAL COMMENTS
alert, NAD afebrile. REquires significant redirection. Makes wants and needs known
alert, sustained eye opening, poor attention, highly distracted, restless
alert, NAD increased attention, less restless, mood stable O x 3 -4 grossly
eyes open spontaneously O x 2 GCS=14 ([id number]) Poor simple attention, restless without agitation

## 2024-01-11 NOTE — PROGRESS NOTE ADULT - ASSESSMENT
72 YO RIGHT handed woman with no PMH who presented to [facility] ED on 12/14/2023, as a transfer from [facility] with R IPH. Hospital course also significant for UTI, b/l DVTs, EEG with increased seizure risks.    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  - Head CT 1/10: The right posterior temporal parenchymal hemorrhage is smaller and less dense, previously measuring 2.8 cm in AP diameter by 4.5 cm transversely by 4.8 cm in craniocaudal diameter now measuring 2.4 cm in AP diameter by 3.4 cm transversely by 4.0 cm in craniocaudal diameter. There is slightly less mass effect on the occipital horn and atrium of the right lateral ventricle. There is resolution of mild midline shift. No new hemorrhage   - EEG with increased risk of seizures in the right frontocentral region  - Briviact 50mg BID for seizure prophylaxis.  - Cont Comprehensive Rehab Program: PT/OT/ST, 3hours daily and 5 days weekly.     # Wet Macular Degeneration  -Patient was previously on Vabysmo injections every 4-5 weeks.  -Per Dr. Joyner she can return to office as outpt in January for her treatment    # self-reported history of ADHD and restless, distracted, poor attention  - xanax Rx in I stop  - modafinil 100mg in AM 1/5. Monitor benefit of increased attention versus possible increased anxiety symptoms  - psych consult 1/2 reviewed and appreciated  - psychology and recreation therapy (former )    #   - discussed with hospitalist. Patient with ascvd is moderate risk  - C/W atorvastatin 10 mg qhs 12/29    # HTN  -  Losartan discontinued due to soft BP 12/27  - BP ) (104/71 - 108/69) 1/11    # Pain management  - Tylenol PRN   -Gabapentin  400mg qhs for neuropathic pain     - Lidocaine patch timing changed to bedtime per request. States she uses Salon Pas on left neck/shoulder at home.   - Cervical pillow for postioning midline and neck control, improved    # DVT   - b/l below the knee DVTs: right soleal vein, left soleal, peroneal and gastrocnemius veins.  - Lovenox 40 mg daily. Not cleared for full dose AC due to large ICH  - Doppler 12/20: Positive deep venous thrombosis below the right knee within the right soleal vein. Positive deep venous thrombosis below the left knee within the left soleal, peroneal and gastrocnemius veins.  - Doppler 12/27 surveillance stable  - Repeat Head CT 12/27 improved. Neuro. not clearing full dose of AC at this time  - Repeat Head CT 1/10 performed, neuro f/u re: AC    # GI ppx/h/o reflux  - Pepcid 20mg   - TUMS, Maalox  - Senna  - metamuci    # Diet  - Diet upgraded to Regular with thin liquids  - c/w AREDS 2 vitamin (home med)    # target: dc SIVAN 1/11/23    # LABS:   neurology re: AC    #[facility]  CODE STATUS: FULL CODE    Outpatient Follow-up (Specialty/Name of physician):    Dr. Dominguez Joyner  Opthalmology   [phone]    Emy Prakash  NP in [address], [address]  [place] [address]  Phone: [phone]  Fax: [phone]  Follow Up Time: 2 weeks    Sergio Jurado  Cardiology  [address] [place] [address], [address]  [place] [address]  Phone: [phone]  Fax: [phone]  Follow Up Time: 1 month         72 YO RIGHT handed woman with no PMH who presented to [facility] ED on 12/14/2023, as a transfer from [facility] with R IPH. Hospital course also significant for UTI, b/l DVTs, EEG with increased seizure risks.    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  - Head CT 1/10: The right posterior temporal parenchymal hemorrhage is smaller and less dense, previously measuring 2.8 cm in AP diameter by 4.5 cm transversely by 4.8 cm in craniocaudal diameter now measuring 2.4 cm in AP diameter by 3.4 cm transversely by 4.0 cm in craniocaudal diameter. There is slightly less mass effect on the occipital horn and atrium of the right lateral ventricle. There is resolution of mild midline shift. No new hemorrhage   - EEG with increased risk of seizures in the right frontocentral region  - Briviact 50mg BID for seizure prophylaxis.  - Cont Comprehensive Rehab Program: PT/OT/ST, 3hours daily and 5 days weekly.     # Wet Macular Degeneration  -Patient was previously on Vabysmo injections every 4-5 weeks.  -Per Dr. Joyner she can return to office as outpt in January for her treatment    # self-reported history of ADHD and restless, distracted, poor attention  - xanax Rx in I stop  - modafinil 100mg in AM 1/5. Monitor benefit of increased attention versus possible increased anxiety symptoms  - psych consult 1/2 reviewed and appreciated  - psychology and recreation therapy (former )    #   - discussed with hospitalist. Patient with ascvd is moderate risk  - C/W atorvastatin 10 mg qhs 12/29    # HTN  -  Losartan discontinued due to soft BP 12/27  - BP ) (104/71 - 108/69) 1/11    # Pain management  - Tylenol PRN   -Gabapentin  400mg qhs for neuropathic pain     - Lidocaine patch timing changed to bedtime per request. States she uses Salon Pas on left neck/shoulder at home.   - Cervical pillow for postioning midline and neck control, improved    # DVT   - b/l below the knee DVTs: right soleal vein, left soleal, peroneal and gastrocnemius veins.  - Lovenox 40 mg daily. Not cleared for full dose AC due to large ICH  - Doppler 12/20: Positive deep venous thrombosis below the right knee within the right soleal vein. Positive deep venous thrombosis below the left knee within the left soleal, peroneal and gastrocnemius veins.  - Doppler 12/27 surveillance stable  - Repeat Head CT 12/27 improved. Neuro. not clearing full dose of AC at this time  - Repeat Head CT 1/10 performed, neuro f/u re: AC    # GI ppx/h/o reflux  - Pepcid 20mg   - TUMS, Maalox  - Senna  - metamuci    # Diet  - Diet upgraded to Regular with thin liquids  - c/w AREDS 2 vitamin (home med)    # target: dc SIVAN 1/11/23    # LABS:   neurology re: AC    #[facility]  CODE STATUS: FULL CODE    Outpatient Follow-up (Specialty/Name of physician):    Dr. Dominguez Joyner  Opthalmology   [phone]    Emy Prakash  NP in [address], [address]  [place] [address]  Phone: [phone]  Fax: [phone]  Follow Up Time: 2 weeks    Sergio Jurado  Cardiology  [address] [place] [address], [address]  [place] [address]  Phone: [phone]  Fax: [phone]  Follow Up Time: 1 month         74 YO RIGHT handed woman with no PMH who presented to [facility] ED on 12/14/2023, as a transfer from [facility] with R IPH. Hospital course also significant for UTI, b/l DVTs, EEG with increased seizure risks.    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  - Head CT 1/10: The right posterior temporal parenchymal hemorrhage is smaller and less dense, previously measuring 2.8 cm in AP diameter by 4.5 cm transversely by 4.8 cm in craniocaudal diameter now measuring 2.4 cm in AP diameter by 3.4 cm transversely by 4.0 cm in craniocaudal diameter. There is slightly less mass effect on the occipital horn and atrium of the right lateral ventricle. There is resolution of mild midline shift. No new hemorrhage   - EEG with increased risk of seizures in the right frontocentral region  - BrFor [facility] transfer today for continue OT PT SLP. Differences between IRF and SIVAN discussed, as well as current functional status and benefits of continued inpatient rehab services at [facility] setting versus outpatient. Patient is agreeable to [facility]    # Wet Macular Degeneration  -Patient was previously on Vabysmo injections every 4-5 weeks.  -Per Dr. Joyner she can return to office as outpt in January for her treatment  - Differences between thrombotic stroke and  hemorrhagic discussedl patient is aware that vabysmo unlikely cultprit given stroke type and will follow with ophtho on dc for next injection  - visual perceptual deficits due to stroke also reviewed, will refer to neuro ophtho on dc. Patient agreeable    # self-reported history of ADHD and restless, distracted, poor attention  - xanax Rx in I stop  - modafinil 100mg in AM 1/5. Improved attention, restlessness; no sig increase in anxiety noted  - psych consult 1/2 reviewed and appreciated  - psychology and recreation therapy (former )    #   - discussed with hospitalist. Patient with ascvd is moderate risk  - C/W atorvastatin 10 mg qhs 12/29    # HTN  -  Losartan discontinued due to soft BP 12/27  - BP ) (104/71 - 108/69) 1/11  - PCP f/u on dc    # Pain management  - Tylenol PRN   -Gabapentin  400mg qhs for neuropathic pain     - Lidocaine patch timing changed to bedtime per request. States she uses Salon Pas on left neck/shoulder at home.   - Cervical pillow for postioning midline and neck control, improved    # DVT   - b/l below the knee DVTs: right soleal vein, left soleal, peroneal and gastrocnemius veins.  - Lovenox 40 mg daily. Not cleared for full dose AC due to large ICH  - Doppler 12/20: Positive deep venous thrombosis below the right knee within the right soleal vein. Positive deep venous thrombosis below the left knee within the left soleal, peroneal and gastrocnemius veins.  - Doppler 12/27 surveillance stable  - Repeat Head CT 12/27 improved. Neuro. not clearing full dose of AC at this time  - Repeat Head CT 1/10 performed as above, neuro f/u re: AC    # GI ppx/h/o reflux  - Pepcid 20mg   - TUMS, Maalox  - Senna  - metamuci    # Diet  - Diet upgraded to Regular with thin liquids  - c/w AREDS 2 vitamin (home med)    # Patient medically cleared for dc SIVAN 1/11/23. Time spent for education, in answering questions re: attention and visual deficits, differences between SIVAN and IRF and prognosis 35 min        #[facility]  CODE STATUS: FULL CODE    Outpatient Follow-up (Specialty/Name of physician):    Dr. Dominguez Joyner  Opthalmology   [phone]    Emy Prakash  NP in [address], [address]  [place] [address]  Phone: [phone]  Fax: [phone]  Follow Up Time: 2 weeks    Sergio Jurado  Cardiology  [address] [facility] [address], [address]  [place] [address]  Phone: [phone]  Fax: [phone]  Follow Up Time: 1 month         74 YO RIGHT handed woman with no PMH who presented to [facility] ED on 12/14/2023, as a transfer from [facility] with R IPH. Hospital course also significant for UTI, b/l DVTs, EEG with increased seizure risks.    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  - Head CT 1/10: The right posterior temporal parenchymal hemorrhage is smaller and less dense, previously measuring 2.8 cm in AP diameter by 4.5 cm transversely by 4.8 cm in craniocaudal diameter now measuring 2.4 cm in AP diameter by 3.4 cm transversely by 4.0 cm in craniocaudal diameter. There is slightly less mass effect on the occipital horn and atrium of the right lateral ventricle. There is resolution of mild midline shift. No new hemorrhage   - EEG with increased risk of seizures in the right frontocentral region  - BrFor [facility] transfer today for continue OT PT SLP. Differences between IRF and SIVAN discussed, as well as current functional status and benefits of continued inpatient rehab services at [facility] setting versus outpatient. Patient is agreeable to [facility]    # Wet Macular Degeneration  -Patient was previously on Vabysmo injections every 4-5 weeks.  -Per Dr. Joyner she can return to office as outpt in January for her treatment  - Differences between thrombotic stroke and  hemorrhagic discussedl patient is aware that vabysmo unlikely cultprit given stroke type and will follow with ophtho on dc for next injection  - visual perceptual deficits due to stroke also reviewed, will refer to neuro ophtho on dc. Patient agreeable    # self-reported history of ADHD and restless, distracted, poor attention  - xanax Rx in I stop  - modafinil 100mg in AM 1/5. Improved attention, restlessness; no sig increase in anxiety noted  - psych consult 1/2 reviewed and appreciated  - psychology and recreation therapy (former )    #   - discussed with hospitalist. Patient with ascvd is moderate risk  - C/W atorvastatin 10 mg qhs 12/29    # HTN  -  Losartan discontinued due to soft BP 12/27  - BP ) (104/71 - 108/69) 1/11  - PCP f/u on dc    # Pain management  - Tylenol PRN   -Gabapentin  400mg qhs for neuropathic pain     - Lidocaine patch timing changed to bedtime per request. States she uses Salon Pas on left neck/shoulder at home.   - Cervical pillow for postioning midline and neck control, improved    # DVT   - b/l below the knee DVTs: right soleal vein, left soleal, peroneal and gastrocnemius veins.  - Lovenox 40 mg daily. Not cleared for full dose AC due to large ICH  - Doppler 12/20: Positive deep venous thrombosis below the right knee within the right soleal vein. Positive deep venous thrombosis below the left knee within the left soleal, peroneal and gastrocnemius veins.  - Doppler 12/27 surveillance stable  - Repeat Head CT 12/27 improved. Neuro. not clearing full dose of AC at this time  - Repeat Head CT 1/10 performed as above, neuro f/u re: AC    # GI ppx/h/o reflux  - Pepcid 20mg   - TUMS, Maalox  - Senna  - metamuci    # Diet  - Diet upgraded to Regular with thin liquids  - c/w AREDS 2 vitamin (home med)    # Patient medically cleared for dc SIVAN 1/11/23. Time spent for education, in answering questions re: attention and visual deficits, differences between SIVAN and IRF and prognosis 35 min        #[facility]  CODE STATUS: FULL CODE    Outpatient Follow-up (Specialty/Name of physician):    Dr. Dominguez Joyner  Opthalmology   [phone]    Emy Prakahs  NP in [address], [address]  [place] [address]  Phone: [phone]  Fax: [phone]  Follow Up Time: 2 weeks    Sergio Jurado  Cardiology  [address] [facility], [address]  [place] [address]  Phone: [phone]  Fax: [phone]  Follow Up Time: 1 month

## 2024-01-11 NOTE — PROGRESS NOTE ADULT - MOTOR
bilateral calves soft no TTP  left grasp 4/5 shoulder 4/5 beneftis from cues   left HF 4-/5 ankle PF 4+_/5
no calf swelling or pedal edema  no TTP bilaterally LE
has difficulty with using phone screen/keypad due to coordination and visual deficits  reviewed artwork/sketchbook, has deficits in left side awareness but notes diffirence with placement of pad to right.   calves soft no TTP
reduced fucntional use of left arm due to poor attention, severe left navi inattention  bilateral calves soft no TTP  no hand swelling left

## 2024-01-11 NOTE — PROGRESS NOTE ADULT - REASON FOR ADMISSION
ICH

## 2024-01-20 ENCOUNTER — INPATIENT (INPATIENT)
Facility: HOSPITAL | Age: 74
LOS: 3 days | Discharge: NURSING, REHAB OR LONG-TERM CARE | DRG: 65 | End: 2024-01-24
Attending: STUDENT IN AN ORGANIZED HEALTH CARE EDUCATION/TRAINING PROGRAM | Admitting: STUDENT IN AN ORGANIZED HEALTH CARE EDUCATION/TRAINING PROGRAM
Payer: MEDICARE

## 2024-01-20 VITALS
RESPIRATION RATE: 18 BRPM | DIASTOLIC BLOOD PRESSURE: 77 MMHG | HEIGHT: 61 IN | SYSTOLIC BLOOD PRESSURE: 114 MMHG | OXYGEN SATURATION: 96 % | TEMPERATURE: 98 F | HEART RATE: 73 BPM

## 2024-01-20 DIAGNOSIS — H35.30 UNSPECIFIED MACULAR DEGENERATION: ICD-10-CM

## 2024-01-20 DIAGNOSIS — Z86.79 PERSONAL HISTORY OF OTHER DISEASES OF THE CIRCULATORY SYSTEM: ICD-10-CM

## 2024-01-20 DIAGNOSIS — I82.409 ACUTE EMBOLISM AND THROMBOSIS OF UNSPECIFIED DEEP VEINS OF UNSPECIFIED LOWER EXTREMITY: ICD-10-CM

## 2024-01-20 DIAGNOSIS — Z87.898 PERSONAL HISTORY OF OTHER SPECIFIED CONDITIONS: ICD-10-CM

## 2024-01-20 DIAGNOSIS — I61.9 NONTRAUMATIC INTRACEREBRAL HEMORRHAGE, UNSPECIFIED: ICD-10-CM

## 2024-01-20 LAB
ALBUMIN SERPL ELPH-MCNC: 3.4 G/DL — SIGNIFICANT CHANGE UP (ref 3.3–5)
ALP SERPL-CCNC: 61 U/L — SIGNIFICANT CHANGE UP (ref 40–120)
ALT FLD-CCNC: 29 U/L — SIGNIFICANT CHANGE UP (ref 10–45)
ANION GAP SERPL CALC-SCNC: 11 MMOL/L — SIGNIFICANT CHANGE UP (ref 5–17)
APTT BLD: 18.9 SEC — LOW (ref 24.5–35.6)
AST SERPL-CCNC: 22 U/L — SIGNIFICANT CHANGE UP (ref 10–40)
BASOPHILS # BLD AUTO: 0.01 K/UL — SIGNIFICANT CHANGE UP (ref 0–0.2)
BASOPHILS NFR BLD AUTO: 0.2 % — SIGNIFICANT CHANGE UP (ref 0–2)
BILIRUB SERPL-MCNC: 0.5 MG/DL — SIGNIFICANT CHANGE UP (ref 0.2–1.2)
BLD GP AB SCN SERPL QL: NEGATIVE — SIGNIFICANT CHANGE UP
BUN SERPL-MCNC: 9 MG/DL — SIGNIFICANT CHANGE UP (ref 7–23)
CALCIUM SERPL-MCNC: 9.1 MG/DL — SIGNIFICANT CHANGE UP (ref 8.4–10.5)
CHLORIDE SERPL-SCNC: 107 MMOL/L — SIGNIFICANT CHANGE UP (ref 96–108)
CO2 SERPL-SCNC: 21 MMOL/L — LOW (ref 22–31)
CREAT SERPL-MCNC: 0.57 MG/DL — SIGNIFICANT CHANGE UP (ref 0.5–1.3)
EGFR: 96 ML/MIN/1.73M2 — SIGNIFICANT CHANGE UP
EOSINOPHIL # BLD AUTO: 0.06 K/UL — SIGNIFICANT CHANGE UP (ref 0–0.5)
EOSINOPHIL NFR BLD AUTO: 1.1 % — SIGNIFICANT CHANGE UP (ref 0–6)
GLUCOSE SERPL-MCNC: 99 MG/DL — SIGNIFICANT CHANGE UP (ref 70–99)
HCT VFR BLD CALC: 35.1 % — SIGNIFICANT CHANGE UP (ref 34.5–45)
HGB BLD-MCNC: 12.2 G/DL — SIGNIFICANT CHANGE UP (ref 11.5–15.5)
IMM GRANULOCYTES NFR BLD AUTO: 0.4 % — SIGNIFICANT CHANGE UP (ref 0–0.9)
INR BLD: 1.06 RATIO — SIGNIFICANT CHANGE UP (ref 0.85–1.18)
LYMPHOCYTES # BLD AUTO: 1.11 K/UL — SIGNIFICANT CHANGE UP (ref 1–3.3)
LYMPHOCYTES # BLD AUTO: 20.3 % — SIGNIFICANT CHANGE UP (ref 13–44)
MCHC RBC-ENTMCNC: 31.3 PG — SIGNIFICANT CHANGE UP (ref 27–34)
MCHC RBC-ENTMCNC: 34.8 GM/DL — SIGNIFICANT CHANGE UP (ref 32–36)
MCV RBC AUTO: 90 FL — SIGNIFICANT CHANGE UP (ref 80–100)
MONOCYTES # BLD AUTO: 0.55 K/UL — SIGNIFICANT CHANGE UP (ref 0–0.9)
MONOCYTES NFR BLD AUTO: 10.1 % — SIGNIFICANT CHANGE UP (ref 2–14)
NEUTROPHILS # BLD AUTO: 3.71 K/UL — SIGNIFICANT CHANGE UP (ref 1.8–7.4)
NEUTROPHILS NFR BLD AUTO: 67.9 % — SIGNIFICANT CHANGE UP (ref 43–77)
NRBC # BLD: 0 /100 WBCS — SIGNIFICANT CHANGE UP (ref 0–0)
PLATELET # BLD AUTO: 195 K/UL — SIGNIFICANT CHANGE UP (ref 150–400)
POTASSIUM SERPL-MCNC: 3.5 MMOL/L — SIGNIFICANT CHANGE UP (ref 3.5–5.3)
POTASSIUM SERPL-SCNC: 3.5 MMOL/L — SIGNIFICANT CHANGE UP (ref 3.5–5.3)
PROT SERPL-MCNC: 5.5 G/DL — LOW (ref 6–8.3)
PROTHROM AB SERPL-ACNC: 11.6 SEC — SIGNIFICANT CHANGE UP (ref 9.5–13)
RBC # BLD: 3.9 M/UL — SIGNIFICANT CHANGE UP (ref 3.8–5.2)
RBC # FLD: 13.3 % — SIGNIFICANT CHANGE UP (ref 10.3–14.5)
RH IG SCN BLD-IMP: POSITIVE — SIGNIFICANT CHANGE UP
SODIUM SERPL-SCNC: 139 MMOL/L — SIGNIFICANT CHANGE UP (ref 135–145)
WBC # BLD: 5.46 K/UL — SIGNIFICANT CHANGE UP (ref 3.8–10.5)
WBC # FLD AUTO: 5.46 K/UL — SIGNIFICANT CHANGE UP (ref 3.8–10.5)

## 2024-01-20 PROCEDURE — 70450 CT HEAD/BRAIN W/O DYE: CPT | Mod: 26,MA,XU

## 2024-01-20 PROCEDURE — 70553 MRI BRAIN STEM W/O & W/DYE: CPT | Mod: 26

## 2024-01-20 PROCEDURE — 70498 CT ANGIOGRAPHY NECK: CPT | Mod: 26,MA

## 2024-01-20 PROCEDURE — 99291 CRITICAL CARE FIRST HOUR: CPT

## 2024-01-20 PROCEDURE — 99223 1ST HOSP IP/OBS HIGH 75: CPT

## 2024-01-20 PROCEDURE — 70496 CT ANGIOGRAPHY HEAD: CPT | Mod: 26,MA

## 2024-01-20 PROCEDURE — 99221 1ST HOSP IP/OBS SF/LOW 40: CPT

## 2024-01-20 RX ORDER — BRIVARACETAM 25 MG/1
50 TABLET, FILM COATED ORAL
Refills: 0 | Status: DISCONTINUED | OUTPATIENT
Start: 2024-01-20 | End: 2024-01-24

## 2024-01-20 RX ORDER — KETOROLAC TROMETHAMINE 0.5 %
1 DROPS OPHTHALMIC (EYE) DAILY
Refills: 0 | Status: DISCONTINUED | OUTPATIENT
Start: 2024-01-20 | End: 2024-01-24

## 2024-01-20 RX ORDER — BRIVARACETAM 25 MG/1
50 TABLET, FILM COATED ORAL ONCE
Refills: 0 | Status: DISCONTINUED | OUTPATIENT
Start: 2024-01-20 | End: 2024-01-20

## 2024-01-20 RX ORDER — GABAPENTIN 400 MG/1
400 CAPSULE ORAL AT BEDTIME
Refills: 0 | Status: DISCONTINUED | OUTPATIENT
Start: 2024-01-21 | End: 2024-01-24

## 2024-01-20 RX ORDER — LANOLIN ALCOHOL/MO/W.PET/CERES
3 CREAM (GRAM) TOPICAL AT BEDTIME
Refills: 0 | Status: DISCONTINUED | OUTPATIENT
Start: 2024-01-20 | End: 2024-01-24

## 2024-01-20 RX ORDER — ENOXAPARIN SODIUM 100 MG/ML
40 INJECTION SUBCUTANEOUS EVERY 24 HOURS
Refills: 0 | Status: DISCONTINUED | OUTPATIENT
Start: 2024-01-20 | End: 2024-01-24

## 2024-01-20 RX ORDER — GABAPENTIN 400 MG/1
400 CAPSULE ORAL ONCE
Refills: 0 | Status: COMPLETED | OUTPATIENT
Start: 2024-01-20 | End: 2024-01-20

## 2024-01-20 RX ORDER — ACETAMINOPHEN 500 MG
650 TABLET ORAL EVERY 6 HOURS
Refills: 0 | Status: DISCONTINUED | OUTPATIENT
Start: 2024-01-20 | End: 2024-01-24

## 2024-01-20 RX ORDER — ATORVASTATIN CALCIUM 80 MG/1
10 TABLET, FILM COATED ORAL AT BEDTIME
Refills: 0 | Status: DISCONTINUED | OUTPATIENT
Start: 2024-01-20 | End: 2024-01-24

## 2024-01-20 RX ORDER — SENNA PLUS 8.6 MG/1
2 TABLET ORAL AT BEDTIME
Refills: 0 | Status: DISCONTINUED | OUTPATIENT
Start: 2024-01-20 | End: 2024-01-24

## 2024-01-20 RX ORDER — FAMOTIDINE 10 MG/ML
20 INJECTION INTRAVENOUS DAILY
Refills: 0 | Status: DISCONTINUED | OUTPATIENT
Start: 2024-01-20 | End: 2024-01-24

## 2024-01-20 RX ORDER — MULTIVIT-MIN/FERROUS GLUCONATE 9 MG/15 ML
1 LIQUID (ML) ORAL
Refills: 0 | DISCHARGE

## 2024-01-20 RX ADMIN — BRIVARACETAM 50 MILLIGRAM(S): 25 TABLET, FILM COATED ORAL at 17:57

## 2024-01-20 RX ADMIN — ATORVASTATIN CALCIUM 10 MILLIGRAM(S): 80 TABLET, FILM COATED ORAL at 22:19

## 2024-01-20 RX ADMIN — GABAPENTIN 400 MILLIGRAM(S): 400 CAPSULE ORAL at 11:20

## 2024-01-20 RX ADMIN — Medication 1 DROP(S): at 15:21

## 2024-01-20 RX ADMIN — BRIVARACETAM 50 MILLIGRAM(S): 25 TABLET, FILM COATED ORAL at 04:18

## 2024-01-20 RX ADMIN — FAMOTIDINE 20 MILLIGRAM(S): 10 INJECTION INTRAVENOUS at 15:15

## 2024-01-20 RX ADMIN — ENOXAPARIN SODIUM 40 MILLIGRAM(S): 100 INJECTION SUBCUTANEOUS at 22:19

## 2024-01-20 NOTE — H&P ADULT - NSICDXPASTMEDICALHX_GEN_ALL_CORE_FT
PAST MEDICAL HISTORY:  DVT, lower extremity     H/O spontaneous intraparenchymal intracranial hemorrhage     Macular degeneration

## 2024-01-20 NOTE — ED ADULT NURSE REASSESSMENT NOTE - COMFORT CARE
assisted to bathroom assisted to bathroom/plan of care explained/side rails up/warm blanket provided

## 2024-01-20 NOTE — ED ADULT NURSE NOTE - OBJECTIVE STATEMENT
Pt is a 73y female BIBEMS as a transfer from [facility] for neuro eval. As per EMS report, pt had a fall 4-5 days ago while at her rehab facility, pt is on Lovenox and has a history of a brain bleed, around 10am this morning pt began endorsing head pain. Pt endorses while at the rehab facility she was attempting to use the commode toilet and fell hitting her head and this morning she was having a HA all over so facility called EMS. [facility] Stroke (in December). Pt is A&Ox4 currently denying any SOB, CP, palpitations, abd pain,  n/v/d/c, fever/chills. Peripheral IV placed by RN in [place], 20G R AC and 18G L wrist.

## 2024-01-20 NOTE — ED ADULT NURSE REASSESSMENT NOTE - NS ED NURSE REASSESS COMMENT FT1
Report received from MARLEN Ray. Pt is A&Ox3. Pt complains of generalized pressure in head but does not report pain. Pt ambulated to the bathroom with assistance with steady gait. V/S as documented. Pt has PERRL and strong upper and lower extremity strength. Breathing is spontaneous and unlabored. Skin is pink and dry. Pt educated on plan of care and monitoring. Bed locked and lowered with side rail up for safety. Report received from MARLEN Ray. Pt is A&Ox3. Pt complains of generalized pressure in head but does not report pain. Patient denies pain, lightheadedness, or dizziness at this time. Pt ambulated to the bathroom with assistance with steady gait. V/S as documented. Pt has PERRL and strong upper and lower extremity strength. Breathing is spontaneous and unlabored. Skin is pink and dry. Pt educated on plan of care and monitoring. Bed locked and lowered with side rail up for safety.

## 2024-01-20 NOTE — ED PROVIDER NOTE - CARE PLAN
Assessment and plan of treatment:	73F hx bi/l DVTs below knees (on lovenox), wet and dry macular degeneration, recent hospitalization 12/2023 for CVA with hemorrhagic conversion (on seizure ppx) presented as transfer from [facility] for concern of worsening IPH. CTA head/neck ordered to eval IPH stability, other intracranial pathologies for headache. CBC, coags.   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 CTA head/neck ordered to eval IPH stability, other intracranial pathologies for headache. CBC, coags. Neurosurg eval for possible intervention.   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 CTA head/neck ordered to eval IPH stability, other intracranial pathologies for headache. CBC, coags. Neurosurg eval for possible intervention.

## 2024-01-20 NOTE — H&P ADULT - ASSESSMENT
73F with PMHx of macular degeneration, bilateral below knee DVT and recent admission for right temporoparietal IPH presenting for headache. Given headache and prior IPH she was eventually sent to [facility] who transferred patient to [facility] for neurosurgical evaluation. Patient had CTA H&N which did not show a LVO. She had CT Head w/o contrast which showed: "reviously seen right posterior temporal parenchymal hemorrhage measuring 2.4 x 2.0 x 2.4 cm. Hemorrhage creates mass effect on the occipital horn right lateral ventricle." Patient admitted for further management.

## 2024-01-20 NOTE — ED PROVIDER NOTE - CLINICAL SUMMARY MEDICAL DECISION MAKING FREE TEXT BOX
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 CTA head/neck ordered to eval IPH stability, other intracranial pathologies for headache. Read showing improvement in R posterior temporal parenchymal hemorrhage, mass effect on occipital R lateral horn. No noted vessel occlusions. Overall stable and improved findings on CT from prior comparison at last discharge. 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 CTA head/neck ordered to eval IPH stability, other intracranial pathologies for headache. Read showing improvement in R posterior temporal parenchymal hemorrhage, mass effect on occipital R lateral horn. No noted vessel occlusions. Overall stable and improved findings on CT from prior comparison at last discharge. Neurosurg rec no neurosurgical intervention, no additional imaging needed at this time. Possible medicine admission for PT/OT and DC recs. Patient should continue to hold AC/AP, okay for dvt ppx. Can continue her home AEDs.

## 2024-01-20 NOTE — ED ADULT TRIAGE NOTE - CHIEF COMPLAINT QUOTE
[facility]JESH as neuro transfer. Pt fell and had evidence of intracranial bleeding, discharged. Returned today with headache and confusion.

## 2024-01-20 NOTE — CONSULT NOTE ADULT - ASSESSMENT
73F, no AC/AP, previously seen by our team for R temporo-parietal IPH, now presents as txfr for mild HA. CT shows that prior IPH has greatly improved. Patient says she is not happy w/ her current facility. Her HA resolved w/ tylenol. Exam at baseline: LLE 4+/5, o/w intact    -No neurosurgical intervention; no additional imaging needed at this time  -recommend possible medicine admission for PT/OT and DC recs   -Patient should continue to hold AC/AP; Okay for dvt ppx  -She can continue her home AEDs  73F, no AC/AP, previously seen by our team for R temporo-parietal IPH, now presents as txfr for mild HA. CT shows that prior IPH has greatly improved. Patient says she is not happy w/ her current facility. Her HA resolved w/ tylenol. Exam at baseline: LLE 4+/5, o/w intact    -No neurosurgical intervention; no additional imaging needed at this time  -recommend possible medicine admission for PT/OT and DC recs   -Patient should continue to hold AC/AP; Okay for dvt ppx  -She can continue her home AEDs   -Please repeat MRI w/wo to rule out underlying lesion

## 2024-01-20 NOTE — ED PROVIDER NOTE - PLAN OF CARE
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 CTA head/neck ordered to eval IPH stability, other intracranial pathologies for headache. CBC, coags. 73F hx bi/l DVTs below knees (on lovenox), wet and dry macular degeneration, recent hospitalization 12/2023 for CVA with hemorrhagic conversion (on seizure ppx) presented as transfer from [facility] for concern of worsening IPH. CTA head/neck ordered to eval IPH stability, other intracranial pathologies for headache. CBC, coags. Neurosurg eval for possible intervention.

## 2024-01-20 NOTE — ED PROVIDER NOTE - PHYSICAL EXAMINATION
T(C): 36.7 (01-20-24 @ 01:16), Max: 36.8 (01-20-24 @ 00:10)  HR: 91 (01-20-24 @ 01:16) (73 - 91)  BP: 131/80 (01-20-24 @ 01:16) (114/77 - 131/80)  RR: 18 (01-20-24 @ 01:16) (18 - 18)  SpO2: 98% (01-20-24 @ 01:16) (96% - 98%)    GENERAL: well-appearing, NAD, appears comfortable  HEENT: NC/AT, EOMI, no conjunctival icterus, moist mucus membranes  NECK: supple, non-tender, no JVD, no LAD  LUNGS: non-labored breathing, CTAB, no wheezing/rales/rhonchi  CV: RRR, no m/r/g, 2+ palpable peripheral pulses bi/l, cap refill <2 secs  ABD: non-distended, soft, non-tender, no rebound tenderness or guarding  : no CVA tenderness  MSK: full ROM  EXT: warm and well-perfused, no peripheral edema  SKIN: no rashes or lesions  NEURO: AAOx3. L-sided weakness 4/5 strength, R side 5/5. Cranial nerves II-XII and coordination intact.

## 2024-01-20 NOTE — ED PROVIDER NOTE - NS ED ROS FT
Constitutional: denies weight loss, fatigue, fevers, chills  Skin: denies rashes or wounds  HEENT: no vision or hearing changes  CV: denies CALDERON or MARCIA  Resp: denies SOB or cough  GI: denies diarrhea, constipation, N/V, or changes to appetite  Urinary: denies urgency, dysuria  Neurologic: denies headache or pain  MSK: denies arthralgias or myalgias  Hematologic: denies bleeding or easy bruising  Lymphatics: denies LAD or recent infection

## 2024-01-20 NOTE — ED ADULT NURSE REASSESSMENT NOTE - NS ED NURSE REASSESS COMMENT FT1
Pt went to MRI. V/S as documented and within normal limits. Neuro assessment completed and findings located in Neuro flowsheet. Pt denies pain at this time.

## 2024-01-20 NOTE — ED ADULT TRIAGE NOTE - IDEAL BODY WEIGHT(KG)
[facility] Progress Note    Client Name: Bria rBeen  Date: September 13, 2023  Service Type:  Group Therapy  Facilitator: VERENICE Figueroa        Response:  Patient did not participate in group.      VERENICE Figueroa     48

## 2024-01-20 NOTE — H&P ADULT - NSHPPHYSICALEXAM_GEN_ALL_CORE
T(C): 36.7 (01-20-24 @ 11:21), Max: 36.8 (01-20-24 @ 00:10)  HR: 96 (01-20-24 @ 11:21) (73 - 96)  BP: 104/70 (01-20-24 @ 11:21) (104/70 - 131/85)  RR: 18 (01-20-24 @ 11:21) (18 - 18)  SpO2: 97% (01-20-24 @ 11:21) (96% - 98%)    GEN: female in NAD, appears comfortable, no diaphoresis  EYES: No scleral injection, PERRL, EOMI  ENTM: neck supple & symmetric without tracheal deviation, moist membranes, no gross hearing impairment, thyroid gland not enlarged  CV: +S1/S2, no m/r/g, no abdominal bruit, no LE edema  RESP: breathing comfortably, no respiratory accessory muscle use, CTAB, no w/r/r  GI: normoactive BS, soft, NTND, no rebounding/guarding, no palpable masses  LYMPHATICS: no LAD or tenderness to palpation  NEURO: AOx3, no focal deficits, CNII-XII grossly intact  PSYCH: No SI/HI/AVH, appropriate affect, appropriate insight/judgment   SKIN: no petechiae, ecchymosis or maculopapular rash noted

## 2024-01-20 NOTE — H&P ADULT - PROBLEM SELECTOR PLAN 2
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

## 2024-01-20 NOTE — CONSULT NOTE ADULT - SUBJECTIVE AND OBJECTIVE BOX
73F, no AC/AP, previously seen by our team for R temporo-parietal IPH, now presents as txfr for mild HA. CT shows that prior IPH has greatly improved. Patient says she is not happy w/ her current facility. Her HA resolved w/ tylenol.    --Anticoagulation--    T(C): 36.7 (01-20-24 @ 01:16), Max: 36.8 (01-20-24 @ 00:10)  HR: 91 (01-20-24 @ 01:16) (73 - 91)  BP: 131/80 (01-20-24 @ 01:16) (114/77 - 131/80)  RR: 18 (01-20-24 @ 01:16) (18 - 18)  SpO2: 98% (01-20-24 @ 01:16) (96% - 98%)  Wt(kg): --    Exam: LLE 4+/5, o/w intact

## 2024-01-20 NOTE — ED PROVIDER NOTE - ATTENDING CONTRIBUTION TO CARE
pt on heparin pt on lovenox las dose was at 1/18 at 5 pm, transferred from [facility] for eval for CVA w/ hemorrhagic conversion, pt head ct today improved from prior, pt refusing to go back to her facility she states she needs to stay in the hospital, pt w/ no cp, no sob, no back pain, pt w/ mild headache and feels like her facility ignores her. pt has mild L sided weakness, plan for labs imaging and nsg consult, pt reports she feels improved at this time headache improved.

## 2024-01-20 NOTE — H&P ADULT - NSHPREVIEWOFSYSTEMS_GEN_ALL_CORE
GEN: no night sweats or change in appetite  EYES: no changes in vision or diplopia   ENT: no epistaxis, sinus pain, gingival bleeding, odynophagia or dysphagia  CV: no CP, PND or palpitations  RESP: no cough, wheezing, or hemoptysis  GI: no hematemesis, hematochezia, or melena  : no dysuria, polyuria, or hematuria  MSK: no arthralgias or joint swelling   NEURO: no gross sensory changes, numbness, focal deficits; +headache  PSYCH: no depression or changes in concentration  HEME/ONC: no purpura, petechiae or night sweats  SKIN: no pruritus, hair loss or skin lesions  ALL: no photosensitivity, no complaints of anaphylaxis (SOB, throat swelling)

## 2024-01-20 NOTE — ED ADULT NURSE REASSESSMENT NOTE - NS ED NURSE REASSESS COMMENT FT1
Pt laying comfortably in bed. V/S taken and as documented. Neuro assessment performed on pt and findings found on Neuro flowsheet. Pt denies pain or discomfort at this time. Plan of care and monitoring explained to patient. Bed locked and lowered for safety.

## 2024-01-20 NOTE — H&P ADULT - PROBLEM SELECTOR PLAN 1
- Recent development of headache in over past few days with CT head showing improved IPH  - Plan to perform MRI brain  - Tylenol PRN pain

## 2024-01-20 NOTE — H&P ADULT - PROBLEM SELECTOR PLAN 3
- Etiology unclear, but on last admission found to have bilateral below knee DVT and cleared for Lovenox and prophylactic dosing  - Last screening Duplex on file was 12/20, so will repeat  - Heme consult (will require ongoing management)

## 2024-01-20 NOTE — CONSULT NOTE ADULT - ATTENDING COMMENTS
73F, no AC/AP, previously seen by our team for R temporo-parietal IPH, now presents as txfr for mild HA. CT shows that prior IPH has greatly improved. Patient says she is not happy w/ her current facility. Her HA resolved w/ tylenol. Exam at baseline: LLE 4+/5, o/w intact    -No neurosurgical intervention; no additional imaging needed at this time  -recommend possible medicine admission for PT/OT and DC recs   -Patient should continue to hold AC/AP; Okay for dvt ppx  -She can continue her home AEDs   -Please repeat MRI w/wo to rule out underlying lesion

## 2024-01-20 NOTE — H&P ADULT - HISTORY OF PRESENT ILLNESS
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 Patient initially presented 12/2023 for CVA, MR brain 12/15 stable R parietotemporal intraparenchymal hemorrhage, similar midline shift 5mm, stable on repeat imaging. Discharged to [facility] acute rehab, good improvement in L-sided deficits. Baseline now with very mild residual upper and lower L-sided weakness, tremors in L UE. Patient was receiving lovenox at [facility], last dose 1/18 5PM, on 1/19 complained of headache and was sent to [facility] where she received 1g IV tylenol 500 cc of ns, then transferred to [facility] for neurosurgical eval given IPH hx. Patient denies chest pain, SOB, abdominal pain, numbness/tingling, current headache, lower extremity swelling. Noted DNR/DNI w/ molst. Takes seizure ppx, famotidine. Vitals hemodynamically stable on interview.     73F with PMHx of macular degeneration, bilateral below knee DVT and recent admission for right temporoparietal IPH presenting for headache. Patient admitted in late December 2023 for confusion and found to have right  temporoparietal IPH with mild midline shift. Patient was in the [facility]. She was treated conservatively. Screening LE duplex showed bilateral LE DVT. She was started on prophylactic Lovenox and discharged to acute rehab at [facility]. She was eventually discharged from [facility] to [facility]. Patient stated while there she was treated poorly. She complained of an occipital headache which she described as dull, but stated they ignored here. She states because of the headache she missed sitting on a commode. Given headache and prior IPH she was eventually sent to [facility] who transferred patient to [facility] for neurosurgical evaluation. Patient had CTA H&N which did not show a LVO. She had CT Head w/o contrast which showed: "reviously seen right posterior temporal parenchymal hemorrhage measuring 2.4 x 2.0 x 2.4 cm. Hemorrhage creates mass effect on the occipital horn right lateral ventricle." She was seen by neurosurgery who recommended no acute intervention, but did recommend a brain MRI w/w/o to rule out underlying lesion. Patient still with dull headache, but tolerable and improved with Tylenol.

## 2024-01-20 NOTE — ED PROVIDER NOTE - OBJECTIVE STATEMENT
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 Patient initially presented 12/2023 for CVA, MR brain 12/15 stable R parietotemporal intraparenchymal hemorrhage, similar midline shift 5mm, stable on repeat imaging. Discharged to [facility] acute rehab, good improvement in L-sided deficits. Baseline now with very mild residual upper and lower L-sided weakness, tremors in L UE. Patient was receiving lovenox at [facility], last dose 1/18 5PM, on 1/19 complained of headache and was sent to [facility] where she received 1g IV tylenol 500 cc of ns, then transferred to [facility] for neurosurgical eval given IPH hx. Patient denies chest pain, SOB, abdominal pain, numbness/tingling, current headache, lower extremity swelling. Noted DNR/DNI w/ molst. Takes seizure ppx, famotidine. Vitals hemodynamically stable on interview.

## 2024-01-21 ENCOUNTER — TRANSCRIPTION ENCOUNTER (OUTPATIENT)
Age: 74
End: 2024-01-21

## 2024-01-21 PROCEDURE — 93970 EXTREMITY STUDY: CPT | Mod: 26

## 2024-01-21 PROCEDURE — 99232 SBSQ HOSP IP/OBS MODERATE 35: CPT

## 2024-01-21 RX ADMIN — Medication 650 MILLIGRAM(S): at 03:02

## 2024-01-21 RX ADMIN — Medication 1 DROP(S): at 18:32

## 2024-01-21 RX ADMIN — BRIVARACETAM 50 MILLIGRAM(S): 25 TABLET, FILM COATED ORAL at 18:32

## 2024-01-21 RX ADMIN — BRIVARACETAM 50 MILLIGRAM(S): 25 TABLET, FILM COATED ORAL at 05:06

## 2024-01-21 RX ADMIN — Medication 3 MILLIGRAM(S): at 22:01

## 2024-01-21 RX ADMIN — Medication 650 MILLIGRAM(S): at 03:32

## 2024-01-21 RX ADMIN — ATORVASTATIN CALCIUM 10 MILLIGRAM(S): 80 TABLET, FILM COATED ORAL at 22:01

## 2024-01-21 RX ADMIN — FAMOTIDINE 20 MILLIGRAM(S): 10 INJECTION INTRAVENOUS at 18:23

## 2024-01-21 RX ADMIN — GABAPENTIN 400 MILLIGRAM(S): 400 CAPSULE ORAL at 22:00

## 2024-01-21 RX ADMIN — ENOXAPARIN SODIUM 40 MILLIGRAM(S): 100 INJECTION SUBCUTANEOUS at 22:00

## 2024-01-21 NOTE — DISCHARGE NOTE PROVIDER - NSDCCPCAREPLAN_GEN_ALL_CORE_FT
PRINCIPAL DISCHARGE DIAGNOSIS  Diagnosis: Intraparenchymal hemorrhage of brain  Assessment and Plan of Treatment: You have a history of brain hemorrhage and we did a CT head and MRI of the head which showed improvement. Please follow up with your PCP in 2 weeks. You will need a CT of your head in 3 months to follow up.      SECONDARY DISCHARGE DIAGNOSES  Diagnosis: History of headache  Assessment and Plan of Treatment: Take tylenol as needed for headaches    Diagnosis: DVT, lower extremity  Assessment and Plan of Treatment: You had a history of a blood clots in your legs. We repeated a scan and showed there is persisent clot below the knee on the right leg, and no clots in the left leg.     PRINCIPAL DISCHARGE DIAGNOSIS  Diagnosis: Intraparenchymal hemorrhage of brain  Assessment and Plan of Treatment: You have a history of brain hemorrhage and we did a CT head and MRI of the head which showed improvement. Please follow up with your PCP in 2 weeks. You will need a CT of your head in 3 months to follow- up.      SECONDARY DISCHARGE DIAGNOSES  Diagnosis: DVT, lower extremity  Assessment and Plan of Treatment: You had a history of a blood clots in your legs. We repeated a scan and showed there is persisent clot below the knee on the right leg, and no clots in the left leg.    Diagnosis: History of headache  Assessment and Plan of Treatment: Take tylenol as needed for headaches     PRINCIPAL DISCHARGE DIAGNOSIS  Diagnosis: Intraparenchymal hemorrhage of brain  Assessment and Plan of Treatment: You have a history of brain hemorrhage and we did a CT head and MRI of the head which showed improvement. Please follow up with your PCP in 2 weeks. You will need a CT of your head in 3 months to follow- up. Please follow-up with Dr. Alvarez, neurosurgeon outpatient.      SECONDARY DISCHARGE DIAGNOSES  Diagnosis: DVT, lower extremity  Assessment and Plan of Treatment: You had a history of a blood clots in your legs. We repeated a scan and showed there is persisent clot below the knee on the right leg, and no clots in the left leg.  You will be discharged on Lovenox. Please follow-up with PCP and hematologist.    Diagnosis: History of headache  Assessment and Plan of Treatment: Take tylenol as needed for headaches

## 2024-01-21 NOTE — PATIENT PROFILE ADULT - FUNCTIONAL ASSESSMENT - BASIC MOBILITY 6.
2-calculated by average/Not able to assess (calculate score using [facility] averaging method)

## 2024-01-21 NOTE — PROGRESS NOTE ADULT - SUBJECTIVE AND OBJECTIVE BOX
[facility] Division of Hospital Medicine  Aroldo Willett MD  Available via MS Teams    SUBJECTIVE / OVERNIGHT EVENTS:  no acute events overnight   having intermittent headaches, top of the head, improved after tylenol  denies other symptoms, eating well, had BM this morning     ADDITIONAL REVIEW OF SYSTEMS:  14 point ROS negative except as noted above     MEDICATIONS  (STANDING):  atorvastatin 10 milliGRAM(s) Oral at bedtime  brivaracetam 50 milliGRAM(s) Oral two times a day  enoxaparin Injectable 40 milliGRAM(s) SubCutaneous every 24 hours  famotidine    Tablet 20 milliGRAM(s) Oral daily  gabapentin 400 milliGRAM(s) Oral at bedtime  ketorolac 0.5% Ophthalmic Solution 1 Drop(s) Left EYE daily  senna 2 Tablet(s) Oral at bedtime    MEDICATIONS  (PRN):  acetaminophen     Tablet .. 650 milliGRAM(s) Oral every 6 hours PRN Moderate Pain (4 - 6)  artificial  tears Solution 1 Drop(s) Both EYES four times a day PRN Dry Eyes  melatonin 3 milliGRAM(s) Oral at bedtime PRN Insomnia      I&O's Summary    20 Jan 2024 07:01  -  21 Jan 2024 07:00  --------------------------------------------------------  IN: 0 mL / OUT: 0 mL / NET: 0 mL    21 Jan 2024 07:01  -  21 Jan 2024 14:03  --------------------------------------------------------  IN: 360 mL / OUT: 0 mL / NET: 360 mL        PHYSICAL EXAM:  Vital Signs Last 24 Hrs  T(C): 36.4 (21 Jan 2024 09:31), Max: 36.7 (20 Jan 2024 15:26)  T(F): 97.6 (21 Jan 2024 09:31), Max: 98.1 (20 Jan 2024 15:26)  HR: 82 (21 Jan 2024 09:31) (71 - 85)  BP: 106/72 (21 Jan 2024 09:31) (106/72 - 122/75)  BP(mean): --  RR: 18 (21 Jan 2024 09:31) (16 - 18)  SpO2: 98% (21 Jan 2024 09:31) (96% - 99%)    Parameters below as of 21 Jan 2024 09:31  Patient On (Oxygen Delivery Method): room air      PHYSICAL EXAM:  GENERAL: NAD, well-developed, elderly   HEAD:  Atraumatic, normocephalic  EYES: EOMI, conjunctiva and sclera clear  NECK: Supple, no JVD  CHEST/LUNG: Clear to auscultation bilaterally; no wheezing or rales  HEART: Regular rate and rhythm; no murmurs, no LE edema   ABDOMEN: Soft, nontender, nondistended; bowel sounds present  EXTREMITIES:  2+ Peripheral Pulses, no clubbing, cyanosis  PSYCH: AAOx3, calm affect, not anxious  SKIN: No rashes or lesions      LABS:                        12.2   5.46  )-----------( 195      ( 20 Jan 2024 01:44 )             35.1     01-20    139  |  107  |  9   ----------------------------<  99  3.5   |  21<L>  |  0.57    Ca    9.1      20 Jan 2024 01:13    TPro  5.5<L>  /  Alb  3.4  /  TBili  0.5  /  DBili  x   /  AST  22  /  ALT  29  /  AlkPhos  61  01-20    PT/INR - ( 20 Jan 2024 01:13 )   PT: 11.6 sec;   INR: 1.06 ratio         PTT - ( 20 Jan 2024 01:13 )  PTT:18.9 sec      Urinalysis Basic - ( 20 Jan 2024 01:13 )    Color: x / Appearance: x / SG: x / pH: x  Gluc: 99 mg/dL / Ketone: x  / Bili: x / Urobili: x   Blood: x / Protein: x / Nitrite: x   Leuk Esterase: x / RBC: x / WBC x   Sq Epi: x / Non Sq Epi: x / Bacteria: x        COVID-19 PCR: NotDetec (20 Dec 2023 22:30)      RADIOLOGY & ADDITIONAL TESTS:  New Imaging Personally Reviewed Today:  New Electrocardiogram Personally Reviewed Today:  Other Results Reviewed Today:   Prior or Outpatient Records Reviewed Today with Summary:    COORDINATION OF CARE:  Consultant Communication and Details of Discussion (where applicable):

## 2024-01-21 NOTE — PATIENT PROFILE ADULT - FALL HARM RISK - HARM RISK INTERVENTIONS

## 2024-01-21 NOTE — PHYSICAL THERAPY INITIAL EVALUATION ADULT - PERTINENT HX OF CURRENT PROBLEM, REHAB EVAL
73F with PMHx of macular degeneration, bilateral below knee DVT and recent admission for right temporoparietal IPH presenting for headache. Given headache and prior IPH she was eventually sent to [facility] who transferred patient to [facility] for neurosurgical evaluation. Patient had CTA H&N which did not show a LVO. She had CT Head w/o contrast which showed: "previously seen right posterior temporal parenchymal hemorrhage measuring 2.4 x 2.0 x 2.4 cm. Hemorrhage creates mass effect on the occipital horn right lateral ventricle." Patient admitted for further management.      Neuro consult:   73F, no AC/AP, previously seen by our team for R temporo-parietal IPH, now presents as txfr for mild HA. CT shows that prior IPH has greatly improved. Patient says she is not happy w/ her current facility. Her HA resolved w/ tylenol. Exam at baseline: LLE 4+/5, o/w intact  -No neurosurgical intervention; no additional imaging needed at this time 73F with PMHx of macular degeneration, bilateral below knee DVT and recent admission for right temporoparietal IPH presenting for headache. Given headache and prior IPH she was eventually sent to [facility] who transferred patient to [facility] for neurosurgical evaluation. Patient had CTA H&N which did not show a LVO. She had CT Head w/o contrast which showed: "previously seen right posterior temporal parenchymal hemorrhage measuring 2.4 x 2.0 x 2.4 cm. Hemorrhage creates mass effect on the occipital horn right lateral ventricle." Patient admitted for further management.  Neuro consult:   73F, no AC/AP, previously seen by our team for R temporo-parietal IPH, now presents as txfr for mild HA. CT shows that prior IPH has greatly improved. Patient says she is not happy w/ her current facility. Her HA resolved w/ tylenol. Exam at baseline: LLE 4+/5, o/w intact  -No neurosurgical intervention; no additional imaging needed at this time 73F with PMHx of macular degeneration, bilateral below knee DVT and recent admission for right temporoparietal IPH presenting for headache. Given headache and prior IPH she was eventually sent to [facility] who transferred patient to [facility] for neurosurgical evaluation. Patient had CTA H&N which did not show a LVO. She had CT Head w/o contrast which showed: "previously seen right posterior temporal parenchymal hemorrhage measuring 2.4 x 2.0 x 2.4 cm. Hemorrhage creates mass effect on the occipital horn right lateral ventricle." Patient admitted for further management.      Neuro consult 1/20/24:   73F previously seen by our team for R temporo-parietal IPH, now presents as txfr for mild HA. CT shows that prior IPH has greatly improved.  Her HA resolved w/ tylenol. -No neurosurgical intervention; no additional imaging needed at this time

## 2024-01-21 NOTE — DISCHARGE NOTE PROVIDER - NSDCFUADDAPPT_GEN_ALL_CORE_FT
APPTS ARE READY TO BE MADE: [ ] YES    Best Family or Patient Contact (if needed):    Additional Information about above appointments (if needed):    1: Follow-up with PCP.  2: Follow-up with neurosurgery.   3: Follow-up with ophthalmologist.     Other comments or requests:    APPTS ARE READY TO BE MADE: [X] YES    Best Family or Patient Contact (if needed):    Additional Information about above appointments (if needed):    1: Follow-up with PCP.  2: Follow-up with neurosurgery.   3: Follow-up with ophthalmologist. Follow-up with retinal specialist.     Other comments or requests:    APPTS ARE READY TO BE MADE: [X] YES    Best Family or Patient Contact (if needed):    Additional Information about above appointments (if needed):    1: Follow-up with PCP.  2: Follow-up with neurosurgery.   3: Follow-up with ophthalmologist. Follow-up with retinal specialist. Patient currently is seeing Dr. Dominguez Joyner for ongoing treatment of neovascular macular degeneration. Her last injection was 11/20/2023 with followup time of 4-5 weeks. Patient was intended to followup by december 25th 2023. Patient is in need of continued treatment and will require transportation to [address] [place] [address] once every 4-5 weeks while in rehab.      Other comments or requests:    APPTS ARE READY TO BE MADE: [X] YES    Best Family or Patient Contact (if needed):    Additional Information about above appointments (if needed):    1: Follow-up with PCP.  2: Follow-up with neurosurgery.   3: Follow-up with ophthalmologist. Follow-up with retinal specialist. Patient currently is seeing Dr. Dominguez Joyner for ongoing treatment of neovascular macular degeneration. Her last injection was 11/20/2023 with followup time of 4-5 weeks. Patient was intended to followup by december 25th 2023. Patient is in need of continued treatment and will require transportation to [address] [place] [address] once every 4-5 weeks while in rehab.      Other comments or requests:   Patient is being discharged to [facility]. Patient/caregiver will arrange follow up appointments.

## 2024-01-21 NOTE — PROGRESS NOTE ADULT - ASSESSMENT
73F with PMHx of macular degeneration, bilateral below knee DVT and recent admission for right temporoparietal IPH presenting for headache. Given headache and prior IPH she was eventually sent to [facility] who transferred patient to [facility] for neurosurgical evaluation. Patient had CTA H&N which did not show a LVO. She had CT Head w/o contrast which showed: "reviously seen right posterior temporal parenchymal hemorrhage measuring 2.4 x 2.0 x 2.4 cm. Hemorrhage creates mass effect on the occipital horn right lateral ventricle." Patient admitted for further management.

## 2024-01-21 NOTE — CONSULT NOTE ADULT - SUBJECTIVE AND OBJECTIVE BOX
[facility]EMATOLOGY/ONCOLOGY CONSULT NOTE     HPI:  Patient is a 73y Female with PMHx of     ROS: pertinent positives and negatives as per HPI    Allergies: No Known Allergies    PMHx:  DVT, lower extremity  Macular degeneration  H/O spontaneous intraparenchymal intracranial hemorrhage    SurgHx:   No significant past surgical history    FHx:   No pertinent family history in first degree relatives    SocHx:     Meds:   MEDICATIONS  (STANDING):  atorvastatin 10 milliGRAM(s) Oral at bedtime  brivaracetam 50 milliGRAM(s) Oral two times a day  enoxaparin Injectable 40 milliGRAM(s) SubCutaneous every 24 hours  famotidine    Tablet 20 milliGRAM(s) Oral daily  gabapentin 400 milliGRAM(s) Oral at bedtime  ketorolac 0.5% Ophthalmic Solution 1 Drop(s) Left EYE daily  senna 2 Tablet(s) Oral at bedtime    MEDICATIONS  (PRN):  acetaminophen     Tablet .. 650 milliGRAM(s) Oral every 6 hours PRN Moderate Pain (4 - 6)  artificial  tears Solution 1 Drop(s) Both EYES four times a day PRN Dry Eyes  melatonin 3 milliGRAM(s) Oral at bedtime PRN Insomnia    Vital Signs  T(C): 36.5 (01-21-24 @ 04:57), Max: 36.7 (01-20-24 @ 07:33)  T(F): 97.7 (01-21-24 @ 04:57), Max: 98.1 (01-20-24 @ 07:33)  HR: 71 (01-21-24 @ 04:57) (71 - 96)  BP: 115/73 (01-21-24 @ 04:57) (104/70 - 126/81)  RR: 18 (01-21-24 @ 04:57) (16 - 18)  SpO2: 96% (01-21-24 @ 04:57) (96% - 99%)    Physical Exam:  Gen:   HEENT:   Chest:   Cardiac:  Abd:   Ext:   Neuro:   Integument:     Labs:  CBC Full  -  ( 20 Jan 2024 01:44 )  WBC Count : 5.46 K/uL  RBC Count : 3.90 M/uL  Hemoglobin : 12.2 g/dL  Hematocrit : 35.1 %  Platelet Count - Automated : 195 K/uL  Mean Cell Volume : 90.0 fl  Mean Cell Hemoglobin : 31.3 pg  Mean Cell Hemoglobin Concentration : 34.8 gm/dL  Auto Neutrophil # : 3.71 K/uL  Auto Lymphocyte # : 1.11 K/uL  Auto Monocyte # : 0.55 K/uL  Auto Eosinophil # : 0.06 K/uL  Auto Basophil # : 0.01 K/uL  Auto Neutrophil % : 67.9 %  Auto Lymphocyte % : 20.3 %  Auto Monocyte % : 10.1 %  Auto Eosinophil % : 1.1 %  Auto Basophil % : 0.2 %    01-20    139  |  107  |  9   ----------------------------<  99  3.5   |  21<L>  |  0.57    Ca    9.1      20 Jan 2024 01:13    TPro  5.5<L>  /  Alb  3.4  /  TBili  0.5  /  DBili  x   /  AST  22  /  ALT  29  /  AlkPhos  61  01-20    PT/INR - ( 20 Jan 2024 01:13 )   PT: 11.6 sec;   INR: 1.06 ratio         PTT - ( 20 Jan 2024 01:13 )  PTT:18.9 sec   [facility]AT[facility]ON[facility]Y CONSULT NOTE     HPI:  Patient is a 73y Female with PMHx of macular degeneration, b/l below knee DVT and recent admission 12/2023 for right temporoparietal IPH who is now presenting with headache. Patient admitted in late December 2023 for confusion and found to have right  temporoparietal IPH with mild midline shift and was treated with supportive care. During that previous admission. US LE initially on that admission was negative, and then repeat showed DVT below the right knee within the right   soleal vein and DVT below the left knee within the left soleal, peroneal and gastrocnemius veins. She was started on prophylactic Lovenox and discharged to acute rehab at [place]. She was eventually discharged from [place] to [facility]. Patient stated while there she was treated poorly. She complained of an occipital headache which she described as dull, but stated they ignored here. She states because of the headache she missed sitting on a commode. Given headache and prior IPH she was eventually sent to [facility] who transferred patient to [facility] for neurosurgical evaluation. Patient had CTA H&N which did not show a LVO. She had CT Head w/o contrast which showed: "reviously seen right posterior temporal parenchymal hemorrhage measuring 2.4 x 2.0 x 2.4 cm. Hemorrhage creates mass effect on the occipital horn right lateral ventricle." She was seen by neurosurgery who recommended no acute intervention, but did recommend a brain MRI w/w/o to rule out underlying lesion. Patient still with dull headache, but tolerable and improved with Tylenol.      ROS: pertinent positives and negatives as per HPI    Allergies: No Known Allergies    PMHx:  DVT, lower extremity  Macular degeneration  H/O spontaneous intraparenchymal intracranial hemorrhage    SurgHx:   No significant past surgical history    FHx:   No pertinent family history in first degree relatives    SocHx:     Meds:   MEDICATIONS  (STANDING):  atorvastatin 10 milliGRAM(s) Oral at bedtime  brivaracetam 50 milliGRAM(s) Oral two times a day  enoxaparin Injectable 40 milliGRAM(s) SubCutaneous every 24 hours  famotidine    Tablet 20 milliGRAM(s) Oral daily  gabapentin 400 milliGRAM(s) Oral at bedtime  ketorolac 0.5% Ophthalmic Solution 1 Drop(s) Left EYE daily  senna 2 Tablet(s) Oral at bedtime    MEDICATIONS  (PRN):  acetaminophen     Tablet .. 650 milliGRAM(s) Oral every 6 hours PRN Moderate Pain (4 - 6)  artificial  tears Solution 1 Drop(s) Both EYES four times a day PRN Dry Eyes  melatonin 3 milliGRAM(s) Oral at bedtime PRN Insomnia    Vital Signs  T(C): 36.5 (01-21-24 @ 04:57), Max: 36.7 (01-20-24 @ 07:33)  T(F): 97.7 (01-21-24 @ 04:57), Max: 98.1 (01-20-24 @ 07:33)  HR: 71 (01-21-24 @ 04:57) (71 - 96)  BP: 115/73 (01-21-24 @ 04:57) (104/70 - 126/81)  RR: 18 (01-21-24 @ 04:57) (16 - 18)  SpO2: 96% (01-21-24 @ 04:57) (96% - 99%)    Physical Exam:  Gen:   HEENT:   Chest:   Cardiac:  Abd:   Ext:   Neuro:   Integument:     Labs:  CBC Full  -  ( 20 Jan 2024 01:44 )  WBC Count : 5.46 K/uL  RBC Count : 3.90 M/uL  Hemoglobin : 12.2 g/dL  Hematocrit : 35.1 %  Platelet Count - Automated : 195 K/uL  Mean Cell Volume : 90.0 fl  Mean Cell Hemoglobin : 31.3 pg  Mean Cell Hemoglobin Concentration : 34.8 gm/dL  Auto Neutrophil # : 3.71 K/uL  Auto Lymphocyte # : 1.11 K/uL  Auto Monocyte # : 0.55 K/uL  Auto Eosinophil # : 0.06 K/uL  Auto Basophil # : 0.01 K/uL  Auto Neutrophil % : 67.9 %  Auto Lymphocyte % : 20.3 %  Auto Monocyte % : 10.1 %  Auto Eosinophil % : 1.1 %  Auto Basophil % : 0.2 %    01-20    139  |  107  |  9   ----------------------------<  99  3.5   |  21<L>  |  0.57    Ca    9.1      20 Jan 2024 01:13    TPro  5.5<L>  /  Alb  3.4  /  TBili  0.5  /  DBili  x   /  AST  22  /  ALT  29  /  AlkPhos  61  01-20    PT/INR - ( 20 Jan 2024 01:13 )   PT: 11.6 sec;   INR: 1.06 ratio         PTT - ( 20 Jan 2024 01:13 )  PTT:18.9 sec   [facility]AT[facility]/ONCOLOGY CONSULT NOTE     HPI:  Patient is a 73y Female with PMHx of macular degeneration, b/l below knee DVT and recent admission 12/2023 for right temporoparietal IPH who is now presenting with headache. Patient admitted in late December 2023 for confusion and found to have right  temporoparietal IPH with mild midline shift and was treated with supportive care. During that previous admission. US LE initially on that admission was negative, and then repeat showed DVT below the right knee within the right soleal vein and DVT below the left knee within the left soleal, peroneal and gastrocnemius veins. She was started on prophylactic Lovenox and discharged to acute rehab at [place]. She was eventually discharged from [place] to [facility] and there she had an occipital headache. Given headache and prior IPH she was eventually sent to [facility] who transferred patient to [facility] for neurosurgical evaluation. Patient had CTA H&N which did not show a LVO. She had CT Head w/o contrast which showed previously seen right posterior temporal parenchymal hemorrhage measuring 2.4 x 2.0 x 2.4 cm. Hemorrhage creates mass effect on the occipital horn right lateral ventricle. She has been seen by neurosurgery who  does not recommended intervention, but did recommend a brain MRI w/w/o to rule out underlying lesion.     She denied hx of smoking, ETOH, illicit drug use, denied family hx of bleeding or blood disorders. Denied hx of bleeding complications in the past.     ROS: pertinent positives and negatives as per HPI    Allergies: No Known Allergies    PMHx:  DVT, lower extremity  Macular degeneration  H/O spontaneous intraparenchymal intracranial hemorrhage    SurgHx:   No significant past surgical history    FHx:   No pertinent family history in first degree relatives    SocHx:   Denied smoking, ETOH, Illicit drug     Meds:   MEDICATIONS  (STANDING):  atorvastatin 10 milliGRAM(s) Oral at bedtime  brivaracetam 50 milliGRAM(s) Oral two times a day  enoxaparin Injectable 40 milliGRAM(s) SubCutaneous every 24 hours  famotidine    Tablet 20 milliGRAM(s) Oral daily  gabapentin 400 milliGRAM(s) Oral at bedtime  ketorolac 0.5% Ophthalmic Solution 1 Drop(s) Left EYE daily  senna 2 Tablet(s) Oral at bedtime    MEDICATIONS  (PRN):  acetaminophen     Tablet .. 650 milliGRAM(s) Oral every 6 hours PRN Moderate Pain (4 - 6)  artificial  tears Solution 1 Drop(s) Both EYES four times a day PRN Dry Eyes  melatonin 3 milliGRAM(s) Oral at bedtime PRN Insomnia    Vital Signs  T(C): 36.5 (01-21-24 @ 04:57), Max: 36.7 (01-20-24 @ 07:33)  T(F): 97.7 (01-21-24 @ 04:57), Max: 98.1 (01-20-24 @ 07:33)  HR: 71 (01-21-24 @ 04:57) (71 - 96)  BP: 115/73 (01-21-24 @ 04:57) (104/70 - 126/81)  RR: 18 (01-21-24 @ 04:57) (16 - 18)  SpO2: 96% (01-21-24 @ 04:57) (96% - 99%)    Physical Exam:  Gen: NAD  HEENT: EOMI, MMM  Chest: equal chest rise, speaking full sentences   Cardiac: RR  Abd: Non-distended   Ext: 1+/trace edema   Neuro: AAOx3, slowed affect and speech   Integument:     Labs:                        12.2   5.46  )-----------( 195      ( 20 Jan 2024 01:44 )             35.1     CBC Full  -  ( 20 Jan 2024 01:44 )  WBC Count : 5.46 K/uL  RBC Count : 3.90 M/uL  Hemoglobin : 12.2 g/dL  Hematocrit : 35.1 %  Platelet Count - Automated : 195 K/uL  Mean Cell Volume : 90.0 fl  Mean Cell Hemoglobin : 31.3 pg  Mean Cell Hemoglobin Concentration : 34.8 gm/dL  Auto Neutrophil # : 3.71 K/uL  Auto Lymphocyte # : 1.11 K/uL  Auto Monocyte # : 0.55 K/uL  Auto Eosinophil # : 0.06 K/uL  Auto Basophil # : 0.01 K/uL  Auto Neutrophil % : 67.9 %  Auto Lymphocyte % : 20.3 %  Auto Monocyte % : 10.1 %  Auto Eosinophil % : 1.1 %  Auto Basophil % : 0.2 %    01-20    139  |  107  |  9   ----------------------------<  99  3.5   |  21<L>  |  0.57    Ca    9.1      20 Jan 2024 01:13    TPro  5.5<L>  /  Alb  3.4  /  TBili  0.5  /  DBili  x   /  AST  22  /  ALT  29  /  AlkPhos  61  01-20    PT/INR - ( 20 Jan 2024 01:13 )   PT: 11.6 sec;   INR: 1.06 ratio         PTT - ( 20 Jan 2024 01:13 )  PTT:18.9 sec

## 2024-01-21 NOTE — DISCHARGE NOTE PROVIDER - CONDITIONS AT DISCHARGE
Request from Dr. Miranda to facilitate patient discharge. Medication reconciliation reviewed, revised and resolved with Dr. Miranda, who has medically cleared patient for discharge with follow-up as advised.

## 2024-01-21 NOTE — DISCHARGE NOTE PROVIDER - NSDCCAREPROVSEEN_GEN_ALL_CORE_FT
Aroldo Willett Alon Brennon, Aroldo Hurley, Gutierrez Miranda, Michelle Brennon, Aroldo Hurley, Gutierrez Miranda, Michelle Cadet, Jem Michelle Miranda

## 2024-01-21 NOTE — DISCHARGE NOTE PROVIDER - PROVIDER TOKENS
PROVIDER:[TOKEN:[[id number]],FOLLOWUP:[2 weeks],ESTABLISHEDPATIENT:[T]] PROVIDER:[TOKEN:[[id number]],FOLLOWUP:[2 weeks],ESTABLISHEDPATIENT:[T]],PROVIDER:[TOKEN:[[id number]],FOLLOWUP:[2 weeks]]

## 2024-01-21 NOTE — PHYSICAL THERAPY INITIAL EVALUATION ADULT - COORDINATION ASSESSED, REHAB EVAL
finger to nose/heel to shin both mild to moderately impaired in left UE and LE/finger to nose/heel to shin

## 2024-01-21 NOTE — DISCHARGE NOTE PROVIDER - NSDCMRMEDTOKEN_GEN_ALL_CORE_FT
acetaminophen 325 mg oral tablet: 2 tab(s) orally every 6 hours As needed Temp greater or equal to 38C (100.4F), Mild Pain (1 - 3)  aluminum hydroxide-magnesium hydroxide 200 mg-200 mg/5 mL oral suspension: 30 milliliter(s) orally every 6 hours As needed Dyspepsia  atorvastatin 10 mg oral tablet: 1 tab(s) orally once a day (at bedtime)  bisacodyl 10 mg rectal suppository: 1 suppository(ies) rectal once a day As needed Constipation  brivaracetam 50 mg oral tablet: 1 tab(s) orally 2 times a day  calcium carbonate 500 mg (200 mg elemental calcium) oral tablet, chewable: 1 tab(s) orally 3 times a day As needed Heartburn  enoxaparin: 40 milligram(s) subcutaneous once a day (at bedtime)  famotidine 20 mg oral tablet: 1 tab(s) orally 2 times a day  gabapentin 400 mg oral capsule: 1 cap(s) orally once a day (at bedtime)  ketorolac 0.5% ophthalmic solution: 1 drop(s) to each affected eye once a day  lidocaine 4% topical film: Apply topically to affected area once a day up to 12 hours daily  melatonin 3 mg oral tablet: 1 tab(s) orally once a day (at bedtime) As needed Insomnia  PreserVision AREDS 2 oral capsule: 1 cap(s) orally 2 times a day  senna leaf extract oral tablet: 2 tab(s) orally once a day (at bedtime)  sodium chloride 0.65% nasal spray: 1 spray(s) nasal 2 times a day PRN dry nasal passages  Systane Balance ophthalmic solution: 1 drop(s) in each eye every 6 hours as needed   acetaminophen 325 mg oral tablet: 2 tab(s) orally every 6 hours As needed Temp greater or equal to 38C (100.4F), Mild Pain (1 - 3)  atorvastatin 10 mg oral tablet: 1 tab(s) orally once a day (at bedtime)  brivaracetam 50 mg oral tablet: 1 tab(s) orally 2 times a day  enoxaparin: 40 milligram(s) subcutaneous every 24 hours  famotidine 20 mg oral tablet: 1 tab(s) orally once a day  faricimab-svoa 6 mg/0.05 mL intravitreal solution: 6 milligram(s) in each affected eye once a week  gabapentin 400 mg oral capsule: 1 cap(s) orally once a day (at bedtime)  ketorolac 0.5% ophthalmic solution: 1 drop(s) to each affected eye once a day  lidocaine 4% topical film: Apply topically to affected area once a day up to 12 hours daily  melatonin 3 mg oral tablet: 1 tab(s) orally once a day (at bedtime) As needed Insomnia  PreserVision AREDS 2 oral capsule: 1 cap(s) orally 2 times a day  senna leaf extract oral tablet: 2 tab(s) orally once a day (at bedtime)  Systane Balance ophthalmic solution: 1 drop(s) in each eye every 6 hours as needed   acetaminophen 325 mg oral tablet: 2 tab(s) orally every 6 hours As needed Temp greater or equal to 38C (100.4F), Mild Pain (1 - 3)  atorvastatin 10 mg oral tablet: 1 tab(s) orally once a day (at bedtime)  brivaracetam 50 mg oral tablet: 1 tab(s) orally 2 times a day  enoxaparin: 40 milligram(s) subcutaneous every 24 hours  famotidine 20 mg oral tablet: 1 tab(s) orally once a day  faricimab-svoa 6 mg/0.05 mL intravitreal solution: 6 milligram(s) in each affected eye every 4 weeks  gabapentin 400 mg oral capsule: 1 cap(s) orally once a day (at bedtime)  ketorolac 0.5% ophthalmic solution: 1 drop(s) to each affected eye once a day  lidocaine 4% topical film: Apply topically to affected area once a day up to 12 hours daily  melatonin 3 mg oral tablet: 1 tab(s) orally once a day (at bedtime) As needed Insomnia  PreserVision AREDS 2 oral capsule: 1 cap(s) orally 2 times a day  senna leaf extract oral tablet: 2 tab(s) orally once a day (at bedtime)  Systane Balance ophthalmic solution: 1 drop(s) in each eye every 6 hours as needed

## 2024-01-21 NOTE — PHYSICAL THERAPY INITIAL EVALUATION ADULT - ADDITIONAL COMMENTS
Pt lives alone in an elevator accessible apt, with no steps to enter. Prior to initial stroke, pt was independent in all areas of mobility without AD.  At [facility] just prior to adm, pt was ambulating with RW and assistance.  Pt owns no DME at this time.

## 2024-01-21 NOTE — DISCHARGE NOTE PROVIDER - CARE PROVIDERS DIRECT ADDRESSES
[email or web address]

## 2024-01-21 NOTE — DISCHARGE NOTE PROVIDER - HOSPITAL COURSE
HPI:  73F with PMHx of macular degeneration, bilateral below knee DVT and recent admission for right temporoparietal IPH presenting for headache. Patient admitted in late December 2023 for confusion and found to have right  temporoparietal IPH with mild midline shift. Patient was in the NSCU. She was treated conservatively. Screening LE duplex showed bilateral LE DVT. She was started on prophylactic Lovenox and discharged to acute rehab at [place]. She was eventually discharged from [place] to [facility]. Patient stated while there she was treated poorly. She complained of an occipital headache which she described as dull, but stated they ignored here. She states because of the headache she missed sitting on a commode. Given headache and prior IPH she was eventually sent to [facility] who transferred patient to [facility] for neurosurgical evaluation. Patient had CTA H&N which did not show a LVO. She had CT Head w/o contrast which showed: "reviously seen right posterior temporal parenchymal hemorrhage measuring 2.4 x 2.0 x 2.4 cm. Hemorrhage creates mass effect on the occipital horn right lateral ventricle." She was seen by neurosurgery who recommended no acute intervention, but did recommend a brain MRI w/w/o to rule out underlying lesion. Patient still with dull headache, but tolerable and improved with Tylenol.   (20 Jan 2024 10:11)    Hospital Course:  73F with PMHx of macular degeneration, bilateral below knee DVT and recent admission for right temporoparietal IPH presenting for headache. Given headache and prior IPH she was eventually sent to [facility] who transferred patient to [facility] for neurosurgical evaluation. Patient had CTA H&N which did not show a LVO. She had CT Head w/o contrast which showed: "previously seen right posterior temporal parenchymal hemorrhage measuring 2.4 x 2.0 x 2.4 cm. Hemorrhage creates mass effect on the occipital horn right lateral ventricle." Patient admitted for further management. Neurosurgery consulted and no intervention during this admission. Pt had headache developing over past few days and CT head done showed improvement in IPH. MRI done showing slight interval improvement of hemorrhage compared to prior imaging w no appreciable lesion or mass. PT to get repeat CTH in 3 months.  Pt has hx of LE DVT and repeat duplex 1/21 showed persistent below knee DVT. PT cleared for prophylactic lovenox dosing    Important Medication Changes and Reason: AC???    Active or Pending Issues Requiring Follow-up: f/u PCP 2 weeks    Advanced Directives:   [ ] Full code  [x] DNR  [ ] Hospice    Discharge Diagnoses: Headache, IPH, DVT         HPI: 73F with PMHx of macular degeneration, bilateral below knee DVT and recent admission for right temporoparietal IPH presenting for headache. Patient admitted in late December 2023 for confusion and found to have right  temporoparietal IPH with mild midline shift. Patient was in the NSCU. She was treated conservatively. Screening LE duplex showed bilateral LE DVT. She was started on prophylactic Lovenox and discharged to acute rehab at [place]. She was eventually discharged from [place] to [facility]. Patient stated while there she was treated poorly. She complained of an occipital headache which she described as dull, but stated they ignored here. She states because of the headache she missed sitting on a commode. Given headache and prior IPH she was eventually sent to [facility] who transferred patient to [facility] for neurosurgical evaluation. Patient had CTA H&N which did not show a LVO. She had CT Head w/o contrast which showed: "reviously seen right posterior temporal parenchymal hemorrhage measuring 2.4 x 2.0 x 2.4 cm. Hemorrhage creates mass effect on the occipital horn right lateral ventricle." She was seen by neurosurgery who recommended no acute intervention, but did recommend a brain MRI w/w/o to rule out underlying lesion. Patient still with dull headache, but tolerable and improved with Tylenol.    Hospital Course:  She had CT Head w/o contrast which showed: "previously seen right posterior temporal parenchymal hemorrhage measuring 2.4 x 2.0 x 2.4 cm. Hemorrhage creates mass effect on the occipital horn right lateral ventricle." Patient admitted for further management. Neurosurgery consulted and no intervention during this admission. Pt had headache developing over past few days and CT head done showed improvement in IPH. MRI done showing slight interval improvement of hemorrhage compared to prior imaging w no appreciable lesion or mass. MRI read: Evolving late subacute hemorrhage in the right temporal lobe measures approximately 7.5 x 4.5 x 4 cm, slightly decreased in size from 8.5 x 5 x   5.7 cm on MRI of 12/13/2023.  There is no appreciable enhancement to indicate an underlying brain lesion.  However, a repeat head CT is recommended in 3 months, after complete resolution of the hematoma, to reevaluate the findings.  Pt has hx of LE DVT and repeat duplex 1/21 showed persistent below knee DVT. PT cleared for prophylactic lovenox dosing    Important Medication Changes and Reason: *********  AC???    Active or Pending Issues Requiring Follow-up:   Follow-up with PCP.  Follow-up with neurosurgery. Per MRI done inpatient, patient requires repeat CT head in 3 months.   Follow-up with ophthalmologist.     Advanced Directives:   [ ] Full code  [x] DNR  [ ] Hospice    Discharge Diagnoses: Headache, IPH, DVT         HPI: 73F with PMHx of macular degeneration, bilateral below knee DVT and recent admission for right temporoparietal IPH presenting for headache. Patient admitted in late December 2023 for confusion and found to have right  temporoparietal IPH with mild midline shift. Patient was in the NSCU. She was treated conservatively. Screening LE duplex showed bilateral LE DVT. She was started on prophylactic Lovenox and discharged to acute rehab at [place]. She was eventually discharged from [place] to [facility] at [facility]. Patient stated while there she was treated poorly. She complained of an occipital headache which she described as dull, but stated they ignored here. She states because of the headache she missed sitting on a commode. Given headache and prior IPH she was eventually sent to [facility] who transferred patient to [facility] for neurosurgical evaluation. Patient had CTA H&N which did not show a LVO. She had CT Head w/o contrast which showed: "previously seen right posterior temporal parenchymal hemorrhage measuring 2.4 x 2.0 x 2.4 cm. Hemorrhage creates mass effect on the occipital horn right lateral ventricle." She was seen by neurosurgery who recommended no acute intervention, but did recommend a brain MRI w/w/o to rule out underlying lesion. Patient still with dull headache, but tolerable and improved with Tylenol.    Hospital Course:  She had CT Head w/o contrast which showed: "previously seen right posterior temporal parenchymal hemorrhage measuring 2.4 x 2.0 x 2.4 cm. Hemorrhage creates mass effect on the occipital horn right lateral ventricle." Patient admitted for further management. Neurosurgery consulted and no intervention during this admission. Pt had headache developing over past few days and CT head done showed improvement in IPH. MRI done showing slight interval improvement of hemorrhage compared to prior imaging w no appreciable lesion or mass. MRI read: Evolving late subacute hemorrhage in the right temporal lobe measures approximately 7.5 x 4.5 x 4 cm, slightly decreased in size from 8.5 x 5 x   5.7 cm on MRI of 12/13/2023.  There is no appreciable enhancement to indicate an underlying brain lesion.  However, a repeat head CT is recommended in 3 months, after complete resolution of the hematoma, to reevaluate the findings. Per neurosurgery note on 1/23/2024, patient to follow-up outpatient with Dr. Alvarez, no acute interventions during admission.   Pt has hx of LE DVT and repeat duplex 1/21/2024 showed persistent below knee DVT. PT cleared for prophylactic lovenox dosing.     Important Medication Changes and Reason:   Patient to continue Lovenox as in med reconciliation on discharge. Follow-up regarding when to discontinue.     Active or Pending Issues Requiring Follow-up:   Follow-up with PCP.  Follow-up with neurosurgery. Per MRI done inpatient, patient requires repeat CT head in 3 months.   Follow-up with ophthalmologist. Patient must also follow-up with retinal specialist.     Advanced Directives:   [ ] Full code  [x] DNR  [ ] Hospice    Discharge Diagnoses: Headache, IPH, DVT      Request from Dr. Miranda to facilitate patient discharge. Medication reconciliation reviewed, revised and resolved with Dr. Miranda, who has medically cleared patient for discharge with follow-up as advised.    HPI: 73F with PMHx of macular degeneration, bilateral below knee DVT and recent admission for right temporoparietal IPH presenting for headache. Patient admitted in late December 2023 for confusion and found to have right  temporoparietal IPH with mild midline shift. Patient was in the NSCU. She was treated conservatively. Screening LE duplex showed bilateral LE DVT. She was started on prophylactic Lovenox and discharged to acute rehab at [place]. She was eventually discharged from [place] to [facility]. Patient stated while there she was treated poorly. She complained of an occipital headache which she described as dull, but stated they ignored here. She states because of the headache she missed sitting on a commode. Given headache and prior IPH she was eventually sent to [facility] who transferred patient to [facility] for neurosurgical evaluation. Patient had CTA H&N which did not show a LVO. She had CT Head w/o contrast which showed: "previously seen right posterior temporal parenchymal hemorrhage measuring 2.4 x 2.0 x 2.4 cm. Hemorrhage creates mass effect on the occipital horn right lateral ventricle." She was seen by neurosurgery who recommended no acute intervention, but did recommend a brain MRI w/w/o to rule out underlying lesion. Patient still with dull headache, but tolerable and improved with Tylenol.    Hospital Course:  She had CT Head w/o contrast which showed: "previously seen right posterior temporal parenchymal hemorrhage measuring 2.4 x 2.0 x 2.4 cm. Hemorrhage creates mass effect on the occipital horn right lateral ventricle." Patient admitted for further management. Neurosurgery consulted and no intervention during this admission. Pt had headache developing over past few days and CT head done showed improvement in IPH. MRI done showing slight interval improvement of hemorrhage compared to prior imaging w no appreciable lesion or mass. MRI read: Evolving late subacute hemorrhage in the right temporal lobe measures approximately 7.5 x 4.5 x 4 cm, slightly decreased in size from 8.5 x 5 x   5.7 cm on MRI of 12/13/2023.  There is no appreciable enhancement to indicate an underlying brain lesion.  However, a repeat head CT is recommended in 3 months, after complete resolution of the hematoma, to reevaluate the findings. Per neurosurgery note on 1/23/2024, patient to follow-up outpatient with Dr. Alvarez, no acute interventions during admission.   Pt has hx of LE DVT and repeat duplex 1/21/2024 showed persistent below knee DVT. PT cleared for prophylactic lovenox dosing.   Patient had OTHER, intraparenchymal intracranial hemorrhage.    Headache were likely due to intraparenchymal intracranial hemorrhage.    Important Medication Changes and Reason:   Patient to continue Lovenox as in med reconciliation on discharge. Follow-up regarding when to discontinue.     Active or Pending Issues Requiring Follow-up:   Follow-up with PCP.  Follow-up with neurosurgery. Per MRI done inpatient, patient requires repeat CT head in 3 months.   Follow-up with ophthalmologist. Patient must also follow-up with retinal specialist.     Advanced Directives:   [ ] Full code  [x] DNR  [ ] Hospice    Discharge Diagnoses: Headache, IPH, DVT      Request from Dr. Miranda to facilitate patient discharge. Medication reconciliation reviewed, revised and resolved with Dr. Miranda, who has medically cleared patient for discharge with follow-up as advised.

## 2024-01-21 NOTE — DISCHARGE NOTE PROVIDER - ATTENDING DISCHARGE PHYSICAL EXAMINATION:
Vital Signs Last 24 Hrs  T(C): 36.5 (24 Jan 2024 09:30), Max: 37.1 (23 Jan 2024 16:43)  T(F): 97.7 (24 Jan 2024 09:30), Max: 98.8 (23 Jan 2024 16:43)  HR: 76 (24 Jan 2024 09:30) (74 - 86)  BP: 107/70 (24 Jan 2024 09:30) (98/62 - 117/77)  BP(mean): --  RR: 18 (24 Jan 2024 09:30) (18 - 18)  SpO2: 98% (24 Jan 2024 09:30) (95% - 99%)    Parameters below as of 24 Jan 2024 09:30  Patient On (Oxygen Delivery Method): room air        CONSTITUTIONAL: NAD, well-developed, well-groomed  EYES: PERRLA; conjunctiva and sclera clear  ENMT: Moist oral mucosa, no pharyngeal injection or exudates; normal dentition  NECK: Supple, no palpable masses; no thyromegaly  RESPIRATORY: Normal respiratory effort; lungs are clear to auscultation bilaterally  CARDIOVASCULAR: Regular rate and rhythm, normal S1 and S2, no murmur/rub/gallop; No lower extremity edema; Peripheral pulses are 2+ bilaterally  ABDOMEN: Nontender to palpation, normoactive bowel sounds, no rebound/guarding; No hepatosplenomegaly  MUSCULOSKELETAL:  Normal gait; no clubbing or cyanosis of digits; no joint swelling or tenderness to palpation  PSYCH: A+O to person, place, and time; affect appropriate  NEUROLOGY: CN 2-12 are intact and symmetric; no gross sensory deficits   SKIN: No rashes; no palpable lesions

## 2024-01-21 NOTE — DISCHARGE NOTE PROVIDER - NSFOLLOWUPCLINICS_GEN_ALL_ED_FT
[facility]  Hematology/Oncology  [address] [id number]  Phone: [phone]  Fax:   Follow Up Time: 2 weeks

## 2024-01-21 NOTE — DISCHARGE NOTE PROVIDER - NSDCQMANTICOAGCONTRA_GEN_A_CORE
LOV  11/09/2016    Last refill    ESCITALOPRAM 10 MG Oral Tab 30 tablet 0 12/9/2016      Sig :  TAKE 1 TABLET(10 MG) BY MOUTH DAILY      Medication pending  Please advise. No future appointments.
Patient does not have atrial fibrillation/flutter

## 2024-01-21 NOTE — CONSULT NOTE ADULT - ASSESSMENT
Mr. Carpenter is a 73 year old female with macular degeneration, b/l below knee DVT and recent admission 12/2023 for right temporoparietal IPH who is now presenting with headache. Patient admitted in late December 2023 for confusion and found to have right  temporoparietal IPH with mild midline shift and was treated with supportive care. During that previous admission. US LE initially on that admission was negative, and then repeat showed DVT below the right knee within the right soleal vein and DVT below the left knee within the left soleal, peroneal and gastrocnemius veins. She was started on prophylactic Lovenox and discharged to acute rehab at [place]. She was eventually discharged from [place] to [facility] and there she had an occipital headache. Given headache and prior IPH she was eventually sent to [facility] who transferred patient to [facility] for neurosurgical evaluation. Patient had CTA H&N which did not show a LVO. She had CT Head w/o contrast which showed previously seen right posterior temporal parenchymal hemorrhage measuring 2.4 x 2.0 x 2.4 cm. Hemorrhage creates mass effect on the occipital horn right lateral ventricle. She has been seen by neurosurgery who  does not recommended intervention, but did recommend a brain MRI w/w/o to rule out underlying lesion.     She is now on Lovenox PPx dose 40mg daily and b/l LE US pending. Agree with current management and pt is aware of the plan for surveillance.     # bilateral lower extremity DVT  - below the knee DVTs  - Provoked due to hospitalization during previous hospitalization   - Agree with Ppx dose Lovenox 40mg daily   - US LE pending     # Hx of IPH   - Stable, no interventions per Neurosurgery   - Rest of management per NSG    # Macular degeneration  - Pt believes her therapy for this may have led to IPH  - She discontinued therapy and speak with her Onomatologist as outpatient     # Decreased PTT  - Recommend to repeat coags as outpatient     Thank you for allowing me to participate in the care of this patient, please do  not hesitate to call or text me if you have further questions or concerns.     Duane Castro MD  [facility] [place]   Division of Hematology/Oncology  [address], [address]  [place] [address]  P: [phone]  F: [phone]    Attestation:   Total time spent on the encounter: >60 minutes   ----Including >30min face-to-face interaction in addition to chart review, reviewing treatment plan, and managing the patient’s chronic diagnoses as listed in the assessment----     1.	I have reviewed, analyzed and interpreted the following labs: CBC, CMP, imaging:  CT Head and MRI brain impressions as above.   2.	I have reviewed notes stating pts current admission, consultants and follow ups.   3.	I have reviewed the past medical, family, and surgical history Mr. Carpenter is a 73 year old female with macular degeneration, b/l below knee DVT and recent admission 12/2023 for right temporoparietal IPH who is now presenting with headache. Patient admitted in late December 2023 for confusion and found to have right  temporoparietal IPH with mild midline shift and was treated with supportive care. During that previous admission. US LE initially on that admission was negative, and then repeat showed DVT below the right knee within the right soleal vein and DVT below the left knee within the left soleal, peroneal and gastrocnemius veins. She was started on prophylactic Lovenox and discharged to acute rehab at [place]. She was eventually discharged from [place] to [facility] and there she had an occipital headache. Given headache and prior IPH she was eventually sent to [facility] who transferred patient to [facility] for neurosurgical evaluation. Patient had CTA H&N which did not show a LVO. She had CT Head w/o contrast which showed previously seen right posterior temporal parenchymal hemorrhage measuring 2.4 x 2.0 x 2.4 cm. Hemorrhage creates mass effect on the occipital horn right lateral ventricle. She has been seen by neurosurgery who  does not recommended intervention, but did recommend a brain MRI w/w/o to rule out underlying lesion.     She is now on Lovenox PPx dose 40mg daily and b/l LE US pending. Agree with current management and pt is aware of the plan for surveillance.     # bilateral lower extremity DVT  - below the knee DVTs  - Provoked due to hospitalization during previous hospitalization   - Agree with Ppx dose Lovenox 40mg daily   - US LE pending     # Hx of IPH   - Stable, no interventions per Neurosurgery   - Rest of management per NSG    # Macular degeneration  - Pt believes her therapy for this may have led to IPH  - She discontinued therapy and speak with her Onomatologist as outpatient     # Shortened PTT  - Under most circumstances, shortening of the PT and/or aPTT reflects poor sample collection or preparation techniques  - No acute interventions for shortened PTT, treat underlying cause if identified   - Recommend to repeat coags as outpatient     Thank you for allowing me to participate in the care of Ms. Carpenter, please do not hesitate to call or text me if you have further questions or concerns.     Duane Castro MD  [facility]   Division of Hematology/Oncology  [address], [address]  [place] [address]  P: [phone]  F: [phone]    Attestation:   Total time spent on the encounter: >60 minutes   ----Including >30min face-to-face interaction in addition to chart review, reviewing treatment plan, and managing the patient’s chronic diagnoses as listed in the assessment----     1.	I have reviewed, analyzed and interpreted the following labs: CBC, CMP, imaging:  CT Head and MRI brain impressions as above.   2.	I have reviewed notes stating pts current admission, consultants and follow ups.   3.	I have reviewed the past medical, family, and surgical history

## 2024-01-21 NOTE — DISCHARGE NOTE PROVIDER - CARE PROVIDER_API CALL
Escobar Meredith  Internal Medicine  [address] [place] [address]  Phone: [phone]  Fax: [phone]  Established Patient  Follow Up Time: 2 weeks   Escobar Meredith  Internal Medicine  [address] [place] [address]  Phone: [phone]  Fax: [phone]  Established Patient  Follow Up Time: 2 weeks    Josef Alvarez  Neurosurgery  [address]  [place] [address]  Phone: [phone]  Fax: [phone]  Follow Up Time: 2 weeks

## 2024-01-21 NOTE — PATIENT PROFILE ADULT - PATIENT'S GENDER IDENTITY
56yo M hx chronic fatigue syndrome, recent dive to [employer] on 8/2, seen by ENT for HA and fullness of ears, states possibly some difficulty w/ gait and confusion, had 6 hrs hyperbarics last week which improved his symptoms, here for concern that HA not completely gone away. Has not had any imaging in this time. ENT doctor concerned for cardiac so sent into the ED and let Dr. Vaca know. Pt denies any CP/SOB. HA mild, goes away completely w/ excedrin, but needs to take it every day. No longer with gait abnl, feels alert, denies confusion. Denies all neurological symptoms. Female

## 2024-01-21 NOTE — DISCHARGE NOTE PROVIDER - NSDCFUSCHEDAPPT_GEN_ALL_CORE_FT
Josef Alvarez  [facility]  NEUROSURG [address] [place] [address]  Scheduled Appointment: 03/01/2024

## 2024-01-22 LAB — SARS-COV-2 RNA SPEC QL NAA+PROBE: SIGNIFICANT CHANGE UP

## 2024-01-22 PROCEDURE — 99233 SBSQ HOSP IP/OBS HIGH 50: CPT

## 2024-01-22 RX ADMIN — FAMOTIDINE 20 MILLIGRAM(S): 10 INJECTION INTRAVENOUS at 11:05

## 2024-01-22 RX ADMIN — ATORVASTATIN CALCIUM 10 MILLIGRAM(S): 80 TABLET, FILM COATED ORAL at 21:43

## 2024-01-22 RX ADMIN — Medication 650 MILLIGRAM(S): at 16:24

## 2024-01-22 RX ADMIN — Medication 1 DROP(S): at 11:05

## 2024-01-22 RX ADMIN — Medication 650 MILLIGRAM(S): at 17:24

## 2024-01-22 RX ADMIN — GABAPENTIN 400 MILLIGRAM(S): 400 CAPSULE ORAL at 21:43

## 2024-01-22 RX ADMIN — Medication 3 MILLIGRAM(S): at 21:43

## 2024-01-22 RX ADMIN — BRIVARACETAM 50 MILLIGRAM(S): 25 TABLET, FILM COATED ORAL at 17:38

## 2024-01-22 RX ADMIN — BRIVARACETAM 50 MILLIGRAM(S): 25 TABLET, FILM COATED ORAL at 06:16

## 2024-01-22 RX ADMIN — ENOXAPARIN SODIUM 40 MILLIGRAM(S): 100 INJECTION SUBCUTANEOUS at 21:43

## 2024-01-22 NOTE — PROGRESS NOTE ADULT - SUBJECTIVE AND OBJECTIVE BOX
[facility] HEMATOLOGY/ONCOL[facility]Y INPATIENT PROGRESS NOTE     Interval Hx:   01-22-24: Ms. Carpenter was seen at bedside today.    Meds:   MEDICATIONS  (STANDING):  atorvastatin 10 milliGRAM(s) Oral at bedtime  brivaracetam 50 milliGRAM(s) Oral two times a day  enoxaparin Injectable 40 milliGRAM(s) SubCutaneous every 24 hours  famotidine    Tablet 20 milliGRAM(s) Oral daily  gabapentin 400 milliGRAM(s) Oral at bedtime  ketorolac 0.5% Ophthalmic Solution 1 Drop(s) Left EYE daily  senna 2 Tablet(s) Oral at bedtime    MEDICATIONS  (PRN):  acetaminophen     Tablet .. 650 milliGRAM(s) Oral every 6 hours PRN Moderate Pain (4 - 6)  artificial  tears Solution 1 Drop(s) Both EYES four times a day PRN Dry Eyes  melatonin 3 milliGRAM(s) Oral at bedtime PRN Insomnia    Vital Signs Last 24 Hrs  T(C): 36.6 (22 Jan 2024 04:56), Max: 37.2 (21 Jan 2024 17:01)  T(F): 97.8 (22 Jan 2024 04:56), Max: 98.9 (21 Jan 2024 17:01)  HR: 74 (22 Jan 2024 04:56) (74 - 92)  BP: 107/66 (22 Jan 2024 04:56) (102/71 - 114/74)  BP(mean): --  RR: 18 (22 Jan 2024 04:56) (18 - 18)  SpO2: 99% (22 Jan 2024 04:56) (95% - 99%)    Parameters below as of 22 Jan 2024 04:56  Patient On (Oxygen Delivery Method): room air    Physical Exam:  Gen: NAD  HEENT: EOMI, MMM  Chest: equal chest rise, speaking full sentences   Cardiac: RR  Abd: Non-distended   Ext: 1+/trace edema   Neuro: AAOx3, slowed affect and speech     Labs:               [facility] HEMATOLOGY/ON[facility]Y INPATIENT PROGRESS NOTE     Interval Hx:   01-22-24: Ms. Carpenter was seen at bedside today, no overnight events noted, US LE with persistent RLE Soleal vein DVT without obvious propagation, pt aware on our discussion this morning     Meds:   MEDICATIONS  (STANDING):  atorvastatin 10 milliGRAM(s) Oral at bedtime  brivaracetam 50 milliGRAM(s) Oral two times a day  enoxaparin Injectable 40 milliGRAM(s) SubCutaneous every 24 hours  famotidine    Tablet 20 milliGRAM(s) Oral daily  gabapentin 400 milliGRAM(s) Oral at bedtime  ketorolac 0.5% Ophthalmic Solution 1 Drop(s) Left EYE daily  senna 2 Tablet(s) Oral at bedtime    MEDICATIONS  (PRN):  acetaminophen     Tablet .. 650 milliGRAM(s) Oral every 6 hours PRN Moderate Pain (4 - 6)  artificial  tears Solution 1 Drop(s) Both EYES four times a day PRN Dry Eyes  melatonin 3 milliGRAM(s) Oral at bedtime PRN Insomnia    Vital Signs Last 24 Hrs  T(C): 36.6 (22 Jan 2024 04:56), Max: 37.2 (21 Jan 2024 17:01)  T(F): 97.8 (22 Jan 2024 04:56), Max: 98.9 (21 Jan 2024 17:01)  HR: 74 (22 Jan 2024 04:56) (74 - 92)  BP: 107/66 (22 Jan 2024 04:56) (102/71 - 114/74)  BP(mean): --  RR: 18 (22 Jan 2024 04:56) (18 - 18)  SpO2: 99% (22 Jan 2024 04:56) (95% - 99%)    Parameters below as of 22 Jan 2024 04:56  Patient On (Oxygen Delivery Method): room air    Physical Exam:  Gen: NAD  HEENT: EOMI, MMM  Chest: equal chest rise, speaking full sentences   Cardiac: RR  Abd: Non-distended   Ext: 1+/trace edema   Neuro: AAOx3, slowed affect and speech     Labs:

## 2024-01-22 NOTE — PROGRESS NOTE ADULT - SUBJECTIVE AND OBJECTIVE BOX
[facility] Division of Hospital Medicine  Michelle Miranda MD  Available via MS Teams  Pager: [phone]    SUBJECTIVE / OVERNIGHT EVENTS: Patient seen and examined at bedside. No acute overnight events. No new symptoms.     ADDITIONAL REVIEW OF SYSTEMS: n/a     MEDICATIONS  (STANDING):  atorvastatin 10 milliGRAM(s) Oral at bedtime  brivaracetam 50 milliGRAM(s) Oral two times a day  enoxaparin Injectable 40 milliGRAM(s) SubCutaneous every 24 hours  famotidine    Tablet 20 milliGRAM(s) Oral daily  gabapentin 400 milliGRAM(s) Oral at bedtime  ketorolac 0.5% Ophthalmic Solution 1 Drop(s) Left EYE daily  senna 2 Tablet(s) Oral at bedtime    MEDICATIONS  (PRN):  acetaminophen     Tablet .. 650 milliGRAM(s) Oral every 6 hours PRN Moderate Pain (4 - 6)  artificial  tears Solution 1 Drop(s) Both EYES four times a day PRN Dry Eyes  melatonin 3 milliGRAM(s) Oral at bedtime PRN Insomnia      I&O's Summary    21 Jan 2024 07:01  -  22 Jan 2024 07:00  --------------------------------------------------------  IN: 1740 mL / OUT: 950 mL / NET: 790 mL    22 Jan 2024 07:01  -  22 Jan 2024 13:20  --------------------------------------------------------  IN: 580 mL / OUT: 450 mL / NET: 130 mL        PHYSICAL EXAM:  Vital Signs Last 24 Hrs  T(C): 36.9 (22 Jan 2024 13:13), Max: 37.2 (21 Jan 2024 17:01)  T(F): 98.5 (22 Jan 2024 13:13), Max: 98.9 (21 Jan 2024 17:01)  HR: 77 (22 Jan 2024 13:13) (70 - 92)  BP: 110/70 (22 Jan 2024 13:13) (102/71 - 114/74)  BP(mean): --  RR: 18 (22 Jan 2024 13:13) (18 - 18)  SpO2: 97% (22 Jan 2024 13:13) (95% - 99%)    Parameters below as of 22 Jan 2024 13:13  Patient On (Oxygen Delivery Method): room air      CONSTITUTIONAL: NAD, well-developed, well-groomed  EYES: PERRLA; conjunctiva and sclera clear  ENMT: Moist oral mucosa, no pharyngeal injection or exudates; normal dentition  NECK: Supple, no palpable masses; no thyromegaly  RESPIRATORY: Normal respiratory effort; lungs are clear to auscultation bilaterally  CARDIOVASCULAR: Regular rate and rhythm, normal S1 and S2, no murmur/rub/gallop; No lower extremity edema; Peripheral pulses are 2+ bilaterally  ABDOMEN: Nontender to palpation, normoactive bowel sounds, no rebound/guarding; No hepatosplenomegaly  MUSCULOSKELETAL:   no clubbing or cyanosis of digits; no joint swelling or tenderness to palpation  PSYCH: A+O to person, place, and time; affect appropriate  NEUROLOGY: CN 2-12 are intact and symmetric; no gross sensory deficits   SKIN: No rashes; no palpable lesions    LABS:                    COVID-19 PCR: NotDetec (20 Dec 2023 22:30)      RADIOLOGY & ADDITIONAL TESTS:  New Results Reviewed Today:   New Imaging Personally Reviewed Today:  New Electrocardiogram Personally Reviewed Today:  Prior or Outpatient Records Reviewed Today:    COMMUNICATION:  Care Discussed with Consultants/Other Providers and Details of Discussion:  Discussions with Patient/Family:  PCP Communication:

## 2024-01-22 NOTE — PROGRESS NOTE ADULT - ASSESSMENT
Mr. Carpenter is a 73 year old female with macular degeneration, b/l below knee DVT and recent admission 12/2023 for right temporoparietal IPH who is now presenting with headache. Patient admitted in late December 2023 for confusion and found to have right  temporoparietal IPH with mild midline shift and was treated with supportive care. During that previous admission. US LE initially on that admission was negative, and then repeat showed DVT below the right knee within the right soleal vein and DVT below the left knee within the left soleal, peroneal and gastrocnemius veins. She was started on prophylactic Lovenox and discharged to acute rehab at [facility]. She was eventually discharged from [facility] to [facility] and there she had an occipital headache. Given headache and prior IPH she was eventually sent to [facility] who transferred patient to [facility] for neurosurgical evaluation. Patient had CTA H&N which did not show a LVO. She had CT Head w/o contrast which showed previously seen right posterior temporal parenchymal hemorrhage measuring 2.4 x 2.0 x 2.4 cm. Hemorrhage creates mass effect on the occipital horn right lateral ventricle. She has been seen by neurosurgery who  does not recommended intervention, but did recommend a brain MRI w/w/o to rule out underlying lesion.     She is now on Lovenox PPx dose 40mg daily and b/l LE US pending. Agree with current management and pt is aware of the plan for surveillance.     # bilateral lower extremity DVT  - below the knee DVTs  - Provoked due to hospitalization during previous hospitalization   - Agree with Ppx dose Lovenox 40mg daily   - US LE pending     # Hx of IPH   - Stable, no interventions per Neurosurgery   - Rest of management per NSG    # Macular degeneration  - Pt believes her therapy for this may have led to IPH  - She discontinued therapy and speak with her Onomatologist as outpatient     # Shortened PTT  - Under most circumstances, shortening of the PT and/or aPTT reflects poor sample collection or preparation techniques  - No acute interventions for shortened PTT, treat underlying cause if identified   - Recommend to repeat coags as outpatient     Thank you for allowing me to participate in the care of Ms. Carpenter, please do not hesitate to call or text me if you have further questions or concerns.     Duane Castro MD  [facility]   Division of Hematology/Oncology  [address], [address]  [place] [address]  P: [phone]  F: [phone]    Attestation:   Total time spent on the encounter: >60 minutes   ----Including >30min face-to-face interaction in addition to chart review, reviewing treatment plan, and managing the patient’s chronic diagnoses as listed in the assessment----     1.	I have reviewed, analyzed and interpreted the following labs: CBC, CMP, imaging:  CT Head and MRI brain impressions as above.   2.	I have reviewed notes stating pts current admission, consultants and follow ups.   3.	I have reviewed the past medical, family, and surgical history Mr. Carpenter is a 73 year old female with macular degeneration, b/l below knee DVT and recent admission 12/2023 for right temporoparietal IPH who is now presenting with headache. Patient admitted in late December 2023 for confusion and found to have right  temporoparietal IPH with mild midline shift and was treated with supportive care. During that previous admission. US LE initially on that admission was negative, and then repeat showed DVT below the right knee within the right soleal vein and DVT below the left knee within the left soleal, peroneal and gastrocnemius veins. She was started on prophylactic Lovenox and discharged to acute rehab at [facility]. She was eventually discharged from [facility] to [facility] and there she had an occipital headache. Given headache and prior IPH she was eventually sent to [facility] who transferred patient to [facility] for neurosurgical evaluation. Patient had CTA H&N which did not show a LVO. She had CT Head w/o contrast which showed previously seen right posterior temporal parenchymal hemorrhage measuring 2.4 x 2.0 x 2.4 cm. Hemorrhage creates mass effect on the occipital horn right lateral ventricle. She has been seen by neurosurgery who  does not recommended intervention, but did recommend a brain MRI w/w/o to rule out underlying lesion.     She is now on Lovenox PPx dose 40mg daily and US LE with persistent RLE Soleal vein DVT without obvious propagation, LLE DVT resolved on this scan. Agree with current management and pt is aware of the plan for surveillance.     # Hx of Bilateral lower extremity DVT  # RLE DVT  - below the knee DVTs  - Provoked due to hospitalization during previous hospitalization   - Agree with Ppx dose Lovenox 40mg daily   - US LE with persistent RLE soleal DVT without propagation and LLE DVT resolved     # Hx of IPH   - Stable, no interventions per Neurosurgery   - Rest of management per NSG    # Macular degeneration  - Pt believes her therapy for this may have led to IPH  - She discontinued therapy and speak with her Onomatologist as outpatient     # Shortened PTT  - Under most circumstances, shortening of the PT and/or aPTT reflects poor sample collection or preparation techniques  - No acute interventions for shortened PTT, treat underlying cause if identified   - Recommend to repeat coags as outpatient     Thank you for allowing me to participate in the care of Ms. Carpenter, please do not hesitate to call or text me if you have further questions or concerns.     Duane Castro MD  [facility]   Division of Hematology/Oncology  [address], [address]  [place] [address]  P: [phone]  F: [phone]    Attestation:    ---- Pt evaluated including face-to-face interaction in addition to chart review, reviewing treatment plan, and managing the patient’s chronic diagnoses as listed in the assessment----

## 2024-01-22 NOTE — PROGRESS NOTE ADULT - ASSESSMENT
73F with PMHx of macular degeneration, bilateral below knee DVT and recent admission for right temporoparietal IPH presenting for headache. Given headache and prior IPH she was eventually sent to [facility] who transferred patient to [facility] for neurosurgical evaluation. Patient had CTA H&N which did not show a LVO. She had CT Head w/o contrast which showed: "reviously seen right posterior temporal parenchymal hemorrhage measuring 2.4 x 2.0 x 2.4 cm. Hemorrhage creates mass effect on the occipital horn right lateral ventricle." Patient admitted for further management.

## 2024-01-23 ENCOUNTER — NON-APPOINTMENT (OUTPATIENT)
Age: 74
End: 2024-01-23

## 2024-01-23 PROCEDURE — 99233 SBSQ HOSP IP/OBS HIGH 50: CPT

## 2024-01-23 RX ADMIN — FAMOTIDINE 20 MILLIGRAM(S): 10 INJECTION INTRAVENOUS at 12:36

## 2024-01-23 RX ADMIN — ENOXAPARIN SODIUM 40 MILLIGRAM(S): 100 INJECTION SUBCUTANEOUS at 21:37

## 2024-01-23 RX ADMIN — BRIVARACETAM 50 MILLIGRAM(S): 25 TABLET, FILM COATED ORAL at 17:18

## 2024-01-23 RX ADMIN — BRIVARACETAM 50 MILLIGRAM(S): 25 TABLET, FILM COATED ORAL at 06:28

## 2024-01-23 RX ADMIN — ATORVASTATIN CALCIUM 10 MILLIGRAM(S): 80 TABLET, FILM COATED ORAL at 21:36

## 2024-01-23 RX ADMIN — Medication 1 DROP(S): at 12:36

## 2024-01-23 RX ADMIN — GABAPENTIN 400 MILLIGRAM(S): 400 CAPSULE ORAL at 21:37

## 2024-01-23 RX ADMIN — Medication 3 MILLIGRAM(S): at 21:36

## 2024-01-23 RX ADMIN — Medication 650 MILLIGRAM(S): at 20:38

## 2024-01-23 NOTE — PROGRESS NOTE ADULT - SUBJECTIVE AND OBJECTIVE BOX
[facility]ATOLOGY[facility]ON[facility]Y INPATIENT PROGRESS NOTE     Interval Hx:   01-23-24: Ms. Carpenter was seen at bedside today.    Meds:   MEDICATIONS  (STANDING):  atorvastatin 10 milliGRAM(s) Oral at bedtime  brivaracetam 50 milliGRAM(s) Oral two times a day  enoxaparin Injectable 40 milliGRAM(s) SubCutaneous every 24 hours  famotidine    Tablet 20 milliGRAM(s) Oral daily  gabapentin 400 milliGRAM(s) Oral at bedtime  ketorolac 0.5% Ophthalmic Solution 1 Drop(s) Left EYE daily  senna 2 Tablet(s) Oral at bedtime    MEDICATIONS  (PRN):  acetaminophen     Tablet .. 650 milliGRAM(s) Oral every 6 hours PRN Moderate Pain (4 - 6)  artificial  tears Solution 1 Drop(s) Both EYES four times a day PRN Dry Eyes  melatonin 3 milliGRAM(s) Oral at bedtime PRN Insomnia    Vital Signs Last 24 Hrs  T(C): 36.8 (23 Jan 2024 05:32), Max: 37.1 (22 Jan 2024 20:27)  T(F): 98.2 (23 Jan 2024 05:32), Max: 98.7 (22 Jan 2024 20:27)  HR: 70 (23 Jan 2024 05:32) (69 - 87)  BP: 109/69 (23 Jan 2024 05:32) (106/67 - 113/70)  BP(mean): --  RR: 16 (23 Jan 2024 05:32) (16 - 18)  SpO2: 98% (23 Jan 2024 05:32) (95% - 98%)    Parameters below as of 23 Jan 2024 05:32  Patient On (Oxygen Delivery Method): room air    Physical exam  Gen: NAD  HEENT: EOMI, MMM  Chest: equal chest rise, speaking full sentences   Cardiac: RR  Abd: Non-distended   Ext: 1+/trace edema   Neuro: AAOx3, slowed affect and speech     Labs:               [facility] H[facility]ATOLOG[facility]/ON[facility]OGY INPATIENT PROGRESS NOTE     Interval Hx:   01-23-24: Ms. Carpenter was seen at bedside today, no overnight events, again discussed her most recent US LE results, no overnight events noted     Meds:   MEDICATIONS  (STANDING):  atorvastatin 10 milliGRAM(s) Oral at bedtime  brivaracetam 50 milliGRAM(s) Oral two times a day  enoxaparin Injectable 40 milliGRAM(s) SubCutaneous every 24 hours  famotidine    Tablet 20 milliGRAM(s) Oral daily  gabapentin 400 milliGRAM(s) Oral at bedtime  ketorolac 0.5% Ophthalmic Solution 1 Drop(s) Left EYE daily  senna 2 Tablet(s) Oral at bedtime    MEDICATIONS  (PRN):  acetaminophen     Tablet .. 650 milliGRAM(s) Oral every 6 hours PRN Moderate Pain (4 - 6)  artificial  tears Solution 1 Drop(s) Both EYES four times a day PRN Dry Eyes  melatonin 3 milliGRAM(s) Oral at bedtime PRN Insomnia    Vital Signs Last 24 Hrs  T(C): 36.8 (23 Jan 2024 05:32), Max: 37.1 (22 Jan 2024 20:27)  T(F): 98.2 (23 Jan 2024 05:32), Max: 98.7 (22 Jan 2024 20:27)  HR: 70 (23 Jan 2024 05:32) (69 - 87)  BP: 109/69 (23 Jan 2024 05:32) (106/67 - 113/70)  BP(mean): --  RR: 16 (23 Jan 2024 05:32) (16 - 18)  SpO2: 98% (23 Jan 2024 05:32) (95% - 98%)    Parameters below as of 23 Jan 2024 05:32  Patient On (Oxygen Delivery Method): room air    Physical exam  Gen: NAD  HEENT: EOMI, MMM  Chest: equal chest rise, speaking full sentences   Cardiac: RR  Abd: Non-distended   Ext: 1+/trace edema   Neuro: AAOx3, slowed affect and speech     Labs:

## 2024-01-23 NOTE — PROGRESS NOTE ADULT - PROBLEM SELECTOR PROBLEM 2
H/O spontaneous intraparenchymal intracranial hemorrhage

## 2024-01-23 NOTE — PROGRESS NOTE ADULT - PROBLEM SELECTOR PLAN 2
- CT head showing improved IPH from last admission  - Neurosurgery consult appreciated, no intervention   - Etiology not known, but possible CAA  - MRI performed, showing evolving late subacute hemorrhage, slightly decreased in size from prior - recs repeat CTH in 3 months   - PT recs SIVAN  - Continue with Briviact for seizure prophylaxis

## 2024-01-23 NOTE — PROGRESS NOTE ADULT - PROBLEM SELECTOR PROBLEM 1
Approval for tysabri has been received, valid from 10/24/2023 through 04/23/2024.  Brennan Aguirre EMT 10/09/2023 12:12PM   
History of headache
New authorization for tysabri has been received from [employer], approval valid from 10/24/2023 through 04/23/2024.  Brennan Aguirre EMT 11/13/2023 12:07PM   
Reauthorization for tysabri has been received from [employer], forms placed in Dr. Regalado's folder for review and signature.  Brennan Aguirre EMT 09/25/2023 9:07AM   
History of headache
History of headache

## 2024-01-23 NOTE — PROGRESS NOTE ADULT - SUBJECTIVE AND OBJECTIVE BOX
[facility] Division of Hospital Medicine  Michelle Miranda MD  Available via MS Teams  Pager: [phone]    SUBJECTIVE / OVERNIGHT EVENTS: Patient seen and examined at bedside. No acute overnight events. States headache is better. Reports getting injections for macular degeneration and states she needs it. Also states she has spatial awareness issues as she draws and was told to f/u with a neuroophthalmologists but states she did not establish care, has neurology only.     ADDITIONAL REVIEW OF SYSTEMS: n/a     MEDICATIONS  (STANDING):  atorvastatin 10 milliGRAM(s) Oral at bedtime  brivaracetam 50 milliGRAM(s) Oral two times a day  enoxaparin Injectable 40 milliGRAM(s) SubCutaneous every 24 hours  famotidine    Tablet 20 milliGRAM(s) Oral daily  gabapentin 400 milliGRAM(s) Oral at bedtime  ketorolac 0.5% Ophthalmic Solution 1 Drop(s) Left EYE daily  senna 2 Tablet(s) Oral at bedtime    MEDICATIONS  (PRN):  acetaminophen     Tablet .. 650 milliGRAM(s) Oral every 6 hours PRN Moderate Pain (4 - 6)  artificial  tears Solution 1 Drop(s) Both EYES four times a day PRN Dry Eyes  melatonin 3 milliGRAM(s) Oral at bedtime PRN Insomnia      I&O's Summary    22 Jan 2024 07:01  -  23 Jan 2024 07:00  --------------------------------------------------------  IN: 820 mL / OUT: 1250 mL / NET: -430 mL    23 Jan 2024 07:01  -  23 Jan 2024 15:28  --------------------------------------------------------  IN: 0 mL / OUT: 450 mL / NET: -450 mL        PHYSICAL EXAM:  Vital Signs Last 24 Hrs  T(C): 36.5 (23 Jan 2024 13:05), Max: 37.1 (22 Jan 2024 20:27)  T(F): 97.7 (23 Jan 2024 13:05), Max: 98.7 (22 Jan 2024 20:27)  HR: 81 (23 Jan 2024 13:05) (69 - 87)  BP: 116/62 (23 Jan 2024 13:05) (106/67 - 116/62)  BP(mean): --  RR: 16 (23 Jan 2024 13:05) (16 - 18)  SpO2: 99% (23 Jan 2024 13:05) (95% - 99%)    Parameters below as of 23 Jan 2024 13:05  Patient On (Oxygen Delivery Method): room air      CONSTITUTIONAL: NAD, well-developed, well-groomed  EYES: PERRLA; conjunctiva and sclera clear  ENMT: Moist oral mucosa, no pharyngeal injection or exudates; normal dentition  NECK: Supple, no palpable masses; no thyromegaly  RESPIRATORY: Normal respiratory effort; lungs are clear to auscultation bilaterally  CARDIOVASCULAR: Regular rate and rhythm, normal S1 and S2, no murmur/rub/gallop; No lower extremity edema; Peripheral pulses are 2+ bilaterally  ABDOMEN: Nontender to palpation, normoactive bowel sounds, no rebound/guarding; No hepatosplenomegaly  MUSCULOSKELETAL:   no clubbing or cyanosis of digits; no joint swelling or tenderness to palpation  PSYCH: A+O to person, place, and time; affect appropriate  NEUROLOGY: CN 2-12 are intact and symmetric; no gross sensory deficits   SKIN: No rashes; no palpable lesions    LABS:                    COVID-19 PCR: NotDetec (22 Jan 2024 15:23)  COVID-19 PCR: NotDetec (20 Dec 2023 22:30)      RADIOLOGY & ADDITIONAL TESTS:  New Results Reviewed Today:   New Imaging Personally Reviewed Today:  New Electrocardiogram Personally Reviewed Today:  Prior or Outpatient Records Reviewed Today:    COMMUNICATION:  Care Discussed with Consultants/Other Providers and Details of Discussion:  Discussions with Patient/Family:  PCP Communication:

## 2024-01-23 NOTE — PROGRESS NOTE ADULT - PROBLEM SELECTOR PLAN 3
- Etiology unclear, but on last admission found to have bilateral below knee DVT and cleared for Lovenox and prophylactic dosing  - Last screening Duplex on file was 12/20 - repeat duplex 1/21 showing persistent below knee DVT  - Heme consulted, recs c/w ppx lovenox

## 2024-01-23 NOTE — PROGRESS NOTE ADULT - ASSESSMENT
73F with PMHx of macular degeneration, bilateral below knee DVT and recent admission for right temporoparietal IPH presenting for headache. Given headache and prior IPH she was eventually sent to [facility] who transferred patient to [facility] for neurosurgical evaluation. Patient had CTA H&N which did not show a LVO. She had CT Head w/o contrast which showed: "reviously seen right posterior temporal parenchymal hemorrhage measuring 2.4 x 2.0 x 2.4 cm. Hemorrhage creates mass effect on the occipital horn right lateral ventricle." Patient admitted for further management.

## 2024-01-23 NOTE — PROGRESS NOTE ADULT - ASSESSMENT
Mr. Carpenter is a 73 year old female with macular degeneration, b/l below knee DVT and recent admission 12/2023 for right temporoparietal IPH who is now presenting with headache. Patient admitted in late December 2023 for confusion and found to have right  temporoparietal IPH with mild midline shift and was treated with supportive care. During that previous admission. US LE initially on that admission was negative, and then repeat showed DVT below the right knee within the right soleal vein and DVT below the left knee within the left soleal, peroneal and gastrocnemius veins. She was started on prophylactic Lovenox and discharged to acute rehab at [place]. She was eventually discharged from [place] [facility] to [facility] at [facility] and there she had an occipital headache. Given headache and prior IPH she was eventually sent to [facility] who transferred patient to [facility] for neurosurgical evaluation. Patient had CTA H&N which did not show a LVO. She had CT Head w/o contrast which showed previously seen right posterior temporal parenchymal hemorrhage measuring 2.4 x 2.0 x 2.4 cm. Hemorrhage creates mass effect on the occipital horn right lateral ventricle. She has been seen by neurosurgery who  does not recommended intervention, but did recommend a brain MRI w/w/o to rule out underlying lesion.     She is now on Lovenox PPx dose 40mg daily and US LE with persistent RLE Soleal vein DVT without obvious propagation, LLE DVT resolved on this scan. Agree with current management and pt is aware of the plan for surveillance.     # Hx of Bilateral lower extremity DVT  # RLE DVT  - below the knee DVTs  - Provoked due to hospitalization during previous hospitalization   - Agree with Ppx dose Lovenox 40mg daily   - US LE with persistent RLE soleal DVT without propagation and LLE DVT resolved     # Hx of IPH   - Stable, no interventions per Neurosurgery   - Rest of management per NSG    # Macular degeneration  - Pt believes her therapy for this may have led to IPH  - She discontinued therapy and speak with her Onomatologist as outpatient     # Shortened PTT  - Under most circumstances, shortening of the PT and/or aPTT reflects poor sample collection or preparation techniques  - No acute interventions for shortened PTT, treat underlying cause if identified   - Recommend to repeat coags as outpatient     Thank you for allowing me to participate in the care of Ms. Carpenter, please do not hesitate to call or text me if you have further questions or concerns.     Duane Castro MD  [facility] [place]   Division of Hematology/Oncology  [address], [address]  [place] [address]  P: [phone]  F: [phone]    Attestation:    ---- Pt evaluated including face-to-face interaction in addition to chart review, reviewing treatment plan, and managing the patient’s chronic diagnoses as listed in the assessment----

## 2024-01-23 NOTE — PROGRESS NOTE ADULT - PROBLEM SELECTOR PLAN 4
- Takes PreserVision at home and follows with Dr. Joyner for Vabysmo injections  - OP follow up
- Takes PreserVision at home and follows with Dr. Joyner for Vabysmo injections  - OP follow up
- Takes PreserVision at home and follows with Dr. Joyner for Vabysmo injections  - OP follow up appreciated to determine need for injection

## 2024-01-23 NOTE — CHART NOTE - NSCHARTNOTEFT_GEN_A_CORE
Chart and imaging reviewed. MRI wwo done. Shows no abnormal enhancement or other indication of an underlying lesion. IPH greatly improved c/t prior. Evolving appropriately.    -No acute nsgy intervention  -f/u with Dr. Alvarez in 1-2 wks outpt Chart and imaging reviewed. MRI wwo done. Shows no abnormal enhancement or other indication of an underlying lesion. IPH greatly improved c/t prior. Evolving appropriately.    -No acute nsgy intervention  -f/u with Dr. Antonio ortega

## 2024-01-23 NOTE — CHART NOTE - NSCHARTNOTEFT_GEN_A_CORE
Patient currently is seeing Dr. Dominguez Joyner for ongoing treatment of neovascular macular degeneration. Her last injection was 11/20/2023 with followup time of 4-5 weeks. Patient was intended to followup by december 25th 2023. Patient is in need of continued treatment and will require transportation to [address] [place] [address] once every 4-5 weeks while in rehab.

## 2024-01-23 NOTE — PROGRESS NOTE ADULT - PROBLEM SELECTOR PLAN 1
- Recent development of headache in over past few days with CT head showing improved IPH  - MRI performed, showing slight interval improvement of hemorrhage compared to prior imaging, no appreciable lesion or mass - recs repeat CTH in 3 months after hemorrhage has resolved    - Tylenol PRN pain

## 2024-01-24 ENCOUNTER — TRANSCRIPTION ENCOUNTER (OUTPATIENT)
Age: 74
End: 2024-01-24

## 2024-01-24 VITALS
DIASTOLIC BLOOD PRESSURE: 70 MMHG | SYSTOLIC BLOOD PRESSURE: 107 MMHG | TEMPERATURE: 98 F | RESPIRATION RATE: 18 BRPM | OXYGEN SATURATION: 98 % | HEART RATE: 76 BPM

## 2024-01-24 PROBLEM — H35.30 UNSPECIFIED MACULAR DEGENERATION: Chronic | Status: ACTIVE | Noted: 2024-01-20

## 2024-01-24 PROBLEM — Z86.79 PERSONAL HISTORY OF OTHER DISEASES OF THE CIRCULATORY SYSTEM: Chronic | Status: ACTIVE | Noted: 2024-01-20

## 2024-01-24 PROBLEM — I82.409 ACUTE EMBOLISM AND THROMBOSIS OF UNSPECIFIED DEEP VEINS OF UNSPECIFIED LOWER EXTREMITY: Chronic | Status: ACTIVE | Noted: 2024-01-20

## 2024-01-24 PROCEDURE — 70496 CT ANGIOGRAPHY HEAD: CPT | Mod: MA

## 2024-01-24 PROCEDURE — 86901 BLOOD TYPING SEROLOGIC RH(D): CPT

## 2024-01-24 PROCEDURE — 86850 RBC ANTIBODY SCREEN: CPT

## 2024-01-24 PROCEDURE — 70498 CT ANGIOGRAPHY NECK: CPT | Mod: MA

## 2024-01-24 PROCEDURE — 80053 COMPREHEN METABOLIC PANEL: CPT

## 2024-01-24 PROCEDURE — 85610 PROTHROMBIN TIME: CPT

## 2024-01-24 PROCEDURE — 93970 EXTREMITY STUDY: CPT

## 2024-01-24 PROCEDURE — 86900 BLOOD TYPING SEROLOGIC ABO: CPT

## 2024-01-24 PROCEDURE — 70553 MRI BRAIN STEM W/O & W/DYE: CPT

## 2024-01-24 PROCEDURE — 99239 HOSP IP/OBS DSCHRG MGMT >30: CPT

## 2024-01-24 PROCEDURE — 85730 THROMBOPLASTIN TIME PARTIAL: CPT

## 2024-01-24 PROCEDURE — 99291 CRITICAL CARE FIRST HOUR: CPT | Mod: 25

## 2024-01-24 PROCEDURE — 70450 CT HEAD/BRAIN W/O DYE: CPT | Mod: MA

## 2024-01-24 PROCEDURE — 97162 PT EVAL MOD COMPLEX 30 MIN: CPT

## 2024-01-24 PROCEDURE — 87635 SARS-COV-2 COVID-19 AMP PRB: CPT

## 2024-01-24 PROCEDURE — 85025 COMPLETE CBC W/AUTO DIFF WBC: CPT

## 2024-01-24 RX ORDER — FAMOTIDINE 10 MG/ML
1 INJECTION INTRAVENOUS
Qty: 0 | Refills: 0 | DISCHARGE
Start: 2024-01-24

## 2024-01-24 RX ORDER — GABAPENTIN 400 MG/1
1 CAPSULE ORAL
Qty: 0 | Refills: 0 | DISCHARGE
Start: 2024-01-24

## 2024-01-24 RX ORDER — SENNA PLUS 8.6 MG/1
2 TABLET ORAL
Qty: 0 | Refills: 0 | DISCHARGE
Start: 2024-01-24

## 2024-01-24 RX ORDER — FARICIMAB 6 MG/.05ML
6 INJECTION, SOLUTION INTRAVITREAL
Qty: 0 | Refills: 0 | DISCHARGE

## 2024-01-24 RX ADMIN — Medication 1 DROP(S): at 12:17

## 2024-01-24 RX ADMIN — FAMOTIDINE 20 MILLIGRAM(S): 10 INJECTION INTRAVENOUS at 12:17

## 2024-01-24 RX ADMIN — BRIVARACETAM 50 MILLIGRAM(S): 25 TABLET, FILM COATED ORAL at 05:43

## 2024-01-24 NOTE — DISCHARGE NOTE NURSING/CASE MANAGEMENT/SOCIAL WORK - NSDCFUADDAPPT_GEN_ALL_CORE_FT
APPTS ARE READY TO BE MADE: [X] YES    Best Family or Patient Contact (if needed):    Additional Information about above appointments (if needed):    1: Follow-up with PCP.  2: Follow-up with neurosurgery.   3: Follow-up with ophthalmologist. Follow-up with retinal specialist.     Other comments or requests:

## 2024-01-24 NOTE — DISCHARGE NOTE NURSING/CASE MANAGEMENT/SOCIAL WORK - NSDCPEFALRISK_GEN_ALL_CORE
For information on Fall & Injury Prevention, visit: [email or web address] OR  [email or web address] OR  https://www.cdc.gov/steadi/patient.html

## 2024-01-24 NOTE — PROGRESS NOTE ADULT - SUBJECTIVE AND OBJECTIVE BOX
[facility]AT[facility] INPATIENT PROGRESS NOTE     Interval Hx:   01-24-24: Ms. Carpenter was seen at bedside today.    Meds:   MEDICATIONS  (STANDING):  atorvastatin 10 milliGRAM(s) Oral at bedtime  brivaracetam 50 milliGRAM(s) Oral two times a day  enoxaparin Injectable 40 milliGRAM(s) SubCutaneous every 24 hours  famotidine    Tablet 20 milliGRAM(s) Oral daily  gabapentin 400 milliGRAM(s) Oral at bedtime  ketorolac 0.5% Ophthalmic Solution 1 Drop(s) Left EYE daily  senna 2 Tablet(s) Oral at bedtime    MEDICATIONS  (PRN):  acetaminophen     Tablet .. 650 milliGRAM(s) Oral every 6 hours PRN Moderate Pain (4 - 6)  artificial  tears Solution 1 Drop(s) Both EYES four times a day PRN Dry Eyes  melatonin 3 milliGRAM(s) Oral at bedtime PRN Insomnia    Vital Signs Last 24 Hrs  T(C): 36.7 (24 Jan 2024 05:21), Max: 37.1 (23 Jan 2024 16:43)  T(F): 98 (24 Jan 2024 05:21), Max: 98.8 (23 Jan 2024 16:43)  HR: 78 (24 Jan 2024 05:21) (74 - 86)  BP: 98/62 (24 Jan 2024 05:21) (98/62 - 117/77)  BP(mean): --  RR: 18 (24 Jan 2024 05:21) (16 - 18)  SpO2: 97% (24 Jan 2024 05:21) (95% - 99%)    Parameters below as of 24 Jan 2024 05:21  Patient On (Oxygen Delivery Method): room air    Physical Exam:  Gen: NAD  HEENT: EOMI, MMM  Chest: equal chest rise, speaking full sentences   Cardiac: RR  Abd: Non-distended   Ext: 1+/trace edema   Neuro: AAOx3, slowed affect and speech     Labs:               [facility]/ON[facility]Y INPATIENT PROGRESS NOTE     Interval Hx:   01-24-24: Ms. Carpenter was seen at bedside today, no acute complaints, stated her hedache has improved, noted updates from NSG with MRI suggestig improvement in IPH, no overnight events noted     Meds:   MEDICATIONS  (STANDING):  atorvastatin 10 milliGRAM(s) Oral at bedtime  brivaracetam 50 milliGRAM(s) Oral two times a day  enoxaparin Injectable 40 milliGRAM(s) SubCutaneous every 24 hours  famotidine    Tablet 20 milliGRAM(s) Oral daily  gabapentin 400 milliGRAM(s) Oral at bedtime  ketorolac 0.5% Ophthalmic Solution 1 Drop(s) Left EYE daily  senna 2 Tablet(s) Oral at bedtime    MEDICATIONS  (PRN):  acetaminophen     Tablet .. 650 milliGRAM(s) Oral every 6 hours PRN Moderate Pain (4 - 6)  artificial  tears Solution 1 Drop(s) Both EYES four times a day PRN Dry Eyes  melatonin 3 milliGRAM(s) Oral at bedtime PRN Insomnia    Vital Signs Last 24 Hrs  T(C): 36.7 (24 Jan 2024 05:21), Max: 37.1 (23 Jan 2024 16:43)  T(F): 98 (24 Jan 2024 05:21), Max: 98.8 (23 Jan 2024 16:43)  HR: 78 (24 Jan 2024 05:21) (74 - 86)  BP: 98/62 (24 Jan 2024 05:21) (98/62 - 117/77)  BP(mean): --  RR: 18 (24 Jan 2024 05:21) (16 - 18)  SpO2: 97% (24 Jan 2024 05:21) (95% - 99%)    Parameters below as of 24 Jan 2024 05:21  Patient On (Oxygen Delivery Method): room air    Physical Exam:  Gen: NAD  HEENT: EOMI, MMM  Chest: equal chest rise, speaking full sentences   Cardiac: RR  Abd: Non-distended   Ext: 1+/trace edema   Neuro: AAOx3, slowed affect and speech     Labs:

## 2024-01-24 NOTE — PROGRESS NOTE ADULT - ASSESSMENT
Mr. Carpenter is a 73 year old female with macular degeneration, b/l below knee DVT and recent admission 12/2023 for right temporoparietal IPH who is now presenting with headache. Patient admitted in late December 2023 for confusion and found to have right  temporoparietal IPH with mild midline shift and was treated with supportive care. During that previous admission. US LE initially on that admission was negative, and then repeat showed DVT below the right knee within the right soleal vein and DVT below the left knee within the left soleal, peroneal and gastrocnemius veins. She was started on prophylactic Lovenox and discharged to acute rehab at [place]. She was eventually discharged from [place] to [facility] at [facility] and there she had an occipital headache. Given headache and prior IPH she was eventually sent to [facility] who transferred patient to [facility] for neurosurgical evaluation. Patient had CTA H&N which did not show a LVO. She had CT Head w/o contrast which showed previously seen right posterior temporal parenchymal hemorrhage measuring 2.4 x 2.0 x 2.4 cm. Hemorrhage creates mass effect on the occipital horn right lateral ventricle. She has been seen by neurosurgery who  does not recommended intervention, but did recommend a brain MRI w/w/o to rule out underlying lesion.     She is now on Lovenox PPx dose 40mg daily and US LE with persistent RLE Soleal vein DVT without obvious propagation, LLE DVT resolved on this scan. Agree with current management and pt is aware of the plan for surveillance.     # Hx of Bilateral lower extremity DVT  # RLE DVT  - below the knee DVTs  - Provoked due to hospitalization during previous hospitalization   - Agree with Ppx dose Lovenox 40mg daily   - US LE with persistent RLE soleal DVT without propagation and LLE DVT resolved     # Hx of IPH   - Stable, no interventions per Neurosurgery   - Rest of management per NSG    # Macular degeneration  - Pt believes her therapy for this may have led to IPH  - She discontinued therapy and speak with her Onomatologist as outpatient     # Shortened PTT  - Under most circumstances, shortening of the PT and/or aPTT reflects poor sample collection or preparation techniques  - No acute interventions for shortened PTT, treat underlying cause if identified   - Recommend to repeat coags as outpatient     Thank you for allowing me to participate in the care of Ms. Carpenter, please do not hesitate to call or text me if you have further questions or concerns.     Duane Castro MD  [facility] [place]   Division of Hematology/Oncology  [address], [address]  [place] [address]  P: [phone]  F: [phone]    Attestation:    ---- Pt evaluated including face-to-face interaction in addition to chart review, reviewing treatment plan, and managing the patient’s chronic diagnoses as listed in the assessment----    Mr. Carpenter is a 73 year old female with macular degeneration, b/l below knee DVT and recent admission 12/2023 for right temporoparietal IPH who is now presenting with headache. Patient admitted in late December 2023 for confusion and found to have right  temporoparietal IPH with mild midline shift and was treated with supportive care. During that previous admission. US LE initially on that admission was negative, and then repeat showed DVT below the right knee within the right soleal vein and DVT below the left knee within the left soleal, peroneal and gastrocnemius veins. She was started on prophylactic Lovenox and discharged to acute rehab at [place]. She was eventually discharged from [place] to [facility] and there she had an occipital headache. Given headache and prior IPH she was eventually sent to [facility] who transferred patient to [facility] for neurosurgical evaluation. Patient had CTA H&N which did not show a LVO. She had CT Head w/o contrast which showed previously seen right posterior temporal parenchymal hemorrhage measuring 2.4 x 2.0 x 2.4 cm. Hemorrhage creates mass effect on the occipital horn right lateral ventricle. She has been seen by neurosurgery who  does not recommended intervention, but did recommend a brain MRI w/w/o to rule out underlying lesion.     She is now on Lovenox PPx dose 40mg daily and US LE with persistent RLE Soleal vein DVT without obvious propagation, LLE DVT resolved on this scan. Agree with current management and pt is aware of the plan for surveillance. NSG IPH with expected evolution. Pts anticoagulation to continue for 3 months or until pts mobility has improved, pt is aware     # Hx of Bilateral lower extremity DVT  # RLE DVT  - below the knee DVTs  - Provoked due to hospitalization during previous hospitalization   - US LE with persistent RLE soleal DVT without propagation and LLE DVT resolved   - Agree with Ppx dose Lovenox 40mg daily   - anticoagulation to continue for 3 months from initial diagnosis of DVT or until pts mobility has improved    # Hx of IPH   - Stable, no interventions per Neurosurgery   - Rest of management per NS    # Macular degeneration  - Pt believes her therapy for this may have led to IPH  - She discontinued therapy and speak with her Onomatologist as outpatient   - Coordination for tx ongoing     # Shortened PTT  - Under most circumstances, shortening of the PT and/or aPTT reflects poor sample collection or preparation techniques  - No acute interventions for shortened PTT, treat underlying cause if identified   - Recommend to repeat coags as outpatient     Thank you for allowing me to participate in the care of Ms. Carpenter, please do not hesitate to call or text me if you have further questions or concerns.     Duane Castro MD  [facility]   Division of Hematology/Oncology  [facility], [address]  [place] [address]  P: [phone]  F: [phone]    Attestation:    ---- Pt evaluated including face-to-face interaction in addition to chart review, reviewing treatment plan, and managing the patient’s chronic diagnoses as listed in the assessment----

## 2024-01-24 NOTE — DISCHARGE NOTE NURSING/CASE MANAGEMENT/SOCIAL WORK - PATIENT PORTAL LINK FT
You can access the FollowMyHealth Patient Portal offered by [facility] by registering at the following website: [email or web address]. By joining [facility]’s FollowMyHealth portal, you will also be able to view your health information using other applications (apps) compatible with our system.

## 2024-01-27 ENCOUNTER — EMERGENCY (EMERGENCY)
Facility: HOSPITAL | Age: 74
LOS: 1 days | Discharge: HOME | End: 2024-01-27
Attending: EMERGENCY MEDICINE
Payer: MEDICARE

## 2024-01-27 VITALS
DIASTOLIC BLOOD PRESSURE: 62 MMHG | HEIGHT: 61 IN | HEART RATE: 82 BPM | SYSTOLIC BLOOD PRESSURE: 139 MMHG | OXYGEN SATURATION: 98 % | WEIGHT: 70.11 LBS | RESPIRATION RATE: 18 BRPM

## 2024-01-27 LAB
ALBUMIN SERPL ELPH-MCNC: 4.6 G/DL — SIGNIFICANT CHANGE UP (ref 3.3–5)
ALP SERPL-CCNC: 72 U/L — SIGNIFICANT CHANGE UP (ref 40–120)
ALT FLD-CCNC: 27 U/L — SIGNIFICANT CHANGE UP (ref 10–45)
ANION GAP SERPL CALC-SCNC: 14 MMOL/L — SIGNIFICANT CHANGE UP (ref 5–17)
APPEARANCE UR: CLEAR — SIGNIFICANT CHANGE UP
APTT BLD: 28.7 SEC — SIGNIFICANT CHANGE UP (ref 24.5–35.6)
AST SERPL-CCNC: 21 U/L — SIGNIFICANT CHANGE UP (ref 10–40)
BACTERIA # UR AUTO: ABNORMAL /HPF
BASE EXCESS BLDV CALC-SCNC: -1.2 MMOL/L — SIGNIFICANT CHANGE UP (ref -2–3)
BASOPHILS # BLD AUTO: 0.02 K/UL — SIGNIFICANT CHANGE UP (ref 0–0.2)
BASOPHILS NFR BLD AUTO: 0.4 % — SIGNIFICANT CHANGE UP (ref 0–2)
BILIRUB SERPL-MCNC: 0.3 MG/DL — SIGNIFICANT CHANGE UP (ref 0.2–1.2)
BILIRUB UR-MCNC: NEGATIVE — SIGNIFICANT CHANGE UP
BUN SERPL-MCNC: 8 MG/DL — SIGNIFICANT CHANGE UP (ref 7–23)
CA-I SERPL-SCNC: 1.14 MMOL/L — LOW (ref 1.15–1.33)
CALCIUM SERPL-MCNC: 10.1 MG/DL — SIGNIFICANT CHANGE UP (ref 8.4–10.5)
CAST: 0 /LPF — SIGNIFICANT CHANGE UP (ref 0–4)
CHLORIDE BLDV-SCNC: 102 MMOL/L — SIGNIFICANT CHANGE UP (ref 96–108)
CHLORIDE SERPL-SCNC: 104 MMOL/L — SIGNIFICANT CHANGE UP (ref 96–108)
CO2 BLDV-SCNC: 25 MMOL/L — SIGNIFICANT CHANGE UP (ref 22–26)
CO2 SERPL-SCNC: 23 MMOL/L — SIGNIFICANT CHANGE UP (ref 22–31)
COLOR SPEC: YELLOW — SIGNIFICANT CHANGE UP
CREAT SERPL-MCNC: 0.54 MG/DL — SIGNIFICANT CHANGE UP (ref 0.5–1.3)
DIFF PNL FLD: NEGATIVE — SIGNIFICANT CHANGE UP
EGFR: 97 ML/MIN/1.73M2 — SIGNIFICANT CHANGE UP
EOSINOPHIL # BLD AUTO: 0.06 K/UL — SIGNIFICANT CHANGE UP (ref 0–0.5)
EOSINOPHIL NFR BLD AUTO: 1.1 % — SIGNIFICANT CHANGE UP (ref 0–6)
FLUAV AG NPH QL: SIGNIFICANT CHANGE UP
FLUBV AG NPH QL: SIGNIFICANT CHANGE UP
GAS PNL BLDV: 135 MMOL/L — LOW (ref 136–145)
GAS PNL BLDV: SIGNIFICANT CHANGE UP
GLUCOSE BLDV-MCNC: 136 MG/DL — HIGH (ref 70–99)
GLUCOSE SERPL-MCNC: 108 MG/DL — HIGH (ref 70–99)
GLUCOSE UR QL: NEGATIVE MG/DL — SIGNIFICANT CHANGE UP
HCO3 BLDV-SCNC: 24 MMOL/L — SIGNIFICANT CHANGE UP (ref 22–29)
HCT VFR BLD CALC: 40.5 % — SIGNIFICANT CHANGE UP (ref 34.5–45)
HCT VFR BLDA CALC: 38 % — SIGNIFICANT CHANGE UP (ref 34.5–46.5)
HGB BLD CALC-MCNC: 12.8 G/DL — SIGNIFICANT CHANGE UP (ref 11.7–16.1)
HGB BLD-MCNC: 14.7 G/DL — SIGNIFICANT CHANGE UP (ref 11.5–15.5)
IMM GRANULOCYTES NFR BLD AUTO: 0.6 % — SIGNIFICANT CHANGE UP (ref 0–0.9)
INR BLD: 0.99 RATIO — SIGNIFICANT CHANGE UP (ref 0.85–1.18)
KETONES UR-MCNC: NEGATIVE MG/DL — SIGNIFICANT CHANGE UP
LACTATE BLDV-MCNC: 0.9 MMOL/L — SIGNIFICANT CHANGE UP (ref 0.5–2)
LEUKOCYTE ESTERASE UR-ACNC: ABNORMAL
LYMPHOCYTES # BLD AUTO: 1.43 K/UL — SIGNIFICANT CHANGE UP (ref 1–3.3)
LYMPHOCYTES # BLD AUTO: 26.5 % — SIGNIFICANT CHANGE UP (ref 13–44)
MCHC RBC-ENTMCNC: 31.7 PG — SIGNIFICANT CHANGE UP (ref 27–34)
MCHC RBC-ENTMCNC: 36.3 GM/DL — HIGH (ref 32–36)
MCV RBC AUTO: 87.3 FL — SIGNIFICANT CHANGE UP (ref 80–100)
MONOCYTES # BLD AUTO: 0.59 K/UL — SIGNIFICANT CHANGE UP (ref 0–0.9)
MONOCYTES NFR BLD AUTO: 10.9 % — SIGNIFICANT CHANGE UP (ref 2–14)
NEUTROPHILS # BLD AUTO: 3.27 K/UL — SIGNIFICANT CHANGE UP (ref 1.8–7.4)
NEUTROPHILS NFR BLD AUTO: 60.5 % — SIGNIFICANT CHANGE UP (ref 43–77)
NITRITE UR-MCNC: POSITIVE
NRBC # BLD: 0 /100 WBCS — SIGNIFICANT CHANGE UP (ref 0–0)
PCO2 BLDV: 39 MMHG — SIGNIFICANT CHANGE UP (ref 39–42)
PH BLDV: 7.39 — SIGNIFICANT CHANGE UP (ref 7.32–7.43)
PH UR: 6.5 — SIGNIFICANT CHANGE UP (ref 5–8)
PLATELET # BLD AUTO: 226 K/UL — SIGNIFICANT CHANGE UP (ref 150–400)
PO2 BLDV: 24 MMHG — LOW (ref 25–45)
POTASSIUM BLDV-SCNC: 3.4 MMOL/L — LOW (ref 3.5–5.1)
POTASSIUM SERPL-MCNC: 3.9 MMOL/L — SIGNIFICANT CHANGE UP (ref 3.5–5.3)
POTASSIUM SERPL-SCNC: 3.9 MMOL/L — SIGNIFICANT CHANGE UP (ref 3.5–5.3)
PROT SERPL-MCNC: 6.7 G/DL — SIGNIFICANT CHANGE UP (ref 6–8.3)
PROT UR-MCNC: NEGATIVE MG/DL — SIGNIFICANT CHANGE UP
PROTHROM AB SERPL-ACNC: 10.9 SEC — SIGNIFICANT CHANGE UP (ref 9.5–13)
RBC # BLD: 4.64 M/UL — SIGNIFICANT CHANGE UP (ref 3.8–5.2)
RBC # FLD: 13.2 % — SIGNIFICANT CHANGE UP (ref 10.3–14.5)
RBC CASTS # UR COMP ASSIST: 5 /HPF — HIGH (ref 0–4)
RSV RNA NPH QL NAA+NON-PROBE: SIGNIFICANT CHANGE UP
SAO2 % BLDV: 42.6 % — LOW (ref 67–88)
SARS-COV-2 RNA SPEC QL NAA+PROBE: SIGNIFICANT CHANGE UP
SODIUM SERPL-SCNC: 141 MMOL/L — SIGNIFICANT CHANGE UP (ref 135–145)
SP GR SPEC: 1.02 — SIGNIFICANT CHANGE UP (ref 1–1.03)
SQUAMOUS # UR AUTO: 1 /HPF — SIGNIFICANT CHANGE UP (ref 0–5)
TROPONIN T, HIGH SENSITIVITY RESULT: 8 NG/L — SIGNIFICANT CHANGE UP (ref 0–51)
TROPONIN T, HIGH SENSITIVITY RESULT: 8 NG/L — SIGNIFICANT CHANGE UP (ref 0–51)
UROBILINOGEN FLD QL: 0.2 MG/DL — SIGNIFICANT CHANGE UP (ref 0.2–1)
WBC # BLD: 5.4 K/UL — SIGNIFICANT CHANGE UP (ref 3.8–10.5)
WBC # FLD AUTO: 5.4 K/UL — SIGNIFICANT CHANGE UP (ref 3.8–10.5)
WBC UR QL: 30 /HPF — HIGH (ref 0–5)

## 2024-01-27 PROCEDURE — 81001 URINALYSIS AUTO W/SCOPE: CPT

## 2024-01-27 PROCEDURE — 85610 PROTHROMBIN TIME: CPT

## 2024-01-27 PROCEDURE — 36415 COLL VENOUS BLD VENIPUNCTURE: CPT

## 2024-01-27 PROCEDURE — 85025 COMPLETE CBC W/AUTO DIFF WBC: CPT

## 2024-01-27 PROCEDURE — 84132 ASSAY OF SERUM POTASSIUM: CPT

## 2024-01-27 PROCEDURE — 70496 CT ANGIOGRAPHY HEAD: CPT | Mod: MA

## 2024-01-27 PROCEDURE — 82803 BLOOD GASES ANY COMBINATION: CPT

## 2024-01-27 PROCEDURE — 85018 HEMOGLOBIN: CPT

## 2024-01-27 PROCEDURE — 80053 COMPREHEN METABOLIC PANEL: CPT

## 2024-01-27 PROCEDURE — 85730 THROMBOPLASTIN TIME PARTIAL: CPT

## 2024-01-27 PROCEDURE — 99285 EMERGENCY DEPT VISIT HI MDM: CPT | Mod: 25

## 2024-01-27 PROCEDURE — 87186 SC STD MICRODIL/AGAR DIL: CPT

## 2024-01-27 PROCEDURE — 96374 THER/PROPH/DIAG INJ IV PUSH: CPT | Mod: XU

## 2024-01-27 PROCEDURE — 70450 CT HEAD/BRAIN W/O DYE: CPT | Mod: MA,XU

## 2024-01-27 PROCEDURE — 70498 CT ANGIOGRAPHY NECK: CPT | Mod: 26,MA

## 2024-01-27 PROCEDURE — 82550 ASSAY OF CK (CPK): CPT

## 2024-01-27 PROCEDURE — 85014 HEMATOCRIT: CPT

## 2024-01-27 PROCEDURE — 87637 SARSCOV2&INF A&B&RSV AMP PRB: CPT

## 2024-01-27 PROCEDURE — 70496 CT ANGIOGRAPHY HEAD: CPT | Mod: 26,MA

## 2024-01-27 PROCEDURE — 93005 ELECTROCARDIOGRAM TRACING: CPT

## 2024-01-27 PROCEDURE — 82330 ASSAY OF CALCIUM: CPT

## 2024-01-27 PROCEDURE — 84295 ASSAY OF SERUM SODIUM: CPT

## 2024-01-27 PROCEDURE — 83605 ASSAY OF LACTIC ACID: CPT

## 2024-01-27 PROCEDURE — 82962 GLUCOSE BLOOD TEST: CPT

## 2024-01-27 PROCEDURE — 70450 CT HEAD/BRAIN W/O DYE: CPT | Mod: 26,MA,59

## 2024-01-27 PROCEDURE — 87086 URINE CULTURE/COLONY COUNT: CPT

## 2024-01-27 PROCEDURE — 99285 EMERGENCY DEPT VISIT HI MDM: CPT | Mod: GC

## 2024-01-27 PROCEDURE — 82435 ASSAY OF BLOOD CHLORIDE: CPT

## 2024-01-27 PROCEDURE — 84484 ASSAY OF TROPONIN QUANT: CPT

## 2024-01-27 PROCEDURE — 70498 CT ANGIOGRAPHY NECK: CPT | Mod: MA

## 2024-01-27 PROCEDURE — 82947 ASSAY GLUCOSE BLOOD QUANT: CPT

## 2024-01-27 RX ORDER — BRIVARACETAM 25 MG/1
50 TABLET, FILM COATED ORAL ONCE
Refills: 0 | Status: DISCONTINUED | OUTPATIENT
Start: 2024-01-27 | End: 2024-01-27

## 2024-01-27 RX ORDER — CEFTRIAXONE 500 MG/1
1000 INJECTION, POWDER, FOR SOLUTION INTRAMUSCULAR; INTRAVENOUS ONCE
Refills: 0 | Status: COMPLETED | OUTPATIENT
Start: 2024-01-27 | End: 2024-01-27

## 2024-01-27 RX ORDER — SODIUM CHLORIDE 9 MG/ML
1000 INJECTION INTRAMUSCULAR; INTRAVENOUS; SUBCUTANEOUS ONCE
Refills: 0 | Status: COMPLETED | OUTPATIENT
Start: 2024-01-27 | End: 2024-01-27

## 2024-01-27 RX ADMIN — BRIVARACETAM 50 MILLIGRAM(S): 25 TABLET, FILM COATED ORAL at 21:38

## 2024-01-27 RX ADMIN — SODIUM CHLORIDE 1000 MILLILITER(S): 9 INJECTION INTRAMUSCULAR; INTRAVENOUS; SUBCUTANEOUS at 21:38

## 2024-01-27 RX ADMIN — CEFTRIAXONE 100 MILLIGRAM(S): 500 INJECTION, POWDER, FOR SOLUTION INTRAMUSCULAR; INTRAVENOUS at 22:16

## 2024-01-27 NOTE — CONSULT NOTE ADULT - SUBJECTIVE AND OBJECTIVE BOX
Neurology - Consult Note    -  Spectra: [phone] ([facility]), [phone] ([facility])  -    HPI: Patient MOISÉS MAYO is a 73y (1950) woman with a PMHx significant for macular degeneration, bilateral below knee DVT treated with prophylactic lovenox and recent admission for right temporoparietal IPH in December 2023 presenting with left sided HA pressure like, LKW reportedly per EMS 1/27 7PM. Patient was found slumped over in her wheelchair for [facility]. Patient did not recall episode, but has been tired ever since and c/o headache. Imaging done, showing known IPH to be stable. Patient denies numbness, weakness, dizziness, nausea, vomiting. Takes Briviact 50 mg bid for seizure prophylaxis. EEG done on prior admission showing area at risk of seizures from right IPH.     NIHSS:1 for known left facial droop mild   preMRS:3, uses walker at baseline at rehab   IPH:1  Not a tenecteplase candidate given recent hemorrhage   Not a thrombectomy candidate given no LVO      Review of Systems:    All other review of systems is negative unless indicated above.    Allergies:  oxycodone (Nausea)  No Known Allergies      PMHx/PSHx/Family Hx: As above, otherwise see below   No pertinent past medical history    DVT, lower extremity    Macular degeneration    H/O spontaneous intraparenchymal intracranial hemorrhage        Social Hx:  No current use of tobacco, alcohol, or illicit drugs    Medications:  MEDICATIONS  (STANDING):  sodium chloride 0.9% Bolus 1000 milliLiter(s) IV Bolus once    MEDICATIONS  (PRN):      Vitals:  T(C): --  HR: 82 (01-27-24 @ 20:02) (82 - 82)  BP: 139/62 (01-27-24 @ 20:02) (139/62 - 139/62)  RR: 18 (01-27-24 @ 20:02) (18 - 18)  SpO2: 98% (01-27-24 @ 20:02) (98% - 98%)    Neurologic Exam:  Mental status - Tired, but Awake, Alert, Oriented to person, place, and time. Speech fluent, repetition and naming intact. Follows simple and complex commands.     Cranial nerves - PERRL, CORBY VFF given patient not participating but blink to threat intact b/l, EOMI, face sensation (V1-V3) intact b/l, facial strength intact without asymmetry b/l, hearing intact b/l, palate with symmetric elevation, trapezius 5/5 strength b/l, tongue midline on protrusion with full lateral movement    Motor - Normal bulk and tone throughout. No pronator drift.  Strength testing  RUE:5/5  RLE:4+/5 Baseline  LUE:5/5  LLE:5/5     Sensation - Light touch/pinprick intact throughout    DTR's -  Not assessed given focused neurological exam     Coordination - Finger to Nose intact b/l. No tremors appreciated    Gait and station - CORBY given c/f safety    Labs:                        14.7   5.40  )-----------( 226      ( 27 Jan 2024 20:17 )             40.5     01-27    141  |  104  |  8   ----------------------------<  108<H>  3.9   |  23  |  0.54    Ca    10.1      27 Jan 2024 20:17    TPro  6.7  /  Alb  4.6  /  TBili  0.3  /  DBili  x   /  AST  21  /  ALT  27  /  AlkPhos  72  01-27    CAPILLARY BLOOD GLUCOSE  113 (27 Jan 2024 20:12)      POCT Blood Glucose.: 113 mg/dL (27 Jan 2024 20:03)    LIVER FUNCTIONS - ( 27 Jan 2024 20:17 )  Alb: 4.6 g/dL / Pro: 6.7 g/dL / ALK PHOS: 72 U/L / ALT: 27 U/L / AST: 21 U/L / GGT: x             PT/INR - ( 27 Jan 2024 20:17 )   PT: 10.9 sec;   INR: 0.99 ratio         PTT - ( 27 Jan 2024 20:17 )  PTT:28.7 sec  CSF:                  Radiology:  < from: CT Brain Stroke Protocol (01.27.24 @ 20:24) >  Interval expectedevolution of a right posterior temporal parenchymal   hemorrhage with decrease size. No new or increased intracranial   hemorrhage or mass effect.    < end of copied text >  < from: CT Angio Brain Stroke Protocol  w/ IV Cont (01.27.24 @ 20:25) >    CT ANGIOGRAPHY NECK:  No evidence of hemodynamically significant stenosis using NASCET   criteria. Patent vertebral arteries. No evidence of vascular dissection.    CT ANGIOGRAPHY BRAIN:  No major vessel occlusion or proximal stenosis.    < end of copied text >

## 2024-01-27 NOTE — ED PROVIDER NOTE - CLINICAL SUMMARY MEDICAL DECISION MAKING FREE TEXT BOX
Given history and physical can be secondary to effort related patient does not appear to have any obvious deficit upon examination that appears to be new given from the history.  Possibility of recrudescence as well will obtain CT head and other imaging to assess for possibility of new IPH as well likely to be admitted Given history and physical can be secondary to effort related patient does not appear to have any obvious deficit upon examination that appears to be new given from the history.  Possibility of recrudescence as well will obtain CT head and other imaging to assess for possibility of new IPH as well likely to be admitted      Charo: patient 73 year old female bib ems from [facility] rehab for left sided facial droop 50 mings ago prior to arrival.  patient is on AC due to  b/l dvt in december. had history of ich in december. also rreporting being more "confused than baseline" at nursing home or "slower to respond" than usual. PE: att exam: patient awake alert NAD. alerta n dorietned x 3,  LUNGS CTAB no wheeze no crackle. CARD RRR no m/r/g.  Abdomen soft NT ND no rebound no guarding no CVA tenderness. EXT WWP no edema no calf tenderness CV 2+DP/PT bilaterally. neuro A&Ox3, no focal defiicts, no facial droop apprecited, cn 2-12 intact. will get labs, code stroke at charge desk, will get imaging, labs, ua, r/o metabolic cause or infecitous cause of possible confusion or resurgence of prior stroke like symptoms. less likely stroke as no focal deficits and no facial droop apprecited. patient awake and alert and oriented. will reassess if needs to be admitted versus return to [facility].

## 2024-01-27 NOTE — ED PROVIDER NOTE - NSFOLLOWUPINSTRUCTIONS_ED_ALL_ED_FT
Please increase the dose of Briviact to 75 mg twice daily    Today you were seen in the ED for increased weakness    It was found that you have No signs of medical emergency warranting further evaluation in the emergency department.    A copy of your results attached this document below.    Please follow up with Your primary care provider in regards to today's event.    Please return to the ED if you have any of the following:   You have increased weakness, lethargy, any decrease / change in sensation in any of your body parts.   Please continue to follow up with a neurologist, information for such can be found below. Please increase the dose of Briviact to 75 mg twice daily    Please take CEFPODOXIME 100mg twice a day for the next seven days.     Today you were seen in the ED for increased weakness    It was found that you have No signs of medical emergency warranting further evaluation in the emergency department.    A copy of your results attached this document below.    Please follow up with Your primary care provider in regards to today's event.    Please return to the ED if you have any of the following:   You have increased weakness, lethargy, any decrease / change in sensation in any of your body parts.   Please continue to follow up with a neurologist, information for such can be found below.

## 2024-01-27 NOTE — ED PROVIDER NOTE - OBJECTIVE STATEMENT
73-year-old female chief complaint of left-sided facial droop starting approximately 50 minutes ago from arrival to the emergency department.  Patient is on anticoagulation secondary due to a CVA in December of last year.  Otherwise history of macular degeneration bilateral below the knee DVTs and had a right temporal parietal IPH patient complaining of pressure-like headache known known trauma or falls.

## 2024-01-27 NOTE — ED PROVIDER NOTE - PATIENT PORTAL LINK FT
You can access the FollowMyHealth Patient Portal offered by [facility] by registering at the following website: [email or web address]. By joining [facility]’s FollowMyHealth portal, you will also be able to view your health information using other applications (apps) compatible with our system.

## 2024-01-27 NOTE — ED ADULT NURSE NOTE - NSFALLHARMRISKINTERV_ED_ALL_ED

## 2024-01-27 NOTE — ED ADULT NURSE NOTE - OBJECTIVE STATEMENT
73y F A&Ox3 BIBEMS from [facility] for stroke like symptoms. Code stroke called at 2001. As per EMS Pt was at [facility] for a CVA in December. Pt was able to ambulate during her therapy session today as per staff at [facility]. At around 1920, pt was sitting in the wheelchair when the staff noticed pt beginning to slouch, left sided facial droop and left sided weakness, however staff is unsure if this was new onset. Pt on Lovenox and C/o of a Ha at the top of the head. PT taken to CT scan and Neuro at bedside. Upon assessment pt gross neuro, pulse, motor and sensory intact. Slight left sided facial droop noted. Pt endorsing mild Ha. Denies any Fever/Cough/N/V/D/CP/SOB/GI/Gu symptoms. PMH of CVA, DVT, HLD, and Gerd. NO PSH. VSS

## 2024-01-27 NOTE — ED PROVIDER NOTE - PHYSICAL EXAMINATION
GENERAL: Awake, alert, NAD  LUNGS: non labored breathing   BACK: No midline spinal tenderness, no CVA tenderness  EXT: No edema, no calf tenderness, 2+ DP pulses bilaterally, no deformities.  NEURO: A&Ox3. Moving all extremities.  SKIN: Warm and dry. No rash.  PSYCH: Normal affect.   **PENDING COOMPLETION GENERAL: Awake, alert, NAD  LUNGS: non labored breathing   BACK: No midline spinal tenderness, no CVA tenderness  EXT: No edema, no deformities.  NEURO: alert, CN 2-12 intact,, sensation intact throughout, coordination shows no abnormalities , romberg negative,   SKIN: Warm and dry. No rash.  PSYCH: Normal affect.

## 2024-01-27 NOTE — CONSULT NOTE ADULT - ASSESSMENT
Hemorrhage  Seizures     Impression: Episode of slumping over with post ictal period likely seizure from right IPH.     Recommendations:   Give Briviact 50 mg now   Increase Briviact 75 mg bid   Please send CPK   Will evaluate disposition pending labs     D/w epilepsy fellow under supervision of epilepsy attending. Also, d/w stroke fellow under supervision of stroke attending   Hemorrhage  Seizures     Impression: Episode of slumping over with post ictal period likely seizure from right IPH. Given UTI, likely provoked.    Recommendations:   Give Briviact 50 mg now   Increase Briviact 75 mg bid   CPK-31  Infectious work up positive for UTI  No further inpatient neurological work up.     D/w epilepsy fellow under supervision of epilepsy attending.   Also, d/w stroke fellow under supervision of stroke attending

## 2024-01-27 NOTE — ED PROVIDER NOTE - PROGRESS NOTE DETAILS
[facility] PGY 3 pt reassessed pt feeling better HA gone Discussed lab and radiology findings with patient. Patient feels comfortable going home. Gave home care and follow up instructions. Discussed which symptoms to look out for and when to return to the ED for further evaluation. Patient given opportunity to ask questions about their medical condition and had all questions answered.

## 2024-01-27 NOTE — CONSULT NOTE ADULT - NSCONSULTADDITIONALINFOA_GEN_ALL_CORE
Patient with seizure in setting of UTI - BRV increased to 75mg bid.  Patient told not to drive by resident and seizure precautions discussed.  Patient left ED prior to being seen by attending.

## 2024-01-28 VITALS
OXYGEN SATURATION: 100 % | RESPIRATION RATE: 18 BRPM | HEART RATE: 85 BPM | DIASTOLIC BLOOD PRESSURE: 62 MMHG | SYSTOLIC BLOOD PRESSURE: 116 MMHG | TEMPERATURE: 98 F

## 2024-01-30 LAB
-  AMOXICILLIN/CLAVULANIC ACID: SIGNIFICANT CHANGE UP
-  AMPICILLIN/SULBACTAM: SIGNIFICANT CHANGE UP
-  AMPICILLIN: SIGNIFICANT CHANGE UP
-  AZTREONAM: SIGNIFICANT CHANGE UP
-  CEFAZOLIN: SIGNIFICANT CHANGE UP
-  CEFEPIME: SIGNIFICANT CHANGE UP
-  CEFTRIAXONE: SIGNIFICANT CHANGE UP
-  CEFUROXIME: SIGNIFICANT CHANGE UP
-  CIPROFLOXACIN: SIGNIFICANT CHANGE UP
-  ERTAPENEM: SIGNIFICANT CHANGE UP
-  GENTAMICIN: SIGNIFICANT CHANGE UP
-  IMIPENEM: SIGNIFICANT CHANGE UP
-  LEVOFLOXACIN: SIGNIFICANT CHANGE UP
-  MEROPENEM: SIGNIFICANT CHANGE UP
-  NITROFURANTOIN: SIGNIFICANT CHANGE UP
-  PIPERACILLIN/TAZOBACTAM: SIGNIFICANT CHANGE UP
-  TOBRAMYCIN: SIGNIFICANT CHANGE UP
-  TRIMETHOPRIM/SULFAMETHOXAZOLE: SIGNIFICANT CHANGE UP
CULTURE RESULTS: ABNORMAL
METHOD TYPE: SIGNIFICANT CHANGE UP
ORGANISM # SPEC MICROSCOPIC CNT: ABNORMAL
ORGANISM # SPEC MICROSCOPIC CNT: ABNORMAL
SPECIMEN SOURCE: SIGNIFICANT CHANGE UP

## 2024-02-05 ENCOUNTER — RESULT REVIEW (OUTPATIENT)
Age: 74
End: 2024-02-05

## 2024-02-12 PROCEDURE — 97163 PT EVAL HIGH COMPLEX 45 MIN: CPT

## 2024-02-12 PROCEDURE — 92507 TX SP LANG VOICE COMM INDIV: CPT

## 2024-02-12 PROCEDURE — 97116 GAIT TRAINING THERAPY: CPT

## 2024-02-12 PROCEDURE — 70450 CT HEAD/BRAIN W/O DYE: CPT

## 2024-02-12 PROCEDURE — 97110 THERAPEUTIC EXERCISES: CPT

## 2024-02-12 PROCEDURE — 36415 COLL VENOUS BLD VENIPUNCTURE: CPT

## 2024-02-12 PROCEDURE — 85027 COMPLETE CBC AUTOMATED: CPT

## 2024-02-12 PROCEDURE — 92610 EVALUATE SWALLOWING FUNCTION: CPT

## 2024-02-12 PROCEDURE — 92523 SPEECH SOUND LANG COMPREHEN: CPT

## 2024-02-12 PROCEDURE — 97535 SELF CARE MNGMENT TRAINING: CPT

## 2024-02-12 PROCEDURE — 80053 COMPREHEN METABOLIC PANEL: CPT

## 2024-02-12 PROCEDURE — 92526 ORAL FUNCTION THERAPY: CPT

## 2024-02-12 PROCEDURE — 97167 OT EVAL HIGH COMPLEX 60 MIN: CPT

## 2024-02-12 PROCEDURE — 87635 SARS-COV-2 COVID-19 AMP PRB: CPT

## 2024-02-12 PROCEDURE — 80048 BASIC METABOLIC PNL TOTAL CA: CPT

## 2024-02-12 PROCEDURE — 83735 ASSAY OF MAGNESIUM: CPT

## 2024-02-12 PROCEDURE — 93970 EXTREMITY STUDY: CPT

## 2024-02-12 PROCEDURE — 97112 NEUROMUSCULAR REEDUCATION: CPT

## 2024-02-12 PROCEDURE — 97530 THERAPEUTIC ACTIVITIES: CPT

## 2024-02-25 ENCOUNTER — NON-APPOINTMENT (OUTPATIENT)
Age: 74
End: 2024-02-25

## 2024-02-26 NOTE — COUNSELING
Called pharm, approved   [de-identified] : Total face-to-face time with patient - 15__ minutes; >50% involved counselling, review of labs/tests, and/or coordination of medical care:\par low chol diet. Avoid fried foods, red meat, butter, eggs, hard cheeses. Use canola or olive oil preferred.\par \par  - Encouraged a low fat/low cholesterol diet\par  - Discussed Healthy eating, avoidance of concentrated sweets, and to include vegetables by at least 3 meals a day\par  - encouraged low glycemic fruits, grains and vegetables and a diet high in plant protein\par  - Discussed regular exercise\par  - Discussed importance of follow up physician visits\par \par \par \par \par

## 2024-02-28 DIAGNOSIS — I61.8 OTHER NONTRAUMATIC INTRACEREBRAL HEMORRHAGE: ICD-10-CM

## 2024-03-01 ENCOUNTER — APPOINTMENT (OUTPATIENT)
Dept: NEUROSURGERY | Facility: CLINIC | Age: 74
End: 2024-03-01
Payer: MEDICARE

## 2024-03-01 VITALS
HEIGHT: 61 IN | HEART RATE: 88 BPM | DIASTOLIC BLOOD PRESSURE: 69 MMHG | SYSTOLIC BLOOD PRESSURE: 111 MMHG | OXYGEN SATURATION: 98 %

## 2024-03-01 DIAGNOSIS — I61.5 NONTRAUMATIC INTRACEREBRAL HEMORRHAGE, INTRAVENTRICULAR: ICD-10-CM

## 2024-03-01 PROCEDURE — 99204 OFFICE O/P NEW MOD 45 MIN: CPT

## 2024-03-11 ENCOUNTER — OUTPATIENT (OUTPATIENT)
Dept: OUTPATIENT SERVICES | Facility: HOSPITAL | Age: 74
LOS: 1 days | Discharge: HOME | End: 2024-03-11

## 2024-03-11 DIAGNOSIS — I82.402 ACUTE EMBOLISM AND THROMBOSIS OF UNSPECIFIED DEEP VEINS OF LEFT LOWER EXTREMITY: ICD-10-CM

## 2024-03-12 DIAGNOSIS — Z02.6 ENCOUNTER FOR EXAMINATION FOR INSURANCE PURPOSES: ICD-10-CM

## 2024-03-12 DIAGNOSIS — Z71.89 OTHER SPECIFIED COUNSELING: ICD-10-CM

## 2024-03-12 DIAGNOSIS — Z23 ENCOUNTER FOR IMMUNIZATION: ICD-10-CM

## 2024-03-12 DIAGNOSIS — R82.90 UNSPECIFIED ABNORMAL FINDINGS IN URINE: ICD-10-CM

## 2024-03-12 DIAGNOSIS — Z13.39 ENCOUNTER FOR SCREENING EXAM FOR OTHER MENTAL HEALTH AND BEHAVIORAL DISORDERS: ICD-10-CM

## 2024-03-17 NOTE — ASSESSMENT
[FreeTextEntry1] : IMPRESSION 73F with PMHx of macular degeneration, bilateral below knee DVT and recent admission for right temporoparietal IPH presenting for headache. Patient admitted in late December 2023 for confusion and found to have right temporoparietal IPH with mild midline shift.  Screening LE duplex showed bilateral LE DVT. She was started on prophylactic Lovenox and discharged to acute rehab at [place]. She was eventually discharged from [place] to [facility] at [facility] and had a fall there and was seen in [facility] ER. She had CT Head w/o contrast which showed stable right posterior temporal parenchymal hemorrhage. Pt now at [facility] rehab,  Patient still with dull headache, but tolerable and improved with Tylenol. Pt doing therapy at rehab, walking well with no imbalance. Pt has loss of left peripheral vision. Otherwise neurologically intact.  PLAN: MRI Head w+ w/o contrast in 1 month Referred to neuro ophthalmology for evaluation of left peripheral vision loss. Continue Lovenox as advised by vascular, (Had a Doppler study yesterday at [facility])  Handicapped parking ticket paperwork was completed.  F/U after 1 month

## 2024-03-17 NOTE — RESULTS/DATA
[FreeTextEntry1] : ACC: [id number] EXAM: CT BRAIN STROKE PROTOCOL ORDERED BY: GILDA SUH  PROCEDURE DATE: 01/27/2024    INTERPRETATION: CLINICAL INFORMATION: Patient with known right parietal intraparenchymal hemorrhage (7 days ago) presenting with new onset headache since 7:00 PM. NIH stroke scale 0.  TECHNIQUE: Noncontrast axial CT images were acquired through the head.  COMPARISON STUDY: CT head from 1/20/2024  FINDINGS:  Interval decrease in size and conspicuity of a 1.9 x 1.7 x 1.9 cm right posterior temporal parenchymal hemorrhage, previously measuring 2.4 x 3.0 x 3.4 cm. Redemonstration of surrounding edema. No new or increased intracranial hemorrhage. No midline shift, extra-axial collection, or hydrocephalus. Mild patchy hypodensities within the periventricular and subcortical white matter, although nonspecific, likely reflect chronic microvascular disease. Cerebral volume loss contributes to prominence of the ventricles and sulci.   The calvarium is intact. The soft tissues of the scalp are unremarkable. Aerated secretions within the right ethmoid and sphenoid sinuses. The mastoid air cells and middle ear cavities are clear. Bilateral lens replacements.  IMPRESSION:  Interval expected evolution of a right posterior temporal parenchymal hemorrhage with decrease size. No new or increased intracranial hemorrhage or mass effect.  Findings discussed results with Dr. Merissa Pimentel, by Dr. You Mack 8:27 PM on 1/27/2024, with read back.  ACC: [id number] EXAM: CT ANGIO NECK STROKE PROTCL IC ORDERED BY: GILDA SUH  ACC: [id number] EXAM: CT ANGIO BRAIN STROKE PROTC IC ORDERED BY: GILDA SUH  PROCEDURE DATE: 01/27/2024    INTERPRETATION: CLINICAL INFORMATION: Patient with known right temporal intraparenchymal hemorrhage (a week ago), now with new onset headache.  TECHNIQUE: Noncontrast axial CT images were acquired through the head. Two-dimensional sagittal and coronal reformats were generated. Angiographic axial CT images were acquired of the head and neck. Three-dimensional maximum intensity projection reformats were generated.  70 cc of Omnipaque-350 mg/ml were administered intravenously, without immediate complication.  COMPARISON: CT head and neck from 1/20/2024  FINDINGS:  CT ANGIOGRAPHY NECK: There is no evidence for significant stenosis or major vessel occlusion involving the bilateral carotid arteries.  There is no evidence for significant stenosis or major vessel occlusion involving the bilateral vertebral arteries.  Hypoattenuating subcentimeter left thyroid nodules.  Visualized osseous structures are unremarkable.  CT ANGIOGRAPHY BRAIN: There is no evidence for significant stenosis, major vessel occlusion, or aneurysm about the [place].  There is no evidence for major vessel occlusion, or aneurysm involving the vertebrobasilar system.  No enlarged vascular lesions or clusters of abnormal vessels are noted to suggest an arterial venous malformation within the field-of-view.  Visualized portions of the superficial and deep venous systems are unremarkable.  IMPRESSION:  CT ANGIOGRAPHY NECK: No evidence of hemodynamically significant stenosis using NASCET criteria. Patent vertebral arteries. No evidence of vascular dissection.  CT ANGIOGRAPHY BRAIN: No major vessel occlusion or proximal stenosis.

## 2024-03-17 NOTE — REVIEW OF SYSTEMS
[Cluster Headache] : cluster headaches [Limb Pain] : limb pain [Negative] : Genitourinary [FreeTextEntry3] : left peripheral vision lost

## 2024-03-17 NOTE — PHYSICAL EXAM
[General Appearance - Alert] : alert [General Appearance - Well Nourished] : well nourished [General Appearance - In No Acute Distress] : in no acute distress [Impaired Insight] : insight and judgment were intact [Oriented To Time, Place, And Person] : oriented to person, place, and time [General Appearance - Well-Appearing] : healthy appearing [Memory Recent] : recent memory was not impaired [Affect] : the affect was normal [Person] : oriented to person [Place] : oriented to place [Time] : oriented to time [Short Term Intact] : short term memory intact [Cranial Nerves Oculomotor (III)] : extraocular motion intact [Cranial Nerves Trigeminal (V)] : facial sensation intact symmetrically [Cranial Nerves Facial (VII)] : face symmetrical [Cranial Nerves Vestibulocochlear (VIII)] : hearing was intact bilaterally [Cranial Nerves Hypoglossal (XII)] : there was no tongue deviation with protrusion [Cranial Nerves Accessory (XI - Cranial And Spinal)] : head turning and shoulder shrug symmetric [Sclera] : the sclera and conjunctiva were normal [Outer Ear] : the ears and nose were normal in appearance [Both Tympanic Membranes Were Examined] : both tympanic membranes were normal [Neck Appearance] : the appearance of the neck was normal [] : no respiratory distress [Apical Impulse] : the apical impulse was normal [Respiration, Rhythm And Depth] : normal respiratory rhythm and effort [Arterial Pulses Carotid] : carotid pulses were normal with no bruits [Abdominal Aorta] : the abdominal aorta was normal [Heart Rate And Rhythm] : heart rate was normal and rhythm regular [Bowel Sounds] : normal bowel sounds [Abdomen Soft] : soft [No CVA Tenderness] : no ~M costovertebral angle tenderness [No Spinal Tenderness] : no spinal tenderness [Involuntary Movements] : no involuntary movements were seen [Skin Color & Pigmentation] : normal skin color and pigmentation [FreeTextEntry5] : loss of left peripheral vision  [FreeTextEntry6] : GUSTAVO [FreeTextEntry8] : no drift, no imbalance at walking [FreeTextEntry1] : loss of left peripheral vision

## 2024-03-17 NOTE — HISTORY OF PRESENT ILLNESS
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 Patient was in the [facility]. She was treated conservatively. Screening LE duplex showed bilateral LE DVT. She was started on prophylactic Lovenox and discharged to acute rehab at [place]. She was eventually discharged from [place] to [facility] at [facility] and had a fall there and was seen in [facility] ER again. .She had CT Head w/o contrast which showed: "previously seen right posterior temporal parenchymal hemorrhage measuring 2.4 x 2.0 x 2.4 cm. Patient still with dull headache, but tolerable and improved with Tylenol.  Today Mrs. Carpenter present on wheelchair, believes that he improved overall. Denies headache, speech impairment, vision problems or seizures. Pt doing therapy at rehab, walking well with no imbalance. Pt report losing her left peripheral vision.

## 2024-03-18 ENCOUNTER — LABORATORY RESULT (OUTPATIENT)
Age: 74
End: 2024-03-18

## 2024-03-18 ENCOUNTER — APPOINTMENT (OUTPATIENT)
Dept: INTERNAL MEDICINE | Facility: CLINIC | Age: 74
End: 2024-03-18
Payer: MEDICARE

## 2024-03-18 VITALS
SYSTOLIC BLOOD PRESSURE: 116 MMHG | RESPIRATION RATE: 18 BRPM | BODY MASS INDEX: 24.92 KG/M2 | DIASTOLIC BLOOD PRESSURE: 80 MMHG | TEMPERATURE: 98.3 F | HEIGHT: 61 IN | OXYGEN SATURATION: 99 % | HEART RATE: 82 BPM | WEIGHT: 132 LBS

## 2024-03-18 DIAGNOSIS — I61.9 NONTRAUMATIC INTRACEREBRAL HEMORRHAGE, UNSPECIFIED: ICD-10-CM

## 2024-03-18 DIAGNOSIS — Z86.39 PERSONAL HISTORY OF OTHER ENDOCRINE, NUTRITIONAL AND METABOLIC DISEASE: ICD-10-CM

## 2024-03-18 PROCEDURE — 36415 COLL VENOUS BLD VENIPUNCTURE: CPT

## 2024-03-18 PROCEDURE — G2211 COMPLEX E/M VISIT ADD ON: CPT

## 2024-03-18 PROCEDURE — 99215 OFFICE O/P EST HI 40 MIN: CPT

## 2024-03-19 PROBLEM — Z86.39 HISTORY OF VITAMIN D DEFICIENCY: Status: ACTIVE | Noted: 2022-03-24

## 2024-03-19 LAB
25(OH)D3 SERPL-MCNC: 47.4 NG/ML
ALBUMIN SERPL ELPH-MCNC: 4.9 G/DL
ALP BLD-CCNC: 74 U/L
ALT SERPL-CCNC: 32 U/L
ANION GAP SERPL CALC-SCNC: 16 MMOL/L
APPEARANCE: CLEAR
AST SERPL-CCNC: 21 U/L
BASOPHILS # BLD AUTO: 0.02 K/UL
BASOPHILS NFR BLD AUTO: 0.3 %
BILIRUB SERPL-MCNC: 0.6 MG/DL
BILIRUBIN URINE: NEGATIVE
BLOOD URINE: NEGATIVE
BUN SERPL-MCNC: 11 MG/DL
CALCIUM SERPL-MCNC: 10.4 MG/DL
CHLORIDE SERPL-SCNC: 103 MMOL/L
CHOLEST SERPL-MCNC: 151 MG/DL
CO2 SERPL-SCNC: 24 MMOL/L
COLOR: YELLOW
CREAT SERPL-MCNC: 0.6 MG/DL
EGFR: 95 ML/MIN/1.73M2
EOSINOPHIL # BLD AUTO: 0.02 K/UL
EOSINOPHIL NFR BLD AUTO: 0.3 %
ESTIMATED AVERAGE GLUCOSE: 91 MG/DL
GGT SERPL-CCNC: 18 U/L
GLUCOSE QUALITATIVE U: NEGATIVE MG/DL
GLUCOSE SERPL-MCNC: 90 MG/DL
HBA1C MFR BLD HPLC: 4.8 %
HCT VFR BLD CALC: 47.1 %
HDLC SERPL-MCNC: 65 MG/DL
HGB BLD-MCNC: 15.7 G/DL
IMM GRANULOCYTES NFR BLD AUTO: 0.5 %
KETONES URINE: NEGATIVE MG/DL
LDLC SERPL CALC-MCNC: 64 MG/DL
LEUKOCYTE ESTERASE URINE: ABNORMAL
LYMPHOCYTES # BLD AUTO: 1.59 K/UL
LYMPHOCYTES NFR BLD AUTO: 25.3 %
MAN DIFF?: NORMAL
MCHC RBC-ENTMCNC: 31.7 PG
MCHC RBC-ENTMCNC: 33.3 GM/DL
MCV RBC AUTO: 95 FL
MONOCYTES # BLD AUTO: 0.34 K/UL
MONOCYTES NFR BLD AUTO: 5.4 %
NEUTROPHILS # BLD AUTO: 4.29 K/UL
NEUTROPHILS NFR BLD AUTO: 68.2 %
NITRITE URINE: NEGATIVE
NONHDLC SERPL-MCNC: 86 MG/DL
PH URINE: 6.5
PLATELET # BLD AUTO: 221 K/UL
POTASSIUM SERPL-SCNC: 4.2 MMOL/L
PROT SERPL-MCNC: 6.9 G/DL
PROTEIN URINE: NEGATIVE MG/DL
RBC # BLD: 4.96 M/UL
RBC # FLD: 13.5 %
SODIUM SERPL-SCNC: 142 MMOL/L
SPECIFIC GRAVITY URINE: 1.01
TRIGL SERPL-MCNC: 128 MG/DL
TSH SERPL-ACNC: 1.53 UIU/ML
UROBILINOGEN URINE: 0.2 MG/DL
WBC # FLD AUTO: 6.29 K/UL

## 2024-03-19 NOTE — REVIEW OF SYSTEMS
[Dysuria] : dysuria [Negative] : Heme/Lymph [Fatigue] : fatigue [Vision Problems] : vision problems [Insomnia] : no insomnia [Suicidal] : not suicidal [FreeTextEntry3] : Left eye visual problems [Anxiety] : anxiety

## 2024-03-19 NOTE — HEALTH RISK ASSESSMENT
[No] : No [Intercurrent hospitalizations] : was admitted to the hospital  [No falls in past year] : Patient reported no falls in the past year [1] : 2) Feeling down, depressed, or hopeless for several days (1) [PHQ-2 Positive] : PHQ-2 Positive [Not at All (0)] : 9.) Thoughts that you would be off dead or of hurting yourself in some way? Not at all [Several Days (1)] : 8.) Moving or speaking so slowly that other people could have noticed, or the opposite, moving or speaking faster than usual? Several days [I have developed a follow-up plan documented below in the note.] : I have developed a follow-up plan documented below in the note. [Mild] : Severity of Depression is Mild [de-identified] : Minimal [de-identified] : Neurology vascular [de-identified] : Standard [[id number]] : 6 [Reviewed no changes] : Reviewed, no changes [I will adhere to the patient's wishes.] : I will adhere to the patient's wishes. [Former] : Former

## 2024-03-19 NOTE — PHYSICAL EXAM
[No Acute Distress] : no acute distress [Well Developed] : well developed [Well Nourished] : well nourished [Well-Appearing] : well-appearing [Normal Sclera/Conjunctiva] : normal sclera/conjunctiva [PERRL] : pupils equal round and reactive to light [EOMI] : extraocular movements intact [Normal Oropharynx] : the oropharynx was normal [Normal Outer Ear/Nose] : the outer ears and nose were normal in appearance [No JVD] : no jugular venous distention [No Lymphadenopathy] : no lymphadenopathy [Supple] : supple [Thyroid Normal, No Nodules] : the thyroid was normal and there were no nodules present [No Respiratory Distress] : no respiratory distress  [No Accessory Muscle Use] : no accessory muscle use [Clear to Auscultation] : lungs were clear to auscultation bilaterally [Regular Rhythm] : with a regular rhythm [Normal Rate] : normal rate  [No Murmur] : no murmur heard [Normal S1, S2] : normal S1 and S2 [No Carotid Bruits] : no carotid bruits [No Abdominal Bruit] : a ~M bruit was not heard ~T in the abdomen [Pedal Pulses Present] : the pedal pulses are present [No Varicosities] : no varicosities [No Edema] : there was no peripheral edema [No Palpable Aorta] : no palpable aorta [Non Tender] : non-tender [Soft] : abdomen soft [No Extremity Clubbing/Cyanosis] : no extremity clubbing/cyanosis [Non-distended] : non-distended [No HSM] : no HSM [No Masses] : no abdominal mass palpated [Normal Bowel Sounds] : normal bowel sounds [Normal Posterior Cervical Nodes] : no posterior cervical lymphadenopathy [Normal Anterior Cervical Nodes] : no anterior cervical lymphadenopathy [No Spinal Tenderness] : no spinal tenderness [No CVA Tenderness] : no CVA  tenderness [Grossly Normal Strength/Tone] : grossly normal strength/tone [No Joint Swelling] : no joint swelling [No Rash] : no rash [No Focal Deficits] : no focal deficits [Coordination Grossly Intact] : coordination grossly intact [Normal Gait] : normal gait [Deep Tendon Reflexes (DTR)] : deep tendon reflexes were 2+ and symmetric [Normal Affect] : the affect was normal [Alert and Oriented x3] : oriented to person, place, and time [Normal Insight/Judgement] : insight and judgment were intact [Declined Breast Exam] : declined breast exam  [Declined Rectal Exam] : declined rectal exam [Normal] : normal gait, coordination grossly intact, no focal deficits and deep tendon reflexes were 2+ and symmetric [de-identified] : BMI 24.9 [de-identified] : No edema no calf tenderness Homans negative bilaterally lower extremities [de-identified] : No flank pain [de-identified] : Anxious

## 2024-03-19 NOTE — COUNSELING
[Fall prevention counseling provided] : Fall prevention counseling provided [Adequate lighting] : Adequate lighting [No throw rugs] : No throw rugs [Use proper foot wear] : Use proper foot wear [Use recommended devices] : Use recommended devices [Behavioral health counseling provided] : Behavioral health counseling provided [Sleep ___ hours/day] : Sleep [unfilled] hours/day [Engage in a relaxing activity] : Engage in a relaxing activity [Plan in advance] : Plan in advance [None] : None [Good understanding] : Patient has a good understanding of lifestyle changes and steps needed to achieve self management goal [de-identified] : Total face-to-face time with patient - 15 minutes; >50% involved counselling, review of labs/tests, and/or coordination of medical care: Bp stable, continue with medications, dash diet, exercise, and dietary management.Continue to check home Bps.  diet and exercise weight loss.  Low-salt low-fat ADA diet/ htn- Discussed diabetes physiology - Discussed importance of monitoring blood glucose levels - Encouraged a low fat/low cholesterol diet - Discussed symptoms of hyperglycemia and hypoglycemia - Discussed ADA glucose goals - Discussed  HGB A1c and the effects of blood glucose on the level - Discussed Healthy eating, avoidance of concentrated sweets, and to include vegetables by at least 2 meals a day - Discussed regular exercise - Discussed importance of follow up physician visits Limit intake of Sodium (Salt) to less than 2 grams a day to prevent fluid retention-swelling or worsening of symptoms. The importance of keeping the blood pressure at or below 130/80 to prevent stroke, heart attacks, kidney failure, blindness, and loss of limbs was  low chol diet. Avoid fried foods, red meat, butter, eggs, hard cheeses. Use canola or olive oil preferred. ::  was established in which goals would be set, monitoring would be done, and problem solving would also be addressed. The patient would be assisted using behavior change techniques, such as self-help and counseling through behavioral modification: Problem solving using hypnosis and positive medical reinforcement to achieve agreed-upon goals. Symptomatic patients : Test for influenza, if positive, treat for influenza and do not continue below.  1. Fever plus cough or shortness of breath : Test for RVP and COVID-19. 2.Indirect, circumstantial or unclear exposure to COVID-19, or other concerning cases not meeting above criteria: Please call [facility] to discuss testing.  +++ All above cases must be reported to the [facility] registry. +++  Asymptomatic patients:  1. Known first-degree direct-contact exposure to positive COVID-19 patient but asymptomatic: No testing PLUS 14 day self-quarantine. Pt to call if symptoms develop. Report to [facility] Registry. 2. No known exposure and asymptomatic, referred from outside healthcare organization: Please call [facility] to discuss testing.  3.All other asymptomatic patients with no known exposures: no testing, no exceptions.  1. Make an appointment with your doctor Begin your fall-prevention plan by making an appointment with your doctor. Be prepared to answer questions such as:  What medications are you taking? Make a list of your prescription and over-the-counter medications and supplements, or bring them with you to the appointment. Your doctor can review your medications for side effects and interactions that may increase your risk of falling. To help with fall prevention, your doctor may consider weaning you off medications that make you tired or affect your thinking, such as sedatives and some types of antidepressants. Have you fallen before? Write down the details, including when, where and how you fell. Be prepared to discuss instances when you almost fell but were caught by someone or managed to grab hold of something just in time. Details such as these may help your doctor identify specific fall-prevention strategies. Could your health conditions cause a fall? Certain eye and ear disorders may increase your risk of falls. Be prepared to discuss your health conditions and how comfortable you are when you walk - for example, do you feel any dizziness, joint pain, shortness of breath, or numbness in your feet and legs when you walk? Your doctor may evaluate your muscle strength, balance and walking style (gait) as well. 2. Keep moving Physical activity can go a long way toward fall prevention. With your doctor's OK, consider activities such as walking, water workouts or arcadio chi - a gentle exercise that involves slow and graceful dance-like movements. Such activities reduce the risk of falls by improving strength, balance, coordination and flexibility.  If you avoid physical activity because you're afraid it will make a fall more likely, tell your doctor. He or she may recommend carefully monitored exercise programs or refer you to a physical therapist. The physical therapist can create a custom exercise program aimed at improving your balance, flexibility, muscle strength and gait.  3. Wear sensible shoes Consider changing your footwear as part of your fall-prevention plan. High heels, floppy slippers and shoes with slick soles can make you slip, stumble and fall. So can walking in your stocking feet. Instead, wear properly fitting, sturdy shoes with nonskid soles. Sensible shoes may also reduce joint pain.  4. Remove home hazards Take a look around your home. Your living room, kitchen, bedroom, bathroom, hallways and stairways may be filled with hazards. To make your home safer:  Remove boxes, newspapers, electrical cords and phone cords from walkways. Move coffee tables, magazine racks and plant stands from high-traffic areas. Secure loose rugs with double-faced tape, tacks or a slip-resistant backing - or remove loose rugs from your home. Repair loose, wooden floorboards and carpeting right away. Store clothing, dishes, food and other necessities within easy reach. Immediately clean spilled liquids, grease or food. Use nonslip mats in your bathtub or shower. Use a bath seat, which allows you to sit while showering. 5. Light up your living space Keep your home brightly lit to avoid tripping on objects that are hard to see. Also:  Place night lights in your bedroom, bathroom and hallways. Place a lamp within reach of your bed for middle-of-the-night needs. Make clear paths to light switches that aren't near room entrances. Consider trading traditional switches for glow-in-the-dark or illuminated switches. Turn on the lights before going up or down stairs. Store flashlights in easy-to-find places in case of power outages. 6. Use assistive devices Your doctor might recommend using a cane or walker to keep you steady. Other assistive devices can help, too. For example:  Hand rails for both sides of stairways Nonslip treads for bare-wood steps A raised toilet seat or one with armrests Grab bars for the shower or tub A sturdy plastic seat for the shower or tub - plus a hand-held shower nozzle for bathing while sitting down If necessary, ask your doctor for a referral to an occupational therapist. He or she can help you brainstorm other fall-prevention strategies. Some solutions are easily installed and relatively inexpensive. Others may require professional help or a larger investment. If you're concerned about the cost, remember that an investment in fall prevention is an investment in your independence.   - Discussed diabetes physiology - Discussed importance of monitoring blood glucose levels - Encouraged a low fat/low cholesterol diet - Discussed symptoms of hyperglycemia and hypoglycemia - Discussed ADA glucose goals - Discussed  HGB A1c and the effects of blood glucose on the level - Discussed Healthy eating, avoidance of concentrated sweets, and to include vegetables by at least 2 meals a day - Discussed regular exercise - Discussed importance of follow up physician visits

## 2024-03-19 NOTE — HISTORY OF PRESENT ILLNESS
[FreeTextEntry1] :  Patient is status post cerebral hemorrhage right hemisphere of brain.  Was admitted to the hospital.  Developed visual problems in the left eye.  Was transferred to a rehab center.  Comes in today for the first time after this ordeal began.  She developed a DVT in the right lower extremity and is presently on Lovenox injections.  She denies aphasia or weakness in arms or legs does admit to blindness peripheral left eye.  Patient is a 73-year-old female with history of osteoporosis cysts COPD DVT cerebral hemorrhage anxiety glucose intolerance.  Patient comes to the office with her friend who is her healthcare proxy who admits that she is extremely stressed out and anxious [de-identified] : 73-year-old female presents herself today for a F/U,GHM. cc: lost her peripheral view on left side due to the stroke.  Had a stroke December 20th.  Hx of hyperlipidemia, glucose intolerance and osteoporosis. She is fully covid/Flu vaccinated for this year. Has mild COPD and smoked 40 year ago.  Patient denies fever chills cough chest pain or shortness of breath.  Patient admits to anxiety and decreased vision in the left eye. Voices no further complaints. ROS as documented below.  Denies fever, cough, chills, body aches and SOB.   I Karrie Mccrary, am scribing for and in the presence of Dr. Meredith, the following sections of:  HISTORY OF PRESENT ILLNESS, PAST MEDICAL/FAMILY/SOCIAL HISTORY, ROS, VITALS, PE, DISPOSITION

## 2024-03-20 ENCOUNTER — APPOINTMENT (OUTPATIENT)
Dept: OPHTHALMOLOGY | Facility: CLINIC | Age: 74
End: 2024-03-20
Payer: MEDICARE

## 2024-03-20 ENCOUNTER — NON-APPOINTMENT (OUTPATIENT)
Age: 74
End: 2024-03-20

## 2024-03-20 PROCEDURE — 92083 EXTENDED VISUAL FIELD XM: CPT

## 2024-03-20 PROCEDURE — 99204 OFFICE O/P NEW MOD 45 MIN: CPT

## 2024-03-20 PROCEDURE — 92133 CPTRZD OPH DX IMG PST SGM ON: CPT

## 2024-03-21 ENCOUNTER — NON-APPOINTMENT (OUTPATIENT)
Age: 74
End: 2024-03-21

## 2024-03-21 ENCOUNTER — APPOINTMENT (OUTPATIENT)
Dept: HEMATOLOGY ONCOLOGY | Facility: CLINIC | Age: 74
End: 2024-03-21
Payer: MEDICARE

## 2024-03-21 VITALS
OXYGEN SATURATION: 99 % | RESPIRATION RATE: 16 BRPM | SYSTOLIC BLOOD PRESSURE: 112 MMHG | BODY MASS INDEX: 24.82 KG/M2 | HEART RATE: 85 BPM | DIASTOLIC BLOOD PRESSURE: 74 MMHG | HEIGHT: 61.3 IN | TEMPERATURE: 97 F | WEIGHT: 133.16 LBS

## 2024-03-21 PROCEDURE — 99205 OFFICE O/P NEW HI 60 MIN: CPT

## 2024-03-21 RX ORDER — ENOXAPARIN SODIUM 100 MG/ML
40 INJECTION SUBCUTANEOUS
Refills: 0 | Status: DISCONTINUED | COMMUNITY

## 2024-03-21 RX ORDER — VIT A/VIT C/VIT E/ZINC/COPPER 4296-226
CAPSULE ORAL
Refills: 0 | Status: ACTIVE | COMMUNITY

## 2024-03-21 RX ORDER — GABAPENTIN 400 MG
400 TABLET ORAL
Refills: 0 | Status: DISCONTINUED | COMMUNITY

## 2024-03-21 RX ORDER — IBUPROFEN 400 MG/1
400 TABLET, FILM COATED ORAL
Qty: 60 | Refills: 0 | Status: DISCONTINUED | COMMUNITY
Start: 2023-04-10 | End: 2024-03-21

## 2024-03-21 RX ORDER — ACETAMINOPHEN 325 MG/1
325 TABLET ORAL
Refills: 0 | Status: DISCONTINUED | COMMUNITY

## 2024-03-21 RX ORDER — OLOPATADINE HYDROCHLORIDE 2 MG/ML
0.2 SOLUTION OPHTHALMIC
Refills: 0 | Status: ACTIVE | COMMUNITY

## 2024-03-21 RX ORDER — FLUCONAZOLE 150 MG/1
150 TABLET ORAL
Qty: 1 | Refills: 0 | Status: DISCONTINUED | COMMUNITY
Start: 2023-03-23 | End: 2024-03-21

## 2024-03-21 RX ORDER — KETOROLAC TROMETHAMINE 5 MG/ML
0.5 SOLUTION OPHTHALMIC
Refills: 0 | Status: ACTIVE | COMMUNITY

## 2024-03-21 RX ORDER — CHLORHEXIDINE GLUCONATE 4 %
5 LIQUID (ML) TOPICAL
Refills: 0 | Status: DISCONTINUED | COMMUNITY

## 2024-03-21 RX ORDER — CALCIUM CARBONATE 300MG(750)
TABLET,CHEWABLE ORAL
Refills: 0 | Status: DISCONTINUED | COMMUNITY

## 2024-03-21 RX ORDER — PANTOPRAZOLE 40 MG/1
40 TABLET, DELAYED RELEASE ORAL
Refills: 0 | Status: DISCONTINUED | COMMUNITY

## 2024-03-21 RX ORDER — ENOXAPARIN SODIUM 40 MG/.4ML
40 INJECTION, SOLUTION SUBCUTANEOUS
Qty: 12 | Refills: 0 | Status: DISCONTINUED | COMMUNITY
Start: 2024-03-04

## 2024-03-21 RX ORDER — LEVOFLOXACIN 250 MG/1
250 TABLET, FILM COATED ORAL DAILY
Qty: 7 | Refills: 0 | Status: DISCONTINUED | COMMUNITY
Start: 2023-02-27 | End: 2024-03-21

## 2024-03-21 RX ORDER — NIRMATRELVIR AND RITONAVIR 300-100 MG
20 X 150 MG & KIT ORAL
Qty: 6 | Refills: 0 | Status: DISCONTINUED | COMMUNITY
Start: 2023-03-17 | End: 2024-03-21

## 2024-03-21 RX ORDER — LIDOCAINE HCL 4 %
4 LIQUID ROLL-ON (ML) TOPICAL
Refills: 0 | Status: ACTIVE | COMMUNITY

## 2024-03-21 RX ORDER — ALPRAZOLAM 0.25 MG/1
0.25 TABLET ORAL EVERY 8 HOURS
Qty: 30 | Refills: 0 | Status: DISCONTINUED | COMMUNITY
Start: 2023-08-14 | End: 2024-03-21

## 2024-03-21 RX ORDER — TRIAMCINOLONE ACETONIDE 1 MG/G
0.1 CREAM TOPICAL
Qty: 80 | Refills: 0 | Status: DISCONTINUED | COMMUNITY
Start: 2023-11-02

## 2024-03-21 RX ORDER — BRIVARACETAM 75 MG/1
75 TABLET, FILM COATED ORAL
Refills: 0 | Status: ACTIVE | COMMUNITY

## 2024-03-21 RX ORDER — METHYLPREDNISOLONE 4 MG/1
4 TABLET ORAL
Qty: 1 | Refills: 0 | Status: DISCONTINUED | COMMUNITY
Start: 2022-03-12 | End: 2024-03-21

## 2024-03-21 RX ORDER — SULFAMETHOXAZOLE AND TRIMETHOPRIM 400; 80 MG/1; MG/1
400-80 TABLET ORAL TWICE DAILY
Qty: 14 | Refills: 0 | Status: DISCONTINUED | COMMUNITY
Start: 2023-08-29 | End: 2024-03-21

## 2024-03-21 RX ORDER — BROMFENAC SODIUM 0.7 MG/ML
0.07 SOLUTION/ DROPS OPHTHALMIC
Qty: 3 | Refills: 0 | Status: ACTIVE | COMMUNITY
Start: 2023-12-04

## 2024-03-21 RX ORDER — AMOXICILLIN AND CLAVULANATE POTASSIUM 875; 125 MG/1; MG/1
875-125 TABLET, COATED ORAL
Qty: 1 | Refills: 0 | Status: DISCONTINUED | COMMUNITY
Start: 2022-03-12 | End: 2024-03-21

## 2024-03-21 RX ORDER — KETOCONAZOLE 20 MG/G
2 CREAM TOPICAL TWICE DAILY
Qty: 45 | Refills: 0 | Status: DISCONTINUED | COMMUNITY
Start: 2023-03-23 | End: 2024-03-21

## 2024-03-21 RX ORDER — TRIAMCINOLONE ACETONIDE 1 MG/G
0.1 OINTMENT TOPICAL
Qty: 80 | Refills: 0 | Status: DISCONTINUED | COMMUNITY
Start: 2023-03-03

## 2024-03-21 RX ORDER — ALPRAZOLAM 0.25 MG/1
0.25 TABLET ORAL EVERY 8 HOURS
Qty: 30 | Refills: 0 | Status: DISCONTINUED | COMMUNITY
Start: 2022-11-30 | End: 2024-03-21

## 2024-03-21 RX ORDER — SENNOSIDES 8.6 MG TABLETS 8.6 MG/1
8.6 TABLET ORAL
Refills: 0 | Status: DISCONTINUED | COMMUNITY

## 2024-03-21 NOTE — HISTORY OF PRESENT ILLNESS
[de-identified] : MOISÉS MAYO is a 73 year woman with PMH of anxiety, neuropathy, HLD, macular degeneration, recent hospitalization (12/2023) for hemorrhagic CVA, who presents for Hematology evaluation of bilateral distal DVTs.   She first presented to [facility] on 12/14/23 for a stroke code, and she was found to have a large right temporal parietal parenchymal hemorrhage (6.5 cm). She initially had normal lower extremity Dopplers on 12/15/23, but she subsequently developed lower extremity swelling and was found to have bilateral distal lower extremity DVTs (left soleal, peroneal, and gastrocnemius veins; right soleal vein) on 12/20/23. She was started on lovenox prophylaxis given the IPH. Repeat Dopplers on 12/27/23 showed persistent but improving clot burden.   She was discharged to rehab, but she was hospitalized again on 1/20/24 for Neurosurgery evaluation given persistent intraparenchymal hemorrhage (2.4 x 2 x 2.4 cm) causing mass effect, which was not seen on MRI. Repeat Dopplers on 1/21/24 showed no LLE DVT and a persistent right soleal vein DVT, stable on repeat Dopplers on 2/29/24.   She presents today to establish outpatient Hematology care. She is now home from rehab. She continues to take lovenox prophylaxis. No prior personal or family history of veous or arterial thromboembolism.    Family History: - No history of hematologic disorders, cancer, or clotting disorders   Social History: - Lives by herself

## 2024-03-21 NOTE — ASSESSMENT
[FreeTextEntry1] : MOISÉS MAYO is a 73 year woman with PMH of anxiety, neuropathy, HLD, macular degeneration, recent hospitalization (12/2023) for hemorrhagic CVA, who presents for Hematology evaluation of bilateral distal DVTs.  #DVT: She first presented to [facility] on 12/14/23 for a stroke code, and she was found to have a large right temporal parietal parenchymal hemorrhage (6.5 cm). She initially had normal lower extremity Dopplers on 12/15/23, but she subsequently developed lower extremity swelling and was found to have bilateral distal lower extremity DVTs (left soleal, peroneal, and gastrocnemius veins; right soleal vein) on 12/20/23. She was started on lovenox prophylaxis given the IPH. Repeat Dopplers on 2/29/24 showed no LLE DVT and a persistent right soleal vein DVT. Suspect her DVTs were provoked in the setting of immobility. - She has completed 3 months of anticoagulation for a provoked DVT. Monitor off anticoagulation.  - Patient should return to clinic in 3 months for repeat visit - Follow up MRI scheduled next week to evaluate status of IPH

## 2024-03-25 ENCOUNTER — APPOINTMENT (OUTPATIENT)
Dept: MRI IMAGING | Facility: CLINIC | Age: 74
End: 2024-03-25
Payer: MEDICARE

## 2024-03-25 ENCOUNTER — TRANSCRIPTION ENCOUNTER (OUTPATIENT)
Age: 74
End: 2024-03-25

## 2024-03-25 PROCEDURE — A9585: CPT

## 2024-03-25 PROCEDURE — 70553 MRI BRAIN STEM W/O & W/DYE: CPT

## 2024-03-28 ENCOUNTER — TRANSCRIPTION ENCOUNTER (OUTPATIENT)
Age: 74
End: 2024-03-28

## 2024-04-01 ENCOUNTER — APPOINTMENT (OUTPATIENT)
Dept: NEUROSURGERY | Facility: CLINIC | Age: 74
End: 2024-04-01
Payer: MEDICARE

## 2024-04-01 VITALS
BODY MASS INDEX: 25.11 KG/M2 | HEART RATE: 78 BPM | SYSTOLIC BLOOD PRESSURE: 116 MMHG | OXYGEN SATURATION: 97 % | WEIGHT: 133 LBS | DIASTOLIC BLOOD PRESSURE: 74 MMHG | HEIGHT: 61 IN

## 2024-04-01 PROCEDURE — 99214 OFFICE O/P EST MOD 30 MIN: CPT

## 2024-04-04 ENCOUNTER — TRANSCRIPTION ENCOUNTER (OUTPATIENT)
Age: 74
End: 2024-04-04

## 2024-04-09 ENCOUNTER — APPOINTMENT (OUTPATIENT)
Dept: INTERNAL MEDICINE | Facility: CLINIC | Age: 74
End: 2024-04-09

## 2024-04-10 ENCOUNTER — APPOINTMENT (OUTPATIENT)
Dept: INTERNAL MEDICINE | Facility: CLINIC | Age: 74
End: 2024-04-10
Payer: MEDICARE

## 2024-04-10 VITALS
HEIGHT: 61 IN | TEMPERATURE: 98.2 F | WEIGHT: 126 LBS | DIASTOLIC BLOOD PRESSURE: 62 MMHG | SYSTOLIC BLOOD PRESSURE: 96 MMHG | HEART RATE: 78 BPM | RESPIRATION RATE: 17 BRPM | OXYGEN SATURATION: 97 % | BODY MASS INDEX: 23.79 KG/M2

## 2024-04-10 DIAGNOSIS — R63.4 ABNORMAL WEIGHT LOSS: ICD-10-CM

## 2024-04-10 DIAGNOSIS — H61.20 IMPACTED CERUMEN, UNSPECIFIED EAR: ICD-10-CM

## 2024-04-10 DIAGNOSIS — Z00.00 ENCOUNTER FOR GENERAL ADULT MEDICAL EXAMINATION W/OUT ABNORMAL FINDINGS: ICD-10-CM

## 2024-04-10 DIAGNOSIS — H35.30 UNSPECIFIED MACULAR DEGENERATION: ICD-10-CM

## 2024-04-10 DIAGNOSIS — Z86.69 PERSONAL HISTORY OF OTHER DISEASES OF THE NERVOUS SYSTEM AND SENSE ORGANS: ICD-10-CM

## 2024-04-10 DIAGNOSIS — R56.9 UNSPECIFIED CONVULSIONS: ICD-10-CM

## 2024-04-10 DIAGNOSIS — F41.9 ANXIETY DISORDER, UNSPECIFIED: ICD-10-CM

## 2024-04-10 DIAGNOSIS — J44.9 CHRONIC OBSTRUCTIVE PULMONARY DISEASE, UNSPECIFIED: ICD-10-CM

## 2024-04-10 DIAGNOSIS — Z13.31 ENCOUNTER FOR SCREENING FOR DEPRESSION: ICD-10-CM

## 2024-04-10 PROCEDURE — 36415 COLL VENOUS BLD VENIPUNCTURE: CPT

## 2024-04-10 PROCEDURE — 99215 OFFICE O/P EST HI 40 MIN: CPT

## 2024-04-10 PROCEDURE — G2211 COMPLEX E/M VISIT ADD ON: CPT

## 2024-04-11 PROBLEM — R56.9 SEIZURES: Status: ACTIVE | Noted: 2024-04-11

## 2024-04-11 PROBLEM — J44.9 COPD, MILD: Status: ACTIVE | Noted: 2022-04-04

## 2024-04-11 PROBLEM — R63.4 WEIGHT LOSS: Status: ACTIVE | Noted: 2024-04-11

## 2024-04-11 PROBLEM — F41.9 ANXIETY: Status: ACTIVE | Noted: 2022-11-30

## 2024-04-11 PROBLEM — Z13.31 DEPRESSION SCREENING: Status: ACTIVE | Noted: 2021-08-20

## 2024-04-11 PROBLEM — Z86.69 HISTORY OF IMPACTED CERUMEN: Status: RESOLVED | Noted: 2024-04-11 | Resolved: 2024-04-11

## 2024-04-11 PROBLEM — H61.20 CERUMEN IMPACTION: Status: ACTIVE | Noted: 2024-04-11

## 2024-04-11 LAB
ANION GAP SERPL CALC-SCNC: 13 MMOL/L
BUN SERPL-MCNC: 16 MG/DL
CALCIUM SERPL-MCNC: 9.8 MG/DL
CHLORIDE SERPL-SCNC: 106 MMOL/L
CO2 SERPL-SCNC: 23 MMOL/L
CREAT SERPL-MCNC: 0.53 MG/DL
EGFR: 98 ML/MIN/1.73M2
GLUCOSE SERPL-MCNC: 100 MG/DL
MAGNESIUM SERPL-MCNC: 2.2 MG/DL
POTASSIUM SERPL-SCNC: 4.6 MMOL/L
SODIUM SERPL-SCNC: 142 MMOL/L

## 2024-04-11 NOTE — HISTORY OF PRESENT ILLNESS
[FreeTextEntry1] : F/U and a GHM. Hx of hyperlipidemia, glucose intolerance and osteoporosis.  Adjustment disorder with anxiety, confusion status post cerebral hemorrhage, mild COPD DVT, lower extremity, hypokalemia... Patient went to the hospital last week and was found to have a potassium 3.1 her potassium levels were corrected with IV and she is now on oral potassium supplements she was previously on potassium supplements but stopped taking them on her own accord. CC: Patient is here today for a routine follow up from the hospital. Patient states that last week April 5-7 she was not feeling well and decided to go to the hospital on Sunday to [facility] and was told that her potassium was low at 3.1. Patient also needs a refill on her Atorvastatin. She would also like to know if she could have a home health aide to help with caring for her and her home. Patient also states that she is taking Sertraline but still feels anxious. Would also like to know if she could take magnesium and potassium together. Patient is also having left ear pain and has wax in it.  [de-identified] : 73-year-old female presents herself today for a F/U,GHM. Hx of hyperlipidemia, glucose intolerance and osteoporosis. Patient is anxious, stressed out about her condition.  Was hospitalized with a CVA and in the nursing home.  She appears to be having great difficulty managing at home on her own.  She comes to the office with a healthcare proxy.  Healthcare proxy seems to think that she is having difficulty with her meds and general management of her personal healthcare.. Patient is here today for a routine follow up  from the hospital. Patient states that last week April 5-7 she was not feeling well and decided to go to the hospital on Sunday to [facility] and was told that her potassium was low at 3.1. Patient also needs a refill on her Atorvastatin. She would also like to know if she could have a home health aide to help with caring for her and her home. Patient also states that she is taking Sertraline but still feels anxious. Would also like to know if she could take magnesium and potassium together. Patient is also having left ear pain and has wax in it.  Had a stroke December 20th.  She is fully covid/Flu vaccinated for this year. Has mild COPD and smoked 40 year ago.  Otherwise patient is stable.   Voices no further complaints. ROS as documented below.  Denies fever, cough, chills, body aches and SOB.   I Fely Daniels, am scribing for and in the presence of Dr. Meredith, the following sections of:  HISTORY OF PRESENT ILLNESS, PAST MEDICAL/FAMILY/SOCIAL HISTORY, ROS, VITALS, PE, DISPOSITION

## 2024-04-11 NOTE — COUNSELING
[Fall prevention counseling provided] : Fall prevention counseling provided [Adequate lighting] : Adequate lighting [No throw rugs] : No throw rugs [Use proper foot wear] : Use proper foot wear [Use recommended devices] : Use recommended devices [Behavioral health counseling provided] : Behavioral health counseling provided [Sleep ___ hours/day] : Sleep [unfilled] hours/day [Engage in a relaxing activity] : Engage in a relaxing activity [de-identified] : Total face-to-face time with patient - 20__ minutes; >50% involved counselling, review of labs/tests, and/or coordination of medical care: diet and exercise weight loss.  Low-salt low-fat ADA diet/ htn- Discussed diabetes physiology - Discussed importance of monitoring blood glucose levels - Encouraged a low fat/low cholesterol diet - Discussed symptoms of hyperglycemia and hypoglycemia - Discussed ADA glucose goals - Discussed  HGB A1c and the effects of blood glucose on the level - Discussed Healthy eating, avoidance of concentrated sweets, and to include vegetables by at least 2 meals a day - Discussed regular exercise - Discussed importance of follow up physician visits Limit intake of Sodium (Salt) to less than 2 grams a day to prevent fluid retention-swelling or worsening of symptoms. The importance of keeping the blood pressure at or below 130/80 to prevent stroke, heart attacks, kidney failure, blindness, and loss of limbs was  low chol diet. Avoid fried foods, red meat, butter, eggs, hard cheeses. Use canola or olive oil preferred. ::  was established in which goals would be set, monitoring would be done, and problem solving would also be addressed. The patient would be assisted using behavior change techniques, such as self-help and counseling through behavioral modification: Problem solving using hypnosis and positive medical reinforcement to achieve agreed-upon goals..covid 1. Make an appointment with your doctor Begin your fall-prevention plan by making an appointment with your doctor. Be prepared to answer questions such as:  What medications are you taking? Make a list of your prescription and over-the-counter medications and supplements, or bring them with you to the appointment. Your doctor can review your medications for side effects and interactions that may increase your risk of falling. To help with fall prevention, your doctor may consider weaning you off medications that make you tired or affect your thinking, such as sedatives and some types of antidepressants. Have you fallen before? Write down the details, including when, where and how you fell. Be prepared to discuss instances when you almost fell but were caught by someone or managed to grab hold of something just in time. Details such as these may help your doctor identify specific fall-prevention strategies. Could your health conditions cause a fall? Certain eye and ear disorders may increase your risk of falls. Be prepared to discuss your health conditions and how comfortable you are when you walk - for example, do you feel any dizziness, joint pain, shortness of breath, or numbness in your feet and legs when you walk? Your doctor may evaluate your muscle strength, balance and walking style (gait) as well. 2. Keep moving Physical activity can go a long way toward fall prevention. With your doctor's OK, consider activities such as walking, water workouts or arcadio chi - a gentle exercise that involves slow and graceful dance-like movements. Such activities reduce the risk of falls by improving strength, balance, coordination and flexibility.  If you avoid physical activity because you're afraid it will make a fall more likely, tell your doctor. He or she may recommend carefully monitored exercise programs or refer you to a physical therapist. The physical therapist can create a custom exercise program aimed at improving your balance, flexibility, muscle strength and gait.  3. Wear sensible shoes Consider changing your footwear as part of your fall-prevention plan. High heels, floppy slippers and shoes with slick soles can make you slip, stumble and fall. So can walking in your stocking feet. Instead, wear properly fitting, sturdy shoes with nonskid soles. Sensible shoes may also reduce joint pain.  4. Remove home hazards Take a look around your home. Your living room, kitchen, bedroom, bathroom, hallways and stairways may be filled with hazards. To make your home safer:  Remove boxes, newspapers, electrical cords and phone cords from walkways. Move coffee tables, magazine racks and plant stands from high-traffic areas. Secure loose rugs with double-faced tape, tacks or a slip-resistant backing - or remove loose rugs from your home. Repair loose, wooden floorboards and carpeting right away. Store clothing, dishes, food and other necessities within easy reach. Immediately clean spilled liquids, grease or food. Use nonslip mats in your bathtub or shower. Use a bath seat, which allows you to sit while showering. 5. Light up your living space Keep your home brightly lit to avoid tripping on objects that are hard to see. Also:  Place night lights in your bedroom, bathroom and hallways. Place a lamp within reach of your bed for middle-of-the-night needs. Make clear paths to light switches that aren't near room entrances. Consider trading traditional switches for glow-in-the-dark or illuminated switches. Turn on the lights before going up or down stairs. Store flashlights in easy-to-find places in case of power outages. 6. Use assistive devices Your doctor might recommend using a cane or walker to keep you steady. Other assistive devices can help, too. For example:  Hand rails for both sides of stairways Nonslip treads for bare-wood steps A raised toilet seat or one with armrests Grab bars for the shower or tub A sturdy plastic seat for the shower or tub - plus a hand-held shower nozzle for bathing while sitting down If necessary, ask your doctor for a referral to an occupational therapist. He or she can help you brainstorm other fall-prevention strategies. Some solutions are easily installed and relatively inexpensive. Others may require professional help or a larger investment. If you're concerned about the cost, remember that an investment in fall prevention is an investment in your independence. Symptomatic patients : Test for influenza, if positive, treat for influenza and do not continue below.  1. Fever plus cough or shortness of breath : Test for RVP and COVID-19. 2.Indirect, circumstantial or unclear exposure to COVID-19, or other concerning cases not meeting above criteria: Please call [facility] to discuss testing.  +++ All above cases must be reported to the [facility] registry. +++  Asymptomatic patients:  1. Known first-degree direct-contact exposure to positive COVID-19 patient but asymptomatic: No testing PLUS 14 day self-quarantine. Pt to call if symptoms develop. Report to [facility] Registry. 2. No known exposure and asymptomatic, referred from outside healthcare organization: Please call [facility] to discuss testing.  3.All other asymptomatic patients with no known exposures: no testing, no exceptions.  diet and exercise weight loss.  Low-salt low-fat ADA diet/ htn- Discussed diabetes physiology - Discussed importance of monitoring blood glucose levels - Encouraged a low fat/low cholesterol diet - Discussed symptoms of hyperglycemia and hypoglycemia - Discussed ADA glucose goals - Discussed  HGB A1c and the effects of blood glucose on the level - Discussed Healthy eating, avoidance of concentrated sweets, and to include vegetables by at least 2 meals a day - Discussed regular exercise - Discussed importance of follow up physician visits Limit intake of Sodium (Salt) to less than 2 grams a day to prevent fluid retention-swelling or worsening of symptoms. The importance of keeping the blood pressure at or below 130/80 to prevent stroke, heart attacks, kidney failure, blindness, and loss of limbs was  low chol diet. Avoid fried foods, red meat, butter, eggs, hard cheeses. Use canola or olive oil preferred. ::  was established in which goals would be set, monitoring would be done, and problem solving would also be addressed. The patient would be assisted using behavior change techniques, such as self-help and counseling through behavioral modification: Problem solving using hypnosis and positive medical reinforcement to achieve agreed-upon goals. Advised to take 1500mg per day of elemental calcium and 1,000 iu of vitamin D3, as well as a minimum of 3 hours per week of weight baring exercise.Repeat Bone Density in 2 years, as protocol indicates: Medical Annual wellness visit completed: HRA completed and reviewed with patient Medical, family, surgical history reviewed with patient and updated List of current providers r/w patient and updated Vitals, BMI reviewed and discussed along with healthy BMI goals. Dietary counseling x 15 minutes provided Depression PHQ 9 completed and reviewed  Annual safety assessment reviewed discussed advanced directives smoking cessation counseling provided Established routine screening and immunization schedules  Medical Annual wellness visit completed: HRA completed and reviewed with patient Medical, family, surgical history reviewed with patient and updated List of current providers r/w patient and updated Vitals, BMI reviewed and discussed along with healthy BMI goals. Dietary counseling x 15 minutes provided Depression PHQ 9 completed and reviewed  Annual safety assessment reviewed discussed advanced directives smoking cessation counseling provided Established routine screening and immunization schedules VACCINATION & OTHER TX RECOMMENDATIONS  ASA preventative therapy Calcium/Vitamin D supplementation   Dietary counseling, nutrition referral risks vs. benefits d/w patient. routine vaccination and vaccination schedules and recommendation d/w patient  Vaccines recommended:  * pneumovax (once after 65) & prevnar * annual Influenza vaccine * Hep B vaccines * zostavax * Tdap  Colorectal screening recommended; screening colonoscopy q10yr, flex sig q5yr, annual fecal occult testing BMD recommended biennially for osteoporosis screening Glaucoma screening recommended, annual optho evals Cardiovascular screening and blood tests recommended and discussed w/ patient, cholesterol screening and dietary counseling AAA recommended x 1            [Limited decision making ability] : Limited decision making ability [Needs reinforcement, referred] : Patient needs reinforcement on understanding of lifestyle changes and steps needed to achieve self management goal; patient was referred

## 2024-04-11 NOTE — ADDENDUM
[FreeTextEntry1] : Patient is a 73-year-old female who recently sustained a cerebral hemorrhage and a DVT of the lower extremity.  Patient was discharged from hospital to nursing home and then to her personal home.  I believe that this patient is not capable of caring for herself.  Patient and healthcare proxy were were told the same.  I will make an effort to get VNS and  involved.  She is also anxious with adjustment disorder and needs to be under the care of a psychologist/psychiatrist.  Sertraline 50 mg was given.  Also I will recommend that she be under the care of a geriatrician Dr Chata Dutton located at [address] [place] [address] phone #[phone]

## 2024-04-11 NOTE — HEALTH RISK ASSESSMENT
[Fair] :  ~his/her~ mood as fair [No] : No [No falls in past year] : Patient reported no falls in the past year [3] : 2) Feeling down, depressed, or hopeless for nearly every day (3) [Nearly Every Day (3)] : 7.) Trouble concentrating on things, such as reading a newspaper or watching television? Nearly every day [1/2 of Days or More (2)] : 8.) Moving or speaking so slowly that other people could have noticed, or the opposite, moving or speaking faster than usual? Half the days or more [Not at All (0)] : 9.) Thoughts that you would be off dead or of hurting yourself in some way? Not at all [I have developed a follow-up plan documented below in the note.] : I have developed a follow-up plan documented below in the note. [de-identified] : Neuro, rehab, psychology, ENT [de-identified] : Minimal [de-identified] : Standard [[id number]] : 20 [Change in mental status noted] : Change in mental status noted [Language] : difficulty with language [Behavior] : difficulty with behavior [Learning/Retaining New Information] : difficulty learning/retaining new information [Handling Complex Tasks] : difficulty handling complex tasks [Reasoning] : difficulty with reasoning [Spatial Ability and Orientation] : difficulty with spatial ability and orientation [Behavioral] : behavioral [Alone] : lives alone [Retired] : retired [College] : College [Single] : single [Sexually Active] : not sexually active [Fully functional (bathing, dressing, toileting, transferring, walking, feeding)] : Fully functional (bathing, dressing, toileting, transferring, walking, feeding) [Fully functional (using the telephone, shopping, preparing meals, housekeeping, doing laundry, using] : Fully functional and needs no help or supervision to perform IADLs (using the telephone, shopping, preparing meals, housekeeping, doing laundry, using transportation, managing medications and managing finances) [Reports changes in hearing] : Reports no changes in hearing [Reports changes in vision] : Reports no changes in vision [Reports normal functional visual acuity (ie: able to read med bottle)] : Reports normal functional visual acuity [Reports changes in dental health] : Reports no changes in dental health [Smoke Detector] : smoke detector [Carbon Monoxide Detector] : carbon monoxide detector [Guns at Home] : no guns at home [Safety elements used in home] : safety elements used in home [Seat Belt] :  uses seat belt [Sunscreen] : uses sunscreen [Travel to Developing Areas] : does not  travel to developing areas [Caregiver Concerns] : has caregiver concerns [Reviewed no changes] : Reviewed, no changes [I will adhere to the patient's wishes.] : I will adhere to the patient's wishes. [Former] : Former

## 2024-04-11 NOTE — PHYSICAL EXAM
[No Acute Distress] : no acute distress [Well Nourished] : well nourished [Well Developed] : well developed [Well-Appearing] : well-appearing [Normal Sclera/Conjunctiva] : normal sclera/conjunctiva [PERRL] : pupils equal round and reactive to light [EOMI] : extraocular movements intact [Normal Outer Ear/Nose] : the outer ears and nose were normal in appearance [Normal Oropharynx] : the oropharynx was normal [No JVD] : no jugular venous distention [No Lymphadenopathy] : no lymphadenopathy [Supple] : supple [Thyroid Normal, No Nodules] : the thyroid was normal and there were no nodules present [No Respiratory Distress] : no respiratory distress  [No Accessory Muscle Use] : no accessory muscle use [Clear to Auscultation] : lungs were clear to auscultation bilaterally [Normal Rate] : normal rate  [Regular Rhythm] : with a regular rhythm [Normal S1, S2] : normal S1 and S2 [No Murmur] : no murmur heard [No Carotid Bruits] : no carotid bruits [No Abdominal Bruit] : a ~M bruit was not heard ~T in the abdomen [No Varicosities] : no varicosities [Pedal Pulses Present] : the pedal pulses are present [No Edema] : there was no peripheral edema [No Palpable Aorta] : no palpable aorta [No Extremity Clubbing/Cyanosis] : no extremity clubbing/cyanosis [Soft] : abdomen soft [Non Tender] : non-tender [Non-distended] : non-distended [No Masses] : no abdominal mass palpated [No HSM] : no HSM [Normal Bowel Sounds] : normal bowel sounds [Normal Posterior Cervical Nodes] : no posterior cervical lymphadenopathy [Normal Anterior Cervical Nodes] : no anterior cervical lymphadenopathy [No CVA Tenderness] : no CVA  tenderness [No Spinal Tenderness] : no spinal tenderness [No Joint Swelling] : no joint swelling [Grossly Normal Strength/Tone] : grossly normal strength/tone [No Rash] : no rash [Coordination Grossly Intact] : coordination grossly intact [No Focal Deficits] : no focal deficits [Normal Gait] : normal gait [Deep Tendon Reflexes (DTR)] : deep tendon reflexes were 2+ and symmetric [Normal Affect] : the affect was normal [Normal Insight/Judgement] : insight and judgment were intact [Ill-Appearing] : ill-appearing [Declined Breast Exam] : declined breast exam  [Declined Rectal Exam] : declined rectal exam [Normal] : no rash [Speech Grossly Normal] : speech grossly normal [Alert and Oriented x3] : oriented to person, place, and time [de-identified] : 7 pound weight loss since April 1 [de-identified] : Ears not examined-... Patient left room came back in, patient and friend forgot to mention ear problems..  When I went back in to see the patient she was gone... [de-identified] : History of DVT [de-identified] : No flank pain [de-identified] : No sensory or motor deficits, no aphasia [de-identified] : Patient is anxious and depressed, agitated at times

## 2024-04-11 NOTE — REVIEW OF SYSTEMS
[Negative] : Heme/Lymph [Recent Change In Weight] : ~T recent weight change [Anxiety] : anxiety [Depression] : depression [FreeTextEntry2] : Weight loss [FreeTextEntry4] : Patient feels she might have wax in her ear [de-identified] : Bit agitated and confused

## 2024-04-12 ENCOUNTER — NON-APPOINTMENT (OUTPATIENT)
Age: 74
End: 2024-04-12

## 2024-04-12 ENCOUNTER — APPOINTMENT (OUTPATIENT)
Dept: NEUROLOGY | Facility: CLINIC | Age: 74
End: 2024-04-12

## 2024-04-12 ENCOUNTER — APPOINTMENT (OUTPATIENT)
Dept: NEUROLOGY | Facility: CLINIC | Age: 74
End: 2024-04-12
Payer: MEDICARE

## 2024-04-12 VITALS
HEIGHT: 61 IN | HEART RATE: 76 BPM | DIASTOLIC BLOOD PRESSURE: 70 MMHG | SYSTOLIC BLOOD PRESSURE: 107 MMHG | BODY MASS INDEX: 24.17 KG/M2 | WEIGHT: 128 LBS

## 2024-04-12 PROCEDURE — 99203 OFFICE O/P NEW LOW 30 MIN: CPT

## 2024-04-12 PROCEDURE — G2211 COMPLEX E/M VISIT ADD ON: CPT

## 2024-04-20 NOTE — HISTORY OF PRESENT ILLNESS
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 Patient was in the NSCU. She was treated conservatively. Screening LE duplex showed bilateral LE DVT. She was started on prophylactic Lovenox and discharged to acute rehab at [facility]. She was eventually discharged from [facility] to [facility] at [facility] and had a fall there and was seen in [facility] ER again. She had CT Head w/o contrast which showed: "previously seen right posterior temporal parenchymal hemorrhage measuring 2.4 x 2.0 x 2.4 cm. Patient still with dull headache, but tolerable and improved with Tylenol.  Today Mrs. Carpenter present from home with her daughter and son believes that he improved overall. Denies headache, speech impairment, vision problems or seizures. Pt getting home therapy once in a week, walking well with no imbalance. Pt saw Dr. Vigil, who advised to do OT for her left peripheral vision issue. Pt also consulted vascular physicians who stopped her Lovenox as the DVT was not seen in the recent imaging. Pt report that she is anxious sometime and has been treated with Anti-anxiety medication (Zoloft). She also reports some tremors in her left hand when holding things.

## 2024-04-20 NOTE — ASSESSMENT
[FreeTextEntry1] : IMPRESSION: 73F with PMHx of macular degeneration, bilateral below knee DVT and recent admission for right temporoparietal IPH presenting for headache. Patient admitted in late December 2023 for confusion and found to have right temporoparietal IPH with mild midline shift.  Patient still with dull headache, but tolerable and improved with Tylenol. Pt doing home PT  walking well with no imbalance. Pt following up with Dr. Vigil  for loss of left peripheral vision. Otherwise neurologically intact. Pt on Briviact and Gabapentin since the trauma, need to know if she should continue taking it.  Current MRI shows decrease in size of right temporal IPH, there may be some increase in ventricular size, but pt is improving will continue to observe.  PLAN:  MRI Head w+ w/o contrast in 3 months Referred to neurology for follow up with anti-seizure medication.  Referred to stroke neurologist for stroke like symptoms.  F/U with neuro ophthalmology for vision loss in left  Will find out if pt should continue taking Tab Gabapentin  F/U after 3 months

## 2024-04-20 NOTE — REVIEW OF SYSTEMS
[Cluster Headache] : cluster headaches [Limb Pain] : limb pain [Negative] : Integumentary [FreeTextEntry3] : left peripheral vision lost

## 2024-04-20 NOTE — PHYSICAL EXAM
[General Appearance - Alert] : alert [General Appearance - In No Acute Distress] : in no acute distress [General Appearance - Well Nourished] : well nourished [General Appearance - Well-Appearing] : healthy appearing [Oriented To Time, Place, And Person] : oriented to person, place, and time [Impaired Insight] : insight and judgment were intact [Affect] : the affect was normal [Memory Recent] : recent memory was not impaired [Person] : oriented to person [Place] : oriented to place [Time] : oriented to time [Short Term Intact] : short term memory intact [Cranial Nerves Oculomotor (III)] : extraocular motion intact [Cranial Nerves Trigeminal (V)] : facial sensation intact symmetrically [Cranial Nerves Facial (VII)] : face symmetrical [Cranial Nerves Vestibulocochlear (VIII)] : hearing was intact bilaterally [Cranial Nerves Accessory (XI - Cranial And Spinal)] : head turning and shoulder shrug symmetric [Cranial Nerves Hypoglossal (XII)] : there was no tongue deviation with protrusion [Abnormal Walk] : normal gait [Sclera] : the sclera and conjunctiva were normal [Outer Ear] : the ears and nose were normal in appearance [Both Tympanic Membranes Were Examined] : both tympanic membranes were normal [Neck Appearance] : the appearance of the neck was normal [] : no respiratory distress [Respiration, Rhythm And Depth] : normal respiratory rhythm and effort [Apical Impulse] : the apical impulse was normal [Heart Rate And Rhythm] : heart rate was normal and rhythm regular [Arterial Pulses Carotid] : carotid pulses were normal with no bruits [Abdominal Aorta] : the abdominal aorta was normal [Bowel Sounds] : normal bowel sounds [Abdomen Soft] : soft [No CVA Tenderness] : no ~M costovertebral angle tenderness [No Spinal Tenderness] : no spinal tenderness [Involuntary Movements] : no involuntary movements were seen [Skin Color & Pigmentation] : normal skin color and pigmentation [FreeTextEntry5] : loss of left peripheral vision  [FreeTextEntry6] : GUSTAVO [FreeTextEntry8] : no drift, no imbalance at walking [FreeTextEntry1] : loss of left peripheral vision and mild vision loss at right eye too

## 2024-04-26 ENCOUNTER — APPOINTMENT (OUTPATIENT)
Dept: NEUROLOGY | Facility: CLINIC | Age: 74
End: 2024-04-26
Payer: MEDICARE

## 2024-04-26 PROCEDURE — 99441: CPT | Mod: 93

## 2024-04-26 PROCEDURE — G2211 COMPLEX E/M VISIT ADD ON: CPT | Mod: NC

## 2024-04-30 RX ORDER — SERTRALINE HYDROCHLORIDE 50 MG/1
50 TABLET, FILM COATED ORAL
Qty: 90 | Refills: 1 | Status: ACTIVE | COMMUNITY
Start: 2024-04-30 | End: 1900-01-01

## 2024-05-02 RX ORDER — SERTRALINE 25 MG/1
25 TABLET, FILM COATED ORAL
Qty: 30 | Refills: 3 | Status: DISCONTINUED | COMMUNITY
Start: 2024-03-18 | End: 2024-05-02

## 2024-05-06 ENCOUNTER — APPOINTMENT (OUTPATIENT)
Dept: INTERNAL MEDICINE | Facility: CLINIC | Age: 74
End: 2024-05-06
Payer: MEDICARE

## 2024-05-06 VITALS
HEIGHT: 61 IN | SYSTOLIC BLOOD PRESSURE: 118 MMHG | BODY MASS INDEX: 23.6 KG/M2 | OXYGEN SATURATION: 99 % | WEIGHT: 125 LBS | TEMPERATURE: 98.5 F | DIASTOLIC BLOOD PRESSURE: 78 MMHG | RESPIRATION RATE: 17 BRPM | HEART RATE: 81 BPM

## 2024-05-06 DIAGNOSIS — E74.39 OTHER DISORDERS OF INTESTINAL CARBOHYDRATE ABSORPTION: ICD-10-CM

## 2024-05-06 DIAGNOSIS — M81.0 AGE-RELATED OSTEOPOROSIS W/OUT CURRENT PATHOLOGICAL FRACTURE: ICD-10-CM

## 2024-05-06 DIAGNOSIS — E87.6 HYPOKALEMIA: ICD-10-CM

## 2024-05-06 DIAGNOSIS — I61.9 NONTRAUMATIC INTRACEREBRAL HEMORRHAGE, UNSPECIFIED: ICD-10-CM

## 2024-05-06 DIAGNOSIS — Z86.39 PERSONAL HISTORY OF OTHER ENDOCRINE, NUTRITIONAL AND METABOLIC DISEASE: ICD-10-CM

## 2024-05-06 DIAGNOSIS — K21.9 GASTRO-ESOPHAGEAL REFLUX DISEASE W/OUT ESOPHAGITIS: ICD-10-CM

## 2024-05-06 DIAGNOSIS — F43.22 ADJUSTMENT DISORDER WITH ANXIETY: ICD-10-CM

## 2024-05-06 DIAGNOSIS — M54.2 CERVICALGIA: ICD-10-CM

## 2024-05-06 DIAGNOSIS — R10.13 EPIGASTRIC PAIN: ICD-10-CM

## 2024-05-06 PROCEDURE — 99215 OFFICE O/P EST HI 40 MIN: CPT

## 2024-05-06 PROCEDURE — G2211 COMPLEX E/M VISIT ADD ON: CPT

## 2024-05-06 RX ORDER — POTASSIUM CHLORIDE 750 MG/1
10 TABLET, FILM COATED, EXTENDED RELEASE ORAL
Refills: 0 | Status: DISCONTINUED | COMMUNITY
Start: 2024-03-06 | End: 2024-05-06

## 2024-05-06 RX ORDER — GABAPENTIN 100 MG/1
100 CAPSULE ORAL DAILY
Qty: 30 | Refills: 1 | Status: ACTIVE | COMMUNITY
Start: 2024-03-04 | End: 1900-01-01

## 2024-05-06 RX ORDER — PANTOPRAZOLE 20 MG/1
20 TABLET, DELAYED RELEASE ORAL
Qty: 90 | Refills: 3 | Status: ACTIVE | COMMUNITY
Start: 2024-05-06 | End: 1900-01-01

## 2024-05-06 NOTE — ASSESSMENT
[FreeTextEntry1] : Patient is a 73-year-old female who suffered a cerebral hemorrhage now has anxiety depression and cognitive decline.  Comes to the office with her aide complaining of dyspepsia.  Will see GI is on a PPI might need EGD.  Was Joan was recently on blood thinners for DVT under the care of vascular.  Patient requesting to be taken off of gabapentin she is being weaned off slowly she will go down to 100 daily.  Dietary suggestions made for GERD and dyspepsia.

## 2024-05-06 NOTE — REVIEW OF SYSTEMS
[Negative] : Heme/Lymph [Abdominal Pain] : abdominal pain [Nausea] : nausea [Heartburn] : heartburn [Anxiety] : anxiety [FreeTextEntry7] : Dyspepsia [de-identified] : Cognitive decline

## 2024-05-06 NOTE — REVIEW OF SYSTEMS
[Negative] : Heme/Lymph [Abdominal Pain] : abdominal pain [Nausea] : nausea [Heartburn] : heartburn [Anxiety] : anxiety [FreeTextEntry7] : Dyspepsia [de-identified] : Cognitive decline

## 2024-05-06 NOTE — REVIEW OF SYSTEMS
[Negative] : Heme/Lymph [Abdominal Pain] : abdominal pain [Nausea] : nausea [Heartburn] : heartburn [Anxiety] : anxiety [FreeTextEntry7] : Dyspepsia [de-identified] : Cognitive decline

## 2024-05-07 LAB — UREA BREATH TEST QL: NEGATIVE

## 2024-05-08 NOTE — HEALTH RISK ASSESSMENT
[Fair] :  ~his/her~ mood as fair [No] : No [No falls in past year] : Patient reported no falls in the past year [3] : 2) Feeling down, depressed, or hopeless for nearly every day (3) [Nearly Every Day (3)] : 7.) Trouble concentrating on things, such as reading a newspaper or watching television? Nearly every day [1/2 of Days or More (2)] : 8.) Moving or speaking so slowly that other people could have noticed, or the opposite, moving or speaking faster than usual? Half the days or more [Not at All (0)] : 9.) Thoughts that you would be off dead or of hurting yourself in some way? Not at all [I have developed a follow-up plan documented below in the note.] : I have developed a follow-up plan documented below in the note. [Change in mental status noted] : Change in mental status noted [Language] : difficulty with language [Behavior] : difficulty with behavior [Learning/Retaining New Information] : difficulty learning/retaining new information [Handling Complex Tasks] : difficulty handling complex tasks [Reasoning] : difficulty with reasoning [Spatial Ability and Orientation] : difficulty with spatial ability and orientation [Behavioral] : behavioral [Alone] : lives alone [Retired] : retired [College] : College [Single] : single [Fully functional (bathing, dressing, toileting, transferring, walking, feeding)] : Fully functional (bathing, dressing, toileting, transferring, walking, feeding) [Fully functional (using the telephone, shopping, preparing meals, housekeeping, doing laundry, using] : Fully functional and needs no help or supervision to perform IADLs (using the telephone, shopping, preparing meals, housekeeping, doing laundry, using transportation, managing medications and managing finances) [Reports normal functional visual acuity (ie: able to read med bottle)] : Reports normal functional visual acuity [Smoke Detector] : smoke detector [Carbon Monoxide Detector] : carbon monoxide detector [Safety elements used in home] : safety elements used in home [Seat Belt] :  uses seat belt [Sunscreen] : uses sunscreen [Caregiver Concerns] : has caregiver concerns [Reviewed no changes] : Reviewed, no changes [I will adhere to the patient's wishes.] : I will adhere to the patient's wishes. [Former] : Former [PHQ-9 Positive] : PHQ-9 Positive [TB Exposure] : is not being exposed to tuberculosis [de-identified] : Neuro, rehab, psychology, ENT [de-identified] : Minimal [de-identified] : Standard [[id number]] : 20 [Sexually Active] : not sexually active [Reports changes in hearing] : Reports no changes in hearing [Reports changes in vision] : Reports no changes in vision [Reports changes in dental health] : Reports no changes in dental health [Guns at Home] : no guns at home [Travel to Developing Areas] : does not  travel to developing areas

## 2024-05-08 NOTE — PHYSICAL EXAM
[No Acute Distress] : no acute distress [Well Nourished] : well nourished [Well Developed] : well developed [Well-Appearing] : well-appearing [Ill-Appearing] : ill-appearing [Normal Sclera/Conjunctiva] : normal sclera/conjunctiva [PERRL] : pupils equal round and reactive to light [EOMI] : extraocular movements intact [Normal Outer Ear/Nose] : the outer ears and nose were normal in appearance [Normal Oropharynx] : the oropharynx was normal [No JVD] : no jugular venous distention [No Lymphadenopathy] : no lymphadenopathy [Supple] : supple [Thyroid Normal, No Nodules] : the thyroid was normal and there were no nodules present [No Respiratory Distress] : no respiratory distress  [No Accessory Muscle Use] : no accessory muscle use [Clear to Auscultation] : lungs were clear to auscultation bilaterally [Normal Rate] : normal rate  [Regular Rhythm] : with a regular rhythm [Normal S1, S2] : normal S1 and S2 [No Murmur] : no murmur heard [No Carotid Bruits] : no carotid bruits [No Abdominal Bruit] : a ~M bruit was not heard ~T in the abdomen [No Varicosities] : no varicosities [Pedal Pulses Present] : the pedal pulses are present [No Edema] : there was no peripheral edema [No Palpable Aorta] : no palpable aorta [No Extremity Clubbing/Cyanosis] : no extremity clubbing/cyanosis [Declined Breast Exam] : declined breast exam  [Soft] : abdomen soft [Non Tender] : non-tender [Non-distended] : non-distended [No Masses] : no abdominal mass palpated [No HSM] : no HSM [Normal Bowel Sounds] : normal bowel sounds [Declined Rectal Exam] : declined rectal exam [Normal Posterior Cervical Nodes] : no posterior cervical lymphadenopathy [Normal Anterior Cervical Nodes] : no anterior cervical lymphadenopathy [No CVA Tenderness] : no CVA  tenderness [No Spinal Tenderness] : no spinal tenderness [No Joint Swelling] : no joint swelling [Grossly Normal Strength/Tone] : grossly normal strength/tone [No Rash] : no rash [Coordination Grossly Intact] : coordination grossly intact [No Focal Deficits] : no focal deficits [Normal Gait] : normal gait [Deep Tendon Reflexes (DTR)] : deep tendon reflexes were 2+ and symmetric [Speech Grossly Normal] : speech grossly normal [Normal Affect] : the affect was normal [Alert and Oriented x3] : oriented to person, place, and time [Normal Insight/Judgement] : insight and judgment were intact [Normal] : normal sclera/conjunctiva, pupils are equal, round and reactive to light and extraocular movements are intact [de-identified] : .  Abdomen soft nontender.  Bowel sounds hyperactive [de-identified] : 7 pound weight loss since April 1 [de-identified] : Ears not examined-... Patient left room came back in, patient and friend forgot to mention ear problems..  When I went back in to see the patient she was gone... [de-identified] : History of DVT [de-identified] : No flank pain [de-identified] : No sensory or motor deficits, no aphasia [de-identified] : Patient is anxious and depressed, agitated at times

## 2024-05-08 NOTE — HEALTH RISK ASSESSMENT
[Fair] :  ~his/her~ mood as fair [No] : No [No falls in past year] : Patient reported no falls in the past year [3] : 2) Feeling down, depressed, or hopeless for nearly every day (3) [Nearly Every Day (3)] : 7.) Trouble concentrating on things, such as reading a newspaper or watching television? Nearly every day [1/2 of Days or More (2)] : 8.) Moving or speaking so slowly that other people could have noticed, or the opposite, moving or speaking faster than usual? Half the days or more [Not at All (0)] : 9.) Thoughts that you would be off dead or of hurting yourself in some way? Not at all [I have developed a follow-up plan documented below in the note.] : I have developed a follow-up plan documented below in the note. [Change in mental status noted] : Change in mental status noted [Language] : difficulty with language [Behavior] : difficulty with behavior [Learning/Retaining New Information] : difficulty learning/retaining new information [Handling Complex Tasks] : difficulty handling complex tasks [Reasoning] : difficulty with reasoning [Spatial Ability and Orientation] : difficulty with spatial ability and orientation [Behavioral] : behavioral [Alone] : lives alone [Retired] : retired [College] : College [Single] : single [Fully functional (bathing, dressing, toileting, transferring, walking, feeding)] : Fully functional (bathing, dressing, toileting, transferring, walking, feeding) [Fully functional (using the telephone, shopping, preparing meals, housekeeping, doing laundry, using] : Fully functional and needs no help or supervision to perform IADLs (using the telephone, shopping, preparing meals, housekeeping, doing laundry, using transportation, managing medications and managing finances) [Reports normal functional visual acuity (ie: able to read med bottle)] : Reports normal functional visual acuity [Smoke Detector] : smoke detector [Carbon Monoxide Detector] : carbon monoxide detector [Safety elements used in home] : safety elements used in home [Seat Belt] :  uses seat belt [Sunscreen] : uses sunscreen [Caregiver Concerns] : has caregiver concerns [Reviewed no changes] : Reviewed, no changes [I will adhere to the patient's wishes.] : I will adhere to the patient's wishes. [Former] : Former [PHQ-9 Positive] : PHQ-9 Positive [TB Exposure] : is not being exposed to tuberculosis [de-identified] : Neuro, rehab, psychology, ENT [de-identified] : Minimal [de-identified] : Standard [[id number]] : 20 [Sexually Active] : not sexually active [Reports changes in hearing] : Reports no changes in hearing [Reports changes in vision] : Reports no changes in vision [Reports changes in dental health] : Reports no changes in dental health [Guns at Home] : no guns at home [Travel to Developing Areas] : does not  travel to developing areas

## 2024-05-08 NOTE — PHYSICAL EXAM
[No Acute Distress] : no acute distress [Well Nourished] : well nourished [Well Developed] : well developed [Well-Appearing] : well-appearing [Ill-Appearing] : ill-appearing [Normal Sclera/Conjunctiva] : normal sclera/conjunctiva [PERRL] : pupils equal round and reactive to light [EOMI] : extraocular movements intact [Normal Outer Ear/Nose] : the outer ears and nose were normal in appearance [Normal Oropharynx] : the oropharynx was normal [No JVD] : no jugular venous distention [No Lymphadenopathy] : no lymphadenopathy [Supple] : supple [Thyroid Normal, No Nodules] : the thyroid was normal and there were no nodules present [No Respiratory Distress] : no respiratory distress  [No Accessory Muscle Use] : no accessory muscle use [Clear to Auscultation] : lungs were clear to auscultation bilaterally [Normal Rate] : normal rate  [Regular Rhythm] : with a regular rhythm [Normal S1, S2] : normal S1 and S2 [No Murmur] : no murmur heard [No Carotid Bruits] : no carotid bruits [No Abdominal Bruit] : a ~M bruit was not heard ~T in the abdomen [No Varicosities] : no varicosities [Pedal Pulses Present] : the pedal pulses are present [No Edema] : there was no peripheral edema [No Palpable Aorta] : no palpable aorta [No Extremity Clubbing/Cyanosis] : no extremity clubbing/cyanosis [Declined Breast Exam] : declined breast exam  [Soft] : abdomen soft [Non Tender] : non-tender [Non-distended] : non-distended [No Masses] : no abdominal mass palpated [No HSM] : no HSM [Normal Bowel Sounds] : normal bowel sounds [Declined Rectal Exam] : declined rectal exam [Normal Posterior Cervical Nodes] : no posterior cervical lymphadenopathy [Normal Anterior Cervical Nodes] : no anterior cervical lymphadenopathy [No CVA Tenderness] : no CVA  tenderness [No Spinal Tenderness] : no spinal tenderness [No Joint Swelling] : no joint swelling [Grossly Normal Strength/Tone] : grossly normal strength/tone [No Rash] : no rash [Coordination Grossly Intact] : coordination grossly intact [No Focal Deficits] : no focal deficits [Normal Gait] : normal gait [Deep Tendon Reflexes (DTR)] : deep tendon reflexes were 2+ and symmetric [Speech Grossly Normal] : speech grossly normal [Normal Affect] : the affect was normal [Alert and Oriented x3] : oriented to person, place, and time [Normal Insight/Judgement] : insight and judgment were intact [Normal] : normal sclera/conjunctiva, pupils are equal, round and reactive to light and extraocular movements are intact [de-identified] : .  Abdomen soft nontender.  Bowel sounds hyperactive [de-identified] : 7 pound weight loss since April 1 [de-identified] : Ears not examined-... Patient left room came back in, patient and friend forgot to mention ear problems..  When I went back in to see the patient she was gone... [de-identified] : History of DVT [de-identified] : No flank pain [de-identified] : No sensory or motor deficits, no aphasia [de-identified] : Patient is anxious and depressed, agitated at times

## 2024-05-08 NOTE — PHYSICAL EXAM
[No Acute Distress] : no acute distress [Well Nourished] : well nourished [Well Developed] : well developed [Well-Appearing] : well-appearing [Ill-Appearing] : ill-appearing [Normal Sclera/Conjunctiva] : normal sclera/conjunctiva [PERRL] : pupils equal round and reactive to light [EOMI] : extraocular movements intact [Normal Outer Ear/Nose] : the outer ears and nose were normal in appearance [Normal Oropharynx] : the oropharynx was normal [No JVD] : no jugular venous distention [No Lymphadenopathy] : no lymphadenopathy [Supple] : supple [Thyroid Normal, No Nodules] : the thyroid was normal and there were no nodules present [No Respiratory Distress] : no respiratory distress  [No Accessory Muscle Use] : no accessory muscle use [Clear to Auscultation] : lungs were clear to auscultation bilaterally [Normal Rate] : normal rate  [Regular Rhythm] : with a regular rhythm [Normal S1, S2] : normal S1 and S2 [No Murmur] : no murmur heard [No Carotid Bruits] : no carotid bruits [No Abdominal Bruit] : a ~M bruit was not heard ~T in the abdomen [No Varicosities] : no varicosities [Pedal Pulses Present] : the pedal pulses are present [No Edema] : there was no peripheral edema [No Palpable Aorta] : no palpable aorta [No Extremity Clubbing/Cyanosis] : no extremity clubbing/cyanosis [Declined Breast Exam] : declined breast exam  [Soft] : abdomen soft [Non Tender] : non-tender [Non-distended] : non-distended [No Masses] : no abdominal mass palpated [No HSM] : no HSM [Normal Bowel Sounds] : normal bowel sounds [Declined Rectal Exam] : declined rectal exam [Normal Posterior Cervical Nodes] : no posterior cervical lymphadenopathy [Normal Anterior Cervical Nodes] : no anterior cervical lymphadenopathy [No CVA Tenderness] : no CVA  tenderness [No Spinal Tenderness] : no spinal tenderness [No Joint Swelling] : no joint swelling [Grossly Normal Strength/Tone] : grossly normal strength/tone [No Rash] : no rash [Coordination Grossly Intact] : coordination grossly intact [No Focal Deficits] : no focal deficits [Normal Gait] : normal gait [Deep Tendon Reflexes (DTR)] : deep tendon reflexes were 2+ and symmetric [Speech Grossly Normal] : speech grossly normal [Normal Affect] : the affect was normal [Alert and Oriented x3] : oriented to person, place, and time [Normal Insight/Judgement] : insight and judgment were intact [Normal] : normal sclera/conjunctiva, pupils are equal, round and reactive to light and extraocular movements are intact [de-identified] : .  Abdomen soft nontender.  Bowel sounds hyperactive [de-identified] : 7 pound weight loss since April 1 [de-identified] : Ears not examined-... Patient left room came back in, patient and friend forgot to mention ear problems..  When I went back in to see the patient she was gone... [de-identified] : History of DVT [de-identified] : No flank pain [de-identified] : No sensory or motor deficits, no aphasia [de-identified] : Patient is anxious and depressed, agitated at times

## 2024-05-08 NOTE — ADDENDUM
[FreeTextEntry1] : Patient is a 73-year-old female who recently sustained a cerebral hemorrhage and a DVT of the lower extremity.  Patient was discharged from hospital to nursing home and then to her personal home.  I believe that this patient is not capable of caring for herself.  Patient and healthcare proxy were were told the same.  I will make an effort to get VNS and  involved.  She is also anxious with adjustment disorder and needs to be under the care of a psychologist/psychiatrist.  Sertraline 50 mg was given.  Also I will recommend that she be under the care of a geriatrician Dr Chata Dutton located at [address] [place] [address] phone #[phone]

## 2024-05-08 NOTE — HEALTH RISK ASSESSMENT
[Fair] :  ~his/her~ mood as fair [No] : No [No falls in past year] : Patient reported no falls in the past year [3] : 2) Feeling down, depressed, or hopeless for nearly every day (3) [Nearly Every Day (3)] : 7.) Trouble concentrating on things, such as reading a newspaper or watching television? Nearly every day [1/2 of Days or More (2)] : 8.) Moving or speaking so slowly that other people could have noticed, or the opposite, moving or speaking faster than usual? Half the days or more [Not at All (0)] : 9.) Thoughts that you would be off dead or of hurting yourself in some way? Not at all [I have developed a follow-up plan documented below in the note.] : I have developed a follow-up plan documented below in the note. [Change in mental status noted] : Change in mental status noted [Language] : difficulty with language [Behavior] : difficulty with behavior [Learning/Retaining New Information] : difficulty learning/retaining new information [Handling Complex Tasks] : difficulty handling complex tasks [Reasoning] : difficulty with reasoning [Spatial Ability and Orientation] : difficulty with spatial ability and orientation [Behavioral] : behavioral [Alone] : lives alone [Retired] : retired [College] : College [Single] : single [Fully functional (bathing, dressing, toileting, transferring, walking, feeding)] : Fully functional (bathing, dressing, toileting, transferring, walking, feeding) [Fully functional (using the telephone, shopping, preparing meals, housekeeping, doing laundry, using] : Fully functional and needs no help or supervision to perform IADLs (using the telephone, shopping, preparing meals, housekeeping, doing laundry, using transportation, managing medications and managing finances) [Reports normal functional visual acuity (ie: able to read med bottle)] : Reports normal functional visual acuity [Smoke Detector] : smoke detector [Carbon Monoxide Detector] : carbon monoxide detector [Safety elements used in home] : safety elements used in home [Seat Belt] :  uses seat belt [Sunscreen] : uses sunscreen [Caregiver Concerns] : has caregiver concerns [Reviewed no changes] : Reviewed, no changes [I will adhere to the patient's wishes.] : I will adhere to the patient's wishes. [Former] : Former [PHQ-9 Positive] : PHQ-9 Positive [TB Exposure] : is not being exposed to tuberculosis [de-identified] : Neuro, rehab, psychology, ENT [de-identified] : Minimal [de-identified] : Standard [[id number]] : 20 [Sexually Active] : not sexually active [Reports changes in hearing] : Reports no changes in hearing [Reports changes in vision] : Reports no changes in vision [Reports changes in dental health] : Reports no changes in dental health [Guns at Home] : no guns at home [Travel to Developing Areas] : does not  travel to developing areas

## 2024-05-08 NOTE — HISTORY OF PRESENT ILLNESS
[FreeTextEntry1] : follow up cc: has had excessive burping and bubbly stomach, started on April 22nd  [de-identified] : 73-year-old female presents herself today for a F/U,[facility]. cc: feels like its a lot of air in her stomach, slight pain on upper abdomen.   Hx of hyperlipidemia, glucose intolerance and osteoporosis.  She is fully covid/Flu vaccinated for this year. Has mild COPD and smoked 40 year ago.   Voices no further complaints. ROS as documented below.  Denies fever, cough, chills, body aches and SOB.   DEBRA Mccrary, am scribing for and in the presence of Dr. Meredith, the following sections of:  HISTORY OF PRESENT ILLNESS, PAST MEDICAL/FAMILY/SOCIAL HISTORY, ROS, VITALS, PE, DISPOSITION

## 2024-05-08 NOTE — HISTORY OF PRESENT ILLNESS
[FreeTextEntry1] : follow up cc: has had excessive burping and bubbly stomach, started on April 22nd  [de-identified] : 73-year-old female presents herself today for a F/U,[facility]. cc: feels like its a lot of air in her stomach, slight pain on upper abdomen.   Hx of hyperlipidemia, glucose intolerance and osteoporosis.  She is fully covid/Flu vaccinated for this year. Has mild COPD and smoked 40 year ago.   Voices no further complaints. ROS as documented below.  Denies fever, cough, chills, body aches and SOB.   DEBRA Mccrary, am scribing for and in the presence of Dr. Meredith, the following sections of:  HISTORY OF PRESENT ILLNESS, PAST MEDICAL/FAMILY/SOCIAL HISTORY, ROS, VITALS, PE, DISPOSITION

## 2024-05-08 NOTE — COUNSELING
[Fall prevention counseling provided] : Fall prevention counseling provided [Adequate lighting] : Adequate lighting [No throw rugs] : No throw rugs [Use proper foot wear] : Use proper foot wear [Use recommended devices] : Use recommended devices [Behavioral health counseling provided] : Behavioral health counseling provided [Sleep ___ hours/day] : Sleep [unfilled] hours/day [Engage in a relaxing activity] : Engage in a relaxing activity [Limited decision making ability] : Limited decision making ability [Needs reinforcement, referred] : Patient needs reinforcement on understanding of lifestyle changes and steps needed to achieve self management goal; patient was referred [de-identified] : Total face-to-face time with patient - 15 minutes; >50% involved counselling, review of labs/tests, and/or coordination of medical care: diet and exercise weight loss.  Low-salt low-fat ADA diet/ htn- Discussed diabetes physiology - Discussed importance of monitoring blood glucose levels - Encouraged a low fat/low cholesterol diet - Discussed symptoms of hyperglycemia and hypoglycemia - Discussed ADA glucose goals - Discussed  HGB A1c and the effects of blood glucose on the level - Discussed Healthy eating, avoidance of concentrated sweets, and to include vegetables by at least 2 meals a day - Discussed regular exercise - Discussed importance of follow up physician visits Limit intake of Sodium (Salt) to less than 2 grams a day to prevent fluid retention-swelling or worsening of symptoms. The importance of keeping the blood pressure at or below 130/80 to prevent stroke, heart attacks, kidney failure, blindness, and loss of limbs was  low chol diet. Avoid fried foods, red meat, butter, eggs, hard cheeses. Use canola or olive oil preferred. ::  was established in which goals would be set, monitoring would be done, and problem solving would also be addressed. The patient would be assisted using behavior change techniques, such as self-help and counseling through behavioral modification: Problem solving using hypnosis and positive medical reinforcement to achieve agreed-upon goals..covid 1. Make an appointment with your doctor Begin your fall-prevention plan by making an appointment with your doctor. Be prepared to answer questions such as:  What medications are you taking? Make a list of your prescription and over-the-counter medications and supplements, or bring them with you to the appointment. Your doctor can review your medications for side effects and interactions that may increase your risk of falling. To help with fall prevention, your doctor may consider weaning you off medications that make you tired or affect your thinking, such as sedatives and some types of antidepressants. Have you fallen before? Write down the details, including when, where and how you fell. Be prepared to discuss instances when you almost fell but were caught by someone or managed to grab hold of something just in time. Details such as these may help your doctor identify specific fall-prevention strategies. Could your health conditions cause a fall? Certain eye and ear disorders may increase your risk of falls. Be prepared to discuss your health conditions and how comfortable you are when you walk - for example, do you feel any dizziness, joint pain, shortness of breath, or numbness in your feet and legs when you walk? Your doctor may evaluate your muscle strength, balance and walking style (gait) as well. 2. Keep moving Physical activity can go a long way toward fall prevention. With your doctor's OK, consider activities such as walking, water workouts or arcadio chi - a gentle exercise that involves slow and graceful dance-like movements. Such activities reduce the risk of falls by improving strength, balance, coordination and flexibility.  If you avoid physical activity because you're afraid it will make a fall more likely, tell your doctor. He or she may recommend carefully monitored exercise programs or refer you to a physical therapist. The physical therapist can create a custom exercise program aimed at improving your balance, flexibility, muscle strength and gait.  3. Wear sensible shoes Consider changing your footwear as part of your fall-prevention plan. High heels, floppy slippers and shoes with slick soles can make you slip, stumble and fall. So can walking in your stocking feet. Instead, wear properly fitting, sturdy shoes with nonskid soles. Sensible shoes may also reduce joint pain.  4. Remove home hazards Take a look around your home. Your living room, kitchen, bedroom, bathroom, hallways and stairways may be filled with hazards. To make your home safer:  Remove boxes, newspapers, electrical cords and phone cords from walkways. Move coffee tables, magazine racks and plant stands from high-traffic areas. Secure loose rugs with double-faced tape, tacks or a slip-resistant backing - or remove loose rugs from your home. Repair loose, wooden floorboards and carpeting right away. Store clothing, dishes, food and other necessities within easy reach. Immediately clean spilled liquids, grease or food. Use nonslip mats in your bathtub or shower. Use a bath seat, which allows you to sit while showering. 5. Light up your living space Keep your home brightly lit to avoid tripping on objects that are hard to see. Also:  Place night lights in your bedroom, bathroom and hallways. Place a lamp within reach of your bed for middle-of-the-night needs. Make clear paths to light switches that aren't near room entrances. Consider trading traditional switches for glow-in-the-dark or illuminated switches. Turn on the lights before going up or down stairs. Store flashlights in easy-to-find places in case of power outages. 6. Use assistive devices Your doctor might recommend using a cane or walker to keep you steady. Other assistive devices can help, too. For example:  Hand rails for both sides of stairways Nonslip treads for bare-wood steps A raised toilet seat or one with armrests Grab bars for the shower or tub A sturdy plastic seat for the shower or tub - plus a hand-held shower nozzle for bathing while sitting down If necessary, ask your doctor for a referral to an occupational therapist. He or she can help you brainstorm other fall-prevention strategies. Some solutions are easily installed and relatively inexpensive. Others may require professional help or a larger investment. If you're concerned about the cost, remember that an investment in fall prevention is an investment in your independence. Symptomatic patients : Test for influenza, if positive, treat for influenza and do not continue below.  1. Fever plus cough or shortness of breath : Test for RVP and COVID-19. 2.Indirect, circumstantial or unclear exposure to COVID-19, or other concerning cases not meeting above criteria: Please call [facility] to discuss testing.  +++ All above cases must be reported to the [facility] registry. +++  Asymptomatic patients:  1. Known first-degree direct-contact exposure to positive COVID-19 patient but asymptomatic: No testing PLUS 14 day self-quarantine. Pt to call if symptoms develop. Report to [facility] Registry. 2. No known exposure and asymptomatic, referred from outside healthcare organization: Please call [facility] to discuss testing.  3.All other asymptomatic patients with no known exposures: no testing, no exceptions.  diet and exercise weight loss.  Low-salt low-fat ADA diet/ htn- Discussed diabetes physiology - Discussed importance of monitoring blood glucose levels - Encouraged a low fat/low cholesterol diet - Discussed symptoms of hyperglycemia and hypoglycemia - Discussed ADA glucose goals - Discussed  HGB A1c and the effects of blood glucose on the level - Discussed Healthy eating, avoidance of concentrated sweets, and to include vegetables by at least 2 meals a day - Discussed regular exercise - Discussed importance of follow up physician visits Limit intake of Sodium (Salt) to less than 2 grams a day to prevent fluid retention-swelling or worsening of symptoms. The importance of keeping the blood pressure at or below 130/80 to prevent stroke, heart attacks, kidney failure, blindness, and loss of limbs was  low chol diet. Avoid fried foods, red meat, butter, eggs, hard cheeses. Use canola or olive oil preferred. ::  was established in which goals would be set, monitoring would be done, and problem solving would also be addressed. The patient would be assisted using behavior change techniques, such as self-help and counseling through behavioral modification: Problem solving using hypnosis and positive medical reinforcement to achieve agreed-upon goals. Advised to take 1500mg per day of elemental calcium and 1,000 iu of vitamin D3, as well as a minimum of 3 hours per week of weight baring exercise.Repeat Bone Density in 2 years, as protocol indicates: Medical Annual wellness visit completed: HRA completed and reviewed with patient Medical, family, surgical history reviewed with patient and updated List of current providers r/w patient and updated Vitals, BMI reviewed and discussed along with healthy BMI goals. Dietary counseling x 15 minutes provided Depression PHQ 9 completed and reviewed  Annual safety assessment reviewed discussed advanced directives smoking cessation counseling provided Established routine screening and immunization schedules  Medical Annual wellness visit completed: HRA completed and reviewed with patient Medical, family, surgical history reviewed with patient and updated List of current providers r/w patient and updated Vitals, BMI reviewed and discussed along with healthy BMI goals. Dietary counseling x 15 minutes provided Depression PHQ 9 completed and reviewed  Annual safety assessment reviewed discussed advanced directives smoking cessation counseling provided Established routine screening and immunization schedules VACCINATION & OTHER TX RECOMMENDATIONS  ASA preventative therapy Calcium/Vitamin D supplementation   Dietary counseling, nutrition referral risks vs. benefits d/w patient. routine vaccination and vaccination schedules and recommendation d/w patient  Vaccines recommended:  * pneumovax (once after 65) & prevnar * annual Influenza vaccine * Hep B vaccines * zostavax * Tdap  Colorectal screening recommended; screening colonoscopy q10yr, flex sig q5yr, annual fecal occult testing BMD recommended biennially for osteoporosis screening Glaucoma screening recommended, annual optho evals Cardiovascular screening and blood tests recommended and discussed w/ patient, cholesterol screening and dietary counseling AAA recommended x 1

## 2024-05-08 NOTE — ADDENDUM
[FreeTextEntry1] : Patient is a 73-year-old female who recently sustained a cerebral hemorrhage and a DVT of the lower extremity.  Patient was discharged from hospital to nursing home and then to her personal home.  I believe that this patient is not capable of caring for herself.  Patient and healthcare proxy were were told the same.  I will make an effort to get VNS and  involved.  She is also anxious with adjustment disorder and needs to be under the care of a psychologist/psychiatrist.  Sertraline 50 mg was given.  Also I will recommend that she be under the care of a geriatrician Dr Chata Dutton located at [address] [place] [address] phone #[phone]

## 2024-05-08 NOTE — COUNSELING
[Fall prevention counseling provided] : Fall prevention counseling provided [Adequate lighting] : Adequate lighting [No throw rugs] : No throw rugs [Use proper foot wear] : Use proper foot wear [Use recommended devices] : Use recommended devices [Behavioral health counseling provided] : Behavioral health counseling provided [Sleep ___ hours/day] : Sleep [unfilled] hours/day [Engage in a relaxing activity] : Engage in a relaxing activity [Limited decision making ability] : Limited decision making ability [Needs reinforcement, referred] : Patient needs reinforcement on understanding of lifestyle changes and steps needed to achieve self management goal; patient was referred [de-identified] : Total face-to-face time with patient - 15 minutes; >50% involved counselling, review of labs/tests, and/or coordination of medical care: diet and exercise weight loss.  Low-salt low-fat ADA diet/ htn- Discussed diabetes physiology - Discussed importance of monitoring blood glucose levels - Encouraged a low fat/low cholesterol diet - Discussed symptoms of hyperglycemia and hypoglycemia - Discussed ADA glucose goals - Discussed  HGB A1c and the effects of blood glucose on the level - Discussed Healthy eating, avoidance of concentrated sweets, and to include vegetables by at least 2 meals a day - Discussed regular exercise - Discussed importance of follow up physician visits Limit intake of Sodium (Salt) to less than 2 grams a day to prevent fluid retention-swelling or worsening of symptoms. The importance of keeping the blood pressure at or below 130/80 to prevent stroke, heart attacks, kidney failure, blindness, and loss of limbs was  low chol diet. Avoid fried foods, red meat, butter, eggs, hard cheeses. Use canola or olive oil preferred. ::  was established in which goals would be set, monitoring would be done, and problem solving would also be addressed. The patient would be assisted using behavior change techniques, such as self-help and counseling through behavioral modification: Problem solving using hypnosis and positive medical reinforcement to achieve agreed-upon goals..covid 1. Make an appointment with your doctor Begin your fall-prevention plan by making an appointment with your doctor. Be prepared to answer questions such as:  What medications are you taking? Make a list of your prescription and over-the-counter medications and supplements, or bring them with you to the appointment. Your doctor can review your medications for side effects and interactions that may increase your risk of falling. To help with fall prevention, your doctor may consider weaning you off medications that make you tired or affect your thinking, such as sedatives and some types of antidepressants. Have you fallen before? Write down the details, including when, where and how you fell. Be prepared to discuss instances when you almost fell but were caught by someone or managed to grab hold of something just in time. Details such as these may help your doctor identify specific fall-prevention strategies. Could your health conditions cause a fall? Certain eye and ear disorders may increase your risk of falls. Be prepared to discuss your health conditions and how comfortable you are when you walk - for example, do you feel any dizziness, joint pain, shortness of breath, or numbness in your feet and legs when you walk? Your doctor may evaluate your muscle strength, balance and walking style (gait) as well. 2. Keep moving Physical activity can go a long way toward fall prevention. With your doctor's OK, consider activities such as walking, water workouts or arcadio chi - a gentle exercise that involves slow and graceful dance-like movements. Such activities reduce the risk of falls by improving strength, balance, coordination and flexibility.  If you avoid physical activity because you're afraid it will make a fall more likely, tell your doctor. He or she may recommend carefully monitored exercise programs or refer you to a physical therapist. The physical therapist can create a custom exercise program aimed at improving your balance, flexibility, muscle strength and gait.  3. Wear sensible shoes Consider changing your footwear as part of your fall-prevention plan. High heels, floppy slippers and shoes with slick soles can make you slip, stumble and fall. So can walking in your stocking feet. Instead, wear properly fitting, sturdy shoes with nonskid soles. Sensible shoes may also reduce joint pain.  4. Remove home hazards Take a look around your home. Your living room, kitchen, bedroom, bathroom, hallways and stairways may be filled with hazards. To make your home safer:  Remove boxes, newspapers, electrical cords and phone cords from walkways. Move coffee tables, magazine racks and plant stands from high-traffic areas. Secure loose rugs with double-faced tape, tacks or a slip-resistant backing - or remove loose rugs from your home. Repair loose, wooden floorboards and carpeting right away. Store clothing, dishes, food and other necessities within easy reach. Immediately clean spilled liquids, grease or food. Use nonslip mats in your bathtub or shower. Use a bath seat, which allows you to sit while showering. 5. Light up your living space Keep your home brightly lit to avoid tripping on objects that are hard to see. Also:  Place night lights in your bedroom, bathroom and hallways. Place a lamp within reach of your bed for middle-of-the-night needs. Make clear paths to light switches that aren't near room entrances. Consider trading traditional switches for glow-in-the-dark or illuminated switches. Turn on the lights before going up or down stairs. Store flashlights in easy-to-find places in case of power outages. 6. Use assistive devices Your doctor might recommend using a cane or walker to keep you steady. Other assistive devices can help, too. For example:  Hand rails for both sides of stairways Nonslip treads for bare-wood steps A raised toilet seat or one with armrests Grab bars for the shower or tub A sturdy plastic seat for the shower or tub - plus a hand-held shower nozzle for bathing while sitting down If necessary, ask your doctor for a referral to an occupational therapist. He or she can help you brainstorm other fall-prevention strategies. Some solutions are easily installed and relatively inexpensive. Others may require professional help or a larger investment. If you're concerned about the cost, remember that an investment in fall prevention is an investment in your independence. Symptomatic patients : Test for influenza, if positive, treat for influenza and do not continue below.  1. Fever plus cough or shortness of breath : Test for RVP and COVID-19. 2.Indirect, circumstantial or unclear exposure to COVID-19, or other concerning cases not meeting above criteria: Please call [facility] to discuss testing.  +++ All above cases must be reported to the [facility] registry. +++  Asymptomatic patients:  1. Known first-degree direct-contact exposure to positive COVID-19 patient but asymptomatic: No testing PLUS 14 day self-quarantine. Pt to call if symptoms develop. Report to [facility] Registry. 2. No known exposure and asymptomatic, referred from outside healthcare organization: Please call [facility] to discuss testing.  3.All other asymptomatic patients with no known exposures: no testing, no exceptions.  diet and exercise weight loss.  Low-salt low-fat ADA diet/ htn- Discussed diabetes physiology - Discussed importance of monitoring blood glucose levels - Encouraged a low fat/low cholesterol diet - Discussed symptoms of hyperglycemia and hypoglycemia - Discussed ADA glucose goals - Discussed  HGB A1c and the effects of blood glucose on the level - Discussed Healthy eating, avoidance of concentrated sweets, and to include vegetables by at least 2 meals a day - Discussed regular exercise - Discussed importance of follow up physician visits Limit intake of Sodium (Salt) to less than 2 grams a day to prevent fluid retention-swelling or worsening of symptoms. The importance of keeping the blood pressure at or below 130/80 to prevent stroke, heart attacks, kidney failure, blindness, and loss of limbs was  low chol diet. Avoid fried foods, red meat, butter, eggs, hard cheeses. Use canola or olive oil preferred. ::  was established in which goals would be set, monitoring would be done, and problem solving would also be addressed. The patient would be assisted using behavior change techniques, such as self-help and counseling through behavioral modification: Problem solving using hypnosis and positive medical reinforcement to achieve agreed-upon goals. Advised to take 1500mg per day of elemental calcium and 1,000 iu of vitamin D3, as well as a minimum of 3 hours per week of weight baring exercise.Repeat Bone Density in 2 years, as protocol indicates: Medical Annual wellness visit completed: HRA completed and reviewed with patient Medical, family, surgical history reviewed with patient and updated List of current providers r/w patient and updated Vitals, BMI reviewed and discussed along with healthy BMI goals. Dietary counseling x 15 minutes provided Depression PHQ 9 completed and reviewed  Annual safety assessment reviewed discussed advanced directives smoking cessation counseling provided Established routine screening and immunization schedules  Medical Annual wellness visit completed: HRA completed and reviewed with patient Medical, family, surgical history reviewed with patient and updated List of current providers r/w patient and updated Vitals, BMI reviewed and discussed along with healthy BMI goals. Dietary counseling x 15 minutes provided Depression PHQ 9 completed and reviewed  Annual safety assessment reviewed discussed advanced directives smoking cessation counseling provided Established routine screening and immunization schedules VACCINATION & OTHER TX RECOMMENDATIONS  ASA preventative therapy Calcium/Vitamin D supplementation   Dietary counseling, nutrition referral risks vs. benefits d/w patient. routine vaccination and vaccination schedules and recommendation d/w patient  Vaccines recommended:  * pneumovax (once after 65) & prevnar * annual Influenza vaccine * Hep B vaccines * zostavax * Tdap  Colorectal screening recommended; screening colonoscopy q10yr, flex sig q5yr, annual fecal occult testing BMD recommended biennially for osteoporosis screening Glaucoma screening recommended, annual optho evals Cardiovascular screening and blood tests recommended and discussed w/ patient, cholesterol screening and dietary counseling AAA recommended x 1

## 2024-05-08 NOTE — COUNSELING
[Fall prevention counseling provided] : Fall prevention counseling provided [Adequate lighting] : Adequate lighting [No throw rugs] : No throw rugs [Use proper foot wear] : Use proper foot wear [Use recommended devices] : Use recommended devices [Behavioral health counseling provided] : Behavioral health counseling provided [Sleep ___ hours/day] : Sleep [unfilled] hours/day [Engage in a relaxing activity] : Engage in a relaxing activity [Limited decision making ability] : Limited decision making ability [Needs reinforcement, referred] : Patient needs reinforcement on understanding of lifestyle changes and steps needed to achieve self management goal; patient was referred [de-identified] : Total face-to-face time with patient - 15 minutes; >50% involved counselling, review of labs/tests, and/or coordination of medical care: diet and exercise weight loss.  Low-salt low-fat ADA diet/ htn- Discussed diabetes physiology - Discussed importance of monitoring blood glucose levels - Encouraged a low fat/low cholesterol diet - Discussed symptoms of hyperglycemia and hypoglycemia - Discussed ADA glucose goals - Discussed  HGB A1c and the effects of blood glucose on the level - Discussed Healthy eating, avoidance of concentrated sweets, and to include vegetables by at least 2 meals a day - Discussed regular exercise - Discussed importance of follow up physician visits Limit intake of Sodium (Salt) to less than 2 grams a day to prevent fluid retention-swelling or worsening of symptoms. The importance of keeping the blood pressure at or below 130/80 to prevent stroke, heart attacks, kidney failure, blindness, and loss of limbs was  low chol diet. Avoid fried foods, red meat, butter, eggs, hard cheeses. Use canola or olive oil preferred. ::  was established in which goals would be set, monitoring would be done, and problem solving would also be addressed. The patient would be assisted using behavior change techniques, such as self-help and counseling through behavioral modification: Problem solving using hypnosis and positive medical reinforcement to achieve agreed-upon goals..covid 1. Make an appointment with your doctor Begin your fall-prevention plan by making an appointment with your doctor. Be prepared to answer questions such as:  What medications are you taking? Make a list of your prescription and over-the-counter medications and supplements, or bring them with you to the appointment. Your doctor can review your medications for side effects and interactions that may increase your risk of falling. To help with fall prevention, your doctor may consider weaning you off medications that make you tired or affect your thinking, such as sedatives and some types of antidepressants. Have you fallen before? Write down the details, including when, where and how you fell. Be prepared to discuss instances when you almost fell but were caught by someone or managed to grab hold of something just in time. Details such as these may help your doctor identify specific fall-prevention strategies. Could your health conditions cause a fall? Certain eye and ear disorders may increase your risk of falls. Be prepared to discuss your health conditions and how comfortable you are when you walk - for example, do you feel any dizziness, joint pain, shortness of breath, or numbness in your feet and legs when you walk? Your doctor may evaluate your muscle strength, balance and walking style (gait) as well. 2. Keep moving Physical activity can go a long way toward fall prevention. With your doctor's OK, consider activities such as walking, water workouts or arcadio chi - a gentle exercise that involves slow and graceful dance-like movements. Such activities reduce the risk of falls by improving strength, balance, coordination and flexibility.  If you avoid physical activity because you're afraid it will make a fall more likely, tell your doctor. He or she may recommend carefully monitored exercise programs or refer you to a physical therapist. The physical therapist can create a custom exercise program aimed at improving your balance, flexibility, muscle strength and gait.  3. Wear sensible shoes Consider changing your footwear as part of your fall-prevention plan. High heels, floppy slippers and shoes with slick soles can make you slip, stumble and fall. So can walking in your stocking feet. Instead, wear properly fitting, sturdy shoes with nonskid soles. Sensible shoes may also reduce joint pain.  4. Remove home hazards Take a look around your home. Your living room, kitchen, bedroom, bathroom, hallways and stairways may be filled with hazards. To make your home safer:  Remove boxes, newspapers, electrical cords and phone cords from walkways. Move coffee tables, magazine racks and plant stands from high-traffic areas. Secure loose rugs with double-faced tape, tacks or a slip-resistant backing - or remove loose rugs from your home. Repair loose, wooden floorboards and carpeting right away. Store clothing, dishes, food and other necessities within easy reach. Immediately clean spilled liquids, grease or food. Use nonslip mats in your bathtub or shower. Use a bath seat, which allows you to sit while showering. 5. Light up your living space Keep your home brightly lit to avoid tripping on objects that are hard to see. Also:  Place night lights in your bedroom, bathroom and hallways. Place a lamp within reach of your bed for middle-of-the-night needs. Make clear paths to light switches that aren't near room entrances. Consider trading traditional switches for glow-in-the-dark or illuminated switches. Turn on the lights before going up or down stairs. Store flashlights in easy-to-find places in case of power outages. 6. Use assistive devices Your doctor might recommend using a cane or walker to keep you steady. Other assistive devices can help, too. For example:  Hand rails for both sides of stairways Nonslip treads for bare-wood steps A raised toilet seat or one with armrests Grab bars for the shower or tub A sturdy plastic seat for the shower or tub - plus a hand-held shower nozzle for bathing while sitting down If necessary, ask your doctor for a referral to an occupational therapist. He or she can help you brainstorm other fall-prevention strategies. Some solutions are easily installed and relatively inexpensive. Others may require professional help or a larger investment. If you're concerned about the cost, remember that an investment in fall prevention is an investment in your independence. Symptomatic patients : Test for influenza, if positive, treat for influenza and do not continue below.  1. Fever plus cough or shortness of breath : Test for RVP and COVID-19. 2.Indirect, circumstantial or unclear exposure to COVID-19, or other concerning cases not meeting above criteria: Please call [facility] to discuss testing.  +++ All above cases must be reported to the [facility] registry. +++  Asymptomatic patients:  1. Known first-degree direct-contact exposure to positive COVID-19 patient but asymptomatic: No testing PLUS 14 day self-quarantine. Pt to call if symptoms develop. Report to [facility] Registry. 2. No known exposure and asymptomatic, referred from outside healthcare organization: Please call [facility] to discuss testing.  3.All other asymptomatic patients with no known exposures: no testing, no exceptions.  diet and exercise weight loss.  Low-salt low-fat ADA diet/ htn- Discussed diabetes physiology - Discussed importance of monitoring blood glucose levels - Encouraged a low fat/low cholesterol diet - Discussed symptoms of hyperglycemia and hypoglycemia - Discussed ADA glucose goals - Discussed  HGB A1c and the effects of blood glucose on the level - Discussed Healthy eating, avoidance of concentrated sweets, and to include vegetables by at least 2 meals a day - Discussed regular exercise - Discussed importance of follow up physician visits Limit intake of Sodium (Salt) to less than 2 grams a day to prevent fluid retention-swelling or worsening of symptoms. The importance of keeping the blood pressure at or below 130/80 to prevent stroke, heart attacks, kidney failure, blindness, and loss of limbs was  low chol diet. Avoid fried foods, red meat, butter, eggs, hard cheeses. Use canola or olive oil preferred. ::  was established in which goals would be set, monitoring would be done, and problem solving would also be addressed. The patient would be assisted using behavior change techniques, such as self-help and counseling through behavioral modification: Problem solving using hypnosis and positive medical reinforcement to achieve agreed-upon goals. Advised to take 1500mg per day of elemental calcium and 1,000 iu of vitamin D3, as well as a minimum of 3 hours per week of weight baring exercise.Repeat Bone Density in 2 years, as protocol indicates: Medical Annual wellness visit completed: HRA completed and reviewed with patient Medical, family, surgical history reviewed with patient and updated List of current providers r/w patient and updated Vitals, BMI reviewed and discussed along with healthy BMI goals. Dietary counseling x 15 minutes provided Depression PHQ 9 completed and reviewed  Annual safety assessment reviewed discussed advanced directives smoking cessation counseling provided Established routine screening and immunization schedules  Medical Annual wellness visit completed: HRA completed and reviewed with patient Medical, family, surgical history reviewed with patient and updated List of current providers r/w patient and updated Vitals, BMI reviewed and discussed along with healthy BMI goals. Dietary counseling x 15 minutes provided Depression PHQ 9 completed and reviewed  Annual safety assessment reviewed discussed advanced directives smoking cessation counseling provided Established routine screening and immunization schedules VACCINATION & OTHER TX RECOMMENDATIONS  ASA preventative therapy Calcium/Vitamin D supplementation   Dietary counseling, nutrition referral risks vs. benefits d/w patient. routine vaccination and vaccination schedules and recommendation d/w patient  Vaccines recommended:  * pneumovax (once after 65) & prevnar * annual Influenza vaccine * Hep B vaccines * zostavax * Tdap  Colorectal screening recommended; screening colonoscopy q10yr, flex sig q5yr, annual fecal occult testing BMD recommended biennially for osteoporosis screening Glaucoma screening recommended, annual optho evals Cardiovascular screening and blood tests recommended and discussed w/ patient, cholesterol screening and dietary counseling AAA recommended x 1

## 2024-05-08 NOTE — ADDENDUM
[FreeTextEntry1] : Patient is a 73-year-old female who recently sustained a cerebral hemorrhage and a DVT of the lower extremity.  Patient was discharged from hospital to nursing home and then to her personal home.  I believe that this patient is not capable of caring for herself.  Patient and healthcare proxy were were told the same.  I will make an effort to get VNS and  involved.  She is also anxious with adjustment disorder and needs to be under the care of a psychologist/psychiatrist.  Sertraline 50 mg was given.  Also I will recommend that she be under the care of a geriatrician Dr Chata Dutton located at [address] [place] [address] phone #[phone]

## 2024-05-08 NOTE — HISTORY OF PRESENT ILLNESS
[FreeTextEntry1] : follow up cc: has had excessive burping and bubbly stomach, started on April 22nd  [de-identified] : 73-year-old female presents herself today for a F/U,[facility]. cc: feels like its a lot of air in her stomach, slight pain on upper abdomen.   Hx of hyperlipidemia, glucose intolerance and osteoporosis.  She is fully covid/Flu vaccinated for this year. Has mild COPD and smoked 40 year ago.   Voices no further complaints. ROS as documented below.  Denies fever, cough, chills, body aches and SOB.   DEBRA Mccrary, am scribing for and in the presence of Dr. Meredith, the following sections of:  HISTORY OF PRESENT ILLNESS, PAST MEDICAL/FAMILY/SOCIAL HISTORY, ROS, VITALS, PE, DISPOSITION

## 2024-05-14 ENCOUNTER — APPOINTMENT (OUTPATIENT)
Dept: NEUROLOGY | Facility: CLINIC | Age: 74
End: 2024-05-14

## 2024-05-14 ENCOUNTER — NON-APPOINTMENT (OUTPATIENT)
Age: 74
End: 2024-05-14

## 2024-05-15 NOTE — ASSESSMENT
[FreeTextEntry1] : 73-year-old female chief complaint of left-sided facial droop starting approximately 50 minutes ago from arrival to the emergency department.   EEG 12/2023 risk for seizures from  right fronto central region   Plan: continue Brivaiact 75 mg BID Sertraline 25 mg anita reviewed seizure triggers/safety Proscribed driving as per [facility] law Follow up in July with Dr Doshi   call if any seizures

## 2024-05-15 NOTE — HISTORY OF PRESENT ILLNESS
[FreeTextEntry1] : ***4/12/2024*** Ms Rose Mary Carpenter is here today for an initial office visit to evaluate possible seizures. She is accompanied by Natalie Sweeney (Healthcare Proxy) [phone] She was admitted to hospital as below:  Chief Complaint: The patient is a 73y Female complaining of weakness. - HPI Objective Statement: 73-year-old female chief complaint of left-sided facial droop starting approximately 50 minutes ago from arrival to the emergency department.  Patient is on anticoagulation secondary due to a CVA in December of last year.  Otherwise history of macular degeneration bilateral below the knee DVTs and had a right temporpossibleal parietal IPH patient complaining of pressure-like headache known known trauma or falls.   MRI brain 3/2024right temp lobe hematomais smaller in size with stableto improved adjacent vasogenicedema. 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

## 2024-05-16 ENCOUNTER — APPOINTMENT (OUTPATIENT)
Dept: NEUROLOGY | Facility: CLINIC | Age: 74
End: 2024-05-16

## 2024-05-24 ENCOUNTER — APPOINTMENT (OUTPATIENT)
Dept: INTERNAL MEDICINE | Facility: CLINIC | Age: 74
End: 2024-05-24

## 2024-05-24 NOTE — ED ADULT TRIAGE NOTE - BANDS:
Clinic Care Coordination Contact  This worker briefly spoke with Darryl regarding recent appointment with Dr. Cain. [place] was not completely able to recall information and resources Dr. Cain had suggested. This worker disclosed areas of support they are able to provide. Darryl affirms understanding, and denies any questions. This worker inquired if he would like this worker to coordinate resources and services with his mom, and Darryl endorses this idea. Darryl was unsure when his mom would be available next, so this worker suggested she call back when able and leave a few times in the following week she will be able to speak to this worker. Darryl confirmed that he has this worker's direct office line, and affirmed they will follow up in the next two weeks if they do not receive a voicemail from Darryl's mother.       CALVIN Cortes  Social Work Care Coordinator  [facility]  [phone]  [email or web address]  Pronouns: They/He     Fall Risk;

## 2024-06-07 DIAGNOSIS — K59.09 OTHER CONSTIPATION: ICD-10-CM

## 2024-06-07 RX ORDER — BRIVARACETAM 75 MG/1
75 TABLET, FILM COATED ORAL
Qty: 60 | Refills: 5 | Status: ACTIVE | OUTPATIENT
Start: 2024-06-07

## 2024-06-07 RX ORDER — LINACLOTIDE 72 UG/1
72 CAPSULE, GELATIN COATED ORAL
Qty: 30 | Refills: 0 | Status: ACTIVE | COMMUNITY
Start: 2024-06-07 | End: 1900-01-01

## 2024-06-11 ENCOUNTER — NON-APPOINTMENT (OUTPATIENT)
Age: 74
End: 2024-06-11

## 2024-06-18 ENCOUNTER — OUTPATIENT (OUTPATIENT)
Dept: OUTPATIENT SERVICES | Facility: HOSPITAL | Age: 74
LOS: 1 days | Discharge: HOME | End: 2024-06-18

## 2024-06-18 DIAGNOSIS — I82.409 ACUTE EMBOLISM AND THROMBOSIS OF UNSPECIFIED DEEP VEINS OF UNSPECIFIED LOWER EXTREMITY: ICD-10-CM

## 2024-06-18 RX ORDER — BRIVARACETAM 75 MG/1
75 TABLET, FILM COATED ORAL
Qty: 60 | Refills: 5 | Status: ACTIVE | COMMUNITY
Start: 2024-04-01

## 2024-06-20 ENCOUNTER — RESULT REVIEW (OUTPATIENT)
Age: 74
End: 2024-06-20

## 2024-06-20 ENCOUNTER — APPOINTMENT (OUTPATIENT)
Dept: HEMATOLOGY ONCOLOGY | Facility: CLINIC | Age: 74
End: 2024-06-20

## 2024-06-20 ENCOUNTER — OUTPATIENT (OUTPATIENT)
Dept: OUTPATIENT SERVICES | Facility: HOSPITAL | Age: 74
LOS: 1 days | End: 2024-06-20
Payer: MEDICARE

## 2024-06-20 ENCOUNTER — APPOINTMENT (OUTPATIENT)
Dept: HEMATOLOGY ONCOLOGY | Facility: CLINIC | Age: 74
End: 2024-06-20
Payer: MEDICARE

## 2024-06-20 ENCOUNTER — APPOINTMENT (OUTPATIENT)
Dept: MRI IMAGING | Facility: IMAGING CENTER | Age: 74
End: 2024-06-20
Payer: MEDICARE

## 2024-06-20 VITALS
DIASTOLIC BLOOD PRESSURE: 75 MMHG | BODY MASS INDEX: 24.37 KG/M2 | WEIGHT: 128.97 LBS | OXYGEN SATURATION: 98 % | SYSTOLIC BLOOD PRESSURE: 116 MMHG | RESPIRATION RATE: 18 BRPM | HEART RATE: 78 BPM | TEMPERATURE: 96.9 F

## 2024-06-20 DIAGNOSIS — I82.409 ACUTE EMBOLISM AND THROMBOSIS OF UNSPECIFIED DEEP VEINS OF UNSPECIFIED LOWER EXTREMITY: ICD-10-CM

## 2024-06-20 DIAGNOSIS — I61.9 NONTRAUMATIC INTRACEREBRAL HEMORRHAGE, UNSPECIFIED: ICD-10-CM

## 2024-06-20 LAB
ALBUMIN SERPL ELPH-MCNC: 4.7 G/DL
ALP BLD-CCNC: 81 U/L
ALT SERPL-CCNC: 23 U/L
ANION GAP SERPL CALC-SCNC: 13 MMOL/L
AST SERPL-CCNC: 22 U/L
BASOPHILS # BLD AUTO: 0.01 K/UL — SIGNIFICANT CHANGE UP (ref 0–0.2)
BASOPHILS NFR BLD AUTO: 0.2 % — SIGNIFICANT CHANGE UP (ref 0–2)
BILIRUB SERPL-MCNC: 0.3 MG/DL
BUN SERPL-MCNC: 14 MG/DL
CALCIUM SERPL-MCNC: 10.2 MG/DL
CHLORIDE SERPL-SCNC: 104 MMOL/L
CO2 SERPL-SCNC: 26 MMOL/L
CREAT SERPL-MCNC: 0.67 MG/DL
EGFR: 92 ML/MIN/1.73M2
EOSINOPHIL # BLD AUTO: 0.13 K/UL — SIGNIFICANT CHANGE UP (ref 0–0.5)
EOSINOPHIL NFR BLD AUTO: 2.2 % — SIGNIFICANT CHANGE UP (ref 0–6)
GLUCOSE SERPL-MCNC: 96 MG/DL
HCT VFR BLD CALC: 42.7 % — SIGNIFICANT CHANGE UP (ref 34.5–45)
HGB BLD-MCNC: 14.8 G/DL — SIGNIFICANT CHANGE UP (ref 11.5–15.5)
IMM GRANULOCYTES NFR BLD AUTO: 0.3 % — SIGNIFICANT CHANGE UP (ref 0–0.9)
LYMPHOCYTES # BLD AUTO: 1.41 K/UL — SIGNIFICANT CHANGE UP (ref 1–3.3)
LYMPHOCYTES # BLD AUTO: 23.5 % — SIGNIFICANT CHANGE UP (ref 13–44)
MCHC RBC-ENTMCNC: 31.8 PG — SIGNIFICANT CHANGE UP (ref 27–34)
MCHC RBC-ENTMCNC: 34.7 G/DL — SIGNIFICANT CHANGE UP (ref 32–36)
MCV RBC AUTO: 91.8 FL — SIGNIFICANT CHANGE UP (ref 80–100)
MONOCYTES # BLD AUTO: 0.44 K/UL — SIGNIFICANT CHANGE UP (ref 0–0.9)
MONOCYTES NFR BLD AUTO: 7.3 % — SIGNIFICANT CHANGE UP (ref 2–14)
NEUTROPHILS # BLD AUTO: 4 K/UL — SIGNIFICANT CHANGE UP (ref 1.8–7.4)
NEUTROPHILS NFR BLD AUTO: 66.5 % — SIGNIFICANT CHANGE UP (ref 43–77)
NRBC # BLD: 0 /100 WBCS — SIGNIFICANT CHANGE UP (ref 0–0)
PLATELET # BLD AUTO: 211 K/UL — SIGNIFICANT CHANGE UP (ref 150–400)
POTASSIUM SERPL-SCNC: 4.8 MMOL/L
PROT SERPL-MCNC: 6.8 G/DL
RBC # BLD: 4.65 M/UL — SIGNIFICANT CHANGE UP (ref 3.8–5.2)
RBC # FLD: 12.4 % — SIGNIFICANT CHANGE UP (ref 10.3–14.5)
SODIUM SERPL-SCNC: 144 MMOL/L
WBC # BLD: 6.01 K/UL — SIGNIFICANT CHANGE UP (ref 3.8–10.5)
WBC # FLD AUTO: 6.01 K/UL — SIGNIFICANT CHANGE UP (ref 3.8–10.5)

## 2024-06-20 PROCEDURE — 70553 MRI BRAIN STEM W/O & W/DYE: CPT

## 2024-06-20 PROCEDURE — 99214 OFFICE O/P EST MOD 30 MIN: CPT

## 2024-06-20 PROCEDURE — 70553 MRI BRAIN STEM W/O & W/DYE: CPT | Mod: 26,MH

## 2024-06-20 PROCEDURE — A9585: CPT

## 2024-06-20 NOTE — REASON FOR VISIT
[Initial Consultation] : an initial consultation for [Coagulopathy] : coagulopathy [Follow-Up Visit] : a follow-up visit for [Friend] : friend [FreeTextEntry2] : coagulopathy

## 2024-06-20 NOTE — PHYSICAL EXAM
[Ambulatory and capable of all self care but unable to carry out any work activities] : Status 2- Ambulatory and capable of all self care but unable to carry out any work activities. Up and about more than 50% of waking hours [Restricted in physically strenuous activity but ambulatory and able to carry out work of a light or sedentary nature] : Status 1- Restricted in physically strenuous activity but ambulatory and able to carry out work of a light or sedentary nature, e.g., light house work, office work [Normal] : affect appropriate [de-identified] : left vision field deficiency

## 2024-06-20 NOTE — REVIEW OF SYSTEMS
[Vision Problems] : vision problems [Negative] : Heme/Lymph [Fever] : no fever [Chills] : no chills [Fatigue] : no fatigue [Recent Change In Weight] : ~T no recent weight change [Eye Pain] : no eye pain [Dry Eyes] : no dryness of the eyes [FreeTextEntry3] : left vision field deficiency

## 2024-06-20 NOTE — HISTORY OF PRESENT ILLNESS
[de-identified] : MOISÉS MAYO is a 73 year woman with PMH of anxiety, neuropathy, HLD, macular degeneration, recent hospitalization (12/2023) for hemorrhagic CVA, who presents for Hematology evaluation of bilateral distal DVTs.  She first presented to [facility] on 12/14/23 for a stroke code, and she was found to have a large right temporal parietal parenchymal hemorrhage (6.5 cm). She initially had normal lower extremity Dopplers on 12/15/23, but she subsequently developed lower extremity swelling and was found to have bilateral distal lower extremity DVTs (left soleal, peroneal, and gastrocnemius veins; right soleal vein) on 12/20/23. She was started on lovenox prophylaxis given the IPH. Repeat Dopplers on 12/27/23 showed persistent but improving clot burden.  She was discharged to rehab, but she was hospitalized again on 1/20/24 for Neurosurgery evaluation given persistent intraparenchymal hemorrhage (2.4 x 2 x 2.4 cm) causing mass effect, which was not seen on MRI. Repeat Dopplers on 1/21/24 showed no LLE DVT and a persistent right soleal vein DVT, stable on repeat Dopplers on 2/29/24.  She presents today to establish outpatient Hematology care. She is now home from rehab. She continues to take lovenox prophylaxis. No prior personal or family history of veous or arterial thromboembolism.  Family History: - No history of hematologic disorders, cancer, or clotting disorders  Social History: - Lives by herself.   [de-identified] : 6/20/24  She reports left vision field deficiency but right vision field is normal. Denies headache, dizziness, N/V/Diarrhea, leg pain/swelling/erythema, upper extremity weakness, numbness on extremities and face, changes with urination and bowel movements. She is able to dance at home, walk blocks without using a cane or walker.

## 2024-06-20 NOTE — ASSESSMENT
[FreeTextEntry1] : DVT (deep venous thrombosis) (453.40) (I82.409) MOISÉS MAYO is a 73 year woman with PMH of anxiety, neuropathy, HLD, macular degeneration, recent hospitalization (12/2023) for hemorrhagic CVA, who presents for Hematology evaluation of bilateral distal DVTs.  #DVT: She first presented to [facility] on 12/14/23 for a stroke code, and she was found to have a large right temporal parietal parenchymal hemorrhage (6.5 cm). She initially had normal lower extremity Dopplers on 12/15/23, but she subsequently developed lower extremity swelling and was found to have bilateral distal lower extremity DVTs (left soleal, peroneal, and gastrocnemius veins; right soleal vein) on 12/20/23. She was started on lovenox prophylaxis given the IPH. Repeat Dopplers on 2/29/24 showed no LLE DVT and a persistent right soleal vein DVT. Suspect her DVTs were provoked in the setting of immobility.  - She completed 3 months of anticoagulation for a provoked DVT. She has been off AC since March 2024. Monitor off anticoagulation. -She has been recovered from stroke without new symptoms and signs of clot formation or bleeding.   --Will send D-dimer; will call patient about the result -No need to follow with us for now if no significant abnormality on D-dimer test; she will continue to follow up with her PCP and neurology and MRI scheduled to evaluate status of IPH.  Discussed with Dr. Hannah Marcial PGY6 Hem & Onc fellow

## 2024-06-20 NOTE — HISTORY OF PRESENT ILLNESS
[de-identified] : MOISÉS MAYO is a 73 year woman with PMH of anxiety, neuropathy, HLD, macular degeneration, recent hospitalization (12/2023) for hemorrhagic CVA, who presents for Hematology evaluation of bilateral distal DVTs.  She first presented to [facility] on 12/14/23 for a stroke code, and she was found to have a large right temporal parietal parenchymal hemorrhage (6.5 cm). She initially had normal lower extremity Dopplers on 12/15/23, but she subsequently developed lower extremity swelling and was found to have bilateral distal lower extremity DVTs (left soleal, peroneal, and gastrocnemius veins; right soleal vein) on 12/20/23. She was started on lovenox prophylaxis given the IPH. Repeat Dopplers on 12/27/23 showed persistent but improving clot burden.  She was discharged to rehab, but she was hospitalized again on 1/20/24 for Neurosurgery evaluation given persistent intraparenchymal hemorrhage (2.4 x 2 x 2.4 cm) causing mass effect, which was not seen on MRI. Repeat Dopplers on 1/21/24 showed no LLE DVT and a persistent right soleal vein DVT, stable on repeat Dopplers on 2/29/24.  She presents today to establish outpatient Hematology care. She is now home from rehab. She continues to take lovenox prophylaxis. No prior personal or family history of veous or arterial thromboembolism.  Family History: - No history of hematologic disorders, cancer, or clotting disorders  Social History: - Lives by herself.   [de-identified] : 6/20/24  She reports left vision field deficiency but right vision field is normal. Denies headache, dizziness, N/V/Diarrhea, leg pain/swelling/erythema, upper extremity weakness, numbness on extremities and face, changes with urination and bowel movements. She is able to dance at home, walk blocks without using a cane or walker.

## 2024-06-20 NOTE — ASSESSMENT
[FreeTextEntry1] : DVT (deep venous thrombosis) (453.40) (I82.409) MOISÉS MAYO is a 73 year woman with PMH of anxiety, neuropathy, HLD, macular degeneration, recent hospitalization (12/2023) for hemorrhagic CVA, who presents for Hematology evaluation of bilateral distal DVTs.  #DVT: She first presented to [facility] on 12/14/23 for a stroke code, and she was found to have a large right temporal parietal parenchymal hemorrhage (6.5 cm). She initially had normal lower extremity Dopplers on 12/15/23, but she subsequently developed lower extremity swelling and was found to have bilateral distal lower extremity DVTs (left soleal, peroneal, and gastrocnemius veins; right soleal vein) on 12/20/23. She was started on lovenox prophylaxis given the IPH. Repeat Dopplers on 2/29/24 showed no LLE DVT and a persistent right soleal vein DVT. Suspect her DVTs were provoked in the setting of immobility.  - She completed 3 months of anticoagulation for a provoked DVT. She has been off AC since March 2024. Monitor off anticoagulation. -She has been recovered from stroke without new symptoms and signs of clot formation or bleeding.   --Will send D-dimer; will call patient about the result -No need to follow with us for now if no significant abnormality on D-dimer test; she will continue to follow up with her PCP and neurology and MRI scheduled to evaluate status of IPH.  Discussed with Dr. Hannah Marcial PGY6 Hem & Onc fellow

## 2024-06-20 NOTE — PHYSICAL EXAM
[Ambulatory and capable of all self care but unable to carry out any work activities] : Status 2- Ambulatory and capable of all self care but unable to carry out any work activities. Up and about more than 50% of waking hours [Restricted in physically strenuous activity but ambulatory and able to carry out work of a light or sedentary nature] : Status 1- Restricted in physically strenuous activity but ambulatory and able to carry out work of a light or sedentary nature, e.g., light house work, office work [Normal] : affect appropriate [de-identified] : left vision field deficiency

## 2024-06-21 LAB — DEPRECATED D DIMER PPP IA-ACNC: <150 NG/ML DDU

## 2024-06-21 RX ORDER — ATORVASTATIN CALCIUM 10 MG/1
10 TABLET, FILM COATED ORAL
Qty: 90 | Refills: 0 | Status: ACTIVE | COMMUNITY
Start: 1900-01-01 | End: 1900-01-01

## 2024-07-05 ENCOUNTER — APPOINTMENT (OUTPATIENT)
Dept: NEUROSURGERY | Facility: CLINIC | Age: 74
End: 2024-07-05

## 2024-07-05 VITALS
DIASTOLIC BLOOD PRESSURE: 68 MMHG | OXYGEN SATURATION: 97 % | BODY MASS INDEX: 24.17 KG/M2 | HEART RATE: 72 BPM | WEIGHT: 128 LBS | SYSTOLIC BLOOD PRESSURE: 112 MMHG | HEIGHT: 61 IN

## 2024-07-05 PROCEDURE — 99214 OFFICE O/P EST MOD 30 MIN: CPT

## 2024-07-10 ENCOUNTER — APPOINTMENT (OUTPATIENT)
Dept: INTERNAL MEDICINE | Facility: CLINIC | Age: 74
End: 2024-07-10
Payer: MEDICARE

## 2024-07-10 VITALS
BODY MASS INDEX: 24.35 KG/M2 | SYSTOLIC BLOOD PRESSURE: 118 MMHG | RESPIRATION RATE: 17 BRPM | TEMPERATURE: 97.1 F | HEART RATE: 68 BPM | HEIGHT: 61 IN | DIASTOLIC BLOOD PRESSURE: 78 MMHG | WEIGHT: 129 LBS | OXYGEN SATURATION: 98 %

## 2024-07-10 DIAGNOSIS — Z00.00 ENCOUNTER FOR GENERAL ADULT MEDICAL EXAMINATION W/OUT ABNORMAL FINDINGS: ICD-10-CM

## 2024-07-10 DIAGNOSIS — K21.9 GASTRO-ESOPHAGEAL REFLUX DISEASE W/OUT ESOPHAGITIS: ICD-10-CM

## 2024-07-10 DIAGNOSIS — Z02.9 ENCOUNTER FOR ADMINISTRATIVE EXAMINATIONS, UNSPECIFIED: ICD-10-CM

## 2024-07-10 DIAGNOSIS — Z86.39 PERSONAL HISTORY OF OTHER ENDOCRINE, NUTRITIONAL AND METABOLIC DISEASE: ICD-10-CM

## 2024-07-10 DIAGNOSIS — F41.9 ANXIETY DISORDER, UNSPECIFIED: ICD-10-CM

## 2024-07-10 DIAGNOSIS — E74.39 OTHER DISORDERS OF INTESTINAL CARBOHYDRATE ABSORPTION: ICD-10-CM

## 2024-07-10 DIAGNOSIS — F43.22 ADJUSTMENT DISORDER WITH ANXIETY: ICD-10-CM

## 2024-07-10 DIAGNOSIS — R56.9 UNSPECIFIED CONVULSIONS: ICD-10-CM

## 2024-07-10 DIAGNOSIS — I61.9 NONTRAUMATIC INTRACEREBRAL HEMORRHAGE, UNSPECIFIED: ICD-10-CM

## 2024-07-10 PROCEDURE — G2211 COMPLEX E/M VISIT ADD ON: CPT

## 2024-07-10 PROCEDURE — 99215 OFFICE O/P EST HI 40 MIN: CPT

## 2024-07-11 PROBLEM — Z00.00 PREVENTATIVE HEALTH CARE: Status: ACTIVE | Noted: 2024-07-11

## 2024-07-11 PROBLEM — Z02.9 ADMINISTRATIVE ENCOUNTER: Status: ACTIVE | Noted: 2024-07-11

## 2024-07-12 ENCOUNTER — APPOINTMENT (OUTPATIENT)
Dept: NEUROLOGY | Facility: CLINIC | Age: 74
End: 2024-07-12
Payer: MEDICARE

## 2024-07-12 VITALS
WEIGHT: 129 LBS | SYSTOLIC BLOOD PRESSURE: 122 MMHG | DIASTOLIC BLOOD PRESSURE: 73 MMHG | BODY MASS INDEX: 24.35 KG/M2 | HEIGHT: 61 IN | HEART RATE: 69 BPM

## 2024-07-12 DIAGNOSIS — I61.8 OTHER NONTRAUMATIC INTRACEREBRAL HEMORRHAGE: ICD-10-CM

## 2024-07-12 DIAGNOSIS — I61.5 NONTRAUMATIC INTRACEREBRAL HEMORRHAGE, INTRAVENTRICULAR: ICD-10-CM

## 2024-07-12 PROCEDURE — 99215 OFFICE O/P EST HI 40 MIN: CPT

## 2024-07-12 RX ORDER — GABAPENTIN 100 MG/1
100 CAPSULE ORAL 3 TIMES DAILY
Qty: 90 | Refills: 2 | Status: ACTIVE | COMMUNITY
Start: 2024-07-10 | End: 1900-01-01

## 2024-07-19 ENCOUNTER — APPOINTMENT (OUTPATIENT)
Dept: NEUROLOGY | Facility: CLINIC | Age: 74
End: 2024-07-19

## 2024-07-19 VITALS
DIASTOLIC BLOOD PRESSURE: 75 MMHG | BODY MASS INDEX: 24.35 KG/M2 | SYSTOLIC BLOOD PRESSURE: 112 MMHG | HEIGHT: 61 IN | HEART RATE: 78 BPM | WEIGHT: 129 LBS

## 2024-07-19 DIAGNOSIS — Z91.89 OTHER SPECIFIED PERSONAL RISK FACTORS, NOT ELSEWHERE CLASSIFIED: ICD-10-CM

## 2024-07-19 PROCEDURE — 99214 OFFICE O/P EST MOD 30 MIN: CPT

## 2024-07-26 ENCOUNTER — APPOINTMENT (OUTPATIENT)
Dept: OPHTHALMOLOGY | Facility: CLINIC | Age: 74
End: 2024-07-26
Payer: MEDICARE

## 2024-07-26 ENCOUNTER — NON-APPOINTMENT (OUTPATIENT)
Age: 74
End: 2024-07-26

## 2024-07-26 PROCEDURE — 92133 CPTRZD OPH DX IMG PST SGM ON: CPT

## 2024-07-26 PROCEDURE — 92083 EXTENDED VISUAL FIELD XM: CPT

## 2024-07-26 PROCEDURE — 99214 OFFICE O/P EST MOD 30 MIN: CPT

## 2024-08-14 ENCOUNTER — APPOINTMENT (OUTPATIENT)
Dept: NEUROSURGERY | Facility: CLINIC | Age: 74
End: 2024-08-14

## 2024-08-14 VITALS
WEIGHT: 125 LBS | SYSTOLIC BLOOD PRESSURE: 112 MMHG | HEART RATE: 68 BPM | OXYGEN SATURATION: 97 % | TEMPERATURE: 96.4 F | DIASTOLIC BLOOD PRESSURE: 71 MMHG | BODY MASS INDEX: 23.62 KG/M2

## 2024-08-14 PROCEDURE — 99215 OFFICE O/P EST HI 40 MIN: CPT

## 2024-08-14 NOTE — RESULTS/DATA
[FreeTextEntry1] : 	 EXAM: [id number] - MR BRAIN WAW IC  - ORDERED BY: ALTONANTHONYDIDIER MASON   PROCEDURE DATE:  06/20/2024    INTERPRETATION:  MR BRAIN WITHOUT AND WITH IV CONTRAST  Clinical information: Pt with TBI and SAH/ ? lesion for follow up;  A MRI of the brain was performed both prior to and after the administration of intravenous gadolinium.  TECHNIQUE: Precontrast imaging includes sagittal and axial T1, coronal and axial T2, axial FLAIR, axial SWI, and axial diffusion weighted imaging.  Postcontrast imaging includes axial T1 and volumetric T1 weighted imaging. IV Contrast:  Gadavist.  6 cc administered, 1.5 cc discarded.  COMPARISON: Exam is compared to prior studies of 3/25/2024 and 1/20/2024  FINDINGS: BRAIN PARENCHYMA: Right temporal parietal parenchymal hemorrhage continues to age/resolved. Largest component measures approximately 1.0 x 1.9 x 2.9 cm previously 1.5 x 2.4 x 3.8 cm. There is decreasing mass effect. Mild associated enhancement is seen which can be associated with a subacute hematoma. Adjacent gliosis is noted. Continued follow-up recommended if an underlying lesion remains of clinical concern.  Patient has moderate brain atrophy. Mild to moderate chronic periventricular and subcortical white matter ischemic changes are seen. No evidence of acute ischemia.  Corpus callosum well formed. No cerebellar tonsillar ectopia.  VENTRICLES: Stable ventriculomegaly with ex vacuo dilatation of the temporal and occipital horns of the right lateral ventricle. There is no midline shift.  EXTRA-AXIAL: No subdural or epidural collection.  No extra-axial mass.  Basal cisterns preserved.  OTHER: No air / fluid levels within the paranasal sinuses  IMPRESSION: Right temporal parietal parenchymal hemorrhage continues to age/resolve.

## 2024-08-14 NOTE — ASSESSMENT
[FreeTextEntry1] : 72 yo female with PMH of macular degeneration, bilateral below knee DVT completed treatment about 3/2024 no longer on any a/c or a/p, and recent admission for right temporoparietal IPH presenting for headache. Patient admitted in late December 2023 for confusion and found to have right temporoparietal IPH with mild midline shift not on any a/c or a/p prior. Headaches have improved, taking Tylenol as needed. Completed home PT walking well with occasional mild imbalance when distracted to no imbalance. Has f/u with Dr. Vigil for loss of left peripheral vision. Otherwise neurologically intact. Pt remains on Briviact and following with neurology pending EEG.   Most recent MRI shows decrease in size of right temporal IPH.  Due to unknown etiology of IPH, I discussed possible causes including a vascular lesion for which I would recommend a diagnostic cerebral angiogram to properly evaluate.   If negative angiogram, then may consider complete work up for ischemic stroke with Dr. Alvarez as well.  PLAN: Schedule diagnostic cerebral angiogram - patient will let our office know when surg coordinator calls to schedule.

## 2024-08-14 NOTE — REVIEW OF SYSTEMS
[Seizures] : convulsions [As Noted in HPI] : as noted in HPI [Eyesight Problems] : eyesight problems [Negative] : Musculoskeletal [Facial Weakness] : no facial weakness [Arm Weakness] : no arm weakness [Hand Weakness] : no hand weakness [Leg Weakness] : no leg weakness [Suicidal] : not suicidal [Loss Of Hearing] : no hearing loss [Chest Pain] : no chest pain [Palpitations] : no palpitations [Shortness Of Breath] : no shortness of breath [Abdominal Pain] : no abdominal pain [Vomiting] : no vomiting [Incontinence] : no incontinence [Skin Lesions] : no skin lesions [Muscle Weakness] : no muscle weakness [Easy Bleeding] : no tendency for easy bleeding [Easy Bruising] : no tendency for easy bruising [de-identified] : mild memory changes

## 2024-08-14 NOTE — RESULTS/DATA
[FreeTextEntry1] : 	 EXAM: [id number] - MR BRAIN WAW IC  - ORDERED BY: ALTONANTHONYDIDIER MASON   PROCEDURE DATE:  06/20/2024    INTERPRETATION:  MR BRAIN WITHOUT AND WITH IV CONTRAST  Clinical information: Pt with TBI and SAH/ ? lesion for follow up;  A MRI of the brain was performed both prior to and after the administration of intravenous gadolinium.  TECHNIQUE: Precontrast imaging includes sagittal and axial T1, coronal and axial T2, axial FLAIR, axial SWI, and axial diffusion weighted imaging.  Postcontrast imaging includes axial T1 and volumetric T1 weighted imaging. IV Contrast:  Gadavist.  6 cc administered, 1.5 cc discarded.  COMPARISON: Exam is compared to prior studies of 3/25/2024 and 1/20/2024  FINDINGS: BRAIN PARENCHYMA: Right temporal parietal parenchymal hemorrhage continues to age/resolved. Largest component measures approximately 1.0 x 1.9 x 2.9 cm previously 1.5 x 2.4 x 3.8 cm. There is decreasing mass effect. Mild associated enhancement is seen which can be associated with a subacute hematoma. Adjacent gliosis is noted. Continued follow-up recommended if an underlying lesion remains of clinical concern.  Patient has moderate brain atrophy. Mild to moderate chronic periventricular and subcortical white matter ischemic changes are seen. No evidence of acute ischemia.  Corpus callosum well formed. No cerebellar tonsillar ectopia.  VENTRICLES: Stable ventriculomegaly with ex vacuo dilatation of the temporal and occipital horns of the right lateral ventricle. There is no midline shift.  EXTRA-AXIAL: No subdural or epidural collection.  No extra-axial mass.  Basal cisterns preserved.  OTHER: No air / fluid levels within the paranasal sinuses  IMPRESSION: Right temporal parietal parenchymal hemorrhage continues to age/resolve.

## 2024-08-14 NOTE — ASSESSMENT
[FreeTextEntry1] : 74 yo female with PMH of macular degeneration, bilateral below knee DVT completed treatment about 3/2024 no longer on any a/c or a/p, and recent admission for right temporoparietal IPH presenting for headache. Patient admitted in late December 2023 for confusion and found to have right temporoparietal IPH with mild midline shift not on any a/c or a/p prior. Headaches have improved, taking Tylenol as needed. Completed home PT walking well with occasional mild imbalance when distracted to no imbalance. Has f/u with Dr. Vigil for loss of left peripheral vision. Otherwise neurologically intact. Pt remains on Briviact and following with neurology pending EEG.   Most recent MRI shows decrease in size of right temporal IPH.  Due to unknown etiology of IPH, I discussed possible causes including a vascular lesion for which I would recommend a diagnostic cerebral angiogram to properly evaluate.   If negative angiogram, then may consider complete work up for ischemic stroke with Dr. Alvarez as well.  PLAN: Schedule diagnostic cerebral angiogram - patient will let our office know when surg coordinator calls to schedule.

## 2024-08-14 NOTE — HISTORY OF PRESENT ILLNESS
[FreeTextEntry1] : 73F with PMHx of macular degeneration, bilateral below knee DVT, found to have right temporoparietal IPH of unknown etiology.   LE duplex showed bilateral LE DVT.  She was from [facility] to [place] to [facility] at [facility] and had a fall there and was seen in [facility] ER again. She had CT Head w/o contrast which showed: "previously seen right posterior temporal parenchymal hemorrhage measuring 2.4 x 2.0 x 2.4 cm.   8/14/23:  pt arrives with saqib Fernandez for f/u feeling well, denies sudden severe headache, speech impairment, vision problems or seizures. Completed medical treatment for BLE DVT 3 months ago not on any a/c, a/p. Has completed PT and now walking with supervision occasional near falls when distracted, otherwise no balance issues. Pt had f/u with Dr. Vigil for vision loss on left noted improvement and f/u in 6 months. Pt completed OT for her left peripheral vision issue with some improvement. Reports mild tremors in her left hand at times when holding things. Pt on Briviact for seizures managed by neurology.  20Jun2024 05:34PM MR Head w/wo IV Cont MR Head w/wo IV Cont: EXAM: [id number] - MR BRAIN WAW IC - ORDERED BY: MARIBEL MASON  PROCEDURE DATE: 06/20/2024 INTERPRETATION: MR BRAIN WITHOUT AND WITH IV CONTRAST Clinical information: Pt with TBI and SAH/ ? lesion for follow up; A MRI of the brain was performed both prior to and after the administration of intravenous gadolinium. TECHNIQUE: Precontrast imaging includes sagittal and axial T1, coronal and axial T2, axial FLAIR, axial SWI, and axial diffusion weighted imaging. Postcontrast imaging includes axial T1 and volumetric T1 weighted imaging. IV Contrast: Gadavist. 6 cc administered, 1.5 cc discarded.  COMPARISON: Exam is compared to prior studies of 3/25/2024 and 1/20/2024  FINDINGS: BRAIN PARENCHYMA: Right temporal parietal parenchymal hemorrhage continues to age/resolved. Largest component measures approximately 1.0 x 1.9 x 2.9 cm previously 1.5 x 2.4 x 3.8 cm. There is decreasing mass effect. Mild associated enhancement is seen which can be associated with a subacute hematoma. Adjacent gliosis is noted. Continued follow-up recommended if an underlying lesion remains of clinical concern.  Patient has moderate brain atrophy. Mild to moderate chronic periventricular and subcortical white matter ischemic changes are seen. No evidence of acute ischemia.  Corpus callosum well formed. No cerebellar tonsillar ectopia.  VENTRICLES: Stable ventriculomegaly with ex vacuo dilatation of the temporal and occipital horns of the right lateral ventricle. There is no midline shift.  EXTRA-AXIAL: No subdural or epidural collection. No extra-axial mass. Basal cisterns preserved.  OTHER: No air / fluid levels within the paranasal sinuses  IMPRESSION: Right temporal parietal parenchymal hemorrhage continues to age/resolve.  --- End of Report ---

## 2024-08-14 NOTE — PHYSICAL EXAM
[General Appearance - Alert] : alert [General Appearance - In No Acute Distress] : in no acute distress [Oriented To Time, Place, And Person] : oriented to person, place, and time [Impaired Insight] : insight and judgment were intact [5] : S1 ankle flexors 5/5 [Limited Balance] : the patient's balance was impaired [Tremor] : no tremor present [FreeTextEntry8] : see HPI, mild off balance with distraction [Sclera] : the sclera and conjunctiva were normal [Neck Appearance] : the appearance of the neck was normal [Hearing Threshold Finger Rub Not [place]] : hearing was normal [] : no respiratory distress [Respiration, Rhythm And Depth] : normal respiratory rhythm and effort [Edema] : there was no peripheral edema [Abnormal Walk] : normal gait [Involuntary Movements] : no involuntary movements were seen [Motor Tone] : muscle strength and tone were normal [FreeTextEntry1] : see HPI - mild off balance with distractions [Skin Color & Pigmentation] : normal skin color and pigmentation

## 2024-08-14 NOTE — HISTORY OF PRESENT ILLNESS
[FreeTextEntry1] : 73F with PMHx of macular degeneration, bilateral below knee DVT, found to have right temporoparietal IPH of unknown etiology.   LE duplex showed bilateral LE DVT.  She was from [facility] to [place] to [facility] [place] at [facility] and had a fall there and was seen in [facility] ER again. She had CT Head w/o contrast which showed: "previously seen right posterior temporal parenchymal hemorrhage measuring 2.4 x 2.0 x 2.4 cm.   8/14/23:  pt arrives with saqib Fernandez for f/u feeling well, denies sudden severe headache, speech impairment, vision problems or seizures. Completed medical treatment for BLE DVT 3 months ago not on any a/c, a/p. Has completed PT and now walking with supervision occasional near falls when distracted, otherwise no balance issues. Pt had f/u with Dr. Vigil for vision loss on left noted improvement and f/u in 6 months. Pt completed OT for her left peripheral vision issue with some improvement. Reports mild tremors in her left hand at times when holding things. Pt on Briviact for seizures managed by neurology.  20Jun2024 05:34PM MR Head w/wo IV Cont MR Head w/wo IV Cont: EXAM: [id number] - MR BRAIN WAW IC - ORDERED BY: MARIBEL MASON  PROCEDURE DATE: 06/20/2024 INTERPRETATION: MR BRAIN WITHOUT AND WITH IV CONTRAST Clinical information: Pt with TBI and SAH/ ? lesion for follow up; A MRI of the brain was performed both prior to and after the administration of intravenous gadolinium. TECHNIQUE: Precontrast imaging includes sagittal and axial T1, coronal and axial T2, axial FLAIR, axial SWI, and axial diffusion weighted imaging. Postcontrast imaging includes axial T1 and volumetric T1 weighted imaging. IV Contrast: Gadavist. 6 cc administered, 1.5 cc discarded.  COMPARISON: Exam is compared to prior studies of 3/25/2024 and 1/20/2024  FINDINGS: BRAIN PARENCHYMA: Right temporal parietal parenchymal hemorrhage continues to age/resolved. Largest component measures approximately 1.0 x 1.9 x 2.9 cm previously 1.5 x 2.4 x 3.8 cm. There is decreasing mass effect. Mild associated enhancement is seen which can be associated with a subacute hematoma. Adjacent gliosis is noted. Continued follow-up recommended if an underlying lesion remains of clinical concern.  Patient has moderate brain atrophy. Mild to moderate chronic periventricular and subcortical white matter ischemic changes are seen. No evidence of acute ischemia.  Corpus callosum well formed. No cerebellar tonsillar ectopia.  VENTRICLES: Stable ventriculomegaly with ex vacuo dilatation of the temporal and occipital horns of the right lateral ventricle. There is no midline shift.  EXTRA-AXIAL: No subdural or epidural collection. No extra-axial mass. Basal cisterns preserved.  OTHER: No air / fluid levels within the paranasal sinuses  IMPRESSION: Right temporal parietal parenchymal hemorrhage continues to age/resolve.  --- End of Report ---

## 2024-08-14 NOTE — REVIEW OF SYSTEMS
[Seizures] : convulsions [As Noted in HPI] : as noted in HPI [Eyesight Problems] : eyesight problems [Negative] : Musculoskeletal [Facial Weakness] : no facial weakness [Arm Weakness] : no arm weakness [Hand Weakness] : no hand weakness [Leg Weakness] : no leg weakness [Suicidal] : not suicidal [Loss Of Hearing] : no hearing loss [Chest Pain] : no chest pain [Palpitations] : no palpitations [Shortness Of Breath] : no shortness of breath [Abdominal Pain] : no abdominal pain [Vomiting] : no vomiting [Incontinence] : no incontinence [Skin Lesions] : no skin lesions [Muscle Weakness] : no muscle weakness [Easy Bleeding] : no tendency for easy bleeding [Easy Bruising] : no tendency for easy bruising [de-identified] : mild memory changes

## 2024-08-14 NOTE — PHYSICAL EXAM
[General Appearance - Alert] : alert [General Appearance - In No Acute Distress] : in no acute distress [Oriented To Time, Place, And Person] : oriented to person, place, and time [Impaired Insight] : insight and judgment were intact [5] : S1 ankle flexors 5/5 [Limited Balance] : the patient's balance was impaired [Tremor] : no tremor present [FreeTextEntry8] : see HPI, mild off balance with distraction [Sclera] : the sclera and conjunctiva were normal [Neck Appearance] : the appearance of the neck was normal [Hearing Threshold Finger Rub Not [place]] : hearing was normal [] : no respiratory distress [Respiration, Rhythm And Depth] : normal respiratory rhythm and effort [Edema] : there was no peripheral edema [Abnormal Walk] : normal gait [Involuntary Movements] : no involuntary movements were seen [Motor Tone] : muscle strength and tone were normal [FreeTextEntry1] : see HPI - mild off balance with distractions [Skin Color & Pigmentation] : normal skin color and pigmentation

## 2024-08-20 ENCOUNTER — NON-APPOINTMENT (OUTPATIENT)
Age: 74
End: 2024-08-20

## 2024-08-30 ENCOUNTER — OUTPATIENT (OUTPATIENT)
Dept: OUTPATIENT SERVICES | Facility: HOSPITAL | Age: 74
LOS: 1 days | End: 2024-08-30
Payer: MEDICARE

## 2024-08-30 VITALS
HEIGHT: 61 IN | SYSTOLIC BLOOD PRESSURE: 122 MMHG | HEART RATE: 69 BPM | TEMPERATURE: 98 F | WEIGHT: 123.9 LBS | OXYGEN SATURATION: 97 % | RESPIRATION RATE: 15 BRPM | DIASTOLIC BLOOD PRESSURE: 78 MMHG

## 2024-08-30 DIAGNOSIS — I61.9 NONTRAUMATIC INTRACEREBRAL HEMORRHAGE, UNSPECIFIED: ICD-10-CM

## 2024-08-30 DIAGNOSIS — Z01.818 ENCOUNTER FOR OTHER PREPROCEDURAL EXAMINATION: ICD-10-CM

## 2024-08-30 LAB
ANION GAP SERPL CALC-SCNC: 12 MMOL/L — SIGNIFICANT CHANGE UP (ref 5–17)
BLD GP AB SCN SERPL QL: NEGATIVE — SIGNIFICANT CHANGE UP
BUN SERPL-MCNC: 14 MG/DL — SIGNIFICANT CHANGE UP (ref 7–23)
CALCIUM SERPL-MCNC: 10.2 MG/DL — SIGNIFICANT CHANGE UP (ref 8.4–10.5)
CHLORIDE SERPL-SCNC: 105 MMOL/L — SIGNIFICANT CHANGE UP (ref 96–108)
CO2 SERPL-SCNC: 25 MMOL/L — SIGNIFICANT CHANGE UP (ref 22–31)
CREAT SERPL-MCNC: 0.69 MG/DL — SIGNIFICANT CHANGE UP (ref 0.5–1.3)
EGFR: 92 ML/MIN/1.73M2 — SIGNIFICANT CHANGE UP
GLUCOSE SERPL-MCNC: 92 MG/DL — SIGNIFICANT CHANGE UP (ref 70–99)
HCT VFR BLD CALC: 40.8 % — SIGNIFICANT CHANGE UP (ref 34.5–45)
HGB BLD-MCNC: 14.3 G/DL — SIGNIFICANT CHANGE UP (ref 11.5–15.5)
MCHC RBC-ENTMCNC: 31.6 PG — SIGNIFICANT CHANGE UP (ref 27–34)
MCHC RBC-ENTMCNC: 35 GM/DL — SIGNIFICANT CHANGE UP (ref 32–36)
MCV RBC AUTO: 90.3 FL — SIGNIFICANT CHANGE UP (ref 80–100)
NRBC # BLD: 0 /100 WBCS — SIGNIFICANT CHANGE UP (ref 0–0)
PLATELET # BLD AUTO: 209 K/UL — SIGNIFICANT CHANGE UP (ref 150–400)
POTASSIUM SERPL-MCNC: 3.8 MMOL/L — SIGNIFICANT CHANGE UP (ref 3.5–5.3)
POTASSIUM SERPL-SCNC: 3.8 MMOL/L — SIGNIFICANT CHANGE UP (ref 3.5–5.3)
RBC # BLD: 4.52 M/UL — SIGNIFICANT CHANGE UP (ref 3.8–5.2)
RBC # FLD: 12.2 % — SIGNIFICANT CHANGE UP (ref 10.3–14.5)
RH IG SCN BLD-IMP: POSITIVE — SIGNIFICANT CHANGE UP
SODIUM SERPL-SCNC: 142 MMOL/L — SIGNIFICANT CHANGE UP (ref 135–145)
WBC # BLD: 5.35 K/UL — SIGNIFICANT CHANGE UP (ref 3.8–10.5)
WBC # FLD AUTO: 5.35 K/UL — SIGNIFICANT CHANGE UP (ref 3.8–10.5)

## 2024-08-30 PROCEDURE — 86901 BLOOD TYPING SEROLOGIC RH(D): CPT

## 2024-08-30 PROCEDURE — 80048 BASIC METABOLIC PNL TOTAL CA: CPT

## 2024-08-30 PROCEDURE — 86900 BLOOD TYPING SEROLOGIC ABO: CPT

## 2024-08-30 PROCEDURE — 85027 COMPLETE CBC AUTOMATED: CPT

## 2024-08-30 PROCEDURE — G0463: CPT

## 2024-08-30 PROCEDURE — 86850 RBC ANTIBODY SCREEN: CPT

## 2024-08-30 NOTE — H&P PST ADULT - ASSESSMENT
Dental: denies loose teeth, no removable teeth    DASI: ADLS, walks daily up to 2 miles/day (walks with an aide)  SCORE: 8.5

## 2024-08-30 NOTE — H&P PST ADULT - PROBLEM SELECTOR PLAN 1
Scheduled for f/u cerebral angiogram on 9/6/2024  Preop instructions reviewed with patient and HCP, printed instructions sent home with patient  CBC, BMP and T&S pending results

## 2024-08-30 NOTE — H&P PST ADULT - LYMPHATICS COMMENTS
Dr. Coburn Pediatric Endocrinology  Dr. Villafuerte Pediatric Genetics   No cervical lymphadenopathy

## 2024-08-30 NOTE — H&P PST ADULT - HISTORY OF PRESENT ILLNESS
Ms. Carpenter is a 73 year old woman with PMH HLD, depression with anxiety, GERD, right temporoparietal IPH of unknown etiology and LE duplex revealed bilateral DVTs, she f/u with Dr. Hopper who requested a cerebral angiogram due to imaging revealing decreased hematoma and enhancement volume on repeat imaging but not completely resolved.  Cerebral angio scheduled for 9/6/2024

## 2024-08-30 NOTE — H&P PST ADULT - NSICDXPASTMEDICALHX_GEN_ALL_CORE_FT
PAST MEDICAL HISTORY:  Chronic GERD     Depression with anxiety     DVT, lower extremity     H/O constipation     H/O spontaneous intraparenchymal intracranial hemorrhage     HLD (hyperlipidemia)     Macular degeneration

## 2024-08-30 NOTE — H&P PST ADULT - NSANTHOSAYNRD_GEN_A_CORE
No. SIRI screening performed.  STOP BANG Legend: 0-2 = LOW Risk; 3-4 = INTERMEDIATE Risk; 5-8 = HIGH Risk

## 2024-09-06 ENCOUNTER — TRANSCRIPTION ENCOUNTER (OUTPATIENT)
Age: 74
End: 2024-09-06

## 2024-09-06 ENCOUNTER — OUTPATIENT (OUTPATIENT)
Dept: OUTPATIENT SERVICES | Facility: HOSPITAL | Age: 74
LOS: 1 days | End: 2024-09-06
Payer: MEDICARE

## 2024-09-06 ENCOUNTER — APPOINTMENT (OUTPATIENT)
Dept: NEUROSURGERY | Facility: HOSPITAL | Age: 74
End: 2024-09-06

## 2024-09-06 VITALS
RESPIRATION RATE: 20 BRPM | SYSTOLIC BLOOD PRESSURE: 105 MMHG | DIASTOLIC BLOOD PRESSURE: 72 MMHG | OXYGEN SATURATION: 99 % | HEART RATE: 67 BPM

## 2024-09-06 VITALS
TEMPERATURE: 98 F | SYSTOLIC BLOOD PRESSURE: 121 MMHG | DIASTOLIC BLOOD PRESSURE: 75 MMHG | HEIGHT: 61 IN | HEART RATE: 71 BPM | RESPIRATION RATE: 17 BRPM | WEIGHT: 123.9 LBS | OXYGEN SATURATION: 98 %

## 2024-09-06 DIAGNOSIS — I61.9 NONTRAUMATIC INTRACEREBRAL HEMORRHAGE, UNSPECIFIED: ICD-10-CM

## 2024-09-06 PROCEDURE — 36227 PLACE CATH XTRNL CAROTID: CPT | Mod: RT

## 2024-09-06 PROCEDURE — 36227 PLACE CATH XTRNL CAROTID: CPT

## 2024-09-06 PROCEDURE — 36223 PLACE CATH CAROTID/INOM ART: CPT | Mod: 59,LT

## 2024-09-06 PROCEDURE — 36224 PLACE CATH CAROTD ART: CPT

## 2024-09-06 PROCEDURE — 36223 PLACE CATH CAROTID/INOM ART: CPT | Mod: XS

## 2024-09-06 PROCEDURE — C1769: CPT

## 2024-09-06 PROCEDURE — 36226 PLACE CATH VERTEBRAL ART: CPT

## 2024-09-06 PROCEDURE — C1887: CPT

## 2024-09-06 PROCEDURE — 36226 PLACE CATH VERTEBRAL ART: CPT | Mod: LT

## 2024-09-06 PROCEDURE — 36224 PLACE CATH CAROTD ART: CPT | Mod: RT

## 2024-09-06 PROCEDURE — C1894: CPT

## 2024-09-06 RX ORDER — SODIUM CHLORIDE 9 MG/ML
1000 INJECTION INTRAMUSCULAR; INTRAVENOUS; SUBCUTANEOUS
Refills: 0 | Status: ACTIVE | OUTPATIENT
Start: 2024-09-06 | End: 2025-08-05

## 2024-09-06 RX ORDER — PANTOPRAZOLE SODIUM 40 MG
1 TABLET, DELAYED RELEASE (ENTERIC COATED) ORAL
Refills: 0 | DISCHARGE

## 2024-09-06 RX ORDER — SERTRALINE HYDROCHLORIDE 50 MG/1
1 TABLET, FILM COATED ORAL
Refills: 0 | DISCHARGE

## 2024-09-06 RX ORDER — OXYCODONE HYDROCHLORIDE 5 MG/1
5 TABLET ORAL ONCE
Refills: 0 | Status: DISCONTINUED | OUTPATIENT
Start: 2024-09-06 | End: 2024-09-06

## 2024-09-06 RX ORDER — ONDANSETRON 2 MG/ML
4 INJECTION, SOLUTION INTRAMUSCULAR; INTRAVENOUS ONCE
Refills: 0 | Status: ACTIVE | OUTPATIENT
Start: 2024-09-06 | End: 2025-08-05

## 2024-09-06 RX ORDER — GABAPENTIN 100 MG
1 CAPSULE ORAL
Refills: 0 | DISCHARGE

## 2024-09-06 RX ORDER — HYDROMORPHONE HYDROCHLORIDE 2 MG/1
0.2 TABLET ORAL
Refills: 0 | Status: DISCONTINUED | OUTPATIENT
Start: 2024-09-06 | End: 2024-09-06

## 2024-09-06 NOTE — CHART NOTE - NSCHARTNOTEFT_GEN_A_CORE
Interventional Neuro- Radiology   Procedure Note SERGEY    Procedure: Catheter Cerebral Angiogram   Pre- Procedure Diagnosis: right parietotemporal hemorrhage   Post- Procedure Diagnosis:    : Dr Beatriz Noble   Fellow:   Physician Assistant: Beryl Hdz PA-C    Nurse:  Radiologic Tech:  Anesthesiologist:  Sheath:  Sheath:  [place]:      I/Os: EBL less than 10cc  IV fluids:     cc  Urine output     cc    Contrast Visi             Vitals: BP         HR      Spo2     %          Preliminary Report:  Using a 5 Guyanese short sheath to the right groin under MAC sedation via left vertebral artery, left internal carotid artery, left external carotid artery, right vertebral artery, right internal carotid artery, right external carotid artery a selective cerebral angiography was performed and demonstrated                       Official note to follow.  Patient tolerated procedure well, hemodynamically stable, no change in neurological status compared to baseline. Results discussed with neuro ICU team, patient and patient's family. Right groin sheath was removed, manual compression held to hemostasis for 20 minutes, no active bleeding, no hematoma, quick clot and safeguard balloon dressing applied at Interventional Neuro- Radiology   Procedure Note PA-C    Procedure: Catheter Cerebral Angiogram   Pre- Procedure Diagnosis: right parietotemporal hemorrhage   Post- Procedure Diagnosis: No source for hemorrhage     : Dr Beatriz Noble   Fellow:     Dr Anika Kenyon   Physician Assistant: Beryl Hdz PA-C    Nurse:                   Naman Almaguer RN  Radiologic Tech:   Aroldo Sloan LRT, Pritesh Duncan LRT  Anesthesiologist:  Dr Alcides Christie   Sheath:                 5/4 Kinyarwanda short sheath         I/Os: EBL less than 10cc  IV fluids: 350cc  Urine due to void  Contrast 101cc           Vitals: /57         HR 64     Spo2 100%          Preliminary Report:  Using a 5 Kinyarwanda short sheath to the right groin under MAC sedation a selective cerebral angiography was performed and demonstrated no source for hemorrhage. Official note to follow.  Patient tolerated procedure well, hemodynamically stable, no change in neurological status compared to baseline. Results discussed with patient and patient's friend. Right groin sheath was removed, manual compression held to hemostasis for 20 minutes, by Dr Anika Kenyon no active bleeding, no hematoma, quick clot and safeguard balloon dressing applied at 12pm. Disposition recovery room for 4 hours.

## 2024-09-06 NOTE — ASU PREOP CHECKLIST - HEIGHT IN FEET
[address]                       [address], [address] [facility]                                 OPERATIVE REPORT    PATIENT NAME: Daniel Robb                      :        10/06/1933  MED REC NO:   [id number]                            ROOM:  ACCOUNT NO:   [de-identified]                           ADMIT DATE: 2017  PROVIDER:     Mariia Dubon MD      DATE OF SURGERY:  2017    PREOPERATIVE DIAGNOSIS:  Probable basal cell or possibly squamous cell  carcinoma, left anterior shin 1 x 1 cm in size. POSTOPERATIVE DIAGNOSIS:  Probable basal cell or possibly squamous cell  carcinoma, left anterior shin 1 x 1 cm in size pending final path report. OPERATION PERFORMED:  Wide excision 6 x 1.5 cm in size and closure of the  defect with a rhomboid flap. SURGEON:  Mariia Dubon MD    ANESTHESIA:  Local.    BLOOD LOSS:  Nil. DRAINS:  None. COMPLICATIONS:  None. CONDITION:  At the end of procedure satisfactory. CLINICAL NOTE:  The patient is an 51-year-old white female with a past  medical history of dementia, emphysema, arthritis, allergic to penicillin  who was referred with a multi month history of a progressively enlarging  growth of her left anterior shin. On presentation, the lesion measured 1 x  1 x 1 cm in size. It's surface showed an eschar and this lesion frequently  bleeds. Examination of the lesion showed that noted above. This lesion is very  suspicious of the cutaneous carcinoma and she presents at this time for  wide excisional biopsy and closure either directly or more than likely with  a rhomboid flap. The above surgical procedure was discussed with her and  family members at length.   The lesion itself, its differential diagnosis,  as well as its removal in the above fashion was discussed at significant  length including the surgical procedure itself, length, location and  orientation of the various incision lines, the expected 5

## 2024-09-06 NOTE — ASU DISCHARGE PLAN (ADULT/PEDIATRIC) - CARE PROVIDER_API CALL
Beatriz Rosado  Neurology  [address], [address]  [place] [address]  Phone: [phone]  Fax: [phone]  Follow Up Time:

## 2024-09-06 NOTE — ASU DISCHARGE PLAN (ADULT/PEDIATRIC) - NS MD DC FALL RISK RISK
For information on Fall & Injury Prevention, visit: [email or web address] OR  [email or web address] OR  https://www.cdc.gov/steadi/patient.html

## 2024-09-06 NOTE — CHART NOTE - NSCHARTNOTEFT_GEN_A_CORE
Interventional Neuro Radiology  Pre-Procedure Note PA-C    This is a 73 year old right hand dominant female            Allergies: oxycodone (Nausea)  No Known Allergies  PMHX: DVT, lower extremity, Macular degeneration, spontaneous intraparenchymal intracranial hemorrhage, Depression with anxiety, GERD, Hyperlipidemia,   PSHX: No significant past surgical history  Social History:   FAMILY HISTORY:  Current Medications:     Labs:                   Blood Bank:       Assessment/Plan:   This is a 73 year old right hand dominant female    Patient presents to Neuro-IR for selective cerebral angiography.   Procedure, goals, risks, benefits and alternatives were discussed with patient and patient's family. All questions were answered. Risks include but are not limited to stroke, vessel injury, hemorrhage, and or right groin hematoma. Patient demonstrates understanding of all risks involved with this procedure and wishes to continue. Appropriate consent was obtained from patient and consent is in the patient's chart. Interventional Neuro Radiology  Pre-Procedure Note PA-C    This is a 73 year old right hand dominant female with a past medical history significant for hyperlipidemia, depression, anxiety, GERD, a right temporal-parietal hemorrhage, who presents to Neuro IR for a diagnostic cerebral angiogram to determine a source for a hemorrhage, accompanied by her health care proxy.      Upon exam patient is A + O x 3, speech is fluent, recent and remote memory intact, follows commands, moves all extremities.     Allergies: oxycodone (Nausea)  No Known Allergies  PMHX: DVT, lower extremity, Macular degeneration, spontaneous intraparenchymal intracranial hemorrhage, Depression with anxiety, GERD, Hyperlipidemia,   PSHX: No significant past surgical history  Social History:   FAMILY HISTORY:  Current Medications:     Labs:            14.3  5.35   40.8   209    142  105   14   3.8    15   0.69  92    Blood Bank: A positive available       Assessment/Plan:   This is a 73 year old right hand dominant female with a past medical history significant for a right temporal-parietal hemorrhage, who presents to Neuro IR for a diagnostic cerebral angiogram, to determine a source for the hemorrhage.    Procedure, goals, risks, benefits and alternatives were discussed with patient and patient's family. All questions were answered. Risks include but are not limited to stroke, vessel injury, hemorrhage, and or right groin hematoma. Patient demonstrates understanding of all risks involved with this procedure and wishes to continue. Appropriate consent was obtained from patient and consent is in the patient's chart.

## 2024-09-10 ENCOUNTER — APPOINTMENT (OUTPATIENT)
Dept: NEUROLOGY | Facility: CLINIC | Age: 74
End: 2024-09-10

## 2024-09-10 PROBLEM — Z87.19 PERSONAL HISTORY OF OTHER DISEASES OF THE DIGESTIVE SYSTEM: Chronic | Status: ACTIVE | Noted: 2024-08-30

## 2024-09-10 PROBLEM — F41.8 OTHER SPECIFIED ANXIETY DISORDERS: Chronic | Status: ACTIVE | Noted: 2024-08-30

## 2024-09-10 PROBLEM — K21.9 GASTRO-ESOPHAGEAL REFLUX DISEASE WITHOUT ESOPHAGITIS: Chronic | Status: ACTIVE | Noted: 2024-08-30

## 2024-09-10 PROBLEM — E78.5 HYPERLIPIDEMIA, UNSPECIFIED: Chronic | Status: ACTIVE | Noted: 2024-08-30

## 2024-09-10 PROCEDURE — 95816 EEG AWAKE AND DROWSY: CPT

## 2024-09-11 PROCEDURE — 95700 EEG CONT REC W/VID EEG TECH: CPT

## 2024-09-11 PROCEDURE — 95708 EEG WO VID EA 12-26HR UNMNTR: CPT

## 2024-09-11 PROCEDURE — 95719 EEG PHYS/QHP EA INCR W/O VID: CPT

## 2024-09-16 ENCOUNTER — NON-APPOINTMENT (OUTPATIENT)
Age: 74
End: 2024-09-16

## 2024-09-16 ENCOUNTER — APPOINTMENT (OUTPATIENT)
Dept: INTERNAL MEDICINE | Facility: CLINIC | Age: 74
End: 2024-09-16
Payer: MEDICARE

## 2024-09-16 VITALS
HEIGHT: 61 IN | TEMPERATURE: 97.8 F | BODY MASS INDEX: 24.92 KG/M2 | OXYGEN SATURATION: 99 % | WEIGHT: 132 LBS | DIASTOLIC BLOOD PRESSURE: 80 MMHG | SYSTOLIC BLOOD PRESSURE: 122 MMHG | RESPIRATION RATE: 18 BRPM | HEART RATE: 64 BPM

## 2024-09-16 DIAGNOSIS — Z00.00 ENCOUNTER FOR GENERAL ADULT MEDICAL EXAMINATION W/OUT ABNORMAL FINDINGS: ICD-10-CM

## 2024-09-16 DIAGNOSIS — E74.39 OTHER DISORDERS OF INTESTINAL CARBOHYDRATE ABSORPTION: ICD-10-CM

## 2024-09-16 DIAGNOSIS — F41.9 ANXIETY DISORDER, UNSPECIFIED: ICD-10-CM

## 2024-09-16 DIAGNOSIS — J44.9 CHRONIC OBSTRUCTIVE PULMONARY DISEASE, UNSPECIFIED: ICD-10-CM

## 2024-09-16 DIAGNOSIS — Z91.89 OTHER SPECIFIED PERSONAL RISK FACTORS, NOT ELSEWHERE CLASSIFIED: ICD-10-CM

## 2024-09-16 DIAGNOSIS — Z13.31 ENCOUNTER FOR SCREENING FOR DEPRESSION: ICD-10-CM

## 2024-09-16 DIAGNOSIS — Z86.39 PERSONAL HISTORY OF OTHER ENDOCRINE, NUTRITIONAL AND METABOLIC DISEASE: ICD-10-CM

## 2024-09-16 DIAGNOSIS — F43.22 ADJUSTMENT DISORDER WITH ANXIETY: ICD-10-CM

## 2024-09-16 DIAGNOSIS — E87.6 HYPOKALEMIA: ICD-10-CM

## 2024-09-16 DIAGNOSIS — Z23 ENCOUNTER FOR IMMUNIZATION: ICD-10-CM

## 2024-09-16 DIAGNOSIS — M81.0 AGE-RELATED OSTEOPOROSIS W/OUT CURRENT PATHOLOGICAL FRACTURE: ICD-10-CM

## 2024-09-16 DIAGNOSIS — Z13.6 ENCOUNTER FOR SCREENING FOR CARDIOVASCULAR DISORDERS: ICD-10-CM

## 2024-09-16 DIAGNOSIS — I61.9 NONTRAUMATIC INTRACEREBRAL HEMORRHAGE, UNSPECIFIED: ICD-10-CM

## 2024-09-16 DIAGNOSIS — R10.13 EPIGASTRIC PAIN: ICD-10-CM

## 2024-09-16 PROCEDURE — G0008: CPT

## 2024-09-16 PROCEDURE — 90662 IIV NO PRSV INCREASED AG IM: CPT

## 2024-09-16 PROCEDURE — 36415 COLL VENOUS BLD VENIPUNCTURE: CPT

## 2024-09-16 PROCEDURE — G0439: CPT

## 2024-09-16 PROCEDURE — 99497 ADVNCD CARE PLAN 30 MIN: CPT

## 2024-09-16 PROCEDURE — 93000 ELECTROCARDIOGRAM COMPLETE: CPT

## 2024-09-16 PROCEDURE — 99215 OFFICE O/P EST HI 40 MIN: CPT | Mod: 25

## 2024-09-17 PROBLEM — Z13.6 SCREENING FOR HEART DISEASE: Status: ACTIVE | Noted: 2024-09-17

## 2024-09-17 PROBLEM — Z23 ENCOUNTER FOR IMMUNIZATION: Status: ACTIVE | Noted: 2021-09-20 | Resolved: 2024-09-30

## 2024-09-17 LAB
25(OH)D3 SERPL-MCNC: 62.7 NG/ML
ALBUMIN SERPL ELPH-MCNC: 4.7 G/DL
ALP BLD-CCNC: 85 U/L
ALT SERPL-CCNC: 16 U/L
ANION GAP SERPL CALC-SCNC: 18 MMOL/L
APPEARANCE: CLEAR
AST SERPL-CCNC: 22 U/L
BASOPHILS # BLD AUTO: 0.03 K/UL
BASOPHILS NFR BLD AUTO: 0.5 %
BILIRUB SERPL-MCNC: 0.5 MG/DL
BILIRUBIN URINE: NEGATIVE
BLOOD URINE: NEGATIVE
BUN SERPL-MCNC: 12 MG/DL
CALCIUM SERPL-MCNC: 10.4 MG/DL
CHLORIDE SERPL-SCNC: 102 MMOL/L
CHOLEST SERPL-MCNC: 179 MG/DL
CO2 SERPL-SCNC: 23 MMOL/L
COLOR: YELLOW
CREAT SERPL-MCNC: 0.63 MG/DL
EGFR: 93 ML/MIN/1.73M2
EOSINOPHIL # BLD AUTO: 0.06 K/UL
EOSINOPHIL NFR BLD AUTO: 1 %
ESTIMATED AVERAGE GLUCOSE: 94 MG/DL
GGT SERPL-CCNC: 15 U/L
GLUCOSE QUALITATIVE U: NEGATIVE MG/DL
GLUCOSE SERPL-MCNC: 83 MG/DL
HBA1C MFR BLD HPLC: 4.9 %
HCT VFR BLD CALC: 46.7 %
HDLC SERPL-MCNC: 56 MG/DL
HGB BLD-MCNC: 14.9 G/DL
IMM GRANULOCYTES NFR BLD AUTO: 0.3 %
KETONES URINE: NEGATIVE MG/DL
LDLC SERPL CALC-MCNC: 95 MG/DL
LEUKOCYTE ESTERASE URINE: NEGATIVE
LYMPHOCYTES # BLD AUTO: 1.38 K/UL
LYMPHOCYTES NFR BLD AUTO: 22.5 %
MAN DIFF?: NORMAL
MCHC RBC-ENTMCNC: 31.6 PG
MCHC RBC-ENTMCNC: 31.9 GM/DL
MCV RBC AUTO: 98.9 FL
MONOCYTES # BLD AUTO: 0.42 K/UL
MONOCYTES NFR BLD AUTO: 6.8 %
NEUTROPHILS # BLD AUTO: 4.23 K/UL
NEUTROPHILS NFR BLD AUTO: 68.9 %
NITRITE URINE: NEGATIVE
NONHDLC SERPL-MCNC: 123 MG/DL
PH URINE: 7
PLATELET # BLD AUTO: 234 K/UL
POTASSIUM SERPL-SCNC: 4.6 MMOL/L
PROT SERPL-MCNC: 6.8 G/DL
PROTEIN URINE: NEGATIVE MG/DL
RBC # BLD: 4.72 M/UL
RBC # FLD: 13.7 %
SODIUM SERPL-SCNC: 143 MMOL/L
SPECIFIC GRAVITY URINE: 1.01
TRIGL SERPL-MCNC: 162 MG/DL
TSH SERPL-ACNC: 2.56 UIU/ML
UROBILINOGEN URINE: 0.2 MG/DL
WBC # FLD AUTO: 6.14 K/UL

## 2024-09-17 NOTE — REASON FOR VISIT
[Annual Wellness Visit] : an annual wellness visit [FreeTextEntry1] : Annual wellness exam for this 74-year-old female status post CVA

## 2024-09-17 NOTE — HEALTH RISK ASSESSMENT
[Good] : ~his/her~  mood as  good [No] : In the past 12 months have you used drugs other than those required for medical reasons? No [No falls in past year] : Patient reported no falls in the past year [0] : 2) Feeling down, depressed, or hopeless: Not at all (0) [PHQ-2 Negative - No further assessment needed] : PHQ-2 Negative - No further assessment needed [Never] : Never [NO] : No [Patient reported mammogram was normal] : Patient reported mammogram was normal [Patient reported PAP Smear was normal] : Patient reported PAP Smear was normal [Patient reported bone density results were normal] : Patient reported bone density results were normal [None] : None [Alone] : lives alone [Retired] : retired [College] : College [Single] : single [Feels Safe at Home] : Feels safe at home [Fully functional (bathing, dressing, toileting, transferring, walking, feeding)] : Fully functional (bathing, dressing, toileting, transferring, walking, feeding) [Fully functional (using the telephone, shopping, preparing meals, housekeeping, doing laundry, using] : Fully functional and needs no help or supervision to perform IADLs (using the telephone, shopping, preparing meals, housekeeping, doing laundry, using transportation, managing medications and managing finances) [Smoke Detector] : smoke detector [Carbon Monoxide Detector] : carbon monoxide detector [With Patient/Caregiver] : , with patient/caregiver [Designated Healthcare Proxy] : Designated healthcare proxy [Name: ___] : Health Care Proxy's Name: [unfilled]  [Relationship: ___] : Relationship: [unfilled] [Aggressive treatment] : aggressive treatment [Last Verification Date: ___] : Last Verification Date: [unfilled] [I will adhere to the patient's wishes.] : I will adhere to the patient's wishes. [Time Spent: ___ minutes] : Time Spent: [unfilled] minutes [Fair] : ~his/her~ current health as fair  [Change in mental status noted] : Change in mental status noted [Behavior] : difficulty with behavior [Learning/Retaining New Information] : difficulty learning/retaining new information [Handling Complex Tasks] : difficulty handling complex tasks [Reasoning] : difficulty with reasoning [Safety elements used in home] : safety elements used in home [Seat Belt] :  uses seat belt [Sunscreen] : uses sunscreen [de-identified] : Neurology [Audit-CScore] : 0 [de-identified] : Light [de-identified] : Standard [[id number]] : 0 [Spatial Ability and Orientation] : denies difficulty with spatial ability and orientation [Sexually Active] : not sexually active [Reports changes in hearing] : Reports no changes in hearing [Reports changes in vision] : Reports no changes in vision [Travel to Developing Areas] : does not  travel to developing areas [MammogramDate] : 11/2023 [MammogramComments] : Will be going back in November [PapSmearDate] : 01/2023 [PapSmearComments] : Will be going this year [BoneDensityDate] : 01/2023 [BoneDensityComments] : Osteopenia [ColonoscopyComments] : Patient is due in 2026 [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

## 2024-09-17 NOTE — HISTORY OF PRESENT ILLNESS
[FreeTextEntry1] : Annual wellness/[facility].  For this 70-year-old female with history of CVA seizures anxiety depression hx of hyperlipidemia, glucose intolerance and osteoporosis.  Patient's anxiety seems.... A bit better controlled she has no aphasia and is walking independently and moving all extremities.  Denies any seizures.  Wishes to discuss medications and needs referrals.  Patient also needs a flu shot CC: Patient is here today for an Annual Wellness and Blood work. Patient states that she is taking Briviact for Seizures, and she is stable on current medication, she would also like to know if she should stop or not because she has been taking it for a long time already and is awaiting of her EEG results. Under the care of a neurologist.  [de-identified] : 74-year-old female presents herself today for a F/U,[facility]. Hx of hyperlipidemia, glucose intolerance and osteoporosis.  CVA and seizures Patient denies fever chills cough chest pain or shortness of breath.  She denies falls she denies weight loss.  She denies skin breakdown Patient is here today for an Annual Wellness and Blood work.  Has mild COPD and smoked 40 year ago.  Otherwise patient is stable.   Voices no further complaints. ROS as documented below.  Denies fever, cough, chills, body aches and SOB.   I Fely Daniels, am scribing for and in the presence of Dr. Meredith, the following sections of:  HISTORY OF PRESENT ILLNESS, PAST MEDICAL/FAMILY/SOCIAL HISTORY, ROS, VITALS, PE, DISPOSITION

## 2024-09-17 NOTE — HISTORY OF PRESENT ILLNESS
[FreeTextEntry1] : Annual wellness/[facility].  For this 70-year-old female with history of CVA seizures anxiety depression hx of hyperlipidemia, glucose intolerance and osteoporosis.  Patient's anxiety seems.... A bit better controlled she has no aphasia and is walking independently and moving all extremities.  Denies any seizures.  Wishes to discuss medications and needs referrals.  Patient also needs a flu shot CC: Patient is here today for an Annual Wellness and Blood work. Patient states that she is taking Briviact for Seizures, and she is stable on current medication, she would also like to know if she should stop or not because she has been taking it for a long time already and is awaiting of her EEG results. Under the care of a neurologist.  [de-identified] : 74-year-old female presents herself today for a F/U,[facility]. Hx of hyperlipidemia, glucose intolerance and osteoporosis.  CVA and seizures Patient denies fever chills cough chest pain or shortness of breath.  She denies falls she denies weight loss.  She denies skin breakdown Patient is here today for an Annual Wellness and Blood work.  Has mild COPD and smoked 40 year ago.  Otherwise patient is stable.   Voices no further complaints. ROS as documented below.  Denies fever, cough, chills, body aches and SOB.   I Fely Daniels, am scribing for and in the presence of Dr. Meredith, the following sections of:  HISTORY OF PRESENT ILLNESS, PAST MEDICAL/FAMILY/SOCIAL HISTORY, ROS, VITALS, PE, DISPOSITION

## 2024-09-17 NOTE — COUNSELING
[Fall prevention counseling provided] : Fall prevention counseling provided [Adequate lighting] : Adequate lighting [No throw rugs] : No throw rugs [Use proper foot wear] : Use proper foot wear [Use recommended devices] : Use recommended devices [Behavioral health counseling provided] : Behavioral health counseling provided [Sleep ___ hours/day] : Sleep [unfilled] hours/day [Engage in a relaxing activity] : Engage in a relaxing activity [Plan in advance] : Plan in advance [None] : None [Good understanding] : Patient has a good understanding of lifestyle changes and steps needed to achieve self management goal [de-identified] : Medical Annual wellness visit completed: HRA completed and reviewed with patient Medical, family, surgical history reviewed with patient and updated List of current providers r/w patient and updated Vitals, BMI reviewed and discussed along with healthy BMI goals. Dietary counseling x 15 minutes provided Depression PHQ 9 completed and reviewed  Annual safety assessment reviewed discussed advanced directives smoking cessation counseling provided Established routine screening and immunization schedules  Medical Annual wellness visit completed: HRA completed and reviewed with patient Medical, family, surgical history reviewed with patient and updated List of current providers r/w patient and updated Vitals, BMI reviewed and discussed along with healthy BMI goals. Dietary counseling x 15 minutes provided Depression PHQ 9 completed and reviewed  Annual safety assessment reviewed discussed advanced directives smoking cessation counseling provided Established routine screening and immunization schedules  VACCINATION & OTHER TX RECOMMENDATIONS  ASA preventative therapy Calcium/Vitamin D supplementation   Dietary counseling, nutrition referral risks vs. benefits d/w patient. routine vaccination and vaccination schedules and recommendation d/w patient  Vaccines recommended:  * pneumovax (once after 65) & prevnar * annual Influenza vaccine * Hep B vaccines * zostavax * Tdap  Colorectal screening recommended; screening colonoscopy q10yr, flex sig q5yr, annual fecal occult testing BMD recommended biennially for osteoporosis screening Glaucoma screening recommended, annual optho evals Cardiovascular screening and blood tests recommended and discussed w/ patient, cholesterol screening and dietary counseling AAA recommended x 1

## 2024-09-17 NOTE — COUNSELING
ACTIVITY AS TOLERATED   [Fall prevention counseling provided] : Fall prevention counseling provided [Adequate lighting] : Adequate lighting [No throw rugs] : No throw rugs [Use proper foot wear] : Use proper foot wear [Use recommended devices] : Use recommended devices [Behavioral health counseling provided] : Behavioral health counseling provided [Sleep ___ hours/day] : Sleep [unfilled] hours/day [Engage in a relaxing activity] : Engage in a relaxing activity [Plan in advance] : Plan in advance [None] : None [Good understanding] : Patient has a good understanding of lifestyle changes and steps needed to achieve self management goal [de-identified] : Medical Annual wellness visit completed: HRA completed and reviewed with patient Medical, family, surgical history reviewed with patient and updated List of current providers r/w patient and updated Vitals, BMI reviewed and discussed along with healthy BMI goals. Dietary counseling x 15 minutes provided Depression PHQ 9 completed and reviewed  Annual safety assessment reviewed discussed advanced directives smoking cessation counseling provided Established routine screening and immunization schedules  Medical Annual wellness visit completed: HRA completed and reviewed with patient Medical, family, surgical history reviewed with patient and updated List of current providers r/w patient and updated Vitals, BMI reviewed and discussed along with healthy BMI goals. Dietary counseling x 15 minutes provided Depression PHQ 9 completed and reviewed  Annual safety assessment reviewed discussed advanced directives smoking cessation counseling provided Established routine screening and immunization schedules  VACCINATION & OTHER TX RECOMMENDATIONS  ASA preventative therapy Calcium/Vitamin D supplementation   Dietary counseling, nutrition referral risks vs. benefits d/w patient. routine vaccination and vaccination schedules and recommendation d/w patient  Vaccines recommended:  * pneumovax (once after 65) & prevnar * annual Influenza vaccine * Hep B vaccines * zostavax * Tdap  Colorectal screening recommended; screening colonoscopy q10yr, flex sig q5yr, annual fecal occult testing BMD recommended biennially for osteoporosis screening Glaucoma screening recommended, annual optho evals Cardiovascular screening and blood tests recommended and discussed w/ patient, cholesterol screening and dietary counseling AAA recommended x 1

## 2024-09-17 NOTE — PHYSICAL EXAM
[No Acute Distress] : no acute distress [Well Nourished] : well nourished [Well Developed] : well developed [Well-Appearing] : well-appearing [Normal Sclera/Conjunctiva] : normal sclera/conjunctiva [PERRL] : pupils equal round and reactive to light [EOMI] : extraocular movements intact [Normal Outer Ear/Nose] : the outer ears and nose were normal in appearance [Normal Oropharynx] : the oropharynx was normal [No JVD] : no jugular venous distention [No Lymphadenopathy] : no lymphadenopathy [Supple] : supple [Thyroid Normal, No Nodules] : the thyroid was normal and there were no nodules present [No Respiratory Distress] : no respiratory distress  [No Accessory Muscle Use] : no accessory muscle use [Clear to Auscultation] : lungs were clear to auscultation bilaterally [Normal Rate] : normal rate  [Regular Rhythm] : with a regular rhythm [Normal S1, S2] : normal S1 and S2 [No Murmur] : no murmur heard [No Carotid Bruits] : no carotid bruits [No Abdominal Bruit] : a ~M bruit was not heard ~T in the abdomen [No Varicosities] : no varicosities [Pedal Pulses Present] : the pedal pulses are present [No Edema] : there was no peripheral edema [No Palpable Aorta] : no palpable aorta [No Extremity Clubbing/Cyanosis] : no extremity clubbing/cyanosis [Soft] : abdomen soft [Non Tender] : non-tender [Non-distended] : non-distended [No Masses] : no abdominal mass palpated [No HSM] : no HSM [Normal Bowel Sounds] : normal bowel sounds [Normal Posterior Cervical Nodes] : no posterior cervical lymphadenopathy [Normal Anterior Cervical Nodes] : no anterior cervical lymphadenopathy [No CVA Tenderness] : no CVA  tenderness [No Spinal Tenderness] : no spinal tenderness [No Joint Swelling] : no joint swelling [Grossly Normal Strength/Tone] : grossly normal strength/tone [No Rash] : no rash [Coordination Grossly Intact] : coordination grossly intact [No Focal Deficits] : no focal deficits [Normal Gait] : normal gait [Deep Tendon Reflexes (DTR)] : deep tendon reflexes were 2+ and symmetric [Normal Affect] : the affect was normal [Normal Insight/Judgement] : insight and judgment were intact [Declined Breast Exam] : declined breast exam  [Declined Rectal Exam] : declined rectal exam [Normal] : normal gait, coordination grossly intact, no focal deficits and deep tendon reflexes were 2+ and symmetric [Alert and Oriented x3] : oriented to person, place, and time [de-identified] : BMI is 24.9 [de-identified] : No flank pain [de-identified] : No sensorimotor deficits or aphasia noted [de-identified] : Cognitive decline noted

## 2024-09-17 NOTE — REVIEW OF SYSTEMS
[Negative] : Heme/Lymph [Unsteady Walking] : ataxia [Anxiety] : anxiety [FreeTextEntry2] : Patient has gained 7 pounds

## 2024-09-17 NOTE — HEALTH RISK ASSESSMENT
[Good] : ~his/her~  mood as  good [No] : In the past 12 months have you used drugs other than those required for medical reasons? No [No falls in past year] : Patient reported no falls in the past year [0] : 2) Feeling down, depressed, or hopeless: Not at all (0) [PHQ-2 Negative - No further assessment needed] : PHQ-2 Negative - No further assessment needed [Never] : Never [NO] : No [Patient reported mammogram was normal] : Patient reported mammogram was normal [Patient reported PAP Smear was normal] : Patient reported PAP Smear was normal [Patient reported bone density results were normal] : Patient reported bone density results were normal [None] : None [Alone] : lives alone [Retired] : retired [College] : College [Single] : single [Feels Safe at Home] : Feels safe at home [Fully functional (bathing, dressing, toileting, transferring, walking, feeding)] : Fully functional (bathing, dressing, toileting, transferring, walking, feeding) [Fully functional (using the telephone, shopping, preparing meals, housekeeping, doing laundry, using] : Fully functional and needs no help or supervision to perform IADLs (using the telephone, shopping, preparing meals, housekeeping, doing laundry, using transportation, managing medications and managing finances) [Smoke Detector] : smoke detector [Carbon Monoxide Detector] : carbon monoxide detector [With Patient/Caregiver] : , with patient/caregiver [Designated Healthcare Proxy] : Designated healthcare proxy [Name: ___] : Health Care Proxy's Name: [unfilled]  [Relationship: ___] : Relationship: [unfilled] [Aggressive treatment] : aggressive treatment [Last Verification Date: ___] : Last Verification Date: [unfilled] [I will adhere to the patient's wishes.] : I will adhere to the patient's wishes. [Time Spent: ___ minutes] : Time Spent: [unfilled] minutes [Fair] : ~his/her~ current health as fair  [Change in mental status noted] : Change in mental status noted [Behavior] : difficulty with behavior [Learning/Retaining New Information] : difficulty learning/retaining new information [Handling Complex Tasks] : difficulty handling complex tasks [Reasoning] : difficulty with reasoning [Safety elements used in home] : safety elements used in home [Seat Belt] :  uses seat belt [Sunscreen] : uses sunscreen [de-identified] : Neurology [Audit-CScore] : 0 [de-identified] : Light [de-identified] : Standard [[id number]] : 0 [Spatial Ability and Orientation] : denies difficulty with spatial ability and orientation [Sexually Active] : not sexually active [Reports changes in hearing] : Reports no changes in hearing [Reports changes in vision] : Reports no changes in vision [Travel to Developing Areas] : does not  travel to developing areas [MammogramDate] : 11/2023 [MammogramComments] : Will be going back in November [PapSmearDate] : 01/2023 [PapSmearComments] : Will be going this year [BoneDensityDate] : 01/2023 [BoneDensityComments] : Osteopenia [ColonoscopyComments] : Patient is due in 2026 [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

## 2024-09-17 NOTE — PHYSICAL EXAM
[No Acute Distress] : no acute distress [Well Nourished] : well nourished [Well Developed] : well developed [Well-Appearing] : well-appearing [Normal Sclera/Conjunctiva] : normal sclera/conjunctiva [PERRL] : pupils equal round and reactive to light [EOMI] : extraocular movements intact [Normal Outer Ear/Nose] : the outer ears and nose were normal in appearance [Normal Oropharynx] : the oropharynx was normal [No JVD] : no jugular venous distention [No Lymphadenopathy] : no lymphadenopathy [Supple] : supple [Thyroid Normal, No Nodules] : the thyroid was normal and there were no nodules present [No Respiratory Distress] : no respiratory distress  [No Accessory Muscle Use] : no accessory muscle use [Clear to Auscultation] : lungs were clear to auscultation bilaterally [Normal Rate] : normal rate  [Regular Rhythm] : with a regular rhythm [Normal S1, S2] : normal S1 and S2 [No Murmur] : no murmur heard [No Carotid Bruits] : no carotid bruits [No Abdominal Bruit] : a ~M bruit was not heard ~T in the abdomen [No Varicosities] : no varicosities [Pedal Pulses Present] : the pedal pulses are present [No Edema] : there was no peripheral edema [No Palpable Aorta] : no palpable aorta [No Extremity Clubbing/Cyanosis] : no extremity clubbing/cyanosis [Soft] : abdomen soft [Non Tender] : non-tender [Non-distended] : non-distended [No Masses] : no abdominal mass palpated [No HSM] : no HSM [Normal Bowel Sounds] : normal bowel sounds [Normal Posterior Cervical Nodes] : no posterior cervical lymphadenopathy [Normal Anterior Cervical Nodes] : no anterior cervical lymphadenopathy [No CVA Tenderness] : no CVA  tenderness [No Spinal Tenderness] : no spinal tenderness [No Joint Swelling] : no joint swelling [Grossly Normal Strength/Tone] : grossly normal strength/tone [No Rash] : no rash [Coordination Grossly Intact] : coordination grossly intact [No Focal Deficits] : no focal deficits [Normal Gait] : normal gait [Deep Tendon Reflexes (DTR)] : deep tendon reflexes were 2+ and symmetric [Normal Affect] : the affect was normal [Normal Insight/Judgement] : insight and judgment were intact [Declined Breast Exam] : declined breast exam  [Declined Rectal Exam] : declined rectal exam [Normal] : normal gait, coordination grossly intact, no focal deficits and deep tendon reflexes were 2+ and symmetric [Alert and Oriented x3] : oriented to person, place, and time [de-identified] : BMI is 24.9 [de-identified] : No flank pain [de-identified] : No sensorimotor deficits or aphasia noted [de-identified] : Cognitive decline noted

## 2024-10-02 ENCOUNTER — APPOINTMENT (OUTPATIENT)
Dept: NEUROSURGERY | Facility: CLINIC | Age: 74
End: 2024-10-02
Payer: MEDICARE

## 2024-10-02 VITALS
BODY MASS INDEX: 24.94 KG/M2 | WEIGHT: 132 LBS | DIASTOLIC BLOOD PRESSURE: 69 MMHG | OXYGEN SATURATION: 97 % | TEMPERATURE: 97.6 F | HEART RATE: 69 BPM | SYSTOLIC BLOOD PRESSURE: 104 MMHG

## 2024-10-02 PROCEDURE — 99214 OFFICE O/P EST MOD 30 MIN: CPT

## 2024-10-03 NOTE — HISTORY OF PRESENT ILLNESS
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   LE duplex showed bilateral LE DVT.  She was from [facility] to [facility] and had a fall there and was seen in [facility] ER again. She had CT Head w/o contrast which showed: "previously seen right posterior temporal parenchymal hemorrhage measuring 2.4 x 2.0 x 2.4 cm.   10/2/24:  pt arrives for an initial visit s/p 9/6/24 cerebral angiogram for right teporoparietal IPH of unknown etiology.   Right groin is well healed, no edema, warmth, erythema, or drainage.  She is feeling well. Has f/u for EEG review this week. Patient denies sudden severe headache, seizure, nausea, vomiting, fever, chest pain, palpitations, sob, vision, hearing, or speech disturbances, denies weakness, or focal weakness, denies balance issues.  8/14/24:  pt arrives with saqib Fernandez for f/u feeling well, denies sudden severe headache, speech impairment, vision problems or seizures. Completed medical treatment for BLE DVT 3 months ago not on any a/c, a/p. Has completed PT and now walking with supervision occasional near falls when distracted, otherwise no balance issues. Pt had f/u with Dr. Vigil for vision loss on left noted improvement and f/u in 6 months. Pt completed OT for her left peripheral vision issue with some improvement. Reports mild tremors in her left hand at times when holding things. Pt on Briviact for seizures managed by neurology.  20Jun2024 05:34PM MR Head w/wo IV Cont MR Head w/wo IV Cont: EXAM: [id number] - MR BRAIN WAW IC - ORDERED BY: MARIBEL MASON  PROCEDURE DATE: 06/20/2024 INTERPRETATION: MR BRAIN WITHOUT AND WITH IV CONTRAST Clinical information: Pt with TBI and SAH/ ? lesion for follow up; A MRI of the brain was performed both prior to and after the administration of intravenous gadolinium. TECHNIQUE: Precontrast imaging includes sagittal and axial T1, coronal and axial T2, axial FLAIR, axial SWI, and axial diffusion weighted imaging. Postcontrast imaging includes axial T1 and volumetric T1 weighted imaging. IV Contrast: Gadavist. 6 cc administered, 1.5 cc discarded.  COMPARISON: Exam is compared to prior studies of 3/25/2024 and 1/20/2024  FINDINGS: BRAIN PARENCHYMA: Right temporal parietal parenchymal hemorrhage continues to age/resolved. Largest component measures approximately 1.0 x 1.9 x 2.9 cm previously 1.5 x 2.4 x 3.8 cm. There is decreasing mass effect. Mild associated enhancement is seen which can be associated with a subacute hematoma. Adjacent gliosis is noted. Continued follow-up recommended if an underlying lesion remains of clinical concern.  Patient has moderate brain atrophy. Mild to moderate chronic periventricular and subcortical white matter ischemic changes are seen. No evidence of acute ischemia.  Corpus callosum well formed. No cerebellar tonsillar ectopia.  VENTRICLES: Stable ventriculomegaly with ex vacuo dilatation of the temporal and occipital horns of the right lateral ventricle. There is no midline shift.  EXTRA-AXIAL: No subdural or epidural collection. No extra-axial mass. Basal cisterns preserved.  OTHER: No air / fluid levels within the paranasal sinuses  IMPRESSION: Right temporal parietal parenchymal hemorrhage continues to age/resolve.  --- End of Report ---

## 2024-10-03 NOTE — REVIEW OF SYSTEMS
[Seizures] : convulsions [As Noted in HPI] : as noted in HPI [Eyesight Problems] : eyesight problems [Negative] : Musculoskeletal [Facial Weakness] : no facial weakness [Arm Weakness] : no arm weakness [Hand Weakness] : no hand weakness [Leg Weakness] : no leg weakness [Suicidal] : not suicidal [Loss Of Hearing] : no hearing loss [Chest Pain] : no chest pain [Palpitations] : no palpitations [Shortness Of Breath] : no shortness of breath [Abdominal Pain] : no abdominal pain [Vomiting] : no vomiting [Incontinence] : no incontinence [Skin Lesions] : no skin lesions [Muscle Weakness] : no muscle weakness [Easy Bleeding] : no tendency for easy bleeding [Easy Bruising] : no tendency for easy bruising [de-identified] : mild memory changes

## 2024-10-03 NOTE — END OF VISIT
[FreeTextEntry3] :  I have seen and evaluated patient with NP Jenna Tobar who has completed the documentation above.  During the examination, I assessed the patients condition and reviewed pertinent laboratory results and imaging studies, if available. I explained the findings to the patient, educated them about their risk factors, and discussed preventive measures. A comprehensive follow-up plan was established to monitor their progress and ensure ongoing management of their health concerns.  We had a long conversation Ms. Carpenter is an [place] ambidextrous (a left handed made right handed) [facility] woman who is an  and demonstrated me several techniques and her magnificent paintings and sculptures. It is truly impressive how she articulates the changes in her art following her brain bleed. Despite returning to her baseline, with the exception of her left hemianopia, she feels that her art is no longer the same, and art has always been her life. She describes the process of re-learning her craft and gaining an appreciation for different techniques, emphasizing that art is about perspective. Even after experiencing a stroke, she remains committed to continuing her creative journey. I have learned a valuable lesson from my patient today and admire her deeply. I decided to include this reflection in my notes in case she ever reads it.

## 2024-10-03 NOTE — PHYSICAL EXAM
[Clean] : clean [Dry] : dry [Well-Healed] : well-healed [Intact] : intact [No Drainage] : without drainage [Normal Skin] : normal [Oriented To Time, Place, And Person] : oriented to person, place, and time [Impaired Insight] : insight and judgment were intact [Hearing Threshold Finger Rub Not [place]] : hearing was normal [] : no respiratory distress [Edema] : there was no peripheral edema [Involuntary Movements] : no involuntary movements were seen [Motor Tone] : muscle strength and tone were normal [Skin Color & Pigmentation] : normal skin color and pigmentation [Erythema] : not erythematous [Tender] : not tender [Warm] : not warm [Indurated] : not indurated [FreeTextEntry1] : mildly off balance at times, able to ambulate indpendently has supervision of aide

## 2024-10-03 NOTE — HISTORY OF PRESENT ILLNESS
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   LE duplex showed bilateral LE DVT.  She was from [facility] to [facility] and had a fall there and was seen in [facility] ER again. She had CT Head w/o contrast which showed: "previously seen right posterior temporal parenchymal hemorrhage measuring 2.4 x 2.0 x 2.4 cm.   10/2/24:  pt arrives for an initial visit s/p 9/6/24 cerebral angiogram for right teporoparietal IPH of unknown etiology.   Right groin is well healed, no edema, warmth, erythema, or drainage.  She is feeling well. Has f/u for EEG review this week. Patient denies sudden severe headache, seizure, nausea, vomiting, fever, chest pain, palpitations, sob, vision, hearing, or speech disturbances, denies weakness, or focal weakness, denies balance issues.  8/14/24:  pt arrives with saqib Fernandez for f/u feeling well, denies sudden severe headache, speech impairment, vision problems or seizures. Completed medical treatment for BLE DVT 3 months ago not on any a/c, a/p. Has completed PT and now walking with supervision occasional near falls when distracted, otherwise no balance issues. Pt had f/u with Dr. Vigil for vision loss on left noted improvement and f/u in 6 months. Pt completed OT for her left peripheral vision issue with some improvement. Reports mild tremors in her left hand at times when holding things. Pt on Briviact for seizures managed by neurology.  20Jun2024 05:34PM MR Head w/wo IV Cont MR Head w/wo IV Cont: EXAM: [id number] - MR BRAIN WAW IC - ORDERED BY: MARIBEL MASON  PROCEDURE DATE: 06/20/2024 INTERPRETATION: MR BRAIN WITHOUT AND WITH IV CONTRAST Clinical information: Pt with TBI and SAH/ ? lesion for follow up; A MRI of the brain was performed both prior to and after the administration of intravenous gadolinium. TECHNIQUE: Precontrast imaging includes sagittal and axial T1, coronal and axial T2, axial FLAIR, axial SWI, and axial diffusion weighted imaging. Postcontrast imaging includes axial T1 and volumetric T1 weighted imaging. IV Contrast: Gadavist. 6 cc administered, 1.5 cc discarded.  COMPARISON: Exam is compared to prior studies of 3/25/2024 and 1/20/2024  FINDINGS: BRAIN PARENCHYMA: Right temporal parietal parenchymal hemorrhage continues to age/resolved. Largest component measures approximately 1.0 x 1.9 x 2.9 cm previously 1.5 x 2.4 x 3.8 cm. There is decreasing mass effect. Mild associated enhancement is seen which can be associated with a subacute hematoma. Adjacent gliosis is noted. Continued follow-up recommended if an underlying lesion remains of clinical concern.  Patient has moderate brain atrophy. Mild to moderate chronic periventricular and subcortical white matter ischemic changes are seen. No evidence of acute ischemia.  Corpus callosum well formed. No cerebellar tonsillar ectopia.  VENTRICLES: Stable ventriculomegaly with ex vacuo dilatation of the temporal and occipital horns of the right lateral ventricle. There is no midline shift.  EXTRA-AXIAL: No subdural or epidural collection. No extra-axial mass. Basal cisterns preserved.  OTHER: No air / fluid levels within the paranasal sinuses  IMPRESSION: Right temporal parietal parenchymal hemorrhage continues to age/resolve.  --- End of Report ---

## 2024-10-03 NOTE — PHYSICAL EXAM
[Clean] : clean [Dry] : dry [Well-Healed] : well-healed [Intact] : intact [No Drainage] : without drainage [Normal Skin] : normal [Oriented To Time, Place, And Person] : oriented to person, place, and time [Impaired Insight] : insight and judgment were intact [Hearing Threshold Finger Rub Not [place]] : hearing was normal [] : no respiratory distress [Edema] : there was no peripheral edema [Involuntary Movements] : no involuntary movements were seen [Motor Tone] : muscle strength and tone were normal [Skin Color & Pigmentation] : normal skin color and pigmentation [Erythema] : not erythematous [Tender] : not tender [Warm] : not warm [Indurated] : not indurated [FreeTextEntry1] : mildly off balance at times, able to ambulate indpendently has supervision of aide

## 2024-10-03 NOTE — END OF VISIT
[FreeTextEntry3] :  I have seen and evaluated patient with NP Jenna Tobar who has completed the documentation above.  During the examination, I assessed the patients condition and reviewed pertinent laboratory results and imaging studies, if available. I explained the findings to the patient, educated them about their risk factors, and discussed preventive measures. A comprehensive follow-up plan was established to monitor their progress and ensure ongoing management of their health concerns.  We had a long conversation Ms. Carpenter is an [place] ambidextrous (a left handed made right handed) [facility] woman who is an  and demonstrated me several techniques and her magnificent paintings and sculptures. It is truly impressive how she articulates the changes in her art following her brain bleed. Despite returning to her baseline, with the exception of her left hemianopia, she feels that her art is no longer the same, and art has always been her life. She describes the process of re-learning her craft and gaining an appreciation for different techniques, emphasizing that art is about perspective. Even after experiencing a stroke, she remains committed to continuing her creative journey. I have learned a valuable lesson from my patient today and admire her deeply. I decided to include this reflection in my notes in case she ever reads it.

## 2024-10-03 NOTE — END OF VISIT
[FreeTextEntry3] :  I have seen and evaluated patient with NP Jenna Tobar who has completed the documentation above.  During the examination, I assessed the patients condition and reviewed pertinent laboratory results and imaging studies, if available. I explained the findings to the patient, educated them about their risk factors, and discussed preventive measures. A comprehensive follow-up plan was established to monitor their progress and ensure ongoing management of their health concerns.  We had a long conversation Ms. Carpenter is an [place] ambidextrous (a left handed made right handed) [facility] woman who is an  and demonstrated me several techniques and her magnificent paintings and sculptures. It is truly impressive how she articulates the changes in her art following her brain bleed. Despite returning to her baseline, with the exception of her left hemianopia, she feels that her art is no longer the same, and art has always been her life. She describes the process of re-learning her craft and gaining an appreciation for different techniques, emphasizing that art is about perspective. Even after experiencing a stroke, she remains committed to continuing her creative journey. I have learned a valuable lesson from my patient today and admire her deeply. I decided to include this reflection in my notes in case she ever reads it.

## 2024-10-03 NOTE — HISTORY OF PRESENT ILLNESS
[FreeTextEntry1] : 3 yo female with PMHx of macular degeneration, bilateral below knee DVT, found to have right temporoparietal IPH of unknown etiology.   LE duplex showed bilateral LE DVT.  She was from [facility] to [facility] and had a fall there and was seen in [facility] ER again. She had CT Head w/o contrast which showed: "previously seen right posterior temporal parenchymal hemorrhage measuring 2.4 x 2.0 x 2.4 cm.   10/2/24:  pt arrives for an initial visit s/p 9/6/24 cerebral angiogram for right teporoparietal IPH of unknown etiology.   Right groin is well healed, no edema, warmth, erythema, or drainage.  She is feeling well. Has f/u for EEG review this week. Patient denies sudden severe headache, seizure, nausea, vomiting, fever, chest pain, palpitations, sob, vision, hearing, or speech disturbances, denies weakness, or focal weakness, denies balance issues.  8/14/24:  pt arrives with saqib Fernandez for f/u feeling well, denies sudden severe headache, speech impairment, vision problems or seizures. Completed medical treatment for BLE DVT 3 months ago not on any a/c, a/p. Has completed PT and now walking with supervision occasional near falls when distracted, otherwise no balance issues. Pt had f/u with Dr. Vigil for vision loss on left noted improvement and f/u in 6 months. Pt completed OT for her left peripheral vision issue with some improvement. Reports mild tremors in her left hand at times when holding things. Pt on Briviact for seizures managed by neurology.  20Jun2024 05:34PM MR Head w/wo IV Cont MR Head w/wo IV Cont: EXAM: [id number] - MR BRAIN WAW IC - ORDERED BY: MARIBEL MASON  PROCEDURE DATE: 06/20/2024 INTERPRETATION: MR BRAIN WITHOUT AND WITH IV CONTRAST Clinical information: Pt with TBI and SAH/ ? lesion for follow up; A MRI of the brain was performed both prior to and after the administration of intravenous gadolinium. TECHNIQUE: Precontrast imaging includes sagittal and axial T1, coronal and axial T2, axial FLAIR, axial SWI, and axial diffusion weighted imaging. Postcontrast imaging includes axial T1 and volumetric T1 weighted imaging. IV Contrast: Gadavist. 6 cc administered, 1.5 cc discarded.  COMPARISON: Exam is compared to prior studies of 3/25/2024 and 1/20/2024  FINDINGS: BRAIN PARENCHYMA: Right temporal parietal parenchymal hemorrhage continues to age/resolved. Largest component measures approximately 1.0 x 1.9 x 2.9 cm previously 1.5 x 2.4 x 3.8 cm. There is decreasing mass effect. Mild associated enhancement is seen which can be associated with a subacute hematoma. Adjacent gliosis is noted. Continued follow-up recommended if an underlying lesion remains of clinical concern.  Patient has moderate brain atrophy. Mild to moderate chronic periventricular and subcortical white matter ischemic changes are seen. No evidence of acute ischemia.  Corpus callosum well formed. No cerebellar tonsillar ectopia.  VENTRICLES: Stable ventriculomegaly with ex vacuo dilatation of the temporal and occipital horns of the right lateral ventricle. There is no midline shift.  EXTRA-AXIAL: No subdural or epidural collection. No extra-axial mass. Basal cisterns preserved.  OTHER: No air / fluid levels within the paranasal sinuses  IMPRESSION: Right temporal parietal parenchymal hemorrhage continues to age/resolve.  --- End of Report ---

## 2024-10-03 NOTE — REVIEW OF SYSTEMS
[Seizures] : convulsions [As Noted in HPI] : as noted in HPI [Eyesight Problems] : eyesight problems [Negative] : Musculoskeletal [Facial Weakness] : no facial weakness [Arm Weakness] : no arm weakness [Hand Weakness] : no hand weakness [Leg Weakness] : no leg weakness [Suicidal] : not suicidal [Loss Of Hearing] : no hearing loss [Chest Pain] : no chest pain [Palpitations] : no palpitations [Shortness Of Breath] : no shortness of breath [Abdominal Pain] : no abdominal pain [Vomiting] : no vomiting [Incontinence] : no incontinence [Skin Lesions] : no skin lesions [Muscle Weakness] : no muscle weakness [Easy Bleeding] : no tendency for easy bleeding [Easy Bruising] : no tendency for easy bruising [de-identified] : mild memory changes

## 2024-10-03 NOTE — ASSESSMENT
[FreeTextEntry1] : s/p 9/6/24 cerebral angiogram for right teporoparietal IPH of unknown etiology.   Right groin is well healed, no edema, warmth, erythema, or drainage.  She is feeling well. Has f/u for EEG review this week.  Neuroophthalmology consulted - diagnosis of Left homonymous bilateral field defects  Patient was in PT/OT - does not want referral at this point.  PLAN:  RTO 6 months Neurographic art project  Drawing of blood vessels

## 2024-10-03 NOTE — REVIEW OF SYSTEMS
[Seizures] : convulsions [As Noted in HPI] : as noted in HPI [Eyesight Problems] : eyesight problems [Negative] : Musculoskeletal [Facial Weakness] : no facial weakness [Arm Weakness] : no arm weakness [Hand Weakness] : no hand weakness [Leg Weakness] : no leg weakness [Suicidal] : not suicidal [Loss Of Hearing] : no hearing loss [Chest Pain] : no chest pain [Palpitations] : no palpitations [Shortness Of Breath] : no shortness of breath [Abdominal Pain] : no abdominal pain [Vomiting] : no vomiting [Incontinence] : no incontinence [Skin Lesions] : no skin lesions [Muscle Weakness] : no muscle weakness [Easy Bleeding] : no tendency for easy bleeding [Easy Bruising] : no tendency for easy bruising [de-identified] : mild memory changes

## 2024-10-04 ENCOUNTER — APPOINTMENT (OUTPATIENT)
Dept: NEUROLOGY | Facility: CLINIC | Age: 74
End: 2024-10-04

## 2024-10-04 VITALS
BODY MASS INDEX: 24.92 KG/M2 | HEART RATE: 63 BPM | SYSTOLIC BLOOD PRESSURE: 118 MMHG | DIASTOLIC BLOOD PRESSURE: 78 MMHG | HEIGHT: 61 IN | WEIGHT: 132 LBS

## 2024-10-04 PROCEDURE — 99214 OFFICE O/P EST MOD 30 MIN: CPT

## 2024-10-04 NOTE — ASSESSMENT
[FreeTextEntry1] : IMPRESSION 73F with PMHx of macular degeneration, bilateral below knee DVT and recent admission for right temporoparietal IPH presenting for headache. Patient admitted in late December 2023 for confusion and found to have right temporoparietal IPH with mild midline shift.  Screening LE duplex showed bilateral LE DVT. She was started on prophylactic Lovenox and discharged to acute rehab at [facility]. She was eventually discharged from [facility] to [facility] and had a fall there and was seen in [facility] ER. She had CT Head w/o contrast which showed stable right posterior temporal parenchymal hemorrhage. Pt now at [facility] rehab,  Patient still with dull headache, but tolerable and improved with Tylenol. Pt doing therapy at rehab, walking well with no imbalance. Pt has loss of left peripheral vision. Otherwise neurologically intact.  Impression: IPH unclear etiology, symptoms improving, EEG w/out epileptogenicity   PLAN: Wean BRV 2 Weeks: 75mg every other day in am, 75mg every evening 2 Weeks: 75mg once daily Then OFF Monitor for Focal seizure symptoms, counseled

## 2024-10-04 NOTE — HISTORY OF PRESENT ILLNESS
[FreeTextEntry1] : **10/4/24*** Presents for follow up. Remains with no seizures. No AE to meds. No imbalance, dizziness, falls. had mood changes in beginning now resolved.  Following closely with stroke neurology / neuro-intervention.  Saw neuro - ophthalmology, Field cut improving. Not driving. No longer working with PT. Doing some exercises at home. Walks a lot, no assistive devices.  9/6/24 cerebral angiogram EEG 9/15/24:  Routine: Continuous focal slowing over the right temporal region. No clear epileptiform pattern, but there were occasional right temporal sharp transients, not clearly epileptiform, of unclear clinical significance. No seizure present. Baseline study for ambulatory recording. Ambulatory: - Focal structural or functional cerebral dysfunction in the right parietal, posterior temporal, occipital region No epileptiform pattern or seizure present. MRI 6/20/2024: IMPRESSION: Right temporal lobe hematoma is smaller in size with stable to improved adjacent vasogenic edema. 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 No abnormal enhancement.  ***7/19/24*** 73F with PMHx of macular degeneration, bilateral below knee DVT admitted in late December 2023 for confusion and found to have right temporoparietal IPH with mild midline shift. Patient was in the [facility]. She was treated conservatively. Screening LE duplex showed bilateral LE DVT. She was started on prophylactic Lovenox and discharged to acute rehab at [facility]. She was eventually discharged from [facility] to [facility] and had a fall there and was seen in [facility] ER again. .She had CT Head w/o contrast which showed: "previously seen right posterior temporal parenchymal hemorrhage measuring 2.4 x 2.0 x 2.4 cm. Patient still with dull headache, but tolerable and improved with Tylenol.  Today Mrs. Carpenter present on wheelchair, believes that he improved overall. Denies headache, speech impairment, vision problems or seizures. Pt doing therapy at rehab, walking well with no imbalance. Pt report losing her left peripheral vision.

## 2024-10-04 NOTE — PHYSICAL EXAM
[General Appearance - Alert] : alert [General Appearance - In No Acute Distress] : in no acute distress [General Appearance - Well Nourished] : well nourished [General Appearance - Well-Appearing] : healthy appearing [Oriented To Time, Place, And Person] : oriented to person, place, and time [Impaired Insight] : insight and judgment were intact [Affect] : the affect was normal [Memory Recent] : recent memory was not impaired [Neck Appearance] : the appearance of the neck was normal [No CVA Tenderness] : no ~M costovertebral angle tenderness [No Spinal Tenderness] : no spinal tenderness [Person] : oriented to person [Place] : oriented to place [Time] : oriented to time [Short Term Intact] : short term memory intact [Cranial Nerves Oculomotor (III)] : extraocular motion intact [Cranial Nerves Trigeminal (V)] : facial sensation intact symmetrically [Cranial Nerves Facial (VII)] : face symmetrical [Cranial Nerves Vestibulocochlear (VIII)] : hearing was intact bilaterally [Cranial Nerves Accessory (XI - Cranial And Spinal)] : head turning and shoulder shrug symmetric [Cranial Nerves Hypoglossal (XII)] : there was no tongue deviation with protrusion [Sclera] : the sclera and conjunctiva were normal [Outer Ear] : the ears and nose were normal in appearance [Both Tympanic Membranes Were Examined] : both tympanic membranes were normal [] : no respiratory distress [Respiration, Rhythm And Depth] : normal respiratory rhythm and effort [Apical Impulse] : the apical impulse was normal [Heart Rate And Rhythm] : heart rate was normal and rhythm regular [Arterial Pulses Carotid] : carotid pulses were normal with no bruits [Abdominal Aorta] : the abdominal aorta was normal [Bowel Sounds] : normal bowel sounds [Abdomen Soft] : soft [Involuntary Movements] : no involuntary movements were seen [Skin Color & Pigmentation] : normal skin color and pigmentation [FreeTextEntry5] : loss of left peripheral vision  [FreeTextEntry6] : GUSTAVO [FreeTextEntry8] : no drift, no imbalance at walking [FreeTextEntry1] : loss of left peripheral vision

## 2024-10-04 NOTE — RESULTS/DATA
[FreeTextEntry1] : ACC: [id number] EXAM: CT BRAIN STROKE PROTOCOL ORDERED BY: GILDA SUH  PROCEDURE DATE: 01/27/2024    INTERPRETATION: CLINICAL INFORMATION: Patient with known right parietal intraparenchymal hemorrhage (7 days ago) presenting with new onset headache since 7:00 PM. NIH stroke scale 0.  TECHNIQUE: Noncontrast axial CT images were acquired through the head.  COMPARISON STUDY: CT head from 1/20/2024  FINDINGS:  Interval decrease in size and conspicuity of a 1.9 x 1.7 x 1.9 cm right posterior temporal parenchymal hemorrhage, previously measuring 2.4 x 3.0 x 3.4 cm. Redemonstration of surrounding edema. No new or increased intracranial hemorrhage. No midline shift, extra-axial collection, or hydrocephalus. Mild patchy hypodensities within the periventricular and subcortical white matter, although nonspecific, likely reflect chronic microvascular disease. Cerebral volume loss contributes to prominence of the ventricles and sulci.   The calvarium is intact. The soft tissues of the scalp are unremarkable. Aerated secretions within the right ethmoid and sphenoid sinuses. The mastoid air cells and middle ear cavities are clear. Bilateral lens replacements.  IMPRESSION:  Interval expected evolution of a right posterior temporal parenchymal hemorrhage with decrease size. No new or increased intracranial hemorrhage or mass effect.  Findings discussed results with Dr. Merissa Pimentel, by Dr. You Mack 8:27 PM on 1/27/2024, with read back.  ACC: [id number] EXAM: CT ANGIO NECK STROKE PROTCL IC ORDERED BY: GILDA SUH  ACC: [id number] EXAM: CT ANGIO BRAIN STROKE PROTC IC ORDERED BY: GILDA SUH  PROCEDURE DATE: 01/27/2024    INTERPRETATION: CLINICAL INFORMATION: Patient with known right temporal intraparenchymal hemorrhage (a week ago), now with new onset headache.  TECHNIQUE: Noncontrast axial CT images were acquired through the head. Two-dimensional sagittal and coronal reformats were generated. Angiographic axial CT images were acquired of the head and neck. Three-dimensional maximum intensity projection reformats were generated.  70 cc of Omnipaque-350 mg/ml were administered intravenously, without immediate complication.  COMPARISON: CT head and neck from 1/20/2024  FINDINGS:  CT ANGIOGRAPHY NECK: There is no evidence for significant stenosis or major vessel occlusion involving the bilateral carotid arteries.  There is no evidence for significant stenosis or major vessel occlusion involving the bilateral vertebral arteries.  Hypoattenuating subcentimeter left thyroid nodules.  Visualized osseous structures are unremarkable.  CT ANGIOGRAPHY BRAIN: There is no evidence for significant stenosis, major vessel occlusion, or aneurysm about the [place] of [place].  There is no evidence for major vessel occlusion, or aneurysm involving the vertebrobasilar system.  No enlarged vascular lesions or clusters of abnormal vessels are noted to suggest an arterial venous malformation within the field-of-view.  Visualized portions of the superficial and deep venous systems are unremarkable.  IMPRESSION:  CT ANGIOGRAPHY NECK: No evidence of hemodynamically significant stenosis using NASCET criteria. Patent vertebral arteries. No evidence of vascular dissection.  CT ANGIOGRAPHY BRAIN: No major vessel occlusion or proximal stenosis.

## 2024-10-04 NOTE — RESULTS/DATA
[FreeTextEntry1] : ACC: [id number] EXAM: CT BRAIN STROKE PROTOCOL ORDERED BY: GILDA SUH  PROCEDURE DATE: 01/27/2024    INTERPRETATION: CLINICAL INFORMATION: Patient with known right parietal intraparenchymal hemorrhage (7 days ago) presenting with new onset headache since 7:00 PM. NIH stroke scale 0.  TECHNIQUE: Noncontrast axial CT images were acquired through the head.  COMPARISON STUDY: CT head from 1/20/2024  FINDINGS:  Interval decrease in size and conspicuity of a 1.9 x 1.7 x 1.9 cm right posterior temporal parenchymal hemorrhage, previously measuring 2.4 x 3.0 x 3.4 cm. Redemonstration of surrounding edema. No new or increased intracranial hemorrhage. No midline shift, extra-axial collection, or hydrocephalus. Mild patchy hypodensities within the periventricular and subcortical white matter, although nonspecific, likely reflect chronic microvascular disease. Cerebral volume loss contributes to prominence of the ventricles and sulci.   The calvarium is intact. The soft tissues of the scalp are unremarkable. Aerated secretions within the right ethmoid and sphenoid sinuses. The mastoid air cells and middle ear cavities are clear. Bilateral lens replacements.  IMPRESSION:  Interval expected evolution of a right posterior temporal parenchymal hemorrhage with decrease size. No new or increased intracranial hemorrhage or mass effect.  Findings discussed results with Dr. Merissa Pimentel, by Dr. You Mack 8:27 PM on 1/27/2024, with read back.  ACC: [id number] EXAM: CT ANGIO NECK STROKE PROTCL IC ORDERED BY: GILDA SUH  ACC: [id number] EXAM: CT ANGIO BRAIN STROKE PROTC IC ORDERED BY: GILDA SUH  PROCEDURE DATE: 01/27/2024    INTERPRETATION: CLINICAL INFORMATION: Patient with known right temporal intraparenchymal hemorrhage (a week ago), now with new onset headache.  TECHNIQUE: Noncontrast axial CT images were acquired through the head. Two-dimensional sagittal and coronal reformats were generated. Angiographic axial CT images were acquired of the head and neck. Three-dimensional maximum intensity projection reformats were generated.  70 cc of Omnipaque-350 mg/ml were administered intravenously, without immediate complication.  COMPARISON: CT head and neck from 1/20/2024  FINDINGS:  CT ANGIOGRAPHY NECK: There is no evidence for significant stenosis or major vessel occlusion involving the bilateral carotid arteries.  There is no evidence for significant stenosis or major vessel occlusion involving the bilateral vertebral arteries.  Hypoattenuating subcentimeter left thyroid nodules.  Visualized osseous structures are unremarkable.  CT ANGIOGRAPHY BRAIN: There is no evidence for significant stenosis, major vessel occlusion, or aneurysm about the [place] of [place].  There is no evidence for major vessel occlusion, or aneurysm involving the vertebrobasilar system.  No enlarged vascular lesions or clusters of abnormal vessels are noted to suggest an arterial venous malformation within the field-of-view.  Visualized portions of the superficial and deep venous systems are unremarkable.  IMPRESSION:  CT ANGIOGRAPHY NECK: No evidence of hemodynamically significant stenosis using NASCET criteria. Patent vertebral arteries. No evidence of vascular dissection.  CT ANGIOGRAPHY BRAIN: No major vessel occlusion or proximal stenosis.

## 2024-10-04 NOTE — HISTORY OF PRESENT ILLNESS
[FreeTextEntry1] : **10/4/24*** Presents for follow up. Remains with no seizures. No AE to meds. No imbalance, dizziness, falls. had mood changes in beginning now resolved.  Following closely with stroke neurology / neuro-intervention.  Saw neuro - ophthalmology, Field cut improving. Not driving. No longer working with PT. Doing some exercises at home. Walks a lot, no assistive devices.  9/6/24 cerebral angiogram EEG 9/15/24:  Routine: Continuous focal slowing over the right temporal region. No clear epileptiform pattern, but there were occasional right temporal sharp transients, not clearly epileptiform, of unclear clinical significance. No seizure present. Baseline study for ambulatory recording. Ambulatory: - Focal structural or functional cerebral dysfunction in the right parietal, posterior temporal, occipital region No epileptiform pattern or seizure present. MRI 6/20/2024: IMPRESSION: Right temporal lobe hematoma is smaller in size with stable to improved adjacent vasogenic edema. 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 No abnormal enhancement.  ***7/19/24*** 73F with PMHx of macular degeneration, bilateral below knee DVT admitted in late December 2023 for confusion and found to have right temporoparietal IPH with mild midline shift. Patient was in the NSCU. She was treated conservatively. Screening LE duplex showed bilateral LE DVT. She was started on prophylactic Lovenox and discharged to acute rehab at [facility]. She was eventually discharged from [facility] to [facility] and had a fall there and was seen in [facility] ER again. .She had CT Head w/o contrast which showed: "previously seen right posterior temporal parenchymal hemorrhage measuring 2.4 x 2.0 x 2.4 cm. Patient still with dull headache, but tolerable and improved with Tylenol.  Today Mrs. Carpenter present on wheelchair, believes that he improved overall. Denies headache, speech impairment, vision problems or seizures. Pt doing therapy at rehab, walking well with no imbalance. Pt report losing her left peripheral vision.

## 2024-10-10 ENCOUNTER — RX RENEWAL (OUTPATIENT)
Age: 74
End: 2024-10-10

## 2024-10-30 ENCOUNTER — RX RENEWAL (OUTPATIENT)
Age: 74
End: 2024-10-30

## 2024-10-30 RX ORDER — SERTRALINE HYDROCHLORIDE 50 MG/1
50 TABLET, FILM COATED ORAL
Qty: 90 | Refills: 1 | Status: ACTIVE | COMMUNITY
Start: 2024-10-30 | End: 1900-01-01

## 2024-11-04 ENCOUNTER — APPOINTMENT (OUTPATIENT)
Dept: MRI IMAGING | Facility: CLINIC | Age: 74
End: 2024-11-04
Payer: MEDICARE

## 2024-11-04 PROCEDURE — A9585: CPT

## 2024-11-04 PROCEDURE — 70553 MRI BRAIN STEM W/O & W/DYE: CPT

## 2024-11-04 NOTE — PROGRESS NOTE ADULT - SUBJECTIVE AND OBJECTIVE BOX
Hospitalist  Dr. Bonnie Jasmine  Progress note    CC: Patient is a 73y old  Female who presents with a chief complaint of ICH (25 Dec 2023 09:15)    Interval History:  Patient seen and examined at bedside. Slept well. Asking for some eye drop. Does not want powder metamucil. Per RN, had a BM today.     No overnight events    ALLERGIES:  No Known Allergies  oxycodone (Nausea)    MEDICATIONS  (STANDING):  bisacodyl 5 milliGRAM(s) Oral daily  brivaracetam 50 milliGRAM(s) Oral two times a day  enoxaparin Injectable 40 milliGRAM(s) SubCutaneous <User Schedule>  famotidine    Tablet 20 milliGRAM(s) Oral two times a day  gabapentin 300 milliGRAM(s) Oral at bedtime  ketorolac 0.5% Ophthalmic Solution 1 Drop(s) Left EYE daily  lidocaine   4% Patch 1 Patch Transdermal <User Schedule>  losartan 25 milliGRAM(s) Oral daily  polyethylene glycol 3350 17 Gram(s) Oral two times a day  potassium chloride    Tablet ER 40 milliEquivalent(s) Oral two times a day  senna 2 Tablet(s) Oral at bedtime  sodium chloride 0.65% Nasal 1 Spray(s) Both Nostrils two times a day    MEDICATIONS  (PRN):  acetaminophen     Tablet .. 650 milliGRAM(s) Oral every 6 hours PRN Temp greater or equal to 38C (100.4F), Mild Pain (1 - 3)  aluminum hydroxide/magnesium hydroxide/simethicone Suspension 30 milliLiter(s) Oral every 6 hours PRN Dyspepsia  artificial tears (preservative free) Ophthalmic Solution 1 Drop(s) Both EYES every 4 hours PRN Dry Eyes  bisacodyl Suppository 10 milliGRAM(s) Rectal daily PRN Constipation  calcium carbonate    500 mG (Tums) Chewable 1 Tablet(s) Chew three times a day PRN Heartburn  ondansetron   Disintegrating Tablet 4 milliGRAM(s) Oral every 6 hours PRN Nausea and/or Vomiting  traMADol 50 milliGRAM(s) Oral every 4 hours PRN Severe Pain (7 - 10)  traMADol 25 milliGRAM(s) Oral every 4 hours PRN Moderate Pain (4 - 6)    Vital Signs Last 24 Hrs  T(C): 36.7 (02 Jan 2024 08:20), Max: 36.7 (01 Jan 2024 20:20)  T(F): 98.1 (02 Jan 2024 08:20), Max: 98.1 (02 Jan 2024 08:20)  HR: 88 (02 Jan 2024 08:20) (75 - 88)  BP: 102/71 (02 Jan 2024 08:20) (102/71 - 103/66)  BP(mean): --  RR: 16 (02 Jan 2024 08:20) (16 - 16)  SpO2: 98% (02 Jan 2024 08:20) (98% - 98%)    Parameters below as of 02 Jan 2024 08:20  Patient On (Oxygen Delivery Method): room air      PHYSICAL EXAM:  GENERAL: NAD, sitting in chair  CHEST/LUNG: Clear to percussion bilaterally; No rales, rhonchi, wheezing, or rubs; normal respiratory effort, no intercostal retractions  HEART: Regular rate and rhythm; No murmurs, rubs, or gallops; No pitting edema  ABDOMEN: Soft, Nontender, Nondistended; Bowel sounds present; No HSM or masses  MUSCULOSKELETAL/EXTREMITIES:  2+ Peripheral Pulses, No clubbing or digital cyanosis; FROM of extremities (pain, crepitation or contracture)  PSYCH: Appropriate affect, Alert & Awake    LABS:                                   15.0   6.65  )-----------( 316      ( 01 Jan 2024 06:41 )             42.4   01-01    137  |  103  |  14  ----------------------------<  87  3.7   |  23  |  0.62    Ca    9.2      01 Jan 2024 06:41    TPro  6.4  /  Alb  3.0<L>  /  TBili  0.4  /  DBili  x   /  AST  24  /  ALT  49<H>  /  AlkPhos  63  01-01      Urinalysis Basic - ( 25 Dec 2023 06:00 )  Color: x /Appearance: x / SG: x / pH: x  Gluc: 102 mg/dL / Ketone: x  / Bili: x / Urobili: x   Blood: x / Protein: x / Nitrite: x   Leuk Esterase: x / RBC: x / WBC x   Sq Epi: x / Non Sq Epi: x / Bacteria: x    Culture - Urine (collected 19 Dec 2023 18:10)  Source: Clean Catch Clean Catch (Midstream)  Final Report (24 Dec 2023 13:10):    >100,000 CFU/ml Escherichia coli ESBL    >100,000 CFU/ml Enterococcus faecalis  Organism: Escherichia coli ESBL  Enterococcus faecalis (24 Dec 2023 13:10)  Organism: Enterococcus faecalis (24 Dec 2023 13:10)      Method Type: MATTHEW      -  Ampicillin: S <=2 Predicts results to ampicillin/sulbactam, amoxacillin-clavulanate and  piperacillin-tazobactam.      -  Ciprofloxacin: I 2      -  Levofloxacin: S 2      -  Nitrofurantoin: S <=32 Should not be used to treat pyelonephritis.      -  Tetracycline: S <=1      -  Vancomycin: S 2  Organism: Escherichia coli ESBL (24 Dec 2023 13:10)      Method Type: MATTHEW      -  Amoxicillin/Clavulanic Acid: S <=8/4      -  Ampicillin: R >16 These ampicillin results predict results for amoxicillin      -  Ampicillin/Sulbactam: S 8/4      -  Aztreonam: R 16      -  Cefazolin: R >16 For uncomplicated UTI with K. pneumoniae, E. coli, or P. mirablis: MATTHEW <=16 is sensitive and MATTHEW >=32 is resistant. This also predicts results for oral agents cefaclor, cefdinir, cefpodoxime, cefprozil, cefuroxime axetil, cephalexin and locarbef for uncomplicated UTI. Note that some isolates may be susceptible to these agents while testing resistant to cefazolin.      -  Cefepime: R >16      -  Ceftriaxone: R >32      -  Cefuroxime: R >16      -  Ciprofloxacin: R >2      -  Ertapenem: S <=0.5      -  Gentamicin: S <=2      -  Imipenem: S <=1      -  Levofloxacin: R >4      -  Meropenem: S <=1      -  Nitrofurantoin: S <=32 Should not be used to treat pyelonephritis      -  Piperacillin/Tazobactam: S <=8      -  Tobramycin: S <=2      -  Trimethoprim/Sulfamethoxazole: S <=0.5/9.5    COVID-19 PCR: NotDetec (12-20-23 @ 22:30)    Care Discussed with Consultants/Other Providers: Yes   Patient was seen today for an educational visit:  -Reviewed education related to heart failure including education booklet entitled \"Living well with Heart Failure\".  -Reviewed patient contributing factors leading to heart failure  -Provided information on CHF clinic, services available to manage fluid volume and CHF stoplight  -Heart Failure GDMT medications reviewed  -Reviewed low sodium diet (2,000 mg) and 64 oz fluid restriction  -Educated on importance of monitoring daily weight, pt stated they do have a scale at home  -Information and instruction to access the [facility] My HF guide/Heart Failure interactive workbook via the link and QR code was provided  -Made follow-up appointment in clinic next week: 24 @ 1:30 pm with cardiology APN  -BMP, BNP to be drawn in one week  Total time spent educatin minutes    Flu vaccine: 24  Pneumo vaccine: 18    Rosario Vo, RN  Heart Failure Nurse Navigator

## 2024-11-15 ENCOUNTER — APPOINTMENT (OUTPATIENT)
Dept: NEUROLOGY | Facility: CLINIC | Age: 74
End: 2024-11-15

## 2024-11-27 ENCOUNTER — RX RENEWAL (OUTPATIENT)
Age: 74
End: 2024-11-27

## 2024-11-29 ENCOUNTER — NON-APPOINTMENT (OUTPATIENT)
Age: 74
End: 2024-11-29

## 2025-01-03 ENCOUNTER — APPOINTMENT (OUTPATIENT)
Dept: NEUROSURGERY | Facility: CLINIC | Age: 75
End: 2025-01-03

## 2025-01-03 PROCEDURE — 99212 OFFICE O/P EST SF 10 MIN: CPT

## 2025-01-16 ENCOUNTER — RX RENEWAL (OUTPATIENT)
Age: 75
End: 2025-01-16

## 2025-02-01 ENCOUNTER — NON-APPOINTMENT (OUTPATIENT)
Age: 75
End: 2025-02-01

## 2025-02-10 ENCOUNTER — RX RENEWAL (OUTPATIENT)
Age: 75
End: 2025-02-10

## 2025-02-24 ENCOUNTER — APPOINTMENT (OUTPATIENT)
Dept: INTERNAL MEDICINE | Facility: CLINIC | Age: 75
End: 2025-02-24
Payer: MEDICARE

## 2025-02-24 VITALS
HEART RATE: 78 BPM | TEMPERATURE: 97.8 F | OXYGEN SATURATION: 97 % | HEIGHT: 61 IN | RESPIRATION RATE: 17 BRPM | WEIGHT: 127 LBS | DIASTOLIC BLOOD PRESSURE: 62 MMHG | BODY MASS INDEX: 23.98 KG/M2 | SYSTOLIC BLOOD PRESSURE: 112 MMHG

## 2025-02-24 DIAGNOSIS — F43.22 ADJUSTMENT DISORDER WITH ANXIETY: ICD-10-CM

## 2025-02-24 DIAGNOSIS — K14.0 GLOSSITIS: ICD-10-CM

## 2025-02-24 DIAGNOSIS — M17.0 BILATERAL PRIMARY OSTEOARTHRITIS OF KNEE: ICD-10-CM

## 2025-02-24 DIAGNOSIS — J44.9 CHRONIC OBSTRUCTIVE PULMONARY DISEASE, UNSPECIFIED: ICD-10-CM

## 2025-02-24 DIAGNOSIS — R10.13 EPIGASTRIC PAIN: ICD-10-CM

## 2025-02-24 DIAGNOSIS — F41.9 ANXIETY DISORDER, UNSPECIFIED: ICD-10-CM

## 2025-02-24 DIAGNOSIS — I61.9 NONTRAUMATIC INTRACEREBRAL HEMORRHAGE, UNSPECIFIED: ICD-10-CM

## 2025-02-24 DIAGNOSIS — E74.39 OTHER DISORDERS OF INTESTINAL CARBOHYDRATE ABSORPTION: ICD-10-CM

## 2025-02-24 DIAGNOSIS — M81.0 AGE-RELATED OSTEOPOROSIS W/OUT CURRENT PATHOLOGICAL FRACTURE: ICD-10-CM

## 2025-02-24 DIAGNOSIS — B37.0 CANDIDAL STOMATITIS: ICD-10-CM

## 2025-02-24 DIAGNOSIS — Z86.39 PERSONAL HISTORY OF OTHER ENDOCRINE, NUTRITIONAL AND METABOLIC DISEASE: ICD-10-CM

## 2025-02-24 DIAGNOSIS — K21.9 GASTRO-ESOPHAGEAL REFLUX DISEASE W/OUT ESOPHAGITIS: ICD-10-CM

## 2025-02-24 DIAGNOSIS — Z91.89 OTHER SPECIFIED PERSONAL RISK FACTORS, NOT ELSEWHERE CLASSIFIED: ICD-10-CM

## 2025-02-24 PROCEDURE — 99215 OFFICE O/P EST HI 40 MIN: CPT

## 2025-02-24 PROCEDURE — G2211 COMPLEX E/M VISIT ADD ON: CPT

## 2025-02-24 RX ORDER — CLOTRIMAZOLE 10 MG/1
10 LOZENGE ORAL 3 TIMES DAILY
Qty: 21 | Refills: 1 | Status: ACTIVE | COMMUNITY
Start: 2025-02-24 | End: 1900-01-01

## 2025-02-24 RX ORDER — FAMOTIDINE 40 MG/1
40 TABLET, FILM COATED ORAL
Qty: 90 | Refills: 0 | Status: ACTIVE | COMMUNITY
Start: 2025-02-24 | End: 1900-01-01

## 2025-02-25 PROBLEM — B37.0 THRUSH: Status: ACTIVE | Noted: 2025-02-24

## 2025-02-25 PROBLEM — K14.0 TRAUMATIC ULCERATION OF TONGUE: Status: ACTIVE | Noted: 2025-02-25

## 2025-03-20 ENCOUNTER — APPOINTMENT (OUTPATIENT)
Dept: OTOLARYNGOLOGY | Facility: CLINIC | Age: 75
End: 2025-03-20
Payer: MEDICARE

## 2025-03-20 VITALS
BODY MASS INDEX: 24.17 KG/M2 | OXYGEN SATURATION: 97 % | HEIGHT: 61 IN | DIASTOLIC BLOOD PRESSURE: 77 MMHG | HEART RATE: 71 BPM | WEIGHT: 128 LBS | SYSTOLIC BLOOD PRESSURE: 117 MMHG

## 2025-03-20 DIAGNOSIS — M26.609 UNSPECIFIED TEMPOROMANDIBULAR JOINT DISORDER: ICD-10-CM

## 2025-03-20 DIAGNOSIS — H92.02 OTALGIA, LEFT EAR: ICD-10-CM

## 2025-03-20 PROCEDURE — 99203 OFFICE O/P NEW LOW 30 MIN: CPT

## 2025-03-25 PROBLEM — M26.609 TMJ DYSFUNCTION: Status: ACTIVE | Noted: 2025-03-25

## 2025-03-25 PROBLEM — H92.02 OTALGIA, LEFT EAR: Status: ACTIVE | Noted: 2025-03-25

## 2025-04-02 ENCOUNTER — NON-APPOINTMENT (OUTPATIENT)
Age: 75
End: 2025-04-02

## 2025-04-02 ENCOUNTER — APPOINTMENT (OUTPATIENT)
Dept: NEUROSURGERY | Facility: CLINIC | Age: 75
End: 2025-04-02

## 2025-04-21 ENCOUNTER — RX RENEWAL (OUTPATIENT)
Age: 75
End: 2025-04-21

## 2025-05-12 ENCOUNTER — NON-APPOINTMENT (OUTPATIENT)
Age: 75
End: 2025-05-12

## 2025-05-14 ENCOUNTER — APPOINTMENT (OUTPATIENT)
Dept: INTERNAL MEDICINE | Facility: CLINIC | Age: 75
End: 2025-05-14

## 2025-05-14 ENCOUNTER — LABORATORY RESULT (OUTPATIENT)
Age: 75
End: 2025-05-14

## 2025-05-14 VITALS
OXYGEN SATURATION: 98 % | HEIGHT: 61 IN | WEIGHT: 132 LBS | SYSTOLIC BLOOD PRESSURE: 118 MMHG | BODY MASS INDEX: 24.92 KG/M2 | DIASTOLIC BLOOD PRESSURE: 64 MMHG | TEMPERATURE: 97.8 F | RESPIRATION RATE: 17 BRPM | HEART RATE: 71 BPM

## 2025-05-14 DIAGNOSIS — L30.9 DERMATITIS, UNSPECIFIED: ICD-10-CM

## 2025-05-14 DIAGNOSIS — K64.9 UNSPECIFIED HEMORRHOIDS: ICD-10-CM

## 2025-05-14 DIAGNOSIS — J44.9 CHRONIC OBSTRUCTIVE PULMONARY DISEASE, UNSPECIFIED: ICD-10-CM

## 2025-05-14 DIAGNOSIS — M25.572 PAIN IN LEFT ANKLE AND JOINTS OF LEFT FOOT: ICD-10-CM

## 2025-05-14 DIAGNOSIS — I61.9 NONTRAUMATIC INTRACEREBRAL HEMORRHAGE, UNSPECIFIED: ICD-10-CM

## 2025-05-14 DIAGNOSIS — Z91.89 OTHER SPECIFIED PERSONAL RISK FACTORS, NOT ELSEWHERE CLASSIFIED: ICD-10-CM

## 2025-05-14 DIAGNOSIS — M81.0 AGE-RELATED OSTEOPOROSIS W/OUT CURRENT PATHOLOGICAL FRACTURE: ICD-10-CM

## 2025-05-14 DIAGNOSIS — E74.39 OTHER DISORDERS OF INTESTINAL CARBOHYDRATE ABSORPTION: ICD-10-CM

## 2025-05-14 DIAGNOSIS — F41.9 ANXIETY DISORDER, UNSPECIFIED: ICD-10-CM

## 2025-05-14 DIAGNOSIS — Z86.39 PERSONAL HISTORY OF OTHER ENDOCRINE, NUTRITIONAL AND METABOLIC DISEASE: ICD-10-CM

## 2025-05-14 DIAGNOSIS — K59.09 OTHER CONSTIPATION: ICD-10-CM

## 2025-05-14 DIAGNOSIS — R21 RASH AND OTHER NONSPECIFIC SKIN ERUPTION: ICD-10-CM

## 2025-05-14 DIAGNOSIS — F43.22 ADJUSTMENT DISORDER WITH ANXIETY: ICD-10-CM

## 2025-05-14 DIAGNOSIS — K21.9 GASTRO-ESOPHAGEAL REFLUX DISEASE W/OUT ESOPHAGITIS: ICD-10-CM

## 2025-05-14 DIAGNOSIS — F45.8 OTHER SOMATOFORM DISORDERS: ICD-10-CM

## 2025-05-14 DIAGNOSIS — B37.0 CANDIDAL STOMATITIS: ICD-10-CM

## 2025-05-14 PROCEDURE — 36415 COLL VENOUS BLD VENIPUNCTURE: CPT

## 2025-05-14 PROCEDURE — 99215 OFFICE O/P EST HI 40 MIN: CPT | Mod: 25

## 2025-05-14 RX ORDER — HYDROCORTISONE 25 MG/G
2.5 CREAM TOPICAL
Qty: 1 | Refills: 0 | Status: ACTIVE | COMMUNITY
Start: 2025-05-14 | End: 1900-01-01

## 2025-05-14 RX ORDER — CLOTRIMAZOLE 10 MG/1
10 LOZENGE ORAL 3 TIMES DAILY
Qty: 21 | Refills: 1 | Status: ACTIVE | COMMUNITY
Start: 2025-05-14 | End: 1900-01-01

## 2025-05-15 ENCOUNTER — RX RENEWAL (OUTPATIENT)
Age: 75
End: 2025-05-15

## 2025-05-15 LAB
25(OH)D3 SERPL-MCNC: 48.5 NG/ML
ALBUMIN SERPL ELPH-MCNC: 4.6 G/DL
ALP BLD-CCNC: 84 U/L
ALT SERPL-CCNC: 23 U/L
ANION GAP SERPL CALC-SCNC: 18 MMOL/L
APPEARANCE: CLEAR
AST SERPL-CCNC: 22 U/L
BASOPHILS # BLD AUTO: 0.01 K/UL
BASOPHILS NFR BLD AUTO: 0.2 %
BILIRUB SERPL-MCNC: 0.4 MG/DL
BILIRUBIN URINE: NEGATIVE
BLOOD URINE: NEGATIVE
BUN SERPL-MCNC: 11 MG/DL
CALCIUM SERPL-MCNC: 9.9 MG/DL
CHLORIDE SERPL-SCNC: 103 MMOL/L
CHOLEST SERPL-MCNC: 189 MG/DL
CO2 SERPL-SCNC: 22 MMOL/L
COLOR: YELLOW
CREAT SERPL-MCNC: 0.55 MG/DL
EGFRCR SERPLBLD CKD-EPI 2021: 96 ML/MIN/1.73M2
EOSINOPHIL # BLD AUTO: 0.06 K/UL
EOSINOPHIL NFR BLD AUTO: 0.9 %
ESTIMATED AVERAGE GLUCOSE: 94 MG/DL
GGT SERPL-CCNC: 12 U/L
GLUCOSE QUALITATIVE U: NEGATIVE MG/DL
GLUCOSE SERPL-MCNC: 93 MG/DL
HBA1C MFR BLD HPLC: 4.9 %
HCT VFR BLD CALC: 44.2 %
HDLC SERPL-MCNC: 49 MG/DL
HGB BLD-MCNC: 14.8 G/DL
IMM GRANULOCYTES NFR BLD AUTO: 0.3 %
KETONES URINE: NEGATIVE MG/DL
LDLC SERPL-MCNC: 100 MG/DL
LEUKOCYTE ESTERASE URINE: ABNORMAL
LYMPHOCYTES # BLD AUTO: 1.89 K/UL
LYMPHOCYTES NFR BLD AUTO: 29.6 %
MAN DIFF?: NORMAL
MCHC RBC-ENTMCNC: 30.8 PG
MCHC RBC-ENTMCNC: 33.5 G/DL
MCV RBC AUTO: 92.1 FL
MONOCYTES # BLD AUTO: 0.45 K/UL
MONOCYTES NFR BLD AUTO: 7 %
NEUTROPHILS # BLD AUTO: 3.96 K/UL
NEUTROPHILS NFR BLD AUTO: 62 %
NITRITE URINE: NEGATIVE
NONHDLC SERPL-MCNC: 141 MG/DL
PH URINE: 7.5
PLATELET # BLD AUTO: 227 K/UL
POTASSIUM SERPL-SCNC: 4 MMOL/L
PROT SERPL-MCNC: 6.7 G/DL
PROTEIN URINE: NEGATIVE MG/DL
RBC # BLD: 4.8 M/UL
RBC # FLD: 13.2 %
SODIUM SERPL-SCNC: 143 MMOL/L
SPECIFIC GRAVITY URINE: 1.01
TRIGL SERPL-MCNC: 240 MG/DL
TSH SERPL-ACNC: 2.57 UIU/ML
UROBILINOGEN URINE: 0.2 MG/DL
WBC # FLD AUTO: 6.39 K/UL

## 2025-05-15 NOTE — PROGRESS NOTE ADULT - ASSESSMENT
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 Noted to have UTI, and BL Distal DVT, now admitted for rehab- pt/ot/dvt ppx    Intraparenchymal Hemorrhage  - Abnormal EEG and Briviact 50mg BID started for seizure ppx  - TTE- EF 64%.    #HTN  -BP was noted to be low, losartan stopped   - Goal BP <130mmhg Average.   - encourage PO hydration  - monitor BP    #bradycardia  -likely neurogenic as per cardio eval prev  -monitor hr, avoid rate controlling agents    #UTI  - s/p Vantin 100mg BID (completed)  --resolved    #HLD  atorvastatin     #DVT   - Distal asymptomatic DVT  - continue prophylactic dose    will follow  d/w rehab team  normal sinus rhythm

## 2025-05-20 ENCOUNTER — TRANSCRIPTION ENCOUNTER (OUTPATIENT)
Age: 75
End: 2025-05-20

## 2025-05-20 PROBLEM — F45.8 BRUXISM (TEETH GRINDING): Status: ACTIVE | Noted: 2025-05-20

## 2025-05-20 RX ORDER — CYCLOBENZAPRINE HYDROCHLORIDE 5 MG/1
5 TABLET, FILM COATED ORAL
Qty: 30 | Refills: 0 | Status: ACTIVE | COMMUNITY
Start: 2025-05-20 | End: 1900-01-01

## 2025-06-25 ENCOUNTER — RX RENEWAL (OUTPATIENT)
Age: 75
End: 2025-06-25

## 2025-07-15 ENCOUNTER — APPOINTMENT (OUTPATIENT)
Dept: PHYSICAL MEDICINE AND REHAB | Facility: CLINIC | Age: 75
End: 2025-07-15
Payer: MEDICARE

## 2025-07-15 VITALS
TEMPERATURE: 97.6 F | DIASTOLIC BLOOD PRESSURE: 76 MMHG | HEART RATE: 72 BPM | SYSTOLIC BLOOD PRESSURE: 118 MMHG | BODY MASS INDEX: 24.73 KG/M2 | WEIGHT: 131 LBS | OXYGEN SATURATION: 98 % | RESPIRATION RATE: 17 BRPM | HEIGHT: 61 IN

## 2025-07-15 PROCEDURE — 99214 OFFICE O/P EST MOD 30 MIN: CPT

## 2025-07-21 ENCOUNTER — NON-APPOINTMENT (OUTPATIENT)
Age: 75
End: 2025-07-21

## 2025-07-21 ENCOUNTER — TRANSCRIPTION ENCOUNTER (OUTPATIENT)
Age: 75
End: 2025-07-21

## 2025-07-21 PROBLEM — R27.0 ATAXIA: Status: ACTIVE | Noted: 2025-07-15

## 2025-08-27 ENCOUNTER — TRANSCRIPTION ENCOUNTER (OUTPATIENT)
Age: 75
End: 2025-08-27